# Patient Record
Sex: MALE | Race: WHITE | Employment: OTHER | URBAN - METROPOLITAN AREA
[De-identification: names, ages, dates, MRNs, and addresses within clinical notes are randomized per-mention and may not be internally consistent; named-entity substitution may affect disease eponyms.]

---

## 2017-01-18 PROBLEM — Z95.2 S/P AVR: Status: ACTIVE | Noted: 2017-01-18

## 2017-01-18 PROBLEM — Z95.1 S/P CABG (CORONARY ARTERY BYPASS GRAFT): Status: ACTIVE | Noted: 2017-01-18

## 2017-02-01 ENCOUNTER — NURSE ONLY (OUTPATIENT)
Dept: INTERNAL MEDICINE CLINIC | Facility: CLINIC | Age: 77
End: 2017-02-01

## 2017-02-01 ENCOUNTER — APPOINTMENT (OUTPATIENT)
Dept: LAB | Age: 77
End: 2017-02-01
Attending: INTERNAL MEDICINE
Payer: MEDICARE

## 2017-02-01 DIAGNOSIS — M35.3 POLYMYALGIA RHEUMATICA (HCC): Primary | ICD-10-CM

## 2017-02-01 DIAGNOSIS — R79.83 HOMOCYSTEINEMIA: ICD-10-CM

## 2017-02-01 DIAGNOSIS — M35.3 POLYMYALGIA RHEUMATICA (HCC): ICD-10-CM

## 2017-02-01 DIAGNOSIS — Z00.00 LABORATORY EXAMINATION ORDERED AS PART OF A ROUTINE GENERAL MEDICAL EXAMINATION: ICD-10-CM

## 2017-02-01 LAB
HOMOCYSTEINE: 9.4 UMOL/L (ref 5–13.9)
SED RATE-ML: 27 MM/HR (ref 0–12)

## 2017-02-01 PROCEDURE — 36415 COLL VENOUS BLD VENIPUNCTURE: CPT

## 2017-02-01 PROCEDURE — 83090 ASSAY OF HOMOCYSTEINE: CPT

## 2017-02-01 PROCEDURE — 93000 ELECTROCARDIOGRAM COMPLETE: CPT | Performed by: INTERNAL MEDICINE

## 2017-02-01 PROCEDURE — 94010 BREATHING CAPACITY TEST: CPT | Performed by: INTERNAL MEDICINE

## 2017-02-01 PROCEDURE — 99173 VISUAL ACUITY SCREEN: CPT | Performed by: INTERNAL MEDICINE

## 2017-02-01 PROCEDURE — 92551 PURE TONE HEARING TEST AIR: CPT | Performed by: INTERNAL MEDICINE

## 2017-02-01 PROCEDURE — 85652 RBC SED RATE AUTOMATED: CPT

## 2017-02-01 NOTE — PROGRESS NOTES
*LOOKIN' BODY RESULTS    YES: X      NO:       REASON TEST NOT PERFORMED:        HEIGHT: 5'11.5   WEIGHT: 173  _____________________________________________________________________________  *VENIPUNCTURE    YES:       NO:  X      REASON VENIPUNCTURE NOT PE

## 2017-02-02 RX ORDER — ROSUVASTATIN CALCIUM 20 MG/1
TABLET, FILM COATED ORAL
Qty: 90 TABLET | Refills: 0 | Status: SHIPPED | OUTPATIENT
Start: 2017-02-02 | End: 2017-04-27

## 2017-02-02 RX ORDER — PREDNISONE 1 MG/1
TABLET ORAL
Qty: 45 TABLET | Refills: 0 | Status: SHIPPED | OUTPATIENT
Start: 2017-02-02 | End: 2017-04-27

## 2017-02-02 RX ORDER — OMEPRAZOLE 10 MG/1
CAPSULE, DELAYED RELEASE ORAL
Qty: 90 CAPSULE | Refills: 0 | Status: SHIPPED | OUTPATIENT
Start: 2017-02-02 | End: 2017-04-27

## 2017-02-02 RX ORDER — CARVEDILOL 6.25 MG/1
TABLET ORAL
Qty: 180 TABLET | Refills: 0 | Status: SHIPPED | OUTPATIENT
Start: 2017-02-02 | End: 2017-04-27

## 2017-02-07 ENCOUNTER — MED REC SCAN ONLY (OUTPATIENT)
Dept: INTERNAL MEDICINE CLINIC | Facility: CLINIC | Age: 77
End: 2017-02-07

## 2017-02-09 NOTE — PROGRESS NOTES
HEALTH MAINTENANCE:        Immunization History  Administered            Date(s) Administered    Influenza             11/06/2013      Influenza Vaccine, High Dose, Preserv Free                          10/20/2014  11/30/2015      Influenza Virus Vaccine, Left Ear @ 25dBHL - 4000 Hz: Fail Right Ear @ 25dBHL - 4000 Hz: Fail   Left Ear @ 40dBHL - 500 Hz: Pass Right Ear @ 40 dBHL - 500 Hz: Pass   Left Ear @ 40dBHL - 1000 Hz: Pass Right Ear @ 40 dBHL - 1000 Hz: Pass   Left Ear @ 40dBHL - 2000 Hz: Pass Right E

## 2017-02-15 ENCOUNTER — OFFICE VISIT (OUTPATIENT)
Dept: INTERNAL MEDICINE CLINIC | Facility: CLINIC | Age: 77
End: 2017-02-15

## 2017-02-15 VITALS
WEIGHT: 173 LBS | HEIGHT: 71.5 IN | RESPIRATION RATE: 12 BRPM | TEMPERATURE: 97 F | OXYGEN SATURATION: 97 % | BODY MASS INDEX: 23.69 KG/M2 | HEART RATE: 66 BPM | SYSTOLIC BLOOD PRESSURE: 135 MMHG | DIASTOLIC BLOOD PRESSURE: 75 MMHG

## 2017-02-15 DIAGNOSIS — R80.8 OTHER PROTEINURIA: ICD-10-CM

## 2017-02-15 DIAGNOSIS — Z79.52 LONG TERM CURRENT USE OF SYSTEMIC STEROIDS: ICD-10-CM

## 2017-02-15 DIAGNOSIS — Z13.89 SCREENING FOR GENITOURINARY CONDITION: ICD-10-CM

## 2017-02-15 DIAGNOSIS — Z00.01 ENCOUNTER FOR GENERAL ADULT MEDICAL EXAMINATION WITH ABNORMAL FINDINGS: Primary | ICD-10-CM

## 2017-02-15 DIAGNOSIS — E55.9 VITAMIN D DEFICIENCY: ICD-10-CM

## 2017-02-15 DIAGNOSIS — M85.80 OSTEOPENIA, UNSPECIFIED LOCATION: ICD-10-CM

## 2017-02-15 LAB
APPEARANCE: CLEAR
CREAT UR-SCNC: 289 MG/DL
GLUCOSE (URINE DIPSTICK): NEGATIVE MG/DL
LEUKOCYTES: NEGATIVE
MICROALBUMIN UR-MCNC: 6.22 MG/DL
MICROALBUMIN/CREAT 24H UR-RTO: 21.5 UG/MG (ref ?–30)
MULTISTIX LOT#: ABNORMAL NUMERIC
NITRITE, URINE: NEGATIVE
OCCULT BLOOD: NEGATIVE
PH, URINE: 5.5 (ref 4.5–8)
PROTEIN (URINE DIPSTICK): 30 MG/DL
SPECIFIC GRAVITY: 1.03 (ref 1–1.03)
UROBILINOGEN,SEMI-QN: 0.2 MG/DL (ref 0–1.9)

## 2017-02-15 PROCEDURE — 82570 ASSAY OF URINE CREATININE: CPT | Performed by: INTERNAL MEDICINE

## 2017-02-15 PROCEDURE — G0439 PPPS, SUBSEQ VISIT: HCPCS | Performed by: INTERNAL MEDICINE

## 2017-02-15 PROCEDURE — 81003 URINALYSIS AUTO W/O SCOPE: CPT | Performed by: INTERNAL MEDICINE

## 2017-02-15 PROCEDURE — 82043 UR ALBUMIN QUANTITATIVE: CPT | Performed by: INTERNAL MEDICINE

## 2017-02-16 NOTE — PROGRESS NOTES
1      REASON FOR VISIT: [facility]/Medicare annual on 02/15/2017:    1. First consideration is cardiovascular: history of hypertension, lipids, and stents along with triple bypass and AVR in 2013, all doing well on meds.  Recent echo and nuclear stress 07/2015 Plus.  5. Snoring, recommended you have a home sleep study in the past-reconsider. CONCLUSIONS: Chris Gaines, you have done very well with your multiple medical problems, stay on your current medicines and supplements except B12.  Will send a copy of these res

## 2017-02-16 NOTE — PROGRESS NOTES
HPI:    Patient ID: Britney Urbina is a 78year old male here for annual 2-15-17    HPI    Multiple stable medical problems:      Review of Systems   Constitutional: Positive for fatigue. Negative for fever.    Eyes:        [place] eye   Respiratory: Vitamin K 100 MCG Oral Tab Take 100 mcg by mouth daily.  Disp:  Rfl:    Probiotic Product ([address]) Oral Cap One a day Disp:  Rfl:    Aspirin 325 MG Oral Tab EC Take one a day Disp: 120 Tab Rfl: 0   Cyanocobalamin (VITAMIN B 12 OR) Take  by gastroesophageal reflux     Vitamin D deficiency     Hyperhomocysteinemia (HCC)     Diverticulosis     Colon polyps     Dyslipidemia     Carotid artery plaque     Renal cyst, left     Osteopenia     S/P AVR     S/P CABG (coronary artery bypass graft)     R Comment [place]-neg    SKIN TAGS  10/1714    Comment plus histofreeze    CORTISONE INJECTION  7/6/15    Comment rt shoulder        Family History:  Family History   Problem Relation Age of Onset   • Neurological Disorder Father      parkinsons   • index is 23.79 kg/(m^2).       Health Screens    Anxiety Index: No major anxiety evident  Depression Index: No major depression evident  Cage Questionnaire: No indication of alcohol abuse  Sleepiness Index: Normal  Male Sexual Health Inventory: Normal  [place] Normal 5.6    TSH: Normal 1.770    PSA: Normal 0.659    CBC: Normal     URINALYSIS: Trace protein so special MAB sent=21.5 a little high-just watch  ___    Patient Active Problem List  Sed Rate Date: 02/01/2017   Value: 27* Ref Range 0-12 mm/Hr Status: Fin

## 2017-02-17 ENCOUNTER — TELEPHONE (OUTPATIENT)
Dept: INTERNAL MEDICINE CLINIC | Facility: CLINIC | Age: 77
End: 2017-02-17

## 2017-02-17 DIAGNOSIS — I35.0 NONRHEUMATIC AORTIC VALVE STENOSIS: ICD-10-CM

## 2017-02-17 DIAGNOSIS — E72.11 HYPERHOMOCYSTEINEMIA (HCC): ICD-10-CM

## 2017-02-17 DIAGNOSIS — I15.9 SECONDARY HYPERTENSION: Primary | ICD-10-CM

## 2017-02-17 DIAGNOSIS — I25.10 CORONARY ARTERY DISEASE INVOLVING NATIVE CORONARY ARTERY OF NATIVE HEART WITHOUT ANGINA PECTORIS: ICD-10-CM

## 2017-02-17 DIAGNOSIS — E53.8 B12 DEFICIENCY: ICD-10-CM

## 2017-02-17 NOTE — TELEPHONE ENCOUNTER
Patient declined unattended sleep study test at this time. (at home test). Informed patient of labs due in 3 months.

## 2017-03-13 ENCOUNTER — MED REC SCAN ONLY (OUTPATIENT)
Dept: INTERNAL MEDICINE CLINIC | Facility: CLINIC | Age: 77
End: 2017-03-13

## 2017-04-27 RX ORDER — OMEPRAZOLE 10 MG/1
CAPSULE, DELAYED RELEASE ORAL
Qty: 90 CAPSULE | Refills: 3 | Status: SHIPPED | OUTPATIENT
Start: 2017-04-27 | End: 2018-02-28

## 2017-04-27 RX ORDER — ROSUVASTATIN CALCIUM 20 MG/1
TABLET, FILM COATED ORAL
Qty: 90 TABLET | Refills: 3 | Status: SHIPPED | OUTPATIENT
Start: 2017-04-27 | End: 2018-05-01

## 2017-04-27 RX ORDER — PREDNISONE 1 MG/1
TABLET ORAL
Qty: 45 TABLET | Refills: 3 | Status: SHIPPED | OUTPATIENT
Start: 2017-04-27 | End: 2018-05-01

## 2017-04-27 RX ORDER — AMLODIPINE BESYLATE 5 MG/1
TABLET ORAL
Qty: 90 TABLET | Refills: 3 | Status: SHIPPED | OUTPATIENT
Start: 2017-04-27 | End: 2018-05-01

## 2017-04-27 RX ORDER — CARVEDILOL 6.25 MG/1
TABLET ORAL
Qty: 180 TABLET | Refills: 3 | Status: SHIPPED | OUTPATIENT
Start: 2017-04-27 | End: 2018-05-01

## 2017-05-01 ENCOUNTER — NURSE ONLY (OUTPATIENT)
Dept: INTERNAL MEDICINE CLINIC | Facility: CLINIC | Age: 77
End: 2017-05-01

## 2017-05-01 ENCOUNTER — APPOINTMENT (OUTPATIENT)
Dept: LAB | Age: 77
End: 2017-05-01
Attending: INTERNAL MEDICINE
Payer: MEDICARE

## 2017-05-01 DIAGNOSIS — I15.9 SECONDARY HYPERTENSION: ICD-10-CM

## 2017-05-01 DIAGNOSIS — I35.0 NONRHEUMATIC AORTIC VALVE STENOSIS: ICD-10-CM

## 2017-05-01 DIAGNOSIS — E72.11 HYPERHOMOCYSTEINEMIA (HCC): ICD-10-CM

## 2017-05-01 DIAGNOSIS — E53.8 B12 DEFICIENCY: ICD-10-CM

## 2017-05-01 DIAGNOSIS — I25.10 CORONARY ARTERY DISEASE INVOLVING NATIVE CORONARY ARTERY OF NATIVE HEART WITHOUT ANGINA PECTORIS: ICD-10-CM

## 2017-05-01 PROCEDURE — 85652 RBC SED RATE AUTOMATED: CPT

## 2017-05-01 PROCEDURE — 36415 COLL VENOUS BLD VENIPUNCTURE: CPT

## 2017-05-01 PROCEDURE — 82607 VITAMIN B-12: CPT

## 2017-05-01 PROCEDURE — 83090 ASSAY OF HOMOCYSTEINE: CPT

## 2017-05-03 ENCOUNTER — TELEPHONE (OUTPATIENT)
Dept: INTERNAL MEDICINE CLINIC | Facility: CLINIC | Age: 77
End: 2017-05-03

## 2017-05-03 ENCOUNTER — MED REC SCAN ONLY (OUTPATIENT)
Dept: INTERNAL MEDICINE CLINIC | Facility: CLINIC | Age: 77
End: 2017-05-03

## 2017-05-04 NOTE — PROGRESS NOTES
MPO collected and sent to [facility] for testing.  Per pt sent to satellite lab for additional labs to do through [facility]

## 2017-05-15 ENCOUNTER — OFFICE VISIT (OUTPATIENT)
Dept: INTERNAL MEDICINE CLINIC | Facility: CLINIC | Age: 77
End: 2017-05-15

## 2017-05-15 VITALS
HEART RATE: 77 BPM | RESPIRATION RATE: 12 BRPM | OXYGEN SATURATION: 99 % | WEIGHT: 176 LBS | BODY MASS INDEX: 24 KG/M2 | TEMPERATURE: 98 F | SYSTOLIC BLOOD PRESSURE: 138 MMHG | DIASTOLIC BLOOD PRESSURE: 75 MMHG

## 2017-05-15 DIAGNOSIS — Z95.1 S/P CABG (CORONARY ARTERY BYPASS GRAFT): ICD-10-CM

## 2017-05-15 DIAGNOSIS — I35.0 NONRHEUMATIC AORTIC VALVE STENOSIS: ICD-10-CM

## 2017-05-15 DIAGNOSIS — Z95.2 S/P AVR: ICD-10-CM

## 2017-05-15 DIAGNOSIS — M35.3 PMR (POLYMYALGIA RHEUMATICA) (HCC): Primary | ICD-10-CM

## 2017-05-15 DIAGNOSIS — I25.10 CORONARY ARTERY DISEASE INVOLVING NATIVE CORONARY ARTERY OF NATIVE HEART WITHOUT ANGINA PECTORIS: ICD-10-CM

## 2017-05-15 DIAGNOSIS — Z83.6 FAMILY HISTORY OF PULMONARY FIBROSIS: ICD-10-CM

## 2017-05-15 PROCEDURE — 99214 OFFICE O/P EST MOD 30 MIN: CPT | Performed by: INTERNAL MEDICINE

## 2017-05-17 NOTE — PROGRESS NOTES
HPI:    Patient ID: Lexus Garcia is a 78year old male PMR and heart disease stable    Joint Pain  This is a chronic (PMR) problem. The current episode started more than 1 year ago. The problem occurs constantly. The problem has been unchanged.  Associat (CLOBETASOL PROPIONATE E) 0.05 % Apply Externally Cream Apply  topically 2 (two) times daily. prn Disp:  Rfl:    Vitamin K 100 MCG Oral Tab Take 100 mcg by mouth daily.  Disp:  Rfl:    Probiotic Product ([address]) Oral Cap One a day Disp:  Rfl:

## 2017-09-22 ENCOUNTER — TELEPHONE (OUTPATIENT)
Dept: INTERNAL MEDICINE CLINIC | Facility: CLINIC | Age: 77
End: 2017-09-22

## 2017-09-22 ENCOUNTER — APPOINTMENT (OUTPATIENT)
Dept: LAB | Age: 77
End: 2017-09-22
Attending: INTERNAL MEDICINE
Payer: MEDICARE

## 2017-09-22 DIAGNOSIS — I25.10 CORONARY ARTERY DISEASE, ANGINA PRESENCE UNSPECIFIED, UNSPECIFIED VESSEL OR LESION TYPE, UNSPECIFIED WHETHER NATIVE OR TRANSPLANTED HEART: ICD-10-CM

## 2017-09-22 DIAGNOSIS — E72.11 HYPERHOMOCYSTEINEMIA (HCC): ICD-10-CM

## 2017-09-22 DIAGNOSIS — M35.3 PMR (POLYMYALGIA RHEUMATICA) (HCC): ICD-10-CM

## 2017-09-22 DIAGNOSIS — E53.8 VITAMIN B12 DEFICIENCY: ICD-10-CM

## 2017-09-22 DIAGNOSIS — I10 HYPERTENSION, UNSPECIFIED TYPE: Primary | ICD-10-CM

## 2017-09-22 PROCEDURE — 83090 ASSAY OF HOMOCYSTEINE: CPT

## 2017-09-22 PROCEDURE — 82607 VITAMIN B-12: CPT

## 2017-09-22 PROCEDURE — 36415 COLL VENOUS BLD VENIPUNCTURE: CPT

## 2017-09-22 PROCEDURE — 85652 RBC SED RATE AUTOMATED: CPT

## 2017-09-22 NOTE — TELEPHONE ENCOUNTER
Elevated MPO in Feb. We raised  qd to bid and asked for 3mo redo = May so past due. Can ask [facility] to send to [facility] or redraw Mon.

## 2017-09-22 NOTE — TELEPHONE ENCOUNTER
Pt in lab thought he needed blood work what would Dr Lesly Greer like? ?  vo dr Obi Olsen sed rate, vit b12 and homocystine lab orders placed lab aware

## 2017-09-22 NOTE — TELEPHONE ENCOUNTER
Pt has questions regarding follow up labs please call  I put in sed rate,Vitamin B12 and homocystine already

## 2017-09-26 ENCOUNTER — NURSE ONLY (OUTPATIENT)
Dept: INTERNAL MEDICINE CLINIC | Facility: CLINIC | Age: 77
End: 2017-09-26

## 2017-09-26 NOTE — PROGRESS NOTES
Patient here for an MPO bloo draw to be sent to [facility].     Patient drawn on right arm by Jose Quintero

## 2017-10-02 ENCOUNTER — MED REC SCAN ONLY (OUTPATIENT)
Dept: INTERNAL MEDICINE CLINIC | Facility: CLINIC | Age: 77
End: 2017-10-02

## 2017-10-05 ENCOUNTER — OFFICE VISIT (OUTPATIENT)
Dept: INTERNAL MEDICINE CLINIC | Facility: CLINIC | Age: 77
End: 2017-10-05

## 2017-10-05 VITALS
BODY MASS INDEX: 24 KG/M2 | HEART RATE: 62 BPM | RESPIRATION RATE: 12 BRPM | OXYGEN SATURATION: 98 % | SYSTOLIC BLOOD PRESSURE: 140 MMHG | WEIGHT: 175 LBS | TEMPERATURE: 98 F | DIASTOLIC BLOOD PRESSURE: 68 MMHG

## 2017-10-05 DIAGNOSIS — E72.11 HYPERHOMOCYSTEINEMIA (HCC): ICD-10-CM

## 2017-10-05 DIAGNOSIS — M35.3 PMR (POLYMYALGIA RHEUMATICA) (HCC): Primary | ICD-10-CM

## 2017-10-05 DIAGNOSIS — Z23 NEED FOR INFLUENZA VACCINATION: ICD-10-CM

## 2017-10-05 DIAGNOSIS — E53.8 VITAMIN B12 DEFICIENCY: ICD-10-CM

## 2017-10-05 PROCEDURE — G0008 ADMIN INFLUENZA VIRUS VAC: HCPCS | Performed by: INTERNAL MEDICINE

## 2017-10-05 PROCEDURE — 90653 IIV ADJUVANT VACCINE IM: CPT | Performed by: INTERNAL MEDICINE

## 2017-10-05 PROCEDURE — 99213 OFFICE O/P EST LOW 20 MIN: CPT | Performed by: INTERNAL MEDICINE

## 2017-10-05 PROCEDURE — 96372 THER/PROPH/DIAG INJ SC/IM: CPT | Performed by: INTERNAL MEDICINE

## 2017-10-05 RX ORDER — CYANOCOBALAMIN 1000 UG/ML
1000 INJECTION INTRAMUSCULAR; SUBCUTANEOUS ONCE
Status: COMPLETED | OUTPATIENT
Start: 2017-10-05 | End: 2017-10-05

## 2017-10-05 RX ADMIN — CYANOCOBALAMIN 1000 MCG: 1000 INJECTION INTRAMUSCULAR; SUBCUTANEOUS at 12:07:00

## 2017-10-05 NOTE — PROGRESS NOTES
HPI:    Patient ID: Roxy Monet is a 78year old male hx PMR recent ESR stable 28 on lodose Pred. Recently added B12 for low B12 and high homo    On B12 for level in 200s and high homo >11. Tired, minor aches and pains.         Review of Systems   Const Cyanocobalamin (VITAMIN B 12 OR) Take 1,000 mcg by mouth daily. Disp:  Rfl:    Riboflavin 100 MG Oral Cap Take  by mouth 3 (three) times a week. m-w-fr Disp:  Rfl:    Nutritional Supplements (JUICE PLUS FIBRE OR) Take  by mouth.  Disp:  Rfl:    Betaine,

## 2017-10-09 ENCOUNTER — TELEPHONE (OUTPATIENT)
Dept: INTERNAL MEDICINE CLINIC | Facility: CLINIC | Age: 77
End: 2017-10-09

## 2017-10-09 NOTE — TELEPHONE ENCOUNTER
As instructed  called pt to schedule OV with Dr. Colleen Navas to go over stress test.  Patient states he had OV last week with Dr. Colleen Navas who told him make sure copies of both cardiac tests (he's having another one done Thursday)  are sent to him and no

## 2017-10-09 NOTE — TELEPHONE ENCOUNTER
Per Dr Nikos Young - once pt has stress test, he would like to see pt 3-4 days later to f/u. Please contact pt to get in Thurs/Friday.   He had stress test this am.

## 2018-01-05 ENCOUNTER — TELEPHONE (OUTPATIENT)
Dept: INTERNAL MEDICINE CLINIC | Facility: CLINIC | Age: 78
End: 2018-01-05

## 2018-01-05 RX ORDER — CODEINE PHOSPHATE AND GUAIFENESIN 10; 100 MG/5ML; MG/5ML
5 SOLUTION ORAL EVERY 6 HOURS PRN
Qty: 118 ML | Refills: 0 | Status: CANCELLED | OUTPATIENT
Start: 2018-01-05

## 2018-01-05 RX ORDER — OSELTAMIVIR PHOSPHATE 75 MG/1
75 CAPSULE ORAL 2 TIMES DAILY
Qty: 10 CAPSULE | Refills: 0 | Status: SHIPPED | OUTPATIENT
Start: 2018-01-05 | End: 2018-01-06

## 2018-01-06 ENCOUNTER — TELEPHONE (OUTPATIENT)
Dept: INTERNAL MEDICINE CLINIC | Facility: CLINIC | Age: 78
End: 2018-01-06

## 2018-01-06 RX ORDER — OSELTAMIVIR PHOSPHATE 75 MG/1
75 CAPSULE ORAL 2 TIMES DAILY
Qty: 10 CAPSULE | Refills: 0 | Status: SHIPPED | OUTPATIENT
Start: 2018-01-06 | End: 2018-02-26 | Stop reason: ALTCHOICE

## 2018-01-08 ENCOUNTER — TELEPHONE (OUTPATIENT)
Dept: INTERNAL MEDICINE CLINIC | Facility: CLINIC | Age: 78
End: 2018-01-08

## 2018-01-08 NOTE — TELEPHONE ENCOUNTER
Per Dr Aviles Matters - call to get condition update on pt. Wife was positive flu; pt tx as well. Spoke to pt wife; she states pt is doing better.

## 2018-01-24 ENCOUNTER — TELEPHONE (OUTPATIENT)
Dept: INTERNAL MEDICINE CLINIC | Facility: CLINIC | Age: 78
End: 2018-01-24

## 2018-02-16 ENCOUNTER — NURSE ONLY (OUTPATIENT)
Dept: INTERNAL MEDICINE CLINIC | Facility: CLINIC | Age: 78
End: 2018-02-16

## 2018-02-16 ENCOUNTER — APPOINTMENT (OUTPATIENT)
Dept: LAB | Age: 78
End: 2018-02-16
Attending: INTERNAL MEDICINE
Payer: MEDICARE

## 2018-02-16 ENCOUNTER — TELEPHONE (OUTPATIENT)
Dept: INTERNAL MEDICINE CLINIC | Facility: CLINIC | Age: 78
End: 2018-02-16

## 2018-02-16 DIAGNOSIS — Z86.010 PERSONAL HISTORY OF COLONIC POLYPS: Primary | ICD-10-CM

## 2018-02-16 DIAGNOSIS — Z00.00 LABORATORY EXAMINATION ORDERED AS PART OF A ROUTINE GENERAL MEDICAL EXAMINATION: ICD-10-CM

## 2018-02-16 DIAGNOSIS — M35.3 PMR (POLYMYALGIA RHEUMATICA) (HCC): ICD-10-CM

## 2018-02-16 DIAGNOSIS — M35.3 PMR (POLYMYALGIA RHEUMATICA) (HCC): Primary | ICD-10-CM

## 2018-02-16 LAB
AMB EXT LDL CHOLESTEROL, DIRECT: 60 MG/DL
AMB EXT PSA SCREEN: 0.55 NG/ML
BILIRUB UR QL STRIP.AUTO: NEGATIVE
COLOR UR AUTO: YELLOW
GLUCOSE UR STRIP.AUTO-MCNC: NEGATIVE MG/DL
KETONES UR STRIP.AUTO-MCNC: NEGATIVE MG/DL
LEUKOCYTE ESTERASE UR QL STRIP.AUTO: NEGATIVE
NITRITE UR QL STRIP.AUTO: NEGATIVE
PH UR STRIP.AUTO: 5 [PH] (ref 4.5–8)
PROT UR STRIP.AUTO-MCNC: NEGATIVE MG/DL
RBC UR QL AUTO: NEGATIVE
SED RATE-ML: 28 MM/HR (ref 0–12)
SP GR UR STRIP.AUTO: 1.02 (ref 1–1.03)
UROBILINOGEN UR STRIP.AUTO-MCNC: <2 MG/DL

## 2018-02-16 PROCEDURE — 81003 URINALYSIS AUTO W/O SCOPE: CPT | Performed by: INTERNAL MEDICINE

## 2018-02-16 PROCEDURE — 99173 VISUAL ACUITY SCREEN: CPT | Performed by: INTERNAL MEDICINE

## 2018-02-16 PROCEDURE — 85652 RBC SED RATE AUTOMATED: CPT

## 2018-02-16 PROCEDURE — 94010 BREATHING CAPACITY TEST: CPT | Performed by: INTERNAL MEDICINE

## 2018-02-16 PROCEDURE — 93000 ELECTROCARDIOGRAM COMPLETE: CPT | Performed by: INTERNAL MEDICINE

## 2018-02-16 PROCEDURE — 92551 PURE TONE HEARING TEST AIR: CPT | Performed by: INTERNAL MEDICINE

## 2018-02-16 PROCEDURE — 36415 COLL VENOUS BLD VENIPUNCTURE: CPT

## 2018-02-16 NOTE — TELEPHONE ENCOUNTER
PC regarding lab SED results, no change in low dose Prednisone. [facility]

## 2018-02-16 NOTE — TELEPHONE ENCOUNTER
----- Message from Nieves Mcneal MD sent at 2/16/2018 11:16 AM CST -----  ESR 28 stable no change lodose prednisone rest sent to [facility]

## 2018-02-16 NOTE — PROGRESS NOTES
*LOOKIN' BODY RESULTS    YES: X      NO:       REASON TEST NOT PERFORMED:        HEIGHT: 5'10.7   WEIGHT: 173  _____________________________________________________________________________  *VENIPUNCTURE    YES:       NO:  X      REASON VENIPUNCTURE NOT PE

## 2018-02-20 NOTE — PROGRESS NOTES
HEALTH MAINTENANCE:        Immunization History  Administered            Date(s) Administered    Fluad High dose 72 yr and older (30131)                          10/05/2017      Influenza             11/06/2013      Influenza Vaccine, High Dose, Preserv Fr Right Ear @ 25dBHL - 4000 Hz: Fail   Left Ear @ 40dBHL - 500 Hz: Pass Right Ear @ 40 dBHL - 500 Hz: Pass   Left Ear @ 40dBHL - 1000 Hz: Pass Right Ear @ 40 dBHL - 1000 Hz: Pass   Left Ear @ 40dBHL - 2000 Hz: Pass Right Ear @ 40 dBHL - 2000 Hz: Pass   Left

## 2018-02-21 ENCOUNTER — MED REC SCAN ONLY (OUTPATIENT)
Dept: INTERNAL MEDICINE CLINIC | Facility: CLINIC | Age: 78
End: 2018-02-21

## 2018-02-26 ENCOUNTER — OFFICE VISIT (OUTPATIENT)
Dept: INTERNAL MEDICINE CLINIC | Facility: CLINIC | Age: 78
End: 2018-02-26

## 2018-02-26 VITALS
BODY MASS INDEX: 24.22 KG/M2 | OXYGEN SATURATION: 97 % | HEIGHT: 70.7 IN | RESPIRATION RATE: 12 BRPM | WEIGHT: 173 LBS | DIASTOLIC BLOOD PRESSURE: 77 MMHG | SYSTOLIC BLOOD PRESSURE: 150 MMHG | HEART RATE: 58 BPM | TEMPERATURE: 98 F

## 2018-02-26 DIAGNOSIS — Z00.01 ENCOUNTER FOR GENERAL ADULT MEDICAL EXAMINATION WITH ABNORMAL FINDINGS: Primary | ICD-10-CM

## 2018-02-26 PROCEDURE — G0439 PPPS, SUBSEQ VISIT: HCPCS | Performed by: INTERNAL MEDICINE

## 2018-02-27 ENCOUNTER — TELEPHONE (OUTPATIENT)
Dept: INTERNAL MEDICINE CLINIC | Facility: CLINIC | Age: 78
End: 2018-02-27

## 2018-02-27 DIAGNOSIS — Z95.2 S/P AVR (AORTIC VALVE REPLACEMENT): ICD-10-CM

## 2018-02-27 DIAGNOSIS — M85.80 OSTEOPENIA, UNSPECIFIED LOCATION: Primary | ICD-10-CM

## 2018-02-27 DIAGNOSIS — I70.90 ATHEROSCLEROSIS: ICD-10-CM

## 2018-02-27 DIAGNOSIS — I65.29 CAROTID ATHEROSCLEROSIS, UNSPECIFIED LATERALITY: ICD-10-CM

## 2018-02-27 DIAGNOSIS — I10 HYPERTENSION, UNSPECIFIED TYPE: ICD-10-CM

## 2018-02-27 DIAGNOSIS — Z95.1 S/P CABG (CORONARY ARTERY BYPASS GRAFT): ICD-10-CM

## 2018-02-27 DIAGNOSIS — E72.11 HYPERHOMOCYSTINEMIA (HCC): ICD-10-CM

## 2018-02-27 NOTE — TELEPHONE ENCOUNTER
LM for pt to call back. Need to let him know what was ordered for him and that I will mail out everything to home. Also sent my chart message detailing everything. Also, Dr Nikos Young wants to know is he having EGD along with colonoscopy in April?  If yes

## 2018-02-27 NOTE — PROGRESS NOTES
HPI:    Patient ID: Kay Valerio is a 70year old male CPE 2-26-18:    No new problems:        Review of Systems   Constitutional: Positive for fatigue. Negative for fever. Eyes:        [place] eye   Respiratory: Negative.     Cardiovascular:        Hx Disp: 30 capsule Rfl: 0   Clobetasol Prop Emollient Base (CLOBETASOL PROPIONATE E) 0.05 % Apply Externally Cream Apply  topically 2 (two) times daily. prn Disp:  Rfl:    Vitamin K 100 MCG Oral Tab Take 100 mcg by mouth daily.  Disp:  Rfl:    Probiotic Produ polyps     Dyslipidemia     Carotid artery plaque     Osteopenia     S/P AVR     S/P CABG (coronary artery bypass graft)     Proteinuria     Multiple renal cysts     Renal stone      Past Medical History:  Past Medical History:   Diagnosis Date   • Aortic UPPER GI ENDOSCOPY PERFORMED      Comment: Eliu/Rafi    Family History:  Family History   Problem Relation Age of Onset   • Neurological Disorder Father      parkinsons   • Cancer Father      lung   • Other[Other] [OTHER] Father    • Mental Disorder Mo SpO2: 97%    Weight: 173 lb    Height: 70.7\"      Body mass index is 24.33 kg/m².       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 You had a previous bone marrow exam as well showing no malignancy.  No reason to

## 2018-02-28 RX ORDER — OMEPRAZOLE 10 MG/1
CAPSULE, DELAYED RELEASE ORAL
Qty: 90 CAPSULE | Refills: 3 | COMMUNITY
Start: 2018-02-28 | End: 2018-05-01

## 2018-02-28 NOTE — TELEPHONE ENCOUNTER
[address] [address] then he must not be having a lot of reflux so make the omeprazole 10mg which should be OTC PRN. If having DAILY reflux despite the omeprazole then I will speak to Chris Goyal about doing EGD same time to avoid having to come back for the EGD alone.

## 2018-02-28 NOTE — TELEPHONE ENCOUNTER
Pt wife called back. Informed her of orders. Per pt, will NOT be having EGD with Dr. Misa Yañez in April.

## 2018-02-28 NOTE — TELEPHONE ENCOUNTER
Per pt; NO daily sx of reflux. Spoke to Dr. Jose Carlos Lewis in detail specifically about reflux and EGD and Dr. Jose Carlos Lewis said was not necessary. Pt said thank you to Dr Kristopher Banerjee for following up on this. Pt does still use omeprazole only as needed.

## 2018-04-03 NOTE — H&P
[facility]  Pre-procedure History and Physical      Javi Dominguez Patient Status:  Hospital Outpatient Surgery    1940 MRN [id number]   [facility] ENDOSCOPY Attending Isabel Alexis MD   [address] # 0 PCP Michael Liang MD

## 2018-04-04 ENCOUNTER — HOSPITAL ENCOUNTER (OUTPATIENT)
Facility: HOSPITAL | Age: 78
Setting detail: HOSPITAL OUTPATIENT SURGERY
Discharge: HOME | End: 2018-04-04
Attending: INTERNAL MEDICINE | Admitting: INTERNAL MEDICINE
Payer: MEDICARE

## 2018-04-04 ENCOUNTER — SURGERY (OUTPATIENT)
Age: 78
End: 2018-04-04

## 2018-04-04 VITALS
WEIGHT: 175 LBS | HEIGHT: 71 IN | SYSTOLIC BLOOD PRESSURE: 132 MMHG | OXYGEN SATURATION: 99 % | RESPIRATION RATE: 16 BRPM | DIASTOLIC BLOOD PRESSURE: 49 MMHG | TEMPERATURE: 98 F | HEART RATE: 59 BPM | BODY MASS INDEX: 24.5 KG/M2

## 2018-04-04 DIAGNOSIS — Z86.010 PERSONAL HISTORY OF COLONIC POLYPS: ICD-10-CM

## 2018-04-04 PROCEDURE — 88305 TISSUE EXAM BY PATHOLOGIST: CPT | Performed by: INTERNAL MEDICINE

## 2018-04-04 PROCEDURE — 0DBN8ZX EXCISION OF SIGMOID COLON, VIA NATURAL OR ARTIFICIAL OPENING ENDOSCOPIC, DIAGNOSTIC: ICD-10-PCS | Performed by: INTERNAL MEDICINE

## 2018-04-04 PROCEDURE — 99152 MOD SED SAME PHYS/QHP 5/>YRS: CPT | Performed by: INTERNAL MEDICINE

## 2018-04-04 PROCEDURE — 0DBM8ZX EXCISION OF DESCENDING COLON, VIA NATURAL OR ARTIFICIAL OPENING ENDOSCOPIC, DIAGNOSTIC: ICD-10-PCS | Performed by: INTERNAL MEDICINE

## 2018-04-04 PROCEDURE — 99153 MOD SED SAME PHYS/QHP EA: CPT | Performed by: INTERNAL MEDICINE

## 2018-04-04 RX ORDER — MIDAZOLAM HYDROCHLORIDE 1 MG/ML
INJECTION INTRAMUSCULAR; INTRAVENOUS
Status: DISCONTINUED | OUTPATIENT
Start: 2018-04-04 | End: 2018-04-04

## 2018-04-04 RX ORDER — SODIUM CHLORIDE, SODIUM LACTATE, POTASSIUM CHLORIDE, CALCIUM CHLORIDE 600; 310; 30; 20 MG/100ML; MG/100ML; MG/100ML; MG/100ML
INJECTION, SOLUTION INTRAVENOUS CONTINUOUS
Status: DISCONTINUED | OUTPATIENT
Start: 2018-04-04 | End: 2018-04-04

## 2018-04-04 NOTE — OPERATIVE REPORT
[facility]  Colonoscopy Report      Janna Joseph Patient Status:  Hospital Outpatient Surgery    1940 MRN [id number]   Location [address] [place] [address] Attending Ilsa Pathak MD       DATE OF OPERATION: 2018     PREOPERATIVE CARLA

## 2018-04-06 ENCOUNTER — TELEPHONE (OUTPATIENT)
Dept: INTERNAL MEDICINE CLINIC | Facility: CLINIC | Age: 78
End: 2018-04-06

## 2018-05-01 RX ORDER — OMEPRAZOLE 10 MG/1
10 CAPSULE, DELAYED RELEASE ORAL DAILY
Qty: 90 CAPSULE | Refills: 3 | Status: SHIPPED | OUTPATIENT
Start: 2018-05-01 | End: 2019-05-11

## 2018-05-01 RX ORDER — AMLODIPINE BESYLATE 5 MG/1
TABLET ORAL
Qty: 90 TABLET | Refills: 3 | Status: SHIPPED | OUTPATIENT
Start: 2018-05-01 | End: 2019-05-11

## 2018-05-01 RX ORDER — ROSUVASTATIN CALCIUM 20 MG/1
TABLET, COATED ORAL
Qty: 90 TABLET | Refills: 3 | Status: SHIPPED | OUTPATIENT
Start: 2018-05-01 | End: 2019-03-21

## 2018-05-01 RX ORDER — PREDNISONE 1 MG/1
TABLET ORAL
Qty: 45 TABLET | Refills: 3 | Status: SHIPPED | OUTPATIENT
Start: 2018-05-01 | End: 2019-03-21

## 2018-05-01 RX ORDER — CARVEDILOL 6.25 MG/1
TABLET ORAL
Qty: 180 TABLET | Refills: 3 | Status: SHIPPED | OUTPATIENT
Start: 2018-05-01 | End: 2019-05-11

## 2018-05-01 NOTE — TELEPHONE ENCOUNTER
CPE 2/26/18    Refill all meds expect Omeprazole 4/2017 90 days + 3  Omeprazole ordered 2/26/18 # 90, 0 refills

## 2018-05-31 ENCOUNTER — OFFICE VISIT (OUTPATIENT)
Dept: INTERNAL MEDICINE CLINIC | Facility: CLINIC | Age: 78
End: 2018-05-31

## 2018-05-31 VITALS
OXYGEN SATURATION: 98 % | RESPIRATION RATE: 14 BRPM | HEART RATE: 68 BPM | DIASTOLIC BLOOD PRESSURE: 60 MMHG | SYSTOLIC BLOOD PRESSURE: 128 MMHG

## 2018-05-31 DIAGNOSIS — I10 ESSENTIAL HYPERTENSION: ICD-10-CM

## 2018-05-31 DIAGNOSIS — M35.3 PMR (POLYMYALGIA RHEUMATICA) (HCC): Primary | ICD-10-CM

## 2018-05-31 PROCEDURE — 99213 OFFICE O/P EST LOW 20 MIN: CPT | Performed by: INTERNAL MEDICINE

## 2018-05-31 NOTE — PROGRESS NOTES
All 30 min discussion of lab from Feb, and testing ordered for Aug. No change meds. Heart-aortic murmur, chest clear. Minimal myalgias from PMR hope to reduce prednisone at next OV. On will call.

## 2018-06-19 ENCOUNTER — TELEPHONE (OUTPATIENT)
Dept: INTERNAL MEDICINE CLINIC | Facility: CLINIC | Age: 78
End: 2018-06-19

## 2018-06-19 ENCOUNTER — HOSPITAL ENCOUNTER (OUTPATIENT)
Dept: GENERAL RADIOLOGY | Facility: HOSPITAL | Age: 78
Discharge: HOME | End: 2018-06-19
Attending: INTERNAL MEDICINE
Payer: MEDICARE

## 2018-06-19 ENCOUNTER — OFFICE VISIT (OUTPATIENT)
Dept: INTERNAL MEDICINE CLINIC | Facility: CLINIC | Age: 78
End: 2018-06-19

## 2018-06-19 VITALS
DIASTOLIC BLOOD PRESSURE: 84 MMHG | WEIGHT: 175 LBS | BODY MASS INDEX: 24.5 KG/M2 | SYSTOLIC BLOOD PRESSURE: 148 MMHG | HEIGHT: 71 IN | HEART RATE: 76 BPM | TEMPERATURE: 98 F | RESPIRATION RATE: 16 BRPM

## 2018-06-19 DIAGNOSIS — R10.9 ACUTE LEFT FLANK PAIN: Primary | ICD-10-CM

## 2018-06-19 DIAGNOSIS — S22.080A COMPRESSION FRACTURE OF T12 VERTEBRA (HCC): ICD-10-CM

## 2018-06-19 DIAGNOSIS — S22.080D CLOSED WEDGE COMPRESSION FRACTURE OF T11 VERTEBRA WITH ROUTINE HEALING: ICD-10-CM

## 2018-06-19 DIAGNOSIS — S22.080D CLOSED WEDGE COMPRESSION FRACTURE OF ELEVENTH THORACIC VERTEBRA WITH ROUTINE HEALING, SUBSEQUENT ENCOUNTER: ICD-10-CM

## 2018-06-19 DIAGNOSIS — M51.9 LUMBAR DISC DISEASE: ICD-10-CM

## 2018-06-19 DIAGNOSIS — R10.9 ACUTE LEFT FLANK PAIN: ICD-10-CM

## 2018-06-19 PROCEDURE — 72110 X-RAY EXAM L-2 SPINE 4/>VWS: CPT | Performed by: INTERNAL MEDICINE

## 2018-06-19 PROCEDURE — 72220 X-RAY EXAM SACRUM TAILBONE: CPT | Performed by: INTERNAL MEDICINE

## 2018-06-19 PROCEDURE — 81003 URINALYSIS AUTO W/O SCOPE: CPT | Performed by: INTERNAL MEDICINE

## 2018-06-19 PROCEDURE — 99214 OFFICE O/P EST MOD 30 MIN: CPT | Performed by: INTERNAL MEDICINE

## 2018-06-19 RX ORDER — METHYLPREDNISOLONE 4 MG/1
TABLET ORAL
Qty: 1 KIT | Refills: 0 | Status: SHIPPED | OUTPATIENT
Start: 2018-06-19 | End: 2018-06-22

## 2018-06-19 RX ORDER — ACETAMINOPHEN AND CODEINE PHOSPHATE 300; 30 MG/1; MG/1
1 TABLET ORAL EVERY 4 HOURS PRN
Qty: 30 TABLET | Refills: 0 | COMMUNITY
Start: 2018-06-19 | End: 2018-06-19

## 2018-06-19 RX ORDER — TRAMADOL HYDROCHLORIDE 50 MG/1
50 TABLET ORAL EVERY 6 HOURS PRN
Qty: 30 TABLET | Refills: 0 | COMMUNITY
Start: 2018-06-19 | End: 2018-07-11

## 2018-06-19 NOTE — TELEPHONE ENCOUNTER
Patient has been doing some gardening and is having  left hip and buttocks pain. Can the doctor send in a prescription for a muscle relaxant to [facility]?

## 2018-06-19 NOTE — PROGRESS NOTES
HPI:    Patient ID: Alonso Giron is a 70year old male here for acute left flank pain since working in yard 6/15. No hx LBP despite XR showing old compressions T11 and T12, multilevel disc disease.  Also hx kidney stones        Review of Systems   Co Disp:  Rfl:    Vitamin K 100 MCG Oral Tab Take 100 mcg by mouth daily. Disp:  Rfl:    Probiotic Product ([address]) Oral Cap One a day Disp:  Rfl:    Aspirin 325 MG Oral Tab  mg 2 (two) times daily.  650 qd  Disp: 120 Tab Rfl: 0   Riboflav

## 2018-06-19 NOTE — TELEPHONE ENCOUNTER
Spouse calling regarding back pain from yard work on 6/15 that has increased over the w/e. Pain is from waist L side radiates to buttock. He is very restless and can not get comfortable in any position, difficulty sleeping. Rates pain 10/10.  Scheduled acu

## 2018-06-22 ENCOUNTER — OFFICE VISIT (OUTPATIENT)
Dept: INTERNAL MEDICINE CLINIC | Facility: CLINIC | Age: 78
End: 2018-06-22

## 2018-06-22 VITALS
DIASTOLIC BLOOD PRESSURE: 65 MMHG | SYSTOLIC BLOOD PRESSURE: 150 MMHG | OXYGEN SATURATION: 95 % | TEMPERATURE: 98 F | HEART RATE: 82 BPM | RESPIRATION RATE: 14 BRPM

## 2018-06-22 DIAGNOSIS — S22.080D CLOSED WEDGE COMPRESSION FRACTURE OF T11 VERTEBRA WITH ROUTINE HEALING: ICD-10-CM

## 2018-06-22 DIAGNOSIS — S39.012D ACUTE MYOFASCIAL STRAIN OF LUMBAR REGION, SUBSEQUENT ENCOUNTER: Primary | ICD-10-CM

## 2018-06-22 DIAGNOSIS — M47.896 OTHER OSTEOARTHRITIS OF SPINE, LUMBAR REGION: ICD-10-CM

## 2018-06-22 DIAGNOSIS — S22.080A COMPRESSION FRACTURE OF T12 VERTEBRA (HCC): ICD-10-CM

## 2018-06-22 DIAGNOSIS — M35.3 PMR (POLYMYALGIA RHEUMATICA) (HCC): ICD-10-CM

## 2018-06-22 PROCEDURE — 99214 OFFICE O/P EST MOD 30 MIN: CPT | Performed by: INTERNAL MEDICINE

## 2018-06-22 RX ORDER — CALCIUM CARBONATE 200(500)MG
1 TABLET,CHEWABLE ORAL DAILY
Refills: 0 | COMMUNITY
Start: 2018-06-22 | End: 2021-05-24

## 2018-06-22 RX ORDER — MELATONIN
400 DAILY
Qty: 30 TABLET | Refills: 0 | COMMUNITY
Start: 2018-06-22 | End: 2020-10-19

## 2018-06-22 NOTE — PROGRESS NOTES
HPI:    Patient ID: Ryne Anderson is a 70year old male here fu acute left lumbar strain-better    Reports 70% improvement left lumbar strain for garden work-taking Medrol and Ultram        Review of Systems   Constitutional: Positive for fatigue (chroni Rfl: 0   Riboflavin 100 MG Oral Cap Take  by mouth 3 (three) times a week. m-w-fr Disp:  Rfl:    Nutritional Supplements (JUICE PLUS FIBRE OR) Take  by mouth.  Disp:  Rfl:      Allergies:  Codeine                 PAIN    Comment:abdominal  Lisinopril

## 2018-07-02 ENCOUNTER — MED REC SCAN ONLY (OUTPATIENT)
Dept: INTERNAL MEDICINE CLINIC | Facility: CLINIC | Age: 78
End: 2018-07-02

## 2018-07-11 RX ORDER — TRAMADOL HYDROCHLORIDE 50 MG/1
50 TABLET ORAL EVERY 6 HOURS PRN
Qty: 30 TABLET | Refills: 1 | Status: ON HOLD
Start: 2018-07-11 | End: 2019-08-10

## 2018-07-11 NOTE — TELEPHONE ENCOUNTER
Patient would like a refill of TraMADol HCl 50 MG Oral Tab. He was taking them every 6 hours as needed. Patient would like the refill to go to [facility].

## 2018-07-20 ENCOUNTER — OFFICE VISIT (OUTPATIENT)
Dept: INTERNAL MEDICINE CLINIC | Facility: CLINIC | Age: 78
End: 2018-07-20
Payer: MEDICARE

## 2018-07-20 VITALS
OXYGEN SATURATION: 99 % | DIASTOLIC BLOOD PRESSURE: 64 MMHG | HEART RATE: 67 BPM | TEMPERATURE: 98 F | WEIGHT: 172 LBS | BODY MASS INDEX: 24.08 KG/M2 | HEIGHT: 71 IN | SYSTOLIC BLOOD PRESSURE: 122 MMHG | RESPIRATION RATE: 12 BRPM

## 2018-07-20 DIAGNOSIS — M54.50 ACUTE LEFT-SIDED LOW BACK PAIN WITHOUT SCIATICA: Primary | ICD-10-CM

## 2018-07-20 DIAGNOSIS — Z95.2 S/P AVR: ICD-10-CM

## 2018-07-20 DIAGNOSIS — M35.3 PMR (POLYMYALGIA RHEUMATICA) (HCC): ICD-10-CM

## 2018-07-20 PROBLEM — M54.16 LEFT LUMBAR RADICULOPATHY: Status: ACTIVE | Noted: 2018-07-20

## 2018-07-20 PROCEDURE — 99213 OFFICE O/P EST LOW 20 MIN: CPT | Performed by: INTERNAL MEDICINE

## 2018-07-20 NOTE — PROGRESS NOTES
Left lumbar pain resolved. Finished PT. No lumbar tenderness or radiation down leg. Has Tylenol and Ultram he was taking-make prn. Ultram caused nausea and constipation but he pushed thru it. Going on vacation to [place] next week. Will see how back holds up.  On

## 2018-08-03 ENCOUNTER — TELEPHONE (OUTPATIENT)
Dept: INTERNAL MEDICINE CLINIC | Facility: CLINIC | Age: 78
End: 2018-08-03

## 2018-08-03 ENCOUNTER — MED REC SCAN ONLY (OUTPATIENT)
Dept: INTERNAL MEDICINE CLINIC | Facility: CLINIC | Age: 78
End: 2018-08-03

## 2018-08-03 DIAGNOSIS — M85.88 OSTEOPENIA OF SPINE: Primary | ICD-10-CM

## 2018-08-03 NOTE — TELEPHONE ENCOUNTER
Kianna from [facility] called and says pt's order for the dexa scan was not approved.  She asks that another order be sent with a code that would be approved by Medicare    Please fax order to [phone]

## 2018-08-03 NOTE — TELEPHONE ENCOUNTER
New order placed with new code faxed as requested they are to call if it still does not pass we will send to [facility]

## 2018-08-06 ENCOUNTER — HOSPITAL ENCOUNTER (OUTPATIENT)
Dept: BONE DENSITY | Age: 78
Discharge: HOME | End: 2018-08-06
Attending: INTERNAL MEDICINE
Payer: MEDICARE

## 2018-08-06 DIAGNOSIS — M85.88 OSTEOPENIA OF SPINE: ICD-10-CM

## 2018-08-06 PROCEDURE — 77080 DXA BONE DENSITY AXIAL: CPT | Performed by: INTERNAL MEDICINE

## 2018-08-08 ENCOUNTER — HOSPITAL ENCOUNTER (OUTPATIENT)
Dept: CV DIAGNOSTICS | Age: 78
Discharge: HOME | End: 2018-08-08
Attending: INTERNAL MEDICINE
Payer: MEDICARE

## 2018-08-08 ENCOUNTER — APPOINTMENT (OUTPATIENT)
Dept: BONE DENSITY | Age: 78
End: 2018-08-08
Attending: INTERNAL MEDICINE
Payer: MEDICARE

## 2018-08-08 ENCOUNTER — HOSPITAL ENCOUNTER (OUTPATIENT)
Dept: ULTRASOUND IMAGING | Age: 78
Discharge: HOME | End: 2018-08-08
Attending: INTERNAL MEDICINE
Payer: MEDICARE

## 2018-08-08 DIAGNOSIS — I10 HYPERTENSION, UNSPECIFIED TYPE: ICD-10-CM

## 2018-08-08 DIAGNOSIS — Z95.1 S/P CABG (CORONARY ARTERY BYPASS GRAFT): ICD-10-CM

## 2018-08-08 DIAGNOSIS — Z95.2 S/P AVR (AORTIC VALVE REPLACEMENT): ICD-10-CM

## 2018-08-08 DIAGNOSIS — I65.29 CAROTID ATHEROSCLEROSIS, UNSPECIFIED LATERALITY: ICD-10-CM

## 2018-08-08 DIAGNOSIS — I70.90 ATHEROSCLEROSIS: ICD-10-CM

## 2018-08-08 PROCEDURE — 93880 EXTRACRANIAL BILAT STUDY: CPT | Performed by: INTERNAL MEDICINE

## 2018-08-08 PROCEDURE — 93306 TTE W/DOPPLER COMPLETE: CPT | Performed by: INTERNAL MEDICINE

## 2018-08-09 ENCOUNTER — TELEPHONE (OUTPATIENT)
Dept: INTERNAL MEDICINE CLINIC | Facility: CLINIC | Age: 78
End: 2018-08-09

## 2018-08-09 NOTE — TELEPHONE ENCOUNTER
----- Message from Gabby Claros MD sent at 8/8/2018    8:40 PM CDT -----  Echo 2D  Valve good redo 1 yr,     US Carotid  Mild plaque unchanged

## 2018-09-05 ENCOUNTER — APPOINTMENT (OUTPATIENT)
Dept: LAB | Age: 78
End: 2018-09-05
Attending: INTERNAL MEDICINE
Payer: MEDICARE

## 2018-09-05 ENCOUNTER — TELEPHONE (OUTPATIENT)
Dept: INTERNAL MEDICINE CLINIC | Facility: CLINIC | Age: 78
End: 2018-09-05

## 2018-09-05 DIAGNOSIS — E72.11 HYPERHOMOCYSTINEMIA (HCC): ICD-10-CM

## 2018-09-05 DIAGNOSIS — M35.3 PMR (POLYMYALGIA RHEUMATICA) (HCC): ICD-10-CM

## 2018-09-05 LAB
HOMOCYSTEINE: 10.8 UMOL/L (ref 5–13.9)
SED RATE-ML: 29 MM/HR (ref 0–12)

## 2018-09-05 PROCEDURE — 83090 ASSAY OF HOMOCYSTEINE: CPT

## 2018-09-05 PROCEDURE — 36415 COLL VENOUS BLD VENIPUNCTURE: CPT

## 2018-09-05 PROCEDURE — 85652 RBC SED RATE AUTOMATED: CPT

## 2018-11-12 ENCOUNTER — TELEPHONE (OUTPATIENT)
Dept: INTERNAL MEDICINE CLINIC | Facility: CLINIC | Age: 78
End: 2018-11-12

## 2018-11-12 NOTE — TELEPHONE ENCOUNTER
Patient would like to know if he has been in this year for his flu shot. Please check and call to confirm if he was here.

## 2018-11-12 NOTE — TELEPHONE ENCOUNTER
PC regarding pt has not had flu shot with info regarding flu vaccine here by appointment or at local pharmacy.

## 2018-11-28 ENCOUNTER — TELEPHONE (OUTPATIENT)
Dept: INTERNAL MEDICINE CLINIC | Facility: CLINIC | Age: 78
End: 2018-11-28

## 2018-11-28 NOTE — TELEPHONE ENCOUNTER
Patient called and has a couple questions about the vitamin TMG that he is supposed to be taking. He says he usually gets them at the [facility] but they have discontinued it and he is unable to find it elsewhere.

## 2018-11-28 NOTE — TELEPHONE ENCOUNTER
PC re Supplement, TMG available at both [employer] and [employer]    Pt will ask spouse to go online and find product for him as she is more computer savvy.

## 2019-03-06 ENCOUNTER — TELEPHONE (OUTPATIENT)
Dept: INTERNAL MEDICINE CLINIC | Facility: CLINIC | Age: 79
End: 2019-03-06

## 2019-03-06 ENCOUNTER — NURSE ONLY (OUTPATIENT)
Dept: INTERNAL MEDICINE CLINIC | Facility: CLINIC | Age: 79
End: 2019-03-06

## 2019-03-06 ENCOUNTER — APPOINTMENT (OUTPATIENT)
Dept: LAB | Age: 79
End: 2019-03-06
Attending: INTERNAL MEDICINE
Payer: MEDICARE

## 2019-03-06 DIAGNOSIS — M35.3 PMR (POLYMYALGIA RHEUMATICA) (HCC): Primary | ICD-10-CM

## 2019-03-06 DIAGNOSIS — M35.3 PMR (POLYMYALGIA RHEUMATICA) (HCC): ICD-10-CM

## 2019-03-06 DIAGNOSIS — R70.0 ELEVATED SED RATE (ELEV SR): Primary | ICD-10-CM

## 2019-03-06 DIAGNOSIS — Z00.00 LABORATORY EXAMINATION ORDERED AS PART OF A ROUTINE GENERAL MEDICAL EXAMINATION: ICD-10-CM

## 2019-03-06 LAB
BILIRUB UR QL STRIP.AUTO: NEGATIVE
CLARITY UR REFRACT.AUTO: CLEAR
COLOR UR AUTO: YELLOW
GLUCOSE UR STRIP.AUTO-MCNC: NEGATIVE MG/DL
KETONES UR STRIP.AUTO-MCNC: NEGATIVE MG/DL
LEUKOCYTE ESTERASE UR QL STRIP.AUTO: NEGATIVE
NITRITE UR QL STRIP.AUTO: NEGATIVE
PH UR STRIP.AUTO: 5 [PH] (ref 4.5–8)
PROT UR STRIP.AUTO-MCNC: NEGATIVE MG/DL
RBC UR QL AUTO: NEGATIVE
SED RATE-ML: 46 MM/HR (ref 0–12)
SP GR UR STRIP.AUTO: 1.02 (ref 1–1.03)
UROBILINOGEN UR STRIP.AUTO-MCNC: <2 MG/DL

## 2019-03-06 PROCEDURE — 94010 BREATHING CAPACITY TEST: CPT | Performed by: INTERNAL MEDICINE

## 2019-03-06 PROCEDURE — 99173 VISUAL ACUITY SCREEN: CPT | Performed by: INTERNAL MEDICINE

## 2019-03-06 PROCEDURE — 36415 COLL VENOUS BLD VENIPUNCTURE: CPT

## 2019-03-06 PROCEDURE — 85652 RBC SED RATE AUTOMATED: CPT

## 2019-03-06 PROCEDURE — 92551 PURE TONE HEARING TEST AIR: CPT | Performed by: INTERNAL MEDICINE

## 2019-03-06 PROCEDURE — 81003 URINALYSIS AUTO W/O SCOPE: CPT | Performed by: INTERNAL MEDICINE

## 2019-03-06 PROCEDURE — 93000 ELECTROCARDIOGRAM COMPLETE: CPT | Performed by: INTERNAL MEDICINE

## 2019-03-06 RX ORDER — PREDNISONE 1 MG/1
5 TABLET ORAL DAILY
Qty: 90 TABLET | Refills: 0 | COMMUNITY
Start: 2019-03-06 | End: 2019-04-22

## 2019-03-06 NOTE — PROGRESS NOTES
*LOOKIN' BODY RESULTS    YES: X      NO:       REASON TEST NOT PERFORMED:        HEIGHT: 5'10.7   WEIGHT: 169  _____________________________________________________________________________  *VENIPUNCTURE    YES:       NO:  x       REASON VENIPUNCTURE NOT P

## 2019-03-06 NOTE — TELEPHONE ENCOUNTER
----- Message from Zakiya Shannon MD sent at 3/6/2019 11:45 AM CST -----  Jump in ESR please increase Prednisone 1/2 of 5mg=2,5mg to whole 5mg daily and will discuss at CPE.  Offer and order repeat ESR same day 1hr before next OV

## 2019-03-06 NOTE — TELEPHONE ENCOUNTER
PC re SED rate elevated and need to increase Prednsione from 2.5 daily to 5 mg daily. Recheck SED rate at THE [facility] @ 1 hour before your CPE on 3/20/19. Dr. Hannah Wright review SED and all of your labs at your Physical.   Pt verbalized understanding of above orders.

## 2019-03-12 ENCOUNTER — MED REC SCAN ONLY (OUTPATIENT)
Dept: INTERNAL MEDICINE CLINIC | Facility: CLINIC | Age: 79
End: 2019-03-12

## 2019-03-19 NOTE — PROGRESS NOTES
HEALTH MAINTENANCE:      Immunization History   Administered Date(s) Administered   • FLU VACC High Dose 65 YRS & Older PRSV Free (92103) 10/20/2014, 11/30/2015, 11/01/2016   • FLUAD High Dose 72 yr and older (73663) 10/05/2017   • Influenza 11/06/2013   •

## 2019-03-20 ENCOUNTER — OFFICE VISIT (OUTPATIENT)
Dept: INTERNAL MEDICINE CLINIC | Facility: CLINIC | Age: 79
End: 2019-03-20
Payer: MEDICARE

## 2019-03-20 ENCOUNTER — APPOINTMENT (OUTPATIENT)
Dept: LAB | Age: 79
End: 2019-03-20
Attending: INTERNAL MEDICINE
Payer: MEDICARE

## 2019-03-20 ENCOUNTER — TELEPHONE (OUTPATIENT)
Dept: INTERNAL MEDICINE CLINIC | Facility: CLINIC | Age: 79
End: 2019-03-20

## 2019-03-20 VITALS
HEART RATE: 60 BPM | RESPIRATION RATE: 12 BRPM | TEMPERATURE: 98 F | BODY MASS INDEX: 23.66 KG/M2 | WEIGHT: 169 LBS | DIASTOLIC BLOOD PRESSURE: 70 MMHG | SYSTOLIC BLOOD PRESSURE: 130 MMHG | HEIGHT: 70.7 IN | OXYGEN SATURATION: 97 %

## 2019-03-20 DIAGNOSIS — R70.0 ELEVATED SEDIMENTATION RATE: Primary | ICD-10-CM

## 2019-03-20 DIAGNOSIS — R50.9 FUO (FEVER OF UNKNOWN ORIGIN): ICD-10-CM

## 2019-03-20 DIAGNOSIS — R70.0 ELEVATED SED RATE (ELEV SR): ICD-10-CM

## 2019-03-20 DIAGNOSIS — Z00.01 ENCOUNTER FOR GENERAL ADULT MEDICAL EXAMINATION WITH ABNORMAL FINDINGS: Primary | ICD-10-CM

## 2019-03-20 DIAGNOSIS — H61.23 EXCESSIVE EAR WAX, BILATERAL: ICD-10-CM

## 2019-03-20 LAB — SED RATE-ML: 30 MM/HR (ref 0–12)

## 2019-03-20 PROCEDURE — G0439 PPPS, SUBSEQ VISIT: HCPCS | Performed by: INTERNAL MEDICINE

## 2019-03-20 PROCEDURE — 69209 REMOVE IMPACTED EAR WAX UNI: CPT | Performed by: INTERNAL MEDICINE

## 2019-03-20 PROCEDURE — 85652 RBC SED RATE AUTOMATED: CPT

## 2019-03-20 PROCEDURE — 36415 COLL VENOUS BLD VENIPUNCTURE: CPT

## 2019-03-20 NOTE — TELEPHONE ENCOUNTER
----- Message from Ajay Ruelas MD sent at 3/20/2019 12:38 PM CDT -----  ESR down from 46 to 30 \"usual\" ESR 20s Stay Pred 5mg/day for awhile.  Next ESR in 1mo

## 2019-03-20 NOTE — TELEPHONE ENCOUNTER
PC re ESR down 30 from 46. Usual level is in 20's. Continue with Prednisone 5mg daily and recheck ESR in one month. Order entered.

## 2019-03-21 PROBLEM — M54.50 ACUTE LEFT-SIDED LOW BACK PAIN WITHOUT SCIATICA: Status: RESOLVED | Noted: 2018-07-20 | Resolved: 2019-03-21

## 2019-03-21 RX ORDER — PREDNISONE 1 MG/1
TABLET ORAL
Qty: 45 TABLET | Refills: 3 | COMMUNITY
Start: 2019-03-21 | End: 2019-03-21

## 2019-03-21 RX ORDER — ROSUVASTATIN CALCIUM 20 MG/1
10 TABLET, COATED ORAL NIGHTLY
Qty: 90 TABLET | Refills: 3 | COMMUNITY
Start: 2019-03-21 | End: 2019-04-08 | Stop reason: DRUGHIGH

## 2019-03-21 NOTE — PROGRESS NOTES
HPI:    Patient ID: Balwinder Banks is a 70year old male CPE    PMR little worse with muscle soreness, better on inc Prednisone        Review of Systems   Constitutional: Positive for fatigue. Negative for fever. HENT: Positive for hearing loss.     Eyes Rfl: 0   CARVEDILOL 6.25 MG Oral Tab TAKE ONE TABLET BY MOUTH TWICE DAILY WITH  MEALS Disp: 180 tablet Rfl: 3   AMLODIPINE BESYLATE 5 MG Oral Tab TAKE ONE TABLET BY MOUTH ONCE DAILY Disp: 90 tablet Rfl: 3   Coenzyme Q10 (CO Q 10 OR) Take 50 mg by mouth.  Gail Bhatt [facility])     CAD (coronary artery disease)     Aortic stenosis     Psoriasis     Hiatal hernia with gastroesophageal reflux     Vitamin D deficiency     Hyperhomocysteinemia (HCC)     Diverticulosis     Colon polyps     Dyslipidemia     Carotid artery plaque REPLACEMENT   • CARDIAC CATHETERIZATION  2008/2013   • CHOLECYSTECTOMY  1984   • COLONOSCOPY  2005    E[facility]   • COLONOSCOPY  11/7/2012       • COLONOSCOPY N/A 4/4/2018    Performed by Gareth Morse MD at [facility] ENDOSCOPY   • CORTISONE INJECTION  7/ drinks or equivalent per week        Comment: 1 drink per night      Drug use: No      Sexual activity: Not on file    Lifestyle      Physical activity:        Days per week: Not on file        Minutes per session: Not on file      Stress: Not on file    R (36.6 °C)   TempSrc: Oral   SpO2: 97%   Weight: 169 lb   Height: 70.7\"     Body mass index is 23.77 kg/m².       Health Screens    Anxiety Index: No major anxiety evident  Depression Index: No major depression   Cage Questionnaire: No  alcohol abuse  Sleep Normal  ______________________________________________________________________     EKG: Sinus bradycardia, normal interval,  unchanged     SPIROMETRY: Normal      HEARING:  Audiometry Results:  Left Ear @ 25dBHL - 500 Hz: Yes Right Ear @ 25dBHL - 500 Hz: Y the past, but no skin cancers. 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  You had a previous bone marrow exam as wel

## 2019-03-22 ENCOUNTER — TELEPHONE (OUTPATIENT)
Dept: INTERNAL MEDICINE CLINIC | Facility: CLINIC | Age: 79
End: 2019-03-22

## 2019-03-22 DIAGNOSIS — M35.3 PMR (POLYMYALGIA RHEUMATICA) (HCC): ICD-10-CM

## 2019-03-22 DIAGNOSIS — E72.11 HYPERHOMOCYSTINEMIA (HCC): ICD-10-CM

## 2019-03-22 DIAGNOSIS — N20.0 RENAL STONE: ICD-10-CM

## 2019-03-22 DIAGNOSIS — Q61.02 MULTIPLE RENAL CYSTS: Primary | ICD-10-CM

## 2019-03-22 DIAGNOSIS — R80.9 PROTEINURIA, UNSPECIFIED TYPE: ICD-10-CM

## 2019-03-22 RX ORDER — PREDNISONE 1 MG/1
5 TABLET ORAL DAILY
Qty: 90 TABLET | Refills: 3 | Status: ON HOLD | OUTPATIENT
Start: 2019-03-22 | End: 2019-07-27

## 2019-03-22 NOTE — TELEPHONE ENCOUNTER
Patient's wife called and says she has been having difficulty finding the life extension tmg 1000mg on [place], she is only seeing the 500mg. She asks that Dr Keila Garcia return her phone call.

## 2019-03-22 NOTE — TELEPHONE ENCOUNTER
Refill request prednisone    LOV 3/20/19, orders at  were to stay on prednisone 5 mg daily     Prednisone 5 mg refill processed

## 2019-03-29 NOTE — TELEPHONE ENCOUNTER
Received letter from [address] [place] [address] assist  On 3/27/19     Patient's medical plan does not cover Prolia at this time. Will do f/u to see if we can continue to look into possible  coverage.

## 2019-04-02 ENCOUNTER — TELEPHONE (OUTPATIENT)
Dept: INTERNAL MEDICINE CLINIC | Facility: CLINIC | Age: 79
End: 2019-04-02

## 2019-04-02 NOTE — TELEPHONE ENCOUNTER
Patient says he was expecting a few orders to check for kidney stones to be in his chart but when he called to schedule appointments he was told there was nothing in there. He asks that a nurse please look into.  It.

## 2019-04-02 NOTE — TELEPHONE ENCOUNTER
Initiated PA for Reclast via Cover My [address]  Dx codes: S22.080D, [id number], M85.88,   K21.9, K44.9

## 2019-04-02 NOTE — TELEPHONE ENCOUNTER
PC to tell him Abd US Complete was ordered on 3/22/19 to compare to CT done in 2015. Imaging will look at kidney stones and cysts. It is in Epic and central scheduling should be able to see it. Recommend he call back and have them schedule US.   His wife w

## 2019-04-03 RX ORDER — RISEDRONATE SODIUM 35 MG/1
35 TABLET, FILM COATED ORAL
Qty: 4 TABLET | Refills: 5 | Status: SHIPPED | OUTPATIENT
Start: 2019-04-03 | End: 2019-07-30

## 2019-04-03 NOTE — TELEPHONE ENCOUNTER
Claire Schaumann from [facility] calling re Dx codes and Dexa T scores for PA for Reclast.   She said [facility] generally requires Dx Osteoporosis for men for Reclast approval. Pt does not have OP.      She will check if generic is on formulary and we will get fax back re determi

## 2019-04-03 NOTE — TELEPHONE ENCOUNTER
Received faxed Denial for Reclast today per [employer]/Medicare. Diagnosis do not support coverage per Medicare.    Notified Dr. Traci Bustos MD sent to [facility] about denials, cash price will be thousands, offer Act

## 2019-04-22 ENCOUNTER — TELEPHONE (OUTPATIENT)
Dept: INTERNAL MEDICINE CLINIC | Facility: CLINIC | Age: 79
End: 2019-04-22

## 2019-04-22 ENCOUNTER — APPOINTMENT (OUTPATIENT)
Dept: LAB | Age: 79
End: 2019-04-22
Attending: INTERNAL MEDICINE
Payer: MEDICARE

## 2019-04-22 DIAGNOSIS — M35.3 PMR (POLYMYALGIA RHEUMATICA) (HCC): ICD-10-CM

## 2019-04-22 DIAGNOSIS — R70.0 ELEVATED SEDIMENTATION RATE: Primary | ICD-10-CM

## 2019-04-22 PROCEDURE — 36415 COLL VENOUS BLD VENIPUNCTURE: CPT

## 2019-04-22 PROCEDURE — 85652 RBC SED RATE AUTOMATED: CPT

## 2019-04-22 NOTE — TELEPHONE ENCOUNTER
----- Message from Alla Hodges MD sent at 4/22/2019 12:21 PM CDT -----  Still high on Pred 5 and  bid. Rather than inc Pred would add \"natural\" anti-inflammatory Tumeric 300mg one a day-Solar[employer] at [employer] good [employer].  Make sure he is taking two

## 2019-04-22 NOTE — TELEPHONE ENCOUNTER
SED level 36    PC and spoke to spouse re SED lab elevated with instructions to stay on Pred 5 and aspirin 325 but add natural antiinflammatory Tumeric 300mg qd- Solaray brand. Reminder to continue 4 CORINNA supplements and recheck SED lab in June.  Order erin

## 2019-04-23 ENCOUNTER — TELEPHONE (OUTPATIENT)
Dept: INTERNAL MEDICINE CLINIC | Facility: CLINIC | Age: 79
End: 2019-04-23

## 2019-04-23 NOTE — TELEPHONE ENCOUNTER
Patient called and says he has not taken his Risedronate for the last two days and saw on medication bottle that if he does not take medication for two or more days he must contact his physician's office. He asks that a nurse please call him.

## 2019-04-23 NOTE — TELEPHONE ENCOUNTER
PC re 2 days late on taking Risedronate- weekly Actonel dose. Told him no harm done. He can either do another day this week or wait for regularly scheduled day to take pill.  Reminder this pill is taken every 7 days only in am.   If he takes another day, th

## 2019-04-24 ENCOUNTER — HOSPITAL ENCOUNTER (OUTPATIENT)
Dept: ULTRASOUND IMAGING | Facility: HOSPITAL | Age: 79
Discharge: HOME | End: 2019-04-24
Attending: INTERNAL MEDICINE
Payer: MEDICARE

## 2019-04-24 ENCOUNTER — TELEPHONE (OUTPATIENT)
Dept: INTERNAL MEDICINE CLINIC | Facility: CLINIC | Age: 79
End: 2019-04-24

## 2019-04-24 DIAGNOSIS — Q61.02 MULTIPLE RENAL CYSTS: ICD-10-CM

## 2019-04-24 DIAGNOSIS — N20.0 RENAL STONE: ICD-10-CM

## 2019-04-24 PROCEDURE — 76700 US EXAM ABDOM COMPLETE: CPT | Performed by: INTERNAL MEDICINE

## 2019-04-24 NOTE — TELEPHONE ENCOUNTER
Mateo Aldana MD  [facility]             Fatty liver (reduce fatty foods) and cysts both kidneys(just watch). No signs of cancer. Redo 1yr ok. PC re US abdomen results with details and instructions to decrease fatty foods.  No signs of canc

## 2019-04-24 NOTE — TELEPHONE ENCOUNTER
----- Message from Minerva Adkins MD sent at 4/24/2019 10:19 AM CDT -----  Fatty liver (reduce fatty foods) and cysts both kidneys(just watch). No signs of cancer. Redo 1yr ok.

## 2019-04-26 ENCOUNTER — TELEPHONE (OUTPATIENT)
Dept: INTERNAL MEDICINE CLINIC | Facility: CLINIC | Age: 79
End: 2019-04-26

## 2019-04-26 NOTE — TELEPHONE ENCOUNTER
Patient called with questions about a letter he received stating he has overdue labs. He says Dr Lesly Greer told him earlier in the week that he would not need to do the labs for another 3 months. He would like to confirm that is true.  Please call him back and

## 2019-05-01 ENCOUNTER — TELEPHONE (OUTPATIENT)
Dept: INTERNAL MEDICINE CLINIC | Facility: CLINIC | Age: 79
End: 2019-05-01

## 2019-05-01 NOTE — TELEPHONE ENCOUNTER
Letter mailed to patient explaining he has labs that are currently due in June. Instructions as follows:  1. Fasting   2. Book appt for lab in our office, but patient can go directly to [facility] day of draw. CHL:  1. Lipid panel   2.  MPO  D

## 2019-05-13 ENCOUNTER — APPOINTMENT (OUTPATIENT)
Dept: LAB | Age: 79
End: 2019-05-13
Attending: INTERNAL MEDICINE
Payer: MEDICARE

## 2019-05-13 DIAGNOSIS — Z95.2 S/P AVR: ICD-10-CM

## 2019-05-13 DIAGNOSIS — I25.10 CORONARY ARTERY DISEASE INVOLVING NATIVE CORONARY ARTERY OF NATIVE HEART WITHOUT ANGINA PECTORIS: ICD-10-CM

## 2019-05-13 DIAGNOSIS — Z95.1 S/P CABG (CORONARY ARTERY BYPASS GRAFT): ICD-10-CM

## 2019-05-13 DIAGNOSIS — E78.00 PURE HYPERCHOLESTEROLEMIA: ICD-10-CM

## 2019-05-13 DIAGNOSIS — I10 ESSENTIAL HYPERTENSION: ICD-10-CM

## 2019-05-13 PROCEDURE — 80053 COMPREHEN METABOLIC PANEL: CPT

## 2019-05-13 PROCEDURE — 36415 COLL VENOUS BLD VENIPUNCTURE: CPT

## 2019-05-13 RX ORDER — CARVEDILOL 6.25 MG/1
TABLET ORAL
Qty: 180 TABLET | Refills: 3 | Status: SHIPPED | OUTPATIENT
Start: 2019-05-13 | End: 2019-07-30

## 2019-05-13 RX ORDER — OMEPRAZOLE 10 MG/1
CAPSULE, DELAYED RELEASE ORAL
Qty: 90 CAPSULE | Refills: 3 | Status: SHIPPED | OUTPATIENT
Start: 2019-05-13 | End: 2019-07-30

## 2019-05-13 RX ORDER — ROSUVASTATIN CALCIUM 20 MG/1
TABLET, COATED ORAL
Qty: 90 TABLET | Refills: 3 | Status: SHIPPED | OUTPATIENT
Start: 2019-05-13 | End: 2020-01-06

## 2019-05-13 RX ORDER — AMLODIPINE BESYLATE 5 MG/1
TABLET ORAL
Qty: 90 TABLET | Refills: 3 | Status: ON HOLD | OUTPATIENT
Start: 2019-05-13 | End: 2019-08-10

## 2019-06-17 ENCOUNTER — TELEPHONE (OUTPATIENT)
Dept: INTERNAL MEDICINE CLINIC | Facility: CLINIC | Age: 79
End: 2019-06-17

## 2019-06-17 NOTE — TELEPHONE ENCOUNTER
2nd Letter mailed to patient explaining he has labs that are currently due in June.      Instructions as follows:  1. Fasting   2. Book appt for lab in our office, but patient can go directly to [facility] day of draw.           CHL:  1.  Lipid panel   2

## 2019-06-24 ENCOUNTER — TELEPHONE (OUTPATIENT)
Dept: INTERNAL MEDICINE CLINIC | Facility: CLINIC | Age: 79
End: 2019-06-24

## 2019-06-24 ENCOUNTER — APPOINTMENT (OUTPATIENT)
Dept: LAB | Age: 79
End: 2019-06-24
Attending: INTERNAL MEDICINE
Payer: MEDICARE

## 2019-06-24 DIAGNOSIS — E72.11 HYPERHOMOCYSTEINEMIA (HCC): ICD-10-CM

## 2019-06-24 DIAGNOSIS — E72.11 HYPERHOMOCYSTINEMIA (HCC): ICD-10-CM

## 2019-06-24 DIAGNOSIS — M35.3 PMR (POLYMYALGIA RHEUMATICA) (HCC): Primary | ICD-10-CM

## 2019-06-24 DIAGNOSIS — R70.0 ELEVATED SEDIMENTATION RATE: ICD-10-CM

## 2019-06-24 PROCEDURE — 85652 RBC SED RATE AUTOMATED: CPT

## 2019-06-24 PROCEDURE — 36415 COLL VENOUS BLD VENIPUNCTURE: CPT

## 2019-06-24 PROCEDURE — 83090 ASSAY OF HOMOCYSTEINE: CPT

## 2019-06-24 NOTE — TELEPHONE ENCOUNTER
PC re ESR and homocysteine results with details and stay on same dose of Prednisone. Recheck both labs in 6 months. Orders entered.

## 2019-06-24 NOTE — TELEPHONE ENCOUNTER
----- Message from Rebekah Burton MD sent at 6/24/2019  1:13 PM CDT -----  ESR 1 point higher (not significant) stay on same dose Prednisone  Like the homo which IS now below 10 good news  Redo both 6mo range

## 2019-07-25 ENCOUNTER — TELEPHONE (OUTPATIENT)
Dept: INTERNAL MEDICINE CLINIC | Facility: CLINIC | Age: 79
End: 2019-07-25

## 2019-07-25 NOTE — TELEPHONE ENCOUNTER
PC from patient's wife    Pt began Actonel 35 mg one tab weekly since  April     Patient took Actonel today and wife and patient would like to stop this. Feels like it is causing joint pain and pt has hx of  Esophagitis in 2005.     Will make an OV to discu

## 2019-07-25 NOTE — TELEPHONE ENCOUNTER
Patient has been suffering from joint pain and exhaustion from coughing since April. Dr. Naun Garcia prescribed Risedronate Sodium (ACTONEL) 35 MG Oral Tab last time the patient was in, and the wife feels this medication is causing these side effects.   She wo

## 2019-07-26 ENCOUNTER — HOSPITAL ENCOUNTER (OUTPATIENT)
Facility: HOSPITAL | Age: 79
Setting detail: OBSERVATION
Discharge: HOME | End: 2019-07-27
Attending: EMERGENCY MEDICINE | Admitting: HOSPITALIST
Payer: MEDICARE

## 2019-07-26 ENCOUNTER — APPOINTMENT (OUTPATIENT)
Dept: CT IMAGING | Facility: HOSPITAL | Age: 79
End: 2019-07-26
Attending: EMERGENCY MEDICINE
Payer: MEDICARE

## 2019-07-26 ENCOUNTER — APPOINTMENT (OUTPATIENT)
Dept: ULTRASOUND IMAGING | Facility: HOSPITAL | Age: 79
End: 2019-07-26
Attending: EMERGENCY MEDICINE
Payer: MEDICARE

## 2019-07-26 ENCOUNTER — APPOINTMENT (OUTPATIENT)
Dept: GENERAL RADIOLOGY | Facility: HOSPITAL | Age: 79
End: 2019-07-26
Attending: EMERGENCY MEDICINE
Payer: MEDICARE

## 2019-07-26 DIAGNOSIS — R06.02 SHORTNESS OF BREATH: Primary | ICD-10-CM

## 2019-07-26 DIAGNOSIS — R53.83 FATIGUE, UNSPECIFIED TYPE: ICD-10-CM

## 2019-07-26 PROBLEM — E87.1 HYPONATREMIA: Status: ACTIVE | Noted: 2019-07-26

## 2019-07-26 PROBLEM — R73.9 HYPERGLYCEMIA: Status: ACTIVE | Noted: 2019-07-26

## 2019-07-26 PROBLEM — D64.9 ANEMIA: Status: ACTIVE | Noted: 2019-07-26

## 2019-07-26 LAB
ALBUMIN SERPL-MCNC: 2.8 G/DL (ref 3.4–5)
ALP LIVER SERPL-CCNC: 51 U/L (ref 45–117)
ALT SERPL-CCNC: 59 U/L (ref 16–61)
ANION GAP SERPL CALC-SCNC: 9 MMOL/L (ref 0–18)
AST SERPL-CCNC: 56 U/L (ref 15–37)
BASOPHILS # BLD AUTO: 0.01 X10(3) UL (ref 0–0.2)
BASOPHILS NFR BLD AUTO: 0.1 %
BILIRUB DIRECT SERPL-MCNC: 0.2 MG/DL (ref 0–0.2)
BILIRUB SERPL-MCNC: 0.6 MG/DL (ref 0.1–2)
BILIRUB UR QL: NEGATIVE
BUN BLD-MCNC: 12 MG/DL (ref 7–18)
BUN/CREAT SERPL: 12.2 (ref 10–20)
CALCIUM BLD-MCNC: 8.2 MG/DL (ref 8.5–10.1)
CHLORIDE SERPL-SCNC: 101 MMOL/L (ref 98–112)
CLARITY UR: CLEAR
CO2 SERPL-SCNC: 23 MMOL/L (ref 21–32)
COLOR UR: YELLOW
CREAT BLD-MCNC: 0.98 MG/DL (ref 0.7–1.3)
D DIMER PPP FEU-MCNC: 3.46 UG/ML FEU (ref ?–0.77)
DEPRECATED RDW RBC AUTO: 41.2 FL (ref 35.1–46.3)
EOSINOPHIL # BLD AUTO: 0.09 X10(3) UL (ref 0–0.7)
EOSINOPHIL NFR BLD AUTO: 1.1 %
ERYTHROCYTE [DISTWIDTH] IN BLOOD BY AUTOMATED COUNT: 11.9 % (ref 11–15)
ERYTHROCYTE [SEDIMENTATION RATE] IN BLOOD: 85 MM/HR (ref 0–20)
GLUCOSE BLD-MCNC: 100 MG/DL (ref 70–99)
GLUCOSE UR-MCNC: NEGATIVE MG/DL
HAV IGM SER QL: 2.2 MG/DL (ref 1.6–2.6)
HCT VFR BLD AUTO: 34.3 % (ref 39–53)
HGB BLD-MCNC: 11.5 G/DL (ref 13–17.5)
HGB UR QL STRIP.AUTO: NEGATIVE
IMM GRANULOCYTES # BLD AUTO: 0.04 X10(3) UL (ref 0–1)
IMM GRANULOCYTES NFR BLD: 0.5 %
KETONES UR-MCNC: NEGATIVE MG/DL
LEUKOCYTE ESTERASE UR QL STRIP.AUTO: NEGATIVE
LYMPHOCYTES # BLD AUTO: 0.36 X10(3) UL (ref 1–4)
LYMPHOCYTES NFR BLD AUTO: 4.4 %
M PROTEIN MFR SERPL ELPH: 7.7 G/DL (ref 6.4–8.2)
MCH RBC QN AUTO: 31.9 PG (ref 26–34)
MCHC RBC AUTO-ENTMCNC: 33.5 G/DL (ref 31–37)
MCV RBC AUTO: 95.3 FL (ref 80–100)
MONOCYTES # BLD AUTO: 0.8 X10(3) UL (ref 0.1–1)
MONOCYTES NFR BLD AUTO: 9.8 %
NEUTROPHILS # BLD AUTO: 6.85 X10 (3) UL (ref 1.5–7.7)
NEUTROPHILS # BLD AUTO: 6.85 X10(3) UL (ref 1.5–7.7)
NEUTROPHILS NFR BLD AUTO: 84.1 %
NITRITE UR QL STRIP.AUTO: NEGATIVE
NT-PROBNP SERPL-MCNC: 744 PG/ML (ref ?–450)
OSMOLALITY SERPL CALC.SUM OF ELEC: 276 MOSM/KG (ref 275–295)
PH UR: 7 [PH] (ref 5–8)
PLATELET # BLD AUTO: 228 10(3)UL (ref 150–450)
POTASSIUM SERPL-SCNC: 4.2 MMOL/L (ref 3.5–5.1)
PROT UR-MCNC: NEGATIVE MG/DL
RBC # BLD AUTO: 3.6 X10(6)UL (ref 3.8–5.8)
SODIUM SERPL-SCNC: 133 MMOL/L (ref 136–145)
SP GR UR STRIP: 1 (ref 1–1.03)
TROPONIN I SERPL-MCNC: <0.045 NG/ML (ref ?–0.04)
TSI SER-ACNC: 1.06 MIU/ML (ref 0.36–3.74)
UROBILINOGEN UR STRIP-ACNC: <2
VIT C UR-MCNC: NEGATIVE MG/DL
WBC # BLD AUTO: 8.2 X10(3) UL (ref 4–11)

## 2019-07-26 PROCEDURE — 83880 ASSAY OF NATRIURETIC PEPTIDE: CPT | Performed by: EMERGENCY MEDICINE

## 2019-07-26 PROCEDURE — 96372 THER/PROPH/DIAG INJ SC/IM: CPT

## 2019-07-26 PROCEDURE — 85379 FIBRIN DEGRADATION QUANT: CPT | Performed by: EMERGENCY MEDICINE

## 2019-07-26 PROCEDURE — 80076 HEPATIC FUNCTION PANEL: CPT | Performed by: EMERGENCY MEDICINE

## 2019-07-26 PROCEDURE — 80048 BASIC METABOLIC PNL TOTAL CA: CPT | Performed by: EMERGENCY MEDICINE

## 2019-07-26 PROCEDURE — 71260 CT THORAX DX C+: CPT | Performed by: EMERGENCY MEDICINE

## 2019-07-26 PROCEDURE — 83735 ASSAY OF MAGNESIUM: CPT | Performed by: EMERGENCY MEDICINE

## 2019-07-26 PROCEDURE — 71045 X-RAY EXAM CHEST 1 VIEW: CPT | Performed by: EMERGENCY MEDICINE

## 2019-07-26 PROCEDURE — 85025 COMPLETE CBC W/AUTO DIFF WBC: CPT | Performed by: EMERGENCY MEDICINE

## 2019-07-26 PROCEDURE — 93010 ELECTROCARDIOGRAM REPORT: CPT | Performed by: INTERNAL MEDICINE

## 2019-07-26 PROCEDURE — 84443 ASSAY THYROID STIM HORMONE: CPT | Performed by: EMERGENCY MEDICINE

## 2019-07-26 PROCEDURE — 93971 EXTREMITY STUDY: CPT | Performed by: EMERGENCY MEDICINE

## 2019-07-26 PROCEDURE — 93005 ELECTROCARDIOGRAM TRACING: CPT

## 2019-07-26 PROCEDURE — 36415 COLL VENOUS BLD VENIPUNCTURE: CPT

## 2019-07-26 PROCEDURE — 84484 ASSAY OF TROPONIN QUANT: CPT | Performed by: EMERGENCY MEDICINE

## 2019-07-26 PROCEDURE — 99285 EMERGENCY DEPT VISIT HI MDM: CPT

## 2019-07-26 PROCEDURE — 85652 RBC SED RATE AUTOMATED: CPT | Performed by: HOSPITALIST

## 2019-07-26 PROCEDURE — 81003 URINALYSIS AUTO W/O SCOPE: CPT | Performed by: EMERGENCY MEDICINE

## 2019-07-26 RX ORDER — ASPIRIN 325 MG
325 TABLET, DELAYED RELEASE (ENTERIC COATED) ORAL NIGHTLY
Status: DISCONTINUED | OUTPATIENT
Start: 2019-07-26 | End: 2019-07-27

## 2019-07-26 RX ORDER — ROSUVASTATIN CALCIUM 20 MG/1
20 TABLET, COATED ORAL NIGHTLY
Status: DISCONTINUED | OUTPATIENT
Start: 2019-07-26 | End: 2019-07-27

## 2019-07-26 RX ORDER — MAGNESIUM OXIDE 400 MG (241.3 MG MAGNESIUM) TABLET
400 TABLET DAILY
Status: DISCONTINUED | OUTPATIENT
Start: 2019-07-27 | End: 2019-07-27

## 2019-07-26 RX ORDER — ONDANSETRON 2 MG/ML
4 INJECTION INTRAMUSCULAR; INTRAVENOUS EVERY 6 HOURS PRN
Status: DISCONTINUED | OUTPATIENT
Start: 2019-07-26 | End: 2019-07-26

## 2019-07-26 RX ORDER — ONDANSETRON 2 MG/ML
4 INJECTION INTRAMUSCULAR; INTRAVENOUS EVERY 6 HOURS PRN
Status: DISCONTINUED | OUTPATIENT
Start: 2019-07-26 | End: 2019-07-27

## 2019-07-26 RX ORDER — HEPARIN SODIUM 5000 [USP'U]/ML
5000 INJECTION, SOLUTION INTRAVENOUS; SUBCUTANEOUS EVERY 12 HOURS SCHEDULED
Status: DISCONTINUED | OUTPATIENT
Start: 2019-07-26 | End: 2019-07-27

## 2019-07-26 RX ORDER — PANTOPRAZOLE SODIUM 20 MG/1
20 TABLET, DELAYED RELEASE ORAL
Status: DISCONTINUED | OUTPATIENT
Start: 2019-07-27 | End: 2019-07-27

## 2019-07-26 RX ORDER — SODIUM CHLORIDE 0.9 % (FLUSH) 0.9 %
3 SYRINGE (ML) INJECTION AS NEEDED
Status: DISCONTINUED | OUTPATIENT
Start: 2019-07-26 | End: 2019-07-27

## 2019-07-26 RX ORDER — SODIUM CHLORIDE 0.9 % (FLUSH) 0.9 %
3 SYRINGE (ML) INJECTION AS NEEDED
Status: DISCONTINUED | OUTPATIENT
Start: 2019-07-26 | End: 2019-07-26

## 2019-07-26 RX ORDER — PREDNISONE 1 MG/1
5 TABLET ORAL DAILY
Status: DISCONTINUED | OUTPATIENT
Start: 2019-07-27 | End: 2019-07-27

## 2019-07-26 RX ORDER — CARVEDILOL 3.12 MG/1
1.56 TABLET ORAL 2 TIMES DAILY WITH MEALS
Status: DISCONTINUED | OUTPATIENT
Start: 2019-07-26 | End: 2019-07-27

## 2019-07-26 RX ORDER — CALCIUM CARBONATE 200(500)MG
500 TABLET,CHEWABLE ORAL NIGHTLY
Status: DISCONTINUED | OUTPATIENT
Start: 2019-07-26 | End: 2019-07-27

## 2019-07-26 RX ORDER — METOCLOPRAMIDE HYDROCHLORIDE 5 MG/ML
10 INJECTION INTRAMUSCULAR; INTRAVENOUS EVERY 8 HOURS PRN
Status: DISCONTINUED | OUTPATIENT
Start: 2019-07-26 | End: 2019-07-26

## 2019-07-26 RX ORDER — METOCLOPRAMIDE HYDROCHLORIDE 5 MG/ML
10 INJECTION INTRAMUSCULAR; INTRAVENOUS EVERY 8 HOURS PRN
Status: DISCONTINUED | OUTPATIENT
Start: 2019-07-26 | End: 2019-07-27

## 2019-07-26 RX ORDER — HEPARIN SODIUM 5000 [USP'U]/ML
5000 INJECTION, SOLUTION INTRAVENOUS; SUBCUTANEOUS EVERY 12 HOURS SCHEDULED
Status: DISCONTINUED | OUTPATIENT
Start: 2019-07-26 | End: 2019-07-26

## 2019-07-26 RX ORDER — ACETAMINOPHEN 325 MG/1
650 TABLET ORAL EVERY 6 HOURS PRN
Status: DISCONTINUED | OUTPATIENT
Start: 2019-07-26 | End: 2019-07-26

## 2019-07-26 RX ORDER — AMLODIPINE BESYLATE 5 MG/1
5 TABLET ORAL DAILY
Status: DISCONTINUED | OUTPATIENT
Start: 2019-07-27 | End: 2019-07-27

## 2019-07-26 RX ORDER — ACETAMINOPHEN 325 MG/1
650 TABLET ORAL EVERY 6 HOURS PRN
Status: DISCONTINUED | OUTPATIENT
Start: 2019-07-26 | End: 2019-07-27

## 2019-07-26 NOTE — CONSULTS
Cardiology  Results reviewed  No specific cardiology abnormalities  D/w dr Jodie Chance and jim  Will keep overnight and get stress test and echo  If ok, home after test done  Pt and family agreeable

## 2019-07-26 NOTE — ED PROVIDER NOTES
Patient Seen in: [facility] Emergency Department    History   Patient presents with:  Dyspnea ANDRES SOB (respiratory)    Stated Complaint: Othella Jemima w/Bilateral LE Swelling    HPI    82-year-old male presents for complaint of fatigue and weakness fo mild   • Psoriasis    • Renal stone     right   • Right shoulder pain     injected-Liane   • Vitamin B12 nutritional deficiency    • Vitamin D deficiency        Past Surgical History:   Procedure Laterality Date   • ANGIOPLASTY (CORONARY)  1999     Current:/70   Pulse 65   Temp 98.4 °F (36.9 °C) (Oral)   Resp 16   Ht 177.8 cm (5' 10\")   Wt 74.8 kg   SpO2 100%   BMI 23.68 kg/m²         Physical Exam   Constitutional: He is oriented to person, place, and time.  He appears well-developed and Narrative: The following orders were created for panel order CBC WITH DIFFERENTIAL WITH PLATELET.   Procedure                               Abnormality         Status                     ---------                               -----------         ---

## 2019-07-26 NOTE — PROGRESS NOTES
ASSESSMENT/PLAN:    Impression: 1. Cough and fatigue in a 16-year-old male with a history of aortic valve replacement and bypass. 2.  History of aortic valve and bypass in 2013. 3.  Hypertension. 4.  Dyslipidemia.   5.  PMR    Recommendation: Renal c WITH MEALS Disp: 180 tablet Rfl: 3   Risedronate Sodium (ACTONEL) 35 MG Oral Tab Take 1 tablet (35 mg total) by mouth every 7 days. Take pill whole in am with full glass of water on empty stomach 30 minutes before eating.  Do not lay back down after taking vertebra ([facility] [address] 6/19/2018   • Diverticulosis    • Dyslipidemia    • Elevated lipoprotein A level    • FUO (fever of unknown origin)     PMR or testicular/prostate infection   • Gastritis    • Hemorrhoids    • Hiatal hernia with gastroesophageal reflux    • Pulse 69   Temp 98.4 °F (36.9 °C) (Oral)   Resp 16   Ht 5' 10\" (1.778 m)   Wt 165 lb (74.8 kg)   SpO2 99%   BMI 23.68 kg/m²   CONS: well developed, well nourished. WEIGHT:discussed. HEAD/FACE:no trauma, normocephalic.  EYES:conjunctiva not injected, no toribio

## 2019-07-26 NOTE — ED INITIAL ASSESSMENT (HPI)
Cough,sob, and edema to bilateral l.e. Symptoms x1 month. Speaking in full sentences at this time. No resp distress noted.

## 2019-07-27 ENCOUNTER — APPOINTMENT (OUTPATIENT)
Dept: NUCLEAR MEDICINE | Facility: HOSPITAL | Age: 79
End: 2019-07-27
Attending: INTERNAL MEDICINE
Payer: MEDICARE

## 2019-07-27 ENCOUNTER — APPOINTMENT (OUTPATIENT)
Dept: CV DIAGNOSTICS | Facility: HOSPITAL | Age: 79
End: 2019-07-27
Attending: INTERNAL MEDICINE
Payer: MEDICARE

## 2019-07-27 VITALS
WEIGHT: 162.13 LBS | DIASTOLIC BLOOD PRESSURE: 68 MMHG | OXYGEN SATURATION: 99 % | TEMPERATURE: 98 F | SYSTOLIC BLOOD PRESSURE: 123 MMHG | HEIGHT: 70 IN | RESPIRATION RATE: 18 BRPM | HEART RATE: 66 BPM | BODY MASS INDEX: 23.21 KG/M2

## 2019-07-27 LAB
ANION GAP SERPL CALC-SCNC: 11 MMOL/L (ref 0–18)
BASOPHILS # BLD AUTO: 0.02 X10(3) UL (ref 0–0.2)
BASOPHILS NFR BLD AUTO: 0.3 %
BUN BLD-MCNC: 17 MG/DL (ref 7–18)
BUN/CREAT SERPL: 17.3 (ref 10–20)
CALCIUM BLD-MCNC: 8.2 MG/DL (ref 8.5–10.1)
CHLORIDE SERPL-SCNC: 103 MMOL/L (ref 98–112)
CO2 SERPL-SCNC: 24 MMOL/L (ref 21–32)
CORTIS SERPL-MCNC: 12.8 UG/DL
CREAT BLD-MCNC: 0.98 MG/DL (ref 0.7–1.3)
DEPRECATED RDW RBC AUTO: 40.9 FL (ref 35.1–46.3)
EOSINOPHIL # BLD AUTO: 0.15 X10(3) UL (ref 0–0.7)
EOSINOPHIL NFR BLD AUTO: 2.1 %
ERYTHROCYTE [DISTWIDTH] IN BLOOD BY AUTOMATED COUNT: 11.9 % (ref 11–15)
GLUCOSE BLD-MCNC: 84 MG/DL (ref 70–99)
HCT VFR BLD AUTO: 34.3 % (ref 39–53)
HGB BLD-MCNC: 11.4 G/DL (ref 13–17.5)
IMM GRANULOCYTES # BLD AUTO: 0.03 X10(3) UL (ref 0–1)
IMM GRANULOCYTES NFR BLD: 0.4 %
LYMPHOCYTES # BLD AUTO: 0.54 X10(3) UL (ref 1–4)
LYMPHOCYTES NFR BLD AUTO: 7.6 %
MCH RBC QN AUTO: 31.7 PG (ref 26–34)
MCHC RBC AUTO-ENTMCNC: 33.2 G/DL (ref 31–37)
MCV RBC AUTO: 95.3 FL (ref 80–100)
MONOCYTES # BLD AUTO: 0.82 X10(3) UL (ref 0.1–1)
MONOCYTES NFR BLD AUTO: 11.6 %
NEUTROPHILS # BLD AUTO: 5.52 X10 (3) UL (ref 1.5–7.7)
NEUTROPHILS # BLD AUTO: 5.52 X10(3) UL (ref 1.5–7.7)
NEUTROPHILS NFR BLD AUTO: 78 %
OSMOLALITY SERPL CALC.SUM OF ELEC: 287 MOSM/KG (ref 275–295)
PATIENT FASTING: YES
PLATELET # BLD AUTO: 189 10(3)UL (ref 150–450)
POTASSIUM SERPL-SCNC: 4 MMOL/L (ref 3.5–5.1)
RBC # BLD AUTO: 3.6 X10(6)UL (ref 3.8–5.8)
SODIUM SERPL-SCNC: 138 MMOL/L (ref 136–145)
WBC # BLD AUTO: 7.1 X10(3) UL (ref 4–11)

## 2019-07-27 PROCEDURE — 93306 TTE W/DOPPLER COMPLETE: CPT | Performed by: INTERNAL MEDICINE

## 2019-07-27 PROCEDURE — 78452 HT MUSCLE IMAGE SPECT MULT: CPT | Performed by: INTERNAL MEDICINE

## 2019-07-27 PROCEDURE — 93016 CV STRESS TEST SUPVJ ONLY: CPT | Performed by: INTERNAL MEDICINE

## 2019-07-27 PROCEDURE — 93017 CV STRESS TEST TRACING ONLY: CPT | Performed by: INTERNAL MEDICINE

## 2019-07-27 PROCEDURE — 93018 CV STRESS TEST I&R ONLY: CPT | Performed by: INTERNAL MEDICINE

## 2019-07-27 PROCEDURE — 80048 BASIC METABOLIC PNL TOTAL CA: CPT | Performed by: HOSPITALIST

## 2019-07-27 PROCEDURE — 82533 TOTAL CORTISOL: CPT | Performed by: HOSPITALIST

## 2019-07-27 PROCEDURE — 85025 COMPLETE CBC W/AUTO DIFF WBC: CPT | Performed by: HOSPITALIST

## 2019-07-27 PROCEDURE — 96372 THER/PROPH/DIAG INJ SC/IM: CPT

## 2019-07-27 RX ORDER — SODIUM CHLORIDE 9 MG/ML
INJECTION, SOLUTION INTRAVENOUS
Status: COMPLETED
Start: 2019-07-27 | End: 2019-07-27

## 2019-07-27 RX ORDER — AMLODIPINE BESYLATE 5 MG/1
5 TABLET ORAL DAILY
Status: DISCONTINUED | OUTPATIENT
Start: 2019-07-27 | End: 2019-07-27

## 2019-07-27 RX ORDER — PREDNISONE 1 MG/1
10 TABLET ORAL DAILY
Qty: 14 TABLET | Refills: 0 | Status: SHIPPED | OUTPATIENT
Start: 2019-07-27 | End: 2019-07-30

## 2019-07-27 NOTE — PROGRESS NOTES
Assessment and Plan:     1. Cough and fatigue  -   2. CABG + AVR (bio) 2013 at [facility]  3. HTN  4. HLD  5.  PMR      PLAN:  - Echo and stress today      Subjective:     Feels better    Objective:   Temp:  [97.8 °F (36.6 °C)-98.6 °F (37 °C)] 98.3 °F Pain/pe (iv Only) (cpt=71260)    Result Date: 7/26/2019  CONCLUSION:   No pulmonary embolus in the central, main, lobar, segmental pulmonary arteries. Nondiagnostic in the subsegmental pulmonary arteries due to respiratory motion artifact.   0.8 cm nodule i

## 2019-07-27 NOTE — PLAN OF CARE
Problem: Patient/Family Goals  Goal: Patient/Family Long Term Goal  Description  Patient's Long Term Goal: home    Interventions:  - medications  - follow up with physicians   - See additional Care Plan goals for specific interventions   7/27/2019 0037 b based on assessment  - Modify environment to reduce risk of injury  - Provide assistive devices as appropriate  - Consider OT/PT consult to assist with strengthening/mobility  - Encourage toileting schedule  7/27/2019 0037 by Alyce Reed RN  Outcome: Alyce Reed RN  Outcome: Progressing  7/27/2019 0036 by Alyce Reed RN  Outcome: Progressing  Goal: Absence of cardiac arrhythmias or at baseline  Description  INTERVENTIONS:  - Continuous cardiac monitoring, monitor vital signs, obtain 12 lead

## 2019-07-27 NOTE — H&P
[facility] Hospitalist H&P     CC: Patient presents with:  Dyspnea NADRES SOB (respiratory)     PCP: Thereasa Sicard, MD    Date of Admission: 7/26/2019  2:58 PM    ASSESSMENT / PLAN:     Mr. Rigo Gonzáles is a 69 yo M with PMH of PMR on chronic prednisone, CAD s/p CABG, AS s with head elevated. He has had some weight loss, about 4 lbs in the past couple weeks. Notices more fatigue with exertion when going up and down stairs. Used to walk 3 miles per day and is unable to do that. No fevers/chills.        [address]  Past Medical Histor JAR(S): 1  12/7/2012    excisional biopsy rt inguinal lymph node Dr. Bertha Smith  2/8/13    exploratory abd lymphadenopathy   • REPLACE AORTIC VALVE OPEN  10/25/13    bio   • Lee   • SKIN TAGS  10/1714    plu normal, CN intact, sensory intact  Psych: Affect- normal  SKIN: warm, dry  EXT: trace edema around ankles    Diagnostic Data:    CBC/Chem    Recent Labs   Lab 07/26/19  1530   RBC 3.60*   HGB 11.5*   HCT 34.3*   MCV 95.3   MCH 31.9   MCHC 33.5   RDW 11.9 lobe.  Followup chest CT recommended in 6 months to demonstrate resolution. Mild wall thickening of the distal esophagus at the gastroesophageal junction may be related to esophagitis.  If clinically indicated, further evaluation with direct visualization

## 2019-07-27 NOTE — DISCHARGE SUMMARY
General Medicine Discharge Summary     Patient ID:  Jesusita Hernandez  70year old  2/13/1940    Admit date: 7/26/2019    Discharge date and time: 07/27/19    Attending Physician: Niko Nesbitt MD     Primary Care Physician: Conny Seip, MD     Reason fo exertion  - normal CXR  - cardiology consulted, plan for stress test and TTE tomorrow  - unclear if due to cardiac issue in nature, less likely infectious, ? rheumatologic given hx of PMR, will check sed rate and also AM cortisol given chronic prednisone  - (cpt=71260)    Result Date: 7/26/2019  CONCLUSION:   No pulmonary embolus in the central, main, lobar, segmental pulmonary arteries. Nondiagnostic in the subsegmental pulmonary arteries due to respiratory motion artifact.   0.8 cm nodule is seen within the TABLET BY MOUTH ONCE DAILY AT NIGHT     traMADol HCl 50 MG Tabs  Commonly known as:  ULTRAM  Take 1 tablet (50 mg total) by mouth every 6 (six) hours as needed for Pain.      TUMS FRESHERS 500 MG Chew  Generic drug:  Calcium Carbonate Antacid     Vitamin K

## 2019-07-29 ENCOUNTER — PATIENT OUTREACH (OUTPATIENT)
Dept: CASE MANAGEMENT | Age: 79
End: 2019-07-29

## 2019-07-29 ENCOUNTER — TELEPHONE (OUTPATIENT)
Dept: INTERNAL MEDICINE CLINIC | Facility: CLINIC | Age: 79
End: 2019-07-29

## 2019-07-29 DIAGNOSIS — Z02.9 ENCOUNTERS FOR UNSPECIFIED ADMINISTRATIVE PURPOSE: ICD-10-CM

## 2019-07-29 DIAGNOSIS — R06.02 SHORTNESS OF BREATH: ICD-10-CM

## 2019-07-29 PROCEDURE — 1111F DSCHRG MED/CURRENT MED MERGE: CPT

## 2019-07-29 NOTE — PROGRESS NOTES
Initial Post Discharge Follow Up   Discharge Date: 7/27/19  Contact Date: 7/29/2019    Consent Verification:  Assessment Completed With: Patient  HIPAA Verified?   Yes    Discharge Dx:     Shortness of breath  fatigue    Was TCC ordered: no    General: Medications:  predniSONE 5 MG Oral Tab Take 2 tablets (10 mg total) by mouth daily.  Disp: 14 tablet Rfl: 0   AMLODIPINE BESYLATE 5 MG Oral Tab TAKE 1 TABLET BY MOUTH ONCE DAILY Disp: 90 tablet Rfl: 3   OMEPRAZOLE 10 MG Oral Capsule Delayed Release TAKE 1 C mouth. Disp:  Rfl:      • When you were leaving the hospital were any medication changes discussed with you? yes  • If you were prescribed a new medication:   o Was the new medication’s purpose explained? yes  o Was the new medication’s side effects discus cannot make your appointments? No     NCM Reviewed upcoming Specialist Appt with patient     n/a  Overall Rating: How would you rate the care you received while in the hospital?  good        Interventions by NCM: AVS summary reviewed with rick Damon

## 2019-07-29 NOTE — TELEPHONE ENCOUNTER
NCM spoke with pt for TCM. Pt has a regular exam appt with his PCP tomorrow 7/30/19 at 3:15pm however is high risk for readmission and would benefit from a TCM HFU.   Please advise PCP and if ok to change visit type to a TCM HFU as pt would greatly benefit

## 2019-07-30 ENCOUNTER — OFFICE VISIT (OUTPATIENT)
Dept: INTERNAL MEDICINE CLINIC | Facility: CLINIC | Age: 79
End: 2019-07-30
Payer: MEDICARE

## 2019-07-30 VITALS
DIASTOLIC BLOOD PRESSURE: 76 MMHG | SYSTOLIC BLOOD PRESSURE: 128 MMHG | BODY MASS INDEX: 24 KG/M2 | WEIGHT: 164 LBS | HEART RATE: 84 BPM

## 2019-07-30 DIAGNOSIS — K20.90 ESOPHAGITIS: ICD-10-CM

## 2019-07-30 DIAGNOSIS — R06.02 SOB (SHORTNESS OF BREATH): ICD-10-CM

## 2019-07-30 DIAGNOSIS — M35.3 PMR (POLYMYALGIA RHEUMATICA) (HCC): Primary | ICD-10-CM

## 2019-07-30 PROCEDURE — 99496 TRANSJ CARE MGMT HIGH F2F 7D: CPT | Performed by: INTERNAL MEDICINE

## 2019-07-30 RX ORDER — OMEPRAZOLE 20 MG/1
20 CAPSULE, DELAYED RELEASE ORAL
Qty: 90 CAPSULE | Refills: 3 | Status: ON HOLD | OUTPATIENT
Start: 2019-07-30 | End: 2019-08-10

## 2019-07-30 RX ORDER — PREDNISONE 10 MG/1
10 TABLET ORAL DAILY
Qty: 90 TABLET | Refills: 3 | Status: SHIPPED | OUTPATIENT
Start: 2019-07-30 | End: 2019-08-03

## 2019-07-30 RX ORDER — CARVEDILOL 3.12 MG/1
3.12 TABLET ORAL 2 TIMES DAILY WITH MEALS
Qty: 180 TABLET | Refills: 3 | Status: ON HOLD | OUTPATIENT
Start: 2019-07-30 | End: 2020-01-13

## 2019-08-03 RX ORDER — PREDNISONE 10 MG/1
TABLET ORAL
Qty: 90 TABLET | Refills: 3 | Status: ON HOLD | COMMUNITY
Start: 2019-08-03 | End: 2019-08-10

## 2019-08-03 NOTE — PROGRESS NOTES
HPI:    Patient ID: Chucho Venegas is a 70year old male TCM fu discharged from [facility] 7/27 with SOB     Neg CTA, normal prosthetic valve on echo.  High ESR with esophagitis from Actonel used because of Reclast denial by insurance      Review of Systems   Cons a day Disp:  Rfl:    Riboflavin 100 MG Oral Cap Take  by mouth 3 (three) times a week. m-w-fr Disp:  Rfl:    Nutritional Supplements (JUICE PLUS FIBRE OR) Take  by mouth.  Disp:  Rfl:    TraMADol HCl 50 MG Oral Tab Take 1 tablet (50 mg total) by mouth every

## 2019-08-09 ENCOUNTER — HOSPITAL ENCOUNTER (OUTPATIENT)
Facility: HOSPITAL | Age: 79
Setting detail: OBSERVATION
Discharge: HOME | End: 2019-08-10
Attending: INTERNAL MEDICINE | Admitting: INTERNAL MEDICINE
Payer: MEDICARE

## 2019-08-09 ENCOUNTER — TELEPHONE (OUTPATIENT)
Dept: INTERNAL MEDICINE CLINIC | Facility: CLINIC | Age: 79
End: 2019-08-09

## 2019-08-09 ENCOUNTER — APPOINTMENT (OUTPATIENT)
Dept: CT IMAGING | Facility: HOSPITAL | Age: 79
End: 2019-08-09
Attending: INTERNAL MEDICINE
Payer: MEDICARE

## 2019-08-09 PROBLEM — R63.4 WEIGHT LOSS: Status: ACTIVE | Noted: 2019-08-09

## 2019-08-09 PROBLEM — K20.90 ESOPHAGITIS: Status: ACTIVE | Noted: 2019-08-09

## 2019-08-09 LAB
ALBUMIN SERPL-MCNC: 2.5 G/DL (ref 3.4–5)
ALBUMIN/GLOB SERPL: 0.6 {RATIO} (ref 1–2)
ALP LIVER SERPL-CCNC: 71 U/L (ref 45–117)
ALT SERPL-CCNC: 115 U/L (ref 16–61)
AMYLASE SERPL-CCNC: 50 U/L (ref 25–115)
ANION GAP SERPL CALC-SCNC: 5 MMOL/L (ref 0–18)
AST SERPL-CCNC: 81 U/L (ref 15–37)
ATRIAL RATE: 73 BPM
BASOPHILS # BLD AUTO: 0.01 X10(3) UL (ref 0–0.2)
BASOPHILS NFR BLD AUTO: 0.1 %
BILIRUB SERPL-MCNC: 0.6 MG/DL (ref 0.1–2)
BILIRUB UR QL STRIP.AUTO: NEGATIVE
BUN BLD-MCNC: 22 MG/DL (ref 7–18)
BUN/CREAT SERPL: 16.7 (ref 10–20)
CALCIUM BLD-MCNC: 8.6 MG/DL (ref 8.5–10.1)
CHLORIDE SERPL-SCNC: 102 MMOL/L (ref 98–112)
CO2 SERPL-SCNC: 25 MMOL/L (ref 21–32)
COLOR UR AUTO: YELLOW
CREAT BLD-MCNC: 1.32 MG/DL (ref 0.7–1.3)
DEPRECATED RDW RBC AUTO: 41.2 FL (ref 35.1–46.3)
EOSINOPHIL # BLD AUTO: 0.08 X10(3) UL (ref 0–0.7)
EOSINOPHIL NFR BLD AUTO: 0.8 %
ERYTHROCYTE [DISTWIDTH] IN BLOOD BY AUTOMATED COUNT: 12 % (ref 11–15)
GLOBULIN PLAS-MCNC: 4.5 G/DL (ref 2.8–4.4)
GLUCOSE BLD-MCNC: 104 MG/DL (ref 70–99)
GLUCOSE UR STRIP.AUTO-MCNC: NEGATIVE MG/DL
HCT VFR BLD AUTO: 34.5 % (ref 39–53)
HGB BLD-MCNC: 11.5 G/DL (ref 13–17.5)
IMM GRANULOCYTES # BLD AUTO: 0.07 X10(3) UL (ref 0–1)
IMM GRANULOCYTES NFR BLD: 0.7 %
KETONES UR STRIP.AUTO-MCNC: NEGATIVE MG/DL
LEUKOCYTE ESTERASE UR QL STRIP.AUTO: NEGATIVE
LIPASE SERPL-CCNC: 72 U/L (ref 73–393)
LYMPHOCYTES # BLD AUTO: 0.31 X10(3) UL (ref 1–4)
LYMPHOCYTES NFR BLD AUTO: 3 %
M PROTEIN MFR SERPL ELPH: 7 G/DL (ref 6.4–8.2)
MCH RBC QN AUTO: 31.2 PG (ref 26–34)
MCHC RBC AUTO-ENTMCNC: 33.3 G/DL (ref 31–37)
MCV RBC AUTO: 93.5 FL (ref 80–100)
MONOCYTES # BLD AUTO: 0.56 X10(3) UL (ref 0.1–1)
MONOCYTES NFR BLD AUTO: 5.4 %
NEUTROPHILS # BLD AUTO: 9.31 X10 (3) UL (ref 1.5–7.7)
NEUTROPHILS # BLD AUTO: 9.31 X10(3) UL (ref 1.5–7.7)
NEUTROPHILS NFR BLD AUTO: 90 %
NITRITE UR QL STRIP.AUTO: NEGATIVE
OSMOLALITY SERPL CALC.SUM OF ELEC: 278 MOSM/KG (ref 275–295)
P AXIS: 49 DEGREES
P-R INTERVAL: 176 MS
PH UR STRIP.AUTO: 6 [PH] (ref 4.5–8)
PLATELET # BLD AUTO: 221 10(3)UL (ref 150–450)
POTASSIUM SERPL-SCNC: 4.4 MMOL/L (ref 3.5–5.1)
PROT UR STRIP.AUTO-MCNC: NEGATIVE MG/DL
Q-T INTERVAL: 406 MS
QRS DURATION: 94 MS
QTC CALCULATION (BEZET): 447 MS
R AXIS: -41 DEGREES
RBC # BLD AUTO: 3.69 X10(6)UL (ref 3.8–5.8)
RBC UR QL AUTO: NEGATIVE
RHEUMATOID FACT SERPL-ACNC: <10 IU/ML (ref ?–15)
SED RATE-ML: 71 MM/HR (ref 0–12)
SODIUM SERPL-SCNC: 132 MMOL/L (ref 136–145)
SP GR UR STRIP.AUTO: 1.01 (ref 1–1.03)
T AXIS: 67 DEGREES
UROBILINOGEN UR STRIP.AUTO-MCNC: <2 MG/DL
VENTRICULAR RATE: 73 BPM
WBC # BLD AUTO: 10.3 X10(3) UL (ref 4–11)

## 2019-08-09 PROCEDURE — 99220 INITIAL OBSERVATION CARE,LEVL III: CPT | Performed by: INTERNAL MEDICINE

## 2019-08-09 PROCEDURE — 74177 CT ABD & PELVIS W/CONTRAST: CPT | Performed by: INTERNAL MEDICINE

## 2019-08-09 RX ORDER — PANTOPRAZOLE SODIUM 20 MG/1
20 TABLET, DELAYED RELEASE ORAL
Status: DISCONTINUED | OUTPATIENT
Start: 2019-08-10 | End: 2019-08-10

## 2019-08-09 RX ORDER — ACETAMINOPHEN 325 MG/1
650 TABLET ORAL EVERY 6 HOURS PRN
Status: DISCONTINUED | OUTPATIENT
Start: 2019-08-09 | End: 2019-08-10

## 2019-08-09 RX ORDER — ROSUVASTATIN CALCIUM 20 MG/1
20 TABLET, COATED ORAL NIGHTLY
Status: DISCONTINUED | OUTPATIENT
Start: 2019-08-09 | End: 2019-08-10

## 2019-08-09 RX ORDER — CARVEDILOL 3.12 MG/1
3.12 TABLET ORAL 2 TIMES DAILY WITH MEALS
Status: DISCONTINUED | OUTPATIENT
Start: 2019-08-09 | End: 2019-08-10

## 2019-08-09 RX ORDER — DEXTROSE AND SODIUM CHLORIDE 5; .45 G/100ML; G/100ML
INJECTION, SOLUTION INTRAVENOUS CONTINUOUS
Status: DISCONTINUED | OUTPATIENT
Start: 2019-08-09 | End: 2019-08-10

## 2019-08-09 NOTE — PROGRESS NOTES
NURSING ADMISSION NOTE      Patient admitted via Wheelchair  Oriented to room. Safety precautions initiated. Bed in low position. Call light in reach. Pt Aox4. No c/o pain. VSS. Pt has hx of esophagitis finding in EGD in 2007.  Weight loss of ~1

## 2019-08-09 NOTE — PROGRESS NOTES
NURSING ADMISSION NOTE      Patient admitted via Wheelchair  Oriented to room. Safety precautions initiated. Bed in low position. Call light in reach. Patient able to demonstrate proper use of call light.    Admission navigator questions and medicati

## 2019-08-09 NOTE — PROGRESS NOTES
Examined for H&P - discussion with wife. See orders. Has esophagitis with wt loss. EGD 2007. Colonoscopy 4/2018 polyp. CT abdomen 4/2019. Suspect a GI malignancy. Plan EGD in am after CT abdomen today. Discussed with GI Reyes Prince.

## 2019-08-09 NOTE — CONSULTS
[facility] Patient Status:  Observation   Date of Birth 2/13/1940 MRN [id number]   [facility] 4NW-A Attending Shane Jimenez MD   [facility] Day # 0 PCP Clarice Kraft MD     Date of Consultation:  6 • Proteinuria     mild   • Psoriasis    • Renal stone     right   • Right shoulder pain     injected-Liane   • Shortness of breath    • Sleep apnea    • Vitamin B12 nutritional deficiency    • Vitamin D deficiency        Medications:     Prior to Admis Date , Taking? Yes, Authorizing Provider External/Patient, Reported    Medication Cholecalciferol (VITAMIN D3) 5000 UNITS Oral Cap, Sig Take 5,000 Units by mouth daily. , Start Date 6/9/15, End Date , Taking?  Yes, Authorizing Provider MD Hiwot Rasheed Social History:    Social History    Tobacco Use      Smoking status: Former Smoker        Types: Cigarettes        Quit date: 1971        Years since quittin.0      Smokeless tobacco: Never Used    Alcohol use:  Yes      Alcohol/week: 5.8 s agrees and all questions were answered.

## 2019-08-10 VITALS
SYSTOLIC BLOOD PRESSURE: 113 MMHG | WEIGHT: 157.81 LBS | TEMPERATURE: 99 F | RESPIRATION RATE: 18 BRPM | BODY MASS INDEX: 23 KG/M2 | DIASTOLIC BLOOD PRESSURE: 57 MMHG | HEART RATE: 77 BPM | OXYGEN SATURATION: 99 %

## 2019-08-10 LAB
ALBUMIN SERPL-MCNC: 2.5 G/DL (ref 3.4–5)
ALP LIVER SERPL-CCNC: 72 U/L (ref 45–117)
ALT SERPL-CCNC: 115 U/L (ref 16–61)
AST SERPL-CCNC: 80 U/L (ref 15–37)
BILIRUB DIRECT SERPL-MCNC: 0.2 MG/DL (ref 0–0.2)
BILIRUB SERPL-MCNC: 0.6 MG/DL (ref 0.1–2)
DEPRECATED HBV CORE AB SER IA-ACNC: 360.7 NG/ML (ref 30–530)
HAV IGM SER QL: NONREACTIVE
HBV CORE IGM SER QL: NONREACTIVE
HBV SURFACE AG SERPL QL IA: NONREACTIVE
HCV AB SERPL QL IA: NONREACTIVE
INR BLD: 1.1 (ref 0.9–1.1)
M PROTEIN MFR SERPL ELPH: 7.1 G/DL (ref 6.4–8.2)
PSA SERPL DL<=0.01 NG/ML-MCNC: 14.7 SECONDS (ref 12.5–14.7)

## 2019-08-10 PROCEDURE — 0DB68ZX EXCISION OF STOMACH, VIA NATURAL OR ARTIFICIAL OPENING ENDOSCOPIC, DIAGNOSTIC: ICD-10-PCS | Performed by: INTERNAL MEDICINE

## 2019-08-10 PROCEDURE — 0DB18ZX EXCISION OF UPPER ESOPHAGUS, VIA NATURAL OR ARTIFICIAL OPENING ENDOSCOPIC, DIAGNOSTIC: ICD-10-PCS | Performed by: INTERNAL MEDICINE

## 2019-08-10 PROCEDURE — 99217 OBSERVATION CARE DISCHARGE: CPT | Performed by: INTERNAL MEDICINE

## 2019-08-10 PROCEDURE — 0DB38ZX EXCISION OF LOWER ESOPHAGUS, VIA NATURAL OR ARTIFICIAL OPENING ENDOSCOPIC, DIAGNOSTIC: ICD-10-PCS | Performed by: INTERNAL MEDICINE

## 2019-08-10 PROCEDURE — 0DB98ZX EXCISION OF DUODENUM, VIA NATURAL OR ARTIFICIAL OPENING ENDOSCOPIC, DIAGNOSTIC: ICD-10-PCS | Performed by: INTERNAL MEDICINE

## 2019-08-10 RX ORDER — ACETAMINOPHEN 325 MG/1
650 TABLET ORAL EVERY 6 HOURS PRN
Refills: 0 | Status: SHIPPED | COMMUNITY
Start: 2019-08-10 | End: 2019-08-28 | Stop reason: CLARIF

## 2019-08-10 RX ORDER — PREDNISONE 10 MG/1
20 TABLET ORAL DAILY
Qty: 90 TABLET | Refills: 3 | Status: SHIPPED | COMMUNITY
Start: 2019-08-10 | End: 2019-09-30

## 2019-08-10 RX ORDER — OMEPRAZOLE 20 MG/1
20 CAPSULE, DELAYED RELEASE ORAL
Qty: 90 CAPSULE | Refills: 3 | Status: SHIPPED | COMMUNITY
Start: 2019-08-10 | End: 2019-11-25

## 2019-08-10 RX ORDER — MIDAZOLAM HYDROCHLORIDE 1 MG/ML
INJECTION INTRAMUSCULAR; INTRAVENOUS
Status: DISCONTINUED | OUTPATIENT
Start: 2019-08-10 | End: 2019-08-10 | Stop reason: HOSPADM

## 2019-08-10 NOTE — OPERATIVE REPORT
[facility]  Esophagogastroduodenoscopy Report    Balwinder Banks Patient Status:  Observation    1940 MRN [id number]   [facility] ENDOSCOPY Attending Esperanza Chan MD      DATE OF OPERATION: 8/10/2019     PREOPERATIVE DIAGNOSIS

## 2019-08-10 NOTE — PLAN OF CARE
Patient A+Ox4. Denies any pain. Up as tolerated. Spoke to MD resumed some of home medications. Vitals stable. NPO for EGD in am. Consent signed for EGD and placed in chart.     Problem: CARDIOVASCULAR - ADULT  Goal: Maintains optimal cardiac output and hemo

## 2019-08-10 NOTE — PROGRESS NOTES
[facility]  GI Progress Note      Chanraghu Waters Patient Status:  Observation    1940 MRN [id number]   [facility] ENDOSCOPY Attending Dixie Lu MD   Hosp Day # 0 PCP Nieves Mcneal MD       The patient has mild elevation of trans

## 2019-08-10 NOTE — H&P
[facility]    PATIENT'S NAME: Shay Cedeño   ATTENDING PHYSICIAN: Karenann Kawasaki, M.D.    PATIENT ACCOUNT#:   [de-identified]    LOCATION:  [address] [place] [address]  MEDICAL RECORD #:   [id number]       YOB: 1940  ADMISSION DATE:       08/09/2019 origin thought to be related to his PMR. PAST SURGICAL HISTORY:  Angioplasty in 1999, appendix, bone marrow in 2012, CABG and aortic valve October 2013, gallbladder out in 1984, colonoscopy last done in 2018, EGD last done in 2007.      MEDICATIONS: he is depressed. HEMATOLOGIC: Bleeds easy on aspirin and steroids.   Mild chronic anemia thought to be due to his PMR with increase in proteins, right inguinal and abdominal node negative for lymphoma on previous biopsies in the past.        PHYSICAL EXAMI

## 2019-08-11 NOTE — PROGRESS NOTES
Seen and discharged yesterday-dictated today. Spoke to Sabiha by phone, wife and nurse in person all done with discharge 8/10.

## 2019-08-12 ENCOUNTER — PATIENT OUTREACH (OUTPATIENT)
Dept: CASE MANAGEMENT | Age: 79
End: 2019-08-12

## 2019-08-12 ENCOUNTER — TELEPHONE (OUTPATIENT)
Dept: INTERNAL MEDICINE CLINIC | Facility: CLINIC | Age: 79
End: 2019-08-12

## 2019-08-12 LAB
ANA SCREEN: POSITIVE
CENTROMERE AUTOAB: <100 AU/ML (ref ?–100)
DSDNA AUTOAB: 149 IU/ML (ref ?–100)
HISTONE AUTOAB: <100 AU/ML (ref ?–100)
JO-1 AUTOAB: <100 AU/ML (ref ?–100)
RNP AUTOAB: <100 AU/ML (ref ?–100)
SCL-70 AUTOAB: <100 AU/ML (ref ?–100)
SM AUTOAB (SMITH): <100 AU/ML (ref ?–100)
SSA AUTOAB: <100 AU/ML (ref ?–100)
SSB AUTOAB: <100 AU/ML (ref ?–100)

## 2019-08-12 NOTE — PROGRESS NOTES
Nilam [phone] for post hospital follow up, [facility] contact information provided.

## 2019-08-13 NOTE — DISCHARGE SUMMARY
[facility]    PATIENT'S NAME: Thadleann Beni   ATTENDING PHYSICIAN: Gabby Claros M.D.    PATIENT ACCOUNT#:   [de-identified]    LOCATION:  [address] [place] [address]  MEDICAL RECORD #:   [id number]       YOB: 1940  ADMISSION DATE:       08/09/2019 needed a small dose for his heart. Amlodipine was on hold. His blood pressure was normal with his weight loss, but I left him on carvedilol for cardiac reasons. FOLLOWUP:  I will see him next week and have results of the biopsies.   We will have an a

## 2019-08-14 LAB — CCP IGG SERPL-ACNC: 1.6 U/ML (ref 0–6.9)

## 2019-08-14 NOTE — PROGRESS NOTES
Pt is currently in TCM. Condition Update Post Discharge   Discharge Date: 8/10/19  Contact Date: 8/14/2019    Consent Verification:  Assessment Completed With: Patient  HIPAA Verified?   Yes    General:   • How have you been since your discharge from the 3   omeprazole 20 MG Oral Capsule Delayed Release Take 1 capsule (20 mg total) by mouth 2 (two) times daily before meals. Disp: 90 capsule Rfl: 3   acetaminophen 325 MG Oral Tab Take 2 tablets (650 mg total) by mouth every 6 (six) hours as needed.  Disp:  R D/C?No       Services ordered at D/C? No     DX: specifics:      Re-Admit:  Tell me what led up to your readmission: Due to symptoms per Dr. Radha Sanchez  Did you call your PCP office first? yes  Did you attempt to get in with your PCP?   yes  Do you feel this co and or orders sent to PCP.

## 2019-08-15 ENCOUNTER — TELEPHONE (OUTPATIENT)
Dept: INTERNAL MEDICINE CLINIC | Facility: CLINIC | Age: 79
End: 2019-08-15

## 2019-08-15 ENCOUNTER — APPOINTMENT (OUTPATIENT)
Dept: LAB | Age: 79
End: 2019-08-15
Attending: INTERNAL MEDICINE
Payer: MEDICARE

## 2019-08-15 ENCOUNTER — OFFICE VISIT (OUTPATIENT)
Dept: INTERNAL MEDICINE CLINIC | Facility: CLINIC | Age: 79
End: 2019-08-15
Payer: MEDICARE

## 2019-08-15 VITALS
DIASTOLIC BLOOD PRESSURE: 80 MMHG | SYSTOLIC BLOOD PRESSURE: 130 MMHG | OXYGEN SATURATION: 98 % | HEART RATE: 70 BPM | HEIGHT: 71 IN | BODY MASS INDEX: 21.84 KG/M2 | WEIGHT: 156 LBS | RESPIRATION RATE: 14 BRPM | TEMPERATURE: 98 F

## 2019-08-15 DIAGNOSIS — K44.9 HIATAL HERNIA WITH GASTROESOPHAGEAL REFLUX: Primary | ICD-10-CM

## 2019-08-15 DIAGNOSIS — R63.4 WEIGHT LOSS: ICD-10-CM

## 2019-08-15 DIAGNOSIS — R53.83 FATIGUE, UNSPECIFIED TYPE: ICD-10-CM

## 2019-08-15 DIAGNOSIS — K21.9 HIATAL HERNIA WITH GASTROESOPHAGEAL REFLUX: Primary | ICD-10-CM

## 2019-08-15 DIAGNOSIS — M35.3 PMR (POLYMYALGIA RHEUMATICA) (HCC): ICD-10-CM

## 2019-08-15 LAB — SED RATE-ML: 62 MM/HR (ref 0–12)

## 2019-08-15 PROCEDURE — 36415 COLL VENOUS BLD VENIPUNCTURE: CPT

## 2019-08-15 PROCEDURE — 99214 OFFICE O/P EST MOD 30 MIN: CPT | Performed by: INTERNAL MEDICINE

## 2019-08-15 PROCEDURE — 85652 RBC SED RATE AUTOMATED: CPT

## 2019-08-15 RX ORDER — HYDROXYCHLOROQUINE SULFATE 200 MG/1
200 TABLET, FILM COATED ORAL NIGHTLY
Qty: 30 TABLET | Refills: 3 | Status: SHIPPED | OUTPATIENT
Start: 2019-08-15 | End: 2019-08-30

## 2019-08-15 NOTE — TELEPHONE ENCOUNTER
PC to pt and spoke with wife. Reviewed test results and new medication regime, wife wrote down details and verbalized understanding and will relay information to .

## 2019-08-15 NOTE — TELEPHONE ENCOUNTER
----- Message from Abigail Russell MD sent at 8/15/2019  1:42 PM CDT -----  ESR down 71 to 62 was hoping for 50s. Stay on Pred 10 bid and I called in Plaquenil 200 at one a day.  Recheck ESR with me or rheum Lichon in 3-4 weeks

## 2019-08-25 ENCOUNTER — TELEPHONE (OUTPATIENT)
Dept: INTERNAL MEDICINE CLINIC | Facility: CLINIC | Age: 79
End: 2019-08-25

## 2019-08-26 NOTE — PROGRESS NOTES
HPI:    Patient ID: Thomas Gupta is a 70year old male seen 8 /15 discharged 8/10 TCM fu wt loss, severe fatigue. Found esophagitis and +RTINY    Esophagitis worsened by recent addition Actonel.  Severe fatigue and wt loss with hx adenopathy suspected lymp Cholecalciferol (VITAMIN D3) 5000 UNITS Oral Cap Take 5,000 Units by mouth daily. Disp: 30 capsule Rfl: 0   Clobetasol Prop Emollient Base (CLOBETASOL PROPIONATE E) 0.05 % Apply Externally Cream Apply  topically 2 (two) times daily.  prn Disp:  Rfl:

## 2019-08-27 ENCOUNTER — TELEPHONE (OUTPATIENT)
Dept: INTERNAL MEDICINE CLINIC | Facility: CLINIC | Age: 79
End: 2019-08-27

## 2019-08-27 ENCOUNTER — OFFICE VISIT (OUTPATIENT)
Dept: FAMILY MEDICINE CLINIC | Facility: CLINIC | Age: 79
End: 2019-08-27
Payer: MEDICARE

## 2019-08-27 VITALS
OXYGEN SATURATION: 100 % | RESPIRATION RATE: 16 BRPM | SYSTOLIC BLOOD PRESSURE: 160 MMHG | HEART RATE: 71 BPM | DIASTOLIC BLOOD PRESSURE: 80 MMHG

## 2019-08-27 DIAGNOSIS — Z01.30 BLOOD PRESSURE CHECK: Primary | ICD-10-CM

## 2019-08-27 NOTE — TELEPHONE ENCOUNTER
Spouse calling regarding elevated BP readings per home monitor and [employer] today:  BP 8/25  167-178 / 84-86  BP 8/26 151-169 / 76-79    /80    BP 8/27 Home monitor 153-166 /     Call to spouse re BP readings, they are different times a day per spo

## 2019-08-27 NOTE — PROGRESS NOTES
Pt here with wife, alert, verbal, ambulating per self without difficulty. Denies any difficult speech this morning, denies weakness. States that daughter is ill and has been concerned about her.  Checked blood pressure with home unit this am  that is automa

## 2019-08-28 ENCOUNTER — OFFICE VISIT (OUTPATIENT)
Dept: INTERNAL MEDICINE CLINIC | Facility: CLINIC | Age: 79
End: 2019-08-28
Payer: MEDICARE

## 2019-08-28 VITALS
TEMPERATURE: 98 F | HEART RATE: 80 BPM | DIASTOLIC BLOOD PRESSURE: 86 MMHG | WEIGHT: 154.69 LBS | SYSTOLIC BLOOD PRESSURE: 142 MMHG | BODY MASS INDEX: 22 KG/M2

## 2019-08-28 DIAGNOSIS — I10 ESSENTIAL HYPERTENSION: ICD-10-CM

## 2019-08-28 DIAGNOSIS — K20.90 ESOPHAGITIS: ICD-10-CM

## 2019-08-28 DIAGNOSIS — R63.4 WEIGHT LOSS: Primary | ICD-10-CM

## 2019-08-28 DIAGNOSIS — S22.080D CLOSED WEDGE COMPRESSION FRACTURE OF T11 VERTEBRA WITH ROUTINE HEALING: ICD-10-CM

## 2019-08-28 DIAGNOSIS — M35.3 PMR (POLYMYALGIA RHEUMATICA) (HCC): ICD-10-CM

## 2019-08-28 DIAGNOSIS — F41.9 ANXIETY: ICD-10-CM

## 2019-08-28 DIAGNOSIS — R76.8 POSITIVE ANA (ANTINUCLEAR ANTIBODY): ICD-10-CM

## 2019-08-28 PROCEDURE — 99214 OFFICE O/P EST MOD 30 MIN: CPT | Performed by: INTERNAL MEDICINE

## 2019-08-28 RX ORDER — AMLODIPINE BESYLATE 5 MG/1
5 TABLET ORAL DAILY
Qty: 90 TABLET | Refills: 3 | COMMUNITY
Start: 2019-08-28 | End: 2019-12-20

## 2019-08-28 NOTE — PROGRESS NOTES
Patient presents with:  Blood Pressure: Here for b/p check. HPI: Issues discussed today.         1.  Blood pressure variability patient got really lightheaded on carvedilol and dose was cut in half because the pulse rate of 48 blood pressure readings n couple weeks. If patient feels worse stop immediately other side effects can include diarrhea constipation sleep disorder etc. but most the time very well tolerated symptoms are come to change suddenly.   But lets get on so we can at that taken care of plaque     Osteopenia     S/P AVR     S/P CABG (coronary artery bypass graft)     Proteinuria     Multiple renal cysts     Renal stone     Closed wedge compression fracture of T11 vertebra with routine healing     Compression fracture of T12 vertebra (HCC) Disp:  Rfl:        Physical Exam  BP (!) 148/94 (BP Location: Left arm, Patient Position: Sitting, Cuff Size: adult)   Pulse 80   Temp 97.9 °F (36.6 °C) (Oral)   Wt 154 lb 11.2 oz   BMI 21.58 kg/m²   Constitutional: Sex lost weight actually looks like he h

## 2019-08-30 ENCOUNTER — OFFICE VISIT (OUTPATIENT)
Dept: RHEUMATOLOGY | Facility: CLINIC | Age: 79
End: 2019-08-30
Payer: MEDICARE

## 2019-08-30 VITALS
WEIGHT: 154 LBS | BODY MASS INDEX: 21 KG/M2 | DIASTOLIC BLOOD PRESSURE: 68 MMHG | RESPIRATION RATE: 12 BRPM | SYSTOLIC BLOOD PRESSURE: 112 MMHG | HEART RATE: 68 BPM

## 2019-08-30 DIAGNOSIS — S22.080D COMPRESSION FRACTURE OF T12 VERTEBRA WITH ROUTINE HEALING, SUBSEQUENT ENCOUNTER: ICD-10-CM

## 2019-08-30 DIAGNOSIS — Z95.1 S/P CABG (CORONARY ARTERY BYPASS GRAFT): ICD-10-CM

## 2019-08-30 DIAGNOSIS — R53.83 FATIGUE, UNSPECIFIED TYPE: ICD-10-CM

## 2019-08-30 DIAGNOSIS — M35.3 PMR (POLYMYALGIA RHEUMATICA) (HCC): Primary | ICD-10-CM

## 2019-08-30 DIAGNOSIS — R76.8 POSITIVE ANA (ANTINUCLEAR ANTIBODY): ICD-10-CM

## 2019-08-30 DIAGNOSIS — Z95.2 S/P AVR: ICD-10-CM

## 2019-08-30 PROBLEM — E87.1 HYPONATREMIA: Status: RESOLVED | Noted: 2019-07-26 | Resolved: 2019-08-30

## 2019-08-30 PROCEDURE — 99205 OFFICE O/P NEW HI 60 MIN: CPT | Performed by: INTERNAL MEDICINE

## 2019-08-30 RX ORDER — HYDROXYCHLOROQUINE SULFATE 200 MG/1
200 TABLET, FILM COATED ORAL 2 TIMES DAILY
Qty: 180 TABLET | Refills: 3 | Status: SHIPPED | OUTPATIENT
Start: 2019-08-30 | End: 2020-01-07 | Stop reason: SINTOL

## 2019-08-30 NOTE — PATIENT INSTRUCTIONS
Current plan for polymyalgia rheumatica is to continue presently on prednisone 10 mg twice a day to bring down the inflammation. Recent sed rate in mid August was 62. Normal level should be below 30.   Plaquenil has recently been introduced to try to help

## 2019-08-30 NOTE — PROGRESS NOTES
[facility]  Report of Consultation    Rocio Waters Patient Status:  No patient class for patient encounter    1940 MRN [id number]   Location [address] [place] [address] PCP Nieves Mcneal MD     Date of Consult:  19    Reason for Consultation: Estate [employer]  Allergies:   Codeine                 PAIN    Comment:abdominal  Lisinopril              HIVES  Niacin                  RASH  Pcn [Bicillin C-R,]     HIVES    Medications:   Current Outpatient Medications:   •  Hydroxychloroquine Sulfate ( Take  by mouth 3 (three) times a week. m-w-fr, Disp: , Rfl:   •  Nutritional Supplements (JUICE PLUS FIBRE OR), Take  by mouth., Disp: , Rfl:     Review of Systems:   No recent fever or chills. No recent weight loss or weight gain.   No current chest pain, present femoral neck. 8/9/19 CT of abdomen and pelvis shows #1 mild distal esophageal and gastroesophageal wall thickening as well as mild gastric wall thickening patient has history of esophagitis. #2 mild bladder wall thickening correlate for cystitis. and I will call you with the results. Eat a well-balanced diet. Mild exercise. We have to be at look out for alternative explanations for your fatigue. Return to see Dr. Leslie Dykes in 1 month.       Thank you for allowing me to participate in the care of yo

## 2019-09-05 ENCOUNTER — APPOINTMENT (OUTPATIENT)
Dept: LAB | Age: 79
End: 2019-09-05
Attending: INTERNAL MEDICINE
Payer: MEDICARE

## 2019-09-05 ENCOUNTER — TELEPHONE (OUTPATIENT)
Dept: RHEUMATOLOGY | Facility: CLINIC | Age: 79
End: 2019-09-05

## 2019-09-05 DIAGNOSIS — M35.3 PMR (POLYMYALGIA RHEUMATICA) (HCC): Primary | ICD-10-CM

## 2019-09-05 DIAGNOSIS — M35.3 PMR (POLYMYALGIA RHEUMATICA) (HCC): ICD-10-CM

## 2019-09-05 LAB
CRP SERPL-MCNC: 1.37 MG/DL (ref ?–0.3)
SED RATE-ML: 41 MM/HR (ref 0–12)

## 2019-09-05 PROCEDURE — 36415 COLL VENOUS BLD VENIPUNCTURE: CPT

## 2019-09-05 PROCEDURE — 85652 RBC SED RATE AUTOMATED: CPT

## 2019-09-05 PROCEDURE — 86140 C-REACTIVE PROTEIN: CPT

## 2019-09-05 NOTE — TELEPHONE ENCOUNTER
ESR 41  CRP  1.3  Still tired and a bit achy. Continue Prednisone 20 mg per day fro PMR  See me in office 2 weeks with another lab before ESR/CRP.   Dr Silvia Aldrich

## 2019-09-18 ENCOUNTER — OFFICE VISIT (OUTPATIENT)
Dept: INTERNAL MEDICINE CLINIC | Facility: CLINIC | Age: 79
End: 2019-09-18
Payer: MEDICARE

## 2019-09-18 ENCOUNTER — TELEPHONE (OUTPATIENT)
Dept: INTERNAL MEDICINE CLINIC | Facility: CLINIC | Age: 79
End: 2019-09-18

## 2019-09-18 VITALS
SYSTOLIC BLOOD PRESSURE: 150 MMHG | BODY MASS INDEX: 21.42 KG/M2 | TEMPERATURE: 98 F | RESPIRATION RATE: 14 BRPM | HEART RATE: 72 BPM | DIASTOLIC BLOOD PRESSURE: 70 MMHG | WEIGHT: 153 LBS | HEIGHT: 71 IN | OXYGEN SATURATION: 98 %

## 2019-09-18 DIAGNOSIS — F32.9 REACTIVE DEPRESSION: ICD-10-CM

## 2019-09-18 DIAGNOSIS — I88.0 MESENTERIC ADENITIS: ICD-10-CM

## 2019-09-18 DIAGNOSIS — R91.1 LUNG NODULE: ICD-10-CM

## 2019-09-18 DIAGNOSIS — Z79.52 LONG TERM (CURRENT) USE OF SYSTEMIC STEROIDS: ICD-10-CM

## 2019-09-18 DIAGNOSIS — M35.3 PMR (POLYMYALGIA RHEUMATICA) (HCC): Primary | ICD-10-CM

## 2019-09-18 DIAGNOSIS — R63.4 WEIGHT LOSS: ICD-10-CM

## 2019-09-18 DIAGNOSIS — R50.9 FEVER OF UNKNOWN ORIGIN: ICD-10-CM

## 2019-09-18 DIAGNOSIS — Z80.7 FAMILY HISTORY OF LYMPHOMA: ICD-10-CM

## 2019-09-18 DIAGNOSIS — S22.080D COMPRESSION FRACTURE OF T12 VERTEBRA WITH ROUTINE HEALING, SUBSEQUENT ENCOUNTER: ICD-10-CM

## 2019-09-18 DIAGNOSIS — M85.80 OSTEOPENIA, UNSPECIFIED LOCATION: Primary | ICD-10-CM

## 2019-09-18 PROCEDURE — 99214 OFFICE O/P EST MOD 30 MIN: CPT | Performed by: INTERNAL MEDICINE

## 2019-09-18 NOTE — TELEPHONE ENCOUNTER
MD Danitza Pedraza             Repeat DEXA-Medicare will pay 1yr if on steroids. Could offer [place] [facility]. He has lost weight and will prob show osteoporosis this time      Pt asking re PA for Prolia at [address] today.  Denial prolia first t

## 2019-09-18 NOTE — PROGRESS NOTES
HPI:    Patient ID: Russ Sun is a 70year old male worried about unplanned wt loss    Continued wt loss, fevers in past, known mesenteric nodes presumed inflammatory, known small lung nodule <1cm, family hx lymphoma(2nd daughter just dx).  Plaquenil Oral Chew Tab Chew 1 tablet by mouth nightly. Disp:  Rfl: 0   Magnesium Oxide 400 (240 Mg) MG Oral Tab Take by mouth. One daily Disp: 30 tablet Rfl: 0   Coenzyme Q10 (CO Q 10 OR) Take 50 mg by mouth.  Disp:  Rfl:    Cholecalciferol (VITAMIN D3) 5000 UNITS requested or ordered in this encounter       Imaging & Referrals:  PET/CT STANDARD BODY SCAN (ONCOLOGY) (XSZ=[id number])       FE#[id number]

## 2019-09-19 ENCOUNTER — TELEPHONE (OUTPATIENT)
Dept: INTERNAL MEDICINE CLINIC | Facility: CLINIC | Age: 79
End: 2019-09-19

## 2019-09-19 NOTE — TELEPHONE ENCOUNTER
Wife states that Dr. Gela Wilson was going to try to order a Petscan for patient, but he expected it to be denied. Dr. Gela Wilson stated he would make a list of issues that the patient had, and maybe they would approve the [address].     Jose Eduardo Aguayo thinks all of the below

## 2019-09-19 NOTE — TELEPHONE ENCOUNTER
Spoke with pt's wife Lillian Wright. Explained process for PA on Pet Scan- pt will schedule PET scan at [facility] to do  Dexa at [address] due to cost and last Dexa Scan done 8/2018.       Pt aware to call for test scheduling and to call if a

## 2019-09-23 RX ORDER — AMLODIPINE BESYLATE 5 MG/1
TABLET ORAL
Qty: 90 TABLET | Refills: 1 | Status: SHIPPED | OUTPATIENT
Start: 2019-09-23 | End: 2020-01-06

## 2019-09-24 ENCOUNTER — TELEPHONE (OUTPATIENT)
Dept: INTERNAL MEDICINE CLINIC | Facility: CLINIC | Age: 79
End: 2019-09-24

## 2019-09-24 NOTE — TELEPHONE ENCOUNTER
PC re Dexa results, Osteopenia but high risk due to T 12 vert compression fx and long term use of steroids. Dr. Verner Drone first choice is to try Prolia injections. PA sent today. Second choice would be Reclast infusion.    Told pt will wait for PA determina

## 2019-09-25 ENCOUNTER — MED REC SCAN ONLY (OUTPATIENT)
Dept: INTERNAL MEDICINE CLINIC | Facility: CLINIC | Age: 79
End: 2019-09-25

## 2019-09-26 ENCOUNTER — APPOINTMENT (OUTPATIENT)
Dept: LAB | Age: 79
End: 2019-09-26
Attending: INTERNAL MEDICINE
Payer: MEDICARE

## 2019-09-26 DIAGNOSIS — M35.3 PMR (POLYMYALGIA RHEUMATICA) (HCC): ICD-10-CM

## 2019-09-26 LAB
CK SERPL-CCNC: 135 U/L (ref 39–308)
CRP SERPL-MCNC: <0.29 MG/DL (ref ?–0.3)
SED RATE-ML: 45 MM/HR (ref 0–12)

## 2019-09-26 PROCEDURE — 85652 RBC SED RATE AUTOMATED: CPT

## 2019-09-26 PROCEDURE — 36415 COLL VENOUS BLD VENIPUNCTURE: CPT

## 2019-09-26 PROCEDURE — 86140 C-REACTIVE PROTEIN: CPT

## 2019-09-26 PROCEDURE — 82550 ASSAY OF CK (CPK): CPT

## 2019-09-27 ENCOUNTER — OFFICE VISIT (OUTPATIENT)
Dept: RHEUMATOLOGY | Facility: CLINIC | Age: 79
End: 2019-09-27
Payer: MEDICARE

## 2019-09-27 VITALS
HEART RATE: 68 BPM | DIASTOLIC BLOOD PRESSURE: 68 MMHG | WEIGHT: 152 LBS | RESPIRATION RATE: 16 BRPM | BODY MASS INDEX: 21 KG/M2 | SYSTOLIC BLOOD PRESSURE: 132 MMHG

## 2019-09-27 DIAGNOSIS — Z95.1 S/P CABG (CORONARY ARTERY BYPASS GRAFT): ICD-10-CM

## 2019-09-27 DIAGNOSIS — M35.3 PMR (POLYMYALGIA RHEUMATICA) (HCC): Primary | ICD-10-CM

## 2019-09-27 DIAGNOSIS — Z95.2 S/P AVR: ICD-10-CM

## 2019-09-27 PROCEDURE — 99214 OFFICE O/P EST MOD 30 MIN: CPT | Performed by: INTERNAL MEDICINE

## 2019-09-27 NOTE — PROGRESS NOTES
[facility]  Dr. Guerda Simms Progress Note     Subjective:   Ryne Anderson is a(n) 70year old male. Current complaints: Patient presents with:  Test Results: Pt here to discuss test results. In the morning feels better. By afternoon gets worse.   St

## 2019-09-27 NOTE — PATIENT INSTRUCTIONS
Continue Plaquenil 200 mg 2 a day in the morning. Continue Prednisone at 10 mg twice a day. Deal with the stress in your life as best you can. Repeat Sed Rate/crp in 2 weeks and I'll you with the results and advice. Well balanced, low in salt.   Return

## 2019-09-30 ENCOUNTER — TELEPHONE (OUTPATIENT)
Dept: INTERNAL MEDICINE CLINIC | Facility: CLINIC | Age: 79
End: 2019-09-30

## 2019-09-30 RX ORDER — PREDNISONE 10 MG/1
20 TABLET ORAL 2 TIMES DAILY
Qty: 180 TABLET | Refills: 1 | Status: SHIPPED | OUTPATIENT
Start: 2019-09-30 | End: 2019-12-20

## 2019-09-30 NOTE — TELEPHONE ENCOUNTER
----- Message from Hoang Causey MD sent at 9/26/2019  5:36 PM CDT -----  Normal muscle enzyme CPK so not a statin problem (Crestor).  Normal CRP good news ESR 45 still little high prefer same dose Pred 10 bid for now until new Rx Plaquenil starts working (on

## 2019-09-30 NOTE — TELEPHONE ENCOUNTER
PC re lab results ordered by Lucy Lomas with instructions to stay on Prednisone 10 mg 2x day until Plaquenil starts working.

## 2019-09-30 NOTE — TELEPHONE ENCOUNTER
Patient is running out of predniSONE 10 MG Oral Tab because the doctor increased his dosage. The pharmacy will not fill because it is too early.     Please call

## 2019-10-01 ENCOUNTER — TELEPHONE (OUTPATIENT)
Dept: INTERNAL MEDICINE CLINIC | Facility: CLINIC | Age: 79
End: 2019-10-01

## 2019-10-01 NOTE — TELEPHONE ENCOUNTER
2021 Lucy Howell patient wife has questions   Per discussion for the next step with Medicare     2021 Lucy Howell

## 2019-10-01 NOTE — TELEPHONE ENCOUNTER
Spouse calling re PA denial for Prolia and asked if it would help if they called [employer] to complain? Told her that would probably not be effective. We will f/u with PA for Reclast infusion and get back to you with determination.

## 2019-10-02 NOTE — TELEPHONE ENCOUNTER
Attempted PA Reclast with dx Osteopenia, T1 compression Fx & long term use of systemic steroids. Pt failed oral biophospahates with dx Esophograstitis.        Call to Chinmay Albarado [phone] with [facility] re dx codes and she confirmed that male wi

## 2019-10-10 ENCOUNTER — HOSPITAL ENCOUNTER (OUTPATIENT)
Dept: NUCLEAR MEDICINE | Facility: HOSPITAL | Age: 79
Discharge: HOME | End: 2019-10-10
Attending: INTERNAL MEDICINE
Payer: MEDICARE

## 2019-10-10 DIAGNOSIS — R91.1 LUNG NODULE: ICD-10-CM

## 2019-10-10 DIAGNOSIS — R50.9 FEVER OF UNKNOWN ORIGIN: ICD-10-CM

## 2019-10-10 DIAGNOSIS — R63.4 WEIGHT LOSS: ICD-10-CM

## 2019-10-10 DIAGNOSIS — Z80.7 FAMILY HISTORY OF LYMPHOMA: ICD-10-CM

## 2019-10-10 DIAGNOSIS — I88.0 MESENTERIC ADENITIS: ICD-10-CM

## 2019-10-10 PROCEDURE — 78815 PET IMAGE W/CT SKULL-THIGH: CPT | Performed by: INTERNAL MEDICINE

## 2019-10-10 PROCEDURE — 82962 GLUCOSE BLOOD TEST: CPT

## 2019-10-11 ENCOUNTER — TELEPHONE (OUTPATIENT)
Dept: INTERNAL MEDICINE CLINIC | Facility: CLINIC | Age: 79
End: 2019-10-11

## 2019-10-14 ENCOUNTER — LAB ENCOUNTER (OUTPATIENT)
Dept: LAB | Age: 79
End: 2019-10-14
Attending: INTERNAL MEDICINE
Payer: MEDICARE

## 2019-10-14 DIAGNOSIS — M35.3 PMR (POLYMYALGIA RHEUMATICA) (HCC): ICD-10-CM

## 2019-10-14 PROCEDURE — 86140 C-REACTIVE PROTEIN: CPT

## 2019-10-14 PROCEDURE — 36415 COLL VENOUS BLD VENIPUNCTURE: CPT

## 2019-10-14 PROCEDURE — 85652 RBC SED RATE AUTOMATED: CPT

## 2019-10-14 PROCEDURE — 86225 DNA ANTIBODY NATIVE: CPT

## 2019-10-29 ENCOUNTER — TELEPHONE (OUTPATIENT)
Dept: INTERNAL MEDICINE CLINIC | Facility: CLINIC | Age: 79
End: 2019-10-29

## 2019-10-29 NOTE — TELEPHONE ENCOUNTER
Patient called and asks for a refill on the following rx:    Sertraline HCl 50 MG    Please send to [facility] in [facility]

## 2019-11-05 ENCOUNTER — TELEPHONE (OUTPATIENT)
Dept: RHEUMATOLOGY | Facility: CLINIC | Age: 79
End: 2019-11-05

## 2019-11-05 DIAGNOSIS — M35.3 PMR (POLYMYALGIA RHEUMATICA) (HCC): Primary | ICD-10-CM

## 2019-11-05 NOTE — TELEPHONE ENCOUNTER
Your appointments     Date & Time Appointment Department [facility])    Nov 08, 2019 11:00 AM CST Exam - Established with Uzair [address] ([facility])                [address]

## 2019-11-06 ENCOUNTER — APPOINTMENT (OUTPATIENT)
Dept: LAB | Age: 79
End: 2019-11-06
Attending: INTERNAL MEDICINE
Payer: MEDICARE

## 2019-11-06 DIAGNOSIS — M35.3 PMR (POLYMYALGIA RHEUMATICA) (HCC): ICD-10-CM

## 2019-11-06 PROCEDURE — 36415 COLL VENOUS BLD VENIPUNCTURE: CPT

## 2019-11-06 PROCEDURE — 85652 RBC SED RATE AUTOMATED: CPT

## 2019-11-08 ENCOUNTER — OFFICE VISIT (OUTPATIENT)
Dept: RHEUMATOLOGY | Facility: CLINIC | Age: 79
End: 2019-11-08
Payer: MEDICARE

## 2019-11-08 VITALS
SYSTOLIC BLOOD PRESSURE: 128 MMHG | HEART RATE: 72 BPM | RESPIRATION RATE: 16 BRPM | WEIGHT: 151.19 LBS | DIASTOLIC BLOOD PRESSURE: 84 MMHG | BODY MASS INDEX: 21 KG/M2

## 2019-11-08 DIAGNOSIS — I25.10 CORONARY ARTERY DISEASE INVOLVING NATIVE CORONARY ARTERY OF NATIVE HEART WITHOUT ANGINA PECTORIS: ICD-10-CM

## 2019-11-08 DIAGNOSIS — Z95.1 S/P CABG (CORONARY ARTERY BYPASS GRAFT): ICD-10-CM

## 2019-11-08 DIAGNOSIS — Z95.2 S/P AVR: ICD-10-CM

## 2019-11-08 DIAGNOSIS — M35.3 PMR (POLYMYALGIA RHEUMATICA) (HCC): Primary | ICD-10-CM

## 2019-11-08 PROCEDURE — 99214 OFFICE O/P EST MOD 30 MIN: CPT | Performed by: INTERNAL MEDICINE

## 2019-11-08 NOTE — PROGRESS NOTES
[facility]  Dr. Mansoor Neville Progress Note     Subjective:   Tenisha Friedman is a(n) 70year old male. Current complaints: Patient presents with:  PMR: 6 week f/u. Pt states 'balance and strength are still not there.  Is currently on 20 mg prednisone daily

## 2019-11-08 NOTE — PATIENT INSTRUCTIONS
Continue Plaquenil 200 mg 2 tablets per day. Use Prednisone 10 mg twice a day. Recheck Sed Rate CRP in 2 weeks. I'll call you with results. Elevate legs, no salt in diet. Return to office 1 month.

## 2019-11-25 ENCOUNTER — TELEPHONE (OUTPATIENT)
Dept: INTERNAL MEDICINE CLINIC | Facility: CLINIC | Age: 79
End: 2019-11-25

## 2019-11-25 RX ORDER — OMEPRAZOLE 20 MG/1
20 CAPSULE, DELAYED RELEASE ORAL
Qty: 90 CAPSULE | Refills: 3 | Status: SHIPPED | OUTPATIENT
Start: 2019-11-25 | End: 2019-12-19

## 2019-11-29 ENCOUNTER — APPOINTMENT (OUTPATIENT)
Dept: LAB | Age: 79
End: 2019-11-29
Attending: INTERNAL MEDICINE
Payer: MEDICARE

## 2019-11-29 DIAGNOSIS — M35.3 PMR (POLYMYALGIA RHEUMATICA) (HCC): ICD-10-CM

## 2019-11-29 PROCEDURE — 86140 C-REACTIVE PROTEIN: CPT

## 2019-11-29 PROCEDURE — 85652 RBC SED RATE AUTOMATED: CPT

## 2019-11-29 PROCEDURE — 36415 COLL VENOUS BLD VENIPUNCTURE: CPT

## 2019-12-02 ENCOUNTER — OFFICE VISIT (OUTPATIENT)
Dept: RHEUMATOLOGY | Facility: CLINIC | Age: 79
End: 2019-12-02
Payer: MEDICARE

## 2019-12-02 VITALS
DIASTOLIC BLOOD PRESSURE: 100 MMHG | WEIGHT: 156 LBS | OXYGEN SATURATION: 99 % | TEMPERATURE: 98 F | HEART RATE: 71 BPM | BODY MASS INDEX: 21.84 KG/M2 | HEIGHT: 71 IN | SYSTOLIC BLOOD PRESSURE: 174 MMHG | RESPIRATION RATE: 15 BRPM

## 2019-12-02 DIAGNOSIS — Z95.2 S/P AVR: ICD-10-CM

## 2019-12-02 DIAGNOSIS — S22.080D COMPRESSION FRACTURE OF T12 VERTEBRA WITH ROUTINE HEALING, SUBSEQUENT ENCOUNTER: ICD-10-CM

## 2019-12-02 DIAGNOSIS — M35.3 PMR (POLYMYALGIA RHEUMATICA) (HCC): Primary | ICD-10-CM

## 2019-12-02 DIAGNOSIS — Z95.1 S/P CABG (CORONARY ARTERY BYPASS GRAFT): ICD-10-CM

## 2019-12-02 DIAGNOSIS — R53.83 FATIGUE, UNSPECIFIED TYPE: ICD-10-CM

## 2019-12-02 PROCEDURE — 99214 OFFICE O/P EST MOD 30 MIN: CPT | Performed by: INTERNAL MEDICINE

## 2019-12-02 RX ORDER — HYDROCHLOROTHIAZIDE 50 MG/1
50 TABLET ORAL DAILY PRN
Qty: 30 TABLET | Refills: 3 | Status: SHIPPED | OUTPATIENT
Start: 2019-12-02 | End: 2019-12-20

## 2019-12-02 RX ORDER — PREDNISONE 1 MG/1
5 TABLET ORAL DAILY
Qty: 30 TABLET | Refills: 3 | Status: SHIPPED | OUTPATIENT
Start: 2019-12-02 | End: 2019-12-20

## 2019-12-02 NOTE — PATIENT INSTRUCTIONS
Current advice continue on Plaquenil 200 mg 2 tablets daily. Lower prednisone from 20 mg/day to 17.5 mg/day taking all the medicine in the morning. For leg swelling use hydrochlorothiazide 50 mg a day as needed.   If you wake up in your legs are swollen t

## 2019-12-02 NOTE — PROGRESS NOTES
[facility]  Dr. Ivana Madison Progress Note     Subjective:   Tj Linares is a(n) 70year old male. Current complaints: Patient presents with:  PMR: 1 month follow-up. Labs 11/29/19: Sed Rate 24, CRP 0.29.  Starting to feel better, ,but balance has not your balance issues if they persist.  Return to see Dr. Tsosie Hodgkins in 1 month.       Edin Thomas MD 57/6/8111 4:25 PM

## 2019-12-04 ENCOUNTER — TELEPHONE (OUTPATIENT)
Dept: INTERNAL MEDICINE CLINIC | Facility: CLINIC | Age: 79
End: 2019-12-04

## 2019-12-09 ENCOUNTER — TELEPHONE (OUTPATIENT)
Dept: INTERNAL MEDICINE CLINIC | Facility: CLINIC | Age: 79
End: 2019-12-09

## 2019-12-09 DIAGNOSIS — E78.5 HYPERLIPIDEMIA, UNSPECIFIED HYPERLIPIDEMIA TYPE: Primary | ICD-10-CM

## 2019-12-09 NOTE — TELEPHONE ENCOUNTER
Patient called and says he believes he was supposed to have an order for a lipid test to be done before the end of this year.  He asks that a nurse please look into it and confirm

## 2019-12-09 NOTE — TELEPHONE ENCOUNTER
PC re order for Lipids entered as they were due in 6 months. Reminder to pt to tell lab to include Homocysteine and Sed ordered 6/2019. Included note on Lipid order.

## 2019-12-19 ENCOUNTER — APPOINTMENT (OUTPATIENT)
Dept: LAB | Age: 79
End: 2019-12-19
Attending: INTERNAL MEDICINE
Payer: MEDICARE

## 2019-12-19 DIAGNOSIS — Z95.2 S/P AVR: ICD-10-CM

## 2019-12-19 DIAGNOSIS — M35.3 PMR (POLYMYALGIA RHEUMATICA) (HCC): ICD-10-CM

## 2019-12-19 DIAGNOSIS — R53.83 FATIGUE, UNSPECIFIED TYPE: ICD-10-CM

## 2019-12-19 DIAGNOSIS — Z95.1 S/P CABG (CORONARY ARTERY BYPASS GRAFT): ICD-10-CM

## 2019-12-19 DIAGNOSIS — E72.11 HYPERHOMOCYSTEINEMIA (HCC): ICD-10-CM

## 2019-12-19 DIAGNOSIS — S22.080D COMPRESSION FRACTURE OF T12 VERTEBRA WITH ROUTINE HEALING, SUBSEQUENT ENCOUNTER: ICD-10-CM

## 2019-12-19 DIAGNOSIS — E78.5 HYPERLIPIDEMIA, UNSPECIFIED HYPERLIPIDEMIA TYPE: ICD-10-CM

## 2019-12-19 PROCEDURE — 85652 RBC SED RATE AUTOMATED: CPT

## 2019-12-19 PROCEDURE — 83090 ASSAY OF HOMOCYSTEINE: CPT

## 2019-12-19 PROCEDURE — 86140 C-REACTIVE PROTEIN: CPT

## 2019-12-19 PROCEDURE — 80061 LIPID PANEL: CPT

## 2019-12-19 PROCEDURE — 36415 COLL VENOUS BLD VENIPUNCTURE: CPT

## 2019-12-20 ENCOUNTER — TELEPHONE (OUTPATIENT)
Dept: INTERNAL MEDICINE CLINIC | Facility: CLINIC | Age: 79
End: 2019-12-20

## 2019-12-20 RX ORDER — HYDROCHLOROTHIAZIDE 50 MG/1
25 TABLET ORAL DAILY PRN
Qty: 30 TABLET | Refills: 3 | COMMUNITY
Start: 2019-12-20 | End: 2019-12-20

## 2019-12-20 RX ORDER — PREDNISONE 10 MG/1
20 TABLET ORAL DAILY
Qty: 180 TABLET | Refills: 1 | Status: ON HOLD | COMMUNITY
Start: 2019-12-20 | End: 2020-01-13

## 2019-12-20 RX ORDER — HYDROCHLOROTHIAZIDE 50 MG/1
25 TABLET ORAL DAILY
Qty: 30 TABLET | Refills: 3 | COMMUNITY
Start: 2019-12-20 | End: 2020-01-06

## 2019-12-20 NOTE — TELEPHONE ENCOUNTER
PC to pt   Pt said balance not good but working with Dr. Ericka Matson on this     Looked up [address] 6-2-600  Cost on good Rx [facility] price $38.00 with good RX    Concerned with Bp reading lately, will send in readings    Dr. Ericka Matson - Rheumatologist s

## 2019-12-20 NOTE — TELEPHONE ENCOUNTER
Patient states Dr. Loc Campbell just prescribed a new medication, which the pharmacy told him is going to be very expensive, $236 for 36 pills. Patient wants to know if there is a generic alternative. Also, patient's BP has been high the past 2 weeks.   Does D

## 2019-12-27 ENCOUNTER — TELEPHONE (OUTPATIENT)
Dept: INTERNAL MEDICINE CLINIC | Facility: CLINIC | Age: 79
End: 2019-12-27

## 2019-12-27 NOTE — TELEPHONE ENCOUNTER
Patient called and says he was asked to call Dr Hadley Pryor to follow up.  Pt understands Dr Hadley Pryor is out of the office and will return on Monday

## 2019-12-28 ENCOUNTER — TELEPHONE (OUTPATIENT)
Dept: INTERNAL MEDICINE CLINIC | Facility: CLINIC | Age: 79
End: 2019-12-28

## 2020-01-04 ENCOUNTER — HOSPITAL ENCOUNTER (OUTPATIENT)
Dept: GENERAL RADIOLOGY | Facility: HOSPITAL | Age: 80
Discharge: HOME | End: 2020-01-04
Attending: INTERNAL MEDICINE
Payer: MEDICARE

## 2020-01-04 ENCOUNTER — TELEPHONE (OUTPATIENT)
Dept: INTERNAL MEDICINE CLINIC | Facility: CLINIC | Age: 80
End: 2020-01-04

## 2020-01-04 DIAGNOSIS — R91.1 LUNG NODULE: ICD-10-CM

## 2020-01-04 DIAGNOSIS — R63.4 WEIGHT LOSS: Primary | ICD-10-CM

## 2020-01-04 DIAGNOSIS — M35.3 PMR (POLYMYALGIA RHEUMATICA) (HCC): ICD-10-CM

## 2020-01-04 PROCEDURE — 71046 X-RAY EXAM CHEST 2 VIEWS: CPT | Performed by: INTERNAL MEDICINE

## 2020-01-04 RX ORDER — BUPROPION HYDROCHLORIDE 75 MG/1
75 TABLET ORAL DAILY
Qty: 30 TABLET | Refills: 0 | Status: SHIPPED | OUTPATIENT
Start: 2020-01-04 | End: 2020-01-23

## 2020-01-05 ENCOUNTER — TELEPHONE (OUTPATIENT)
Dept: INTERNAL MEDICINE CLINIC | Facility: CLINIC | Age: 80
End: 2020-01-05

## 2020-01-06 ENCOUNTER — OFFICE VISIT (OUTPATIENT)
Dept: INTERNAL MEDICINE CLINIC | Facility: CLINIC | Age: 80
End: 2020-01-06
Payer: MEDICARE

## 2020-01-06 ENCOUNTER — LAB ENCOUNTER (OUTPATIENT)
Dept: LAB | Age: 80
End: 2020-01-06
Attending: INTERNAL MEDICINE
Payer: MEDICARE

## 2020-01-06 VITALS
TEMPERATURE: 98 F | RESPIRATION RATE: 12 BRPM | HEIGHT: 71 IN | HEART RATE: 74 BPM | OXYGEN SATURATION: 99 % | BODY MASS INDEX: 20.02 KG/M2 | DIASTOLIC BLOOD PRESSURE: 80 MMHG | SYSTOLIC BLOOD PRESSURE: 120 MMHG | WEIGHT: 143 LBS

## 2020-01-06 DIAGNOSIS — E86.0 DEHYDRATION: ICD-10-CM

## 2020-01-06 DIAGNOSIS — E43 SEVERE PROTEIN-CALORIE MALNUTRITION (HCC): Primary | ICD-10-CM

## 2020-01-06 DIAGNOSIS — D64.9 ANEMIA, UNSPECIFIED TYPE: ICD-10-CM

## 2020-01-06 DIAGNOSIS — E87.6 HYPOKALEMIA: ICD-10-CM

## 2020-01-06 DIAGNOSIS — R73.9 HYPERGLYCEMIA: ICD-10-CM

## 2020-01-06 DIAGNOSIS — F32.9 REACTIVE DEPRESSION: ICD-10-CM

## 2020-01-06 DIAGNOSIS — M35.3 PMR (POLYMYALGIA RHEUMATICA) (HCC): ICD-10-CM

## 2020-01-06 DIAGNOSIS — R09.89 SUSPECTED ENDOCARDITIS: ICD-10-CM

## 2020-01-06 DIAGNOSIS — R23.8 EASY BRUISING: ICD-10-CM

## 2020-01-06 DIAGNOSIS — R63.4 WEIGHT LOSS: ICD-10-CM

## 2020-01-06 DIAGNOSIS — E72.11 HYPERHOMOCYSTEINEMIA (HCC): ICD-10-CM

## 2020-01-06 DIAGNOSIS — Z95.2 S/P AVR: ICD-10-CM

## 2020-01-06 DIAGNOSIS — I88.0 MESENTERIC ADENITIS: ICD-10-CM

## 2020-01-06 DIAGNOSIS — R53.83 FATIGUE, UNSPECIFIED TYPE: ICD-10-CM

## 2020-01-06 DIAGNOSIS — I49.3 PVC'S (PREMATURE VENTRICULAR CONTRACTIONS): ICD-10-CM

## 2020-01-06 LAB
ALBUMIN SERPL-MCNC: 2.7 G/DL (ref 3.4–5)
ALBUMIN/GLOB SERPL: 0.6 {RATIO} (ref 1–2)
ALP LIVER SERPL-CCNC: 65 U/L (ref 45–117)
ALT SERPL-CCNC: 110 U/L (ref 16–61)
ANION GAP SERPL CALC-SCNC: 12 MMOL/L (ref 0–18)
AST SERPL-CCNC: 61 U/L (ref 15–37)
BASOPHILS # BLD AUTO: 0.02 X10(3) UL (ref 0–0.2)
BASOPHILS NFR BLD AUTO: 0.2 %
BILIRUB SERPL-MCNC: 0.8 MG/DL (ref 0.1–2)
BUN BLD-MCNC: 49 MG/DL (ref 7–18)
BUN/CREAT SERPL: 21.3 (ref 10–20)
CALCIUM BLD-MCNC: 9.1 MG/DL (ref 8.5–10.1)
CHLORIDE SERPL-SCNC: 98 MMOL/L (ref 98–112)
CO2 SERPL-SCNC: 24 MMOL/L (ref 21–32)
CREAT BLD-MCNC: 2.3 MG/DL (ref 0.7–1.3)
DEPRECATED RDW RBC AUTO: 47.1 FL (ref 35.1–46.3)
EOSINOPHIL # BLD AUTO: 0 X10(3) UL (ref 0–0.7)
EOSINOPHIL NFR BLD AUTO: 0 %
ERYTHROCYTE [DISTWIDTH] IN BLOOD BY AUTOMATED COUNT: 13.3 % (ref 11–15)
FOLATE SERPL-MCNC: >100 NG/ML (ref 8.7–?)
GLOBULIN PLAS-MCNC: 4.5 G/DL (ref 2.8–4.4)
GLUCOSE BLD-MCNC: 125 MG/DL (ref 70–99)
HCT VFR BLD AUTO: 31.2 % (ref 39–53)
HGB BLD-MCNC: 10.8 G/DL (ref 13–17.5)
IMM GRANULOCYTES # BLD AUTO: 0.25 X10(3) UL (ref 0–1)
IMM GRANULOCYTES NFR BLD: 1.9 %
IRON SATURATION: 35 % (ref 20–50)
IRON SERPL-MCNC: 98 UG/DL (ref 65–175)
LYMPHOCYTES # BLD AUTO: 2.08 X10(3) UL (ref 1–4)
LYMPHOCYTES NFR BLD AUTO: 15.7 %
M PROTEIN MFR SERPL ELPH: 7.2 G/DL (ref 6.4–8.2)
MCH RBC QN AUTO: 33.3 PG (ref 26–34)
MCHC RBC AUTO-ENTMCNC: 34.6 G/DL (ref 31–37)
MCV RBC AUTO: 96.3 FL (ref 80–100)
MONOCYTES # BLD AUTO: 0.63 X10(3) UL (ref 0.1–1)
MONOCYTES NFR BLD AUTO: 4.7 %
NEUTROPHILS # BLD AUTO: 10.31 X10 (3) UL (ref 1.5–7.7)
NEUTROPHILS # BLD AUTO: 10.31 X10(3) UL (ref 1.5–7.7)
NEUTROPHILS NFR BLD AUTO: 77.5 %
OSMOLALITY SERPL CALC.SUM OF ELEC: 292 MOSM/KG (ref 275–295)
PATIENT FASTING Y/N/NP: NO
PLATELET # BLD AUTO: 172 10(3)UL (ref 150–450)
POTASSIUM SERPL-SCNC: 3.1 MMOL/L (ref 3.5–5.1)
RBC # BLD AUTO: 3.24 X10(6)UL (ref 3.8–5.8)
SED RATE-ML: 90 MM/HR (ref 0–12)
SODIUM SERPL-SCNC: 134 MMOL/L (ref 136–145)
T4 FREE SERPL-MCNC: 0.9 NG/DL (ref 0.8–1.7)
TOTAL IRON BINDING CAPACITY: 280 UG/DL (ref 240–450)
TRANSFERRIN SERPL-MCNC: 188 MG/DL (ref 200–360)
TSI SER-ACNC: 1.78 MIU/ML (ref 0.36–3.74)
VIT B12 SERPL-MCNC: >2000 PG/ML (ref 193–986)
WBC # BLD AUTO: 13.3 X10(3) UL (ref 4–11)

## 2020-01-06 PROCEDURE — 85652 RBC SED RATE AUTOMATED: CPT

## 2020-01-06 PROCEDURE — 80053 COMPREHEN METABOLIC PANEL: CPT

## 2020-01-06 PROCEDURE — 84439 ASSAY OF FREE THYROXINE: CPT

## 2020-01-06 PROCEDURE — 36415 COLL VENOUS BLD VENIPUNCTURE: CPT

## 2020-01-06 PROCEDURE — 93000 ELECTROCARDIOGRAM COMPLETE: CPT | Performed by: INTERNAL MEDICINE

## 2020-01-06 PROCEDURE — 82607 VITAMIN B-12: CPT

## 2020-01-06 PROCEDURE — 83550 IRON BINDING TEST: CPT

## 2020-01-06 PROCEDURE — 99215 OFFICE O/P EST HI 40 MIN: CPT | Performed by: INTERNAL MEDICINE

## 2020-01-06 PROCEDURE — 82746 ASSAY OF FOLIC ACID SERUM: CPT

## 2020-01-06 PROCEDURE — 85025 COMPLETE CBC W/AUTO DIFF WBC: CPT

## 2020-01-06 PROCEDURE — 84443 ASSAY THYROID STIM HORMONE: CPT

## 2020-01-06 PROCEDURE — 83540 ASSAY OF IRON: CPT

## 2020-01-06 RX ORDER — POTASSIUM CHLORIDE 1500 MG/1
20 TABLET, FILM COATED, EXTENDED RELEASE ORAL DAILY
Qty: 60 TABLET | Refills: 1 | Status: SHIPPED | OUTPATIENT
Start: 2020-01-06 | End: 2020-01-23

## 2020-01-06 RX ORDER — AMLODIPINE BESYLATE 5 MG/1
2.5 TABLET ORAL
Qty: 90 TABLET | Refills: 1 | Status: ON HOLD | COMMUNITY
Start: 2020-01-06 | End: 2020-01-13

## 2020-01-06 RX ORDER — ROSUVASTATIN CALCIUM 20 MG/1
10 TABLET, COATED ORAL NIGHTLY
Qty: 90 TABLET | Refills: 3 | Status: SHIPPED | OUTPATIENT
Start: 2020-01-06 | End: 2020-01-16 | Stop reason: ALTCHOICE

## 2020-01-06 NOTE — PROGRESS NOTES
HPI:    Patient ID: Toro Mirza is a 70year old male here fu from weekend calls for severe fatigue and wt loss    OP CXR neg, labs and OV today for worsening fatigue, weakness, depression, wt loss      Review of Systems   Constitutional: Positive for MG Oral Tab Take by mouth. One daily 30 tablet 0   • Coenzyme Q10 (CO Q 10 OR) Take 50 mg by mouth. • Cholecalciferol (VITAMIN D3) 5000 UNITS Oral Cap Take 5,000 Units by mouth daily.    30 capsule 0   • Clobetasol Prop Emollient Base (CLOBETASOL PROPIO Final   • Calcium, Total 01/06/2020 9.1  8.5 - 10.1 mg/dL Final   • Calculated Osmolality 01/06/2020 292  275 - 295 mOsm/kg Final   • GFR, Non- 01/06/2020 26* >=60 Final   • GFR, -American 01/06/2020 31* >=60 Final   • AST 01/06/2020 1.9  % Final   Appointment on 12/19/2019   Component Date Value Ref Range Status   • Cholesterol, Total 12/19/2019 161  <200 mg/dL Final   • HDL Cholesterol 12/19/2019 93* 40 - 59 mg/dL Final   • Triglycerides 12/19/2019 85  30 - 149 mg/dL Final   • LDL Ch Potassium Chloride ER 20 MEQ Oral Tab CR 60 tablet 1     Sig: Take 20 mEq by mouth daily. • Rosuvastatin Calcium 20 MG Oral Tab 90 tablet 3     Sig: Take 0.5 tablets (10 mg total) by mouth nightly.        Imaging & Referrals:  ELECTROCARDIOGRAM, COMPLETE

## 2020-01-07 ENCOUNTER — LAB ENCOUNTER (OUTPATIENT)
Dept: LAB | Age: 80
End: 2020-01-07
Attending: INTERNAL MEDICINE
Payer: MEDICARE

## 2020-01-07 ENCOUNTER — OFFICE VISIT (OUTPATIENT)
Dept: RHEUMATOLOGY | Facility: CLINIC | Age: 80
End: 2020-01-07
Payer: MEDICARE

## 2020-01-07 VITALS
HEART RATE: 84 BPM | BODY MASS INDEX: 20.33 KG/M2 | DIASTOLIC BLOOD PRESSURE: 92 MMHG | OXYGEN SATURATION: 98 % | HEIGHT: 70.5 IN | SYSTOLIC BLOOD PRESSURE: 144 MMHG | WEIGHT: 143.63 LBS

## 2020-01-07 DIAGNOSIS — Z95.2 S/P AVR: ICD-10-CM

## 2020-01-07 DIAGNOSIS — R23.8 EASY BRUISING: ICD-10-CM

## 2020-01-07 DIAGNOSIS — M35.3 PMR (POLYMYALGIA RHEUMATICA) (HCC): Primary | ICD-10-CM

## 2020-01-07 DIAGNOSIS — R53.1 WEAKNESS: ICD-10-CM

## 2020-01-07 DIAGNOSIS — R09.89 SUSPECTED ENDOCARDITIS: ICD-10-CM

## 2020-01-07 DIAGNOSIS — D64.89 ANEMIA DUE TO OTHER CAUSE, NOT CLASSIFIED: ICD-10-CM

## 2020-01-07 DIAGNOSIS — Z95.1 S/P CABG (CORONARY ARTERY BYPASS GRAFT): ICD-10-CM

## 2020-01-07 DIAGNOSIS — R53.83 FATIGUE, UNSPECIFIED TYPE: ICD-10-CM

## 2020-01-07 DIAGNOSIS — R63.4 WEIGHT LOSS: ICD-10-CM

## 2020-01-07 LAB
INR BLD: 1.05 (ref 0.9–1.1)
PSA SERPL DL<=0.01 NG/ML-MCNC: 14.1 SECONDS (ref 12.5–14.7)

## 2020-01-07 PROCEDURE — 36415 COLL VENOUS BLD VENIPUNCTURE: CPT

## 2020-01-07 PROCEDURE — 99214 OFFICE O/P EST MOD 30 MIN: CPT | Performed by: INTERNAL MEDICINE

## 2020-01-07 PROCEDURE — 85610 PROTHROMBIN TIME: CPT

## 2020-01-07 PROCEDURE — 87040 BLOOD CULTURE FOR BACTERIA: CPT

## 2020-01-07 NOTE — PATIENT INSTRUCTIONS
D/c Plaquenil no effect on sed rate and PMR symptoms. ESR now 90, losing weight, down from 170 to 146. Question is there a hidden cancer. Current process appears to be more than PMR. Increase Prednisone up to 20 mg per day.   Agree with CT abdomen and

## 2020-01-07 NOTE — PROGRESS NOTES
[facility]  Dr. Bryanna Hoover Progress Note     Subjective:   Shanta Garcia is a(n) 70year old male. Current complaints: Patient presents with: Follow - Up: 1 month f/u C/O Patient losing weight, 30 lb since April.  Patient denies any feet or ankle swe

## 2020-01-08 ENCOUNTER — TELEPHONE (OUTPATIENT)
Dept: HEMATOLOGY/ONCOLOGY | Facility: HOSPITAL | Age: 80
End: 2020-01-08

## 2020-01-08 ENCOUNTER — TELEPHONE (OUTPATIENT)
Dept: INTERNAL MEDICINE CLINIC | Facility: CLINIC | Age: 80
End: 2020-01-08

## 2020-01-08 NOTE — TELEPHONE ENCOUNTER
----- Message from Maria Elena Powell MD sent at 1/7/2020  9:08 PM CST -----  Was worried his bruising was from nutritional vitK def but NO.

## 2020-01-08 NOTE — TELEPHONE ENCOUNTER
Call to spouse re INR in normal range so Camacho's bruising is NOT related to Vit K deficiency. The blood cultures are in process, we will call back with results.

## 2020-01-09 ENCOUNTER — TELEPHONE (OUTPATIENT)
Dept: INTERNAL MEDICINE CLINIC | Facility: CLINIC | Age: 80
End: 2020-01-09

## 2020-01-09 ENCOUNTER — HOSPITAL ENCOUNTER (OUTPATIENT)
Dept: CV DIAGNOSTICS | Facility: HOSPITAL | Age: 80
Discharge: HOME | End: 2020-01-09
Attending: INTERNAL MEDICINE
Payer: MEDICARE

## 2020-01-09 ENCOUNTER — APPOINTMENT (OUTPATIENT)
Dept: GENERAL RADIOLOGY | Facility: HOSPITAL | Age: 80
End: 2020-01-09
Attending: EMERGENCY MEDICINE
Payer: MEDICARE

## 2020-01-09 ENCOUNTER — HOSPITAL ENCOUNTER (OUTPATIENT)
Dept: CT IMAGING | Facility: HOSPITAL | Age: 80
Discharge: HOME | End: 2020-01-09
Attending: INTERNAL MEDICINE
Payer: MEDICARE

## 2020-01-09 ENCOUNTER — HOSPITAL ENCOUNTER (OUTPATIENT)
Facility: HOSPITAL | Age: 80
Setting detail: OBSERVATION
Discharge: HOME | End: 2020-01-14
Attending: EMERGENCY MEDICINE | Admitting: INTERNAL MEDICINE
Payer: MEDICARE

## 2020-01-09 DIAGNOSIS — D64.9 ANEMIA, UNSPECIFIED TYPE: ICD-10-CM

## 2020-01-09 DIAGNOSIS — R09.89 SUSPECTED ENDOCARDITIS: ICD-10-CM

## 2020-01-09 DIAGNOSIS — R63.4 WEIGHT LOSS: ICD-10-CM

## 2020-01-09 DIAGNOSIS — I88.0 MESENTERIC ADENITIS: ICD-10-CM

## 2020-01-09 DIAGNOSIS — Z95.2 S/P AVR: ICD-10-CM

## 2020-01-09 DIAGNOSIS — R91.8 LUNG MASS: ICD-10-CM

## 2020-01-09 DIAGNOSIS — E87.6 HYPOKALEMIA: ICD-10-CM

## 2020-01-09 DIAGNOSIS — R53.1 WEAKNESS GENERALIZED: Primary | ICD-10-CM

## 2020-01-09 DIAGNOSIS — J18.9 PNEUMONIA OF RIGHT LOWER LOBE DUE TO INFECTIOUS ORGANISM: ICD-10-CM

## 2020-01-09 LAB
ALBUMIN SERPL-MCNC: 3.1 G/DL (ref 3.4–5)
ALBUMIN/GLOB SERPL: 0.7 {RATIO} (ref 1–2)
ALP LIVER SERPL-CCNC: 82 U/L (ref 45–117)
ALT SERPL-CCNC: 108 U/L (ref 16–61)
ANION GAP SERPL CALC-SCNC: 8 MMOL/L (ref 0–18)
AST SERPL-CCNC: 72 U/L (ref 15–37)
BASOPHILS # BLD AUTO: 0.04 X10(3) UL (ref 0–0.2)
BASOPHILS NFR BLD AUTO: 0.3 %
BILIRUB SERPL-MCNC: 0.8 MG/DL (ref 0.1–2)
BILIRUB UR QL STRIP.AUTO: NEGATIVE
BUN BLD-MCNC: 55 MG/DL (ref 7–18)
BUN/CREAT SERPL: 26.2 (ref 10–20)
CALCIUM BLD-MCNC: 8.6 MG/DL (ref 8.5–10.1)
CHLORIDE SERPL-SCNC: 100 MMOL/L (ref 98–112)
CLARITY UR REFRACT.AUTO: CLEAR
CO2 SERPL-SCNC: 27 MMOL/L (ref 21–32)
COLOR UR AUTO: YELLOW
CREAT BLD-MCNC: 2.1 MG/DL (ref 0.7–1.3)
DEPRECATED RDW RBC AUTO: 46.7 FL (ref 35.1–46.3)
EOSINOPHIL # BLD AUTO: 0 X10(3) UL (ref 0–0.7)
EOSINOPHIL NFR BLD AUTO: 0 %
ERYTHROCYTE [DISTWIDTH] IN BLOOD BY AUTOMATED COUNT: 13.5 % (ref 11–15)
GLOBULIN PLAS-MCNC: 4.2 G/DL (ref 2.8–4.4)
GLUCOSE BLD-MCNC: 98 MG/DL (ref 70–99)
GLUCOSE UR STRIP.AUTO-MCNC: NEGATIVE MG/DL
HCT VFR BLD AUTO: 32.2 % (ref 39–53)
HGB BLD-MCNC: 11.2 G/DL (ref 13–17.5)
IMM GRANULOCYTES # BLD AUTO: 0.43 X10(3) UL (ref 0–1)
IMM GRANULOCYTES NFR BLD: 3.2 %
KETONES UR STRIP.AUTO-MCNC: NEGATIVE MG/DL
LEUKOCYTE ESTERASE UR QL STRIP.AUTO: NEGATIVE
LIPASE SERPL-CCNC: 260 U/L (ref 73–393)
LYMPHOCYTES # BLD AUTO: 2.36 X10(3) UL (ref 1–4)
LYMPHOCYTES NFR BLD AUTO: 17.5 %
M PROTEIN MFR SERPL ELPH: 7.3 G/DL (ref 6.4–8.2)
MCH RBC QN AUTO: 33.2 PG (ref 26–34)
MCHC RBC AUTO-ENTMCNC: 34.8 G/DL (ref 31–37)
MCV RBC AUTO: 95.5 FL (ref 80–100)
MONOCYTES # BLD AUTO: 0.79 X10(3) UL (ref 0.1–1)
MONOCYTES NFR BLD AUTO: 5.8 %
NEUTROPHILS # BLD AUTO: 9.89 X10 (3) UL (ref 1.5–7.7)
NEUTROPHILS # BLD AUTO: 9.89 X10(3) UL (ref 1.5–7.7)
NEUTROPHILS NFR BLD AUTO: 73.2 %
NITRITE UR QL STRIP.AUTO: NEGATIVE
NT-PROBNP SERPL-MCNC: 5563 PG/ML (ref ?–450)
OSMOLALITY SERPL CALC.SUM OF ELEC: 295 MOSM/KG (ref 275–295)
PH UR STRIP.AUTO: 7 [PH] (ref 4.5–8)
PLATELET # BLD AUTO: 171 10(3)UL (ref 150–450)
POTASSIUM SERPL-SCNC: 2.8 MMOL/L (ref 3.5–5.1)
PROT UR STRIP.AUTO-MCNC: 100 MG/DL
RBC # BLD AUTO: 3.37 X10(6)UL (ref 3.8–5.8)
SODIUM SERPL-SCNC: 135 MMOL/L (ref 136–145)
SP GR UR STRIP.AUTO: 1.01 (ref 1–1.03)
TROPONIN I SERPL-MCNC: 0.15 NG/ML (ref ?–0.04)
UROBILINOGEN UR STRIP.AUTO-MCNC: <2 MG/DL
WBC # BLD AUTO: 13.5 X10(3) UL (ref 4–11)

## 2020-01-09 PROCEDURE — 74176 CT ABD & PELVIS W/O CONTRAST: CPT | Performed by: INTERNAL MEDICINE

## 2020-01-09 PROCEDURE — 99220 INITIAL OBSERVATION CARE,LEVL III: CPT | Performed by: HOSPITALIST

## 2020-01-09 PROCEDURE — 71045 X-RAY EXAM CHEST 1 VIEW: CPT | Performed by: EMERGENCY MEDICINE

## 2020-01-09 PROCEDURE — 93306 TTE W/DOPPLER COMPLETE: CPT | Performed by: INTERNAL MEDICINE

## 2020-01-09 RX ORDER — SODIUM CHLORIDE 9 MG/ML
INJECTION, SOLUTION INTRAVENOUS CONTINUOUS
Status: DISCONTINUED | OUTPATIENT
Start: 2020-01-09 | End: 2020-01-14

## 2020-01-09 RX ORDER — HYDRALAZINE HYDROCHLORIDE 20 MG/ML
10 INJECTION INTRAMUSCULAR; INTRAVENOUS EVERY 4 HOURS PRN
Status: DISCONTINUED | OUTPATIENT
Start: 2020-01-09 | End: 2020-01-14

## 2020-01-09 RX ORDER — BUPROPION HYDROCHLORIDE 75 MG/1
75 TABLET ORAL DAILY
Status: DISCONTINUED | OUTPATIENT
Start: 2020-01-09 | End: 2020-01-13

## 2020-01-09 RX ORDER — CARVEDILOL 3.12 MG/1
3.12 TABLET ORAL 2 TIMES DAILY WITH MEALS
Status: DISCONTINUED | OUTPATIENT
Start: 2020-01-10 | End: 2020-01-10

## 2020-01-09 RX ORDER — PREDNISONE 20 MG/1
20 TABLET ORAL DAILY
Status: DISCONTINUED | OUTPATIENT
Start: 2020-01-09 | End: 2020-01-10

## 2020-01-09 RX ORDER — METOCLOPRAMIDE HYDROCHLORIDE 5 MG/ML
5 INJECTION INTRAMUSCULAR; INTRAVENOUS EVERY 8 HOURS PRN
Status: DISCONTINUED | OUTPATIENT
Start: 2020-01-09 | End: 2020-01-14

## 2020-01-09 RX ORDER — AMLODIPINE BESYLATE 2.5 MG/1
2.5 TABLET ORAL
Status: DISCONTINUED | OUTPATIENT
Start: 2020-01-10 | End: 2020-01-10

## 2020-01-09 RX ORDER — ACETAMINOPHEN 500 MG
1000 TABLET ORAL EVERY 6 HOURS PRN
Status: DISCONTINUED | OUTPATIENT
Start: 2020-01-09 | End: 2020-01-14

## 2020-01-09 RX ORDER — POTASSIUM CHLORIDE 20 MEQ/1
20 TABLET, EXTENDED RELEASE ORAL DAILY
Status: DISCONTINUED | OUTPATIENT
Start: 2020-01-09 | End: 2020-01-14

## 2020-01-09 RX ORDER — ASPIRIN 81 MG/1
81 TABLET, CHEWABLE ORAL
Status: DISCONTINUED | OUTPATIENT
Start: 2020-01-10 | End: 2020-01-10

## 2020-01-09 RX ORDER — ONDANSETRON 2 MG/ML
4 INJECTION INTRAMUSCULAR; INTRAVENOUS EVERY 6 HOURS PRN
Status: DISCONTINUED | OUTPATIENT
Start: 2020-01-09 | End: 2020-01-14

## 2020-01-09 RX ORDER — ENOXAPARIN SODIUM 100 MG/ML
30 INJECTION SUBCUTANEOUS NIGHTLY
Status: DISCONTINUED | OUTPATIENT
Start: 2020-01-09 | End: 2020-01-10

## 2020-01-09 RX ORDER — AMLODIPINE BESYLATE 5 MG/1
2.5 TABLET ORAL DAILY
Status: DISCONTINUED | OUTPATIENT
Start: 2020-01-09 | End: 2020-01-09

## 2020-01-09 RX ORDER — METOCLOPRAMIDE HYDROCHLORIDE 5 MG/ML
10 INJECTION INTRAMUSCULAR; INTRAVENOUS EVERY 8 HOURS PRN
Status: DISCONTINUED | OUTPATIENT
Start: 2020-01-09 | End: 2020-01-09

## 2020-01-09 NOTE — ED INITIAL ASSESSMENT (HPI)
Patient sent here from primary care office d/t increased weakness, fatigue and dehydration- all symptoms worsening in the last week.  Patient has polymyalgia rheumatica- which patient now takes a lot of steroids over the years and his energy has been declin

## 2020-01-09 NOTE — TELEPHONE ENCOUNTER
PC to pt   Spoke with wife - Cathy  Patient getting progressively weaker over last several weeks  Weight loss 10 lbs in 4 mos    Poor appetite   Drinking fluids ok   Dr. Rekha Montoya - on Mon (1/6)  Hctz stopped and   Potassium 20 MEQ daily begun         Saw Dr. Uum Ni

## 2020-01-10 ENCOUNTER — APPOINTMENT (OUTPATIENT)
Dept: CT IMAGING | Facility: HOSPITAL | Age: 80
End: 2020-01-10
Attending: INTERNAL MEDICINE
Payer: MEDICARE

## 2020-01-10 LAB
ALBUMIN SERPL-MCNC: 2.5 G/DL (ref 3.4–5)
ALBUMIN/GLOB SERPL: 0.7 {RATIO} (ref 1–2)
ALP LIVER SERPL-CCNC: 59 U/L (ref 45–117)
ALT SERPL-CCNC: 82 U/L (ref 16–61)
ANION GAP SERPL CALC-SCNC: 7 MMOL/L (ref 0–18)
ANION GAP SERPL CALC-SCNC: 7 MMOL/L (ref 0–18)
APTT PPP: 27.9 SECONDS (ref 25.4–36.1)
AST SERPL-CCNC: 50 U/L (ref 15–37)
ATRIAL RATE: 79 BPM
ATRIAL RATE: 81 BPM
BASOPHILS # BLD AUTO: 0.02 X10(3) UL (ref 0–0.2)
BASOPHILS NFR BLD AUTO: 0.2 %
BILIRUB SERPL-MCNC: 0.6 MG/DL (ref 0.1–2)
BUN BLD-MCNC: 35 MG/DL (ref 7–18)
BUN BLD-MCNC: 35 MG/DL (ref 7–18)
BUN/CREAT SERPL: 22.2 (ref 10–20)
BUN/CREAT SERPL: 22.9 (ref 10–20)
C DIFF TOX B STL QL: NEGATIVE
CALCIUM BLD-MCNC: 7.2 MG/DL (ref 8.5–10.1)
CALCIUM BLD-MCNC: 7.4 MG/DL (ref 8.5–10.1)
CHLORIDE SERPL-SCNC: 111 MMOL/L (ref 98–112)
CHLORIDE SERPL-SCNC: 111 MMOL/L (ref 98–112)
CK SERPL-CCNC: 112 U/L (ref 39–308)
CO2 SERPL-SCNC: 23 MMOL/L (ref 21–32)
CO2 SERPL-SCNC: 23 MMOL/L (ref 21–32)
CORTIS SERPL-MCNC: 17.5 UG/DL
CREAT BLD-MCNC: 1.53 MG/DL (ref 0.7–1.3)
CREAT BLD-MCNC: 1.58 MG/DL (ref 0.7–1.3)
DEPRECATED RDW RBC AUTO: 47.2 FL (ref 35.1–46.3)
DEPRECATED RDW RBC AUTO: 47.8 FL (ref 35.1–46.3)
DIRECT COOMBS POLY: NEGATIVE
EOSINOPHIL # BLD AUTO: 0 X10(3) UL (ref 0–0.7)
EOSINOPHIL NFR BLD AUTO: 0 %
ERYTHROCYTE [DISTWIDTH] IN BLOOD BY AUTOMATED COUNT: 13.7 % (ref 11–15)
ERYTHROCYTE [DISTWIDTH] IN BLOOD BY AUTOMATED COUNT: 13.7 % (ref 11–15)
GLOBULIN PLAS-MCNC: 3.5 G/DL (ref 2.8–4.4)
GLUCOSE BLD-MCNC: 81 MG/DL (ref 70–99)
GLUCOSE BLD-MCNC: 84 MG/DL (ref 70–99)
HAPTOGLOB SERPL-MCNC: 167 MG/DL (ref 30–200)
HCT VFR BLD AUTO: 24.6 % (ref 39–53)
HCT VFR BLD AUTO: 25.2 % (ref 39–53)
HEPARIN INDUCED PLT ANTIBODY: NEGATIVE
HGB BLD-MCNC: 8.6 G/DL (ref 13–17.5)
HGB BLD-MCNC: 8.7 G/DL (ref 13–17.5)
HGB RETIC QN AUTO: 39.8 PG (ref 28.2–36.6)
IMM GRANULOCYTES # BLD AUTO: 0.18 X10(3) UL (ref 0–1)
IMM GRANULOCYTES NFR BLD: 1.9 %
IMM RETICS NFR: 0.27 RATIO (ref 0.1–0.3)
INR BLD: 1.04 (ref 0.9–1.1)
LDH SERPL L TO P-CCNC: 365 U/L (ref 87–241)
LYMPHOCYTES # BLD AUTO: 1.11 X10(3) UL (ref 1–4)
LYMPHOCYTES NFR BLD AUTO: 11.9 %
M PROTEIN MFR SERPL ELPH: 6 G/DL (ref 6.4–8.2)
MCH RBC QN AUTO: 33.1 PG (ref 26–34)
MCH RBC QN AUTO: 33.7 PG (ref 26–34)
MCHC RBC AUTO-ENTMCNC: 34.5 G/DL (ref 31–37)
MCHC RBC AUTO-ENTMCNC: 35 G/DL (ref 31–37)
MCV RBC AUTO: 95.8 FL (ref 80–100)
MCV RBC AUTO: 96.5 FL (ref 80–100)
MONOCYTES # BLD AUTO: 0.21 X10(3) UL (ref 0.1–1)
MONOCYTES NFR BLD AUTO: 2.2 %
NEUTROPHILS # BLD AUTO: 7.82 X10 (3) UL (ref 1.5–7.7)
NEUTROPHILS # BLD AUTO: 7.82 X10(3) UL (ref 1.5–7.7)
NEUTROPHILS NFR BLD AUTO: 83.8 %
OSMOLALITY SERPL CALC.SUM OF ELEC: 299 MOSM/KG (ref 275–295)
OSMOLALITY SERPL CALC.SUM OF ELEC: 299 MOSM/KG (ref 275–295)
P AXIS: 39 DEGREES
P AXIS: 41 DEGREES
P-R INTERVAL: 174 MS
P-R INTERVAL: 182 MS
PLATELET # BLD AUTO: 100 10(3)UL (ref 150–450)
PLATELET # BLD AUTO: 99 10(3)UL (ref 150–450)
POTASSIUM SERPL-SCNC: 3.1 MMOL/L (ref 3.5–5.1)
POTASSIUM SERPL-SCNC: 3.4 MMOL/L (ref 3.5–5.1)
PSA SERPL DL<=0.01 NG/ML-MCNC: 14 SECONDS (ref 12.5–14.7)
Q-T INTERVAL: 448 MS
Q-T INTERVAL: 472 MS
QRS DURATION: 108 MS
QRS DURATION: 116 MS
QTC CALCULATION (BEZET): 520 MS
QTC CALCULATION (BEZET): 541 MS
R AXIS: -39 DEGREES
R AXIS: -47 DEGREES
RBC # BLD AUTO: 2.55 X10(6)UL (ref 3.8–5.8)
RBC # BLD AUTO: 2.63 X10(6)UL (ref 3.8–5.8)
RETICS # AUTO: 142.5 X10(3) UL (ref 22.5–147.5)
RETICS/RBC NFR AUTO: 5.4 % (ref 0.5–2.5)
SODIUM SERPL-SCNC: 141 MMOL/L (ref 136–145)
SODIUM SERPL-SCNC: 141 MMOL/L (ref 136–145)
T AXIS: 61 DEGREES
T AXIS: 64 DEGREES
TROPONIN I SERPL-MCNC: 0.13 NG/ML (ref ?–0.04)
VENTRICULAR RATE: 79 BPM
VENTRICULAR RATE: 81 BPM
WBC # BLD AUTO: 9.3 X10(3) UL (ref 4–11)
WBC # BLD AUTO: 9.4 X10(3) UL (ref 4–11)

## 2020-01-10 PROCEDURE — 99204 OFFICE O/P NEW MOD 45 MIN: CPT | Performed by: INTERNAL MEDICINE

## 2020-01-10 PROCEDURE — 71250 CT THORAX DX C-: CPT | Performed by: INTERNAL MEDICINE

## 2020-01-10 PROCEDURE — 99225 SUBSEQUENT OBSERVATION CARE: CPT | Performed by: HOSPITALIST

## 2020-01-10 PROCEDURE — 99214 OFFICE O/P EST MOD 30 MIN: CPT | Performed by: INTERNAL MEDICINE

## 2020-01-10 RX ORDER — ASPIRIN 81 MG/1
81 TABLET, CHEWABLE ORAL
Status: DISCONTINUED | OUTPATIENT
Start: 2020-01-10 | End: 2020-01-14

## 2020-01-10 RX ORDER — POTASSIUM CHLORIDE 20 MEQ/1
40 TABLET, EXTENDED RELEASE ORAL EVERY 4 HOURS
Status: COMPLETED | OUTPATIENT
Start: 2020-01-10 | End: 2020-01-10

## 2020-01-10 RX ORDER — OMEPRAZOLE 20 MG/1
20 CAPSULE, DELAYED RELEASE ORAL
COMMUNITY
End: 2020-01-23

## 2020-01-10 RX ORDER — CARVEDILOL 6.25 MG/1
6.25 TABLET ORAL 2 TIMES DAILY WITH MEALS
Status: DISCONTINUED | OUTPATIENT
Start: 2020-01-10 | End: 2020-01-13

## 2020-01-10 RX ORDER — AMLODIPINE BESYLATE 5 MG/1
5 TABLET ORAL
Status: DISCONTINUED | OUTPATIENT
Start: 2020-01-11 | End: 2020-01-14

## 2020-01-10 NOTE — CONSULTS
[facility] [address] [place] [address] Cardiology  Report of Consultation    Rocio Waters Patient Status:  Observation    1940 MRN [id number]   [facility] 4NW-A Attending Shaquille Scott MD   Hosp Day # 0 PCP Nieves Mcneal MD     Reason for Consultation neg 12/2012   • Closed wedge compression fracture of T11 vertebra with routine healing 6/19/2018   • Colon polyps 11/2012    colonoscopy   • Compression fracture of T12 vertebra (HCC) 6/19/2018   • Diverticulosis    • Dyslipidemia    • Elevated lipoprotein total) by mouth once daily. , Disp: 90 tablet, Rfl: 1  aspirin 81 MG Oral Tab, Take 1 tablet (81 mg total) by mouth 3 (three) times a week., Disp: , Rfl: 0  Rosuvastatin Calcium 20 MG Oral Tab, Take 0.5 tablets (10 mg total) by mouth nightly., Disp: 90 tabl 01/10/20  0748   BP: 148/73   Pulse: 76   Resp: 18   Temp: 99 °F (37.2 °C)     Wt Readings from Last 3 Encounters:  01/09/20 : 145 lb 14.4 oz (66.2 kg)  01/07/20 : 143 lb 9.6 oz (65.1 kg)  01/06/20 : 143 lb (64.9 kg)          General: Well developed, well 33.2 33.1 33.7   MCHC 34.6 34.8 34.5 35.0   RDW 13.3 13.5 13.7 13.7   NEPRELIM 10.31* 9.89*  --  7.82*   WBC 13.3* 13.5* 9.4 9.3   .0 171.0 99.0* 100.0*       Recent Labs   Lab 01/07/20  1413   PTP 14.1   INR 1.05       Recent Labs   Lab 01/09/20  1 Coreg and continue to titrate  7. K+  8. F/U QT with normalization of lytes  9. Rheumatology eval  10. Oncology evaluation of CT findings  11. May ultimately consider cath pending above assessments.         Eboni Alamo MD  1/10/2020  9:00 AM

## 2020-01-10 NOTE — CONSULTS
[facility]  Rheumatology Report of Consultation  Tenisha Riosleonard Patient Status:  Observation    1940 MRN [id number]   [facility] 4NW-A Attending Shelley Quintana MD   Hosp Day # 0 PCP Zakiya Shannon MD     Date of Admission: 2020 appendectomy,  and cholecystectomy()  Bone marrow biopsy done in the  which was negative. Past colonoscopy with removal of polyp. Cardiac stents 2 in . FAMHX. Father  age 80 of lung cancer and also had Parkinson's disease.   Mother  was increased to 20 mg/day on Tuesday of this week. Review of Systems: See history of present illness. No current fever or chills. Has a mild cough. Shortness of breath on exertion. Complaints of generalized weakness and fatigue.   Recent weight loss T4 0.9. Pro time 14.0 INR 1.04 PTT 27.9. Sed rate 2020 was 90. Sed rate 1931. Sed rate 1924. Imagin/10/2020 CT scan of chest shows airspace opacity in the medial right lung base. Small air bronchograms are likely present.   This mg/day and hopefully continue to taper the steroids. Thank you for allowing me to participate in the care of your patient. Natanael Henriquez [phone]  1/10/2020  5:19 PM

## 2020-01-10 NOTE — OCCUPATIONAL THERAPY NOTE
OCCUPATIONAL THERAPY EVALUATION - INPATIENT     Room Number: 422/422-A  Evaluation Date: 1/10/2020  Type of Evaluation: Initial  Presenting Problem: Hypokalemia, weakness    Physician Order: IP Consult to Occupational Therapy  Reason for Therapy: ADL/IADL deficiency    • Vitamin D deficiency        Past Surgical History  Past Surgical History:   Procedure Laterality Date   • ANGIOPLASTY (CORONARY)  1999       • APPENDECTOMY      teenager   • BONE MARROW BIOPSY AND ASPIRATION  12/2012    Colin-neg PAIN ASSESSMENT  Ratin  Location: denies       COGNITION  WNL    VISION  WNL    PERCEPTION  WNL    SENSATION  WNL    Communication: WNL    Behavioral/Emotional/Social: in good spirits, cooperative, pleasant    RANGE OF MOTION AND STRENGTH ASSESSMEN balance w/ use of RW. Recommended use of RW while recovering. Patient verbalized understanding. Tolerated standing x ~3 min w/ c/o increasing SOB w/ activity. O2 sats stable between 93-98% on RA.   HR at rest: 107; w/ act: 121bpm  Don/doff socks while u 1 - 3 performance deficits    Client Assessment/Performance Deficits LOW - No comorbidities nor modifications of tasks    Clinical Decision Making LOW - Analysis of occupational profile, problem-focused assessments, limited treatment options    Overall Co

## 2020-01-10 NOTE — CM/SW NOTE
01/10/20 1200   CM/SW Referral Data   Referral Source Social Work (self-referral)   Reason for Referral Discharge planning   Informant Patient   Patient Info   Patient's Mental Status Alert;Oriented   Patient's Home Environment House   Patient lives wit

## 2020-01-10 NOTE — CONSULTS
[facility] Consult [facility]    Reason for Consult: Concern for Cancer  Medical Record Number: [id number]   CSN: [id number]   Referring Physician: No ref.  provider found  PCP: Patria Cole MD    History of Present Illness: 80M with a PMH of PMR Besylate (NORVASC) tab 2.5 mg, 2.5 mg, Oral, Daily  •  buPROPion HCl (WELLBUTRIN) tab 75 mg, 75 mg, Oral, Daily  •  carvedilol (COREG) tab 3.125 mg, 3.125 mg, Oral, BID with meals  •  Potassium Chloride ER (K-DUR M20) CR tab 20 mEq, 20 mEq, Oral, Daily  • PMR (polymyalgia rheumatica) (HCC)    • Precancerous lesion     skin   • Proteinuria     mild   • Psoriasis    • Renal stone     right   • Right shoulder pain     injected-Liane   • Shortness of breath    • Sleep apnea    • Vitamin B12 nutritional defici drink per night      Drug use: No     Family History   Problem Relation Age of Onset   • Neurological Disorder Father         parkinsons   • Cancer Father         lung   • Other (Other) Father    • Mental Disorder Mother         alzheimers   • Other (Other illness and dilution. LDH mildly elevated with normal haptoglobin. He was exposed to heparin last night and > 150 days. Low probability of HIT, but will hold Lovenox and check HIT Ab now.    - Monoclonal study, ANDREY, Fe studies, PT/PTT  - N92, Folic acid and

## 2020-01-10 NOTE — TELEPHONE ENCOUNTER
Spoke with Dr. Thomas Maza in afternoon 1/9/20. Dr. Thomas Maza spoke with patient and wife directly.     Patient sent to ED for assessment, then admitted to hospital.

## 2020-01-10 NOTE — PHYSICAL THERAPY NOTE
PHYSICAL THERAPY EVALUATION - INPATIENT     Room Number: 422/422-A  Evaluation Date: 1/10/2020  Type of Evaluation: Initial  Physician Order: PT Eval and Treat    Presenting Problem: weakness  Reason for Therapy: Mobility Dysfunction and Discharge Pl History:   Procedure Laterality Date   • ANGIOPLASTY (CORONARY)  1999       • APPENDECTOMY      teenager   • BONE MARROW BIOPSY AND ASPIRATION  12/2012    Hantel-neg   • CABG  10/25/13    AORTIC VALVE REPLACEMENT   • CARDIAC CATHETERIZATION  2008 bilaterally    BALANCE  Static Sitting: Good  Dynamic Sitting: Good  Static Standing: Fair +  Dynamic Standing: Fair -    ADDITIONAL TESTS                                    NEUROLOGICAL FINDINGS                      ACTIVITY TOLERANCE worker    ASSESSMENT   Patient is a 70year old male admitted on 1/9/2020 for progressive weakness. Pertinent comorbidities and personal factors impacting therapy include h/o chronic anemia.   In this PT evaluation, the patient presents with the following

## 2020-01-10 NOTE — PROGRESS NOTES
[facility]acy Note:  Renal Dose Adjustment for Metoclopramide (REGLAN)    Roxy Monet has been prescribed Metoclopramide (REGLAN) 10 mg every 8 hours as needed for nausea/vomiting.     Estimated Creatinine Clearance: 26.5 mL/min (A) (based on SCr of 2.1

## 2020-01-10 NOTE — PLAN OF CARE
NURSING ADMISSION NOTE      Patient admitted via Wheelchair  Oriented to room. Safety precautions initiated. Bed in low position. Call light in reach. Patient admitted for increasing weakness and abn CT results from primary care office.  Ez

## 2020-01-10 NOTE — DIETARY MALNUTRITION NOTE
[facility]    NUTRITION INITIAL ASSESSMENT    Pt meets severe malnutrition criteria.     CRITERIA FOR MALNUTRITION DIAGNOSIS:  Criteria for severe malnutrition diagnosis: chronic illness related to wt loss greater than 5% in 1 month, energy intake less past 72 hrs:   Weight   01/09/20 1921 63.5 kg (140 lb)   01/09/20 2318 66.2 kg (145 lb 14.4 oz)     Wt Readings from Last 30 Encounters:  01/09/20 : 66.2 kg (145 lb 14.4 oz)  01/07/20 : 65.1 kg (143 lb 9.6 oz)  01/06/20 : 64.9 kg (143 lb)  12/02/19 : 70.8

## 2020-01-10 NOTE — H&P
[facility]  History and Physical     Hyacinth Jaimes Patient Status:  Emergency    1940 MRN [id number]   Location [address] [facility] Attending Richar Whyte MD   Hosp Day # 0 PCP Hoang Causey MD     Chief Complaint: Moore Guess • Dyslipidemia    • Elevated lipoprotein A level    • FUO (fever of unknown origin)     PMR or testicular/prostate infection   • Gastritis    • Heart valve disease    • Hemorrhoids    • Hiatal hernia with gastroesophageal reflux    • High blood pressure reports current alcohol use of about 5.8 standard drinks of alcohol per week. He reports that he does not use drugs.   Family History:   Family History   Problem Relation Age of Onset   • Neurological Disorder Father         parkinsons   • Cancer Father MG Oral Tab, Take by mouth. One daily, Disp: 30 tablet, Rfl: 0  Coenzyme Q10 (CO Q 10 OR), Take 50 mg by mouth., Disp: , Rfl:   Cholecalciferol (VITAMIN D3) 5000 UNITS Oral Cap, Take 5,000 Units by mouth daily.   , Disp: 30 capsule, Rfl: 0  Clobetasol Prop CA 9.1 8.6   ALB 2.7* 3.1*   * 135*   K 3.1* 2.8*   CL 98 100   CO2 24.0 27.0   ALKPHO 65 82   AST 61* 72*   * 108*   BILT 0.8 0.8   TP 7.2 7.3     Recent Labs   Lab 01/07/20  1413   PTP 14.1   INR 1.05     Recent Labs   Lab 01/09/20  1821

## 2020-01-10 NOTE — PROGRESS NOTES
[facility]  Progress Note     Iftikhar Alves Patient Status:  Observation    1940 MRN [id number]   [facility] 4NW-A Attending Idris Blake MD   Hosp Day # 0 PCP Karenann Kawasaki, MD     Chief Complaint: Weakness    S: Patient feel 108*  --  82*   BILT 0.8 0.8  --  0.6   TP 7.2 7.3  --  6.0*       Estimated Creatinine Clearance: 37.9 mL/min (A) (based on SCr of 1.53 mg/dL (H)).     Recent Labs   Lab 01/07/20  1413 01/10/20  1026   PTP 14.1 14.0   INR 1.05 1.04       Recent Labs   Lab

## 2020-01-10 NOTE — PROGRESS NOTES
[facility] Pharmacy Note:  Renal Dose Adjustment for Enoxaparin (LOVENOX)    Ryne Anderson has been prescribed Enoxaparin (LOVENOX) 40 mg subcutaneously every 24 hours. Estimated Creatinine Clearance: 26.5 mL/min (A) (based on SCr of 2.1 mg/dL (H)).     His c

## 2020-01-11 LAB
ANION GAP SERPL CALC-SCNC: 5 MMOL/L (ref 0–18)
BASOPHILS # BLD AUTO: 0.01 X10(3) UL (ref 0–0.2)
BASOPHILS NFR BLD AUTO: 0.1 %
BUN BLD-MCNC: 28 MG/DL (ref 7–18)
BUN/CREAT SERPL: 20.9 (ref 10–20)
CALCIUM BLD-MCNC: 7.6 MG/DL (ref 8.5–10.1)
CHLORIDE SERPL-SCNC: 117 MMOL/L (ref 98–112)
CO2 SERPL-SCNC: 22 MMOL/L (ref 21–32)
CREAT BLD-MCNC: 1.34 MG/DL (ref 0.7–1.3)
DEPRECATED RDW RBC AUTO: 50.3 FL (ref 35.1–46.3)
EOSINOPHIL # BLD AUTO: 0 X10(3) UL (ref 0–0.7)
EOSINOPHIL NFR BLD AUTO: 0 %
ERYTHROCYTE [DISTWIDTH] IN BLOOD BY AUTOMATED COUNT: 13.9 % (ref 11–15)
GLUCOSE BLD-MCNC: 77 MG/DL (ref 70–99)
HAV IGM SER QL: 1.8 MG/DL (ref 1.6–2.6)
HCT VFR BLD AUTO: 24.6 % (ref 39–53)
HGB BLD-MCNC: 8.1 G/DL (ref 13–17.5)
IMM GRANULOCYTES # BLD AUTO: 0.14 X10(3) UL (ref 0–1)
IMM GRANULOCYTES NFR BLD: 1.6 %
LYMPHOCYTES # BLD AUTO: 1.41 X10(3) UL (ref 1–4)
LYMPHOCYTES NFR BLD AUTO: 16.2 %
MCH RBC QN AUTO: 32.8 PG (ref 26–34)
MCHC RBC AUTO-ENTMCNC: 32.9 G/DL (ref 31–37)
MCV RBC AUTO: 99.6 FL (ref 80–100)
MONOCYTES # BLD AUTO: 0.6 X10(3) UL (ref 0.1–1)
MONOCYTES NFR BLD AUTO: 6.9 %
NEUTROPHILS # BLD AUTO: 6.57 X10 (3) UL (ref 1.5–7.7)
NEUTROPHILS # BLD AUTO: 6.57 X10(3) UL (ref 1.5–7.7)
NEUTROPHILS NFR BLD AUTO: 75.2 %
OSMOLALITY SERPL CALC.SUM OF ELEC: 302 MOSM/KG (ref 275–295)
PLATELET # BLD AUTO: 105 10(3)UL (ref 150–450)
POTASSIUM SERPL-SCNC: 3.6 MMOL/L (ref 3.5–5.1)
POTASSIUM SERPL-SCNC: 3.6 MMOL/L (ref 3.5–5.1)
RBC # BLD AUTO: 2.47 X10(6)UL (ref 3.8–5.8)
SODIUM SERPL-SCNC: 144 MMOL/L (ref 136–145)
WBC # BLD AUTO: 8.7 X10(3) UL (ref 4–11)

## 2020-01-11 PROCEDURE — 99214 OFFICE O/P EST MOD 30 MIN: CPT | Performed by: SPECIALIST

## 2020-01-11 PROCEDURE — 99225 SUBSEQUENT OBSERVATION CARE: CPT | Performed by: HOSPITALIST

## 2020-01-11 RX ORDER — PANTOPRAZOLE SODIUM 20 MG/1
20 TABLET, DELAYED RELEASE ORAL
Status: DISCONTINUED | OUTPATIENT
Start: 2020-01-11 | End: 2020-01-14

## 2020-01-11 NOTE — PROGRESS NOTES
[facility]      Reason for Consultation:   CAD / CABG / +Troponin / Dehydration    History of Present Illness:   Balwinder Banks is a(n) 70year old male with a past history of HTN, HL, and CAD.   In 1999 he underwent stenting of 2 vess 11/2012    colonoscopy   • Compression fracture of T12 vertebra ([facility] [address] 6/19/2018   • Diverticulosis    • Dyslipidemia    • Elevated lipoprotein A level    • FUO (fever of unknown origin)     PMR or testicular/prostate infection   • Gastritis    • Heart valv mouth once daily. , Disp: 90 tablet, Rfl: 1  aspirin 81 MG Oral Tab, Take 1 tablet (81 mg total) by mouth 3 (three) times a week., Disp: , Rfl: 0  Rosuvastatin Calcium 20 MG Oral Tab, Take 0.5 tablets (10 mg total) by mouth nightly., Disp: 90 tablet, Rfl: 3 Ten point review of systems is otherwise negative or unremarkable.     Physical Exam:    01/11/20  0900   BP: 158/90   Pulse: 88   Resp: 19   Temp: 98.3 °F (36.8 °C)     Wt Readings from Last 3 Encounters:  01/11/20 : 143 lb 8.3 oz (65.1 kg)  01/07/20 : 143 TP 7.2 7.3  --  6.0*  --        Recent Labs   Lab 01/09/20  1821 01/10/20  0541 01/10/20  0712 01/11/20  0555   RBC 3.37* 2.63* 2.55* 2.47*   HGB 11.2* 8.7* 8.6* 8.1*   HCT 32.2* 25.2* 24.6* 24.6*   MCV 95.5 95.8 96.5 99.6   MCH 33.2 33.1 33.7 32.8   MCH angina   -Known CAD, however negative recent stress test   -? HTN related   -? Reflect RI  8. ANCELMO   -Dehydration  9. PMR   -Significant increase in ESR recently      Plan:  1. ASA  2. Norvasc 5 mg PO qAM  3. IV Fluids - reduce rate creat improved   4.

## 2020-01-11 NOTE — PLAN OF CARE
Alert and oriented, denies pain, still experiencing fatigue but improved since admission. Dangled at the bedside which he able to do it unassisted, per family, patient could hardly do it on his own before.   Mild SOB with exertion, improved at rest, latest

## 2020-01-11 NOTE — PLAN OF CARE
Pt alert and oriented x4. Pt hypertensive this evening, PRN hydralazine given at this time. Pt went from 173/86 to 147/85. Pt wife sleeping at bedside. Pt requesting to restart home omeprazole, MD paged, waiting for response. Pt denies pain.   IV antib

## 2020-01-11 NOTE — PROGRESS NOTES
[facility] Progress Note    Patient Name: Roxy Monet  Medical Record Number: [id number]     Subjective: 80M with a PMH of PMR on Steroids, HTN, CAD s/p CABG, T12 Compression Fx and AS s/p AVR presented on 1/9/19 with acute on chroni Constitutional NAD. Head Normocephalic and atraumatic. Eyes Conjunctiva clear; sclera anicteric. ENMT External nose normal; external ears normal.  Respiratory Normal effort; no respiratory distress. Cardiovascular Regular rate and rhythm.   Extremitie Prelim 7.82 (H) 1.50 - 7.70 x10 (3) uL    Neutrophil Absolute 7.82 (H) 1.50 - 7.70 x10(3) uL    Lymphocyte Absolute 1.11 1.00 - 4.00 x10(3) uL    Monocyte Absolute 0.21 0.10 - 1.00 x10(3) uL    Eosinophil Absolute 0.00 0.00 - 0.70 x10(3) uL    Basophil Abs 8.5 - 10.1 mg/dL    Calculated Osmolality 302 (H) 275 - 295 mOsm/kg    GFR, Non- 51 (L) >=60    GFR, -American 59 (L) >=60   MAGNESIUM    Collection Time: 01/11/20  5:55 AM   Result Value Ref Range    Magnesium 1.8 1.6 - 2.6 mg/dL noted of the medial right lung base is again noted. Small air bronchograms are likely present. This is therefore favored represent an area of atelectasis or early consolidation as opposed to underlying mass.   However a   follow-up exam is recommended to multifactorial and includes anemia of CKD and anemia of chronic disease.   - LDH mildly elevated with normal haptoglobin. HIPA is negative.    - W49, Folic acid and TSH are normal  - Iron panel unremarkable. - Hemoglobin and platelet stable.  No transfu

## 2020-01-12 LAB
ANION GAP SERPL CALC-SCNC: 7 MMOL/L (ref 0–18)
BUN BLD-MCNC: 31 MG/DL (ref 7–18)
BUN/CREAT SERPL: 21.1 (ref 10–20)
CALCIUM BLD-MCNC: 7.6 MG/DL (ref 8.5–10.1)
CHLORIDE SERPL-SCNC: 116 MMOL/L (ref 98–112)
CO2 SERPL-SCNC: 21 MMOL/L (ref 21–32)
CREAT BLD-MCNC: 1.47 MG/DL (ref 0.7–1.3)
GLUCOSE BLD-MCNC: 70 MG/DL (ref 70–99)
OSMOLALITY SERPL CALC.SUM OF ELEC: 303 MOSM/KG (ref 275–295)
POTASSIUM SERPL-SCNC: 3.5 MMOL/L (ref 3.5–5.1)
SODIUM SERPL-SCNC: 144 MMOL/L (ref 136–145)

## 2020-01-12 PROCEDURE — 99214 OFFICE O/P EST MOD 30 MIN: CPT | Performed by: INTERNAL MEDICINE

## 2020-01-12 PROCEDURE — 99225 SUBSEQUENT OBSERVATION CARE: CPT | Performed by: HOSPITALIST

## 2020-01-12 NOTE — PLAN OF CARE
Pt alert and oriented x4. VSS and afebrile. Pt with wife sleeping at bedside. Continues on IV antibiotics, tolerating well. Pt states that he is able to get up and walk to washroom and feeling less SOB. Coughing also decreasing.  All needs attended to

## 2020-01-12 NOTE — PROGRESS NOTES
[facility]      Reason for Consultation:   CAD / CABG / +Troponin / Dehydration    History of Present Illness:   Jesusita Hernandez is a(n) 70year old male with a past history of HTN, HL, and CAD.   In 1999 he underwent stenting of 2 vess 11/2012    colonoscopy   • Compression fracture of T12 vertebra ([facility] [address] 6/19/2018   • Diverticulosis    • Dyslipidemia    • Elevated lipoprotein A level    • FUO (fever of unknown origin)     PMR or testicular/prostate infection   • Gastritis    • Heart valv 1  aspirin 81 MG Oral Tab, Take 1 tablet (81 mg total) by mouth 3 (three) times a week., Disp: , Rfl: 0  Rosuvastatin Calcium 20 MG Oral Tab, Take 0.5 tablets (10 mg total) by mouth nightly., Disp: 90 tablet, Rfl: 3  buPROPion HCl (WELLBUTRIN) 75 MG Oral T negative or unremarkable.     Physical Exam:    01/12/20  0815   BP:    Pulse: 88   Resp:    Temp:      Wt Readings from Last 3 Encounters:  01/12/20 : 151 lb 6.4 oz (68.7 kg)  01/07/20 : 143 lb 9.6 oz (65.1 kg)  01/06/20 : 143 lb (64.9 kg)          General 0.8  --  0.6  --   --    TP 7.2 7.3  --  6.0*  --   --     < > = values in this interval not displayed.        Recent Labs   Lab 01/09/20  1821 01/10/20  0541 01/10/20  0712 01/11/20  0555   RBC 3.37* 2.63* 2.55* 2.47*   HGB 11.2* 8.7* 8.6* 8.1*   HCT 32.2* +Troponin   -No classic Sx of ischemia   -Has not been noting his typical angina   -Known CAD, however negative recent stress test   -? HTN related   -? Reflect RI  8.   ANCELMO   -Dehydration              - Creat up slightly  Follow BMP push po fluid  CRI baseli

## 2020-01-12 NOTE — PROGRESS NOTES
[facility]  Rheumatology Progress Note  Ke Bui Patient Status:  Observation    1940 MRN [id number]   [facility] 4NW-A Attending Michela Roe MD   Hosp Day # 0 PCP Demetria Delacruz MD     Subjective:  Ke Bui is a(n) valve replacement. Plan:   Continue current antibiotic treatment. Lower prednisone from 17.5 mg/day to 15 mg/day starting tomorrow. My impression is that the PMR is not the cause of the elevated sed rate and is not active.   This higher dose of predn

## 2020-01-12 NOTE — PROGRESS NOTES
[facility]  Progress Note     Mary Hose Patient Status:  Observation    1940 MRN [id number]   [facility] 4NW-A Attending Jeronimo Jules MD   Hosp Day # 0 PCP Amadou Payne MD     Chief Complaint: Weakness    S: Patient feel 2. 7* 3.1*  --  2.5*  --   --    * 135*   < > 141 144 144   K 3.1* 2.8*   < > 3.1* 3.6  3.6 3.5   CL 98 100   < > 111 117* 116*   CO2 24.0 27.0   < > 23.0 22.0 21.0   ALKPHO 65 82  --  59  --   --    AST 61* 72*  --  50*  --   --    * 108*  -- to TCC on discharge?: yes  Estimated date of discharge: 1/13  Discharge is dependent on: progress  At this point Mr. Daly is expected to be discharge to: home    Plan of care discussed with pt, wife and RN.     Reena Reece MD

## 2020-01-12 NOTE — PLAN OF CARE
Alert and oriented, states he feels less tired and has no SOB with exertion. O2 walk was done, patient was consistently 99% on RA at rest and with ambulation.  Had one loose stools today, instructed to collect next stools if to have another one.   1700 had retention  Outcome: Progressing

## 2020-01-13 LAB
ANION GAP SERPL CALC-SCNC: 7 MMOL/L (ref 0–18)
BASOPHILS # BLD AUTO: 0.02 X10(3) UL (ref 0–0.2)
BASOPHILS NFR BLD AUTO: 0.2 %
BUN BLD-MCNC: 33 MG/DL (ref 7–18)
BUN/CREAT SERPL: 22.4 (ref 10–20)
CALCIUM BLD-MCNC: 8 MG/DL (ref 8.5–10.1)
CHLORIDE SERPL-SCNC: 114 MMOL/L (ref 98–112)
CO2 SERPL-SCNC: 22 MMOL/L (ref 21–32)
CREAT BLD-MCNC: 1.47 MG/DL (ref 0.7–1.3)
CRP SERPL-MCNC: 0.72 MG/DL (ref ?–0.3)
DEPRECATED RDW RBC AUTO: 51.5 FL (ref 35.1–46.3)
EOSINOPHIL # BLD AUTO: 0.01 X10(3) UL (ref 0–0.7)
EOSINOPHIL NFR BLD AUTO: 0.1 %
ERYTHROCYTE [DISTWIDTH] IN BLOOD BY AUTOMATED COUNT: 14.4 % (ref 11–15)
GLUCOSE BLD-MCNC: 78 MG/DL (ref 70–99)
HCT VFR BLD AUTO: 23.5 % (ref 39–53)
HGB BLD-MCNC: 7.7 G/DL (ref 13–17.5)
IMM GRANULOCYTES # BLD AUTO: 0.18 X10(3) UL (ref 0–1)
IMM GRANULOCYTES NFR BLD: 2 %
KAPPA FREE LIGHT CHAIN: 5.59 MG/DL (ref 0.33–1.94)
KAPPA/LAMBDA FLC RATIO: 1.62 (ref 0.26–1.65)
LAMBDA FREE LIGHT CHAIN: 3.45 MG/DL (ref 0.57–2.63)
LYMPHOCYTES # BLD AUTO: 1.4 X10(3) UL (ref 1–4)
LYMPHOCYTES NFR BLD AUTO: 15.4 %
MCH RBC QN AUTO: 33 PG (ref 26–34)
MCHC RBC AUTO-ENTMCNC: 32.8 G/DL (ref 31–37)
MCV RBC AUTO: 100.9 FL (ref 80–100)
MONOCYTES # BLD AUTO: 0.57 X10(3) UL (ref 0.1–1)
MONOCYTES NFR BLD AUTO: 6.3 %
NEUTROPHILS # BLD AUTO: 6.91 X10 (3) UL (ref 1.5–7.7)
NEUTROPHILS # BLD AUTO: 6.91 X10(3) UL (ref 1.5–7.7)
NEUTROPHILS NFR BLD AUTO: 76 %
OSMOLALITY SERPL CALC.SUM OF ELEC: 302 MOSM/KG (ref 275–295)
PLATELET # BLD AUTO: 84 10(3)UL (ref 150–450)
POTASSIUM SERPL-SCNC: 3.3 MMOL/L (ref 3.5–5.1)
RBC # BLD AUTO: 2.33 X10(6)UL (ref 3.8–5.8)
SED RATE-ML: 79 MM/HR (ref 0–12)
SODIUM SERPL-SCNC: 143 MMOL/L (ref 136–145)
WBC # BLD AUTO: 9.1 X10(3) UL (ref 4–11)

## 2020-01-13 PROCEDURE — 99214 OFFICE O/P EST MOD 30 MIN: CPT | Performed by: INTERNAL MEDICINE

## 2020-01-13 PROCEDURE — 99225 SUBSEQUENT OBSERVATION CARE: CPT | Performed by: INTERNAL MEDICINE

## 2020-01-13 PROCEDURE — 99214 OFFICE O/P EST MOD 30 MIN: CPT | Performed by: NURSE PRACTITIONER

## 2020-01-13 RX ORDER — PREDNISONE 2.5 MG
17.5 TABLET ORAL DAILY
Qty: 90 TABLET | Refills: 5 | Status: SHIPPED | COMMUNITY
Start: 2020-01-13 | End: 2020-01-14

## 2020-01-13 RX ORDER — CARVEDILOL 6.25 MG/1
6.25 TABLET ORAL ONCE
Status: COMPLETED | OUTPATIENT
Start: 2020-01-13 | End: 2020-01-13

## 2020-01-13 RX ORDER — CARVEDILOL 12.5 MG/1
12.5 TABLET ORAL 2 TIMES DAILY WITH MEALS
Status: DISCONTINUED | OUTPATIENT
Start: 2020-01-13 | End: 2020-01-14

## 2020-01-13 RX ORDER — BUSPIRONE HYDROCHLORIDE 5 MG/1
5 TABLET ORAL 2 TIMES DAILY
Status: DISCONTINUED | OUTPATIENT
Start: 2020-01-13 | End: 2020-01-14

## 2020-01-13 RX ORDER — CARVEDILOL 6.25 MG/1
6.25 TABLET ORAL 2 TIMES DAILY WITH MEALS
Qty: 90 TABLET | Refills: 5 | Status: SHIPPED | OUTPATIENT
Start: 2020-01-13 | End: 2020-01-14

## 2020-01-13 RX ORDER — AMLODIPINE BESYLATE 5 MG/1
5 TABLET ORAL
Qty: 30 TABLET | Refills: 0 | Status: SHIPPED | COMMUNITY
Start: 2020-01-13 | End: 2020-01-23

## 2020-01-13 RX ORDER — PANTOPRAZOLE SODIUM 20 MG/1
20 TABLET, DELAYED RELEASE ORAL
Qty: 60 TABLET | Refills: 0 | Status: SHIPPED | COMMUNITY
Start: 2020-01-13 | End: 2020-01-13

## 2020-01-13 NOTE — PROGRESS NOTES
[facility]  Progress Note    Tramaine Ramirez Patient Status:  Observation    1940 MRN [id number]   [facility] 4NW-A Attending Hetal Cohen MD   Hosp Day # 0 PCP Rafi Alexis MD     Subjective:   Feels better overa Atraumatic. No icterus. Neck:  There is no jugular venous distention. Cardiovascular:  Cardiovascular examination demonstrates a regular rate and rhythm. There is normal S1, S2. There is no S3 or S4. There are no murmurs, rubs, or gallops.   No click 14.1 14.0   INR 1.05 1.04       Recent Labs   Lab 01/09/20  1821 01/09/20  2318 01/10/20  0712   TROP 0.146* 0.130*  --    CK  --   --  112         Tele: SR    Diagnostics:    Echo:   Conclusions:     1. Left ventricle:  The cavity size was normal. There wa chest    Plan:   1. Increase Coreg  2. Norvasc  3. Hold statin at present  4. Heme evaluation of anemia ongoing  5. Anticipate pulmonary assessment  6. Discussed cardiac status with pt and wife. At present, no angina / ACS Sx.   Troponin likely refle

## 2020-01-13 NOTE — DISCHARGE SUMMARY
[address] [place] [address] Patient Status:  Observation    1940 MRN [id number]   [facility] 4NW-A Attending Lesly Poole MD   Hosp Day # 0 PCP Siobhan Gamez MD     Date of Admission: 2020  Date of Discharge:  Awaiting their recommendation. Patient does experience some edema from time to time. Has been used with hydrochlorothiazide just on a as needed basis. BMP is pending today.         Lace+ Score: 72  59-90 High Risk  29-58 Medium Risk  0-28   Low cholecalciferol 125 MCG (5000 UT) Caps  Commonly known as:  VITAMIN D3      Take 5,000 Units by mouth daily.    Quantity:  30 capsule  Refills:  0     CLOBETASOL PROPIONATE E 0.05 % Crea  Generic drug:  Clobetasol Prop Emollient Base      Apply  topically CONSULT-HEM/ONC  [7757] 60 min. [address] [facility] in [place] Cristina Childs MD    Patient Instructions:          Location Instructions: Your appointment is on the [address] [place] [address] in&nbsp; the [facility].  The address is  97.9 °F (36.6 °C)  Pulse:  [73-88] 74  Resp:  [16-20] 20  BP: (114-148)/(68-83) 146/69    Physical Exam:    General: No acute distress. General sense of wellness improving. Physically and objectively  Respiratory: Some rhonchi right lung fields.   No club

## 2020-01-13 NOTE — PROGRESS NOTES
[facility]  Rheumatology Progress Note  Jaxon Josue Patient Status:  Observation    1940 MRN [id number]   [facility] 4NW-A Attending Tanisha Arauz MD   Hosp Day # 0 PCP Emiliano Petit MD     Subjective:  Jaxon Josue is a(n)

## 2020-01-13 NOTE — PROGRESS NOTES
Hem/Onc Inpatient Note    Patient Name: Sarath Marcial   YOB: 1940   Medical Record Number: [id number]   CSN: [id number]   Attending Physician: Dr. Kathy Martin     Chief Complaint: dyspnea, fatigue and weight loss; anemia    S: patient reports fee Sodium 144 136 - 145 mmol/L    Potassium 3.6 3.5 - 5.1 mmol/L    Chloride 117 (H) 98 - 112 mmol/L    CO2 22.0 21.0 - 32.0 mmol/L    Anion Gap 5 0 - 18 mmol/L    BUN 28 (H) 7 - 18 mg/dL    Creatinine 1.34 (H) 0.70 - 1.30 mg/dL    BUN/CREA Ratio 20.9 (H) Calcium, Total 7.6 (L) 8.5 - 10.1 mg/dL    Calculated Osmolality 303 (H) 275 - 295 mOsm/kg    GFR, Non- 45 (L) >=60    GFR, -American 52 (L) >=60   SED RATE, RAFFI (AUTOMATED)    Collection Time: 01/13/20  6:13 AM   Result Va Osmolality 302 (H) 275 - 295 mOsm/kg    GFR, Non- 45 (L) >=60    GFR, -American 52 (L) >=60     Impression/Plan    RLL Spiculated Mass  - Described on abd/pelvis CT.  CT chest shows the finding to be most consistent with atelectasis/c

## 2020-01-14 ENCOUNTER — TELEPHONE (OUTPATIENT)
Dept: INTERNAL MEDICINE CLINIC | Facility: CLINIC | Age: 80
End: 2020-01-14

## 2020-01-14 VITALS
SYSTOLIC BLOOD PRESSURE: 168 MMHG | OXYGEN SATURATION: 94 % | BODY MASS INDEX: 21 KG/M2 | DIASTOLIC BLOOD PRESSURE: 87 MMHG | TEMPERATURE: 98 F | RESPIRATION RATE: 18 BRPM | WEIGHT: 151.38 LBS | HEART RATE: 96 BPM

## 2020-01-14 LAB
BASOPHILS # BLD AUTO: 0.02 X10(3) UL (ref 0–0.2)
BASOPHILS NFR BLD AUTO: 0.2 %
DEPRECATED RDW RBC AUTO: 52.3 FL (ref 35.1–46.3)
EOSINOPHIL # BLD AUTO: 0 X10(3) UL (ref 0–0.7)
EOSINOPHIL NFR BLD AUTO: 0 %
ERYTHROCYTE [DISTWIDTH] IN BLOOD BY AUTOMATED COUNT: 14.6 % (ref 11–15)
HCT VFR BLD AUTO: 26.7 % (ref 39–53)
HGB BLD-MCNC: 8.7 G/DL (ref 13–17.5)
IMM GRANULOCYTES # BLD AUTO: 0.26 X10(3) UL (ref 0–1)
IMM GRANULOCYTES NFR BLD: 2.5 %
LYMPHOCYTES # BLD AUTO: 1.76 X10(3) UL (ref 1–4)
LYMPHOCYTES NFR BLD AUTO: 16.9 %
MCH RBC QN AUTO: 33.2 PG (ref 26–34)
MCHC RBC AUTO-ENTMCNC: 32.6 G/DL (ref 31–37)
MCV RBC AUTO: 101.9 FL (ref 80–100)
MONOCYTES # BLD AUTO: 0.66 X10(3) UL (ref 0.1–1)
MONOCYTES NFR BLD AUTO: 6.3 %
NEUTROPHILS # BLD AUTO: 7.71 X10 (3) UL (ref 1.5–7.7)
NEUTROPHILS # BLD AUTO: 7.71 X10(3) UL (ref 1.5–7.7)
NEUTROPHILS NFR BLD AUTO: 74.1 %
PLATELET # BLD AUTO: 105 10(3)UL (ref 150–450)
RBC # BLD AUTO: 2.62 X10(6)UL (ref 3.8–5.8)
WBC # BLD AUTO: 10.4 X10(3) UL (ref 4–11)

## 2020-01-14 PROCEDURE — 99214 OFFICE O/P EST MOD 30 MIN: CPT | Performed by: INTERNAL MEDICINE

## 2020-01-14 PROCEDURE — 99217 OBSERVATION CARE DISCHARGE: CPT | Performed by: INTERNAL MEDICINE

## 2020-01-14 RX ORDER — PREDNISONE 1 MG/1
15 TABLET ORAL DAILY
Qty: 90 TABLET | Refills: 1 | Status: SHIPPED | OUTPATIENT
Start: 2020-01-15 | End: 2020-02-06

## 2020-01-14 RX ORDER — CEFUROXIME AXETIL 500 MG/1
500 TABLET ORAL 2 TIMES DAILY
Qty: 7 TABLET | Refills: 0 | Status: SHIPPED | OUTPATIENT
Start: 2020-01-14 | End: 2020-01-21 | Stop reason: ALTCHOICE

## 2020-01-14 RX ORDER — CARVEDILOL 12.5 MG/1
12.5 TABLET ORAL 2 TIMES DAILY WITH MEALS
Qty: 90 TABLET | Refills: 1 | Status: SHIPPED | OUTPATIENT
Start: 2020-01-14 | End: 2020-03-13

## 2020-01-14 RX ORDER — PREDNISONE 1 MG/1
5 TABLET ORAL DAILY
Qty: 90 TABLET | Refills: 1 | Status: SHIPPED | OUTPATIENT
Start: 2020-01-15 | End: 2020-01-14

## 2020-01-14 NOTE — PROGRESS NOTES
[facility]  Progress Note    Kay Valerio Patient Status:  Observation    1940 MRN [id number]   [facility] 4NW-A Attending Krzysztof Alba MD   Hosp Day # 0 PCP Siobhan Gamez MD     Subjective:  Kay Valerio is a(n) 70 year Recent Labs   Lab 01/11/20  0555 01/12/20  0546 01/13/20  0613   GLU 77 70 78   BUN 28* 31* 33*   CREATSERUM 1.34* 1.47* 1.47*   GFRAA 59* 52* 52*   GFRNAA 51* 45* 45*   CA 7.6* 7.6* 8.0*    144 143   K 3.6  3.6 3.5 3.3*   * 116* 114*   CO2

## 2020-01-14 NOTE — TELEPHONE ENCOUNTER
Pharmacy called and asks that a nurse please call as she has some questions about carvedilol and prednisone rx's that were sent in today.     Crystal can be reached at [phone]

## 2020-01-14 NOTE — PROGRESS NOTES
Heme/Onc Progress Note    Patient Name: Thomas Gupta   YOB: 1940   Medical Record Number: [id number]   CSN: [id number]   Attending Physician: Ashly Horan M.D.      Subjective:  Stumbled last night and scraped leg - has an abrasion and Oropharynx is clear. Neck: No JVD. No palpable lymphadenopathy. Neck is supple. Chest: Scattered minimal crackles  Heart: Regular rate and rhythm  Abdomen: Soft, non tender with good bowel sounds. Extremities: Pedal pulses are present. No edema.   Princella Bran anemia is multifactorial and includes anemia of CKD and anemia of chronic disease.   - On admission (1/9/20), Hb at baseline of 11.2 and Plt 171. On 1/10/20, Hb 8.7 and Plt 99.  Acute drop likely due to Abx exposure/acute illness and dilution.   - LDH mildl

## 2020-01-14 NOTE — TELEPHONE ENCOUNTER
Hospital discharge meds:  Pharmacist needs to clarify Prednisone is 15 daily vs 5 mg and  Coreg is 12.5mg bid vs 6.25 bid.        Dr. Jacob Long received call from pharmacy and already clarified both rx with pharmacist.

## 2020-01-14 NOTE — PHYSICAL THERAPY NOTE
PHYSICAL THERAPY TREATMENT NOTE - INPATIENT    Room Number: 426/426-A     Session: 1   Number of Visits to Meet Established Goals: 3    Presenting Problem: weakness    Problem List  Principal Problem:    Weakness generalized  Active Problems:    Hyponatre teenager   • BONE MARROW BIOPSY AND ASPIRATION  12/2012    Hantel-neg   • CABG  10/25/13    AORTIC VALVE REPLACEMENT   • CARDIAC CATHETERIZATION  2008/2013   • CHOLECYSTECTOMY  1984   • COLONOSCOPY  2005    Eliu   • COLONOSCOPY  11/7/2012     back to sitting on the side of the bed?: None   How much help from another person does the patient currently need. ..   -   Moving to and from a bed to a chair (including a wheelchair)?: None   -   Need to walk in hospital room?: None   -   Climbing 3-5 dwayne Patient currently does not meet criteria for skilled inpatient physical therapy services, however patient will continue to benefit from QID ambulation with nursing staff - with use of RW. Pt is hereby discharged from [address]   RN aware to re-or

## 2020-01-14 NOTE — PROGRESS NOTES
[facility]  Progress Note    [facility] Patient Status:  Observation    1940 MRN [id number]   [facility] 4NW-A Attending Gabriela Qureshi MD   Hosp Day # 0 PCP Rebecca Ugarte MD       Assessment #1. Fall with abrasion.   He i headaches. Psychiatric/Behavioral: Negative for hallucinations, substance abuse and suicidal ideas. The patient is not nervous/anxious and does not have insomnia. Has seen developed more anxiety 2 daughters have cancer. We will relook at [facility]. < > 3.1*  --  3.6  3.6 3.5 3.3*   CL  --  100  --    < > 111  --  117* 116* 114*   GLU  --  98  --    < > 81  --  77 70 78   CO2  --  27.0  --    < > 23.0  --  22.0 21.0 22.0   CA  --  8.6  --    < > 7.2*  --  7.6* 7.6* 8.0*   ALB  --  3.1*  --   --  2.

## 2020-01-14 NOTE — PROGRESS NOTES
[facility] Cardiology  Progress Note    Tenisha Friedman Patient Status:  Observation    1940 MRN [id number]   [facility] 4NW-A Attending Shelley Quintana MD   Hosp Day # 0 PCP Zakiya Shannon MD     Subjective:   No chest pain / an Normocephalic. Atraumatic. No icterus. Neck:  There is no jugular venous distention. Cardiovascular:  Cardiovascular examination demonstrates a regular rate and rhythm. There is normal S1, S2. There is no S3 or S4.   There are no murmurs, rubs, or ga 01/10/20  1026   PTP 14.1 14.0   INR 1.05 1.04       Recent Labs   Lab 01/09/20  1821 01/09/20  2318 01/10/20  0712   TROP 0.146* 0.130*  --    CK  --   --  112         Tele: SR    Diagnostics:    Echo:   Conclusions:     1. Left ventricle:  The cavity size Abn CT chest    Plan:   1. Coreg 12.5mg PO BID  2. Norvasc  3. Hold statin at present - will re-introduce upon follow up in 1 month if no change clinically. 4.  Heme evaluation of anemia ongoing. Plan repeat CBC in 1 week and consider BMBx.     5.  ATB

## 2020-01-14 NOTE — CM/SW NOTE
01/14/20 1200   Discharge disposition   Expected discharge disposition Home or Self   Name of Facillity/Home Care/Hospice Residential   Informed [facility] the pt will dc home later today

## 2020-01-14 NOTE — PLAN OF CARE
Patient received this am alert and oriented x 4, Dr Sharolyn Hamman discharged patient, dressing changed. Lungs clear on room air, abdomen soft, bowel sounds present, passing flatus, voiding adequate amounts, scattered bruising noted to body.   14:15-Patient discha

## 2020-01-14 NOTE — PLAN OF CARE
Pt Alert x4 on RA denies pain. Pt up and ambulating throughout the day. This evening around 1700 pt ambulating in the beard with wife and RN. Pt turned to talk to nurse and loss his balance while turning. RN at pt side, fall witnessed.  Pt does have a small

## 2020-01-14 NOTE — OCCUPATIONAL THERAPY NOTE
OCCUPATIONAL THERAPY TREATMENT NOTE - INPATIENT     Room Number: 426/426-A  Session: 1   Number of Visits to Meet Established Goals: 3    Presenting Problem: Hypokalemia, weakness    History related to current admission: Patient is a 73y/o male admitted 1/ Right shoulder pain     injected-Liane   • Shortness of breath    • Sleep apnea    • Vitamin B12 nutritional deficiency    • Vitamin D deficiency        Past Surgical History  Past Surgical History:   Procedure Laterality Date   • ANGIOPLASTY (CORONARY) off regular upper body clothing?: None  -   Taking care of personal grooming such as brushing teeth?: None  -   Eating meals?: None    AM-PAC Score:  Score: 23  Approx Degree of Impairment: 15.86%  Standardized Score (AM-PAC Scale): 51.12  CMS Modifier (G- eval/education; Compensatory technique education  Rehab Potential : Good  Frequency (Obs): 5x/week      Goals met 1/14/20  ADL GOALS:  Patient will perform lower body dressing w/ mod I and with adaptive equipment PRN  Patient will perform toileting with mod

## 2020-01-15 ENCOUNTER — PATIENT OUTREACH (OUTPATIENT)
Dept: CASE MANAGEMENT | Age: 80
End: 2020-01-15

## 2020-01-15 DIAGNOSIS — R53.1 WEAKNESS GENERALIZED: ICD-10-CM

## 2020-01-15 DIAGNOSIS — J18.9 PNEUMONIA OF RIGHT LOWER LOBE DUE TO INFECTIOUS ORGANISM: ICD-10-CM

## 2020-01-15 DIAGNOSIS — Z02.9 ENCOUNTERS FOR UNSPECIFIED ADMINISTRATIVE PURPOSE: ICD-10-CM

## 2020-01-15 DIAGNOSIS — E87.6 HYPOKALEMIA: ICD-10-CM

## 2020-01-15 DIAGNOSIS — R91.8 LUNG MASS: ICD-10-CM

## 2020-01-15 PROCEDURE — 1111F DSCHRG MED/CURRENT MED MERGE: CPT

## 2020-01-15 NOTE — PROGRESS NOTES
Initial Post Discharge Follow Up   Discharge Date: 1/14/20  Contact Date: 1/15/2020    Consent Verification:  Assessment Completed With: Patient  HIPAA Verified? Yes    Discharge Dx:     #1.  Clinical pneumonitis community-acquired improving at time of explained to you?   o On a scale of 1-5   1- Very Poor and 5- Very well   o Very well  • Do you have any questions about your discharge instructions? No  • Before leaving the hospital was your diagnoses explained to you?  Yes  • Do you have any questions a D3) 5000 UNITS Oral Cap Take 5,000 Units by mouth daily. 30 capsule 0   • Clobetasol Prop Emollient Base (CLOBETASOL PROPIONATE E) 0.05 % Apply Externally Cream Apply  topically 2 (two) times daily.  prn     • Vitamin K 100 MCG Oral Tab Take 100 mcg by mo that you are home, are there any needs or concerns you need addressed before your next visit with your PCP?  (DME, meds, disease concerns, Etc): No     Follow up appointments:      Your appointments     Date & Time Appointment Department [facility])    Jan 16 confirmed with Jose Cuevas that he has a HFU on 1/21/2020 with Dr. Aman Iqbal and a HFU on 9/1/4054 with Dr. Whitney Roca.  Patient states the appointment he had scheduled with Dr. Bharat Hernandez on 1/16/2020 is cancelled as Dr. Bharat Hernandez told Jose Cuevas he did not need to see him since

## 2020-01-16 ENCOUNTER — OFFICE VISIT (OUTPATIENT)
Dept: INTERNAL MEDICINE CLINIC | Facility: CLINIC | Age: 80
End: 2020-01-16
Payer: MEDICARE

## 2020-01-16 ENCOUNTER — LAB ENCOUNTER (OUTPATIENT)
Dept: LAB | Age: 80
End: 2020-01-16
Attending: INTERNAL MEDICINE
Payer: MEDICARE

## 2020-01-16 VITALS
HEART RATE: 80 BPM | WEIGHT: 155 LBS | OXYGEN SATURATION: 98 % | HEIGHT: 70.5 IN | TEMPERATURE: 98 F | SYSTOLIC BLOOD PRESSURE: 140 MMHG | BODY MASS INDEX: 21.94 KG/M2 | RESPIRATION RATE: 14 BRPM | DIASTOLIC BLOOD PRESSURE: 78 MMHG

## 2020-01-16 DIAGNOSIS — J18.9 PNEUMONIA OF RIGHT LOWER LOBE DUE TO INFECTIOUS ORGANISM: ICD-10-CM

## 2020-01-16 DIAGNOSIS — N18.30 ANEMIA IN STAGE 3 CHRONIC KIDNEY DISEASE (HCC): ICD-10-CM

## 2020-01-16 DIAGNOSIS — R93.1 DECREASED CARDIAC EJECTION FRACTION: ICD-10-CM

## 2020-01-16 DIAGNOSIS — F39 MOOD DISORDER (HCC): ICD-10-CM

## 2020-01-16 DIAGNOSIS — I10 ESSENTIAL HYPERTENSION: ICD-10-CM

## 2020-01-16 DIAGNOSIS — M35.3 PMR (POLYMYALGIA RHEUMATICA) (HCC): ICD-10-CM

## 2020-01-16 DIAGNOSIS — N18.30 STAGE 3 CHRONIC KIDNEY DISEASE (HCC): ICD-10-CM

## 2020-01-16 DIAGNOSIS — N18.30 STAGE 3 CHRONIC KIDNEY DISEASE (HCC): Primary | ICD-10-CM

## 2020-01-16 DIAGNOSIS — R53.1 WEAKNESS GENERALIZED: ICD-10-CM

## 2020-01-16 DIAGNOSIS — D63.1 ANEMIA IN STAGE 3 CHRONIC KIDNEY DISEASE (HCC): ICD-10-CM

## 2020-01-16 DIAGNOSIS — I35.0 NONRHEUMATIC AORTIC VALVE STENOSIS: ICD-10-CM

## 2020-01-16 DIAGNOSIS — R91.8 LUNG MASS: ICD-10-CM

## 2020-01-16 DIAGNOSIS — R63.4 WEIGHT LOSS: ICD-10-CM

## 2020-01-16 LAB
ALBUMIN SERPL ELPH-MCNC: 3.32 G/DL (ref 3.75–5.21)
ALBUMIN/GLOB SERPL: 0.93 {RATIO} (ref 1–2)
ALPHA1 GLOB SERPL ELPH-MCNC: 0.52 G/DL (ref 0.19–0.46)
ALPHA2 GLOB SERPL ELPH-MCNC: 1.08 G/DL (ref 0.48–1.05)
ANION GAP SERPL CALC-SCNC: 5 MMOL/L (ref 0–18)
B-GLOBULIN SERPL ELPH-MCNC: 1.01 G/DL (ref 0.68–1.23)
BUN BLD-MCNC: 40 MG/DL (ref 7–18)
BUN/CREAT SERPL: 25.2 (ref 10–20)
CALCIUM BLD-MCNC: 8.5 MG/DL (ref 8.5–10.1)
CHLORIDE SERPL-SCNC: 110 MMOL/L (ref 98–112)
CO2 SERPL-SCNC: 23 MMOL/L (ref 21–32)
CREAT BLD-MCNC: 1.59 MG/DL (ref 0.7–1.3)
GAMMA GLOB SERPL ELPH-MCNC: 0.97 G/DL (ref 0.62–1.7)
GLUCOSE BLD-MCNC: 81 MG/DL (ref 70–99)
M PROTEIN MFR SERPL ELPH: 6.9 G/DL (ref 6.4–8.2)
OSMOLALITY SERPL CALC.SUM OF ELEC: 295 MOSM/KG (ref 275–295)
PATIENT FASTING Y/N/NP: NO
POTASSIUM SERPL-SCNC: 4.2 MMOL/L (ref 3.5–5.1)
SODIUM SERPL-SCNC: 138 MMOL/L (ref 136–145)

## 2020-01-16 PROCEDURE — 36415 COLL VENOUS BLD VENIPUNCTURE: CPT

## 2020-01-16 PROCEDURE — 99495 TRANSJ CARE MGMT MOD F2F 14D: CPT | Performed by: INTERNAL MEDICINE

## 2020-01-16 PROCEDURE — 80048 BASIC METABOLIC PNL TOTAL CA: CPT

## 2020-01-16 RX ORDER — FUROSEMIDE 20 MG/1
TABLET ORAL
Qty: 10 TABLET | Refills: 2 | Status: SHIPPED | OUTPATIENT
Start: 2020-01-16 | End: 2020-01-23 | Stop reason: ALTCHOICE

## 2020-01-16 NOTE — PROGRESS NOTES
Patient presents with:  TCM (Transition Of Care Management)      HPI: Discharged 2 days ago. TCM visit -since discharge patient is feeling better sleeping better better sense of wellbeing. Wife accompanies patient.     Full medication list reviewed Well modestly. Dr. Yanna Hamm can see the patient would patient does have appointments first with Dr. Loreta Hernández. Etc. Etc. #6. Hypokalemia following up on testing. Review of Systems   Constitutional: Negative for chills, fever and weight loss. breath     Fatigue, unspecified type     Weight loss     Positive TRINY (antinuclear antibody)     Weakness generalized     Hypokalemia     Pneumonia of right lower lobe due to infectious organism     Lung mass     Pneumonia of right lung due to infectious o mouth daily. 30 capsule 0   • Clobetasol Prop Emollient Base (CLOBETASOL PROPIONATE E) 0.05 % Apply Externally Cream Apply  topically 2 (two) times daily. prn     • Vitamin K 100 MCG Oral Tab Take 100 mcg by mouth daily.      • Probiotic Product (WONG (hcc)  Lung mass  Pneumonia of right lower lobe due to infectious organism  Mood disorder (hcc)  Weight loss    Orders Placed This Encounter      Basic Metabolic Panel (8) [E]      Basic Metabolic Panel (8) [E]      Meds & Refills for this Visit:  Requeste

## 2020-01-17 ENCOUNTER — TELEPHONE (OUTPATIENT)
Dept: INTERNAL MEDICINE CLINIC | Facility: CLINIC | Age: 80
End: 2020-01-17

## 2020-01-17 NOTE — TELEPHONE ENCOUNTER
Patient is calling to report his daily weight, as requested by Dr. Damian Mahoney. Today, this AM before breakfast,   151.5 pounds    Please return call if needed.

## 2020-01-17 NOTE — PROGRESS NOTES
We will have to watch about over diuresis just use the Lasix for 2 days. None repeat next week a BMP.   Has renal function I do want a get on a hand

## 2020-01-20 ENCOUNTER — TELEPHONE (OUTPATIENT)
Dept: INTERNAL MEDICINE CLINIC | Facility: CLINIC | Age: 80
End: 2020-01-20

## 2020-01-20 NOTE — TELEPHONE ENCOUNTER
FELICITY, acknowlegded daily wts and asked about appetite. He said \" My appetite is good. \" states he is still on diuretic until tomorrow. He has f/u apt Thursday 1/23 with Dr. Joshua Degroot.

## 2020-01-21 ENCOUNTER — OFFICE VISIT (OUTPATIENT)
Dept: HEMATOLOGY/ONCOLOGY | Facility: HOSPITAL | Age: 80
End: 2020-01-21
Attending: INTERNAL MEDICINE
Payer: MEDICARE

## 2020-01-21 VITALS
HEIGHT: 69.88 IN | SYSTOLIC BLOOD PRESSURE: 134 MMHG | TEMPERATURE: 97 F | DIASTOLIC BLOOD PRESSURE: 66 MMHG | RESPIRATION RATE: 18 BRPM | OXYGEN SATURATION: 100 % | BODY MASS INDEX: 21.86 KG/M2 | WEIGHT: 151 LBS | HEART RATE: 83 BPM

## 2020-01-21 DIAGNOSIS — R53.1 WEAKNESS GENERALIZED: ICD-10-CM

## 2020-01-21 DIAGNOSIS — N18.30 STAGE 3 CHRONIC KIDNEY DISEASE (HCC): ICD-10-CM

## 2020-01-21 DIAGNOSIS — D64.89 ANEMIA DUE TO OTHER CAUSE, NOT CLASSIFIED: Primary | ICD-10-CM

## 2020-01-21 DIAGNOSIS — D64.9 ANEMIA, NORMOCYTIC NORMOCHROMIC: Primary | ICD-10-CM

## 2020-01-21 DIAGNOSIS — M35.3 PMR (POLYMYALGIA RHEUMATICA) (HCC): ICD-10-CM

## 2020-01-21 DIAGNOSIS — J18.9 PNEUMONIA OF RIGHT LOWER LOBE DUE TO INFECTIOUS ORGANISM: ICD-10-CM

## 2020-01-21 LAB
BASOPHILS # BLD AUTO: 0.02 X10(3) UL (ref 0–0.2)
BASOPHILS NFR BLD AUTO: 0.2 %
DEPRECATED RDW RBC AUTO: 53.5 FL (ref 35.1–46.3)
EOSINOPHIL # BLD AUTO: 0.09 X10(3) UL (ref 0–0.7)
EOSINOPHIL NFR BLD AUTO: 0.8 %
ERYTHROCYTE [DISTWIDTH] IN BLOOD BY AUTOMATED COUNT: 14.4 % (ref 11–15)
HCT VFR BLD AUTO: 29.6 % (ref 39–53)
HGB BLD-MCNC: 9.9 G/DL (ref 13–17.5)
HGB RETIC QN AUTO: 41.2 PG (ref 28.2–36.6)
IMM GRANULOCYTES # BLD AUTO: 0.22 X10(3) UL (ref 0–1)
IMM GRANULOCYTES NFR BLD: 2 %
IMM RETICS NFR: 0.23 RATIO (ref 0.1–0.3)
LDH SERPL L TO P-CCNC: 411 U/L (ref 87–241)
LYMPHOCYTES # BLD AUTO: 1.73 X10(3) UL (ref 1–4)
LYMPHOCYTES NFR BLD AUTO: 15.8 %
MCH RBC QN AUTO: 34.3 PG (ref 26–34)
MCHC RBC AUTO-ENTMCNC: 33.4 G/DL (ref 31–37)
MCV RBC AUTO: 102.4 FL (ref 80–100)
MONOCYTES # BLD AUTO: 0.57 X10(3) UL (ref 0.1–1)
MONOCYTES NFR BLD AUTO: 5.2 %
NEUTROPHILS # BLD AUTO: 8.29 X10 (3) UL (ref 1.5–7.7)
NEUTROPHILS # BLD AUTO: 8.29 X10(3) UL (ref 1.5–7.7)
NEUTROPHILS NFR BLD AUTO: 76 %
PLATELET # BLD AUTO: 111 10(3)UL (ref 150–450)
RBC # BLD AUTO: 2.89 X10(6)UL (ref 3.8–5.8)
RETICS # AUTO: 182.6 X10(3) UL (ref 22.5–147.5)
RETICS/RBC NFR AUTO: 6.3 % (ref 0.5–2.5)
WBC # BLD AUTO: 10.9 X10(3) UL (ref 4–11)

## 2020-01-21 PROCEDURE — 38222 DX BONE MARROW BX & ASPIR: CPT | Performed by: INTERNAL MEDICINE

## 2020-01-21 NOTE — PROGRESS NOTES
Patient is here for MD consult/ post hospital follow up. Patient was seen by Dr Kathy Martin during his hospital admission 1/9-1/13. Patient reports recent weight loss, weakness and SOB with exertion. He is using a walker to assist in ambulation.  He is here with

## 2020-01-21 NOTE — PROGRESS NOTES
Date of visit: 1/21/2020    Diagnosis:  Anemia, normocytic normochromic  (primary encounter diagnosis)   Weight loss    Narrative:  Ramon Ruelas presents for a bone marrow biopsy and aspirate.   I explained the procedure to the patient including the risk

## 2020-01-21 NOTE — PROGRESS NOTES
[facility]    PATIENT'S NAME: Sudeepaimee More   ATTENDING PHYSICIAN: Anabelle Salvador M.D.    PATIENT ACCOUNT #: [de-identified] LOCATION: [address] [place] [address] RECORD #: [id number] YOB: 1940   DATE OF SERVICE: 01/21/2020       CANCER CENT though he is starting to eat a little bit better. He is short of breath with exertion and at rest.  He is coughing minimally and largely nonproductive.     MEDICATIONS:  His current medications include amlodipine 5 mg daily, aspirin 81 mg 3 times a week, b CT scan and Dr. Shant Echavarria as well. Dictated By Anabelle Salvador M.D.  d: 01/21/2020 13:21:06  t: 01/21/2020 13:49:57  [facility] [id number]  AH/    cc: STACEY Dena M.D.

## 2020-01-22 ENCOUNTER — TELEPHONE (OUTPATIENT)
Dept: INTERNAL MEDICINE CLINIC | Facility: CLINIC | Age: 80
End: 2020-01-22

## 2020-01-22 NOTE — TELEPHONE ENCOUNTER
Dina Tyler, a p/t from [facility], called just wanting to let  know that she will be seeing patient 2x/week for the next 3 weeks. No call back needed.

## 2020-01-23 ENCOUNTER — OFFICE VISIT (OUTPATIENT)
Dept: INTERNAL MEDICINE CLINIC | Facility: CLINIC | Age: 80
End: 2020-01-23
Payer: MEDICARE

## 2020-01-23 ENCOUNTER — TELEPHONE (OUTPATIENT)
Dept: INTERNAL MEDICINE CLINIC | Facility: CLINIC | Age: 80
End: 2020-01-23

## 2020-01-23 ENCOUNTER — LAB ENCOUNTER (OUTPATIENT)
Dept: LAB | Age: 80
End: 2020-01-23
Attending: INTERNAL MEDICINE
Payer: MEDICARE

## 2020-01-23 VITALS
DIASTOLIC BLOOD PRESSURE: 70 MMHG | BODY MASS INDEX: 21.33 KG/M2 | WEIGHT: 149 LBS | HEIGHT: 70 IN | RESPIRATION RATE: 14 BRPM | OXYGEN SATURATION: 99 % | SYSTOLIC BLOOD PRESSURE: 120 MMHG | HEART RATE: 74 BPM

## 2020-01-23 DIAGNOSIS — R63.4 WEIGHT LOSS: ICD-10-CM

## 2020-01-23 DIAGNOSIS — N18.30 STAGE 3 CHRONIC KIDNEY DISEASE (HCC): ICD-10-CM

## 2020-01-23 DIAGNOSIS — R05.3 CHRONIC COUGH: Primary | ICD-10-CM

## 2020-01-23 DIAGNOSIS — M35.3 PMR (POLYMYALGIA RHEUMATICA) (HCC): ICD-10-CM

## 2020-01-23 DIAGNOSIS — D64.89 ANEMIA DUE TO OTHER CAUSE, NOT CLASSIFIED: ICD-10-CM

## 2020-01-23 DIAGNOSIS — R53.1 WEAKNESS: ICD-10-CM

## 2020-01-23 DIAGNOSIS — J18.9 PNEUMONIA OF BOTH LUNGS DUE TO INFECTIOUS ORGANISM, UNSPECIFIED PART OF LUNG: Primary | ICD-10-CM

## 2020-01-23 DIAGNOSIS — M35.3 PMR (POLYMYALGIA RHEUMATICA) (HCC): Primary | ICD-10-CM

## 2020-01-23 DIAGNOSIS — R05.3 CHRONIC COUGH: ICD-10-CM

## 2020-01-23 LAB
ALBUMIN SERPL-MCNC: 2.9 G/DL (ref 3.4–5)
ALBUMIN/GLOB SERPL: 0.7 {RATIO} (ref 1–2)
ALP LIVER SERPL-CCNC: 48 U/L (ref 45–117)
ALT SERPL-CCNC: 48 U/L (ref 16–61)
ANION GAP SERPL CALC-SCNC: 6 MMOL/L (ref 0–18)
AST SERPL-CCNC: 31 U/L (ref 15–37)
BILIRUB SERPL-MCNC: 0.4 MG/DL (ref 0.1–2)
BUN BLD-MCNC: 35 MG/DL (ref 7–18)
BUN/CREAT SERPL: 19.2 (ref 10–20)
CALCIUM BLD-MCNC: 8.8 MG/DL (ref 8.5–10.1)
CHLORIDE SERPL-SCNC: 107 MMOL/L (ref 98–112)
CO2 SERPL-SCNC: 25 MMOL/L (ref 21–32)
CREAT BLD-MCNC: 1.82 MG/DL (ref 0.7–1.3)
CRP SERPL-MCNC: 0.99 MG/DL (ref ?–0.3)
GLOBULIN PLAS-MCNC: 4.1 G/DL (ref 2.8–4.4)
GLUCOSE BLD-MCNC: 85 MG/DL (ref 70–99)
HAPTOGLOB SERPL-MCNC: 137 MG/DL (ref 30–200)
M PROTEIN MFR SERPL ELPH: 7 G/DL (ref 6.4–8.2)
OSMOLALITY SERPL CALC.SUM OF ELEC: 293 MOSM/KG (ref 275–295)
PATIENT FASTING Y/N/NP: NO
POTASSIUM SERPL-SCNC: 4.2 MMOL/L (ref 3.5–5.1)
SED RATE-ML: 64 MM/HR (ref 0–12)
SODIUM SERPL-SCNC: 138 MMOL/L (ref 136–145)

## 2020-01-23 PROCEDURE — 86334 IMMUNOFIX E-PHORESIS SERUM: CPT

## 2020-01-23 PROCEDURE — 36415 COLL VENOUS BLD VENIPUNCTURE: CPT

## 2020-01-23 PROCEDURE — 83883 ASSAY NEPHELOMETRY NOT SPEC: CPT

## 2020-01-23 PROCEDURE — 99214 OFFICE O/P EST MOD 30 MIN: CPT | Performed by: INTERNAL MEDICINE

## 2020-01-23 PROCEDURE — 84165 PROTEIN E-PHORESIS SERUM: CPT

## 2020-01-23 PROCEDURE — 86480 TB TEST CELL IMMUN MEASURE: CPT

## 2020-01-23 PROCEDURE — 83010 ASSAY OF HAPTOGLOBIN QUANT: CPT

## 2020-01-23 PROCEDURE — 85652 RBC SED RATE AUTOMATED: CPT

## 2020-01-23 PROCEDURE — 86140 C-REACTIVE PROTEIN: CPT

## 2020-01-23 PROCEDURE — 80053 COMPREHEN METABOLIC PANEL: CPT

## 2020-01-23 RX ORDER — ROSUVASTATIN CALCIUM 20 MG/1
10 TABLET, COATED ORAL NIGHTLY
Refills: 0 | COMMUNITY
Start: 2020-01-23 | End: 2020-11-04

## 2020-01-23 RX ORDER — AMLODIPINE BESYLATE 5 MG/1
2.5 TABLET ORAL
Qty: 30 TABLET | Refills: 0 | COMMUNITY
Start: 2020-01-23 | End: 2020-04-02

## 2020-01-23 RX ORDER — PAROXETINE 10 MG/1
10 TABLET, FILM COATED ORAL EVERY MORNING
Qty: 30 TABLET | Refills: 0 | Status: SHIPPED | OUTPATIENT
Start: 2020-01-23 | End: 2020-02-18

## 2020-01-23 RX ORDER — OMEPRAZOLE 20 MG/1
10 CAPSULE, DELAYED RELEASE ORAL EVERY MORNING
Refills: 0 | COMMUNITY
Start: 2020-01-23 | End: 2020-09-24

## 2020-01-23 NOTE — PROGRESS NOTES
HPI:    Patient ID: Toro Mirza is a 70year old male [facility] hospital 1/10 to 1/14 dx pneumonia     EH for atypical bilat pneumonia. Minimal cough and no fever prob due to suppression from prednisone for PMR was 20 now 10mg.  Wt loss troublesome-l (TUMS FRESHERS) 500 MG Oral Chew Tab Chew 1 tablet by mouth as needed. 0   • Magnesium Oxide 400 (240 Mg) MG Oral Tab Take 400 mg by mouth daily. 30 tablet 0   • Coenzyme Q10 (CO Q 10 OR) Take 50 mg by mouth.      • Cholecalciferol (VITAMIN D3) 5000 UN low alb but proBNP was very high BUT EF 50% on echo.  Amlodipine a factor also cut 5 to 2.5  Stay off abx for sputum collection  Stay lower dose Pred 15 not 20  Off Plaquenil  Cut Prilosec bid to qd(having no reflux)  Stop Pot-was 4.2 on 1/16 and NOT on [facility]

## 2020-01-23 NOTE — TELEPHONE ENCOUNTER
Bella Alcantar OT, will be seeing Sol Lapping,    Once a week for one week  Twice a week for one week  Once a week for two weeks. Please call with a verbal confirmation.

## 2020-01-24 ENCOUNTER — TELEPHONE (OUTPATIENT)
Dept: INTERNAL MEDICINE CLINIC | Facility: CLINIC | Age: 80
End: 2020-01-24

## 2020-01-24 LAB
KAPPA FREE LIGHT CHAIN: 3.79 MG/DL (ref 0.33–1.94)
KAPPA/LAMBDA FLC RATIO: 1.39 (ref 0.26–1.65)
LAMBDA FREE LIGHT CHAIN: 2.73 MG/DL (ref 0.57–2.63)

## 2020-01-27 ENCOUNTER — TELEPHONE (OUTPATIENT)
Dept: HEMATOLOGY/ONCOLOGY | Facility: HOSPITAL | Age: 80
End: 2020-01-27

## 2020-01-27 ENCOUNTER — TELEPHONE (OUTPATIENT)
Dept: INTERNAL MEDICINE CLINIC | Facility: CLINIC | Age: 80
End: 2020-01-27

## 2020-01-27 LAB
ALBUMIN SERPL ELPH-MCNC: 3.35 G/DL (ref 3.75–5.21)
ALBUMIN/GLOB SERPL: 1.22 {RATIO} (ref 1–2)
ALPHA1 GLOB SERPL ELPH-MCNC: 0.37 G/DL (ref 0.19–0.46)
ALPHA2 GLOB SERPL ELPH-MCNC: 0.85 G/DL (ref 0.48–1.05)
B-GLOBULIN SERPL ELPH-MCNC: 0.77 G/DL (ref 0.68–1.23)
GAMMA GLOB SERPL ELPH-MCNC: 0.76 G/DL (ref 0.62–1.7)
M PROTEIN MFR SERPL ELPH: 6.1 G/DL (ref 6.4–8.2)
M TB TUBERC IFN-G/MITOGEN IGNF BLD: 0 IU/ML
M TB TUBERC IFN-G/MITOGEN IGNF BLD: 0 IU/ML
M TB TUBERC IGNF/MITOGEN IGNF CONTROL: 0.45 IU/ML
MITOGEN IGNF BCKGRD COR BLD-ACNC: 0.02 IU/ML

## 2020-01-27 NOTE — TELEPHONE ENCOUNTER
Left message on wife's cell phone. Marrow looks ok - no infiltrative process like leukemia or lymphoma. Mild hypocellularity consistent with inflammatory state. Will froward to Dr Wanda Chun and Duncan Matute.   Doug Cano MD

## 2020-01-31 ENCOUNTER — TELEPHONE (OUTPATIENT)
Dept: INTERNAL MEDICINE CLINIC | Facility: CLINIC | Age: 80
End: 2020-01-31

## 2020-01-31 NOTE — TELEPHONE ENCOUNTER
Patient is reporting his daily weight as follows;    Monday 1/27   150.0    Wednesday 1/29     152.0    Friday 1/31         153.0

## 2020-01-31 NOTE — TELEPHONE ENCOUNTER
Call to spouse regarding ok to decrease wts to weekly and asked about sputum cultures ordered by Pulmonology? She said they were waiting for call back from his office to reschedule correct tests but at this pt will clarify labs at [address] on Monday 2/3.

## 2020-02-03 ENCOUNTER — APPOINTMENT (OUTPATIENT)
Dept: LAB | Facility: HOSPITAL | Age: 80
End: 2020-02-03
Attending: INTERNAL MEDICINE
Payer: MEDICARE

## 2020-02-04 ENCOUNTER — APPOINTMENT (OUTPATIENT)
Dept: LAB | Facility: HOSPITAL | Age: 80
End: 2020-02-04
Attending: INTERNAL MEDICINE
Payer: MEDICARE

## 2020-02-04 ENCOUNTER — TELEPHONE (OUTPATIENT)
Dept: INTERNAL MEDICINE CLINIC | Facility: CLINIC | Age: 80
End: 2020-02-04

## 2020-02-04 ENCOUNTER — OFFICE VISIT (OUTPATIENT)
Dept: RHEUMATOLOGY | Facility: CLINIC | Age: 80
End: 2020-02-04
Payer: MEDICARE

## 2020-02-04 VITALS
RESPIRATION RATE: 18 BRPM | WEIGHT: 155 LBS | HEART RATE: 80 BPM | HEIGHT: 70 IN | DIASTOLIC BLOOD PRESSURE: 80 MMHG | SYSTOLIC BLOOD PRESSURE: 142 MMHG | BODY MASS INDEX: 22.19 KG/M2

## 2020-02-04 DIAGNOSIS — Z95.1 S/P CABG (CORONARY ARTERY BYPASS GRAFT): ICD-10-CM

## 2020-02-04 DIAGNOSIS — Z95.2 S/P AVR: ICD-10-CM

## 2020-02-04 DIAGNOSIS — D69.6 THROMBOCYTOPENIA (HCC): ICD-10-CM

## 2020-02-04 DIAGNOSIS — D64.89 ANEMIA DUE TO OTHER CAUSE, NOT CLASSIFIED: ICD-10-CM

## 2020-02-04 DIAGNOSIS — R91.8 LUNG MASS: ICD-10-CM

## 2020-02-04 DIAGNOSIS — J18.9 PNEUMONIA OF RIGHT LOWER LOBE DUE TO INFECTIOUS ORGANISM: ICD-10-CM

## 2020-02-04 DIAGNOSIS — M35.3 PMR (POLYMYALGIA RHEUMATICA) (HCC): Primary | ICD-10-CM

## 2020-02-04 PROBLEM — E87.1 HYPONATREMIA: Status: RESOLVED | Noted: 2019-07-26 | Resolved: 2020-02-04

## 2020-02-04 PROCEDURE — 99214 OFFICE O/P EST MOD 30 MIN: CPT | Performed by: INTERNAL MEDICINE

## 2020-02-04 NOTE — TELEPHONE ENCOUNTER
Jose G Maradiaga, will be discharging patient one visit early. He has met all of his goals and is doing great. Please call with verbal OK.

## 2020-02-04 NOTE — PATIENT INSTRUCTIONS
Currently on Prednisone 15 mg per day for the past 3 weeks. Gradually feeling better. Was in the hospital treated with antibiotics, which were stopped 2 weeks ago. Has a history of PMR. No current joint pain. PMR quiet.   History of abnormal lung scan

## 2020-02-04 NOTE — PROGRESS NOTES
[facility]  Dr. Bill Hamlin Progress Note     Subjective:   Bella Brown is a(n) 70year old male.    Current complaints: Patient presents with:  PMR: Hopital follow-up,1/23/2020 Sed Rate 64, CRP 0.99, prednisone 15 mg/day, overall feeling better  Slowl treated with antibiotics, which were stopped 2 weeks ago. Has a history of PMR. No current joint pain. PMR quiet. History of abnormal lung scan with questionable area of either scar tissue, atelectasis, infection or remotely tumor.   Pulmonary following

## 2020-02-05 ENCOUNTER — OFFICE VISIT (OUTPATIENT)
Dept: INTERNAL MEDICINE CLINIC | Facility: CLINIC | Age: 80
End: 2020-02-05
Payer: MEDICARE

## 2020-02-05 VITALS
RESPIRATION RATE: 14 BRPM | DIASTOLIC BLOOD PRESSURE: 80 MMHG | WEIGHT: 158 LBS | HEART RATE: 70 BPM | BODY MASS INDEX: 23 KG/M2 | SYSTOLIC BLOOD PRESSURE: 160 MMHG | OXYGEN SATURATION: 97 %

## 2020-02-05 DIAGNOSIS — F32.9 REACTIVE DEPRESSION: ICD-10-CM

## 2020-02-05 DIAGNOSIS — M35.3 PMR (POLYMYALGIA RHEUMATICA) (HCC): Primary | ICD-10-CM

## 2020-02-05 DIAGNOSIS — J18.9 PNEUMONIA DUE TO INFECTIOUS ORGANISM, UNSPECIFIED LATERALITY, UNSPECIFIED PART OF LUNG: ICD-10-CM

## 2020-02-05 PROCEDURE — 99214 OFFICE O/P EST MOD 30 MIN: CPT | Performed by: INTERNAL MEDICINE

## 2020-02-06 RX ORDER — PREDNISONE 1 MG/1
TABLET ORAL
Qty: 90 TABLET | Refills: 1 | COMMUNITY
Start: 2020-02-06 | End: 2020-03-02

## 2020-02-07 NOTE — PROGRESS NOTES
HPI:    Patient ID: Jaxon Josue is a 70year old male fu from [facility] dx pneumonia-better    Finished Ceftin for atypical pneumia-no org-TB Quantiferon equivocal. On  Pred 15 for PMR-saw rheum who said he could reduce to 12.5.  Wt loss WAS a problem now wt g needed. 0   • Magnesium Oxide 400 (240 Mg) MG Oral Tab Take 400 mg by mouth daily. 30 tablet 0   • Coenzyme Q10 (CO Q 10 OR) Take 50 mg by mouth. • Cholecalciferol (VITAMIN D3) 5000 UNITS Oral Cap Take 5,000 Units by mouth daily.    30 capsule 0 Referrals:  None       YY#[id number]

## 2020-02-18 ENCOUNTER — TELEPHONE (OUTPATIENT)
Dept: INTERNAL MEDICINE CLINIC | Facility: CLINIC | Age: 80
End: 2020-02-18

## 2020-02-18 RX ORDER — PAROXETINE 10 MG/1
10 TABLET, FILM COATED ORAL EVERY MORNING
Qty: 90 TABLET | Refills: 1 | Status: SHIPPED | OUTPATIENT
Start: 2020-02-18 | End: 2020-08-13

## 2020-02-18 NOTE — TELEPHONE ENCOUNTER
Patient called for a refill on the following rx: PARoxetine HCl 10 MG    He asks whether or not Dr Gela Wilson would like for him to continue taking the medication or not. He also says he was asked to call in with his weight.  He says from 2/11 until danna

## 2020-02-18 NOTE — TELEPHONE ENCOUNTER
PC yes you should stay on Paroxetine and rx was refilled today. We'll see you in 2 weeks at your next OV in March. PA approved through 1/24/21.

## 2020-02-25 ENCOUNTER — HOSPITAL ENCOUNTER (OUTPATIENT)
Dept: CT IMAGING | Facility: HOSPITAL | Age: 80
Discharge: HOME | End: 2020-02-25
Attending: INTERNAL MEDICINE
Payer: MEDICARE

## 2020-02-25 DIAGNOSIS — M85.80 OSTEOPENIA, UNSPECIFIED LOCATION: ICD-10-CM

## 2020-02-25 DIAGNOSIS — Z79.52 LONG TERM (CURRENT) USE OF SYSTEMIC STEROIDS: ICD-10-CM

## 2020-02-25 DIAGNOSIS — R91.8 LUNG MASS: ICD-10-CM

## 2020-02-25 DIAGNOSIS — J18.9 PNEUMONIA OF RIGHT LOWER LOBE DUE TO INFECTIOUS ORGANISM: ICD-10-CM

## 2020-02-25 DIAGNOSIS — S22.080D COMPRESSION FRACTURE OF T12 VERTEBRA WITH ROUTINE HEALING, SUBSEQUENT ENCOUNTER: ICD-10-CM

## 2020-02-25 PROCEDURE — 77080 DXA BONE DENSITY AXIAL: CPT | Performed by: INTERNAL MEDICINE

## 2020-02-25 PROCEDURE — 71250 CT THORAX DX C-: CPT | Performed by: INTERNAL MEDICINE

## 2020-02-27 ENCOUNTER — LAB ENCOUNTER (OUTPATIENT)
Dept: LAB | Age: 80
End: 2020-02-27
Attending: INTERNAL MEDICINE
Payer: MEDICARE

## 2020-02-27 ENCOUNTER — TELEPHONE (OUTPATIENT)
Dept: INTERNAL MEDICINE CLINIC | Facility: CLINIC | Age: 80
End: 2020-02-27

## 2020-02-27 DIAGNOSIS — M35.3 PMR (POLYMYALGIA RHEUMATICA) (HCC): ICD-10-CM

## 2020-02-27 LAB
BASOPHILS # BLD AUTO: 0.04 X10(3) UL (ref 0–0.2)
BASOPHILS NFR BLD AUTO: 0.3 %
CRP SERPL-MCNC: <0.29 MG/DL (ref ?–0.3)
DEPRECATED RDW RBC AUTO: 49.1 FL (ref 35.1–46.3)
EOSINOPHIL # BLD AUTO: 0.26 X10(3) UL (ref 0–0.7)
EOSINOPHIL NFR BLD AUTO: 2.2 %
ERYTHROCYTE [DISTWIDTH] IN BLOOD BY AUTOMATED COUNT: 13.2 % (ref 11–15)
HCT VFR BLD AUTO: 32.7 % (ref 39–53)
HGB BLD-MCNC: 10.7 G/DL (ref 13–17.5)
IMM GRANULOCYTES # BLD AUTO: 0.11 X10(3) UL (ref 0–1)
IMM GRANULOCYTES NFR BLD: 0.9 %
LYMPHOCYTES # BLD AUTO: 1.54 X10(3) UL (ref 1–4)
LYMPHOCYTES NFR BLD AUTO: 13.1 %
MCH RBC QN AUTO: 33.6 PG (ref 26–34)
MCHC RBC AUTO-ENTMCNC: 32.7 G/DL (ref 31–37)
MCV RBC AUTO: 102.8 FL (ref 80–100)
MONOCYTES # BLD AUTO: 0.82 X10(3) UL (ref 0.1–1)
MONOCYTES NFR BLD AUTO: 6.9 %
NEUTROPHILS # BLD AUTO: 9.03 X10 (3) UL (ref 1.5–7.7)
NEUTROPHILS # BLD AUTO: 9.03 X10(3) UL (ref 1.5–7.7)
NEUTROPHILS NFR BLD AUTO: 76.6 %
PLATELET # BLD AUTO: 130 10(3)UL (ref 150–450)
RBC # BLD AUTO: 3.18 X10(6)UL (ref 3.8–5.8)
SED RATE-ML: 32 MM/HR (ref 0–12)
WBC # BLD AUTO: 11.8 X10(3) UL (ref 4–11)

## 2020-02-27 PROCEDURE — 86140 C-REACTIVE PROTEIN: CPT

## 2020-02-27 PROCEDURE — 36415 COLL VENOUS BLD VENIPUNCTURE: CPT

## 2020-02-27 PROCEDURE — 85652 RBC SED RATE AUTOMATED: CPT

## 2020-02-27 PROCEDURE — 85025 COMPLETE CBC W/AUTO DIFF WBC: CPT

## 2020-02-27 NOTE — TELEPHONE ENCOUNTER
Tasha Bautista from [facility] called and wanted to let Dr Aurelia Padilla know that she will be seeing patient for the next 3 weeks, one time each week to check on respiratory status.  She expects him to be discharged from home health services on the week of Ma

## 2020-03-02 ENCOUNTER — OFFICE VISIT (OUTPATIENT)
Dept: INTERNAL MEDICINE CLINIC | Facility: CLINIC | Age: 80
End: 2020-03-02
Payer: MEDICARE

## 2020-03-02 VITALS
WEIGHT: 168 LBS | RESPIRATION RATE: 14 BRPM | HEART RATE: 68 BPM | DIASTOLIC BLOOD PRESSURE: 65 MMHG | HEIGHT: 70 IN | BODY MASS INDEX: 24.05 KG/M2 | TEMPERATURE: 98 F | OXYGEN SATURATION: 98 % | SYSTOLIC BLOOD PRESSURE: 135 MMHG

## 2020-03-02 DIAGNOSIS — R63.4 WEIGHT LOSS: ICD-10-CM

## 2020-03-02 DIAGNOSIS — F32.9 REACTIVE DEPRESSION: ICD-10-CM

## 2020-03-02 DIAGNOSIS — M35.3 PMR (POLYMYALGIA RHEUMATICA) (HCC): Primary | ICD-10-CM

## 2020-03-02 PROCEDURE — 99214 OFFICE O/P EST MOD 30 MIN: CPT | Performed by: INTERNAL MEDICINE

## 2020-03-02 RX ORDER — PREDNISONE 1 MG/1
TABLET ORAL
Qty: 90 TABLET | Refills: 1 | COMMUNITY
Start: 2020-03-02 | End: 2020-03-22

## 2020-03-02 RX ORDER — CHROMIUM 200 MCG
1 TABLET ORAL DAILY
Qty: 30 TABLET | Refills: 0 | COMMUNITY
Start: 2020-03-02 | End: 2021-01-20 | Stop reason: DRUGHIGH

## 2020-03-03 ENCOUNTER — TELEPHONE (OUTPATIENT)
Dept: INTERNAL MEDICINE CLINIC | Facility: CLINIC | Age: 80
End: 2020-03-03

## 2020-03-03 NOTE — PROGRESS NOTES
HPI:    Patient ID: Britney Urbina is a 68year old male wt stable but retaing fluid in face and feet from steroids    Pred at 12.5 for PMR with moon face. Wt stable.  Rare cough despite recent abnormal CT chest.       Review of Systems   Constitutional: P Antacid (TUMS FRESHERS) 500 MG Oral Chew Tab Chew 1 tablet by mouth as needed. 0   • Magnesium Oxide 400 (240 Mg) MG Oral Tab Take 400 mg by mouth daily. 30 tablet 0   • Coenzyme Q10 (CO Q 10 OR) Take 50 mg by mouth.      • Cholecalciferol (VITAMIN D3) Referrals:  None       #[id number]

## 2020-03-03 NOTE — TELEPHONE ENCOUNTER
Page Coburn spoke with you regarding Dash Median : 1940. Anuradha Cammy needs to know was the CBC that was taken on  with Dr. Michelle Norton ok?

## 2020-03-04 NOTE — TELEPHONE ENCOUNTER
Call back to spouse with CBC results, mild anemia, unchanged and WBC elevated from [facility] as expected. Will repeat CBC with [facility] panel in April.

## 2020-03-04 NOTE — TELEPHONE ENCOUNTER
Abnormal unchanged-I told her at LOV that mild anemia same and mild inc WBC from Prednisone as expected. Will be repeated at CPE.

## 2020-03-13 RX ORDER — CARVEDILOL 12.5 MG/1
TABLET ORAL
Qty: 180 TABLET | Refills: 1 | Status: SHIPPED | OUTPATIENT
Start: 2020-03-13 | End: 2020-09-23

## 2020-03-13 NOTE — TELEPHONE ENCOUNTER
Requesting Carvedilol   LOV: 3/2/20 last OV   Filled: 1/14/20 #90 with 1refills    Future Appointments   Date Time Provider Kerry Villa   3/31/2020  8:00 AM [facility] 24 [facility] 24 [facility] [place]   4/6/2020 12:30 PM EH XR RM6 (CHEST) [id number] [facility]   4/6

## 2020-03-18 RX ORDER — AMLODIPINE BESYLATE 2.5 MG/1
2.5 TABLET ORAL DAILY
Qty: 90 TABLET | Refills: 1 | Status: SHIPPED | OUTPATIENT
Start: 2020-03-18 | End: 2020-04-09

## 2020-03-22 ENCOUNTER — TELEPHONE (OUTPATIENT)
Dept: INTERNAL MEDICINE CLINIC | Facility: CLINIC | Age: 80
End: 2020-03-22

## 2020-03-22 RX ORDER — PREDNISONE 1 MG/1
10 TABLET ORAL DAILY
Qty: 90 TABLET | Refills: 1 | COMMUNITY
Start: 2020-03-22 | End: 2020-09-24

## 2020-03-22 NOTE — TELEPHONE ENCOUNTER
I called him worried about Corona-doing fine-recommended dec Pred 10 to 7.5. OV and Lab later in March.

## 2020-03-25 ENCOUNTER — TELEPHONE (OUTPATIENT)
Dept: RHEUMATOLOGY | Facility: CLINIC | Age: 80
End: 2020-03-25

## 2020-03-31 ENCOUNTER — OFFICE VISIT (OUTPATIENT)
Dept: INTERNAL MEDICINE CLINIC | Facility: CLINIC | Age: 80
End: 2020-03-31
Payer: MEDICARE

## 2020-03-31 ENCOUNTER — HOSPITAL ENCOUNTER (OUTPATIENT)
Dept: GENERAL RADIOLOGY | Facility: HOSPITAL | Age: 80
Discharge: HOME | End: 2020-03-31
Attending: INTERNAL MEDICINE
Payer: MEDICARE

## 2020-03-31 ENCOUNTER — LAB ENCOUNTER (OUTPATIENT)
Dept: LAB | Age: 80
End: 2020-03-31
Attending: INTERNAL MEDICINE
Payer: MEDICARE

## 2020-03-31 ENCOUNTER — NURSE ONLY (OUTPATIENT)
Dept: INTERNAL MEDICINE CLINIC | Facility: CLINIC | Age: 80
End: 2020-03-31
Payer: MEDICARE

## 2020-03-31 VITALS
BODY MASS INDEX: 23.88 KG/M2 | SYSTOLIC BLOOD PRESSURE: 152 MMHG | HEART RATE: 64 BPM | DIASTOLIC BLOOD PRESSURE: 80 MMHG | HEIGHT: 70.5 IN | WEIGHT: 168.69 LBS | OXYGEN SATURATION: 99 %

## 2020-03-31 DIAGNOSIS — M35.3 PMR (POLYMYALGIA RHEUMATICA) (HCC): ICD-10-CM

## 2020-03-31 DIAGNOSIS — R69 DIAGNOSIS UNKNOWN: Primary | ICD-10-CM

## 2020-03-31 DIAGNOSIS — Z00.00 ROUTINE GENERAL MEDICAL EXAMINATION AT A HEALTH CARE FACILITY: Primary | ICD-10-CM

## 2020-03-31 DIAGNOSIS — Z00.01 ENCOUNTER FOR GENERAL ADULT MEDICAL EXAMINATION WITH ABNORMAL FINDINGS: Primary | ICD-10-CM

## 2020-03-31 DIAGNOSIS — J90 PLEURAL EFFUSION: ICD-10-CM

## 2020-03-31 LAB
BILIRUB UR QL STRIP.AUTO: NEGATIVE
COLOR UR AUTO: YELLOW
GLUCOSE UR STRIP.AUTO-MCNC: NEGATIVE MG/DL
KETONES UR STRIP.AUTO-MCNC: NEGATIVE MG/DL
LEUKOCYTE ESTERASE UR QL STRIP.AUTO: NEGATIVE
NITRITE UR QL STRIP.AUTO: NEGATIVE
PH UR STRIP.AUTO: 6 [PH] (ref 4.5–8)
PROT UR STRIP.AUTO-MCNC: 100 MG/DL
RBC UR QL AUTO: NEGATIVE
SP GR UR STRIP.AUTO: 1.01 (ref 1–1.03)
UROBILINOGEN UR STRIP.AUTO-MCNC: <2 MG/DL

## 2020-03-31 PROCEDURE — 93000 ELECTROCARDIOGRAM COMPLETE: CPT | Performed by: INTERNAL MEDICINE

## 2020-03-31 PROCEDURE — 36415 COLL VENOUS BLD VENIPUNCTURE: CPT

## 2020-03-31 PROCEDURE — 99173 VISUAL ACUITY SCREEN: CPT | Performed by: INTERNAL MEDICINE

## 2020-03-31 PROCEDURE — 71046 X-RAY EXAM CHEST 2 VIEWS: CPT | Performed by: INTERNAL MEDICINE

## 2020-03-31 PROCEDURE — 92551 PURE TONE HEARING TEST AIR: CPT | Performed by: INTERNAL MEDICINE

## 2020-03-31 PROCEDURE — 94010 BREATHING CAPACITY TEST: CPT | Performed by: INTERNAL MEDICINE

## 2020-03-31 PROCEDURE — 85652 RBC SED RATE AUTOMATED: CPT

## 2020-03-31 PROCEDURE — G0439 PPPS, SUBSEQ VISIT: HCPCS | Performed by: INTERNAL MEDICINE

## 2020-03-31 PROCEDURE — 81001 URINALYSIS AUTO W/SCOPE: CPT | Performed by: INTERNAL MEDICINE

## 2020-03-31 NOTE — PROGRESS NOTES
*LOOKIN' BODY RESULTS    YES: x      NO:       REASON TEST NOT PERFORMED:        HEIGHT: 5'10.5  WEIGHT:168.7 lb  _____________________________________________________________________________  *VENIPUNCTURE    YES:       NO: x       REASON VENIPUNCTURE NOT

## 2020-04-01 ENCOUNTER — TELEPHONE (OUTPATIENT)
Dept: INTERNAL MEDICINE CLINIC | Facility: CLINIC | Age: 80
End: 2020-04-01

## 2020-04-02 RX ORDER — AMLODIPINE BESYLATE 5 MG/1
5 TABLET ORAL DAILY
Qty: 30 TABLET | Refills: 0 | COMMUNITY
Start: 2020-04-02 | End: 2020-04-08

## 2020-04-02 NOTE — PROGRESS NOTES
HPI:    Patient ID: Robby Jasso is a 68year old male here for annual and fu pneumonia    Pneumonia in 1/2020, CT 2/2020, now CXR today finall resolved. No cough or fever.  Still high risk in this Covid epidemic aisha with heart disease and PMR on Prednis every 7 days.  12 tablet 1   • predniSONE 5 MG Oral Tab 7.5/day 90 tablet 1   • CARVEDILOL 12.5 MG Oral Tab TAKE 1 TABLET BY MOUTH TWICE DAILY WITH MEALS 180 tablet 1   • Turmeric, Curcuma Longa, Does not apply Powder One a day 300mg  0   • chromium picolin Constitutional: He appears well-developed and well-nourished. No distress. Eyes:   normal   Neck: Normal range of motion. Neck supple. Cardiovascular: Normal rate, regular rhythm and intact distal pulses. Murmur (2/6 systolic) heard.   Sternal scar back pain without sciatica 7/20/2018   • JOSE LUIS (acute kidney injury) [facility])     resolved   • Aortic stenosis 10/2013    AVR-bio   • CAD (coronary artery disease)     stents, bypasses   • Calculus of kidney    • Carotid artery plaque 2011    ultrasound   • Chr shoulder    • CYTOLOGY LYMPH NODE FNA - JAR(S): 1  12/7/2012    excisional biopsy rt inguinal lymph node    • ESOPHAGOGASTRODUODENOSCOPY (EGD) N/A 8/10/2019    Performed by Allan Adam MD at [address] [place] [address] Physical activity:        Days per week: Not on file        Minutes per session: Not on file      Stress: Not on file    Relationships      Social connections:        Talks on phone: Not on file        Gets together: Not on file        Attends [employer] se index is 23.86 kg/m².       Health Screens    Anxiety Index: Anxiety evident  Depression Index: Chronic depression from family stresses  Cage Questionnaire: No indication of alcohol abuse  Sleepiness Index: Normal  Male Sexual Health Inventory: N/A  Metabol OMEGA CHECK: Last year normal 7.0 not repeated continue wild salmon and sardines, flax, johnathan, increased Juice Plus Omega Blend      CMP: Normal fasting sugar 92, BUN high 27 down from 40 with better hydration, but creat up to 1.81 giving you eGFR 35 want on Crestor 20. BP up with all this family stress and Covid so increase amlodipine 2.5 to 5/day and monitor at home 2x/week. Homocysteine little high want <10 so increase TMG and recheck 3mos.       2. Regarding cancer: you had precancerous changes on skin b

## 2020-04-06 ENCOUNTER — TELEPHONE (OUTPATIENT)
Dept: INTERNAL MEDICINE CLINIC | Facility: CLINIC | Age: 80
End: 2020-04-06

## 2020-04-06 ENCOUNTER — VIRTUAL PHONE E/M (OUTPATIENT)
Dept: RHEUMATOLOGY | Facility: CLINIC | Age: 80
End: 2020-04-06
Payer: MEDICARE

## 2020-04-06 DIAGNOSIS — R70.0 ELEVATED SED RATE: ICD-10-CM

## 2020-04-06 DIAGNOSIS — M35.3 PMR (POLYMYALGIA RHEUMATICA) (HCC): ICD-10-CM

## 2020-04-06 PROCEDURE — G2012 BRIEF CHECK IN BY MD/QHP: HCPCS | Performed by: INTERNAL MEDICINE

## 2020-04-06 NOTE — PROGRESS NOTES
Virtual/Telephone Check-In    Roxy Monet verbally consents to a Virtual/Telephone Check-In service on 04/06/20. Patient understands and accepts financial responsibility for any deductible, co-insurance and/or co-pays associated with this service.

## 2020-04-08 ENCOUNTER — TELEPHONE (OUTPATIENT)
Dept: INTERNAL MEDICINE CLINIC | Facility: CLINIC | Age: 80
End: 2020-04-08

## 2020-04-08 DIAGNOSIS — N18.30 ANEMIA IN STAGE 3 CHRONIC KIDNEY DISEASE (HCC): Primary | ICD-10-CM

## 2020-04-08 DIAGNOSIS — R80.9 PROTEINURIA, UNSPECIFIED TYPE: ICD-10-CM

## 2020-04-08 DIAGNOSIS — D63.1 ANEMIA IN STAGE 3 CHRONIC KIDNEY DISEASE (HCC): Primary | ICD-10-CM

## 2020-04-09 PROBLEM — R63.4 WEIGHT LOSS: Status: RESOLVED | Noted: 2019-08-09 | Resolved: 2020-04-09

## 2020-04-09 PROBLEM — R73.9 HYPERGLYCEMIA: Status: RESOLVED | Noted: 2019-07-26 | Resolved: 2020-04-09

## 2020-04-09 RX ORDER — RISEDRONATE SODIUM 35 MG/1
35 TABLET, FILM COATED ORAL
Qty: 12 TABLET | Refills: 1 | Status: SHIPPED | OUTPATIENT
Start: 2020-04-09 | End: 2020-09-03

## 2020-04-09 RX ORDER — AMLODIPINE BESYLATE 2.5 MG/1
5 TABLET ORAL DAILY
Qty: 90 TABLET | Refills: 1 | COMMUNITY
Start: 2020-04-09 | End: 2020-08-13

## 2020-04-10 DIAGNOSIS — E78.5 DYSLIPIDEMIA: ICD-10-CM

## 2020-04-10 DIAGNOSIS — E72.11 HYPERHOMOCYSTEINEMIA (HCC): ICD-10-CM

## 2020-04-10 DIAGNOSIS — R80.9 PROTEINURIA, UNSPECIFIED TYPE: Primary | ICD-10-CM

## 2020-04-10 DIAGNOSIS — N18.30 CKD (CHRONIC KIDNEY DISEASE) STAGE 3, GFR 30-59 ML/MIN (HCC): ICD-10-CM

## 2020-04-10 DIAGNOSIS — D69.6 THROMBOCYTOPENIA (HCC): ICD-10-CM

## 2020-04-17 ENCOUNTER — MED REC SCAN ONLY (OUTPATIENT)
Dept: INTERNAL MEDICINE CLINIC | Facility: CLINIC | Age: 80
End: 2020-04-17

## 2020-05-11 ENCOUNTER — TELEPHONE (OUTPATIENT)
Dept: INTERNAL MEDICINE CLINIC | Facility: CLINIC | Age: 80
End: 2020-05-11

## 2020-05-11 DIAGNOSIS — Z20.822 EXPOSURE TO COVID-19 VIRUS: Primary | ICD-10-CM

## 2020-05-11 NOTE — TELEPHONE ENCOUNTER
Call to check in with Maliha Both and Martha Morrow to see how they are doing and offer COVID antibody testing. Maliha Both said they are doing ok at home but Martha Morrow is fatigued and sleeping more. Recommend she call for virtual visit for spouse.     They would like order for a

## 2020-06-15 ENCOUNTER — TELEPHONE (OUTPATIENT)
Dept: INTERNAL MEDICINE CLINIC | Facility: CLINIC | Age: 80
End: 2020-06-15

## 2020-06-15 DIAGNOSIS — R70.0 ELEVATED SED RATE: Primary | ICD-10-CM

## 2020-06-18 ENCOUNTER — LAB ENCOUNTER (OUTPATIENT)
Dept: LAB | Age: 80
End: 2020-06-18
Attending: INTERNAL MEDICINE
Payer: MEDICARE

## 2020-06-18 ENCOUNTER — TELEPHONE (OUTPATIENT)
Dept: INTERNAL MEDICINE CLINIC | Facility: CLINIC | Age: 80
End: 2020-06-18

## 2020-06-18 DIAGNOSIS — N18.30 CKD (CHRONIC KIDNEY DISEASE) STAGE 3, GFR 30-59 ML/MIN (HCC): ICD-10-CM

## 2020-06-18 DIAGNOSIS — D69.6 THROMBOCYTOPENIA (HCC): ICD-10-CM

## 2020-06-18 DIAGNOSIS — Z20.822 CLOSE EXPOSURE TO COVID-19 VIRUS: Primary | ICD-10-CM

## 2020-06-18 DIAGNOSIS — R70.0 ELEVATED SED RATE: ICD-10-CM

## 2020-06-18 DIAGNOSIS — R80.9 PROTEINURIA, UNSPECIFIED TYPE: ICD-10-CM

## 2020-06-18 PROCEDURE — 36415 COLL VENOUS BLD VENIPUNCTURE: CPT

## 2020-06-18 PROCEDURE — 86769 SARS-COV-2 COVID-19 ANTIBODY: CPT

## 2020-06-18 PROCEDURE — 85025 COMPLETE CBC W/AUTO DIFF WBC: CPT

## 2020-06-18 PROCEDURE — 82570 ASSAY OF URINE CREATININE: CPT

## 2020-06-18 PROCEDURE — 81001 URINALYSIS AUTO W/SCOPE: CPT

## 2020-06-18 PROCEDURE — 80048 BASIC METABOLIC PNL TOTAL CA: CPT

## 2020-06-18 PROCEDURE — 82043 UR ALBUMIN QUANTITATIVE: CPT

## 2020-06-18 PROCEDURE — 85652 RBC SED RATE AUTOMATED: CPT

## 2020-06-18 NOTE — TELEPHONE ENCOUNTER
Discussed labs-inc ESR, worsening kidneys, SARS antibody neg.  In Pred 7.5 to 10 and get consultants involved

## 2020-06-19 ENCOUNTER — TELEPHONE (OUTPATIENT)
Dept: NEPHROLOGY | Facility: CLINIC | Age: 80
End: 2020-06-19

## 2020-06-19 ENCOUNTER — TELEPHONE (OUTPATIENT)
Dept: PHARMACY | Facility: HOSPITAL | Age: 80
End: 2020-06-19

## 2020-06-19 NOTE — TELEPHONE ENCOUNTER
Called pt to and LVM for a return call to schedule for a consultation in 1-2 weeks with dr Joselin Duncan

## 2020-07-08 ENCOUNTER — OFFICE VISIT (OUTPATIENT)
Dept: NEPHROLOGY | Facility: CLINIC | Age: 80
End: 2020-07-08
Payer: MEDICARE

## 2020-07-08 ENCOUNTER — APPOINTMENT (OUTPATIENT)
Dept: HEMATOLOGY/ONCOLOGY | Facility: HOSPITAL | Age: 80
End: 2020-07-08
Attending: INTERNAL MEDICINE
Payer: MEDICARE

## 2020-07-08 VITALS — DIASTOLIC BLOOD PRESSURE: 82 MMHG | WEIGHT: 165.5 LBS | BODY MASS INDEX: 23 KG/M2 | SYSTOLIC BLOOD PRESSURE: 160 MMHG

## 2020-07-08 DIAGNOSIS — R80.9 PROTEINURIA, UNSPECIFIED TYPE: ICD-10-CM

## 2020-07-08 DIAGNOSIS — N18.30 CKD (CHRONIC KIDNEY DISEASE) STAGE 3, GFR 30-59 ML/MIN (HCC): Primary | ICD-10-CM

## 2020-07-08 PROCEDURE — 99204 OFFICE O/P NEW MOD 45 MIN: CPT | Performed by: INTERNAL MEDICINE

## 2020-07-08 NOTE — PROGRESS NOTES
Nephrology Consult Note    REASON FOR CONSULT: CKD 3    ASSESSMENT/PLAN:        1) CKD 3- serum creatinine is increased gradually since July 2019 from 0.98 mg/dL to 1.94 mg/dL and is accompanied by onset of mild sub-nephrotic range proteinuria.   Zion Donovan cytopenias by Dr. Sadler Para tell which was nondiagnostic. No history of lung or liver disease. No history of congestive heart failure. No NSAID use.     ROS:    Denies fever/chills  Denies wt loss/gain  Denies HA or visual changes  Denies CP or palpitations Procedure Laterality Date   • ANGIOPLASTY (CORONARY)  1999       • APPENDECTOMY      teenager   • BONE MARROW BIOPSY AND ASPIRATION  1/2021    Hantel-neg   • CABG  10/25/13    AORTIC VALVE REPLACEMENT   • CARDIAC CATHETERIZATION  2008/2013   • tablet (81 mg total) by mouth 3 (three) times a week.  0   • Ljxpaysoy-Xjhyjcoym-Kksnllfewz (CEREFOLIN NAC) 6-2-600 MG Oral Tab One MWF 3/week 36 tablet 3   • Betaine, Trimethylglycine, 500 MG Oral Cap Take 3 capsules by mouth daily.  3/day=1500   0   • Gabe Caffeine Concern: Yes          2 cups daily       Family History:  Denies family history of kidney disease. PHYSICAL EXAM:   There were no vitals taken for this visit.    Wt Readings from Last 3 Encounters:  03/31/20 : 168 lb 11.2 oz (76.5 kg)  03/02

## 2020-07-09 ENCOUNTER — TELEPHONE (OUTPATIENT)
Dept: INTERNAL MEDICINE CLINIC | Facility: CLINIC | Age: 80
End: 2020-07-09

## 2020-07-10 ENCOUNTER — OFFICE VISIT (OUTPATIENT)
Dept: HEMATOLOGY/ONCOLOGY | Facility: HOSPITAL | Age: 80
End: 2020-07-10
Attending: INTERNAL MEDICINE
Payer: MEDICARE

## 2020-07-10 ENCOUNTER — APPOINTMENT (OUTPATIENT)
Dept: LAB | Age: 80
End: 2020-07-10
Attending: INTERNAL MEDICINE
Payer: MEDICARE

## 2020-07-10 ENCOUNTER — NURSE ONLY (OUTPATIENT)
Dept: INTERNAL MEDICINE CLINIC | Facility: CLINIC | Age: 80
End: 2020-07-10
Payer: MEDICARE

## 2020-07-10 VITALS
BODY MASS INDEX: 23.25 KG/M2 | HEART RATE: 69 BPM | HEIGHT: 70.5 IN | DIASTOLIC BLOOD PRESSURE: 89 MMHG | TEMPERATURE: 97 F | RESPIRATION RATE: 16 BRPM | SYSTOLIC BLOOD PRESSURE: 159 MMHG | WEIGHT: 164.19 LBS | OXYGEN SATURATION: 99 %

## 2020-07-10 DIAGNOSIS — E78.5 DYSLIPIDEMIA: ICD-10-CM

## 2020-07-10 DIAGNOSIS — N18.30 STAGE 3 CHRONIC KIDNEY DISEASE (HCC): ICD-10-CM

## 2020-07-10 DIAGNOSIS — D63.8 ANEMIA OF CHRONIC DISEASE: ICD-10-CM

## 2020-07-10 DIAGNOSIS — E72.11 HYPERHOMOCYSTEINEMIA (HCC): ICD-10-CM

## 2020-07-10 DIAGNOSIS — D64.9 ANEMIA, NORMOCYTIC NORMOCHROMIC: Primary | ICD-10-CM

## 2020-07-10 DIAGNOSIS — N18.30 CKD (CHRONIC KIDNEY DISEASE) STAGE 3, GFR 30-59 ML/MIN (HCC): ICD-10-CM

## 2020-07-10 DIAGNOSIS — M35.3 PMR (POLYMYALGIA RHEUMATICA) (HCC): ICD-10-CM

## 2020-07-10 DIAGNOSIS — R80.9 PROTEINURIA, UNSPECIFIED TYPE: ICD-10-CM

## 2020-07-10 LAB
ANION GAP SERPL CALC-SCNC: 6 MMOL/L (ref 0–18)
BILIRUB UR QL STRIP.AUTO: NEGATIVE
BUN BLD-MCNC: 38 MG/DL (ref 7–18)
BUN/CREAT SERPL: 19.4 (ref 10–20)
C3 SERPL-MCNC: 112 MG/DL (ref 90–180)
C4 SERPL-MCNC: 27.9 MG/DL (ref 10–40)
CALCIUM BLD-MCNC: 9.1 MG/DL (ref 8.5–10.1)
CHLORIDE SERPL-SCNC: 103 MMOL/L (ref 98–112)
CLARITY UR REFRACT.AUTO: CLEAR
CO2 SERPL-SCNC: 28 MMOL/L (ref 21–32)
COLOR UR AUTO: YELLOW
CREAT BLD-MCNC: 1.96 MG/DL (ref 0.7–1.3)
CREAT UR-SCNC: 123 MG/DL
EOSINOPHILS,URINE: NEGATIVE
GLUCOSE BLD-MCNC: 100 MG/DL (ref 70–99)
GLUCOSE UR STRIP.AUTO-MCNC: NEGATIVE MG/DL
HCYS SERPL-SCNC: 17.7 UMOL/L (ref 3.2–10.7)
KETONES UR STRIP.AUTO-MCNC: NEGATIVE MG/DL
LEUKOCYTE ESTERASE UR QL STRIP.AUTO: NEGATIVE
NITRITE UR QL STRIP.AUTO: NEGATIVE
OSMOLALITY SERPL CALC.SUM OF ELEC: 293 MOSM/KG (ref 275–295)
PATIENT FASTING Y/N/NP: YES
PH UR STRIP.AUTO: 6 [PH] (ref 4.5–8)
POTASSIUM SERPL-SCNC: 4 MMOL/L (ref 3.5–5.1)
PROT UR STRIP.AUTO-MCNC: 100 MG/DL
PROT UR-MCNC: 107.1 MG/DL
RBC UR QL AUTO: NEGATIVE
SODIUM SERPL-SCNC: 137 MMOL/L (ref 136–145)
SP GR UR STRIP.AUTO: 1.01 (ref 1–1.03)
UROBILINOGEN UR STRIP.AUTO-MCNC: <2 MG/DL

## 2020-07-10 PROCEDURE — 86160 COMPLEMENT ANTIGEN: CPT

## 2020-07-10 PROCEDURE — 84156 ASSAY OF PROTEIN URINE: CPT

## 2020-07-10 PROCEDURE — 87205 SMEAR GRAM STAIN: CPT

## 2020-07-10 PROCEDURE — 81001 URINALYSIS AUTO W/SCOPE: CPT

## 2020-07-10 PROCEDURE — 99214 OFFICE O/P EST MOD 30 MIN: CPT | Performed by: INTERNAL MEDICINE

## 2020-07-10 PROCEDURE — 86255 FLUORESCENT ANTIBODY SCREEN: CPT

## 2020-07-10 PROCEDURE — 83876 ASSAY MYELOPEROXIDASE: CPT

## 2020-07-10 PROCEDURE — 82570 ASSAY OF URINE CREATININE: CPT

## 2020-07-10 PROCEDURE — 83516 IMMUNOASSAY NONANTIBODY: CPT

## 2020-07-10 PROCEDURE — 36415 COLL VENOUS BLD VENIPUNCTURE: CPT

## 2020-07-10 PROCEDURE — 80048 BASIC METABOLIC PNL TOTAL CA: CPT

## 2020-07-10 PROCEDURE — 83090 ASSAY OF HOMOCYSTEINE: CPT

## 2020-07-10 RX ORDER — MULTIVIT,TX WITH IRON,MINERALS
TABLET, EXTENDED RELEASE ORAL
COMMUNITY
End: 2021-01-20

## 2020-07-10 NOTE — PROGRESS NOTES
Patient is here for MD f/u. Patient is on Prednisone 10 mg daily. No longer having pain. He c/o still feeling tired all the time. Weight is maintaining. Patient reports sed rate continues to rise despite Prednisone dose. Patient c/o SOB with exertion.

## 2020-07-12 PROBLEM — D63.8 ANEMIA OF CHRONIC DISEASE: Status: ACTIVE | Noted: 2020-07-12

## 2020-07-13 NOTE — PROGRESS NOTES
[facility]    PATIENT'S NAME: Jackson Parents   ATTENDING PHYSICIAN: Kisha Mir M.D.    PATIENT ACCOUNT #: [de-identified] LOCATION: [address] [place] [address] RECORD #: [id number] YOB: 1940   DATE OF SERVICE: 07/10/2020       CANCER CENT mg nightly; turmeric 300 mg daily; ;vitamin K 100 mcg daily; zinc 50 mg daily. PHYSICAL EXAMINATION:    GENERAL:  He is a well-appearing male in no acute distress. VITAL SIGNS:  His performance status is 1. His weight is 164 pounds.   Blood pressure i

## 2020-07-15 LAB
MYELOPEROX ANTIBODIES, IGG: 0 AU/ML
SERINE PROTEASE3, IGG: 0 AU/ML

## 2020-07-16 ENCOUNTER — TELEPHONE (OUTPATIENT)
Dept: INTERNAL MEDICINE CLINIC | Facility: CLINIC | Age: 80
End: 2020-07-16

## 2020-07-19 ENCOUNTER — TELEPHONE (OUTPATIENT)
Dept: NEPHROLOGY | Facility: CLINIC | Age: 80
End: 2020-07-19

## 2020-07-19 NOTE — TELEPHONE ENCOUNTER
Left VM for pt- all serologies / imaging neg; UA bland with modest proteinuria approx 1 g- given signfiicnat Cr rise since last yr- needs renal biopsy-    Arianna / Cher- please schedule pt to come in to discuss this week-    thx  htf

## 2020-07-20 ENCOUNTER — TELEPHONE (OUTPATIENT)
Dept: INTERNAL MEDICINE CLINIC | Facility: CLINIC | Age: 80
End: 2020-07-20

## 2020-07-21 ENCOUNTER — MED REC SCAN ONLY (OUTPATIENT)
Dept: INTERNAL MEDICINE CLINIC | Facility: CLINIC | Age: 80
End: 2020-07-21

## 2020-07-22 ENCOUNTER — TELEPHONE (OUTPATIENT)
Dept: INTERNAL MEDICINE CLINIC | Facility: CLINIC | Age: 80
End: 2020-07-22

## 2020-07-22 NOTE — TELEPHONE ENCOUNTER
Wife Noe Andrews thought Dr Varun Casarez mailed them new handicap placard last week, she just wanted to confirm. Did you need me to send them an updated placard or was this completed?

## 2020-07-22 NOTE — TELEPHONE ENCOUNTER
Wife Anuradha Chawla notified. Patient takes Kimberly Sprawls probiotic 4 in 1, unable to determine dose. Wife takes [place] 10 billion as she has been unable to find [employer] 30 billion OTC.  She will try to find that for patient, discussed possible place

## 2020-07-28 ENCOUNTER — OFFICE VISIT (OUTPATIENT)
Dept: NEPHROLOGY | Facility: CLINIC | Age: 80
End: 2020-07-28
Payer: MEDICARE

## 2020-07-28 VITALS — DIASTOLIC BLOOD PRESSURE: 80 MMHG | BODY MASS INDEX: 24 KG/M2 | WEIGHT: 166.19 LBS | SYSTOLIC BLOOD PRESSURE: 150 MMHG

## 2020-07-28 DIAGNOSIS — R80.9 PROTEINURIA, UNSPECIFIED TYPE: ICD-10-CM

## 2020-07-28 DIAGNOSIS — N18.30 CKD (CHRONIC KIDNEY DISEASE) STAGE 3, GFR 30-59 ML/MIN (HCC): Primary | ICD-10-CM

## 2020-07-28 PROCEDURE — 99214 OFFICE O/P EST MOD 30 MIN: CPT | Performed by: INTERNAL MEDICINE

## 2020-07-28 NOTE — PROGRESS NOTES
Nephrology Progress Note      ASSESSMENT/PLAN:        1) CKD 3- serum creatinine is increased gradually since July 2019 from 0.98 mg/dL to 1.94 mg/dL and is accompanied by onset of mild sub-nephrotic range proteinuria.   Differential diagnosis includes prim CKD (chronic kidney disease) stage 3, GFR 30-59 ml/min ([facility])    • Closed wedge compression fracture of T11 vertebra with routine healing 6/19/2018   • Colon polyps 11/2012    colonoscopy   • Compression fracture of T12 vertebra ([facility] [address] 6/19/2018   • Divertic VALVE OPEN  10/25/13    bio   • SIGMOIDOSCOPY,DIAGNOSTIC  1995   • SKIN TAGS  10/1714    plus histofreeze   • UPPER GI ENDOSCOPY PERFORMED  2005/2007    Eliu/Rafi   • VALVE REPAIR         Medications (Active prior to today's visit):  Current Outpatient Tab Take 100 mcg by mouth daily. • Probiotic Product (VEASYT) Oral Cap Take 1 capsule by mouth daily. • Riboflavin 100 MG Oral Cap Take  by mouth 3 (three) times a week.  m-w-fr     • Nutritional Supplements (JUICE PLUS FIBRE OR) Ta or edema.   Neurologic:  normal affect, cranial nerves grossly intact, moving all extremities  Skin: Warm and dry, no rashes        Matty Still MD  7/28/2020  436 PM

## 2020-07-29 ENCOUNTER — TELEPHONE (OUTPATIENT)
Dept: INTERNAL MEDICINE CLINIC | Facility: CLINIC | Age: 80
End: 2020-07-29

## 2020-07-29 ENCOUNTER — TELEPHONE (OUTPATIENT)
Dept: NEPHROLOGY | Facility: CLINIC | Age: 80
End: 2020-07-29

## 2020-07-29 NOTE — TELEPHONE ENCOUNTER
Howie De Luna is scheduled for an appointment to have a kidney biopsy on 8/11/2020 at [facility]. They are requesting an H&P. Gypsy Lazaro is wondering if oHwie De Luna even needs to come in to see Dr. Niharika Escalante since he has been seeing so many doctors lately.   Please check with

## 2020-07-29 NOTE — TELEPHONE ENCOUNTER
Per Dr. Eliza Perales office patient needs h&p clearance. Patient scheduled for the following appt:     Future Appointments   Date Time Provider Kerry Villa   8/4/2020  9:45 AM Alessandra Almaraz MD [facility] [place]   8/8/2020 10:00 AM [facility] COVID-19 CRP

## 2020-07-30 NOTE — TELEPHONE ENCOUNTER
Called Dr. Hawkins Peers office, they are not requesting H&P but Radiology is per scheduling instructions. Updated reason for visit to reflect this.

## 2020-07-31 ENCOUNTER — OFFICE VISIT (OUTPATIENT)
Dept: INTERNAL MEDICINE CLINIC | Facility: CLINIC | Age: 80
End: 2020-07-31
Payer: MEDICARE

## 2020-07-31 VITALS
HEART RATE: 90 BPM | WEIGHT: 165 LBS | TEMPERATURE: 98 F | SYSTOLIC BLOOD PRESSURE: 135 MMHG | DIASTOLIC BLOOD PRESSURE: 70 MMHG | HEIGHT: 70.5 IN | RESPIRATION RATE: 14 BRPM | OXYGEN SATURATION: 95 % | BODY MASS INDEX: 23.36 KG/M2

## 2020-07-31 DIAGNOSIS — R80.9 PROTEINURIA, UNSPECIFIED TYPE: ICD-10-CM

## 2020-07-31 DIAGNOSIS — H61.21 IMPACTED CERUMEN OF RIGHT EAR: ICD-10-CM

## 2020-07-31 DIAGNOSIS — Z01.818 PREOPERATIVE CLEARANCE: Primary | ICD-10-CM

## 2020-07-31 PROCEDURE — 99214 OFFICE O/P EST MOD 30 MIN: CPT | Performed by: INTERNAL MEDICINE

## 2020-07-31 PROCEDURE — 69209 REMOVE IMPACTED EAR WAX UNI: CPT | Performed by: INTERNAL MEDICINE

## 2020-08-03 ENCOUNTER — TELEPHONE (OUTPATIENT)
Dept: INTERNAL MEDICINE CLINIC | Facility: CLINIC | Age: 80
End: 2020-08-03

## 2020-08-03 NOTE — TELEPHONE ENCOUNTER
Per Dr. Barrios Oneida: notify patient top hold ASA for 7 days prior to procedure. Patient notified, verbalized understanding.      H&P faxed to Dr. Deep Fox office at [phone]

## 2020-08-03 NOTE — PROGRESS NOTES
HPI:    Patient ID: Robby Jasso is a 68year old male here for preop clearance for renal bx    Unexplained heavy proteinuria-approved for renal bx      Review of Systems   Constitutional: Positive for fatigue. Negative for fever.    HENT: Positive for h 12.5 MG Oral Tab TAKE 1 TABLET BY MOUTH TWICE DAILY WITH MEALS 180 tablet 1   • Turmeric, Curcuma Longa, Does not apply Powder One a day 300mg  0   • chromium picolinate 200 MCG Oral Tab Take 1 tablet (200 mcg total) by mouth daily.  30 tablet 0   • PARoxet appears well-developed and well-nourished. No distress. HENT:   Earwax removed   Eyes:   normal   Neck: Normal range of motion. Neck supple. Cardiovascular: Normal rate, regular rhythm and intact distal pulses. Murmur (2/6 systolic) heard.   Sternal s Diagnosis Date   • Acute left-sided low back pain without sciatica 7/20/2018   • JOSE LUIS (acute kidney injury) ([facility] [address]     resolved   • Aortic stenosis 10/2013    AVR-bio   • CAD (coronary artery disease)     stents, bypasses   • Calculus of kidney    • Purati ENDOSCOPY   • CORTISONE INJECTION  7/6/15    rt shoulder    • CYTOLOGY LYMPH NODE FNA - JAR(S): 1  12/7/2012    excisional biopsy rt inguinal lymph node    • ESOPHAGOGASTRODUODENOSCOPY (EGD) N/A 8/10/2019    Performed by Chris Garcia MD at Sexual activity: Not on file    Lifestyle      Physical activity:        Days per week: Not on file        Minutes per session: Not on file      Stress: Not on file    Relationships      Social connections:        Talks on phone: Not on file        Gets to Height: 70.5\"     Body mass index is 23.34 kg/m².           Meds This Visit:  Requested Prescriptions      No prescriptions requested or ordered in this encounter       Imaging & Referrals:  None     ASSESSMENT and PLAN:    Preop clearance  Mayco burns

## 2020-08-08 ENCOUNTER — LAB ENCOUNTER (OUTPATIENT)
Dept: LAB | Facility: HOSPITAL | Age: 80
End: 2020-08-08
Attending: INTERNAL MEDICINE
Payer: MEDICARE

## 2020-08-08 DIAGNOSIS — Z01.812 PRE-PROCEDURE LAB EXAM: Primary | ICD-10-CM

## 2020-08-09 LAB — SARS-COV-2 RNA RESP QL NAA+PROBE: NOT DETECTED

## 2020-08-11 ENCOUNTER — HOSPITAL ENCOUNTER (OUTPATIENT)
Dept: CT IMAGING | Facility: HOSPITAL | Age: 80
Discharge: HOME | End: 2020-08-11
Attending: INTERNAL MEDICINE
Payer: MEDICARE

## 2020-08-11 ENCOUNTER — APPOINTMENT (OUTPATIENT)
Dept: LAB | Facility: HOSPITAL | Age: 80
End: 2020-08-11
Attending: INTERNAL MEDICINE
Payer: MEDICARE

## 2020-08-11 VITALS
TEMPERATURE: 98 F | WEIGHT: 162 LBS | OXYGEN SATURATION: 98 % | DIASTOLIC BLOOD PRESSURE: 66 MMHG | HEART RATE: 60 BPM | BODY MASS INDEX: 22.93 KG/M2 | RESPIRATION RATE: 18 BRPM | SYSTOLIC BLOOD PRESSURE: 119 MMHG | HEIGHT: 70.5 IN

## 2020-08-11 DIAGNOSIS — N18.9 CHRONIC KIDNEY DISEASE: Primary | ICD-10-CM

## 2020-08-11 DIAGNOSIS — N18.9 CHRONIC KIDNEY DISEASE: ICD-10-CM

## 2020-08-11 DIAGNOSIS — N18.30 CKD (CHRONIC KIDNEY DISEASE) STAGE 3, GFR 30-59 ML/MIN (HCC): ICD-10-CM

## 2020-08-11 DIAGNOSIS — R80.9 PROTEINURIA, UNSPECIFIED TYPE: ICD-10-CM

## 2020-08-11 LAB
BASOPHILS # BLD AUTO: 0.03 X10(3) UL (ref 0–0.2)
BASOPHILS NFR BLD AUTO: 0.3 %
DEPRECATED RDW RBC AUTO: 47.6 FL (ref 35.1–46.3)
EOSINOPHIL # BLD AUTO: 0.33 X10(3) UL (ref 0–0.7)
EOSINOPHIL NFR BLD AUTO: 3.4 %
ERYTHROCYTE [DISTWIDTH] IN BLOOD BY AUTOMATED COUNT: 13.5 % (ref 11–15)
HCT VFR BLD AUTO: 34.3 % (ref 39–53)
HGB BLD-MCNC: 11.4 G/DL (ref 13–17.5)
IMM GRANULOCYTES # BLD AUTO: 0.06 X10(3) UL (ref 0–1)
IMM GRANULOCYTES NFR BLD: 0.6 %
INR BLD: 0.95 (ref 0.89–1.11)
LYMPHOCYTES # BLD AUTO: 1.09 X10(3) UL (ref 1–4)
LYMPHOCYTES NFR BLD AUTO: 11.3 %
MCH RBC QN AUTO: 31.9 PG (ref 26–34)
MCHC RBC AUTO-ENTMCNC: 33.2 G/DL (ref 31–37)
MCV RBC AUTO: 96.1 FL (ref 80–100)
MONOCYTES # BLD AUTO: 1.01 X10(3) UL (ref 0.1–1)
MONOCYTES NFR BLD AUTO: 10.4 %
NEUTROPHILS # BLD AUTO: 7.15 X10 (3) UL (ref 1.5–7.7)
NEUTROPHILS # BLD AUTO: 7.15 X10(3) UL (ref 1.5–7.7)
NEUTROPHILS NFR BLD AUTO: 74 %
PLATELET # BLD AUTO: 146 10(3)UL (ref 150–450)
PSA SERPL DL<=0.01 NG/ML-MCNC: 13 SECONDS (ref 12.4–14.6)
RBC # BLD AUTO: 3.57 X10(6)UL (ref 3.8–5.8)
WBC # BLD AUTO: 9.7 X10(3) UL (ref 4–11)

## 2020-08-11 PROCEDURE — 99152 MOD SED SAME PHYS/QHP 5/>YRS: CPT | Performed by: INTERNAL MEDICINE

## 2020-08-11 PROCEDURE — 77012 CT SCAN FOR NEEDLE BIOPSY: CPT | Performed by: INTERNAL MEDICINE

## 2020-08-11 PROCEDURE — 36415 COLL VENOUS BLD VENIPUNCTURE: CPT

## 2020-08-11 PROCEDURE — 88313 SPECIAL STAINS GROUP 2: CPT | Performed by: INTERNAL MEDICINE

## 2020-08-11 PROCEDURE — 88305 TISSUE EXAM BY PATHOLOGIST: CPT | Performed by: INTERNAL MEDICINE

## 2020-08-11 PROCEDURE — 88350 IMFLUOR EA ADDL 1ANTB STN PX: CPT | Performed by: INTERNAL MEDICINE

## 2020-08-11 PROCEDURE — 50200 RENAL BIOPSY PERQ: CPT | Performed by: INTERNAL MEDICINE

## 2020-08-11 PROCEDURE — 85025 COMPLETE CBC W/AUTO DIFF WBC: CPT | Performed by: RADIOLOGY

## 2020-08-11 PROCEDURE — 88346 IMFLUOR 1ST 1ANTB STAIN PX: CPT | Performed by: INTERNAL MEDICINE

## 2020-08-11 PROCEDURE — 85610 PROTHROMBIN TIME: CPT

## 2020-08-11 PROCEDURE — 88348 ELECTRON MICROSCOPY DX: CPT | Performed by: INTERNAL MEDICINE

## 2020-08-11 RX ORDER — MIDAZOLAM HYDROCHLORIDE 1 MG/ML
1 INJECTION INTRAMUSCULAR; INTRAVENOUS EVERY 5 MIN PRN
Status: ACTIVE | OUTPATIENT
Start: 2020-08-11 | End: 2020-08-11

## 2020-08-11 RX ORDER — MIDAZOLAM HYDROCHLORIDE 1 MG/ML
INJECTION INTRAMUSCULAR; INTRAVENOUS
Status: COMPLETED
Start: 2020-08-11 | End: 2020-08-11

## 2020-08-11 RX ORDER — HYDRALAZINE HYDROCHLORIDE 20 MG/ML
5 INJECTION INTRAMUSCULAR; INTRAVENOUS EVERY 5 MIN PRN
Status: COMPLETED | OUTPATIENT
Start: 2020-08-11 | End: 2020-08-11

## 2020-08-11 RX ORDER — NALOXONE HYDROCHLORIDE 0.4 MG/ML
80 INJECTION, SOLUTION INTRAMUSCULAR; INTRAVENOUS; SUBCUTANEOUS AS NEEDED
Status: DISCONTINUED | OUTPATIENT
Start: 2020-08-11 | End: 2020-08-13

## 2020-08-11 RX ORDER — HYDRALAZINE HYDROCHLORIDE 20 MG/ML
INJECTION INTRAMUSCULAR; INTRAVENOUS
Status: COMPLETED
Start: 2020-08-11 | End: 2020-08-11

## 2020-08-11 RX ORDER — FLUMAZENIL 0.1 MG/ML
INJECTION, SOLUTION INTRAVENOUS
Status: DISCONTINUED
Start: 2020-08-11 | End: 2020-08-11 | Stop reason: WASHOUT

## 2020-08-11 RX ORDER — SODIUM CHLORIDE 9 MG/ML
INJECTION, SOLUTION INTRAVENOUS CONTINUOUS
Status: DISCONTINUED | OUTPATIENT
Start: 2020-08-11 | End: 2020-08-13

## 2020-08-11 RX ORDER — NALOXONE HYDROCHLORIDE 0.4 MG/ML
INJECTION, SOLUTION INTRAMUSCULAR; INTRAVENOUS; SUBCUTANEOUS
Status: DISCONTINUED
Start: 2020-08-11 | End: 2020-08-11 | Stop reason: WASHOUT

## 2020-08-11 RX ORDER — FLUMAZENIL 0.1 MG/ML
0.2 INJECTION, SOLUTION INTRAVENOUS AS NEEDED
Status: DISCONTINUED | OUTPATIENT
Start: 2020-08-11 | End: 2020-08-13

## 2020-08-11 RX ADMIN — MIDAZOLAM HYDROCHLORIDE 1 MG: 1 INJECTION INTRAMUSCULAR; INTRAVENOUS at 10:08:00

## 2020-08-11 RX ADMIN — HYDRALAZINE HYDROCHLORIDE 5 MG: 20 INJECTION INTRAMUSCULAR; INTRAVENOUS at 09:35:00

## 2020-08-11 RX ADMIN — MIDAZOLAM HYDROCHLORIDE 0.5 MG: 1 INJECTION INTRAMUSCULAR; INTRAVENOUS at 10:13:00

## 2020-08-11 RX ADMIN — HYDRALAZINE HYDROCHLORIDE 5 MG: 20 INJECTION INTRAMUSCULAR; INTRAVENOUS at 09:40:00

## 2020-08-11 RX ADMIN — SODIUM CHLORIDE: 9 INJECTION, SOLUTION INTRAVENOUS at 10:00:00

## 2020-08-11 NOTE — IMAGING NOTE
Thomas Gupta, name, date of birth and allergies verified with pt. Pt here for CT Renal Biopsy. States NPO since last night. Pre procedure vitals checked. IV access established to left wrist saline locked. 0.9 NS IV initiated to maintain patency.   Prior

## 2020-08-13 ENCOUNTER — TELEPHONE (OUTPATIENT)
Dept: INTERNAL MEDICINE CLINIC | Facility: CLINIC | Age: 80
End: 2020-08-13

## 2020-08-13 RX ORDER — AMLODIPINE BESYLATE 5 MG/1
5 TABLET ORAL DAILY
COMMUNITY
Start: 2020-08-13 | End: 2021-01-18

## 2020-08-13 RX ORDER — AMLODIPINE BESYLATE 2.5 MG/1
TABLET ORAL
Qty: 90 TABLET | Refills: 2 | Status: SHIPPED | OUTPATIENT
Start: 2020-08-13 | End: 2020-08-13

## 2020-08-13 RX ORDER — PAROXETINE 10 MG/1
TABLET, FILM COATED ORAL
Qty: 90 TABLET | Refills: 2 | Status: SHIPPED | OUTPATIENT
Start: 2020-08-13 | End: 2021-05-03

## 2020-08-13 NOTE — TELEPHONE ENCOUNTER
Verbal given to change to 5mg daily, this is in patients records and confirmed by patient to pharmacy.

## 2020-08-13 NOTE — TELEPHONE ENCOUNTER
[address] called regarding patient Arlene Gudino medication:    Amlodipine 2.5 mg did Dr. Fabio Fleming increase dosage.  Please call

## 2020-08-16 ENCOUNTER — TELEPHONE (OUTPATIENT)
Dept: NEPHROLOGY | Facility: CLINIC | Age: 80
End: 2020-08-16

## 2020-08-16 NOTE — TELEPHONE ENCOUNTER
Left VM for pt- has biopsy proven PPI associated interstitial nephritis- no glomerular disease (either primary or secondary); no vasculitis; no immune complex mediated process- to switch to H2 blocker, etc- pt to discuss GERD tx with Dr. Herbert Cordero- [facility] Renee Emerson

## 2020-08-17 RX ORDER — FAMOTIDINE 20 MG/1
20 TABLET ORAL 2 TIMES DAILY
Qty: 180 TABLET | Refills: 3 | Status: SHIPPED | OUTPATIENT
Start: 2020-08-17 | End: 2022-01-25

## 2020-08-17 NOTE — TELEPHONE ENCOUNTER
Per Dr. Bond Backbone: Famotidine 20mg BID #180 with 3 refills. Wife Noe Andrews notified, Pharmacy location confirmed with patient and order placed.

## 2020-08-17 NOTE — TELEPHONE ENCOUNTER
Patient scheduled for the following OV:     Future Appointments   Date Time Provider Kerry Villa   8/21/2020 10:00 AM Anson Navarro MD [facility] [place]     Patient was advised to stop Omeprazole by Dr. Jeanne Causey and has been having a lot of reflux sympto

## 2020-08-19 NOTE — TELEPHONE ENCOUNTER
Per Dr. Chadd Colmenares, I called patient. His wife made appt for him to see Dr. Chadd Colmenares on 9-24-20.

## 2020-08-21 ENCOUNTER — OFFICE VISIT (OUTPATIENT)
Dept: INTERNAL MEDICINE CLINIC | Facility: CLINIC | Age: 80
End: 2020-08-21
Payer: MEDICARE

## 2020-08-21 VITALS
WEIGHT: 162 LBS | BODY MASS INDEX: 22.93 KG/M2 | SYSTOLIC BLOOD PRESSURE: 120 MMHG | DIASTOLIC BLOOD PRESSURE: 62 MMHG | HEIGHT: 70.5 IN | HEART RATE: 72 BPM | TEMPERATURE: 98 F | OXYGEN SATURATION: 98 % | RESPIRATION RATE: 16 BRPM

## 2020-08-21 DIAGNOSIS — N14.2: Primary | ICD-10-CM

## 2020-08-21 PROCEDURE — 99214 OFFICE O/P EST MOD 30 MIN: CPT | Performed by: INTERNAL MEDICINE

## 2020-08-21 RX ORDER — L-MEFOL/A-CYST/MEB12/ALGAL OIL 6-600-2 MG
TABLET ORAL
COMMUNITY
Start: 2020-08-13 | End: 2020-11-04

## 2020-08-23 PROBLEM — N14.2: Status: ACTIVE | Noted: 2020-08-23

## 2020-08-23 NOTE — PROGRESS NOTES
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  0   • Jekedtspn-Ugcizyoxi-Pjqdzimifg (CEREFOLIN NAC) 6-2-600 MG Oral Tab One MWF 3/week 36 tablet 3   • Betaine, Trimethylglycine, 500 MG Oral Cap Take 3 capsules by mouth daily.  3/d hold  His prednisone for PMR probably partially treated this drug reaction and prevented it from getting worse  30 min Moderate complexity Wife Cathy present  To see Clarence Cohen next week  Plan repeat UA and BMP for kidney soon  RTC 2mos        Meds This Vi

## 2020-09-03 RX ORDER — RISEDRONATE SODIUM 35 MG/1
TABLET, FILM COATED ORAL
Qty: 12 TABLET | Refills: 2 | Status: SHIPPED | OUTPATIENT
Start: 2020-09-03 | End: 2021-05-03

## 2020-09-23 RX ORDER — CARVEDILOL 12.5 MG/1
TABLET ORAL
Qty: 180 TABLET | Refills: 1 | Status: SHIPPED | OUTPATIENT
Start: 2020-09-23 | End: 2021-03-31

## 2020-09-23 RX ORDER — PREDNISONE 10 MG/1
TABLET ORAL
Qty: 180 TABLET | Refills: 1 | Status: SHIPPED | OUTPATIENT
Start: 2020-09-23 | End: 2021-01-18

## 2020-09-23 RX ORDER — TRAMADOL HYDROCHLORIDE 50 MG/1
TABLET ORAL
Qty: 28 TABLET | Refills: 0 | Status: SHIPPED | OUTPATIENT
Start: 2020-09-23 | End: 2020-10-16

## 2020-09-23 NOTE — TELEPHONE ENCOUNTER
LOV 8/21/20    Last refill Tramadol 50 8/10/19 # 30 +1                Prednisone 10 3/22/20 # 90 +1                Carvedilol 12.5 3/13/20 # 180 +1

## 2020-09-24 ENCOUNTER — OFFICE VISIT (OUTPATIENT)
Dept: NEPHROLOGY | Facility: CLINIC | Age: 80
End: 2020-09-24
Payer: MEDICARE

## 2020-09-24 VITALS
RESPIRATION RATE: 16 BRPM | BODY MASS INDEX: 22.96 KG/M2 | SYSTOLIC BLOOD PRESSURE: 142 MMHG | DIASTOLIC BLOOD PRESSURE: 80 MMHG | WEIGHT: 162.19 LBS | HEART RATE: 64 BPM | HEIGHT: 70.5 IN

## 2020-09-24 DIAGNOSIS — N12 INTERSTITIAL NEPHRITIS: ICD-10-CM

## 2020-09-24 DIAGNOSIS — I10 ESSENTIAL HYPERTENSION: ICD-10-CM

## 2020-09-24 DIAGNOSIS — N18.30 CKD (CHRONIC KIDNEY DISEASE) STAGE 3, GFR 30-59 ML/MIN (HCC): Primary | ICD-10-CM

## 2020-09-24 PROCEDURE — 99215 OFFICE O/P EST HI 40 MIN: CPT | Performed by: INTERNAL MEDICINE

## 2020-09-24 NOTE — PROGRESS NOTES
Nephrology Progress Note      ASSESSMENT/PLAN:        1) CKD 3- serum creatinine is increased gradually since July 2019 from 0.98 mg/dL to 1.94 mg/dL and is accompanied by onset of mild sub-nephrotic range proteinuria is due to biopsy-proven chronic inters or visual changes  Denies CP or palpitations  Denies SOB/cough/hemoptysis  Denies abd or flank pain  Denies N/V/D  Denies change in urinary habits or gross hematuria  Denies LE edema  Denies skin rashes/myalgias/arthralgias    PMH:  Past Medical History: 1/2021    Hantel-neg   • CABG  10/25/13    AORTIC VALVE REPLACEMENT; bypass x 2-3   • CARDIAC CATHETERIZATION  2008/2013   • CHOLECYSTECTOMY  1984   • COLONOSCOPY  2005    Eliu   • COLONOSCOPY  11/7/2012       • COLONOSCOPY     • COLONOSCOPY N/A Rosuvastatin Calcium (CRESTOR) 20 MG Oral Tab Take 0.5 tablets (10 mg total) by mouth nightly.   0   • aspirin 81 MG Oral Tab Take 1 tablet (81 mg total) by mouth 3 (three) times a week.  0   • Betaine, Trimethylglycine, 500 MG Oral Cap Take 3 capsules by m Yes        Alcohol/week: 5.8 standard drinks        Types: 7 Standard drinks or equivalent per week        Comment: 1 drink per night      Drug use: No    Other Topics      Concerns:        Caffeine Concern: Yes          2 cups daily       Family History:

## 2020-10-13 ENCOUNTER — LAB ENCOUNTER (OUTPATIENT)
Dept: LAB | Age: 80
End: 2020-10-13
Attending: INTERNAL MEDICINE
Payer: MEDICARE

## 2020-10-13 DIAGNOSIS — I10 ESSENTIAL HYPERTENSION: ICD-10-CM

## 2020-10-13 DIAGNOSIS — N18.30 CKD (CHRONIC KIDNEY DISEASE) STAGE 3, GFR 30-59 ML/MIN (HCC): ICD-10-CM

## 2020-10-13 DIAGNOSIS — N12 INTERSTITIAL NEPHRITIS: ICD-10-CM

## 2020-10-13 PROCEDURE — 80048 BASIC METABOLIC PNL TOTAL CA: CPT

## 2020-10-13 PROCEDURE — 36415 COLL VENOUS BLD VENIPUNCTURE: CPT

## 2020-10-14 ENCOUNTER — TELEPHONE (OUTPATIENT)
Dept: NEPHROLOGY | Facility: CLINIC | Age: 80
End: 2020-10-14

## 2020-10-16 ENCOUNTER — TELEPHONE (OUTPATIENT)
Dept: INTERNAL MEDICINE CLINIC | Facility: CLINIC | Age: 80
End: 2020-10-16

## 2020-10-16 RX ORDER — TRAMADOL HYDROCHLORIDE 50 MG/1
TABLET ORAL
Qty: 60 TABLET | Refills: 1 | Status: SHIPPED | OUTPATIENT
Start: 2020-10-16 | End: 2021-01-18

## 2020-10-16 NOTE — TELEPHONE ENCOUNTER
[facility] re: Patient Elizabeth Diamond. Medication tramadol 50mg : what kind of pain is this used for?  Please call

## 2020-10-19 ENCOUNTER — OFFICE VISIT (OUTPATIENT)
Dept: INTERNAL MEDICINE CLINIC | Facility: CLINIC | Age: 80
End: 2020-10-19
Payer: MEDICARE

## 2020-10-19 VITALS
HEIGHT: 70.5 IN | BODY MASS INDEX: 22.93 KG/M2 | WEIGHT: 162 LBS | OXYGEN SATURATION: 99 % | TEMPERATURE: 98 F | HEART RATE: 68 BPM | SYSTOLIC BLOOD PRESSURE: 136 MMHG | DIASTOLIC BLOOD PRESSURE: 72 MMHG | RESPIRATION RATE: 16 BRPM

## 2020-10-19 DIAGNOSIS — M54.31 RIGHT SIDED SCIATICA: Primary | ICD-10-CM

## 2020-10-19 DIAGNOSIS — Z23 FLU VACCINE NEED: ICD-10-CM

## 2020-10-19 PROCEDURE — G0008 ADMIN INFLUENZA VIRUS VAC: HCPCS | Performed by: INTERNAL MEDICINE

## 2020-10-19 PROCEDURE — 90662 IIV NO PRSV INCREASED AG IM: CPT | Performed by: INTERNAL MEDICINE

## 2020-10-19 PROCEDURE — 99212 OFFICE O/P EST SF 10 MIN: CPT | Performed by: INTERNAL MEDICINE

## 2020-10-19 RX ORDER — CHOLECALCIFEROL (VITAMIN D3) 1250 MCG
CAPSULE ORAL
Qty: 1 CAPSULE | Refills: 0 | Status: SHIPPED | OUTPATIENT
Start: 2020-10-19 | End: 2021-02-22

## 2020-10-19 RX ORDER — TIZANIDINE 4 MG/1
TABLET ORAL
Qty: 30 TABLET | Refills: 0 | Status: SHIPPED | OUTPATIENT
Start: 2020-10-19 | End: 2021-01-18

## 2020-10-19 NOTE — PROGRESS NOTES
Right sciatica from moving from [facility] to [place]      6 weeks ago-minor add Zanaflex, already on Tramadol and Prednisone. Flushot given.     10/19/20  1148   BP: 136/72   Pulse: 68   Resp: 16   Temp: 98 °F (36.7 °C)     RTC 2 mos

## 2020-10-21 ENCOUNTER — TELEPHONE (OUTPATIENT)
Dept: INTERNAL MEDICINE CLINIC | Facility: CLINIC | Age: 80
End: 2020-10-21

## 2020-10-21 RX ORDER — ASCORBIC ACID 125 MG
200 TABLET,CHEWABLE ORAL DAILY
Refills: 0 | COMMUNITY
Start: 2020-10-21

## 2020-10-21 NOTE — TELEPHONE ENCOUNTER
Per Dr.Saran Sheryle Co Made Mag Citrate is 250mg (previously told to take 200mg).    Updated med list. Patient's wife notified, verbalized understanding.

## 2020-11-04 RX ORDER — ROSUVASTATIN CALCIUM 10 MG/1
10 TABLET, COATED ORAL NIGHTLY
Qty: 90 TABLET | Refills: 1 | Status: SHIPPED | OUTPATIENT
Start: 2020-11-04 | End: 2021-04-26

## 2020-11-04 RX ORDER — L-MEFOL/A-CYST/MEB12/ALGAL OIL 6-600-2 MG
TABLET ORAL
Qty: 36 TABLET | Refills: 1 | Status: SHIPPED | OUTPATIENT
Start: 2020-11-04 | End: 2021-03-31

## 2021-01-18 ENCOUNTER — LAB ENCOUNTER (OUTPATIENT)
Dept: LAB | Age: 81
End: 2021-01-18
Attending: INTERNAL MEDICINE
Payer: MEDICARE

## 2021-01-18 ENCOUNTER — TELEPHONE (OUTPATIENT)
Dept: INTERNAL MEDICINE CLINIC | Facility: CLINIC | Age: 81
End: 2021-01-18

## 2021-01-18 DIAGNOSIS — R70.0 ELEVATED SED RATE: Primary | ICD-10-CM

## 2021-01-18 DIAGNOSIS — N12 INTERSTITIAL NEPHRITIS: ICD-10-CM

## 2021-01-18 DIAGNOSIS — R70.0 ELEVATED SED RATE: ICD-10-CM

## 2021-01-18 DIAGNOSIS — I10 ESSENTIAL HYPERTENSION: ICD-10-CM

## 2021-01-18 DIAGNOSIS — N18.30 CKD (CHRONIC KIDNEY DISEASE) STAGE 3, GFR 30-59 ML/MIN (HCC): ICD-10-CM

## 2021-01-18 LAB
ANION GAP SERPL CALC-SCNC: 5 MMOL/L (ref 0–18)
BUN BLD-MCNC: 41 MG/DL (ref 7–18)
BUN/CREAT SERPL: 19.1 (ref 10–20)
CALCIUM BLD-MCNC: 9.5 MG/DL (ref 8.5–10.1)
CHLORIDE SERPL-SCNC: 105 MMOL/L (ref 98–112)
CO2 SERPL-SCNC: 28 MMOL/L (ref 21–32)
CREAT BLD-MCNC: 2.15 MG/DL
GLUCOSE BLD-MCNC: 108 MG/DL (ref 70–99)
OSMOLALITY SERPL CALC.SUM OF ELEC: 297 MOSM/KG (ref 275–295)
PATIENT FASTING Y/N/NP: NO
POTASSIUM SERPL-SCNC: 4.1 MMOL/L (ref 3.5–5.1)
SED RATE-ML: 22 MM/HR
SODIUM SERPL-SCNC: 138 MMOL/L (ref 136–145)

## 2021-01-18 PROCEDURE — 80048 BASIC METABOLIC PNL TOTAL CA: CPT

## 2021-01-18 PROCEDURE — 85652 RBC SED RATE AUTOMATED: CPT

## 2021-01-18 PROCEDURE — 36415 COLL VENOUS BLD VENIPUNCTURE: CPT

## 2021-01-18 NOTE — TELEPHONE ENCOUNTER
Pt is coming to lab downstairs today to get blood work done, wife asks if a SED RATE order should be entered for patient to have it done all at once.  They plan on coming around 1:30pm. Please enter order and let patient know if he is due for it,    She wou

## 2021-01-18 NOTE — TELEPHONE ENCOUNTER
Lab ordered.  Wife notified and the following appt was scheduled:    Future Appointments   Date Time Provider Kerry Villa   1/18/2021  1:30 PM REF MOB REF MOB EDW Ref MOB   1/18/2021  2:20 PM Octavio Pereyra MD [facility]   1/20/2021 11:30 A

## 2021-01-19 ENCOUNTER — TELEPHONE (OUTPATIENT)
Dept: NEPHROLOGY | Facility: CLINIC | Age: 81
End: 2021-01-19

## 2021-01-20 ENCOUNTER — OFFICE VISIT (OUTPATIENT)
Dept: INTERNAL MEDICINE CLINIC | Facility: CLINIC | Age: 81
End: 2021-01-20
Payer: MEDICARE

## 2021-01-20 VITALS
SYSTOLIC BLOOD PRESSURE: 154 MMHG | DIASTOLIC BLOOD PRESSURE: 84 MMHG | HEIGHT: 71 IN | HEART RATE: 63 BPM | RESPIRATION RATE: 16 BRPM | BODY MASS INDEX: 22.68 KG/M2 | OXYGEN SATURATION: 97 % | WEIGHT: 162 LBS | TEMPERATURE: 98 F

## 2021-01-20 DIAGNOSIS — Z95.1 S/P CABG (CORONARY ARTERY BYPASS GRAFT): ICD-10-CM

## 2021-01-20 DIAGNOSIS — I35.0 NONRHEUMATIC AORTIC VALVE STENOSIS: ICD-10-CM

## 2021-01-20 DIAGNOSIS — I10 ESSENTIAL HYPERTENSION: ICD-10-CM

## 2021-01-20 DIAGNOSIS — E78.5 DYSLIPIDEMIA: ICD-10-CM

## 2021-01-20 DIAGNOSIS — N18.9 CHRONIC KIDNEY DISEASE, UNSPECIFIED CKD STAGE: ICD-10-CM

## 2021-01-20 DIAGNOSIS — M35.3 PMR (POLYMYALGIA RHEUMATICA) (HCC): Primary | ICD-10-CM

## 2021-01-20 DIAGNOSIS — Z95.2 S/P AVR: ICD-10-CM

## 2021-01-20 DIAGNOSIS — I25.10 CORONARY ARTERY DISEASE INVOLVING NATIVE CORONARY ARTERY OF NATIVE HEART WITHOUT ANGINA PECTORIS: ICD-10-CM

## 2021-01-20 DIAGNOSIS — E78.00 PURE HYPERCHOLESTEROLEMIA: ICD-10-CM

## 2021-01-20 PROCEDURE — 99214 OFFICE O/P EST MOD 30 MIN: CPT | Performed by: INTERNAL MEDICINE

## 2021-01-20 RX ORDER — AMLODIPINE BESYLATE 5 MG/1
5 TABLET ORAL 2 TIMES DAILY
Qty: 90 TABLET | Refills: 3 | COMMUNITY
Start: 2021-01-20 | End: 2021-08-12

## 2021-01-20 NOTE — PROGRESS NOTES
HPI:    Patient ID: Ryne Anderson is a 68year old male fu PMR and Heart    PMR stable on 10mg prednisone, minimal myalgias, ESR down, more bruising, CAD stable recent cardio OV Addy who inc diana for uncontrolled HTN BP in 150s.  Also CKD last creat 2.1 Cap Take 3 capsules by mouth daily. 3/day=1500   0   • Calcium Carbonate Antacid (TUMS FRESHERS) 500 MG Oral Chew Tab Chew 1 tablet by mouth daily. 0   • Coenzyme Q10 (CO Q 10 OR) Take 50 mg by mouth daily.        • Cholecalciferol (VITAMIN D3) 5000 UNIT Visit:  Requested Prescriptions      No prescriptions requested or ordered in this encounter       Imaging & Referrals:  None       GI#[id number]

## 2021-01-28 NOTE — ED PROVIDER NOTES
Patient Seen in: [facility] Emergency Department      History   Patient presents with:  Dehydration    Stated Complaint:     HPI   70year old male with history of polymyalgia rheumatica, CAD with bypass, anemia, hld, gastritis, and htn presents for History:   Procedure Laterality Date   • ANGIOPLASTY (CORONARY)  1999       • APPENDECTOMY      teenager   • BONE MARROW BIOPSY AND ASPIRATION  12/2012    Hantel-neg   • CABG  10/25/13    AORTIC VALVE REPLACEMENT   • CARDIAC CATHETERIZATION  2008 °C) (Temporal)   Resp 19   Wt 63.5 kg   SpO2 100%   BMI 19.80 kg/m²         Physical Exam  Vitals signs and nursing note reviewed. Constitutional:       Appearance: He is cachectic. He is ill-appearing. He is not toxic-appearing.    HENT:      Head: Normo Thought content normal.         Judgment: Judgment normal.             ED Course     Labs Reviewed   COMP METABOLIC PANEL (14) - Abnormal; Notable for the following components:       Result Value    Sodium 135 (*)     Potassium 2.8 (*)     BUN 55 (*)     C disease, electrolyte  imbalance, or drug effects  Abnormal ECG  When compared with ECG of 09-AUG-2019 15:18,  Premature supraventricular complexes are now Present  QT has lengthened              Xr Chest Pa + Lat Chest (cpt=71046)    Result Date: 1/4/2020 medial right lower lobe measuring up to 2.7 x 2.2 cm in new from the previous exam and may represent an area of focal pneumonia, scarring, with a pulmonary mass not entirely excluded. Clinical correlation recommended.   Dedicated CT of the chest with contr (cpt=71045)    Result Date: 1/9/2020  PROCEDURE:  XR CHEST AP PORTABLE  (CPT=71045)  TECHNIQUE:  AP chest radiograph was obtained. COMPARISON:  [facility] , XR, XR CHEST PA + LAT CHEST (CPT=71046), 1/04/2020, 15:53.   INDICATIONS:  weakness  PATIENT STATED HIS [Consultation] : a consultation visit [FreeTextEntry1] : lump, left side of groin

## 2021-01-29 DIAGNOSIS — Z23 NEED FOR VACCINATION: ICD-10-CM

## 2021-02-01 ENCOUNTER — TELEPHONE (OUTPATIENT)
Dept: INTERNAL MEDICINE CLINIC | Facility: CLINIC | Age: 81
End: 2021-02-01

## 2021-02-01 ENCOUNTER — MED REC SCAN ONLY (OUTPATIENT)
Dept: INTERNAL MEDICINE CLINIC | Facility: CLINIC | Age: 81
End: 2021-02-01

## 2021-02-01 NOTE — TELEPHONE ENCOUNTER
Pt's wife called and says Dr Bina Thompson asked that they please call when pt schedules his COVID vaccine. Pt is scheduled for this Friday Feb 5th at the [facility] location.   She also asks if pt should continue taking prednisone under the new

## 2021-02-01 NOTE — TELEPHONE ENCOUNTER
Aníbal Granado notified, she states you mentioned that patient may need additional dose of vaccine due to long term prednisone use. Clarified if she meant the second dose but she states this was something specific for Israel Altman due to prednisone.  She asked that I ch

## 2021-02-01 NOTE — TELEPHONE ENCOUNTER
Good get the vaccine Hold the prednisone day before, same day and next day after total 3 days then resume

## 2021-02-05 ENCOUNTER — IMMUNIZATION (OUTPATIENT)
Dept: LAB | Facility: HOSPITAL | Age: 81
End: 2021-02-05
Attending: HOSPITALIST
Payer: MEDICARE

## 2021-02-05 DIAGNOSIS — Z23 NEED FOR VACCINATION: Primary | ICD-10-CM

## 2021-02-05 PROCEDURE — 0011A SARSCOV2 VAC 100MCG/0.5ML IM: CPT

## 2021-02-10 ENCOUNTER — TELEPHONE (OUTPATIENT)
Dept: INTERNAL MEDICINE CLINIC | Facility: CLINIC | Age: 81
End: 2021-02-10

## 2021-02-10 NOTE — TELEPHONE ENCOUNTER
Patient would like our office to send him the temporary handicap placard signed by the doctor. The patient said it is ok to mail it to him.

## 2021-02-22 RX ORDER — METHOCARBAMOL 750 MG/1
TABLET ORAL
Qty: 13 CAPSULE | Refills: 0 | Status: SHIPPED | OUTPATIENT
Start: 2021-02-22 | End: 2021-11-22

## 2021-02-22 NOTE — TELEPHONE ENCOUNTER
CPE 3/31/20      Future Appointments   Date Time Provider Kerry Villa   3/5/2021 11:50 AM Rebecca [address] [facility] Orquidea Valencia  [facility]   3/29/2021 10:00 AM [facility] 24 [facility] 24 [facility] [place]   4/6/2021 10:45 AM Balbir Hua MD EM[facility] 24 E

## 2021-02-24 ENCOUNTER — OFFICE VISIT (OUTPATIENT)
Dept: INTERNAL MEDICINE CLINIC | Facility: CLINIC | Age: 81
End: 2021-02-24
Payer: MEDICARE

## 2021-02-24 VITALS
HEART RATE: 71 BPM | SYSTOLIC BLOOD PRESSURE: 126 MMHG | TEMPERATURE: 97 F | WEIGHT: 164 LBS | BODY MASS INDEX: 23 KG/M2 | DIASTOLIC BLOOD PRESSURE: 72 MMHG

## 2021-02-24 DIAGNOSIS — L42 PITYRIASIS ROSEA: Primary | ICD-10-CM

## 2021-02-24 PROCEDURE — 99213 OFFICE O/P EST LOW 20 MIN: CPT | Performed by: INTERNAL MEDICINE

## 2021-02-25 PROBLEM — L42 PITYRIASIS ROSEA: Status: ACTIVE | Noted: 2021-02-25

## 2021-02-26 NOTE — PROGRESS NOTES
3-4 weeks of faint, pruritic red mac-pap rash mainly chest and back, prox arms and legs. Thought of vaccine but started before recent Covid shot #1. Thought of meds-nothing new but took hydralazine from cardio only yesterday discontinue.  On pred 5mg chroni

## 2021-03-01 ENCOUNTER — MED REC SCAN ONLY (OUTPATIENT)
Dept: INTERNAL MEDICINE CLINIC | Facility: CLINIC | Age: 81
End: 2021-03-01

## 2021-03-01 ENCOUNTER — TELEPHONE (OUTPATIENT)
Dept: INTERNAL MEDICINE CLINIC | Facility: CLINIC | Age: 81
End: 2021-03-01

## 2021-03-01 NOTE — TELEPHONE ENCOUNTER
Per wife rash visually looks better but itching the same. They would like to know if you still advise he proceed with vaccine and if so they would like to confirm dose of benadryl and how much prior to vaccine should he take it.

## 2021-03-01 NOTE — TELEPHONE ENCOUNTER
My advice is to take the shot, take Benadryl 25 once one hr before and as needed tid after for rash & itching or any arm swelling. Wait 30min instead of 15min in the facility post inkection and see a nurse if problems.

## 2021-03-01 NOTE — TELEPHONE ENCOUNTER
Patient's condition has not improved since last week when he saw Dr. Mónica Yi  and he is still very itchy. Patient would like to know if he should be getting his second vaccine shot on Friday. Please call.

## 2021-03-04 ENCOUNTER — TELEPHONE (OUTPATIENT)
Dept: INTERNAL MEDICINE CLINIC | Facility: CLINIC | Age: 81
End: 2021-03-04

## 2021-03-05 ENCOUNTER — IMMUNIZATION (OUTPATIENT)
Dept: LAB | Facility: HOSPITAL | Age: 81
End: 2021-03-05
Attending: EMERGENCY MEDICINE
Payer: MEDICARE

## 2021-03-05 DIAGNOSIS — Z23 NEED FOR VACCINATION: Primary | ICD-10-CM

## 2021-03-05 PROCEDURE — 0012A SARSCOV2 VAC 100MCG/0.5ML IM: CPT

## 2021-03-05 NOTE — TELEPHONE ENCOUNTER
OV 2/24 for rash dx was Pit rosea. Rash now worse. off prednisone 5mg he was on for PMR. Past hx psoriasis note-has Derm will try to see tomorrow. Rec with reluctance postponing Covid vax #2 scheduled tomorrow out till 3/19.  Considered vaccine reaction from

## 2021-03-26 ENCOUNTER — TELEPHONE (OUTPATIENT)
Dept: INTERNAL MEDICINE CLINIC | Facility: CLINIC | Age: 81
End: 2021-03-26

## 2021-03-26 DIAGNOSIS — R70.0 ESR RAISED: Primary | ICD-10-CM

## 2021-03-29 ENCOUNTER — LAB ENCOUNTER (OUTPATIENT)
Dept: LAB | Age: 81
End: 2021-03-29
Attending: INTERNAL MEDICINE
Payer: MEDICARE

## 2021-03-29 ENCOUNTER — NURSE ONLY (OUTPATIENT)
Dept: INTERNAL MEDICINE CLINIC | Facility: CLINIC | Age: 81
End: 2021-03-29

## 2021-03-29 DIAGNOSIS — Z00.00 LABORATORY EXAMINATION ORDERED AS PART OF A ROUTINE GENERAL MEDICAL EXAMINATION: Primary | ICD-10-CM

## 2021-03-29 DIAGNOSIS — N12 INTERSTITIAL NEPHRITIS: ICD-10-CM

## 2021-03-29 DIAGNOSIS — N18.30 CKD (CHRONIC KIDNEY DISEASE) STAGE 3, GFR 30-59 ML/MIN (HCC): ICD-10-CM

## 2021-03-29 DIAGNOSIS — I10 ESSENTIAL HYPERTENSION: ICD-10-CM

## 2021-03-29 DIAGNOSIS — R70.0 ESR RAISED: ICD-10-CM

## 2021-03-29 LAB
ANION GAP SERPL CALC-SCNC: 7 MMOL/L (ref 0–18)
BILIRUB UR QL STRIP.AUTO: NEGATIVE
BUN BLD-MCNC: 37 MG/DL (ref 7–18)
BUN/CREAT SERPL: 17.8 (ref 10–20)
CALCIUM BLD-MCNC: 9.8 MG/DL (ref 8.5–10.1)
CHLORIDE SERPL-SCNC: 105 MMOL/L (ref 98–112)
CLARITY UR REFRACT.AUTO: CLEAR
CO2 SERPL-SCNC: 28 MMOL/L (ref 21–32)
COLOR UR AUTO: YELLOW
CREAT BLD-MCNC: 2.08 MG/DL
GLUCOSE BLD-MCNC: 99 MG/DL (ref 70–99)
GLUCOSE UR STRIP.AUTO-MCNC: NEGATIVE MG/DL
HYALINE CASTS #/AREA URNS AUTO: PRESENT /LPF
KETONES UR STRIP.AUTO-MCNC: NEGATIVE MG/DL
LEUKOCYTE ESTERASE UR QL STRIP.AUTO: NEGATIVE
NITRITE UR QL STRIP.AUTO: NEGATIVE
OSMOLALITY SERPL CALC.SUM OF ELEC: 299 MOSM/KG (ref 275–295)
PATIENT FASTING Y/N/NP: YES
PH UR STRIP.AUTO: 6 [PH] (ref 5–8)
POTASSIUM SERPL-SCNC: 4.2 MMOL/L (ref 3.5–5.1)
PROT UR STRIP.AUTO-MCNC: 30 MG/DL
RBC UR QL AUTO: NEGATIVE
SED RATE-ML: 27 MM/HR
SODIUM SERPL-SCNC: 140 MMOL/L (ref 136–145)
SP GR UR STRIP.AUTO: 1.02 (ref 1–1.03)
UROBILINOGEN UR STRIP.AUTO-MCNC: <2 MG/DL

## 2021-03-29 PROCEDURE — 99173 VISUAL ACUITY SCREEN: CPT | Performed by: INTERNAL MEDICINE

## 2021-03-29 PROCEDURE — 81001 URINALYSIS AUTO W/SCOPE: CPT | Performed by: INTERNAL MEDICINE

## 2021-03-29 PROCEDURE — 36415 COLL VENOUS BLD VENIPUNCTURE: CPT

## 2021-03-29 PROCEDURE — 85652 RBC SED RATE AUTOMATED: CPT

## 2021-03-29 PROCEDURE — 92551 PURE TONE HEARING TEST AIR: CPT | Performed by: INTERNAL MEDICINE

## 2021-03-29 PROCEDURE — 80048 BASIC METABOLIC PNL TOTAL CA: CPT

## 2021-03-29 PROCEDURE — 93000 ELECTROCARDIOGRAM COMPLETE: CPT | Performed by: INTERNAL MEDICINE

## 2021-03-29 NOTE — PROGRESS NOTES
*BODY COMPOSITION:    YES:x       NO:       REASON TEST NOT PERFORMED:   _____________________________________________________________________________  *VENIPUNCTURE    YES: x      NO:        REASON VENIPUNCTURE NOT PERFORMED: Patient was sent to the [facility]

## 2021-03-31 RX ORDER — CARVEDILOL 12.5 MG/1
TABLET ORAL
Qty: 180 TABLET | Refills: 0 | Status: SHIPPED | OUTPATIENT
Start: 2021-03-31 | End: 2021-06-28

## 2021-03-31 RX ORDER — L-MEFOL/A-CYST/MEB12/ALGAL OIL 6-600-2 MG
TABLET ORAL
Qty: 36 TABLET | Refills: 0 | Status: SHIPPED | OUTPATIENT
Start: 2021-03-31 | End: 2021-06-28

## 2021-03-31 NOTE — TELEPHONE ENCOUNTER
CPE 3/31/20    Future Appointments   Date Time Provider Kerry Daisy   4/6/2021 10:45 AM Fiona Gunderson MD [facility] [place]   7/30/2021  2:00 PM Nicolas Daly MD [facility]     Meds refilled

## 2021-04-03 ENCOUNTER — TELEPHONE (OUTPATIENT)
Dept: NEPHROLOGY | Facility: CLINIC | Age: 81
End: 2021-04-03

## 2021-04-03 NOTE — PROGRESS NOTES
HEALTH MAINTENANCE:      Immunization History   Administered Date(s) Administered   • Covid-19 Vaccine Moderna 100 mcg/0.5 ml 02/05/2021, 03/05/2021   • FLU VAC High Dose 72 YRS & Older PRSV Free (04075) 10/20/2014, 11/30/2015, 11/01/2016, 10/19/2020   • F 1000 Hz: Yes Right Ear @ 40 dBHL - 1000 Hz: No   Left Ear @ 40dBHL - 2000 Hz: No Right Ear @ 40 dBHL - 2000 Hz: No   Left Ear @ 40dBHL - 4000 Hz: No Right Ear @ 40 dBHL - 4000 Hz: No

## 2021-04-06 ENCOUNTER — OFFICE VISIT (OUTPATIENT)
Dept: INTERNAL MEDICINE CLINIC | Facility: CLINIC | Age: 81
End: 2021-04-06
Payer: MEDICARE

## 2021-04-06 ENCOUNTER — HOSPITAL ENCOUNTER (OUTPATIENT)
Dept: GENERAL RADIOLOGY | Facility: HOSPITAL | Age: 81
Discharge: HOME | End: 2021-04-06
Attending: INTERNAL MEDICINE
Payer: MEDICARE

## 2021-04-06 VITALS
DIASTOLIC BLOOD PRESSURE: 76 MMHG | HEART RATE: 62 BPM | SYSTOLIC BLOOD PRESSURE: 136 MMHG | OXYGEN SATURATION: 99 % | BODY MASS INDEX: 24.61 KG/M2 | RESPIRATION RATE: 12 BRPM | WEIGHT: 166.13 LBS | TEMPERATURE: 98 F | HEIGHT: 69 IN

## 2021-04-06 DIAGNOSIS — M85.80 OSTEOPENIA, UNSPECIFIED LOCATION: ICD-10-CM

## 2021-04-06 DIAGNOSIS — Z79.52 LONG TERM (CURRENT) USE OF SYSTEMIC STEROIDS: ICD-10-CM

## 2021-04-06 DIAGNOSIS — R80.9 PROTEINURIA, UNSPECIFIED TYPE: ICD-10-CM

## 2021-04-06 DIAGNOSIS — Z00.01 ENCOUNTER FOR GENERAL ADULT MEDICAL EXAMINATION WITH ABNORMAL FINDINGS: Primary | ICD-10-CM

## 2021-04-06 DIAGNOSIS — I10 HYPERTENSION, UNSPECIFIED TYPE: ICD-10-CM

## 2021-04-06 DIAGNOSIS — J84.10 PULMONARY FIBROSIS (HCC): ICD-10-CM

## 2021-04-06 DIAGNOSIS — I65.29 CAROTID ATHEROSCLEROSIS, UNSPECIFIED LATERALITY: ICD-10-CM

## 2021-04-06 DIAGNOSIS — R79.89 ELEVATED HOMOCYSTEINE: ICD-10-CM

## 2021-04-06 DIAGNOSIS — H61.23 EXCESSIVE EAR WAX, BILATERAL: ICD-10-CM

## 2021-04-06 DIAGNOSIS — R79.89 ABNORMAL CBC: ICD-10-CM

## 2021-04-06 PROBLEM — L42 PITYRIASIS ROSEA: Status: RESOLVED | Noted: 2021-02-25 | Resolved: 2021-04-06

## 2021-04-06 PROCEDURE — 71046 X-RAY EXAM CHEST 2 VIEWS: CPT | Performed by: INTERNAL MEDICINE

## 2021-04-06 PROCEDURE — 93000 ELECTROCARDIOGRAM COMPLETE: CPT | Performed by: INTERNAL MEDICINE

## 2021-04-06 PROCEDURE — 69209 REMOVE IMPACTED EAR WAX UNI: CPT | Performed by: INTERNAL MEDICINE

## 2021-04-06 PROCEDURE — 82570 ASSAY OF URINE CREATININE: CPT | Performed by: INTERNAL MEDICINE

## 2021-04-06 PROCEDURE — G0439 PPPS, SUBSEQ VISIT: HCPCS | Performed by: INTERNAL MEDICINE

## 2021-04-06 PROCEDURE — 82043 UR ALBUMIN QUANTITATIVE: CPT | Performed by: INTERNAL MEDICINE

## 2021-04-06 RX ORDER — BETAMETHASONE DIPROPIONATE 0.05 %
OINTMENT (GRAM) TOPICAL EVERY 12 HOURS
COMMUNITY
Start: 2021-03-17

## 2021-04-06 NOTE — PROGRESS NOTES
HPI/Subjective:   Patient ID: Toro Mirza is a 80year old male here for CPE biggest complaint is flare of psoriasis    Severe, generalized pruritic rash.  Rest of CPE see below:      History/Other:   Review of Systems   Constitutional: Positive for fat tablet 0   • Methotrexate Sodium 2.5 MG Oral Tab 4 tabs once weekly 16 tablet 3   • triamcinolone acetonide 0.1 % External Ointment APPLY OINTMENT TOPICALLY TWICE DAILY TO WHOLE BODY     • Betamethasone Dipropionate 0.05 % External Ointment Apply topically by mouth daily. 0   • famoTIDine 20 MG Oral Tab Take 1 tablet (20 mg total) by mouth 2 (two) times daily. 180 tablet 3   • Cholecalciferol (VITAMIN D3) 5000 UNITS Oral Cap Take 5,000 Units by mouth daily.    30 capsule 0     Allergies:  Codeine artery bypass graft)     Proteinuria     Multiple renal cysts     Renal stone     Closed wedge compression fracture of T11 vertebra with routine healing     Compression fracture of T12 vertebra (HCC)     Anemia     Fatigue, unspecified type     Positive AN [facility])    • Inguinal lymphadenopathy     right-bx   • Ischemic colitis [facility])    • Mesenteric adenitis     bx   • Multiple renal cysts     both   • Osteopenia    • PMR (polymyalgia rheumatica) (HCC)    • Pneumonia 02/2020   • Positive TRINY (antinuclear antibo hodgkins at age 27   • Heart Surgery Brother    • Heart Disorder Brother    • Other (Other) Brother    • Other (Other) Brother    • Other (Other) Other         seizures from neurofibromatosis       Social History:  Social History    Socioeconomic H Feeling of Stress :   Social Connections:       Frequency of Communication with Friends and Family:       Frequency of Social Gatherings with Friends and Family:       Attends [employer] Services:       Active Member of Clubs or Organizations:       Attends 9/2021     2. Colonoscopy: 4/2018 by Dr. Magalie Acharya consider 4/2024     3. Dental: Periodic     4. Eye Exam: Screening done in office     5. Full Skin Exam: Sees Dr. Robbi Harvey     6.   Heart Test:  2D with Doppler-2/2021 AVR ok-redo 2/2022  Card Nuc Exercise Stres unspecified type  Carotid atherosclerosis, unspecified laterality  Pulmonary fibrosis ([facility] [address]  Long term (current) use of systemic steroids  Osteopenia, unspecified location  Excessive ear wax, bilateral  Abnormal CBC        Meds This Visit:  Requested Presc your multiple medical problems, stay on your current medicines and supplements. Will send a copy of these results to cardio Addy and renal Fang. Try for a better diet with reduction of red meat, dairy, and egg yolks to lower TMAO.  Recheck urine protein

## 2021-04-07 ENCOUNTER — MED REC SCAN ONLY (OUTPATIENT)
Dept: INTERNAL MEDICINE CLINIC | Facility: CLINIC | Age: 81
End: 2021-04-07

## 2021-04-09 RX ORDER — METHOTREXATE 2.5 MG/1
TABLET ORAL
Qty: 16 TABLET | Refills: 3 | Status: SHIPPED | OUTPATIENT
Start: 2021-04-09 | End: 2021-07-06

## 2021-04-09 RX ORDER — PREDNISONE 1 MG/1
10 TABLET ORAL DAILY
Qty: 90 TABLET | Refills: 0 | COMMUNITY
Start: 2021-04-09 | End: 2021-05-19

## 2021-04-10 ENCOUNTER — TELEPHONE (OUTPATIENT)
Dept: INTERNAL MEDICINE CLINIC | Facility: CLINIC | Age: 81
End: 2021-04-10

## 2021-04-10 RX ORDER — MELATONIN
800 DAILY
Refills: 0 | COMMUNITY
Start: 2021-04-10 | End: 2021-09-13

## 2021-04-12 ENCOUNTER — TELEPHONE (OUTPATIENT)
Dept: INTERNAL MEDICINE CLINIC | Facility: CLINIC | Age: 81
End: 2021-04-12

## 2021-04-12 DIAGNOSIS — D64.9 ANEMIA, UNSPECIFIED TYPE: ICD-10-CM

## 2021-04-12 DIAGNOSIS — M35.3 PMR (POLYMYALGIA RHEUMATICA) (HCC): Primary | ICD-10-CM

## 2021-04-12 DIAGNOSIS — I25.10 CORONARY ARTERY DISEASE INVOLVING NATIVE HEART, UNSPECIFIED VESSEL OR LESION TYPE, UNSPECIFIED WHETHER ANGINA PRESENT: ICD-10-CM

## 2021-04-12 DIAGNOSIS — R79.89 ELEVATED HOMOCYSTEINE: ICD-10-CM

## 2021-04-12 NOTE — TELEPHONE ENCOUNTER
Per Dr. Rekha Montoya: order CBC, homocysteine, change BMP to CMP. Add ESR. Patient aware, no need to call. Orders placed.

## 2021-04-23 ENCOUNTER — TELEPHONE (OUTPATIENT)
Dept: INTERNAL MEDICINE CLINIC | Facility: CLINIC | Age: 81
End: 2021-04-23

## 2021-04-23 NOTE — TELEPHONE ENCOUNTER
Patient is currently  taking prednisone 10mg once daily. Dr. Amy Nayak ordered edward labs, when should Johana Estrada have labs drawn?

## 2021-04-26 RX ORDER — ROSUVASTATIN CALCIUM 10 MG/1
TABLET, COATED ORAL
Qty: 90 TABLET | Refills: 3 | Status: SHIPPED | OUTPATIENT
Start: 2021-04-26

## 2021-04-27 ENCOUNTER — LAB ENCOUNTER (OUTPATIENT)
Dept: LAB | Age: 81
End: 2021-04-27
Attending: INTERNAL MEDICINE
Payer: MEDICARE

## 2021-04-27 DIAGNOSIS — I25.10 CORONARY ARTERY DISEASE INVOLVING NATIVE HEART, UNSPECIFIED VESSEL OR LESION TYPE, UNSPECIFIED WHETHER ANGINA PRESENT: ICD-10-CM

## 2021-04-27 DIAGNOSIS — R79.89 ELEVATED HOMOCYSTEINE: ICD-10-CM

## 2021-04-27 DIAGNOSIS — D64.9 ANEMIA, UNSPECIFIED TYPE: ICD-10-CM

## 2021-04-27 DIAGNOSIS — M35.3 PMR (POLYMYALGIA RHEUMATICA) (HCC): ICD-10-CM

## 2021-04-27 PROCEDURE — 36415 COLL VENOUS BLD VENIPUNCTURE: CPT

## 2021-04-27 PROCEDURE — 80053 COMPREHEN METABOLIC PANEL: CPT

## 2021-04-27 PROCEDURE — 85025 COMPLETE CBC W/AUTO DIFF WBC: CPT

## 2021-04-27 PROCEDURE — 85652 RBC SED RATE AUTOMATED: CPT

## 2021-04-27 PROCEDURE — 83090 ASSAY OF HOMOCYSTEINE: CPT

## 2021-05-03 RX ORDER — RISEDRONATE SODIUM 35 MG/1
TABLET, FILM COATED ORAL
Qty: 12 TABLET | Refills: 3 | Status: SHIPPED | OUTPATIENT
Start: 2021-05-03 | End: 2022-01-27

## 2021-05-03 RX ORDER — PAROXETINE 10 MG/1
TABLET, FILM COATED ORAL
Qty: 90 TABLET | Refills: 3 | Status: SHIPPED | OUTPATIENT
Start: 2021-05-03

## 2021-05-19 ENCOUNTER — LAB ENCOUNTER (OUTPATIENT)
Dept: LAB | Age: 81
End: 2021-05-19
Attending: INTERNAL MEDICINE
Payer: MEDICARE

## 2021-05-19 ENCOUNTER — TELEPHONE (OUTPATIENT)
Dept: NEPHROLOGY | Facility: CLINIC | Age: 81
End: 2021-05-19

## 2021-05-19 DIAGNOSIS — I10 ESSENTIAL HYPERTENSION: ICD-10-CM

## 2021-05-19 DIAGNOSIS — N12 INTERSTITIAL NEPHRITIS: ICD-10-CM

## 2021-05-19 DIAGNOSIS — N18.30 CKD (CHRONIC KIDNEY DISEASE) STAGE 3, GFR 30-59 ML/MIN (HCC): ICD-10-CM

## 2021-05-19 DIAGNOSIS — M35.3 PMR (POLYMYALGIA RHEUMATICA) (HCC): ICD-10-CM

## 2021-05-19 PROCEDURE — 85652 RBC SED RATE AUTOMATED: CPT

## 2021-05-19 PROCEDURE — 83090 ASSAY OF HOMOCYSTEINE: CPT

## 2021-05-19 PROCEDURE — 80053 COMPREHEN METABOLIC PANEL: CPT

## 2021-05-19 PROCEDURE — 85025 COMPLETE CBC W/AUTO DIFF WBC: CPT

## 2021-05-19 PROCEDURE — 36415 COLL VENOUS BLD VENIPUNCTURE: CPT

## 2021-05-20 ENCOUNTER — TELEPHONE (OUTPATIENT)
Dept: RHEUMATOLOGY | Facility: CLINIC | Age: 81
End: 2021-05-20

## 2021-05-20 NOTE — TELEPHONE ENCOUNTER
Test results forwarded from Dr. Rossy gaona. 5/19/2021 homocysteine  10.7 normal.  Sed rate 28. White count 9.9 hemoglobin 10.3 hematocrit 31.1 platelet count 24,866 MCV 96.   Glucose 94 sodium 135 potassium 4.4 chloride 106 BUN 39 creatinine 1.69 calcium

## 2021-05-24 ENCOUNTER — OFFICE VISIT (OUTPATIENT)
Dept: RHEUMATOLOGY | Facility: CLINIC | Age: 81
End: 2021-05-24
Payer: MEDICARE

## 2021-05-24 VITALS
BODY MASS INDEX: 22.9 KG/M2 | TEMPERATURE: 98 F | SYSTOLIC BLOOD PRESSURE: 148 MMHG | RESPIRATION RATE: 16 BRPM | WEIGHT: 160 LBS | HEIGHT: 70 IN | HEART RATE: 74 BPM | DIASTOLIC BLOOD PRESSURE: 70 MMHG

## 2021-05-24 DIAGNOSIS — M35.3 PMR (POLYMYALGIA RHEUMATICA) (HCC): Primary | ICD-10-CM

## 2021-05-24 DIAGNOSIS — D69.6 THROMBOCYTOPENIA (HCC): ICD-10-CM

## 2021-05-24 DIAGNOSIS — N18.32 CKD STAGE G3B/A2, GFR 30-44 AND ALBUMIN CREATININE RATIO 30-299 MG/G (HCC): ICD-10-CM

## 2021-05-24 DIAGNOSIS — Z95.2 S/P AVR: ICD-10-CM

## 2021-05-24 DIAGNOSIS — Z95.1 S/P CABG (CORONARY ARTERY BYPASS GRAFT): ICD-10-CM

## 2021-05-24 PROCEDURE — 99214 OFFICE O/P EST MOD 30 MIN: CPT | Performed by: INTERNAL MEDICINE

## 2021-05-24 NOTE — PROGRESS NOTES
[facility]  Dr. Edilson Black Progress Note     Subjective:   Nick Hayes is a(n) 80year old male. Current complaints: Patient presents with:  PMR: one year f/u. Feeling good. Gets fatigued and winded. No joint pain. Some facial swelling.     Has 7 gra

## 2021-05-24 NOTE — PATIENT INSTRUCTIONS
Current complaint, short of breath at times,    Lack of energy at times  Continue Methotrexate 4 tablets per week for PMR. Use Folic acid 1 mg per day. Cut/reduce Prednisone to 5 mg per day. Continue trying to exercise.   A small glass of Darrelyn Dice is ok o

## 2021-06-01 ENCOUNTER — CARDPULM VISIT (OUTPATIENT)
Dept: CARDIAC REHAB | Facility: HOSPITAL | Age: 81
End: 2021-06-01
Attending: INTERNAL MEDICINE
Payer: MEDICARE

## 2021-06-01 DIAGNOSIS — J84.10 PULMONARY FIBROSIS (HCC): ICD-10-CM

## 2021-06-10 ENCOUNTER — CARDPULM VISIT (OUTPATIENT)
Dept: CARDIAC REHAB | Facility: HOSPITAL | Age: 81
End: 2021-06-10
Attending: INTERNAL MEDICINE
Payer: MEDICARE

## 2021-06-15 ENCOUNTER — CARDPULM VISIT (OUTPATIENT)
Dept: CARDIAC REHAB | Facility: HOSPITAL | Age: 81
End: 2021-06-15
Attending: INTERNAL MEDICINE
Payer: MEDICARE

## 2021-06-17 ENCOUNTER — CARDPULM VISIT (OUTPATIENT)
Dept: CARDIAC REHAB | Facility: HOSPITAL | Age: 81
End: 2021-06-17
Attending: INTERNAL MEDICINE
Payer: MEDICARE

## 2021-06-22 ENCOUNTER — APPOINTMENT (OUTPATIENT)
Dept: CARDIAC REHAB | Facility: HOSPITAL | Age: 81
End: 2021-06-22
Attending: INTERNAL MEDICINE
Payer: MEDICARE

## 2021-06-24 ENCOUNTER — CARDPULM VISIT (OUTPATIENT)
Dept: CARDIAC REHAB | Facility: HOSPITAL | Age: 81
End: 2021-06-24
Attending: INTERNAL MEDICINE
Payer: MEDICARE

## 2021-06-28 RX ORDER — CARVEDILOL 12.5 MG/1
TABLET ORAL
Qty: 180 TABLET | Refills: 3 | Status: SHIPPED | OUTPATIENT
Start: 2021-06-28

## 2021-06-28 RX ORDER — L-MEFOL/A-CYST/MEB12/ALGAL OIL 6-600-2 MG
TABLET ORAL
Qty: 36 TABLET | Refills: 3 | Status: SHIPPED | OUTPATIENT
Start: 2021-06-28

## 2021-06-28 NOTE — HOME CARE LIAISON
Received referral from Steven Valencia. Met with patient at the bedside to discuss home health services and offer choice. Patient is agreeable to [facility] services at discharge. Brochure and contact information provided. Any questions addressed. Will follow. No

## 2021-06-29 ENCOUNTER — CARDPULM VISIT (OUTPATIENT)
Dept: CARDIAC REHAB | Facility: HOSPITAL | Age: 81
End: 2021-06-29
Attending: INTERNAL MEDICINE
Payer: MEDICARE

## 2021-07-01 ENCOUNTER — CARDPULM VISIT (OUTPATIENT)
Dept: CARDIAC REHAB | Facility: HOSPITAL | Age: 81
End: 2021-07-01
Attending: INTERNAL MEDICINE
Payer: MEDICARE

## 2021-07-01 ENCOUNTER — TELEPHONE (OUTPATIENT)
Dept: INTERNAL MEDICINE CLINIC | Facility: CLINIC | Age: 81
End: 2021-07-01

## 2021-07-01 ENCOUNTER — LAB ENCOUNTER (OUTPATIENT)
Dept: LAB | Facility: HOSPITAL | Age: 81
End: 2021-07-01
Attending: INTERNAL MEDICINE
Payer: MEDICARE

## 2021-07-01 DIAGNOSIS — Z95.2 S/P AVR: ICD-10-CM

## 2021-07-01 DIAGNOSIS — D69.6 THROMBOCYTOPENIA (HCC): ICD-10-CM

## 2021-07-01 DIAGNOSIS — N18.32 CKD STAGE G3B/A2, GFR 30-44 AND ALBUMIN CREATININE RATIO 30-299 MG/G (HCC): ICD-10-CM

## 2021-07-01 DIAGNOSIS — N18.30 CKD (CHRONIC KIDNEY DISEASE) STAGE 3, GFR 30-59 ML/MIN (HCC): ICD-10-CM

## 2021-07-01 DIAGNOSIS — I10 ESSENTIAL HYPERTENSION: ICD-10-CM

## 2021-07-01 DIAGNOSIS — Z95.1 S/P CABG (CORONARY ARTERY BYPASS GRAFT): ICD-10-CM

## 2021-07-01 DIAGNOSIS — N12 INTERSTITIAL NEPHRITIS: ICD-10-CM

## 2021-07-01 DIAGNOSIS — M35.3 PMR (POLYMYALGIA RHEUMATICA) (HCC): ICD-10-CM

## 2021-07-01 LAB
ALBUMIN SERPL-MCNC: 3.4 G/DL (ref 3.4–5)
ALBUMIN/GLOB SERPL: 1 {RATIO} (ref 1–2)
ALP LIVER SERPL-CCNC: 39 U/L
ALT SERPL-CCNC: 23 U/L
ANION GAP SERPL CALC-SCNC: 7 MMOL/L (ref 0–18)
AST SERPL-CCNC: 25 U/L (ref 15–37)
BASOPHILS # BLD AUTO: 0.04 X10(3) UL (ref 0–0.2)
BASOPHILS NFR BLD AUTO: 0.4 %
BILIRUB SERPL-MCNC: 0.7 MG/DL (ref 0.1–2)
BUN BLD-MCNC: 40 MG/DL (ref 7–18)
BUN/CREAT SERPL: 17.1 (ref 10–20)
CALCIUM BLD-MCNC: 9.1 MG/DL (ref 8.5–10.1)
CHLORIDE SERPL-SCNC: 108 MMOL/L (ref 98–112)
CO2 SERPL-SCNC: 24 MMOL/L (ref 21–32)
CREAT BLD-MCNC: 2.34 MG/DL
DEPRECATED RDW RBC AUTO: 56.8 FL (ref 35.1–46.3)
EOSINOPHIL # BLD AUTO: 0.41 X10(3) UL (ref 0–0.7)
EOSINOPHIL NFR BLD AUTO: 4.3 %
ERYTHROCYTE [DISTWIDTH] IN BLOOD BY AUTOMATED COUNT: 15.9 % (ref 11–15)
ERYTHROCYTE [SEDIMENTATION RATE] IN BLOOD: 63 MM/HR
GLOBULIN PLAS-MCNC: 3.4 G/DL (ref 2.8–4.4)
GLUCOSE BLD-MCNC: 86 MG/DL (ref 70–99)
HCT VFR BLD AUTO: 27.2 %
HGB BLD-MCNC: 8.9 G/DL
IMM GRANULOCYTES # BLD AUTO: 0.06 X10(3) UL (ref 0–1)
IMM GRANULOCYTES NFR BLD: 0.6 %
LYMPHOCYTES # BLD AUTO: 0.81 X10(3) UL (ref 1–4)
LYMPHOCYTES NFR BLD AUTO: 8.6 %
M PROTEIN MFR SERPL ELPH: 6.8 G/DL (ref 6.4–8.2)
MCH RBC QN AUTO: 32.7 PG (ref 26–34)
MCHC RBC AUTO-ENTMCNC: 32.7 G/DL (ref 31–37)
MCV RBC AUTO: 100 FL
MONOCYTES # BLD AUTO: 0.99 X10(3) UL (ref 0.1–1)
MONOCYTES NFR BLD AUTO: 10.5 %
NEUTROPHILS # BLD AUTO: 7.14 X10 (3) UL (ref 1.5–7.7)
NEUTROPHILS # BLD AUTO: 7.14 X10(3) UL (ref 1.5–7.7)
NEUTROPHILS NFR BLD AUTO: 75.6 %
OSMOLALITY SERPL CALC.SUM OF ELEC: 297 MOSM/KG (ref 275–295)
PATIENT FASTING Y/N/NP: YES
PLATELET # BLD AUTO: 167 10(3)UL (ref 150–450)
POTASSIUM SERPL-SCNC: 4.4 MMOL/L (ref 3.5–5.1)
RBC # BLD AUTO: 2.72 X10(6)UL
SODIUM SERPL-SCNC: 139 MMOL/L (ref 136–145)
WBC # BLD AUTO: 9.5 X10(3) UL (ref 4–11)

## 2021-07-01 PROCEDURE — 85652 RBC SED RATE AUTOMATED: CPT

## 2021-07-01 PROCEDURE — 85025 COMPLETE CBC W/AUTO DIFF WBC: CPT

## 2021-07-01 PROCEDURE — 80053 COMPREHEN METABOLIC PANEL: CPT

## 2021-07-01 PROCEDURE — 36415 COLL VENOUS BLD VENIPUNCTURE: CPT

## 2021-07-06 ENCOUNTER — OFFICE VISIT (OUTPATIENT)
Dept: INTERNAL MEDICINE CLINIC | Facility: CLINIC | Age: 81
End: 2021-07-06
Payer: MEDICARE

## 2021-07-06 ENCOUNTER — TELEPHONE (OUTPATIENT)
Dept: RHEUMATOLOGY | Facility: CLINIC | Age: 81
End: 2021-07-06

## 2021-07-06 VITALS
DIASTOLIC BLOOD PRESSURE: 62 MMHG | BODY MASS INDEX: 22.62 KG/M2 | RESPIRATION RATE: 16 BRPM | HEART RATE: 59 BPM | WEIGHT: 158 LBS | SYSTOLIC BLOOD PRESSURE: 126 MMHG | HEIGHT: 70 IN | TEMPERATURE: 98 F | OXYGEN SATURATION: 100 %

## 2021-07-06 DIAGNOSIS — M35.3 PMR (POLYMYALGIA RHEUMATICA) (HCC): ICD-10-CM

## 2021-07-06 DIAGNOSIS — N18.4 ANEMIA DUE TO STAGE 4 CHRONIC KIDNEY DISEASE (HCC): ICD-10-CM

## 2021-07-06 DIAGNOSIS — L40.9 PSORIASIS: ICD-10-CM

## 2021-07-06 DIAGNOSIS — N18.4 STAGE 4 CHRONIC KIDNEY DISEASE (HCC): Primary | ICD-10-CM

## 2021-07-06 DIAGNOSIS — D63.1 ANEMIA DUE TO STAGE 4 CHRONIC KIDNEY DISEASE (HCC): ICD-10-CM

## 2021-07-06 PROCEDURE — 99214 OFFICE O/P EST MOD 30 MIN: CPT | Performed by: INTERNAL MEDICINE

## 2021-07-06 RX ORDER — METHOTREXATE 2.5 MG/1
TABLET ORAL
Qty: 60 TABLET | Refills: 3 | Status: SHIPPED | OUTPATIENT
Start: 2021-07-06

## 2021-07-06 NOTE — TELEPHONE ENCOUNTER
Dr. Aretha López office called and was wanting Dr. Gerardo Calvo to call their office to discuss payment.

## 2021-07-06 NOTE — TELEPHONE ENCOUNTER
Discussed with Dr. Herbert Cordero. Bianka Owusu is sed rate is in the 60s. He feels okay not great. He has a history of compression fractures seen in skin on prednisone 5 mg daily and 3 tablets of methotrexate 7.5 mg/week. .   Agreed to increase to 5 tablets/week which

## 2021-07-07 NOTE — PROGRESS NOTES
HPI/Subjective:   Patient ID: Rocio Waters is a 80year old male. HPI    History/Other:   Review of Systems   Constitutional: Positive for fatigue. Fever: severe. Respiratory: Positive for shortness of breath.     Cardiovascular:        CAD(CABG), A total) by mouth 2 (two) times a day. 90 tablet 3   • Magnesium Citrate 125 MG Oral Cap Take 250 capsules by mouth daily. 0   • famoTIDine 20 MG Oral Tab Take 1 tablet (20 mg total) by mouth 2 (two) times daily.  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)    • Mesenteric adenitis     bx   • M Mother         alzheimers   • Other (Other) Mother    • Cancer Daughter         hodgkins at age 25   • Heart Surgery Brother    • Heart Disorder Brother    • Other (Other) Brother    • Other (Other) Brother    • Other (Other) Other         seizures from ne per Week:       Minutes of Exercise per Session:   Stress:       Feeling of Stress :   Social Connections:       Frequency of Communication with Friends and Family:       Frequency of Social Gatherings with Friends and Family:       Attends [employer] methotrexate 10 to 12.5 per week after speaking to rheum Abebe-liver enzymes ok  Spoke to renal Tigre Right said nothing can be done about kidneys-damaged form interstitial nephritis on bx  Has appts Sid Rey. .and also cardio Addy  RTC 3mos-wants to revisi

## 2021-07-12 ENCOUNTER — TELEPHONE (OUTPATIENT)
Dept: INTERNAL MEDICINE CLINIC | Facility: CLINIC | Age: 81
End: 2021-07-12

## 2021-07-12 NOTE — TELEPHONE ENCOUNTER
Per Dr. Sydney Deluna: Call Dr. Sherry Walsh office and give Riaz Form that patient needs shot of EPO at office for anemia r/t chronic kidney disease. '    Attempted to call x2, office is open but per automated system they are helping others and were unable to take the call, a

## 2021-07-13 ENCOUNTER — CARDPULM VISIT (OUTPATIENT)
Dept: CARDIAC REHAB | Facility: HOSPITAL | Age: 81
End: 2021-07-13
Attending: INTERNAL MEDICINE
Payer: MEDICARE

## 2021-07-15 ENCOUNTER — CARDPULM VISIT (OUTPATIENT)
Dept: CARDIAC REHAB | Facility: HOSPITAL | Age: 81
End: 2021-07-15
Attending: INTERNAL MEDICINE
Payer: MEDICARE

## 2021-07-20 ENCOUNTER — CARDPULM VISIT (OUTPATIENT)
Dept: CARDIAC REHAB | Facility: HOSPITAL | Age: 81
End: 2021-07-20
Attending: INTERNAL MEDICINE
Payer: MEDICARE

## 2021-07-21 ENCOUNTER — HOSPITAL ENCOUNTER (OUTPATIENT)
Dept: ULTRASOUND IMAGING | Facility: HOSPITAL | Age: 81
Discharge: HOME | End: 2021-07-21
Attending: INTERNAL MEDICINE
Payer: MEDICARE

## 2021-07-21 DIAGNOSIS — I65.29 CAROTID ATHEROSCLEROSIS, UNSPECIFIED LATERALITY: ICD-10-CM

## 2021-07-21 PROCEDURE — 93880 EXTRACRANIAL BILAT STUDY: CPT | Performed by: INTERNAL MEDICINE

## 2021-07-22 ENCOUNTER — CARDPULM VISIT (OUTPATIENT)
Dept: CARDIAC REHAB | Facility: HOSPITAL | Age: 81
End: 2021-07-22
Attending: INTERNAL MEDICINE
Payer: MEDICARE

## 2021-07-27 ENCOUNTER — OFFICE VISIT (OUTPATIENT)
Dept: NEPHROLOGY | Facility: CLINIC | Age: 81
End: 2021-07-27
Payer: MEDICARE

## 2021-07-27 ENCOUNTER — OFFICE VISIT (OUTPATIENT)
Dept: RHEUMATOLOGY | Facility: CLINIC | Age: 81
End: 2021-07-27
Payer: MEDICARE

## 2021-07-27 VITALS
SYSTOLIC BLOOD PRESSURE: 124 MMHG | HEIGHT: 70 IN | RESPIRATION RATE: 16 BRPM | DIASTOLIC BLOOD PRESSURE: 80 MMHG | WEIGHT: 161 LBS | BODY MASS INDEX: 23.05 KG/M2

## 2021-07-27 VITALS
SYSTOLIC BLOOD PRESSURE: 138 MMHG | WEIGHT: 161 LBS | OXYGEN SATURATION: 98 % | HEIGHT: 70 IN | BODY MASS INDEX: 23.05 KG/M2 | DIASTOLIC BLOOD PRESSURE: 68 MMHG | HEART RATE: 62 BPM

## 2021-07-27 DIAGNOSIS — N18.4 ANEMIA DUE TO STAGE 4 CHRONIC KIDNEY DISEASE (HCC): ICD-10-CM

## 2021-07-27 DIAGNOSIS — D63.1 ANEMIA DUE TO STAGE 4 CHRONIC KIDNEY DISEASE (HCC): ICD-10-CM

## 2021-07-27 DIAGNOSIS — S22.080D CLOSED WEDGE COMPRESSION FRACTURE OF T11 VERTEBRA WITH ROUTINE HEALING: ICD-10-CM

## 2021-07-27 DIAGNOSIS — S22.080D COMPRESSION FRACTURE OF T12 VERTEBRA WITH ROUTINE HEALING, SUBSEQUENT ENCOUNTER: ICD-10-CM

## 2021-07-27 DIAGNOSIS — N18.4 CKD (CHRONIC KIDNEY DISEASE) STAGE 4, GFR 15-29 ML/MIN (HCC): ICD-10-CM

## 2021-07-27 DIAGNOSIS — M35.3 PMR (POLYMYALGIA RHEUMATICA) (HCC): Primary | ICD-10-CM

## 2021-07-27 DIAGNOSIS — Z95.1 S/P CABG (CORONARY ARTERY BYPASS GRAFT): ICD-10-CM

## 2021-07-27 DIAGNOSIS — M85.80 OSTEOPENIA, UNSPECIFIED LOCATION: ICD-10-CM

## 2021-07-27 DIAGNOSIS — N18.4 CKD (CHRONIC KIDNEY DISEASE) STAGE 4, GFR 15-29 ML/MIN (HCC): Primary | ICD-10-CM

## 2021-07-27 PROCEDURE — 99215 OFFICE O/P EST HI 40 MIN: CPT | Performed by: INTERNAL MEDICINE

## 2021-07-27 PROCEDURE — 99214 OFFICE O/P EST MOD 30 MIN: CPT | Performed by: INTERNAL MEDICINE

## 2021-07-27 PROCEDURE — 96372 THER/PROPH/DIAG INJ SC/IM: CPT | Performed by: INTERNAL MEDICINE

## 2021-07-27 NOTE — PROGRESS NOTES
Nephrology Progress Note      ASSESSMENT/PLAN:        1) CKD 3- serum creatinine is increased gradually since July 2019 from 0.98 mg/dL to apprx 2 mg/dL and is accompanied by onset of mild proteinuria is due to biopsy-proven chronic interstitial nephritis years in [place] to the opera club in [place]. His son-in-law is the head of anesthesiology at [facility] but moved from [facility] now to [place]. Meds are otherwise unchanged.   Reflux reasonably well controlled on H2 blocker but not as good as P rheumatica) ([facility].)    • Pneumonia 02/2020   • Positive TRINY (antinuclear antibody) 8/28/2019   • Precancerous lesion     skin   • Proteinuria     mild   • Psoriasis    • Reactive depression    • Renal stone     right   • Right shoulder pain     injected-LaBe Oral Tab TAKE 1 TABLET BY MOUTH ONCE DAILY IN THE MORNING 90 tablet 3   • RISEDRONATE SODIUM 35 MG Oral Tab Take 1 tablet by mouth once a week 12 tablet 3   • ROSUVASTATIN CALCIUM 10 MG Oral Tab Take 1 tablet by mouth nightly 90 tablet 3   • folic acid 621 Socioeconomic History      Marital status:       Spouse name: Melisa Duran      Number of children: 3      Years of education: Not on file      Highest education level: Not on file    Occupational History      Occupation:         Emp

## 2021-07-27 NOTE — PROGRESS NOTES
[facility]  Dr. Mello Every Progress Note     Subjective:   Karla Merritt is a(n) 80year old male. Current complaints: Patient presents with:   Follow - Up: LOV 5-24-21; on MTX 5tabs/week; feels stronger but it is slow progress, fatigues easily, naps causes skin rash or mouth sores and the vitamin helps prevent this. Prednisone 5 mg per day. Hesitant to raise prednisone higher due to his osteoporosis and thin skin.   Your show mild renal insufficiency with a BUN of 40 and creatinine of 2.3 normal spencer

## 2021-07-27 NOTE — PATIENT INSTRUCTIONS
For PMR - Methotrexate 5 tablets per week and Folic acid 214 mug per day. Folic acid is a vitamin that helps prevent side effects from methotrexate. Sometimes methotrexate causes skin rash or mouth sores and the vitamin helps prevent this.   Prednisone 5

## 2021-07-29 ENCOUNTER — CARDPULM VISIT (OUTPATIENT)
Dept: CARDIAC REHAB | Facility: HOSPITAL | Age: 81
End: 2021-07-29
Attending: INTERNAL MEDICINE
Payer: MEDICARE

## 2021-07-30 ENCOUNTER — LAB ENCOUNTER (OUTPATIENT)
Dept: LAB | Age: 81
End: 2021-07-30
Attending: INTERNAL MEDICINE
Payer: MEDICARE

## 2021-07-30 DIAGNOSIS — N18.4 CKD (CHRONIC KIDNEY DISEASE) STAGE 4, GFR 15-29 ML/MIN (HCC): ICD-10-CM

## 2021-07-30 DIAGNOSIS — D63.1 ANEMIA DUE TO STAGE 4 CHRONIC KIDNEY DISEASE (HCC): ICD-10-CM

## 2021-07-30 DIAGNOSIS — I10 ESSENTIAL HYPERTENSION: ICD-10-CM

## 2021-07-30 DIAGNOSIS — Z95.1 S/P CABG (CORONARY ARTERY BYPASS GRAFT): ICD-10-CM

## 2021-07-30 DIAGNOSIS — N18.30 CKD (CHRONIC KIDNEY DISEASE) STAGE 3, GFR 30-59 ML/MIN (HCC): ICD-10-CM

## 2021-07-30 DIAGNOSIS — M35.3 PMR (POLYMYALGIA RHEUMATICA) (HCC): ICD-10-CM

## 2021-07-30 DIAGNOSIS — N12 INTERSTITIAL NEPHRITIS: ICD-10-CM

## 2021-07-30 DIAGNOSIS — N18.4 ANEMIA DUE TO STAGE 4 CHRONIC KIDNEY DISEASE (HCC): ICD-10-CM

## 2021-07-30 PROBLEM — D50.0 IRON DEFICIENCY ANEMIA DUE TO CHRONIC BLOOD LOSS: Status: ACTIVE | Noted: 2021-07-30

## 2021-07-30 LAB
ANION GAP SERPL CALC-SCNC: 5 MMOL/L (ref 0–18)
BASOPHILS # BLD AUTO: 0.04 X10(3) UL (ref 0–0.2)
BASOPHILS NFR BLD AUTO: 0.3 %
BUN BLD-MCNC: 28 MG/DL (ref 7–18)
CALCIUM BLD-MCNC: 9.4 MG/DL (ref 8.5–10.1)
CHLORIDE SERPL-SCNC: 106 MMOL/L (ref 98–112)
CO2 SERPL-SCNC: 26 MMOL/L (ref 21–32)
CREAT BLD-MCNC: 2.14 MG/DL
DEPRECATED HBV CORE AB SER IA-ACNC: 72.8 NG/ML
EOSINOPHIL # BLD AUTO: 0.34 X10(3) UL (ref 0–0.7)
EOSINOPHIL NFR BLD AUTO: 2.6 %
ERYTHROCYTE [DISTWIDTH] IN BLOOD BY AUTOMATED COUNT: 14.4 %
FOLATE SERPL-MCNC: >100 NG/ML (ref 8.7–?)
GLUCOSE BLD-MCNC: 108 MG/DL (ref 70–99)
HCT VFR BLD AUTO: 32 %
HGB BLD-MCNC: 10.3 G/DL
IMM GRANULOCYTES # BLD AUTO: 0.13 X10(3) UL (ref 0–1)
IMM GRANULOCYTES NFR BLD: 1 %
IRON SATURATION: 10 %
IRON SERPL-MCNC: 36 UG/DL
LYMPHOCYTES # BLD AUTO: 0.51 X10(3) UL (ref 1–4)
LYMPHOCYTES NFR BLD AUTO: 3.9 %
MCH RBC QN AUTO: 33.2 PG (ref 26–34)
MCHC RBC AUTO-ENTMCNC: 32.2 G/DL (ref 31–37)
MCV RBC AUTO: 103.2 FL
MONOCYTES # BLD AUTO: 0.83 X10(3) UL (ref 0.1–1)
MONOCYTES NFR BLD AUTO: 6.4 %
NEUTROPHILS # BLD AUTO: 11.08 X10 (3) UL (ref 1.5–7.7)
NEUTROPHILS # BLD AUTO: 11.08 X10(3) UL (ref 1.5–7.7)
NEUTROPHILS NFR BLD AUTO: 85.8 %
OSMOLALITY SERPL CALC.SUM OF ELEC: 290 MOSM/KG (ref 275–295)
PATIENT FASTING Y/N/NP: NO
PLATELET # BLD AUTO: 173 10(3)UL (ref 150–450)
POTASSIUM SERPL-SCNC: 4.1 MMOL/L (ref 3.5–5.1)
RBC # BLD AUTO: 3.1 X10(6)UL
SED RATE-ML: 27 MM/HR
SODIUM SERPL-SCNC: 137 MMOL/L (ref 136–145)
TOTAL IRON BINDING CAPACITY: 378 UG/DL (ref 240–450)
TRANSFERRIN SERPL-MCNC: 254 MG/DL (ref 200–360)
VIT B12 SERPL-MCNC: 732 PG/ML (ref 193–986)
WBC # BLD AUTO: 12.9 X10(3) UL (ref 4–11)

## 2021-07-30 PROCEDURE — 82746 ASSAY OF FOLIC ACID SERUM: CPT

## 2021-07-30 PROCEDURE — 83540 ASSAY OF IRON: CPT

## 2021-07-30 PROCEDURE — 83550 IRON BINDING TEST: CPT

## 2021-07-30 PROCEDURE — 80048 BASIC METABOLIC PNL TOTAL CA: CPT

## 2021-07-30 PROCEDURE — 36415 COLL VENOUS BLD VENIPUNCTURE: CPT

## 2021-07-30 PROCEDURE — 85652 RBC SED RATE AUTOMATED: CPT

## 2021-07-30 PROCEDURE — 82607 VITAMIN B-12: CPT

## 2021-07-30 PROCEDURE — 82728 ASSAY OF FERRITIN: CPT

## 2021-07-30 PROCEDURE — 85025 COMPLETE CBC W/AUTO DIFF WBC: CPT

## 2021-08-02 ENCOUNTER — TELEPHONE (OUTPATIENT)
Dept: NEPHROLOGY | Facility: CLINIC | Age: 81
End: 2021-08-02

## 2021-08-03 ENCOUNTER — CARDPULM VISIT (OUTPATIENT)
Dept: CARDIAC REHAB | Facility: HOSPITAL | Age: 81
End: 2021-08-03
Attending: INTERNAL MEDICINE
Payer: MEDICARE

## 2021-08-04 ENCOUNTER — OFFICE VISIT (OUTPATIENT)
Dept: HEMATOLOGY/ONCOLOGY | Facility: HOSPITAL | Age: 81
End: 2021-08-04
Attending: INTERNAL MEDICINE
Payer: MEDICARE

## 2021-08-04 VITALS
SYSTOLIC BLOOD PRESSURE: 141 MMHG | DIASTOLIC BLOOD PRESSURE: 67 MMHG | TEMPERATURE: 98 F | OXYGEN SATURATION: 100 % | BODY MASS INDEX: 23.34 KG/M2 | HEIGHT: 70 IN | RESPIRATION RATE: 16 BRPM | HEART RATE: 68 BPM | WEIGHT: 163 LBS

## 2021-08-04 DIAGNOSIS — D64.9 ANEMIA, NORMOCYTIC NORMOCHROMIC: ICD-10-CM

## 2021-08-04 DIAGNOSIS — N18.31 STAGE 3A CHRONIC KIDNEY DISEASE (HCC): ICD-10-CM

## 2021-08-04 DIAGNOSIS — D50.0 IRON DEFICIENCY ANEMIA DUE TO CHRONIC BLOOD LOSS: Primary | ICD-10-CM

## 2021-08-04 PROCEDURE — 99214 OFFICE O/P EST MOD 30 MIN: CPT | Performed by: INTERNAL MEDICINE

## 2021-08-05 ENCOUNTER — CARDPULM VISIT (OUTPATIENT)
Dept: CARDIAC REHAB | Facility: HOSPITAL | Age: 81
End: 2021-08-05
Attending: INTERNAL MEDICINE
Payer: MEDICARE

## 2021-08-05 NOTE — PROGRESS NOTES
Rolo Matt    PATIENT'S NAME: Dung Benja   ATTENDING PHYSICIAN: Peyton Jackson MD   PATIENT ACCOUNT #: [de-identified] LOCATION: [address] [place] [address] RECORD #: [id number] YOB: 1940   DATE OF SERVICE: 08/04/2021       CANCER EUGENE though his weight is fairly stable. He, for a time, was losing weight, but he has regained some of that. He also does not eat a lot of red meat in his diet. Dr. Naun Garcia has been encouraging him to minimize this. He has never had a capsule endoscopy.     [place] probably multifactorial, and he has components of other processes. The etiology of his iron deficiency is unclear. It is likely to be mild chronic gastrointestinal blood loss. He has had endoscopies not that long ago.   I will contact Dr. Oneil Boyd and ask

## 2021-08-06 ENCOUNTER — OFFICE VISIT (OUTPATIENT)
Dept: HEMATOLOGY/ONCOLOGY | Facility: HOSPITAL | Age: 81
End: 2021-08-06
Attending: INTERNAL MEDICINE
Payer: MEDICARE

## 2021-08-06 VITALS
HEART RATE: 63 BPM | TEMPERATURE: 96 F | SYSTOLIC BLOOD PRESSURE: 132 MMHG | OXYGEN SATURATION: 98 % | DIASTOLIC BLOOD PRESSURE: 74 MMHG | RESPIRATION RATE: 16 BRPM

## 2021-08-06 DIAGNOSIS — D50.0 IRON DEFICIENCY ANEMIA DUE TO CHRONIC BLOOD LOSS: Primary | ICD-10-CM

## 2021-08-06 PROCEDURE — 96365 THER/PROPH/DIAG IV INF INIT: CPT

## 2021-08-06 PROCEDURE — 96376 TX/PRO/DX INJ SAME DRUG ADON: CPT

## 2021-08-06 NOTE — PROGRESS NOTES
Education Record    Learner:  Patient    Disease / Diagnosis: MARIAH    Barriers / Limitations:  None   Comments:    Method:  Brief focused   Comments:    General Topics:  Medication, Procedure, Side effects and symptom management and Plan of care reviewed

## 2021-08-10 ENCOUNTER — CARDPULM VISIT (OUTPATIENT)
Dept: CARDIAC REHAB | Facility: HOSPITAL | Age: 81
End: 2021-08-10
Attending: INTERNAL MEDICINE
Payer: MEDICARE

## 2021-08-17 ENCOUNTER — CARDPULM VISIT (OUTPATIENT)
Dept: CARDIAC REHAB | Facility: HOSPITAL | Age: 81
End: 2021-08-17
Attending: INTERNAL MEDICINE
Payer: MEDICARE

## 2021-08-19 ENCOUNTER — CARDPULM VISIT (OUTPATIENT)
Dept: CARDIAC REHAB | Facility: HOSPITAL | Age: 81
End: 2021-08-19
Attending: INTERNAL MEDICINE
Payer: MEDICARE

## 2021-08-26 ENCOUNTER — CARDPULM VISIT (OUTPATIENT)
Dept: CARDIAC REHAB | Facility: HOSPITAL | Age: 81
End: 2021-08-26
Attending: INTERNAL MEDICINE
Payer: MEDICARE

## 2021-08-31 ENCOUNTER — CARDPULM VISIT (OUTPATIENT)
Dept: CARDIAC REHAB | Facility: HOSPITAL | Age: 81
End: 2021-08-31
Attending: INTERNAL MEDICINE
Payer: MEDICARE

## 2021-09-02 ENCOUNTER — CARDPULM VISIT (OUTPATIENT)
Dept: CARDIAC REHAB | Facility: HOSPITAL | Age: 81
End: 2021-09-02
Attending: INTERNAL MEDICINE
Payer: MEDICARE

## 2021-09-07 ENCOUNTER — CARDPULM VISIT (OUTPATIENT)
Dept: CARDIAC REHAB | Facility: HOSPITAL | Age: 81
End: 2021-09-07
Attending: INTERNAL MEDICINE
Payer: MEDICARE

## 2021-09-14 ENCOUNTER — CARDPULM VISIT (OUTPATIENT)
Dept: CARDIAC REHAB | Facility: HOSPITAL | Age: 81
End: 2021-09-14
Attending: INTERNAL MEDICINE
Payer: MEDICARE

## 2021-09-16 ENCOUNTER — CARDPULM VISIT (OUTPATIENT)
Dept: CARDIAC REHAB | Facility: HOSPITAL | Age: 81
End: 2021-09-16
Attending: INTERNAL MEDICINE
Payer: MEDICARE

## 2021-09-21 ENCOUNTER — CARDPULM VISIT (OUTPATIENT)
Dept: CARDIAC REHAB | Facility: HOSPITAL | Age: 81
End: 2021-09-21
Attending: INTERNAL MEDICINE
Payer: MEDICARE

## 2021-09-24 ENCOUNTER — HOSPITAL ENCOUNTER (OUTPATIENT)
Dept: BONE DENSITY | Facility: HOSPITAL | Age: 81
Discharge: HOME | End: 2021-09-24
Attending: INTERNAL MEDICINE
Payer: MEDICARE

## 2021-09-24 DIAGNOSIS — Z79.52 LONG TERM (CURRENT) USE OF SYSTEMIC STEROIDS: ICD-10-CM

## 2021-09-24 DIAGNOSIS — M85.80 OSTEOPENIA, UNSPECIFIED LOCATION: ICD-10-CM

## 2021-09-24 PROCEDURE — 77080 DXA BONE DENSITY AXIAL: CPT | Performed by: INTERNAL MEDICINE

## 2021-09-28 ENCOUNTER — CARDPULM VISIT (OUTPATIENT)
Dept: CARDIAC REHAB | Facility: HOSPITAL | Age: 81
End: 2021-09-28
Attending: INTERNAL MEDICINE
Payer: MEDICARE

## 2021-09-28 ENCOUNTER — LAB ENCOUNTER (OUTPATIENT)
Dept: LAB | Facility: HOSPITAL | Age: 81
End: 2021-09-28
Attending: INTERNAL MEDICINE
Payer: MEDICARE

## 2021-09-28 DIAGNOSIS — D50.0 IRON DEFICIENCY ANEMIA DUE TO CHRONIC BLOOD LOSS: ICD-10-CM

## 2021-09-28 PROCEDURE — 84466 ASSAY OF TRANSFERRIN: CPT

## 2021-09-28 PROCEDURE — 36415 COLL VENOUS BLD VENIPUNCTURE: CPT

## 2021-09-28 PROCEDURE — 85025 COMPLETE CBC W/AUTO DIFF WBC: CPT

## 2021-09-28 PROCEDURE — 83540 ASSAY OF IRON: CPT

## 2021-09-28 PROCEDURE — 82728 ASSAY OF FERRITIN: CPT

## 2021-09-29 ENCOUNTER — OFFICE VISIT (OUTPATIENT)
Dept: RHEUMATOLOGY | Facility: CLINIC | Age: 81
End: 2021-09-29
Payer: MEDICARE

## 2021-09-29 VITALS
OXYGEN SATURATION: 97 % | DIASTOLIC BLOOD PRESSURE: 78 MMHG | HEIGHT: 70 IN | HEART RATE: 57 BPM | WEIGHT: 164.63 LBS | SYSTOLIC BLOOD PRESSURE: 132 MMHG | TEMPERATURE: 97 F | BODY MASS INDEX: 23.57 KG/M2

## 2021-09-29 DIAGNOSIS — D64.89 ANEMIA DUE TO OTHER CAUSE, NOT CLASSIFIED: ICD-10-CM

## 2021-09-29 DIAGNOSIS — M35.3 PMR (POLYMYALGIA RHEUMATICA) (HCC): Primary | ICD-10-CM

## 2021-09-29 DIAGNOSIS — Z95.2 S/P AVR: ICD-10-CM

## 2021-09-29 DIAGNOSIS — Z95.1 S/P CABG (CORONARY ARTERY BYPASS GRAFT): ICD-10-CM

## 2021-09-29 DIAGNOSIS — R53.82 CHRONIC FATIGUE: ICD-10-CM

## 2021-09-29 PROCEDURE — 99214 OFFICE O/P EST MOD 30 MIN: CPT | Performed by: INTERNAL MEDICINE

## 2021-09-29 RX ORDER — POLYETHYLENE GLYCOL 3350, SODIUM SULFATE ANHYDROUS, SODIUM BICARBONATE, SODIUM CHLORIDE, POTASSIUM CHLORIDE 236; 22.74; 6.74; 5.86; 2.97 G/4L; G/4L; G/4L; G/4L; G/4L
POWDER, FOR SOLUTION ORAL
COMMUNITY
Start: 2021-09-13 | End: 2021-10-11

## 2021-09-29 NOTE — PATIENT INSTRUCTIONS
Continue Methotrexate 5 tablets per week to fight the joint pain and keep the sed rate low. Folic acid 404 mcg per day. Multivitamin one per day. Prednisone 5 mg per day. Water pill on days the ankles swell.    Daily short walk when not in [facility] [place]

## 2021-09-29 NOTE — PROGRESS NOTES
[facility]  Dr. Iavna Madison Progress Note     Subjective:   Tj Linares is a(n) 80year old male. Current complaints: Patient presents with: Follow - Up: Pt had a Dexa scan at [place]. Swelling in the left foot since June. Labs are completed.  Phys low.  Folic acid 880 mcg per day. Multivitamin one per day. Prednisone 5 mg per day. Water pill on days the ankles swell. Daily short walk when not in [facility]. Dr Bobbi Tompkins the hematologist along with Dr Cynthia Miller determine treatment of the anemia.

## 2021-09-30 ENCOUNTER — CARDPULM VISIT (OUTPATIENT)
Dept: CARDIAC REHAB | Facility: HOSPITAL | Age: 81
End: 2021-09-30
Attending: INTERNAL MEDICINE
Payer: MEDICARE

## 2021-09-30 RX ORDER — FAMOTIDINE 20 MG/1
20 TABLET ORAL 2 TIMES DAILY
Qty: 180 TABLET | Refills: 3 | Status: SHIPPED | OUTPATIENT
Start: 2021-09-30

## 2021-10-05 ENCOUNTER — CARDPULM VISIT (OUTPATIENT)
Dept: CARDIAC REHAB | Facility: HOSPITAL | Age: 81
End: 2021-10-05
Attending: INTERNAL MEDICINE
Payer: MEDICARE

## 2021-10-06 ENCOUNTER — OFFICE VISIT (OUTPATIENT)
Dept: INTERNAL MEDICINE CLINIC | Facility: CLINIC | Age: 81
End: 2021-10-06
Payer: MEDICARE

## 2021-10-06 ENCOUNTER — LAB ENCOUNTER (OUTPATIENT)
Dept: LAB | Age: 81
End: 2021-10-06
Attending: INTERNAL MEDICINE
Payer: MEDICARE

## 2021-10-06 VITALS
WEIGHT: 157 LBS | TEMPERATURE: 98 F | BODY MASS INDEX: 23 KG/M2 | HEART RATE: 67 BPM | RESPIRATION RATE: 12 BRPM | OXYGEN SATURATION: 98 % | SYSTOLIC BLOOD PRESSURE: 146 MMHG | DIASTOLIC BLOOD PRESSURE: 70 MMHG

## 2021-10-06 DIAGNOSIS — R70.0 ESR RAISED: ICD-10-CM

## 2021-10-06 DIAGNOSIS — D64.89 ANEMIA DUE TO OTHER CAUSE, NOT CLASSIFIED: ICD-10-CM

## 2021-10-06 DIAGNOSIS — M35.3 PMR (POLYMYALGIA RHEUMATICA) (HCC): ICD-10-CM

## 2021-10-06 DIAGNOSIS — Z95.1 S/P CABG (CORONARY ARTERY BYPASS GRAFT): ICD-10-CM

## 2021-10-06 DIAGNOSIS — Z23 IMMUNIZATION DUE: ICD-10-CM

## 2021-10-06 DIAGNOSIS — R53.82 CHRONIC FATIGUE: ICD-10-CM

## 2021-10-06 DIAGNOSIS — Z95.2 S/P AVR: ICD-10-CM

## 2021-10-06 DIAGNOSIS — J84.10 PULMONARY FIBROSIS (HCC): ICD-10-CM

## 2021-10-06 DIAGNOSIS — M35.3 PMR (POLYMYALGIA RHEUMATICA) (HCC): Primary | ICD-10-CM

## 2021-10-06 DIAGNOSIS — J42 CHRONIC BRONCHITIS, UNSPECIFIED CHRONIC BRONCHITIS TYPE (HCC): ICD-10-CM

## 2021-10-06 PROCEDURE — 99214 OFFICE O/P EST MOD 30 MIN: CPT | Performed by: INTERNAL MEDICINE

## 2021-10-06 PROCEDURE — 85025 COMPLETE CBC W/AUTO DIFF WBC: CPT

## 2021-10-06 PROCEDURE — G0008 ADMIN INFLUENZA VIRUS VAC: HCPCS | Performed by: INTERNAL MEDICINE

## 2021-10-06 PROCEDURE — 36415 COLL VENOUS BLD VENIPUNCTURE: CPT

## 2021-10-06 PROCEDURE — 80053 COMPREHEN METABOLIC PANEL: CPT

## 2021-10-06 PROCEDURE — 85652 RBC SED RATE AUTOMATED: CPT

## 2021-10-06 PROCEDURE — 90662 IIV NO PRSV INCREASED AG IM: CPT | Performed by: INTERNAL MEDICINE

## 2021-10-07 ENCOUNTER — CARDPULM VISIT (OUTPATIENT)
Dept: CARDIAC REHAB | Facility: HOSPITAL | Age: 81
End: 2021-10-07
Attending: INTERNAL MEDICINE
Payer: MEDICARE

## 2021-10-07 ENCOUNTER — TELEPHONE (OUTPATIENT)
Dept: RHEUMATOLOGY | Facility: CLINIC | Age: 81
End: 2021-10-07

## 2021-10-07 RX ORDER — PREDNISONE 2.5 MG
5 TABLET ORAL EVERY OTHER DAY
Qty: 90 TABLET | Refills: 0 | COMMUNITY
Start: 2021-10-07 | End: 2021-11-02

## 2021-10-07 NOTE — PROGRESS NOTES
Subjective:   Patient ID: Ryne Anderson is a 80year old male. fu PMR     Prox muscle aches and pains better, still fatigue. Sees Abebe for rheum. Cardiac hx-Addy      History/Other:   Review of Systems   Constitutional: Positive for fatigue.  Negative TWICE DAILY TO WHOLE BODY     • Betamethasone Dipropionate 0.05 % External Ointment Apply topically Q12H.      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   • FUO (fever of unknown origin)     PMR or testicular/prostate infection   • Gastritis    • Hemorrhoids    • Hiatal hernia with gastroesophageal reflux    • History of community acquired pneumonia    • HTN (hypertension)    • Immunosuppression Age of Onset   • Neurological Disorder Father         parkinsons   • Cancer Father         lung   • Other (Other) Father    • Mental Disorder Mother         alzheimers   • Other (Other) Mother    • Cancer Daughter         hodgkins at age 25   • Heart Surge the Last Year: Not on file  Transportation Needs:       Lack of Transportation (Medical): Not on file      Lack of Transportation (Non-Medical):  Not on file  Physical Activity:       Days of Exercise per Week: Not on file      Minutes of Exercise per [facility] (primary encounter diagnosis)  ESR raised  Immunization due  Pulmonary fibrosis (HCC)  Chronic bronchitis, unspecified chronic bronchitis type (HCC)   PMR better on methotrexate, ESR done=15 low can dec Pred 5 every day to every other day-check with Rheum

## 2021-10-08 ENCOUNTER — LAB ENCOUNTER (OUTPATIENT)
Dept: LAB | Facility: HOSPITAL | Age: 81
End: 2021-10-08
Attending: INTERNAL MEDICINE
Payer: MEDICARE

## 2021-10-08 DIAGNOSIS — Z01.818 PREOP TESTING: ICD-10-CM

## 2021-10-08 NOTE — TELEPHONE ENCOUNTER
Sed rate normal at 15. Patient feels well. Continue methotrexate 5 tablets/week. Lower prednisone to 2.5 mg/day.

## 2021-10-11 ENCOUNTER — HOSPITAL ENCOUNTER (OUTPATIENT)
Facility: HOSPITAL | Age: 81
Setting detail: HOSPITAL OUTPATIENT SURGERY
Discharge: HOME | End: 2021-10-11
Attending: INTERNAL MEDICINE | Admitting: INTERNAL MEDICINE
Payer: MEDICARE

## 2021-10-11 ENCOUNTER — ANESTHESIA (OUTPATIENT)
Dept: ENDOSCOPY | Facility: HOSPITAL | Age: 81
End: 2021-10-11
Payer: MEDICARE

## 2021-10-11 ENCOUNTER — ANESTHESIA EVENT (OUTPATIENT)
Dept: ENDOSCOPY | Facility: HOSPITAL | Age: 81
End: 2021-10-11
Payer: MEDICARE

## 2021-10-11 VITALS
DIASTOLIC BLOOD PRESSURE: 64 MMHG | RESPIRATION RATE: 18 BRPM | HEIGHT: 70 IN | SYSTOLIC BLOOD PRESSURE: 120 MMHG | TEMPERATURE: 98 F | BODY MASS INDEX: 22.19 KG/M2 | HEART RATE: 55 BPM | WEIGHT: 155 LBS | OXYGEN SATURATION: 99 %

## 2021-10-11 DIAGNOSIS — D50.9 IRON DEFICIENCY ANEMIA, UNSPECIFIED IRON DEFICIENCY ANEMIA TYPE: ICD-10-CM

## 2021-10-11 DIAGNOSIS — Z01.818 PREOP TESTING: Primary | ICD-10-CM

## 2021-10-11 PROCEDURE — 0DBL8ZX EXCISION OF TRANSVERSE COLON, VIA NATURAL OR ARTIFICIAL OPENING ENDOSCOPIC, DIAGNOSTIC: ICD-10-PCS | Performed by: INTERNAL MEDICINE

## 2021-10-11 PROCEDURE — 0DB98ZX EXCISION OF DUODENUM, VIA NATURAL OR ARTIFICIAL OPENING ENDOSCOPIC, DIAGNOSTIC: ICD-10-PCS | Performed by: INTERNAL MEDICINE

## 2021-10-11 PROCEDURE — 36415 COLL VENOUS BLD VENIPUNCTURE: CPT | Performed by: INTERNAL MEDICINE

## 2021-10-11 PROCEDURE — 0DBN8ZX EXCISION OF SIGMOID COLON, VIA NATURAL OR ARTIFICIAL OPENING ENDOSCOPIC, DIAGNOSTIC: ICD-10-PCS | Performed by: INTERNAL MEDICINE

## 2021-10-11 PROCEDURE — 0DBM8ZX EXCISION OF DESCENDING COLON, VIA NATURAL OR ARTIFICIAL OPENING ENDOSCOPIC, DIAGNOSTIC: ICD-10-PCS | Performed by: INTERNAL MEDICINE

## 2021-10-11 PROCEDURE — 88305 TISSUE EXAM BY PATHOLOGIST: CPT | Performed by: INTERNAL MEDICINE

## 2021-10-11 PROCEDURE — 0DB48ZX EXCISION OF ESOPHAGOGASTRIC JUNCTION, VIA NATURAL OR ARTIFICIAL OPENING ENDOSCOPIC, DIAGNOSTIC: ICD-10-PCS | Performed by: INTERNAL MEDICINE

## 2021-10-11 PROCEDURE — 88312 SPECIAL STAINS GROUP 1: CPT | Performed by: INTERNAL MEDICINE

## 2021-10-11 RX ORDER — SODIUM CHLORIDE, SODIUM LACTATE, POTASSIUM CHLORIDE, CALCIUM CHLORIDE 600; 310; 30; 20 MG/100ML; MG/100ML; MG/100ML; MG/100ML
INJECTION, SOLUTION INTRAVENOUS CONTINUOUS
Status: DISCONTINUED | OUTPATIENT
Start: 2021-10-11 | End: 2021-10-11

## 2021-10-11 RX ORDER — LIDOCAINE HYDROCHLORIDE 10 MG/ML
INJECTION, SOLUTION EPIDURAL; INFILTRATION; INTRACAUDAL; PERINEURAL AS NEEDED
Status: DISCONTINUED | OUTPATIENT
Start: 2021-10-11 | End: 2021-10-11 | Stop reason: SURG

## 2021-10-11 RX ADMIN — LIDOCAINE HYDROCHLORIDE 70 MG: 10 INJECTION, SOLUTION EPIDURAL; INFILTRATION; INTRACAUDAL; PERINEURAL at 13:23:00

## 2021-10-11 RX ADMIN — SODIUM CHLORIDE, SODIUM LACTATE, POTASSIUM CHLORIDE, CALCIUM CHLORIDE: 600; 310; 30; 20 INJECTION, SOLUTION INTRAVENOUS at 13:21:00

## 2021-10-11 NOTE — OPERATIVE REPORT
[facility]  Esophagogastroduodenoscopy and Colonoscopy Report    [facility]    Hyacinth Jaimes Patient Status:  Hospital Outpatient Surgery   Date of Birth 2/13/1940 MRN [id number]   Location [address] [place] [address] Attending Mor the GE junction. The gastric mucosa including retrograde views of theM cardia and fundus is normal.  The pylorus duodenal bulb and descending duodenum are normal. Biopsies were taken from the descending duodenum.      The visualized colonic mucosa reveals

## 2021-10-11 NOTE — ANESTHESIA PREPROCEDURE EVALUATION
Anesthesia PreOp Note    HPI:     Elena Castillo is a 80year old male who presents for preoperative consultation requested by: Sujit Gomez MD    Date of Surgery: 10/11/2021    Procedure(s):  COLONOSCOPY, ESOPHAGOGASTRODUODENOSCOPY (EGD)  _  Indicatio Noted: 11/08/2012      Hyperhomocysteinemia ([facility] [address]      Aortic stenosis         Date Noted: 09/22/2012      Psoriasis      Hiatal hernia with gastroesophageal reflux      Vitamin D deficiency      HTN (hypertension)         Date Noted: 08/14/2012      PMR ( Reactive depression    • Renal stone     right   • Right shoulder pain     injected-Liane   • Sleep apnea    • Thrombocytopenia (HCC)    • Vitamin B12 nutritional deficiency    • Vitamin D deficiency    • Weight loss        Past Surgical History:   Proce MCG Oral Tab, Take 100 mcg by mouth daily. , Disp: , Rfl:   predniSONE 2.5 MG Oral Tab, Take 2 tablets (5 mg total) by mouth every other day., Disp: 90 tablet, Rfl: 0, 10/8/2021  famotidine 20 MG Oral Tab, Take 1 tablet (20 mg total) by mouth 2 (two) times infusion, , Intravenous, Continuous, Jason Love MD  sodium chloride 3 % nebulizer solution 3 mL, 3 mL, Nebulization, Once, Jeff Sr., Nic Jaquez MD    No current [facility]-ordered outpatient medications on file.         Codeine                 PAIN    Co Care: Not Asked        Exercise: Yes          walking daily         Bike Helmet: Not Asked        Seat Belt: Yes        Self-Exams: Not Asked    Social History Narrative      Not on file    Social Determinants of Health  Financial Resource Strain:       Eitan Friends  (H) 10/06/2021    CA 9.7 10/06/2021          Vital Signs: Body mass index is 22.24 kg/m². height is 1.778 m (5' 10\") and weight is 70.3 kg (155 lb). His temporal temperature is 97.6 °F (36.4 °C).  His blood pressure is 146/75 and his pulse is 62 normal exam             Anesthesia Plan:   ASA:  3  Plan:   General and MAC      I have informed Jeannette Ann-Marielinh and/or legal guardian or family member of the nature of the anesthetic plan, benefits, risks including possible dental damage if relevant, iman

## 2021-10-11 NOTE — ANESTHESIA POSTPROCEDURE EVALUATION
Patient: Ryne Anderson    Procedure Summary     Date: 10/11/21 Room / Location: [address] [place] [address] ENDOSCOPY 05 / [facility] ENDOSCOPY    Anesthesia Start: 8933 Anesthesia Stop: 1400    Procedures:       COLONOSCOPY, ESOPHAGOGASTRODUODENOSCOPY (EGD) (N/A )      _ (N/A ) Diagn

## 2021-10-11 NOTE — H&P
[facility]  Pre-procedure History and Physical      Jaxon Josue Patient Status:  Hospital Outpatient Surgery    1940 MRN [id number]   Location [facility] [place] ENDOSCOPY LAB SUITES Attending Mara Newberry MD   Hosp Day # 0 MACHO Zavala Inguinal lymphadenopathy     right-bx   • Ischemic colitis [facility])    • Mesenteric adenitis     bx   • Multiple renal cysts     both   • Osteopenia    • PMR (polymyalgia rheumatica) (HCC)    • Pneumonia 02/2020   • Positive TRINY (antinuclear antibody) 8/28/20 90 tablet 3   • Methotrexate Sodium 2.5 MG Oral Tab 5 tabs=12.5 total mg once weekly 60 tablet 3   • CARVEDILOL 12.5 MG Oral Tab TAKE 1 TABLET BY MOUTH TWICE DAILY WITH MEALS 180 tablet 3   • PAROXETINE HCL 10 MG Oral Tab TAKE 1 TABLET BY MOUTH ONCE DAILY 10/11/21  1301   BP: 146/75   Pulse: 62   Resp: 16   Temp: 97.6 °F (36.4 °C)     HEENT: normocephalic, oropharynx clear  HEART: normal S1S2  LUNGS: clear  ABDOMEN: soft, bowel sounds positive, no masses    ASSESSMENT:    1. Iron deficiency anemia  2.  Histo

## 2021-10-14 ENCOUNTER — CARDPULM VISIT (OUTPATIENT)
Dept: CARDIAC REHAB | Facility: HOSPITAL | Age: 81
End: 2021-10-14
Attending: INTERNAL MEDICINE
Payer: MEDICARE

## 2021-10-16 ENCOUNTER — HOSPITAL ENCOUNTER (OUTPATIENT)
Dept: GENERAL RADIOLOGY | Facility: HOSPITAL | Age: 81
Discharge: HOME | End: 2021-10-16
Attending: INTERNAL MEDICINE
Payer: MEDICARE

## 2021-10-16 DIAGNOSIS — J42 CHRONIC BRONCHITIS, UNSPECIFIED CHRONIC BRONCHITIS TYPE (HCC): ICD-10-CM

## 2021-10-16 DIAGNOSIS — J84.10 PULMONARY FIBROSIS (HCC): ICD-10-CM

## 2021-10-16 PROCEDURE — 71046 X-RAY EXAM CHEST 2 VIEWS: CPT | Performed by: INTERNAL MEDICINE

## 2021-10-19 ENCOUNTER — CARDPULM VISIT (OUTPATIENT)
Dept: CARDIAC REHAB | Facility: HOSPITAL | Age: 81
End: 2021-10-19
Attending: INTERNAL MEDICINE
Payer: MEDICARE

## 2021-10-19 ENCOUNTER — TELEPHONE (OUTPATIENT)
Dept: INTERNAL MEDICINE CLINIC | Facility: CLINIC | Age: 81
End: 2021-10-19

## 2021-10-20 ENCOUNTER — TELEPHONE (OUTPATIENT)
Dept: INTERNAL MEDICINE CLINIC | Facility: CLINIC | Age: 81
End: 2021-10-20

## 2021-10-20 DIAGNOSIS — K52.9 COLITIS: Primary | ICD-10-CM

## 2021-10-20 NOTE — TELEPHONE ENCOUNTER
MD Hema Chan January, April, RN  Please order a plain CT abd and pelvis dx colitis-tell patient this is what GI Sabrina Come and I want     Ordered. Wife Alejandro Navas notified.

## 2021-10-20 NOTE — PROGRESS NOTES
I reviewed the results with the patient's wife. GE junction biopsies show intestinal metaplasia without dysplasia. Duodenal biopsies are normal. One polyp is an adenoma and two are hyperplastic.  Sigmoid biopsies show changes consistent with ischemic coliti

## 2021-10-25 ENCOUNTER — HOSPITAL ENCOUNTER (OUTPATIENT)
Dept: CT IMAGING | Facility: HOSPITAL | Age: 81
Discharge: HOME | End: 2021-10-25
Attending: INTERNAL MEDICINE
Payer: MEDICARE

## 2021-10-25 ENCOUNTER — TELEPHONE (OUTPATIENT)
Dept: INTERNAL MEDICINE CLINIC | Facility: CLINIC | Age: 81
End: 2021-10-25

## 2021-10-25 DIAGNOSIS — K52.9 COLITIS: ICD-10-CM

## 2021-10-25 PROCEDURE — 74176 CT ABD & PELVIS W/O CONTRAST: CPT | Performed by: INTERNAL MEDICINE

## 2021-10-25 NOTE — TELEPHONE ENCOUNTER
Discussed CT and Colonoscopy findings and discussed with -mild ischemic colitis-avoid caffeine. Normal BM. Intermittent reflux and abd cramping-has Pepcid. Has fu.

## 2021-10-26 ENCOUNTER — CARDPULM VISIT (OUTPATIENT)
Dept: CARDIAC REHAB | Facility: HOSPITAL | Age: 81
End: 2021-10-26
Attending: INTERNAL MEDICINE
Payer: MEDICARE

## 2021-10-28 ENCOUNTER — CARDPULM VISIT (OUTPATIENT)
Dept: CARDIAC REHAB | Facility: HOSPITAL | Age: 81
End: 2021-10-28
Attending: INTERNAL MEDICINE
Payer: MEDICARE

## 2021-11-02 ENCOUNTER — CARDPULM VISIT (OUTPATIENT)
Dept: CARDIAC REHAB | Facility: HOSPITAL | Age: 81
End: 2021-11-02
Attending: INTERNAL MEDICINE
Payer: MEDICARE

## 2021-11-02 RX ORDER — PREDNISONE 2.5 MG
5 TABLET ORAL EVERY OTHER DAY
Qty: 45 TABLET | Refills: 1 | Status: SHIPPED | OUTPATIENT
Start: 2021-11-02 | End: 2021-11-09

## 2021-11-04 ENCOUNTER — CARDPULM VISIT (OUTPATIENT)
Dept: CARDIAC REHAB | Facility: HOSPITAL | Age: 81
End: 2021-11-04
Attending: INTERNAL MEDICINE
Payer: MEDICARE

## 2021-11-05 ENCOUNTER — TELEPHONE (OUTPATIENT)
Dept: INTERNAL MEDICINE CLINIC | Facility: CLINIC | Age: 81
End: 2021-11-05

## 2021-11-05 DIAGNOSIS — R70.0 ELEVATED SED RATE: Primary | ICD-10-CM

## 2021-11-05 NOTE — TELEPHONE ENCOUNTER
Patient called and asks if Dr Loc Phelan would like to order a sed rate blood test just to see where levels are currently at.  He says Dr Loc Phelan usually likes to have it checked before patient's follow up appointment with him

## 2021-11-08 ENCOUNTER — LAB ENCOUNTER (OUTPATIENT)
Dept: LAB | Age: 81
End: 2021-11-08
Attending: INTERNAL MEDICINE
Payer: MEDICARE

## 2021-11-08 DIAGNOSIS — R70.0 ELEVATED SED RATE: ICD-10-CM

## 2021-11-08 PROCEDURE — 85652 RBC SED RATE AUTOMATED: CPT

## 2021-11-08 PROCEDURE — 36415 COLL VENOUS BLD VENIPUNCTURE: CPT

## 2021-11-09 ENCOUNTER — CARDPULM VISIT (OUTPATIENT)
Dept: CARDIAC REHAB | Facility: HOSPITAL | Age: 81
End: 2021-11-09
Attending: INTERNAL MEDICINE
Payer: MEDICARE

## 2021-11-09 ENCOUNTER — OFFICE VISIT (OUTPATIENT)
Dept: INTERNAL MEDICINE CLINIC | Facility: CLINIC | Age: 81
End: 2021-11-09
Payer: MEDICARE

## 2021-11-09 ENCOUNTER — TELEPHONE (OUTPATIENT)
Dept: RHEUMATOLOGY | Facility: CLINIC | Age: 81
End: 2021-11-09

## 2021-11-09 VITALS
SYSTOLIC BLOOD PRESSURE: 112 MMHG | HEART RATE: 59 BPM | WEIGHT: 157 LBS | TEMPERATURE: 98 F | RESPIRATION RATE: 16 BRPM | OXYGEN SATURATION: 97 % | DIASTOLIC BLOOD PRESSURE: 64 MMHG | BODY MASS INDEX: 22.48 KG/M2 | HEIGHT: 70 IN

## 2021-11-09 DIAGNOSIS — M35.3 PMR (POLYMYALGIA RHEUMATICA) (HCC): Primary | ICD-10-CM

## 2021-11-09 PROCEDURE — 99213 OFFICE O/P EST LOW 20 MIN: CPT | Performed by: INTERNAL MEDICINE

## 2021-11-09 RX ORDER — PREDNISONE 2.5 MG
5 TABLET ORAL DAILY
Qty: 45 TABLET | Refills: 1 | COMMUNITY
Start: 2021-11-09

## 2021-11-09 NOTE — PROGRESS NOTES
Subjective:   Patient ID: Ke Bui is a 80year old male. PMR fatigue myalgias not bad    PMR on Pred 2.5 and Methotrexate-myalgias/arthralgias not bad but extreme fatigue      History/Other:   Review of Systems   Constitutional: Positive for fatigue topically Q12H. • cholecalciferol (D3-50) 1.25 MG (05498 UT) Oral Cap Take 1 capsule by mouth once a week 13 capsule 0   • Magnesium Citrate 125 MG Oral Cap Take 200 capsules by mouth daily.   0   • Turmeric, Curcuma Longa, Does not apply Powder One a d Mental Status: He is alert.    Psychiatric:         Mood and Affect: Mood normal.        11/09/21  0914   BP: 112/64   Pulse: 59   Resp: 16   Temp: 97.5 °F (36.4 °C)         Assessment & Plan:   PMR (polymyalgia rheumatica) (HCC)  (primary encounter diagn

## 2021-11-09 NOTE — TELEPHONE ENCOUNTER
Spoke with Dr. Hoang Causey today regarding Carley Mcdonnell. Carley Kecia feels a little worse. More tired. Sed rate is up to 48. He has PMR. Plan is increase the prednisone from 2.5 to 5 mg a day. Maintain the methotrexate at 5 tablets/week which is 12.5 mg.   See ho

## 2021-11-10 ENCOUNTER — TELEPHONE (OUTPATIENT)
Dept: INTERNAL MEDICINE CLINIC | Facility: CLINIC | Age: 81
End: 2021-11-10

## 2021-11-10 NOTE — TELEPHONE ENCOUNTER
Patient would like to speak with the nurse regarding some questions he has about the full dose Moderna or pfizer vaccine.

## 2021-11-11 ENCOUNTER — APPOINTMENT (OUTPATIENT)
Dept: CARDIAC REHAB | Facility: HOSPITAL | Age: 81
End: 2021-11-11
Attending: INTERNAL MEDICINE
Payer: MEDICARE

## 2021-11-16 ENCOUNTER — APPOINTMENT (OUTPATIENT)
Dept: CARDIAC REHAB | Facility: HOSPITAL | Age: 81
End: 2021-11-16
Attending: INTERNAL MEDICINE
Payer: MEDICARE

## 2021-11-18 ENCOUNTER — APPOINTMENT (OUTPATIENT)
Dept: CARDIAC REHAB | Facility: HOSPITAL | Age: 81
End: 2021-11-18
Attending: INTERNAL MEDICINE
Payer: MEDICARE

## 2021-11-22 ENCOUNTER — TELEPHONE (OUTPATIENT)
Dept: INTERNAL MEDICINE CLINIC | Facility: CLINIC | Age: 81
End: 2021-11-22

## 2021-11-22 RX ORDER — CHOLECALCIFEROL (VITAMIN D3) 1250 MCG
CAPSULE ORAL
Qty: 13 CAPSULE | Refills: 1 | Status: SHIPPED | OUTPATIENT
Start: 2021-11-22

## 2021-11-22 NOTE — TELEPHONE ENCOUNTER
Patient states Dr. Loc Phelan prescribed 2 different D3 medications and he is not sure which one he is supposed to take. Please return call.

## 2021-11-22 NOTE — TELEPHONE ENCOUNTER
Reviewed with patient, both bottles have same dose/ instruction. Most likely different manufactures. Patient verbalized understanding.

## 2021-12-02 ENCOUNTER — PATIENT OUTREACH (OUTPATIENT)
Dept: CASE MANAGEMENT | Age: 81
End: 2021-12-02

## 2021-12-09 ENCOUNTER — TELEPHONE (OUTPATIENT)
Dept: INTERNAL MEDICINE CLINIC | Facility: CLINIC | Age: 81
End: 2021-12-09

## 2021-12-09 NOTE — TELEPHONE ENCOUNTER
Patient is getting his booster shot on Monday and he has questions about stopping prednisone before and after the shot. This is what he did before previous shots. Please call to confirm that is what he should do.

## 2021-12-09 NOTE — TELEPHONE ENCOUNTER
Patient is going to hold prednisone prior to and a few days after COVID vaccine as he did prior to previous COVID vaccines.

## 2021-12-10 ENCOUNTER — TELEPHONE (OUTPATIENT)
Dept: INTERNAL MEDICINE CLINIC | Facility: CLINIC | Age: 81
End: 2021-12-10

## 2021-12-10 ENCOUNTER — PATIENT OUTREACH (OUTPATIENT)
Dept: CASE MANAGEMENT | Age: 81
End: 2021-12-10

## 2021-12-10 NOTE — TELEPHONE ENCOUNTER
Good question-theoretically should stop both Prednisone and Metho 1 week before Covid booster and 1 week after = 2 weeks total BUT. .he will be nonfuctional. So will compromise and say cont Pred and hold the Metho for the two weeks(one week before and one w

## 2021-12-10 NOTE — TELEPHONE ENCOUNTER
Patient called and wants to know if he should stop taking Methotrexate Sodium 2.5 MG Oral Tab before he gets his booster shot.

## 2021-12-13 ENCOUNTER — IMMUNIZATION (OUTPATIENT)
Dept: LAB | Facility: HOSPITAL | Age: 81
End: 2021-12-13
Attending: EMERGENCY MEDICINE
Payer: MEDICARE

## 2021-12-13 DIAGNOSIS — Z23 NEED FOR VACCINATION: Primary | ICD-10-CM

## 2021-12-13 PROCEDURE — 0064A SARSCOV2 VAC 50MCG/0.25ML IM: CPT

## 2021-12-23 ENCOUNTER — TELEPHONE (OUTPATIENT)
Dept: INTERNAL MEDICINE CLINIC | Facility: CLINIC | Age: 81
End: 2021-12-23

## 2021-12-23 NOTE — TELEPHONE ENCOUNTER
Patient was at an engagement party for about 2 hours yesterday with his grandson. Yesterday the grandson tested negative for covid, today he tested positive. Please call patient.

## 2021-12-28 ENCOUNTER — TELEPHONE (OUTPATIENT)
Dept: INTERNAL MEDICINE CLINIC | Facility: CLINIC | Age: 81
End: 2021-12-28

## 2021-12-28 NOTE — TELEPHONE ENCOUNTER
FYI: Patient was tested negative for Covid, however he wants to cancel his f/u appt with  DR. Debra Kwon

## 2022-01-07 ENCOUNTER — VIRTUAL PHONE E/M (OUTPATIENT)
Dept: INTERNAL MEDICINE CLINIC | Facility: CLINIC | Age: 82
End: 2022-01-07
Payer: MEDICARE

## 2022-01-07 DIAGNOSIS — D64.89 ANEMIA DUE TO OTHER CAUSE, NOT CLASSIFIED: ICD-10-CM

## 2022-01-07 DIAGNOSIS — M35.3 PMR (POLYMYALGIA RHEUMATICA) (HCC): Primary | ICD-10-CM

## 2022-01-07 DIAGNOSIS — N18.32 STAGE 3B CHRONIC KIDNEY DISEASE (HCC): ICD-10-CM

## 2022-01-07 PROCEDURE — 99442 PHONE E/M BY PHYS 11-20 MIN: CPT | Performed by: INTERNAL MEDICINE

## 2022-01-07 NOTE — PROGRESS NOTES
Virtual Telephone Check-In    Mely Macario verbally consents to a Virtual/Telephone Check-In visit on 01/07/22. Patient has been referred to the [facility] website at [email or web address] to review the yearly Consent to Treat document.     Patient underst

## 2022-01-19 ENCOUNTER — LAB ENCOUNTER (OUTPATIENT)
Dept: LAB | Facility: REFERENCE LAB | Age: 82
End: 2022-01-19
Attending: INTERNAL MEDICINE
Payer: MEDICARE

## 2022-01-19 DIAGNOSIS — D64.89 ANEMIA DUE TO OTHER CAUSE, NOT CLASSIFIED: ICD-10-CM

## 2022-01-19 DIAGNOSIS — M35.3 PMR (POLYMYALGIA RHEUMATICA) (HCC): ICD-10-CM

## 2022-01-19 DIAGNOSIS — N18.32 STAGE 3B CHRONIC KIDNEY DISEASE (HCC): ICD-10-CM

## 2022-01-19 LAB
ANION GAP SERPL CALC-SCNC: 6 MMOL/L (ref 0–18)
BUN BLD-MCNC: 28 MG/DL (ref 7–18)
BUN/CREAT SERPL: 15.6 (ref 10–20)
CALCIUM BLD-MCNC: 9.3 MG/DL (ref 8.5–10.1)
CHLORIDE SERPL-SCNC: 107 MMOL/L (ref 98–112)
CO2 SERPL-SCNC: 27 MMOL/L (ref 21–32)
CREAT BLD-MCNC: 1.8 MG/DL
DEPRECATED HBV CORE AB SER IA-ACNC: 352.9 NG/ML
ERYTHROCYTE [SEDIMENTATION RATE] IN BLOOD: 26 MM/HR
FASTING STATUS PATIENT QL REPORTED: YES
FOLATE SERPL-MCNC: >20 NG/ML (ref 8.7–?)
GLUCOSE BLD-MCNC: 88 MG/DL (ref 70–99)
IRON SATN MFR SERPL: 37 %
IRON SERPL-MCNC: 124 UG/DL
OSMOLALITY SERPL CALC.SUM OF ELEC: 295 MOSM/KG (ref 275–295)
POTASSIUM SERPL-SCNC: 4.1 MMOL/L (ref 3.5–5.1)
SODIUM SERPL-SCNC: 140 MMOL/L (ref 136–145)
TIBC SERPL-MCNC: 331 UG/DL (ref 240–450)
TRANSFERRIN SERPL-MCNC: 222 MG/DL (ref 200–360)
VIT B12 SERPL-MCNC: 774 PG/ML (ref 193–986)

## 2022-01-19 PROCEDURE — 84466 ASSAY OF TRANSFERRIN: CPT

## 2022-01-19 PROCEDURE — 80048 BASIC METABOLIC PNL TOTAL CA: CPT

## 2022-01-19 PROCEDURE — 83540 ASSAY OF IRON: CPT

## 2022-01-19 PROCEDURE — 82746 ASSAY OF FOLIC ACID SERUM: CPT

## 2022-01-19 PROCEDURE — 82607 VITAMIN B-12: CPT

## 2022-01-19 PROCEDURE — 82728 ASSAY OF FERRITIN: CPT

## 2022-01-19 PROCEDURE — 36415 COLL VENOUS BLD VENIPUNCTURE: CPT

## 2022-01-19 PROCEDURE — 85652 RBC SED RATE AUTOMATED: CPT

## 2022-01-26 ENCOUNTER — TELEPHONE (OUTPATIENT)
Dept: INTERNAL MEDICINE CLINIC | Facility: CLINIC | Age: 82
End: 2022-01-26

## 2022-01-26 ENCOUNTER — TELEPHONE (OUTPATIENT)
Dept: RHEUMATOLOGY | Facility: CLINIC | Age: 82
End: 2022-01-26

## 2022-01-26 DIAGNOSIS — K55.9 ISCHEMIC COLITIS (HCC): Primary | ICD-10-CM

## 2022-01-26 NOTE — TELEPHONE ENCOUNTER
Spouse (Cathy) would like to know if the patient can have the CAT scan done at [facility] or does he have to have it done at the [facility]? Please call ASAP so she can schedule.

## 2022-01-26 NOTE — TELEPHONE ENCOUNTER
I called and told him I suspect ischemic colitis, I ordered stat CT abd/pelvis for TOMORROW please call our radiol to confirm. Add stat CBC, ESR and CMP also for tomorrow.

## 2022-01-26 NOTE — TELEPHONE ENCOUNTER
Diego Holter is having abdominal pain and bloody diarrhea since last night. Hx of stomach colitis please advise.  [phone]

## 2022-01-26 NOTE — TELEPHONE ENCOUNTER
Gini Rosario (spouse) informed that the patient can have his CAT Scan done at [facility] as per [facility].

## 2022-01-27 ENCOUNTER — HOSPITAL ENCOUNTER (OUTPATIENT)
Dept: CT IMAGING | Facility: HOSPITAL | Age: 82
Discharge: HOME | End: 2022-01-27
Attending: INTERNAL MEDICINE
Payer: MEDICARE

## 2022-01-27 ENCOUNTER — OFFICE VISIT (OUTPATIENT)
Dept: SURGERY | Facility: CLINIC | Age: 82
End: 2022-01-27
Payer: MEDICARE

## 2022-01-27 ENCOUNTER — LAB ENCOUNTER (OUTPATIENT)
Dept: LAB | Facility: HOSPITAL | Age: 82
End: 2022-01-27
Attending: INTERNAL MEDICINE
Payer: MEDICARE

## 2022-01-27 ENCOUNTER — TELEPHONE (OUTPATIENT)
Dept: INTERNAL MEDICINE CLINIC | Facility: CLINIC | Age: 82
End: 2022-01-27

## 2022-01-27 VITALS
DIASTOLIC BLOOD PRESSURE: 84 MMHG | SYSTOLIC BLOOD PRESSURE: 134 MMHG | WEIGHT: 149 LBS | HEART RATE: 88 BPM | BODY MASS INDEX: 21.33 KG/M2 | HEIGHT: 70 IN | TEMPERATURE: 97 F

## 2022-01-27 DIAGNOSIS — K55.9 ISCHEMIC COLITIS (HCC): ICD-10-CM

## 2022-01-27 DIAGNOSIS — Z95.1 S/P CABG (CORONARY ARTERY BYPASS GRAFT): ICD-10-CM

## 2022-01-27 DIAGNOSIS — N18.4 CKD (CHRONIC KIDNEY DISEASE) STAGE 4, GFR 15-29 ML/MIN (HCC): ICD-10-CM

## 2022-01-27 DIAGNOSIS — K57.90 DIVERTICULOSIS: ICD-10-CM

## 2022-01-27 DIAGNOSIS — K55.9 ISCHEMIC COLITIS (HCC): Primary | ICD-10-CM

## 2022-01-27 DIAGNOSIS — T38.0X5A IMMUNOSUPPRESSION DUE TO CHRONIC STEROID USE (HCC): ICD-10-CM

## 2022-01-27 DIAGNOSIS — I10 PRIMARY HYPERTENSION: ICD-10-CM

## 2022-01-27 DIAGNOSIS — R10.9 ABDOMINAL PAIN, UNSPECIFIED ABDOMINAL LOCATION: ICD-10-CM

## 2022-01-27 DIAGNOSIS — Z95.2 S/P AVR: ICD-10-CM

## 2022-01-27 DIAGNOSIS — K22.719 BARRETT'S ESOPHAGUS WITH DYSPLASIA: ICD-10-CM

## 2022-01-27 DIAGNOSIS — I25.10 CORONARY ARTERY DISEASE INVOLVING NATIVE CORONARY ARTERY OF NATIVE HEART WITHOUT ANGINA PECTORIS: ICD-10-CM

## 2022-01-27 DIAGNOSIS — Z79.52 IMMUNOSUPPRESSION DUE TO CHRONIC STEROID USE (HCC): ICD-10-CM

## 2022-01-27 DIAGNOSIS — D84.821 IMMUNOSUPPRESSION DUE TO CHRONIC STEROID USE (HCC): ICD-10-CM

## 2022-01-27 DIAGNOSIS — D50.0 IRON DEFICIENCY ANEMIA DUE TO CHRONIC BLOOD LOSS: ICD-10-CM

## 2022-01-27 LAB
ALBUMIN SERPL-MCNC: 3.5 G/DL (ref 3.4–5)
ALBUMIN/GLOB SERPL: 1 {RATIO} (ref 1–2)
ALP LIVER SERPL-CCNC: 51 U/L
ALT SERPL-CCNC: 20 U/L
ANION GAP SERPL CALC-SCNC: 8 MMOL/L (ref 0–18)
AST SERPL-CCNC: 21 U/L (ref 15–37)
BASOPHILS # BLD AUTO: 0.04 X10(3) UL (ref 0–0.2)
BASOPHILS NFR BLD AUTO: 0.2 %
BILIRUB SERPL-MCNC: 1.3 MG/DL (ref 0.1–2)
BUN BLD-MCNC: 30 MG/DL (ref 7–18)
CALCIUM BLD-MCNC: 9 MG/DL (ref 8.5–10.1)
CHLORIDE SERPL-SCNC: 104 MMOL/L (ref 98–112)
CO2 SERPL-SCNC: 25 MMOL/L (ref 21–32)
CREAT BLD-MCNC: 1.93 MG/DL
EOSINOPHIL # BLD AUTO: 0.26 X10(3) UL (ref 0–0.7)
EOSINOPHIL NFR BLD AUTO: 1.1 %
ERYTHROCYTE [DISTWIDTH] IN BLOOD BY AUTOMATED COUNT: 13.4 %
ERYTHROCYTE [SEDIMENTATION RATE] IN BLOOD: 33 MM/HR
FASTING STATUS PATIENT QL REPORTED: NO
GLOBULIN PLAS-MCNC: 3.4 G/DL (ref 2.8–4.4)
GLUCOSE BLD-MCNC: 96 MG/DL (ref 70–99)
HCT VFR BLD AUTO: 37.5 %
HGB BLD-MCNC: 12.4 G/DL
IMM GRANULOCYTES # BLD AUTO: 0.18 X10(3) UL (ref 0–1)
IMM GRANULOCYTES NFR BLD: 0.7 %
LYMPHOCYTES # BLD AUTO: 1.01 X10(3) UL (ref 1–4)
LYMPHOCYTES NFR BLD AUTO: 4.1 %
MCH RBC QN AUTO: 33.7 PG (ref 26–34)
MCHC RBC AUTO-ENTMCNC: 33.1 G/DL (ref 31–37)
MCV RBC AUTO: 101.9 FL
MONOCYTES # BLD AUTO: 1.18 X10(3) UL (ref 0.1–1)
MONOCYTES NFR BLD AUTO: 4.8 %
NEUTROPHILS # BLD AUTO: 21.91 X10 (3) UL (ref 1.5–7.7)
NEUTROPHILS # BLD AUTO: 21.91 X10(3) UL (ref 1.5–7.7)
NEUTROPHILS NFR BLD AUTO: 89.1 %
OSMOLALITY SERPL CALC.SUM OF ELEC: 290 MOSM/KG (ref 275–295)
PLATELET # BLD AUTO: 154 10(3)UL (ref 150–450)
POTASSIUM SERPL-SCNC: 4 MMOL/L (ref 3.5–5.1)
PROT SERPL-MCNC: 6.9 G/DL (ref 6.4–8.2)
RBC # BLD AUTO: 3.68 X10(6)UL
SODIUM SERPL-SCNC: 137 MMOL/L (ref 136–145)
WBC # BLD AUTO: 24.6 X10(3) UL (ref 4–11)

## 2022-01-27 PROCEDURE — 74176 CT ABD & PELVIS W/O CONTRAST: CPT | Performed by: INTERNAL MEDICINE

## 2022-01-27 PROCEDURE — 80053 COMPREHEN METABOLIC PANEL: CPT

## 2022-01-27 PROCEDURE — 85025 COMPLETE CBC W/AUTO DIFF WBC: CPT

## 2022-01-27 PROCEDURE — 99205 OFFICE O/P NEW HI 60 MIN: CPT | Performed by: COLON & RECTAL SURGERY

## 2022-01-27 PROCEDURE — 36415 COLL VENOUS BLD VENIPUNCTURE: CPT

## 2022-01-27 PROCEDURE — 85652 RBC SED RATE AUTOMATED: CPT

## 2022-01-27 RX ORDER — METRONIDAZOLE 500 MG/1
500 TABLET ORAL 3 TIMES DAILY
Qty: 30 TABLET | Refills: 0 | Status: SHIPPED | OUTPATIENT
Start: 2022-01-27

## 2022-01-27 RX ORDER — LEVOFLOXACIN 500 MG/1
500 TABLET, FILM COATED ORAL DAILY
Qty: 10 TABLET | Refills: 0 | Status: SHIPPED | OUTPATIENT
Start: 2022-01-27 | End: 2022-02-06

## 2022-01-27 NOTE — TELEPHONE ENCOUNTER
Today spoke with wife. Patient's recent lab test show a sed rate 26. Creatinine 1.8. These levels are stable for Bibi Finney. However he is having abdominal pain. Seen Dr. Rigo Delgado primary, and Dr. Alina Perez GI.   Had a CAT scan today which does show some colit

## 2022-01-27 NOTE — TELEPHONE ENCOUNTER
Spouse called. Unable to see his regular surgeon. Made of with Dr. Nela Ramon tomorrow at 1:20. Wants to know what Dr. Gabrielle Manzano advises. Instructed patient to keep this appointment.  Will forward to Dr. Gabrielle Manzano

## 2022-01-27 NOTE — H&P
New Patient Visit Note       Active Problems      1. Ischemic colitis ([facility] [address]    2. Diverticulosis    3. Primary hypertension    4. CKD (chronic kidney disease) stage 4, GFR 15-29 ml/min ([facility])    5.  Coronary artery disease involving native coronary artery of [place] in this encounter. The physician obtained a history, independently performed a physical exam, and developed an assessment and plan. The physician performed all medical decision making.     Marvel Smith PA-C      I agree with the not bone marrow neg 12/2012   • CKD (chronic kidney disease) stage 4, GFR 15-29 ml/min ([facility])    • Closed wedge compression fracture of T11 vertebra with routine healing 6/19/2018   • Colon polyps 11/2012    colonoscopy   • Compression fracture of T12 vertebra COLONOSCOPY  10/11/2021    Polyps   • COLONOSCOPY N/A 10/11/2021    Procedure: COLONOSCOPY, ESOPHAGOGASTRODUODENOSCOPY (EGD);   Surgeon: Phil Grant MD;  Location: [address] [place] [address] ENDOSCOPY   • CORTISONE INJECTION  7/6/15    rt shoulder    • CYTOLOGY LYMP daily        Stress Concern: No        Weight Concern: No        Special Diet: No        Exercise: Yes          walking daily         Seat Belt: Yes       Current Outpatient Medications:   •  levoFLOXacin (LEVAQUIN) 500 MG Oral Tab, Take 1 tablet (500 mg t 0.05 % External Ointment, Apply topically Q12H., Disp: , Rfl:   •  Magnesium Citrate 125 MG Oral Cap, Take 200 capsules by mouth daily. , Disp: , Rfl: 0  •  Turmeric, Curcuma Longa, Does not apply Powder, One a day 300mg, Disp: , Rfl: 0  •  Betaine, Trimeth Exam  Constitutional:       Appearance: He is well-developed. Pulmonary:      Effort: Pulmonary effort is normal.      Breath sounds: Normal breath sounds. Abdominal:      General: Bowel sounds are normal. There is no distension.       Palpations: Abdom and 4-5 bouts of diarrhea daily. He endorses anorexia, and states he has been not eating much aside from soup broth for the past 2 days. He notes some associated nausea. He denies fevers, chills, vomiting. I personally reviewed this patient's CT.  CT ever fluids he will tolerate at this time. I instructed the patient that if his symptoms worsen, his pain increases, or he has nausea, vomiting, fever, chills, dizziness, lightheadedness, then he should go to the emergency department immediately.     I wou

## 2022-01-27 NOTE — PATIENT INSTRUCTIONS
The patient presents today in consultation of Dr. Marzetta Libman for ischemic colitis. The patient states about 1 week ago he started having left lower quadrant pain. He describes the pain as a constant, dull, ache that is tolerable.   He states his pain is asso imaging due to his chronic kidney disease. I discussed with the patient that I recommend gut rest, a clear liquid diet, and hydration for his symptoms. I instructed the patient that it is essential that he stay hydrated.   I instructed him to obtain G

## 2022-01-27 NOTE — TELEPHONE ENCOUNTER
Called pharmacy. On lunch break. Left message. What additional information do they need from this office?

## 2022-01-27 NOTE — TELEPHONE ENCOUNTER
Dr Demetrice Linder sent two antibiotic prescriptions in for [facility], but they should have been sent to [facility] in [facility]. The [facility] pharmacist states they have no history for [facility], so can we call in the medications ASAP and fax over some information about [facility]? Please call Jj Abraham when we have submitted this to the new pharmacy so then she will send her son to wait for the prescriptions to be ready. Again, she needs these ASAP.     levoFLOXacin (LEVAQUIN) 500 MG Oral Tab    metRONIDAZOLE (FLAGYL) 500 MG Oral Tab

## 2022-01-28 ENCOUNTER — OFFICE VISIT (OUTPATIENT)
Dept: INTERNAL MEDICINE CLINIC | Facility: CLINIC | Age: 82
End: 2022-01-28
Payer: MEDICARE

## 2022-01-28 ENCOUNTER — OFFICE VISIT (OUTPATIENT)
Dept: HEMATOLOGY/ONCOLOGY | Facility: HOSPITAL | Age: 82
End: 2022-01-28
Attending: INTERNAL MEDICINE
Payer: MEDICARE

## 2022-01-28 ENCOUNTER — TELEPHONE (OUTPATIENT)
Dept: INTERNAL MEDICINE CLINIC | Facility: CLINIC | Age: 82
End: 2022-01-28

## 2022-01-28 VITALS
BODY MASS INDEX: 22 KG/M2 | RESPIRATION RATE: 16 BRPM | SYSTOLIC BLOOD PRESSURE: 110 MMHG | HEART RATE: 70 BPM | WEIGHT: 155 LBS | OXYGEN SATURATION: 94 % | DIASTOLIC BLOOD PRESSURE: 72 MMHG | TEMPERATURE: 98 F

## 2022-01-28 VITALS
BODY MASS INDEX: 22 KG/M2 | OXYGEN SATURATION: 99 % | DIASTOLIC BLOOD PRESSURE: 68 MMHG | TEMPERATURE: 98 F | WEIGHT: 155.81 LBS | HEART RATE: 80 BPM | SYSTOLIC BLOOD PRESSURE: 111 MMHG | RESPIRATION RATE: 16 BRPM

## 2022-01-28 DIAGNOSIS — K55.9 ISCHEMIC COLITIS (HCC): Primary | ICD-10-CM

## 2022-01-28 DIAGNOSIS — E86.0 DEHYDRATION: Primary | ICD-10-CM

## 2022-01-28 PROCEDURE — 96360 HYDRATION IV INFUSION INIT: CPT

## 2022-01-28 PROCEDURE — 96361 HYDRATE IV INFUSION ADD-ON: CPT

## 2022-01-28 PROCEDURE — 99215 OFFICE O/P EST HI 40 MIN: CPT | Performed by: INTERNAL MEDICINE

## 2022-01-28 RX ORDER — SODIUM CHLORIDE 9 MG/ML
INJECTION, SOLUTION INTRAVENOUS CONTINUOUS
Status: ACTIVE | OUTPATIENT
Start: 2022-01-31 | End: 2022-01-31

## 2022-01-28 NOTE — PROGRESS NOTES
Education Record    Learner:  Patient    Disease / Diagnosis: Dehydration    Barriers / Limitations:  None   Comments:    Method:  Discussion   Comments:    General Topics:  Medication, Procedure and Plan of care reviewed   Comments:    Outcome:  Shows und

## 2022-01-28 NOTE — IMAGING NOTE
Spoke  To wife  Tobias Jamil with direction. Arrival time  At 745 for 2 hrs hydration.  CT at 10 AM. Wife verbalized understanding and  Call back number provided

## 2022-01-28 NOTE — TELEPHONE ENCOUNTER
Left message for patient and spouse. CT scheduled for [facility] Monday 1/31. Need to arrive at 8:00am for IV Infusion 500cc NS to run over 2 hours prior to procedure which is scheduled at 10:00am and repeat labs for Wednesday 2.2.2022.

## 2022-01-30 NOTE — PROGRESS NOTES
Subjective:   Patient ID: Nanette Loco is a 80year old male. Seriously ill with colitis      Called late 1/26 with abd cramps and bloody diarrhea. Had CT 1/27 showing severe colitis most likely ischemic.  On prednisone and Methotrexate for for PMR so i total mg once weekly 60 tablet 3   • CARVEDILOL 12.5 MG Oral Tab TAKE 1 TABLET BY MOUTH TWICE DAILY WITH MEALS 180 tablet 3   • CEREFOLIN NAC 6-90.314-2-600 MG Oral Tab TAKE 1 TABLET BY MOUTH ON MONDAY, WEDNESDAY AND FRIDAY THREE  TIMES  A  WEEK 36 tablet is alert.          Active Problems:  Patient Active Problem List:     HTN (hypertension)     PMR (polymyalgia rheumatica) (HCC)     CAD (coronary artery disease)     Aortic stenosis     Psoriasis     Hiatal hernia with gastroesophageal reflux     Vitamin D ejection fraction 1/16/2020   • Diverticulosis    • Drug-induced interstitial nephritis 8/23/2020    Omeprazole   • Dyslipidemia    • Elevated homocysteine    • Elevated lipoprotein A level    • Elevated lipoprotein A level    • Elevated troponin    • Fati JAR(S): 1  12/07/2012    excisional biopsy rt inguinal lymph node    • EGD  10-    [facility]   • IR KIDNEY BIOPSY (RENAL)RANDOM  08/11/2020    for drug induced nephritis   • LAPAROSCOPIC LYMPH NODE BIOP  2/8/13    exploratory abd lymphadenopathy   • RE Concern: No        Weight Concern: No        Special Diet: No        Back Care: Not Asked        Exercise: Yes          walking daily         Bike Helmet: Not Asked        Seat Belt: Yes        Self-Exams: Not Asked    Social History Narrative      Not on weekend  Over 1 hr spent on his case        Orders Placed This Encounter      CBC W Differential W Platelet [E]      Sed Rate, Westergren (Automated) [E]      Basic Metabolic Panel (8) [E]      Meds This Visit:  Requested Prescriptions      No prescription

## 2022-01-31 ENCOUNTER — HOSPITAL ENCOUNTER (OUTPATIENT)
Dept: CT IMAGING | Facility: HOSPITAL | Age: 82
Discharge: HOME | End: 2022-01-31
Attending: INTERNAL MEDICINE
Payer: MEDICARE

## 2022-01-31 ENCOUNTER — OFFICE VISIT (OUTPATIENT)
Dept: SURGERY | Facility: CLINIC | Age: 82
End: 2022-01-31
Payer: MEDICARE

## 2022-01-31 VITALS
HEART RATE: 60 BPM | SYSTOLIC BLOOD PRESSURE: 111 MMHG | DIASTOLIC BLOOD PRESSURE: 65 MMHG | RESPIRATION RATE: 18 BRPM | OXYGEN SATURATION: 100 %

## 2022-01-31 VITALS
SYSTOLIC BLOOD PRESSURE: 144 MMHG | BODY MASS INDEX: 22 KG/M2 | HEART RATE: 66 BPM | DIASTOLIC BLOOD PRESSURE: 75 MMHG | TEMPERATURE: 98 F | WEIGHT: 153 LBS

## 2022-01-31 DIAGNOSIS — N18.4 CKD (CHRONIC KIDNEY DISEASE) STAGE 4, GFR 15-29 ML/MIN (HCC): ICD-10-CM

## 2022-01-31 DIAGNOSIS — K55.9 ISCHEMIC COLITIS (HCC): Primary | ICD-10-CM

## 2022-01-31 DIAGNOSIS — I25.10 CORONARY ARTERY DISEASE INVOLVING NATIVE CORONARY ARTERY OF NATIVE HEART WITHOUT ANGINA PECTORIS: ICD-10-CM

## 2022-01-31 DIAGNOSIS — K55.9 ISCHEMIC COLITIS (HCC): ICD-10-CM

## 2022-01-31 DIAGNOSIS — Z95.1 S/P CABG (CORONARY ARTERY BYPASS GRAFT): ICD-10-CM

## 2022-01-31 DIAGNOSIS — I10 PRIMARY HYPERTENSION: ICD-10-CM

## 2022-01-31 PROCEDURE — 96361 HYDRATE IV INFUSION ADD-ON: CPT

## 2022-01-31 PROCEDURE — 96360 HYDRATION IV INFUSION INIT: CPT

## 2022-01-31 PROCEDURE — 99214 OFFICE O/P EST MOD 30 MIN: CPT | Performed by: COLON & RECTAL SURGERY

## 2022-01-31 PROCEDURE — 74174 CTA ABD&PLVS W/CONTRAST: CPT | Performed by: INTERNAL MEDICINE

## 2022-01-31 RX ORDER — IOHEXOL 350 MG/ML
100 INJECTION, SOLUTION INTRAVENOUS
Status: COMPLETED | OUTPATIENT
Start: 2022-01-31 | End: 2022-01-31

## 2022-01-31 RX ADMIN — SODIUM CHLORIDE 500 ML: 9 INJECTION, SOLUTION INTRAVENOUS at 08:10:00

## 2022-01-31 RX ADMIN — IOHEXOL 100 ML: 350 INJECTION, SOLUTION INTRAVENOUS at 10:40:00

## 2022-01-31 NOTE — PROGRESS NOTES
Follow Up Visit Note       Active Problems      1. Ischemic colitis ([address]    2. Coronary artery disease involving native coronary artery of native heart without angina pectoris    3. Primary hypertension    4.  CKD (chronic kidney disease) stage 4, GFR 15-2 as necessary  I attest to the accuracy of this note as detailed above    Clare Muro MD FACS FASCRS    My total time spent with this patient on today's visit was 30 minutes.   This includes time in preparation, obtaining and reviewing history, performi right renal artery is present just inferior to the main right renal artery with approximately 60%    stenosis.   Approximately 50% stenosis involves the left renal artery ostium       LIVER:  No enlargement, atrophy, abnormal density, or significant focal l (CST): Geovanny Grider MD on 1/31/2022 at 11:06 AM      Allergies  Sklavern Carmona is allergic to codeine; lisinopril; niacin; pcn [bicillin c-r,]; and zinc acetate.     Past Medical / Surgical / Social / Family History    The past medical and past surgical history have injected-Liane   • Sleep apnea    • Thrombocytopenia (HCC)    • Vitamin B12 nutritional deficiency    • Vitamin D deficiency    • Weight loss      Past Surgical History:   Procedure Laterality Date   • ANGIOPLASTY (CORONARY)  1999    Dr. Deondre Reeves   • MARTHA Marital status:       Spouse name: Jakob Clayton      Number of children: 3    Occupational History      Occupation:         Employer: [address] [place] [address]    Tobacco Use      Smoking status: Former Smoker        Types: Cigarettes        Q 3  •  CARVEDILOL 12.5 MG Oral Tab, TAKE 1 TABLET BY MOUTH TWICE DAILY WITH MEALS, Disp: 180 tablet, Rfl: 3  •  CEREFOLIN NAC 6-90.314-2-600 MG Oral Tab, TAKE 1 TABLET BY MOUTH ON MONDAY, WEDNESDAY AND FRIDAY THREE  TIMES  A  WEEK, Disp: 36 tablet, Rfl: 3 palpitations and leg swelling. Gastrointestinal: Negative for abdominal distention, abdominal pain, anal bleeding, blood in stool, constipation, diarrhea, nausea and vomiting.    Genitourinary: Negative for difficulty urinating, dysuria, frequency and urg having any diarrhea. He states that he does have an appetite and would like to have his diet advanced. He denies having any fevers, chills, nausea, or vomiting.   He is still on his Levaquin and Flagyl, he has about a weeks worth left of his antibiotics

## 2022-01-31 NOTE — PATIENT INSTRUCTIONS
The patient presents today for continued care and evaluation of his ischemic colitis. The patient states that since his visit last week he feels much better. He states he is no longer having any abdominal pain.   He is tolerating his clear liquid diet w the patient on an as-needed basis at this time. He may call or return for any new or worsening symptoms.

## 2022-02-03 ENCOUNTER — TELEPHONE (OUTPATIENT)
Dept: INTERNAL MEDICINE CLINIC | Facility: CLINIC | Age: 82
End: 2022-02-03

## 2022-02-03 NOTE — TELEPHONE ENCOUNTER
Dr. Sonja Page adjusted the patients medications temporarily while he was having some issues. The patient has received some good test results from Dr. Ishan Wu, and he is wondering if Dr. Sonja Page wants him to resume his regular medications.

## 2022-02-04 ENCOUNTER — LAB ENCOUNTER (OUTPATIENT)
Dept: LAB | Facility: HOSPITAL | Age: 82
End: 2022-02-04
Attending: INTERNAL MEDICINE
Payer: MEDICARE

## 2022-02-04 ENCOUNTER — VIRTUAL PHONE E/M (OUTPATIENT)
Dept: INTERNAL MEDICINE CLINIC | Facility: CLINIC | Age: 82
End: 2022-02-04
Payer: MEDICARE

## 2022-02-04 DIAGNOSIS — T50.8X5A CONTRAST DYE INDUCED NEPHROPATHY: ICD-10-CM

## 2022-02-04 DIAGNOSIS — N14.1 CONTRAST DYE INDUCED NEPHROPATHY: ICD-10-CM

## 2022-02-04 DIAGNOSIS — K55.9 ISCHEMIC COLITIS (HCC): ICD-10-CM

## 2022-02-04 DIAGNOSIS — M35.3 PMR (POLYMYALGIA RHEUMATICA) (HCC): ICD-10-CM

## 2022-02-04 DIAGNOSIS — K52.9 COLITIS: Primary | ICD-10-CM

## 2022-02-04 PROBLEM — N14.11 CONTRAST DYE INDUCED NEPHROPATHY: Status: ACTIVE | Noted: 2022-02-04

## 2022-02-04 LAB
ANION GAP SERPL CALC-SCNC: 5 MMOL/L (ref 0–18)
BASOPHILS # BLD AUTO: 0.05 X10(3) UL (ref 0–0.2)
BASOPHILS NFR BLD AUTO: 0.4 %
BUN BLD-MCNC: 25 MG/DL (ref 7–18)
BUN/CREAT SERPL: 9.6 (ref 10–20)
CALCIUM BLD-MCNC: 9.4 MG/DL (ref 8.5–10.1)
CHLORIDE SERPL-SCNC: 107 MMOL/L (ref 98–112)
CO2 SERPL-SCNC: 25 MMOL/L (ref 21–32)
CREAT BLD-MCNC: 2.61 MG/DL
DEPRECATED RDW RBC AUTO: 50.1 FL (ref 35.1–46.3)
EOSINOPHIL # BLD AUTO: 0.32 X10(3) UL (ref 0–0.7)
EOSINOPHIL NFR BLD AUTO: 2.4 %
ERYTHROCYTE [DISTWIDTH] IN BLOOD BY AUTOMATED COUNT: 13.5 % (ref 11–15)
ERYTHROCYTE [SEDIMENTATION RATE] IN BLOOD: 36 MM/HR
FASTING STATUS PATIENT QL REPORTED: YES
GLUCOSE BLD-MCNC: 93 MG/DL (ref 70–99)
HCT VFR BLD AUTO: 34.8 %
HGB BLD-MCNC: 11.2 G/DL
IMM GRANULOCYTES # BLD AUTO: 0.15 X10(3) UL (ref 0–1)
IMM GRANULOCYTES NFR BLD: 1.1 %
LYMPHOCYTES NFR BLD AUTO: 8 %
MCHC RBC AUTO-ENTMCNC: 32.2 G/DL (ref 31–37)
MCV RBC AUTO: 101.8 FL
MONOCYTES # BLD AUTO: 1.6 X10(3) UL (ref 0.1–1)
MONOCYTES NFR BLD AUTO: 12.2 %
NEUTROPHILS # BLD AUTO: 9.94 X10 (3) UL (ref 1.5–7.7)
NEUTROPHILS # BLD AUTO: 9.94 X10(3) UL (ref 1.5–7.7)
NEUTROPHILS NFR BLD AUTO: 75.9 %
OSMOLALITY SERPL CALC.SUM OF ELEC: 288 MOSM/KG (ref 275–295)
PLATELET # BLD AUTO: 216 10(3)UL (ref 150–450)
RBC # BLD AUTO: 3.42 X10(6)UL
SODIUM SERPL-SCNC: 137 MMOL/L (ref 136–145)
WBC # BLD AUTO: 13.1 X10(3) UL (ref 4–11)

## 2022-02-04 PROCEDURE — 85652 RBC SED RATE AUTOMATED: CPT

## 2022-02-04 PROCEDURE — 85025 COMPLETE CBC W/AUTO DIFF WBC: CPT

## 2022-02-04 PROCEDURE — 99442 PHONE E/M BY PHYS 11-20 MIN: CPT | Performed by: INTERNAL MEDICINE

## 2022-02-04 PROCEDURE — 36415 COLL VENOUS BLD VENIPUNCTURE: CPT

## 2022-02-04 PROCEDURE — 80048 BASIC METABOLIC PNL TOTAL CA: CPT

## 2022-02-05 ENCOUNTER — LAB ENCOUNTER (OUTPATIENT)
Dept: LAB | Facility: REFERENCE LAB | Age: 82
End: 2022-02-05
Attending: INTERNAL MEDICINE
Payer: MEDICARE

## 2022-02-05 DIAGNOSIS — K52.9 COLITIS: ICD-10-CM

## 2022-02-05 DIAGNOSIS — T50.8X5A CONTRAST DYE INDUCED NEPHROPATHY: ICD-10-CM

## 2022-02-05 DIAGNOSIS — M35.3 PMR (POLYMYALGIA RHEUMATICA) (HCC): ICD-10-CM

## 2022-02-05 DIAGNOSIS — N14.1 CONTRAST DYE INDUCED NEPHROPATHY: ICD-10-CM

## 2022-02-05 LAB
ANION GAP SERPL CALC-SCNC: 10 MMOL/L (ref 0–18)
BASOPHILS # BLD AUTO: 0.03 X10(3) UL (ref 0–0.2)
BASOPHILS NFR BLD AUTO: 0.3 %
BILIRUB UR QL: NEGATIVE
BUN BLD-MCNC: 28 MG/DL (ref 7–18)
BUN/CREAT SERPL: 11 (ref 10–20)
CALCIUM BLD-MCNC: 9.4 MG/DL (ref 8.5–10.1)
CHLORIDE SERPL-SCNC: 107 MMOL/L (ref 98–112)
CO2 SERPL-SCNC: 24 MMOL/L (ref 21–32)
COLOR UR: YELLOW
CREAT BLD-MCNC: 2.55 MG/DL
DEPRECATED RDW RBC AUTO: 49.6 FL (ref 35.1–46.3)
EOSINOPHIL # BLD AUTO: 0.16 X10(3) UL (ref 0–0.7)
EOSINOPHIL NFR BLD AUTO: 1.5 %
ERYTHROCYTE [DISTWIDTH] IN BLOOD BY AUTOMATED COUNT: 13.3 % (ref 11–15)
ERYTHROCYTE [SEDIMENTATION RATE] IN BLOOD: 51 MM/HR
FASTING STATUS PATIENT QL REPORTED: NO
GLUCOSE BLD-MCNC: 125 MG/DL (ref 70–99)
GLUCOSE UR-MCNC: NEGATIVE MG/DL
HCT VFR BLD AUTO: 33.3 %
HGB BLD-MCNC: 10.8 G/DL
HGB UR QL STRIP.AUTO: NEGATIVE
HYALINE CASTS #/AREA URNS AUTO: PRESENT /LPF
IMM GRANULOCYTES # BLD AUTO: 0.1 X10(3) UL (ref 0–1)
IMM GRANULOCYTES NFR BLD: 0.9 %
LYMPHOCYTES # BLD AUTO: 0.6 X10(3) UL (ref 1–4)
LYMPHOCYTES NFR BLD AUTO: 5.6 %
MCH RBC QN AUTO: 32.9 PG (ref 26–34)
MCHC RBC AUTO-ENTMCNC: 32.4 G/DL (ref 31–37)
MCV RBC AUTO: 101.5 FL
MONOCYTES # BLD AUTO: 1.16 X10(3) UL (ref 0.1–1)
MONOCYTES NFR BLD AUTO: 10.8 %
NEUTROPHILS # BLD AUTO: 8.72 X10 (3) UL (ref 1.5–7.7)
NEUTROPHILS # BLD AUTO: 8.72 X10(3) UL (ref 1.5–7.7)
NEUTROPHILS NFR BLD AUTO: 80.9 %
NITRITE UR QL STRIP.AUTO: NEGATIVE
OSMOLALITY SERPL CALC.SUM OF ELEC: 299 MOSM/KG (ref 275–295)
PH UR: 5 [PH] (ref 5–8)
PLATELET # BLD AUTO: 186 10(3)UL (ref 150–450)
POTASSIUM SERPL-SCNC: 3.9 MMOL/L (ref 3.5–5.1)
PROT UR-MCNC: 30 MG/DL
RBC # BLD AUTO: 3.28 X10(6)UL
SODIUM SERPL-SCNC: 141 MMOL/L (ref 136–145)
SP GR UR STRIP: 1.02 (ref 1–1.03)
UROBILINOGEN UR STRIP-ACNC: 2
WBC # BLD AUTO: 10.8 X10(3) UL (ref 4–11)

## 2022-02-05 PROCEDURE — 81001 URINALYSIS AUTO W/SCOPE: CPT

## 2022-02-05 PROCEDURE — 85652 RBC SED RATE AUTOMATED: CPT

## 2022-02-05 PROCEDURE — 80048 BASIC METABOLIC PNL TOTAL CA: CPT

## 2022-02-05 PROCEDURE — 36415 COLL VENOUS BLD VENIPUNCTURE: CPT

## 2022-02-05 PROCEDURE — 85025 COMPLETE CBC W/AUTO DIFF WBC: CPT

## 2022-02-05 NOTE — PROGRESS NOTES
Virtual Telephone Check-In    Michelle Offer verbally consents to a Virtual/Telephone Check-In visit on 02/04/22. Patient has been referred to the [facility] website at [email or web address] to review the yearly Consent to Treat document. Patient understands and accepts financial responsibility for any deductible, co-insurance and/or co-pays associated with this service. Duration of the service: 15 minutes audio only could not connect to video      Summary of topics discussed:     Colitis better. BM normal,no blood. LLQ pain better Finishing Levaquin and Flagyl. Lab 2/3 = WBC down, but Hgb down and ESR and creat higher (worse). Had contrast with CT 1/31 showing colitis better. Mesenteric vessels patent. Plan fluids, finish abx, repeat creat CBC and ESR 2/5.  Inc pred 5 to to 10 daily (for PMR)      Riki Kim MD

## 2022-02-09 ENCOUNTER — OFFICE VISIT (OUTPATIENT)
Dept: HEMATOLOGY/ONCOLOGY | Facility: HOSPITAL | Age: 82
End: 2022-02-09
Attending: INTERNAL MEDICINE
Payer: MEDICARE

## 2022-02-09 VITALS
HEIGHT: 70 IN | DIASTOLIC BLOOD PRESSURE: 70 MMHG | WEIGHT: 156 LBS | OXYGEN SATURATION: 100 % | SYSTOLIC BLOOD PRESSURE: 130 MMHG | HEART RATE: 65 BPM | BODY MASS INDEX: 22.33 KG/M2 | RESPIRATION RATE: 16 BRPM | TEMPERATURE: 97 F

## 2022-02-09 DIAGNOSIS — Z86.39 HISTORY OF IRON DEFICIENCY: ICD-10-CM

## 2022-02-09 DIAGNOSIS — D64.89 ANEMIA DUE TO OTHER CAUSE, NOT CLASSIFIED: Primary | ICD-10-CM

## 2022-02-09 PROCEDURE — 99214 OFFICE O/P EST MOD 30 MIN: CPT | Performed by: INTERNAL MEDICINE

## 2022-02-10 NOTE — PROGRESS NOTES
[facility]    PATIENT'S NAME: Mayur Belcher   ATTENDING PHYSICIAN: Felicia Nobles MD   PATIENT ACCOUNT #: [de-identified] LOCATION: [address] [place] [address] RECORD #: [id number] YOB: 1940   DATE OF SERVICE: 02/09/2022       CANC[facility] CENTER PROGRESS NOTE    CHIEF COMPLAINT:  Followup for history of multifactorial anemia among other medical issues. HISTORY OF PRESENT ILLNESS:  The patient is a 70-year-old male. He has a history of multifactorial anemia with factors that have included iron deficiency in the past, anemia of chronic disease, and anemia related to chronic kidney disease as well. I last saw him in August.  He has had bone marrows done and has had no evidence of any myelodysplastic syndrome. He does have ongoing polymyalgia rheumatica and he has had flare-ups. He has been on various doses of prednisone, and he is currently being weaned back down. He sees Dr. August Palacios. He is under the care of Dr. Koko Rutledge for his primary care. Most recently, he has recovered from another flare of the polymyalgia rheumatica. He has had upper and lower endoscopies. He recently had an episode of ischemic colitis which appears to be resolving. MEDICATIONS:  His current medications include amlodipine besylate 5 mg b.i.d.; calcium carbonate 500 mg daily; carvedilol 12.5 mg twice daily; CerefolinNAC 1 tablet Monday, Wednesday, Friday; coenzyme Q10 at 50 mg daily; Decara 1.25 mg weekly; famotidine 20 mg twice daily; magnesium citrate 125 mg 2 capsules daily; methotrexate 12.5 mg weekly; Juice Plus with fiber daily; prednisone 5 mg daily; rosuvastatin 10 mg nightly; turmeric 300 mg daily; vitamin K 100 mcg daily. PHYSICAL EXAMINATION:  GENERAL:  He is a well-appearing male in no acute distress. He is thin. VITAL SIGNS:  Performance status is 1. His weight is 156 pounds. Blood pressure is 130/70, pulse 65, respiratory rate is 20, temperature is 97.4. HEENT:  Unremarkable.   LYMPHATICS:  He has no adenopathy. LUNGS:  Clear. HEART:  Normal.  ABDOMEN:  No hepatosplenomegaly or tenderness. EXTREMITIES:  He has no clubbing, cyanosis, or edema. NEUROLOGIC:  He is intact. LABORATORY DATA:  His most recent labs demonstrated a BUN of 28, creatinine of 2.55. Hemoglobin was 10.8. Last iron studies were done on January 19. His iron saturation was 37% with a ferritin of 353. R46 and folic acid were normal.    IMPRESSION:  Multifactorial anemia. He is clearly not currrently iron deficient. He has anemia related to chronic kidney disease and also anemia of inflammation related to his PMR. He also had a recent flare of ischemic colitis. He is actually stable enough with a hemoglobin maintained over 10. I told him that I would just follow his blood counts every 3 months with iron studies, and we will continue to see him at 6-month intervals for now.     Dictated By Robbi Pedersen MD  d: 02/09/2022 18:40:41  t: 02/09/2022 41:23:09  Job [id number]  XT/

## 2022-04-14 RX ORDER — RISEDRONATE SODIUM 35 MG/1
TABLET, FILM COATED ORAL
Qty: 12 TABLET | Refills: 0 | Status: SHIPPED | OUTPATIENT
Start: 2022-04-14

## 2022-04-14 RX ORDER — HYDROCHLOROTHIAZIDE 50 MG/1
TABLET ORAL
Qty: 30 TABLET | Refills: 0 | OUTPATIENT
Start: 2022-04-14

## 2022-04-19 ENCOUNTER — NURSE ONLY (OUTPATIENT)
Dept: INTERNAL MEDICINE CLINIC | Facility: CLINIC | Age: 82
End: 2022-04-19
Payer: MEDICARE

## 2022-04-19 DIAGNOSIS — Z00.00 LABORATORY EXAMINATION ORDERED AS PART OF A ROUTINE GENERAL MEDICAL EXAMINATION: Primary | ICD-10-CM

## 2022-04-19 LAB
AMB EXT CHLORIDE: 96
AMB EXT CHOL/HDL RATIO: 2.6
AMB EXT CHOLESTEROL, TOTAL: 143 MG/DL
AMB EXT CMP ALT: 21 U/L
AMB EXT CMP AST: 23 U/L
AMB EXT GLUCOSE: 97 MG/DL
AMB EXT HDL CHOLESTEROL: 56 MG/DL
AMB EXT HEMATOCRIT: 29.8
AMB EXT HEMOGLOBIN: 10.1
AMB EXT HGBA1C: 4.9 %
AMB EXT LDL CHOLESTEROL, DIRECT: 66 MG/DL
AMB EXT MCV: 98.7
AMB EXT NON HDL CHOL: 87 MG/DL
AMB EXT POSTASSIUM: 4.5 MMOL/L
AMB EXT PSA SCREEN: 0.5 NG/ML
AMB EXT SODIUM: 134 MMOL/L
AMB EXT TRIGLYCERIDES: 127 MG/DL
AMB EXT TSH: 1.6 MIU/ML
AMB EXT WBC: 9 X10(3)UL
BILIRUB UR QL STRIP.AUTO: NEGATIVE
COLOR UR AUTO: YELLOW
GLUCOSE UR STRIP.AUTO-MCNC: NEGATIVE MG/DL
HYALINE CASTS #/AREA URNS AUTO: PRESENT /LPF
LEUKOCYTE ESTERASE UR QL STRIP.AUTO: NEGATIVE
NITRITE UR QL STRIP.AUTO: NEGATIVE
PH UR STRIP.AUTO: 5 [PH] (ref 5–8)
PROT UR STRIP.AUTO-MCNC: 30 MG/DL
RBC #/AREA URNS AUTO: >10 /HPF
RBC UR QL AUTO: NEGATIVE
SP GR UR STRIP.AUTO: 1.02 (ref 1–1.03)
UROBILINOGEN UR STRIP.AUTO-MCNC: <2 MG/DL

## 2022-04-19 PROCEDURE — 92551 PURE TONE HEARING TEST AIR: CPT | Performed by: INTERNAL MEDICINE

## 2022-04-19 PROCEDURE — 93923 UPR/LXTR ART STDY 3+ LVLS: CPT | Performed by: INTERNAL MEDICINE

## 2022-04-19 PROCEDURE — 99173 VISUAL ACUITY SCREEN: CPT | Performed by: INTERNAL MEDICINE

## 2022-04-19 PROCEDURE — 81001 URINALYSIS AUTO W/SCOPE: CPT | Performed by: INTERNAL MEDICINE

## 2022-04-19 NOTE — PROGRESS NOTES
*BODY COMPOSITION:    YES: x       NO:       REASON TEST NOT PERFORMED:   _____________________________________________________________________________  *VENIPUNCTURE    YES: x      NO:        REASON VENIPUNCTURE NOT PERFORMED:      LEFT A/C:     LEFT HAND:        RIGHT A/C: x 1 stick landed    RIGHT HAND:   __________________________________________________________________  *VISION    YES:x    NO:    REASON TEST NOT PERFORMED:  ________________________________________________________________________  *ANKLE BRACHIAL INDEX    YES;x      NO:    REASON TEST NOT PERFORMED:   ________________________________________________________________________  *URINE SAMPLE    YES:x    NO:    REASON NOT COLLECTED:

## 2022-04-29 RX ORDER — ROSUVASTATIN CALCIUM 10 MG/1
TABLET, COATED ORAL
Qty: 90 TABLET | Refills: 3 | Status: SHIPPED | OUTPATIENT
Start: 2022-04-29 | End: 2023-02-07

## 2022-04-29 NOTE — TELEPHONE ENCOUNTER
Rosuvastatin 10 mg 1 tab nightly filled 4/26/21 90 with 3 refills     LOV 1/28/22  Next apt 5/9/22  Labs 3/29/21

## 2022-05-09 ENCOUNTER — OFFICE VISIT (OUTPATIENT)
Dept: INTERNAL MEDICINE CLINIC | Facility: CLINIC | Age: 82
End: 2022-05-09
Payer: MEDICARE

## 2022-05-09 VITALS
TEMPERATURE: 97 F | DIASTOLIC BLOOD PRESSURE: 56 MMHG | BODY MASS INDEX: 22.63 KG/M2 | OXYGEN SATURATION: 95 % | HEART RATE: 65 BPM | WEIGHT: 152.81 LBS | SYSTOLIC BLOOD PRESSURE: 116 MMHG | HEIGHT: 69 IN

## 2022-05-09 DIAGNOSIS — I35.0 NONRHEUMATIC AORTIC VALVE STENOSIS: ICD-10-CM

## 2022-05-09 DIAGNOSIS — K63.5 POLYP OF COLON, UNSPECIFIED PART OF COLON, UNSPECIFIED TYPE: ICD-10-CM

## 2022-05-09 DIAGNOSIS — Z95.2 S/P AVR: ICD-10-CM

## 2022-05-09 DIAGNOSIS — I10 ESSENTIAL HYPERTENSION: ICD-10-CM

## 2022-05-09 DIAGNOSIS — Z95.1 S/P CABG (CORONARY ARTERY BYPASS GRAFT): ICD-10-CM

## 2022-05-09 DIAGNOSIS — E78.5 DYSLIPIDEMIA: ICD-10-CM

## 2022-05-09 DIAGNOSIS — I25.10 CORONARY ARTERY DISEASE INVOLVING NATIVE CORONARY ARTERY OF NATIVE HEART WITHOUT ANGINA PECTORIS: ICD-10-CM

## 2022-05-09 DIAGNOSIS — E78.00 PURE HYPERCHOLESTEROLEMIA: ICD-10-CM

## 2022-05-09 DIAGNOSIS — Z00.01 ENCOUNTER FOR GENERAL ADULT MEDICAL EXAMINATION WITH ABNORMAL FINDINGS: Primary | ICD-10-CM

## 2022-05-09 PROBLEM — E43 SEVERE PROTEIN-CALORIE MALNUTRITION (HCC): Status: RESOLVED | Noted: 2022-05-09 | Resolved: 2022-05-09

## 2022-05-09 PROBLEM — J42 CHRONIC BRONCHITIS, UNSPECIFIED CHRONIC BRONCHITIS TYPE (HCC): Status: RESOLVED | Noted: 2022-05-09 | Resolved: 2022-05-09

## 2022-05-09 PROBLEM — D50.0 IRON DEFICIENCY ANEMIA DUE TO CHRONIC BLOOD LOSS: Status: RESOLVED | Noted: 2021-07-30 | Resolved: 2022-05-09

## 2022-05-09 PROBLEM — J42 CHRONIC BRONCHITIS, UNSPECIFIED CHRONIC BRONCHITIS TYPE (HCC): Status: ACTIVE | Noted: 2022-05-09

## 2022-05-09 PROBLEM — E43 SEVERE PROTEIN-CALORIE MALNUTRITION (HCC): Status: ACTIVE | Noted: 2022-05-09

## 2022-05-09 PROCEDURE — G0439 PPPS, SUBSEQ VISIT: HCPCS | Performed by: INTERNAL MEDICINE

## 2022-05-09 RX ORDER — AMLODIPINE BESYLATE 5 MG/1
5 TABLET ORAL DAILY
Qty: 90 TABLET | Refills: 2 | COMMUNITY
Start: 2022-05-09

## 2022-05-09 RX ORDER — ASPIRIN 81 MG/1
81 TABLET ORAL
Refills: 0 | COMMUNITY
Start: 2022-05-09

## 2022-05-09 RX ORDER — CHOLECALCIFEROL (VITAMIN D3) 1250 MCG
50000 CAPSULE ORAL
COMMUNITY

## 2022-05-09 RX ORDER — NITROGLYCERIN 2.5 MG/1
2.5 CAPSULE ORAL 2 TIMES DAILY
Qty: 180 CAPSULE | Refills: 3 | Status: SHIPPED | OUTPATIENT
Start: 2022-05-09

## 2022-05-09 RX ORDER — NITROGLYCERIN 0.4 MG/1
0.4 TABLET SUBLINGUAL EVERY 5 MIN PRN
Qty: 25 TABLET | Refills: 1 | Status: SHIPPED | OUTPATIENT
Start: 2022-05-09

## 2022-05-09 RX ORDER — PREDNISONE 1 MG/1
5 TABLET ORAL DAILY
COMMUNITY

## 2022-05-10 ENCOUNTER — TELEPHONE (OUTPATIENT)
Dept: INTERNAL MEDICINE CLINIC | Facility: CLINIC | Age: 82
End: 2022-05-10

## 2022-05-10 ENCOUNTER — TELEPHONE (OUTPATIENT)
Dept: RHEUMATOLOGY | Facility: CLINIC | Age: 82
End: 2022-05-10

## 2022-05-10 RX ORDER — PREDNISONE 1 MG/1
5 TABLET ORAL DAILY
Qty: 135 TABLET | Refills: 3 | Status: SHIPPED | OUTPATIENT
Start: 2022-05-10

## 2022-05-10 NOTE — TELEPHONE ENCOUNTER
Called pt to get him an appt with dr Alejandra Serrano in June, left pt a message to please call back

## 2022-05-11 ENCOUNTER — TELEPHONE (OUTPATIENT)
Dept: INTERNAL MEDICINE CLINIC | Facility: CLINIC | Age: 82
End: 2022-05-11

## 2022-05-11 ENCOUNTER — OFFICE VISIT (OUTPATIENT)
Dept: RHEUMATOLOGY | Facility: CLINIC | Age: 82
End: 2022-05-11
Payer: MEDICARE

## 2022-05-11 ENCOUNTER — TELEPHONE (OUTPATIENT)
Dept: HEMATOLOGY/ONCOLOGY | Facility: HOSPITAL | Age: 82
End: 2022-05-11

## 2022-05-11 ENCOUNTER — LAB ENCOUNTER (OUTPATIENT)
Dept: LAB | Age: 82
End: 2022-05-11
Attending: INTERNAL MEDICINE
Payer: MEDICARE

## 2022-05-11 VITALS
HEART RATE: 63 BPM | DIASTOLIC BLOOD PRESSURE: 64 MMHG | BODY MASS INDEX: 22.28 KG/M2 | SYSTOLIC BLOOD PRESSURE: 122 MMHG | HEIGHT: 70 IN | OXYGEN SATURATION: 99 % | WEIGHT: 155.63 LBS | TEMPERATURE: 98 F

## 2022-05-11 DIAGNOSIS — R53.82 CHRONIC FATIGUE: Primary | ICD-10-CM

## 2022-05-11 DIAGNOSIS — I25.10 CORONARY ARTERY DISEASE INVOLVING NATIVE CORONARY ARTERY OF NATIVE HEART WITHOUT ANGINA PECTORIS: ICD-10-CM

## 2022-05-11 DIAGNOSIS — Z95.1 S/P CABG (CORONARY ARTERY BYPASS GRAFT): ICD-10-CM

## 2022-05-11 DIAGNOSIS — N18.4 CKD (CHRONIC KIDNEY DISEASE) STAGE 4, GFR 15-29 ML/MIN (HCC): ICD-10-CM

## 2022-05-11 DIAGNOSIS — M35.3 PMR (POLYMYALGIA RHEUMATICA) (HCC): ICD-10-CM

## 2022-05-11 DIAGNOSIS — D64.89 ANEMIA DUE TO OTHER CAUSE, NOT CLASSIFIED: ICD-10-CM

## 2022-05-11 DIAGNOSIS — D89.89 KAPPA LIGHT CHAIN DISEASE (HCC): ICD-10-CM

## 2022-05-11 DIAGNOSIS — N18.32 CKD STAGE G3B/A2, GFR 30-44 AND ALBUMIN CREATININE RATIO 30-299 MG/G (HCC): ICD-10-CM

## 2022-05-11 LAB
BILIRUB UR QL STRIP.AUTO: NEGATIVE
COLOR UR AUTO: YELLOW
CREAT UR-SCNC: 289 MG/DL
GLUCOSE UR STRIP.AUTO-MCNC: NEGATIVE MG/DL
HYALINE CASTS #/AREA URNS AUTO: PRESENT /LPF
LEUKOCYTE ESTERASE UR QL STRIP.AUTO: NEGATIVE
MICROALBUMIN UR-MCNC: 10.5 MG/DL
MICROALBUMIN/CREAT 24H UR-RTO: 36.3 UG/MG (ref ?–30)
NITRITE UR QL STRIP.AUTO: NEGATIVE
PH UR STRIP.AUTO: 5 [PH] (ref 5–8)
PROT UR STRIP.AUTO-MCNC: 30 MG/DL
RBC UR QL AUTO: NEGATIVE
SP GR UR STRIP.AUTO: 1.02 (ref 1–1.03)
UROBILINOGEN UR STRIP.AUTO-MCNC: <2 MG/DL

## 2022-05-11 PROCEDURE — 82570 ASSAY OF URINE CREATININE: CPT

## 2022-05-11 PROCEDURE — 99214 OFFICE O/P EST MOD 30 MIN: CPT | Performed by: INTERNAL MEDICINE

## 2022-05-11 PROCEDURE — 82043 UR ALBUMIN QUANTITATIVE: CPT

## 2022-05-11 PROCEDURE — 81001 URINALYSIS AUTO W/SCOPE: CPT

## 2022-05-11 RX ORDER — METHOTREXATE 2.5 MG/1
7.5 TABLET ORAL WEEKLY
Qty: 60 TABLET | Refills: 3 | COMMUNITY
Start: 2022-05-11

## 2022-05-11 NOTE — TELEPHONE ENCOUNTER
[address] is calling to clarify if the patient is taking both medications for nitroglycerin 0.4 MG Sublingual SL Tab nitroglycerin ER 2.5 MG Oral Cap CR     [facility] [address] [phone], [phone]

## 2022-05-11 NOTE — TELEPHONE ENCOUNTER
Dr Rahul Sim would like patient to follow up with Dr Davon Farr in June and get labs drawn as well. Message sent to the PSRs. Also contacted patient and updated him. His wife will look at the schedule and call back. He is uncertain at this time [facility].

## 2022-05-11 NOTE — PATIENT INSTRUCTIONS
Labs ordered to check for inflammation, abnormal proteins, kidney function, iron level, liver function, anemia. Cut back on Methotrexate to 3 tablets per week. Folic acid daily. Prednisone was at 5 mg every other day since 4/18. Dr Edna Young increased Prednisone to 5 mg alternating with 10 mg on 5/9. Try this for a month and see how it goes. PMR appears quiet. Fatigue is present and severe. Do the blood tests now. I will call you with results. Your fatigue is caused by multiple factors which include aging, anemia, kidney disease, PMR. Rest as needed. Return to office 2 months.

## 2022-05-12 ENCOUNTER — LAB ENCOUNTER (OUTPATIENT)
Dept: LAB | Facility: HOSPITAL | Age: 82
End: 2022-05-12
Attending: INTERNAL MEDICINE
Payer: MEDICARE

## 2022-05-12 DIAGNOSIS — R53.82 CHRONIC FATIGUE: ICD-10-CM

## 2022-05-12 DIAGNOSIS — M35.3 PMR (POLYMYALGIA RHEUMATICA) (HCC): ICD-10-CM

## 2022-05-12 DIAGNOSIS — N18.4 CKD (CHRONIC KIDNEY DISEASE) STAGE 4, GFR 15-29 ML/MIN (HCC): ICD-10-CM

## 2022-05-12 DIAGNOSIS — N18.32 CKD STAGE G3B/A2, GFR 30-44 AND ALBUMIN CREATININE RATIO 30-299 MG/G (HCC): ICD-10-CM

## 2022-05-12 DIAGNOSIS — D64.89 ANEMIA DUE TO OTHER CAUSE, NOT CLASSIFIED: ICD-10-CM

## 2022-05-12 LAB
ALBUMIN SERPL-MCNC: 3.5 G/DL (ref 3.4–5)
ALBUMIN/GLOB SERPL: 0.9 {RATIO} (ref 1–2)
ALP LIVER SERPL-CCNC: 38 U/L
ALT SERPL-CCNC: 21 U/L
ANION GAP SERPL CALC-SCNC: 6 MMOL/L (ref 0–18)
AST SERPL-CCNC: 23 U/L (ref 15–37)
BASOPHILS # BLD AUTO: 0.04 X10(3) UL (ref 0–0.2)
BASOPHILS NFR BLD AUTO: 0.5 %
BILIRUB SERPL-MCNC: 0.5 MG/DL (ref 0.1–2)
BUN BLD-MCNC: 33 MG/DL (ref 7–18)
BUN/CREAT SERPL: 13.3 (ref 10–20)
CALCIUM BLD-MCNC: 9.9 MG/DL (ref 8.5–10.1)
CHLORIDE SERPL-SCNC: 107 MMOL/L (ref 98–112)
CO2 SERPL-SCNC: 28 MMOL/L (ref 21–32)
CREAT BLD-MCNC: 2.49 MG/DL
CRP SERPL-MCNC: <0.29 MG/DL (ref ?–0.3)
DEPRECATED RDW RBC AUTO: 52.8 FL (ref 35.1–46.3)
EOSINOPHIL # BLD AUTO: 0.35 X10(3) UL (ref 0–0.7)
EOSINOPHIL NFR BLD AUTO: 4.1 %
ERYTHROCYTE [DISTWIDTH] IN BLOOD BY AUTOMATED COUNT: 13.9 % (ref 11–15)
ERYTHROCYTE [SEDIMENTATION RATE] IN BLOOD: 46 MM/HR
FASTING STATUS PATIENT QL REPORTED: YES
GLOBULIN PLAS-MCNC: 3.7 G/DL (ref 2.8–4.4)
GLUCOSE BLD-MCNC: 96 MG/DL (ref 70–99)
HCT VFR BLD AUTO: 31.6 %
HGB BLD-MCNC: 10.4 G/DL
IMM GRANULOCYTES # BLD AUTO: 0.05 X10(3) UL (ref 0–1)
IMM GRANULOCYTES NFR BLD: 0.6 %
IRON SATN MFR SERPL: 16 %
IRON SERPL-MCNC: 54 UG/DL
LYMPHOCYTES # BLD AUTO: 0.86 X10(3) UL (ref 1–4)
LYMPHOCYTES NFR BLD AUTO: 10.2 %
MCH RBC QN AUTO: 34.1 PG (ref 26–34)
MCHC RBC AUTO-ENTMCNC: 32.9 G/DL (ref 31–37)
MCV RBC AUTO: 103.6 FL
MONOCYTES # BLD AUTO: 1.21 X10(3) UL (ref 0.1–1)
MONOCYTES NFR BLD AUTO: 14.3 %
NEUTROPHILS # BLD AUTO: 5.95 X10 (3) UL (ref 1.5–7.7)
NEUTROPHILS # BLD AUTO: 5.95 X10(3) UL (ref 1.5–7.7)
NEUTROPHILS NFR BLD AUTO: 70.3 %
OSMOLALITY SERPL CALC.SUM OF ELEC: 299 MOSM/KG (ref 275–295)
PLATELET # BLD AUTO: 166 10(3)UL (ref 150–450)
POTASSIUM SERPL-SCNC: 3.9 MMOL/L (ref 3.5–5.1)
PROT SERPL-MCNC: 7.2 G/DL (ref 6.4–8.2)
RBC # BLD AUTO: 3.05 X10(6)UL
SODIUM SERPL-SCNC: 141 MMOL/L (ref 136–145)
TIBC SERPL-MCNC: 335 UG/DL (ref 240–450)
TRANSFERRIN SERPL-MCNC: 225 MG/DL (ref 200–360)
WBC # BLD AUTO: 8.5 X10(3) UL (ref 4–11)

## 2022-05-12 PROCEDURE — 36415 COLL VENOUS BLD VENIPUNCTURE: CPT

## 2022-05-12 PROCEDURE — 86140 C-REACTIVE PROTEIN: CPT

## 2022-05-12 PROCEDURE — 84165 PROTEIN E-PHORESIS SERUM: CPT

## 2022-05-12 PROCEDURE — 80053 COMPREHEN METABOLIC PANEL: CPT

## 2022-05-12 PROCEDURE — 85652 RBC SED RATE AUTOMATED: CPT

## 2022-05-12 PROCEDURE — 83540 ASSAY OF IRON: CPT

## 2022-05-12 PROCEDURE — 86334 IMMUNOFIX E-PHORESIS SERUM: CPT

## 2022-05-12 PROCEDURE — 83521 IG LIGHT CHAINS FREE EACH: CPT

## 2022-05-12 PROCEDURE — 85025 COMPLETE CBC W/AUTO DIFF WBC: CPT

## 2022-05-12 PROCEDURE — 84466 ASSAY OF TRANSFERRIN: CPT

## 2022-05-16 LAB
ALBUMIN SERPL ELPH-MCNC: 3.93 G/DL (ref 3.75–5.21)
ALBUMIN/GLOB SERPL: 1.47 {RATIO} (ref 1–2)
ALPHA1 GLOB SERPL ELPH-MCNC: 0.37 G/DL (ref 0.19–0.46)
ALPHA2 GLOB SERPL ELPH-MCNC: 0.87 G/DL (ref 0.48–1.05)
B-GLOBULIN SERPL ELPH-MCNC: 0.69 G/DL (ref 0.68–1.23)
GAMMA GLOB SERPL ELPH-MCNC: 0.74 G/DL (ref 0.62–1.7)
KAPPA LC FREE SER-MCNC: 4.22 MG/DL (ref 0.33–1.94)
KAPPA LC FREE/LAMBDA FREE SER NEPH: 1.55 {RATIO} (ref 0.26–1.65)
LAMBDA LC FREE SERPL-MCNC: 2.71 MG/DL (ref 0.57–2.63)
PROT SERPL-MCNC: 6.6 G/DL (ref 6.4–8.2)

## 2022-05-17 ENCOUNTER — HOSPITAL ENCOUNTER (OUTPATIENT)
Dept: CV DIAGNOSTICS | Facility: HOSPITAL | Age: 82
Discharge: HOME | End: 2022-05-17
Attending: INTERNAL MEDICINE
Payer: MEDICARE

## 2022-05-17 ENCOUNTER — HOSPITAL ENCOUNTER (OUTPATIENT)
Dept: NUCLEAR MEDICINE | Facility: HOSPITAL | Age: 82
Discharge: HOME | End: 2022-05-17
Attending: INTERNAL MEDICINE
Payer: MEDICARE

## 2022-05-17 DIAGNOSIS — I25.10 CORONARY ARTERY DISEASE INVOLVING NATIVE HEART WITHOUT ANGINA PECTORIS, UNSPECIFIED VESSEL OR LESION TYPE: ICD-10-CM

## 2022-05-17 PROCEDURE — 93018 CV STRESS TEST I&R ONLY: CPT | Performed by: INTERNAL MEDICINE

## 2022-05-17 PROCEDURE — 78452 HT MUSCLE IMAGE SPECT MULT: CPT | Performed by: INTERNAL MEDICINE

## 2022-05-17 PROCEDURE — 93017 CV STRESS TEST TRACING ONLY: CPT | Performed by: INTERNAL MEDICINE

## 2022-05-17 PROCEDURE — 93306 TTE W/DOPPLER COMPLETE: CPT | Performed by: INTERNAL MEDICINE

## 2022-05-17 PROCEDURE — 93016 CV STRESS TEST SUPVJ ONLY: CPT | Performed by: INTERNAL MEDICINE

## 2022-05-24 ENCOUNTER — OFFICE VISIT (OUTPATIENT)
Dept: NEPHROLOGY | Facility: CLINIC | Age: 82
End: 2022-05-24
Payer: MEDICARE

## 2022-05-24 VITALS — WEIGHT: 159 LBS | SYSTOLIC BLOOD PRESSURE: 134 MMHG | BODY MASS INDEX: 23 KG/M2 | DIASTOLIC BLOOD PRESSURE: 66 MMHG

## 2022-05-24 DIAGNOSIS — N18.4 ANEMIA DUE TO STAGE 4 CHRONIC KIDNEY DISEASE (HCC): ICD-10-CM

## 2022-05-24 DIAGNOSIS — N26.9 INTERSTITIAL FIBROSIS PRESENT ON BIOPSY OF KIDNEY: ICD-10-CM

## 2022-05-24 DIAGNOSIS — I10 PRIMARY HYPERTENSION: ICD-10-CM

## 2022-05-24 DIAGNOSIS — D63.1 ANEMIA DUE TO STAGE 4 CHRONIC KIDNEY DISEASE (HCC): ICD-10-CM

## 2022-05-24 DIAGNOSIS — N18.4 CKD (CHRONIC KIDNEY DISEASE) STAGE 4, GFR 15-29 ML/MIN (HCC): Primary | ICD-10-CM

## 2022-05-24 PROCEDURE — 99215 OFFICE O/P EST HI 40 MIN: CPT | Performed by: INTERNAL MEDICINE

## 2022-05-27 ENCOUNTER — APPOINTMENT (OUTPATIENT)
Dept: CT IMAGING | Facility: HOSPITAL | Age: 82
End: 2022-05-27
Attending: EMERGENCY MEDICINE
Payer: MEDICARE

## 2022-05-27 ENCOUNTER — HOSPITAL ENCOUNTER (INPATIENT)
Facility: HOSPITAL | Age: 82
LOS: 1 days | Discharge: HOME | End: 2022-05-30
Attending: EMERGENCY MEDICINE | Admitting: HOSPITALIST
Payer: MEDICARE

## 2022-05-27 DIAGNOSIS — R50.9 FEBRILE ILLNESS, ACUTE: Primary | ICD-10-CM

## 2022-05-27 LAB
ADENOVIRUS F 40/41 PCR: NEGATIVE
ALBUMIN SERPL-MCNC: 3.3 G/DL (ref 3.4–5)
ALP LIVER SERPL-CCNC: 37 U/L
ALT SERPL-CCNC: 24 U/L
ANION GAP SERPL CALC-SCNC: 6 MMOL/L (ref 0–18)
AST SERPL-CCNC: 24 U/L (ref 15–37)
ASTROVIRUS PCR: NEGATIVE
BASOPHILS # BLD AUTO: 0.02 X10(3) UL (ref 0–0.2)
BASOPHILS NFR BLD AUTO: 0.1 %
BILIRUB DIRECT SERPL-MCNC: 0.2 MG/DL (ref 0–0.2)
BILIRUB SERPL-MCNC: 0.5 MG/DL (ref 0.1–2)
BILIRUB UR QL: NEGATIVE
BUN BLD-MCNC: 43 MG/DL (ref 7–18)
BUN/CREAT SERPL: 20.8 (ref 10–20)
C CAYETANENSIS DNA SPEC QL NAA+PROBE: NEGATIVE
C DIFF TOX B STL QL: POSITIVE
CALCIUM BLD-MCNC: 8.7 MG/DL (ref 8.5–10.1)
CAMPY SP DNA.DIARRHEA STL QL NAA+PROBE: NEGATIVE
CHLORIDE SERPL-SCNC: 109 MMOL/L (ref 98–112)
CLARITY UR: CLEAR
CO2 SERPL-SCNC: 25 MMOL/L (ref 21–32)
COLOR UR: YELLOW
CREAT BLD-MCNC: 2.07 MG/DL
CRYPTOSP DNA SPEC QL NAA+PROBE: NEGATIVE
DEPRECATED RDW RBC AUTO: 52.2 FL (ref 35.1–46.3)
EAEC PAA PLAS AGGR+AATA ST NAA+NON-PRB: NEGATIVE
EC STX1+STX2 + H7 FLIC SPEC NAA+PROBE: NEGATIVE
ENTAMOEBA HISTOLYTICA PCR: NEGATIVE
EOSINOPHIL # BLD AUTO: 0.17 X10(3) UL (ref 0–0.7)
EOSINOPHIL NFR BLD AUTO: 1.2 %
EPEC EAE GENE STL QL NAA+NON-PROBE: NEGATIVE
ERYTHROCYTE [DISTWIDTH] IN BLOOD BY AUTOMATED COUNT: 13.6 % (ref 11–15)
ETEC LTA+ST1A+ST1B TOX ST NAA+NON-PROBE: NEGATIVE
FLUAV + FLUBV RNA SPEC NAA+PROBE: NEGATIVE
FLUAV + FLUBV RNA SPEC NAA+PROBE: NEGATIVE
GIARDIA LAMBLIA PCR: NEGATIVE
GLUCOSE BLD-MCNC: 92 MG/DL (ref 70–99)
GLUCOSE UR-MCNC: NEGATIVE MG/DL
HCT VFR BLD AUTO: 33.4 %
HGB BLD-MCNC: 10.8 G/DL
HGB UR QL STRIP.AUTO: NEGATIVE
IMM GRANULOCYTES # BLD AUTO: 0.1 X10(3) UL (ref 0–1)
IMM GRANULOCYTES NFR BLD: 0.7 %
KETONES UR-MCNC: NEGATIVE MG/DL
LEUKOCYTE ESTERASE UR QL STRIP.AUTO: NEGATIVE
LYMPHOCYTES # BLD AUTO: 0.37 X10(3) UL (ref 1–4)
LYMPHOCYTES NFR BLD AUTO: 2.5 %
MCH RBC QN AUTO: 34.2 PG (ref 26–34)
MCHC RBC AUTO-ENTMCNC: 32.3 G/DL (ref 31–37)
MCV RBC AUTO: 105.7 FL
MONOCYTES # BLD AUTO: 1.46 X10(3) UL (ref 0.1–1)
MONOCYTES NFR BLD AUTO: 10.1 %
NEUTROPHILS # BLD AUTO: 12.4 X10 (3) UL (ref 1.5–7.7)
NEUTROPHILS # BLD AUTO: 12.4 X10(3) UL (ref 1.5–7.7)
NEUTROPHILS NFR BLD AUTO: 85.4 %
NITRITE UR QL STRIP.AUTO: NEGATIVE
NOROVIRUS GI/GII PCR: NEGATIVE
OSMOLALITY SERPL CALC.SUM OF ELEC: 300 MOSM/KG (ref 275–295)
P SHIGELLOIDES DNA STL QL NAA+PROBE: NEGATIVE
PH UR: 5 [PH] (ref 5–8)
PLATELET # BLD AUTO: 123 10(3)UL (ref 150–450)
POTASSIUM SERPL-SCNC: 4.5 MMOL/L (ref 3.5–5.1)
PROT SERPL-MCNC: 6.3 G/DL (ref 6.4–8.2)
PROT UR-MCNC: 30 MG/DL
RBC # BLD AUTO: 3.16 X10(6)UL
ROTAVIRUS A PCR: NEGATIVE
RSV RNA SPEC NAA+PROBE: NEGATIVE
SALMONELLA DNA SPEC QL NAA+PROBE: NEGATIVE
SAPOVIRUS PCR: NEGATIVE
SARS-COV-2 RNA RESP QL NAA+PROBE: NOT DETECTED
SHIGELLA SP+EIEC IPAH ST NAA+NON-PROBE: NEGATIVE
SODIUM SERPL-SCNC: 140 MMOL/L (ref 136–145)
SP GR UR STRIP: 1.02 (ref 1–1.03)
UROBILINOGEN UR STRIP-ACNC: <2
V CHOLERAE DNA SPEC QL NAA+PROBE: NEGATIVE
VIBRIO DNA SPEC NAA+PROBE: NEGATIVE
VIT C UR-MCNC: 20 MG/DL
WBC # BLD AUTO: 14.5 X10(3) UL (ref 4–11)
YERSINIA DNA SPEC NAA+PROBE: NEGATIVE

## 2022-05-27 PROCEDURE — 74176 CT ABD & PELVIS W/O CONTRAST: CPT | Performed by: EMERGENCY MEDICINE

## 2022-05-27 PROCEDURE — 99223 1ST HOSP IP/OBS HIGH 75: CPT | Performed by: HOSPITALIST

## 2022-05-27 RX ORDER — ROSUVASTATIN CALCIUM 10 MG/1
10 TABLET, COATED ORAL NIGHTLY
Status: DISCONTINUED | OUTPATIENT
Start: 2022-05-27 | End: 2022-05-30

## 2022-05-27 RX ORDER — CARVEDILOL 12.5 MG/1
12.5 TABLET ORAL 2 TIMES DAILY WITH MEALS
Status: DISCONTINUED | OUTPATIENT
Start: 2022-05-27 | End: 2022-05-30

## 2022-05-27 RX ORDER — ASPIRIN 81 MG/1
81 TABLET ORAL
Status: DISCONTINUED | OUTPATIENT
Start: 2022-05-27 | End: 2022-05-30

## 2022-05-27 RX ORDER — SODIUM CHLORIDE 9 MG/ML
INJECTION, SOLUTION INTRAVENOUS CONTINUOUS
Status: DISCONTINUED | OUTPATIENT
Start: 2022-05-27 | End: 2022-05-29

## 2022-05-27 RX ORDER — MELATONIN
400 2 TIMES DAILY
COMMUNITY

## 2022-05-27 RX ORDER — PREDNISONE 1 MG/1
2.5 TABLET ORAL 2 TIMES DAILY
Status: DISCONTINUED | OUTPATIENT
Start: 2022-05-27 | End: 2022-05-28

## 2022-05-27 RX ORDER — CALCIUM CARBONATE 200(500)MG
500 TABLET,CHEWABLE ORAL 3 TIMES DAILY PRN
Status: DISCONTINUED | OUTPATIENT
Start: 2022-05-27 | End: 2022-05-30

## 2022-05-27 RX ORDER — HYDROCHLOROTHIAZIDE 50 MG/1
50 TABLET ORAL
COMMUNITY

## 2022-05-27 RX ORDER — ONDANSETRON 2 MG/ML
4 INJECTION INTRAMUSCULAR; INTRAVENOUS EVERY 6 HOURS PRN
Status: DISCONTINUED | OUTPATIENT
Start: 2022-05-27 | End: 2022-05-30

## 2022-05-27 RX ORDER — HEPARIN SODIUM 5000 [USP'U]/ML
5000 INJECTION, SOLUTION INTRAVENOUS; SUBCUTANEOUS EVERY 12 HOURS SCHEDULED
Status: DISCONTINUED | OUTPATIENT
Start: 2022-05-27 | End: 2022-05-30

## 2022-05-27 RX ORDER — AMLODIPINE BESYLATE 10 MG/1
10 TABLET ORAL DAILY
Status: DISCONTINUED | OUTPATIENT
Start: 2022-05-27 | End: 2022-05-30

## 2022-05-27 NOTE — RESPIRATORY THERAPY NOTE
SHIRA ASSESSMENT:    Pt does not have a previous diagnosis of SHIRA. Pt does not routinely use a CPAP device at home.

## 2022-05-27 NOTE — ED INITIAL ASSESSMENT (HPI)
Pt to ED with c/o fever, fatigue, and diarrhea since this evening. Fever 101.5 PTA. Tylenol given at home. Pt A&Ox4. Pt denies abdominal pain.

## 2022-05-27 NOTE — PROGRESS NOTES
ADMISSION NOTE    [de-identified]year old male on Prednisone and M T X for PMR  presents with one day of fever to 101.5, weakness and fatigue. And one episode of diarrhea. Had dental work in Feb 2022 but was on prophylactic antibiotics before. 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  ID consulted patient started on cefepime . Available medical records partially reviewed. Dictation to follow.     Yany Bahena M.D.  5/27/2022

## 2022-05-27 NOTE — H&P
[facility]    PATIENT'S NAME: Mayur Belcher   ATTENDING PHYSICIAN: Markus Linda MD   PATIENT ACCOUNT#:   [de-identified]    LOCATION:  [address] [place] [address] RECORD #:   [id number]       YOB: 1940  ADMISSION DATE:       05/27/2022    HISTORY AND PHYSICAL EXAMINATION    DATE OF EXAMINATION:  05/27/2022. CHIEF COMPLAINT:  Fever, diarrhea, poor appetite. HISTORY OF PRESENT ILLNESS:  This is a very pleasant 51-year-old gentleman with a past history of polymyalgia rheumatica treated with methotrexate and prednisone. He was in his usual state of health until the day of admission when he developed fever to 101.5, one episode of diarrhea, and some nausea. His appetite may have been poor for the last couple of days, but he denied any abdominal pain. No urinary symptoms. He reports that he traveled to [place] in March but felt well after returning. He denies any cough, shortness of breath. No dysuria or increased frequency of urination. No melena or hematochezia. He denied any abdominal discomfort. However, on exam in the emergency room did seem to have some abdominal pain. 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 He was also noted to have diverticulosis without evidence of diverticulitis. His urinalysis revealed no signs of infection. Glucose was 92, sodium 140, potassium 4.5, chloride 109, CO2 of 25, BUN of 43 with a creatinine of 2.07. The patient's creatinine on 05/12 was 2.49. Calcium was 8.7, anion gap was 6. Liver function tests were essentially normal except for an albumin of 3.3. White count was 14.5 with a hemoglobin of 10.8 and a platelet count of 231,214. The MCV was 105.7. There were 85% neutrophils. The emergency room physician discussed the patient's case with Infectious Disease. He was started on cefepime and admitted for further evaluation and treatment.     PAST MEDICAL HISTORY: Significant for ischemic colitis in the past; diverticulosis; primary hypertension; chronic kidney disease stage 4; coronary artery disease, status post coronary artery bypass grafting; status post aortic valve replacement; immunosuppression secondary to chronic steroid use; iron deficiency anemia; Pittman's esophagus with dysplasia, the patient was scoped, he said, last year; kidney stones, the patient had 1 episode of kidney stone that apparently passed on its own; chronic anemia, the patient had a bone marrow test done in December 2012 which was negative; colonic polyps removed by colonoscopy, they were apparently precancerous; compression fracture of T12; decreased ejection fraction; dyslipidemia; elevated homocystine level; elevated lipoprotein A level; history of fever of unknown origin, apparently when he was diagnosed with polymyalgia rheumatica; gastritis; hemorrhoids; hiatal hernia with gastroesophageal reflux; history of mesenteric adenitis; proteinuria; thrombocytopenia; B12 deficiency; vitamin D deficiency; appendectomy; coronary artery bypass grafting with bypass x2 to 3, 2013; cholecystectomy; multiple colonoscopies; inguinal lymph node biopsy; skin tags which were removed; psoriasis. MEDICATIONS:  Medications prior to admission:  Chromium 871 mcg daily; folic acid 877 mcg twice a day; hydrochlorothiazide 50 mg daily as needed; methotrexate 2.5 mg 3 tablets by mouth once a week; prednisone 5 mg tablets, 2.5 mg twice a day; _________ 50,000 units every 14 days;  Betaine/trimethylglycine 500 mg capsules, the patient takes 1000 mg twice a day; aspirin 81 mg 3 times a day; amlodipine 81 mg 3 times a week, Monday, Wednesday, and Friday; amlodipine 5 mg tablets, 10 mg daily; Crestor 10 mg nightly; famotidine 20 mg twice a day; carvedilol 12.5 mg twice a day:  Cerefolin NAC 1 tablet Monday, Wednesday, and Friday; calcium carbonate 500 mg at bedtime; triamcinolone acetonide 0.1% external ointment applied topically twice daily; betamethasone dipropionate 0.05% topically as needed; magnesium citrate 125 mg 2 times daily; turmeric once a day; coenzyme Q 50 mg daily; clobetasol 2 times a day p.r.n.; vitamin K 100 mg daily; probiotics 1 capsule daily; nitroglycerin ER 2.5 mg 2 times a day; sublingual nitroglycerin 0.4 under the tongue every 5 minutes as needed for chest pain. ALLERGIES:  Codeine, lisinopril, niacin, penicillin, zinc.    FAMILY HISTORY:  The patient's father  at 80. He had Parkinson's and lung cancer. His mother  at 80; she had Alzheimer's disease. He has a daughter who had Hodgkin's lymphoma at age 25 and currently has breast cancer and another daughter recently diagnosed with non-Hodgkin's lymphoma. SOCIAL HISTORY:  The patient is , lives with his wife. He was a smoker but quit in . Drinks 1 cocktail per night, has 2 cups of coffee daily. He is a retired . REVIEW OF SYSTEMS:  Twelve systems were reviewed. The patient reports fatigue, decreased appetite, and fever. Denies any chest pain, shortness of breath, cough, wheezing, sore throat, sinus congestion, ear pain, rash, urinary symptoms. Did have one episode of diarrhea. There are otherwise no additional pertinent positives or negatives on a 12-point review of systems except as listed in the History of Present Illness. PHYSICAL EXAMINATION:    VITAL SIGNS:  Temperature was 98.5; the patient had taken Tylenol at home when his temperature was 101.5. When I saw him, pulse was 71, respiratory rate was 18, blood pressure was 157/66, O2 saturation was 99% on room air. GENERAL:  He had no complaints at that time. HEENT:  Normocephalic, atraumatic. Pupils equal, reactive. Sclerae anicteric. There was no sinus tenderness. Mucous membranes were moist.  NECK:  Supple. LUNGS:  Clear with easy respiratory excursions. I did not appreciate any wheezes, rhonchi, or rales.   HEART:  Regular rate and rhythm. Normal S1, S2.  ABDOMEN:  Soft. There was tenderness to palpation primarily in the right upper quadrant without guarding or rebound. EXTREMITIES:  No clubbing, cyanosis, calf tenderness, palpable cords, or edema. SKIN:  Warm and dry without any significant rashes. NEUROLOGIC:  The patient was awake, alert, oriented x3 with no focal motor or sensory deficits. PSYCHIATRIC:  His mood and affect were pleasant and cooperative. BACK:  There was no costovertebral angle tenderness. LABORATORY DATA:  Labs were previously mentioned. ASSESSMENT AND PLAN:    1. 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 It could also be autoimmune in nature. Started on cefepime by Infectious Disease. Await recommendations by Infectious Disease. 2.   One episode of diarrhea. If further episodes occur, I have ordered stool for PCR. 3.   Polymyalgia rheumatica on prednisone and methotrexate. Continue prednisone as patient at this point would most certainly have adrenal insufficiency. Obviously this chronic treatment makes him an immunocompromised host.  4.   Chronic kidney disease stage 4, stable. Continue to monitor labs. 5.   History of coronary artery disease, status post coronary artery bypass graft. No complaints of chest pain. Continue to monitor. Place on remote telemetry. 6.   Hypertension. Blood pressure medicines were held initially to monitor his blood pressure given the fever. 7.   History of ischemic colitis. The patient was evaluated by Colorectal Surgery in January of this year. At that point, there was a question that he might need surgical intervention, but apparently he responded to antibiotic treatment and did not require surgery. 8.   Chronic thrombocytopenia. 9.   History of psoriasis which apparently varies in intensity, currently not too bad. 10.   Polymyalgia rheumatica. Will continue low-dose prednisone. Hold methotrexate for now.   11.   Previous history of fever of unknown etiology in the past according to the patient and his wife. No obvious etiology was identified. This occurred years ago. 12.   Vitamin B12 and vitamin D deficiency, continue supplementation. 13.   The patient's current clinical status and proposed treatment plan was discussed with him and his wife at the bedside. All of their questions were answered, and they agreed with the plan of care as outlined above.     Dictated By Swati Ramires MD  d: 05/27/2022 14:44:20  t: 05/27/2022 16:10:03  [facility] [id number]  [facility]/    cc: Sherri Parsons MD

## 2022-05-27 NOTE — PROGRESS NOTES
[facility] Pharmacy Note:  Renal Adjustment for cefepime (MAXIPIME)    Adriana Johnson is a [de-identified]year old patient who has been prescribed cefepime (MAXIPIME) 1 g every 8 hrs. The estimated creatinine clearance is 27.7 mL/min (A) (based on SCr of 2.07 mg/dL (H)). The dose has been adjusted to cefepime (MAXIPIME) 1 g every 12 hrs per hospital renal dose adjustment protocol for treatment of fever of unknown origin. Pharmacy will follow and adjust dose as warranted for additional renal function changes.     Thank you,    Lucky Angelucci, PharmD  5/27/2022  6:19 AM

## 2022-05-27 NOTE — ED QUICK NOTES
Orders for admission, patient is aware of plan and ready to go upstairs. Any questions, please call ED RN karen at extension [phone].      Patient Covid vaccination status: Fully vaccinated     COVID Test Ordered in ED: SARS-CoV-2/Flu A and B/RSV by PCR (GeneXpert)    COVID Suspicion at Admission: N/A    Running Infusions:      Mental Status/LOC at time of transport: alert    Other pertinent information:   CIWA score: N/A   NIH score:  N/A

## 2022-05-27 NOTE — CM/SW NOTE
05/27/22 1000   CM/SW Referral Data   Referral Source    Reason for Referral Discharge planning   Informant Patient   Pertinent Medical Hx   Does patient have an established PCP? Yes  Sloan Babcock)   Patient Info   Patient's Current Mental Status at Time of Assessment Alert;Oriented   Patient's [address]   Patient lives with Spouse/Significant other   Patient Status Prior to Admission   Independent with ADLs and Mobility Yes   Discharge Needs   Anticipated D/C needs No anticipated discharge needs     Pt discussed during nursing rounds. Dx febrile illness, on IV abx. From home w/spouse, independent and active prior to dx. No home care needs anticipated on dc. Plan: Home w/spouse pending medical clearance. / to remain available for support and/or discharge planning.      Monty Nunez, BSN    [phone]

## 2022-05-28 LAB
ALBUMIN SERPL-MCNC: 2.9 G/DL (ref 3.4–5)
ALBUMIN/GLOB SERPL: 1 {RATIO} (ref 1–2)
ALP LIVER SERPL-CCNC: 35 U/L
ALT SERPL-CCNC: 21 U/L
ANION GAP SERPL CALC-SCNC: 7 MMOL/L (ref 0–18)
AST SERPL-CCNC: 25 U/L (ref 15–37)
BASOPHILS # BLD AUTO: 0.01 X10(3) UL (ref 0–0.2)
BASOPHILS NFR BLD AUTO: 0.1 %
BILIRUB SERPL-MCNC: 0.5 MG/DL (ref 0.1–2)
BUN BLD-MCNC: 30 MG/DL (ref 7–18)
BUN/CREAT SERPL: 19.5 (ref 10–20)
C3 SERPL-MCNC: 85.5 MG/DL (ref 90–180)
C4 SERPL-MCNC: 27.2 MG/DL (ref 10–40)
CALCIUM BLD-MCNC: 7.7 MG/DL (ref 8.5–10.1)
CHLORIDE SERPL-SCNC: 111 MMOL/L (ref 98–112)
CK SERPL-CCNC: 53 U/L
CO2 SERPL-SCNC: 22 MMOL/L (ref 21–32)
CREAT BLD-MCNC: 1.54 MG/DL
CRP SERPL-MCNC: 3.46 MG/DL (ref ?–0.3)
DEPRECATED HBV CORE AB SER IA-ACNC: 337.4 NG/ML
DEPRECATED RDW RBC AUTO: 52.6 FL (ref 35.1–46.3)
EOSINOPHIL # BLD AUTO: 0.15 X10(3) UL (ref 0–0.7)
EOSINOPHIL NFR BLD AUTO: 1.6 %
ERYTHROCYTE [DISTWIDTH] IN BLOOD BY AUTOMATED COUNT: 13.4 % (ref 11–15)
GLOBULIN PLAS-MCNC: 2.9 G/DL (ref 2.8–4.4)
GLUCOSE BLD-MCNC: 123 MG/DL (ref 70–99)
HCT VFR BLD AUTO: 31.1 %
HGB BLD-MCNC: 9.9 G/DL
IMM GRANULOCYTES # BLD AUTO: 0.04 X10(3) UL (ref 0–1)
IMM GRANULOCYTES NFR BLD: 0.4 %
LYMPHOCYTES # BLD AUTO: 0.45 X10(3) UL (ref 1–4)
LYMPHOCYTES NFR BLD AUTO: 4.8 %
MAGNESIUM SERPL-MCNC: 2.2 MG/DL (ref 1.6–2.6)
MCH RBC QN AUTO: 34 PG (ref 26–34)
MCHC RBC AUTO-ENTMCNC: 31.8 G/DL (ref 31–37)
MCV RBC AUTO: 106.9 FL
MONOCYTES # BLD AUTO: 0.95 X10(3) UL (ref 0.1–1)
MONOCYTES NFR BLD AUTO: 10.1 %
NEUTROPHILS # BLD AUTO: 7.82 X10 (3) UL (ref 1.5–7.7)
NEUTROPHILS # BLD AUTO: 7.82 X10(3) UL (ref 1.5–7.7)
NEUTROPHILS NFR BLD AUTO: 83 %
OSMOLALITY SERPL CALC.SUM OF ELEC: 298 MOSM/KG (ref 275–295)
PHOSPHATE SERPL-MCNC: 2.4 MG/DL (ref 2.5–4.9)
PLATELET # BLD AUTO: 102 10(3)UL (ref 150–450)
POTASSIUM SERPL-SCNC: 3.6 MMOL/L (ref 3.5–5.1)
PROT SERPL-MCNC: 5.8 G/DL (ref 6.4–8.2)
RBC # BLD AUTO: 2.91 X10(6)UL
SODIUM SERPL-SCNC: 140 MMOL/L (ref 136–145)
WBC # BLD AUTO: 9.4 X10(3) UL (ref 4–11)

## 2022-05-28 PROCEDURE — 99223 1ST HOSP IP/OBS HIGH 75: CPT | Performed by: HOSPITALIST

## 2022-05-28 PROCEDURE — 99223 1ST HOSP IP/OBS HIGH 75: CPT | Performed by: INTERNAL MEDICINE

## 2022-05-28 RX ORDER — PREDNISONE 1 MG/1
5 TABLET ORAL 2 TIMES DAILY
Status: DISCONTINUED | OUTPATIENT
Start: 2022-05-28 | End: 2022-05-29

## 2022-05-28 RX ORDER — VANCOMYCIN HYDROCHLORIDE 125 MG/1
125 CAPSULE ORAL EVERY 6 HOURS
Status: DISCONTINUED | OUTPATIENT
Start: 2022-05-28 | End: 2022-05-30

## 2022-05-28 RX ORDER — PREDNISONE 1 MG/1
2.5 TABLET ORAL ONCE
Status: COMPLETED | OUTPATIENT
Start: 2022-05-28 | End: 2022-05-28

## 2022-05-28 NOTE — PLAN OF CARE
Problem: Patient Centered Care  Goal: Patient preferences are identified and integrated in the patient's plan of care  Description: Interventions:  - What would you like us to know as we care for you  - Provide timely, complete, and accurate information to patient/family  - Incorporate patient and family knowledge, values, beliefs, and cultural backgrounds into the planning and delivery of care  - Encourage patient/family to participate in care and decision-making at the level they choose  - Honor patient and family perspectives and choices  Outcome: Progressing     No PRNs given shift, no complaints of pain  Diet upgraded from CLD to soft, patient had 2 episodes of diarrhea  Wife updated on POC at bedside

## 2022-05-28 NOTE — PLAN OF CARE
Problem: Patient Centered Care  Goal: Patient preferences are identified and integrated in the patient's plan of care  Description: Interventions:  - What would you like us to know as we care for you?  From home with wife  - Provide timely, complete, and accurate information to patient/family  - Incorporate patient and family knowledge, values, beliefs, and cultural backgrounds into the planning and delivery of care  - Encourage patient/family to participate in care and decision-making at the level they choose  - Honor patient and family perspectives and choices  Outcome: Progressing     Problem: Patient/Family Goals  Goal: Patient/Family Long Term Goal  Description: Patient's Long Term Goal: Go back home    Interventions:  - Monitor vital signs  - Monitor appropriate labs  - Pain management  - Administer medications per order  - Follow MD orders  - Diagnostics per order  - Update / inform patient and family on plan of care  - Discharge planning  - See additional Care Plan goals for specific interventions  Outcome: Progressing  Goal: Patient/Family Short Term Goal  Description: Patient's Short Term Goal: Feel better    Interventions:   - Monitor vital signs  - Monitor appropriate labs  - Pain management  - Administer medications per order  - Follow MD orders  - Diagnostics per order  - Update / inform patient and family on plan of care  - See additional Care Plan goals for specific interventions  Outcome: Progressing     Problem: PAIN - ADULT  Goal: Verbalizes/displays adequate comfort level or patient's stated pain goal  Description: INTERVENTIONS:  - Encourage pt to monitor pain and request assistance  - Assess pain using appropriate pain scale  - Administer analgesics based on type and severity of pain and evaluate response  - Implement non-pharmacological measures as appropriate and evaluate response  - Consider cultural and social influences on pain and pain management  - Manage/alleviate anxiety  - Utilize distraction and/or relaxation techniques  - Monitor for opioid side effects  - Notify MD/LIP if interventions unsuccessful or patient reports new pain  - Anticipate increased pain with activity and pre-medicate as appropriate  Outcome: Progressing     Problem: SAFETY ADULT - FALL  Goal: Free from fall injury  Description: INTERVENTIONS:  - Assess pt frequently for physical needs  - Identify cognitive and physical deficits and behaviors that affect risk of falls.   - [place] fall precautions as indicated by assessment.  - Educate pt/family on patient safety including physical limitations  - Instruct pt to call for assistance with activity based on assessment  - Modify environment to reduce risk of injury  - Provide assistive devices as appropriate  - Consider OT/PT consult to assist with strengthening/mobility  - Encourage toileting schedule  Outcome: Progressing     Problem: DISCHARGE PLANNING  Goal: Discharge to home or other facility with appropriate resources  Description: INTERVENTIONS:  - Identify barriers to discharge w/pt and caregiver  - Include patient/family/discharge partner in discharge planning  - Arrange for needed discharge resources and transportation as appropriate  - Identify discharge learning needs (meds, wound care, etc)  - Arrange for interpreters to assist at discharge as needed  - Consider post-discharge preferences of patient/family/discharge partner  - Complete POLST form as appropriate  - Assess patient's ability to be responsible for managing their own health  - Refer to Case Management Department for coordinating discharge planning if the patient needs post-hospital services based on physician/LIP order or complex needs related to functional status, cognitive ability or social support system  Outcome: Progressing     Problem: GASTROINTESTINAL - ADULT  Goal: Maintains or returns to baseline bowel function  Description: INTERVENTIONS:  - Assess bowel function  - Maintain adequate hydration with IV or PO as ordered and tolerated  - Evaluate effectiveness of GI medications  - Encourage mobilization and activity  - Obtain nutritional consult as needed  - Establish a toileting routine/schedule  - Consider collaborating with pharmacy to review patient's medication profile  Outcome: Progressing     Problem: METABOLIC/FLUID AND ELECTROLYTES - ADULT  Goal: Electrolytes maintained within normal limits  Description: INTERVENTIONS:  - Monitor labs and rhythm and assess patient for signs and symptoms of electrolyte imbalances  - Administer electrolyte replacement as ordered  - Monitor response to electrolyte replacements, including rhythm and repeat lab results as appropriate  - Fluid restriction as ordered  - Instruct patient on fluid and nutrition restrictions as appropriate  Outcome: Progressing     Problem: SKIN/TISSUE INTEGRITY - ADULT  Goal: Skin integrity remains intact  Description: INTERVENTIONS  - Assess and document risk factors for pressure ulcer development  - Assess and document skin integrity  - Monitor for areas of redness and/or skin breakdown  - Initiate interventions, skin care algorithm/standards of care as needed  Outcome: Progressing      Monitoring vital signs- stable at this time. Stool specimen collected resulted positive for C. Diff- MD notified, new orders received for antibiotic- see MAR. Safety and fall precautions maintained- I-bed awareness on, bed locked in lowest position, call light within reach. Frequent rounding by nursing staff.

## 2022-05-28 NOTE — PLAN OF CARE
Problem: Patient Centered Care  Goal: Patient preferences are identified and integrated in the patient's plan of care  Description: Interventions:  - What would you like us to know as we care for you?  From home with wife  - Provide timely, complete, and accurate information to patient/family  - Incorporate patient and family knowledge, values, beliefs, and cultural backgrounds into the planning and delivery of care  - Encourage patient/family to participate in care and decision-making at the level they choose  - Honor patient and family perspectives and choices  Outcome: Progressing     Problem: Patient/Family Goals  Goal: Patient/Family Long Term Goal  Description: Patient's Long Term Goal: Go back home    Interventions:  - Monitor vital signs  - Monitor appropriate labs  - Pain management  - Administer medications per order  - Follow MD orders  - Diagnostics per order  - Update / inform patient and family on plan of care  - Discharge planning  - See additional Care Plan goals for specific interventions  Outcome: Progressing  Goal: Patient/Family Short Term Goal  Description: Patient's Short Term Goal: Feel better    Interventions:   - Monitor vital signs  - Monitor appropriate labs  - Pain management  - Administer medications per order  - Follow MD orders  - Diagnostics per order  - Update / inform patient and family on plan of care  - See additional Care Plan goals for specific interventions  Outcome: Progressing     Problem: PAIN - ADULT  Goal: Verbalizes/displays adequate comfort level or patient's stated pain goal  Description: INTERVENTIONS:  - Encourage pt to monitor pain and request assistance  - Assess pain using appropriate pain scale  - Administer analgesics based on type and severity of pain and evaluate response  - Implement non-pharmacological measures as appropriate and evaluate response  - Consider cultural and social influences on pain and pain management  - Manage/alleviate anxiety  - Utilize distraction and/or relaxation techniques  - Monitor for opioid side effects  - Notify MD/LIP if interventions unsuccessful or patient reports new pain  - Anticipate increased pain with activity and pre-medicate as appropriate  Outcome: Progressing     Problem: SAFETY ADULT - FALL  Goal: Free from fall injury  Description: INTERVENTIONS:  - Assess pt frequently for physical needs  - Identify cognitive and physical deficits and behaviors that affect risk of falls.   - [place] fall precautions as indicated by assessment.  - Educate pt/family on patient safety including physical limitations  - Instruct pt to call for assistance with activity based on assessment  - Modify environment to reduce risk of injury  - Provide assistive devices as appropriate  - Consider OT/PT consult to assist with strengthening/mobility  - Encourage toileting schedule  Outcome: Progressing     Problem: DISCHARGE PLANNING  Goal: Discharge to home or other facility with appropriate resources  Description: INTERVENTIONS:  - Identify barriers to discharge w/pt and caregiver  - Include patient/family/discharge partner in discharge planning  - Arrange for needed discharge resources and transportation as appropriate  - Identify discharge learning needs (meds, wound care, etc)  - Arrange for interpreters to assist at discharge as needed  - Consider post-discharge preferences of patient/family/discharge partner  - Complete POLST form as appropriate  - Assess patient's ability to be responsible for managing their own health  - Refer to Case Management Department for coordinating discharge planning if the patient needs post-hospital services based on physician/LIP order or complex needs related to functional status, cognitive ability or social support system  Outcome: Progressing     Problem: GASTROINTESTINAL - ADULT  Goal: Maintains or returns to baseline bowel function  Description: INTERVENTIONS:  - Assess bowel function  - Maintain adequate hydration with IV or PO as ordered and tolerated  - Evaluate effectiveness of GI medications  - Encourage mobilization and activity  - Obtain nutritional consult as needed  - Establish a toileting routine/schedule  - Consider collaborating with pharmacy to review patient's medication profile  Outcome: Progressing     Problem: METABOLIC/FLUID AND ELECTROLYTES - ADULT  Goal: Electrolytes maintained within normal limits  Description: INTERVENTIONS:  - Monitor labs and rhythm and assess patient for signs and symptoms of electrolyte imbalances  - Administer electrolyte replacement as ordered  - Monitor response to electrolyte replacements, including rhythm and repeat lab results as appropriate  - Fluid restriction as ordered  - Instruct patient on fluid and nutrition restrictions as appropriate  Outcome: Progressing     Problem: SKIN/TISSUE INTEGRITY - ADULT  Goal: Skin integrity remains intact  Description: INTERVENTIONS  - Assess and document risk factors for pressure ulcer development  - Assess and document skin integrity  - Monitor for areas of redness and/or skin breakdown  - Initiate interventions, skin care algorithm/standards of care as needed  Outcome: Progressing     Patient is presently resting in the bed. Alert x 4. Patient denied any pain or discomfort. Vital signs taken and stable. Patient is afebrile. Self care and independent. Tolerates meal. Diet was advanced to Regular Cardiac diet. Enteric isolation precautions are followed at all times. Telemetry monitor in place. Patient receives intravenous antibiotics per doctor orders. Rheumatology was placed on consult for patient. Patient receives prednisone oral bid. Intravenous fluids infusing and tolerated. Call light within reach at all times.

## 2022-05-29 LAB
ALBUMIN SERPL-MCNC: 2.8 G/DL (ref 3.4–5)
ANION GAP SERPL CALC-SCNC: 4 MMOL/L (ref 0–18)
BASOPHILS # BLD AUTO: 0 X10(3) UL (ref 0–0.2)
BASOPHILS NFR BLD AUTO: 0 %
BUN BLD-MCNC: 22 MG/DL (ref 7–18)
BUN/CREAT SERPL: 15.7 (ref 10–20)
CALCIUM BLD-MCNC: 8.2 MG/DL (ref 8.5–10.1)
CHLORIDE SERPL-SCNC: 114 MMOL/L (ref 98–112)
CO2 SERPL-SCNC: 23 MMOL/L (ref 21–32)
CREAT BLD-MCNC: 1.4 MG/DL
DEPRECATED RDW RBC AUTO: 51.8 FL (ref 35.1–46.3)
EOSINOPHIL # BLD AUTO: 0.06 X10(3) UL (ref 0–0.7)
EOSINOPHIL NFR BLD AUTO: 0.6 %
ERYTHROCYTE [DISTWIDTH] IN BLOOD BY AUTOMATED COUNT: 13.3 % (ref 11–15)
ERYTHROCYTE [SEDIMENTATION RATE] IN BLOOD: 37 MM/HR
GLUCOSE BLD-MCNC: 124 MG/DL (ref 70–99)
HCT VFR BLD AUTO: 27.2 %
HGB BLD-MCNC: 8.8 G/DL
IMM GRANULOCYTES # BLD AUTO: 0.08 X10(3) UL (ref 0–1)
IMM GRANULOCYTES NFR BLD: 0.8 %
LYMPHOCYTES # BLD AUTO: 0.49 X10(3) UL (ref 1–4)
LYMPHOCYTES NFR BLD AUTO: 4.8 %
MCH RBC QN AUTO: 34.4 PG (ref 26–34)
MCHC RBC AUTO-ENTMCNC: 32.4 G/DL (ref 31–37)
MCV RBC AUTO: 106.3 FL
MONOCYTES # BLD AUTO: 0.76 X10(3) UL (ref 0.1–1)
MONOCYTES NFR BLD AUTO: 7.4 %
NEUTROPHILS # BLD AUTO: 8.88 X10 (3) UL (ref 1.5–7.7)
NEUTROPHILS # BLD AUTO: 8.88 X10(3) UL (ref 1.5–7.7)
NEUTROPHILS NFR BLD AUTO: 86.4 %
OSMOLALITY SERPL CALC.SUM OF ELEC: 297 MOSM/KG (ref 275–295)
PHOSPHATE SERPL-MCNC: 2.6 MG/DL (ref 2.5–4.9)
PLATELET # BLD AUTO: 100 10(3)UL (ref 150–450)
POTASSIUM SERPL-SCNC: 3.8 MMOL/L (ref 3.5–5.1)
RBC # BLD AUTO: 2.56 X10(6)UL
SODIUM SERPL-SCNC: 141 MMOL/L (ref 136–145)
WBC # BLD AUTO: 10.3 X10(3) UL (ref 4–11)

## 2022-05-29 PROCEDURE — 99232 SBSQ HOSP IP/OBS MODERATE 35: CPT | Performed by: INTERNAL MEDICINE

## 2022-05-29 PROCEDURE — 99233 SBSQ HOSP IP/OBS HIGH 50: CPT | Performed by: HOSPITALIST

## 2022-05-29 RX ORDER — PREDNISONE 1 MG/1
2.5 TABLET ORAL EVERY EVENING
Status: DISCONTINUED | OUTPATIENT
Start: 2022-05-29 | End: 2022-05-30

## 2022-05-29 RX ORDER — TEMAZEPAM 7.5 MG/1
7.5 CAPSULE ORAL NIGHTLY PRN
Status: DISCONTINUED | OUTPATIENT
Start: 2022-05-29 | End: 2022-05-30

## 2022-05-29 RX ORDER — HYDROCHLOROTHIAZIDE 25 MG/1
50 TABLET ORAL DAILY PRN
Status: DISCONTINUED | OUTPATIENT
Start: 2022-05-29 | End: 2022-05-30

## 2022-05-29 RX ORDER — PREDNISONE 1 MG/1
5 TABLET ORAL
Status: DISCONTINUED | OUTPATIENT
Start: 2022-05-30 | End: 2022-05-30

## 2022-05-29 NOTE — PLAN OF CARE
Problem: Patient Centered Care  Goal: Patient preferences are identified and integrated in the patient's plan of care  Description: Interventions:  - What would you like us to know as we care for you?  From home with wife  - Provide timely, complete, and accurate information to patient/family  - Incorporate patient and family knowledge, values, beliefs, and cultural backgrounds into the planning and delivery of care  - Encourage patient/family to participate in care and decision-making at the level they choose  - Honor patient and family perspectives and choices  Outcome: Progressing     Problem: Patient/Family Goals  Goal: Patient/Family Long Term Goal  Description: Patient's Long Term Goal: Go back home    Interventions:  - Monitor vital signs  - Monitor appropriate labs  - Pain management  - Administer medications per order  - Follow MD orders  - Diagnostics per order  - Update / inform patient and family on plan of care  - Discharge planning  - See additional Care Plan goals for specific interventions  Outcome: Progressing  Goal: Patient/Family Short Term Goal  Description: Patient's Short Term Goal: Feel better    Interventions:   - Monitor vital signs  - Monitor appropriate labs  - Pain management  - Administer medications per order  - Follow MD orders  - Diagnostics per order  - Update / inform patient and family on plan of care  - See additional Care Plan goals for specific interventions  Outcome: Progressing     Problem: PAIN - ADULT  Goal: Verbalizes/displays adequate comfort level or patient's stated pain goal  Description: INTERVENTIONS:  - Encourage pt to monitor pain and request assistance  - Assess pain using appropriate pain scale  - Administer analgesics based on type and severity of pain and evaluate response  - Implement non-pharmacological measures as appropriate and evaluate response  - Consider cultural and social influences on pain and pain management  - Manage/alleviate anxiety  - Utilize distraction and/or relaxation techniques  - Monitor for opioid side effects  - Notify MD/LIP if interventions unsuccessful or patient reports new pain  - Anticipate increased pain with activity and pre-medicate as appropriate  Outcome: Progressing     Problem: SAFETY ADULT - FALL  Goal: Free from fall injury  Description: INTERVENTIONS:  - Assess pt frequently for physical needs  - Identify cognitive and physical deficits and behaviors that affect risk of falls.   - [place] fall precautions as indicated by assessment.  - Educate pt/family on patient safety including physical limitations  - Instruct pt to call for assistance with activity based on assessment  - Modify environment to reduce risk of injury  - Provide assistive devices as appropriate  - Consider OT/PT consult to assist with strengthening/mobility  - Encourage toileting schedule  Outcome: Progressing     Problem: DISCHARGE PLANNING  Goal: Discharge to home or other facility with appropriate resources  Description: INTERVENTIONS:  - Identify barriers to discharge w/pt and caregiver  - Include patient/family/discharge partner in discharge planning  - Arrange for needed discharge resources and transportation as appropriate  - Identify discharge learning needs (meds, wound care, etc)  - Arrange for interpreters to assist at discharge as needed  - Consider post-discharge preferences of patient/family/discharge partner  - Complete POLST form as appropriate  - Assess patient's ability to be responsible for managing their own health  - Refer to Case Management Department for coordinating discharge planning if the patient needs post-hospital services based on physician/LIP order or complex needs related to functional status, cognitive ability or social support system  Outcome: Progressing     Problem: GASTROINTESTINAL - ADULT  Goal: Maintains or returns to baseline bowel function  Description: INTERVENTIONS:  - Assess bowel function  - Maintain adequate hydration with IV or PO as ordered and tolerated  - Evaluate effectiveness of GI medications  - Encourage mobilization and activity  - Obtain nutritional consult as needed  - Establish a toileting routine/schedule  - Consider collaborating with pharmacy to review patient's medication profile  Outcome: Progressing     Problem: METABOLIC/FLUID AND ELECTROLYTES - ADULT  Goal: Electrolytes maintained within normal limits  Description: INTERVENTIONS:  - Monitor labs and rhythm and assess patient for signs and symptoms of electrolyte imbalances  - Administer electrolyte replacement as ordered  - Monitor response to electrolyte replacements, including rhythm and repeat lab results as appropriate  - Fluid restriction as ordered  - Instruct patient on fluid and nutrition restrictions as appropriate  Outcome: Progressing     Problem: SKIN/TISSUE INTEGRITY - ADULT  Goal: Skin integrity remains intact  Description: INTERVENTIONS  - Assess and document risk factors for pressure ulcer development  - Assess and document skin integrity  - Monitor for areas of redness and/or skin breakdown  - Initiate interventions, skin care algorithm/standards of care as needed  Outcome: Progressing     Patient alert, vitals stable, medications tolerated well. No c/o pain. Iv fluids running. Patient able to make needs known. Isolation precautions maintained. Call light within reach.

## 2022-05-29 NOTE — PLAN OF CARE
Patient and spouse reported that patient has edema to bilateral lower extremities. Patient was instructed to keep bilateral feet elevated while in bed or recliner chair. Patient currently sitting up in recliner chair with bilateral feet elevated on 2 pillows. Patient reported that he takes at home for bilateral feet swelling hydrochlorathiazide 50 mg daily PRN. Dr. Kisha Newton was notified and informed of patient bilateral feet swelling. Dr. Mahi Preston entered orders to discontinue continuous intravenous fluids and to administer hydrochlorathiazide daily PRN per order.

## 2022-05-29 NOTE — PROGRESS NOTES
[facility]acy Note:  Renal Adjustment for cefepime (MAXIPIME)    Conor Armas is a [de-identified]year old patient who has been prescribed cefepime (MAXIPIME) 1000 mg every 12 hrs. The estimated creatinine clearance is 41.9 mL/min (A) (based on SCr of 1.4 mg/dL (H)). The dose has been adjusted to cefepime (MAXIPIME) 1000 mg every 8 hrs per hospital renal dose adjustment protocol for treatment of fever of unknown origin. Pharmacy will follow and adjust dose as warranted for additional renal function changes.     Thank you,    Isidro Amaro, PharmD  5/29/2022  11:04 AM

## 2022-05-29 NOTE — PLAN OF CARE
Problem: Patient Centered Care  Goal: Patient preferences are identified and integrated in the patient's plan of care  Description: Interventions:  - What would you like us to know as we care for you?  From home with wife  - Provide timely, complete, and accurate information to patient/family  - Incorporate patient and family knowledge, values, beliefs, and cultural backgrounds into the planning and delivery of care  - Encourage patient/family to participate in care and decision-making at the level they choose  - Honor patient and family perspectives and choices  Outcome: Progressing     Problem: Patient/Family Goals  Goal: Patient/Family Long Term Goal  Description: Patient's Long Term Goal: Go back home    Interventions:  - Monitor vital signs  - Monitor appropriate labs  - Pain management  - Administer medications per order  - Follow MD orders  - Diagnostics per order  - Update / inform patient and family on plan of care  - Discharge planning  - See additional Care Plan goals for specific interventions  Outcome: Progressing  Goal: Patient/Family Short Term Goal  Description: Patient's Short Term Goal: Feel better    Interventions:   - Monitor vital signs  - Monitor appropriate labs  - Pain management  - Administer medications per order  - Follow MD orders  - Diagnostics per order  - Update / inform patient and family on plan of care  - See additional Care Plan goals for specific interventions  Outcome: Progressing     Problem: PAIN - ADULT  Goal: Verbalizes/displays adequate comfort level or patient's stated pain goal  Description: INTERVENTIONS:  - Encourage pt to monitor pain and request assistance  - Assess pain using appropriate pain scale  - Administer analgesics based on type and severity of pain and evaluate response  - Implement non-pharmacological measures as appropriate and evaluate response  - Consider cultural and social influences on pain and pain management  - Manage/alleviate anxiety  - Utilize distraction and/or relaxation techniques  - Monitor for opioid side effects  - Notify MD/LIP if interventions unsuccessful or patient reports new pain  - Anticipate increased pain with activity and pre-medicate as appropriate  Outcome: Progressing     Problem: SAFETY ADULT - FALL  Goal: Free from fall injury  Description: INTERVENTIONS:  - Assess pt frequently for physical needs  - Identify cognitive and physical deficits and behaviors that affect risk of falls.   - [place] fall precautions as indicated by assessment.  - Educate pt/family on patient safety including physical limitations  - Instruct pt to call for assistance with activity based on assessment  - Modify environment to reduce risk of injury  - Provide assistive devices as appropriate  - Consider OT/PT consult to assist with strengthening/mobility  - Encourage toileting schedule  Outcome: Progressing     Problem: DISCHARGE PLANNING  Goal: Discharge to home or other facility with appropriate resources  Description: INTERVENTIONS:  - Identify barriers to discharge w/pt and caregiver  - Include patient/family/discharge partner in discharge planning  - Arrange for needed discharge resources and transportation as appropriate  - Identify discharge learning needs (meds, wound care, etc)  - Arrange for interpreters to assist at discharge as needed  - Consider post-discharge preferences of patient/family/discharge partner  - Complete POLST form as appropriate  - Assess patient's ability to be responsible for managing their own health  - Refer to Case Management Department for coordinating discharge planning if the patient needs post-hospital services based on physician/LIP order or complex needs related to functional status, cognitive ability or social support system  Outcome: Progressing     Problem: GASTROINTESTINAL - ADULT  Goal: Maintains or returns to baseline bowel function  Description: INTERVENTIONS:  - Assess bowel function  - Maintain adequate hydration with IV or PO as ordered and tolerated  - Evaluate effectiveness of GI medications  - Encourage mobilization and activity  - Obtain nutritional consult as needed  - Establish a toileting routine/schedule  - Consider collaborating with pharmacy to review patient's medication profile  Outcome: Progressing     Problem: METABOLIC/FLUID AND ELECTROLYTES - ADULT  Goal: Electrolytes maintained within normal limits  Description: INTERVENTIONS:  - Monitor labs and rhythm and assess patient for signs and symptoms of electrolyte imbalances  - Administer electrolyte replacement as ordered  - Monitor response to electrolyte replacements, including rhythm and repeat lab results as appropriate  - Fluid restriction as ordered  - Instruct patient on fluid and nutrition restrictions as appropriate  Outcome: Progressing     Problem: SKIN/TISSUE INTEGRITY - ADULT  Goal: Skin integrity remains intact  Description: INTERVENTIONS  - Assess and document risk factors for pressure ulcer development  - Assess and document skin integrity  - Monitor for areas of redness and/or skin breakdown  - Initiate interventions, skin care algorithm/standards of care as needed  Outcome: Progressing     Patient is presently resting in the bed. Alert x 4. Patient denied pain or discomfort at current time. Intravenous fluids infusing and tolerated. Vital signs taken and stable. Patient is self care and independent. Patient receives intravenous antibiotics per doctor orders. Enteric isolation precautions are followed per protocol. Telemetry monitor in place. Rhematology on consult. Call light within reach at all times.

## 2022-05-30 VITALS
BODY MASS INDEX: 21.98 KG/M2 | HEIGHT: 70 IN | SYSTOLIC BLOOD PRESSURE: 138 MMHG | RESPIRATION RATE: 18 BRPM | OXYGEN SATURATION: 100 % | HEART RATE: 68 BPM | TEMPERATURE: 98 F | WEIGHT: 153.5 LBS | DIASTOLIC BLOOD PRESSURE: 64 MMHG

## 2022-05-30 LAB — CORTIS SERPL-MCNC: 4.6 UG/DL

## 2022-05-30 PROCEDURE — 99239 HOSP IP/OBS DSCHRG MGMT >30: CPT | Performed by: INTERNAL MEDICINE

## 2022-05-30 RX ORDER — METHOTREXATE 2.5 MG/1
7.5 TABLET ORAL WEEKLY
Qty: 60 TABLET | Refills: 3 | Status: SHIPPED | COMMUNITY
Start: 2022-06-09 | End: 2022-06-16 | Stop reason: ALTCHOICE

## 2022-05-30 RX ORDER — PREDNISONE 1 MG/1
7.5 TABLET ORAL DAILY
Qty: 135 TABLET | Refills: 3 | Status: SHIPPED | COMMUNITY
Start: 2022-05-30 | End: 2022-06-16

## 2022-05-30 RX ORDER — VANCOMYCIN HYDROCHLORIDE 125 MG/1
125 CAPSULE ORAL EVERY 6 HOURS
Qty: 48 CAPSULE | Refills: 0 | Status: SHIPPED | OUTPATIENT
Start: 2022-05-30 | End: 2022-06-11

## 2022-05-30 NOTE — PLAN OF CARE
Problem: Patient Centered Care  Goal: Patient preferences are identified and integrated in the patient's plan of care  Description: Interventions:  - What would you like us to know as we care for you?  From home with wife  - Provide timely, complete, and accurate information to patient/family  - Incorporate patient and family knowledge, values, beliefs, and cultural backgrounds into the planning and delivery of care  - Encourage patient/family to participate in care and decision-making at the level they choose  - Honor patient and family perspectives and choices  Outcome: Progressing     Problem: Patient/Family Goals  Goal: Patient/Family Long Term Goal  Description: Patient's Long Term Goal: Go back home    Interventions:  - Monitor vital signs  - Monitor appropriate labs  - Pain management  - Administer medications per order  - Follow MD orders  - Diagnostics per order  - Update / inform patient and family on plan of care  - Discharge planning  - See additional Care Plan goals for specific interventions  Outcome: Progressing  Goal: Patient/Family Short Term Goal  Description: Patient's Short Term Goal: Feel better    Interventions:   - Monitor vital signs  - Monitor appropriate labs  - Pain management  - Administer medications per order  - Follow MD orders  - Diagnostics per order  - Update / inform patient and family on plan of care  - See additional Care Plan goals for specific interventions  Outcome: Progressing     Problem: PAIN - ADULT  Goal: Verbalizes/displays adequate comfort level or patient's stated pain goal  Description: INTERVENTIONS:  - Encourage pt to monitor pain and request assistance  - Assess pain using appropriate pain scale  - Administer analgesics based on type and severity of pain and evaluate response  - Implement non-pharmacological measures as appropriate and evaluate response  - Consider cultural and social influences on pain and pain management  - Manage/alleviate anxiety  - Utilize distraction and/or relaxation techniques  - Monitor for opioid side effects  - Notify MD/LIP if interventions unsuccessful or patient reports new pain  - Anticipate increased pain with activity and pre-medicate as appropriate  Outcome: Progressing     Problem: SAFETY ADULT - FALL  Goal: Free from fall injury  Description: INTERVENTIONS:  - Assess pt frequently for physical needs  - Identify cognitive and physical deficits and behaviors that affect risk of falls.   - [place] fall precautions as indicated by assessment.  - Educate pt/family on patient safety including physical limitations  - Instruct pt to call for assistance with activity based on assessment  - Modify environment to reduce risk of injury  - Provide assistive devices as appropriate  - Consider OT/PT consult to assist with strengthening/mobility  - Encourage toileting schedule  Outcome: Progressing     Problem: DISCHARGE PLANNING  Goal: Discharge to home or other facility with appropriate resources  Description: INTERVENTIONS:  - Identify barriers to discharge w/pt and caregiver  - Include patient/family/discharge partner in discharge planning  - Arrange for needed discharge resources and transportation as appropriate  - Identify discharge learning needs (meds, wound care, etc)  - Arrange for interpreters to assist at discharge as needed  - Consider post-discharge preferences of patient/family/discharge partner  - Complete POLST form as appropriate  - Assess patient's ability to be responsible for managing their own health  - Refer to Case Management Department for coordinating discharge planning if the patient needs post-hospital services based on physician/LIP order or complex needs related to functional status, cognitive ability or social support system  Outcome: Progressing     Problem: GASTROINTESTINAL - ADULT  Goal: Maintains or returns to baseline bowel function  Description: INTERVENTIONS:  - Assess bowel function  - Maintain adequate hydration with IV or PO as ordered and tolerated  - Evaluate effectiveness of GI medications  - Encourage mobilization and activity  - Obtain nutritional consult as needed  - Establish a toileting routine/schedule  - Consider collaborating with pharmacy to review patient's medication profile  Outcome: Progressing     Problem: METABOLIC/FLUID AND ELECTROLYTES - ADULT  Goal: Electrolytes maintained within normal limits  Description: INTERVENTIONS:  - Monitor labs and rhythm and assess patient for signs and symptoms of electrolyte imbalances  - Administer electrolyte replacement as ordered  - Monitor response to electrolyte replacements, including rhythm and repeat lab results as appropriate  - Fluid restriction as ordered  - Instruct patient on fluid and nutrition restrictions as appropriate  Outcome: Progressing     Problem: SKIN/TISSUE INTEGRITY - ADULT  Goal: Skin integrity remains intact  Description: INTERVENTIONS  - Assess and document risk factors for pressure ulcer development  - Assess and document skin integrity  - Monitor for areas of redness and/or skin breakdown  - Initiate interventions, skin care algorithm/standards of care as needed  Outcome: Progressing   Patient ambulatory, no c/o pain, no c/o diarrhea, having soft stools; no complaints; vital signs stable, call light in reach.

## 2022-05-30 NOTE — PLAN OF CARE
Patient is alert and oriented x4. On room air, denies SOB, vitals stable. Educated patient on plan of care, general admission education. Call light within reach. Bed in lowest position. All needs addressed. Hourly rounding maintained. Pt educated on discharge instructions. Pt sent home with all paperwork and belongings. Pt brought to ED via wheelchair with PCT. PIV removed. Pt stable upon discharge. Problem: Patient Centered Care  Goal: Patient preferences are identified and integrated in the patient's plan of care  Description: Interventions:  - What would you like us to know as we care for you?  From home with wife  - Provide timely, complete, and accurate information to patient/family  - Incorporate patient and family knowledge, values, beliefs, and cultural backgrounds into the planning and delivery of care  - Encourage patient/family to participate in care and decision-making at the level they choose  - Honor patient and family perspectives and choices  Outcome: Progressing     Problem: PAIN - ADULT  Goal: Verbalizes/displays adequate comfort level or patient's stated pain goal  Description: INTERVENTIONS:  - Encourage pt to monitor pain and request assistance  - Assess pain using appropriate pain scale  - Administer analgesics based on type and severity of pain and evaluate response  - Implement non-pharmacological measures as appropriate and evaluate response  - Consider cultural and social influences on pain and pain management  - Manage/alleviate anxiety  - Utilize distraction and/or relaxation techniques  - Monitor for opioid side effects  - Notify MD/LIP if interventions unsuccessful or patient reports new pain  - Anticipate increased pain with activity and pre-medicate as appropriate  Outcome: Progressing     Problem: SAFETY ADULT - FALL  Goal: Free from fall injury  Description: INTERVENTIONS:  - Assess pt frequently for physical needs  - Identify cognitive and physical deficits and behaviors that affect risk of falls.   - [place] fall precautions as indicated by assessment.  - Educate pt/family on patient safety including physical limitations  - Instruct pt to call for assistance with activity based on assessment  - Modify environment to reduce risk of injury  - Provide assistive devices as appropriate  - Consider OT/PT consult to assist with strengthening/mobility  - Encourage toileting schedule  Outcome: Progressing     Problem: SKIN/TISSUE INTEGRITY - ADULT  Goal: Skin integrity remains intact  Description: INTERVENTIONS  - Assess and document risk factors for pressure ulcer development  - Assess and document skin integrity  - Monitor for areas of redness and/or skin breakdown  - Initiate interventions, skin care algorithm/standards of care as needed  Outcome: Progressing

## 2022-05-31 ENCOUNTER — TELEPHONE (OUTPATIENT)
Dept: INTERNAL MEDICINE CLINIC | Facility: CLINIC | Age: 82
End: 2022-05-31

## 2022-05-31 ENCOUNTER — TELEPHONE (OUTPATIENT)
Dept: HEMATOLOGY/ONCOLOGY | Facility: HOSPITAL | Age: 82
End: 2022-05-31

## 2022-05-31 LAB
ALDOLASE, SERUM: 5.4 U/L
ANGIOTENSIN CONVERTING ENZYME: 15 U/L
NUCLEAR IGG TITR SER IF: NEGATIVE {TITER}

## 2022-05-31 NOTE — TELEPHONE ENCOUNTER
Vitamin d 1 cap once a week filled   LOV 5/9/22  Prednisone 2.5 mg 2 tab daily filled     Follow up in 4 weeks   Next apt 6/2/22  Labs 96.9 vitamin d

## 2022-05-31 NOTE — TELEPHONE ENCOUNTER
Benedicto Johnathan reports Jami Zapien is feeling weak from diarrhea. They would prefer to move his appointments from tomorrow to next week.  I transferred the call to CARLOS CASTELLON, [facility].

## 2022-05-31 NOTE — TELEPHONE ENCOUNTER
Patient was just discharged from hospital and has C-Diff, patient's wife is inquiring if they should still keep his appointment for Wed 6/1 or should they reschedule

## 2022-05-31 NOTE — TELEPHONE ENCOUNTER
Becky Daniel is coming to see Dr. Awa Yap for a hosp f/u appt on Thursday 6/2. Originally, he had a follow up appointment with Dr. Awa Yap on 6/9, and he was supposed to bring a hemoccult. Does he still have to do this with the hosp f/u?   Please call Mickey Orourke

## 2022-05-31 NOTE — TELEPHONE ENCOUNTER
I attempted to reach [facility] in regards to [facility] appointment with Dr Marcos Key tomorrow. No answer. I left a voice mail message that if Batsheva Welch is feeling too weak to come in that we can defer his appointment for 1 week. I asked her to please call the office this afternoon.

## 2022-05-31 NOTE — TELEPHONE ENCOUNTER
Pt wife informed to hold hemoccult since pt was recently diagnosed w joleen   Pt will be in the office Thursday for hosp f/u

## 2022-06-01 ENCOUNTER — PATIENT OUTREACH (OUTPATIENT)
Dept: CASE MANAGEMENT | Age: 82
End: 2022-06-01

## 2022-06-01 ENCOUNTER — APPOINTMENT (OUTPATIENT)
Dept: HEMATOLOGY/ONCOLOGY | Facility: HOSPITAL | Age: 82
End: 2022-06-01
Attending: INTERNAL MEDICINE
Payer: MEDICARE

## 2022-06-01 DIAGNOSIS — Z02.9 ENCOUNTERS FOR UNSPECIFIED ADMINISTRATIVE PURPOSE: ICD-10-CM

## 2022-06-01 LAB
ENA SM IGG SER QL: NEGATIVE
ENA SM+RNP AB SER QL: NEGATIVE
ENA SS-B AB SER QL IA: NEGATIVE
MYELOPEROX ANTIBODIES, IGG: 0 AU/ML
SERINE PROTEASE 3, IGG: 0 AU/ML

## 2022-06-01 RX ORDER — PREDNISONE 2.5 MG
TABLET ORAL
Qty: 45 TABLET | Refills: 0 | Status: SHIPPED | OUTPATIENT
Start: 2022-06-01 | End: 2022-06-02 | Stop reason: DRUGHIGH

## 2022-06-01 RX ORDER — CHOLECALCIFEROL (VITAMIN D3) 1250 MCG
CAPSULE ORAL
Qty: 13 CAPSULE | Refills: 0 | Status: SHIPPED | OUTPATIENT
Start: 2022-06-01

## 2022-06-01 NOTE — PROGRESS NOTES
NCM placed call to patient for TCM, LM requesting a call back to [phone] with a condition update.

## 2022-06-02 ENCOUNTER — OFFICE VISIT (OUTPATIENT)
Dept: INTERNAL MEDICINE CLINIC | Facility: CLINIC | Age: 82
End: 2022-06-02
Payer: MEDICARE

## 2022-06-02 VITALS
TEMPERATURE: 98 F | RESPIRATION RATE: 16 BRPM | SYSTOLIC BLOOD PRESSURE: 120 MMHG | DIASTOLIC BLOOD PRESSURE: 66 MMHG | BODY MASS INDEX: 21.47 KG/M2 | HEART RATE: 78 BPM | OXYGEN SATURATION: 98 % | WEIGHT: 150 LBS | HEIGHT: 70 IN

## 2022-06-02 DIAGNOSIS — E44.0 MODERATE PROTEIN-CALORIE MALNUTRITION (HCC): ICD-10-CM

## 2022-06-02 DIAGNOSIS — A04.72 CLOSTRIDIUM DIFFICILE COLITIS: Primary | ICD-10-CM

## 2022-06-02 DIAGNOSIS — M35.03 SJOGREN'S SYNDROME WITH MYOPATHY (HCC): ICD-10-CM

## 2022-06-02 DIAGNOSIS — R50.9 FUO (FEVER OF UNKNOWN ORIGIN): ICD-10-CM

## 2022-06-02 DIAGNOSIS — M35.3 PMR (POLYMYALGIA RHEUMATICA) (HCC): ICD-10-CM

## 2022-06-02 DIAGNOSIS — I25.10 CORONARY ARTERY DISEASE INVOLVING NATIVE CORONARY ARTERY OF NATIVE HEART WITHOUT ANGINA PECTORIS: ICD-10-CM

## 2022-06-02 PROCEDURE — 1111F DSCHRG MED/CURRENT MED MERGE: CPT | Performed by: INTERNAL MEDICINE

## 2022-06-02 PROCEDURE — 99496 TRANSJ CARE MGMT HIGH F2F 7D: CPT | Performed by: INTERNAL MEDICINE

## 2022-06-02 RX ORDER — CHOLESTYRAMINE 4 G/9G
4 POWDER, FOR SUSPENSION ORAL NIGHTLY
Qty: 14 EACH | Refills: 0 | Status: SHIPPED | OUTPATIENT
Start: 2022-06-02 | End: 2022-07-02

## 2022-06-03 PROBLEM — A04.72 CLOSTRIDIUM DIFFICILE COLITIS: Status: ACTIVE | Noted: 2022-06-03

## 2022-06-03 PROBLEM — M35.03 SJOGREN'S SYNDROME WITH MYOPATHY (HCC): Status: ACTIVE | Noted: 2022-06-03

## 2022-06-03 LAB — DSDNA AB TITR SER: <10 {TITER}

## 2022-06-03 NOTE — PROGRESS NOTES
Multiple attempts to reach pt and messages left with no return call. Patient went in for HFU appt with PCP on 6/2/22. Encounter closing.

## 2022-06-06 ENCOUNTER — TELEPHONE (OUTPATIENT)
Dept: RHEUMATOLOGY | Facility: CLINIC | Age: 82
End: 2022-06-06

## 2022-06-06 NOTE — TELEPHONE ENCOUNTER
Phoned pt wife, explained Dr. Basil Quesada is not the ordering provider of his vancomycin. Pt needs to call the ordering provider or PCP in regards to antibiotics for pt c diff. Instructed to go to urgent care of ER for worsening symptoms. Voiced understanding.

## 2022-06-08 ENCOUNTER — NURSE ONLY (OUTPATIENT)
Dept: HEMATOLOGY/ONCOLOGY | Facility: HOSPITAL | Age: 82
End: 2022-06-08
Attending: INTERNAL MEDICINE
Payer: MEDICARE

## 2022-06-08 ENCOUNTER — TELEPHONE (OUTPATIENT)
Dept: HEMATOLOGY/ONCOLOGY | Facility: HOSPITAL | Age: 82
End: 2022-06-08

## 2022-06-08 VITALS
WEIGHT: 153 LBS | BODY MASS INDEX: 21.9 KG/M2 | TEMPERATURE: 98 F | SYSTOLIC BLOOD PRESSURE: 128 MMHG | DIASTOLIC BLOOD PRESSURE: 67 MMHG | RESPIRATION RATE: 16 BRPM | HEART RATE: 73 BPM | OXYGEN SATURATION: 100 % | HEIGHT: 70 IN

## 2022-06-08 DIAGNOSIS — D64.89 ANEMIA DUE TO OTHER CAUSE, NOT CLASSIFIED: Primary | ICD-10-CM

## 2022-06-08 DIAGNOSIS — D64.89 ANEMIA DUE TO OTHER CAUSE, NOT CLASSIFIED: ICD-10-CM

## 2022-06-08 DIAGNOSIS — D64.9 ANEMIA, UNSPECIFIED TYPE: ICD-10-CM

## 2022-06-08 DIAGNOSIS — Z86.39 HISTORY OF IRON DEFICIENCY: ICD-10-CM

## 2022-06-08 LAB
BASOPHILS # BLD AUTO: 0.03 X10(3) UL (ref 0–0.2)
BASOPHILS NFR BLD AUTO: 0.4 %
DEPRECATED RDW RBC AUTO: 46.7 FL (ref 35.1–46.3)
EOSINOPHIL # BLD AUTO: 0.46 X10(3) UL (ref 0–0.7)
EOSINOPHIL NFR BLD AUTO: 5.7 %
ERYTHROCYTE [DISTWIDTH] IN BLOOD BY AUTOMATED COUNT: 12.4 % (ref 11–15)
HCT VFR BLD AUTO: 31.6 %
HGB BLD-MCNC: 10.3 G/DL
IMM GRANULOCYTES # BLD AUTO: 0.03 X10(3) UL (ref 0–1)
IMM GRANULOCYTES NFR BLD: 0.4 %
LYMPHOCYTES # BLD AUTO: 0.78 X10(3) UL (ref 1–4)
LYMPHOCYTES NFR BLD AUTO: 9.7 %
MCH RBC QN AUTO: 33.8 PG (ref 26–34)
MCHC RBC AUTO-ENTMCNC: 32.6 G/DL (ref 31–37)
MCV RBC AUTO: 103.6 FL
MONOCYTES # BLD AUTO: 1.1 X10(3) UL (ref 0.1–1)
MONOCYTES NFR BLD AUTO: 13.6 %
NEUTROPHILS # BLD AUTO: 5.66 X10 (3) UL (ref 1.5–7.7)
NEUTROPHILS # BLD AUTO: 5.66 X10(3) UL (ref 1.5–7.7)
NEUTROPHILS NFR BLD AUTO: 70.2 %
PLATELET # BLD AUTO: 139 10(3)UL (ref 150–450)
RBC # BLD AUTO: 3.05 X10(6)UL
WBC # BLD AUTO: 8.1 X10(3) UL (ref 4–11)

## 2022-06-08 PROCEDURE — 36415 COLL VENOUS BLD VENIPUNCTURE: CPT

## 2022-06-08 PROCEDURE — 99214 OFFICE O/P EST MOD 30 MIN: CPT | Performed by: INTERNAL MEDICINE

## 2022-06-08 PROCEDURE — 85025 COMPLETE CBC W/AUTO DIFF WBC: CPT

## 2022-06-09 NOTE — PROGRESS NOTES
Rolo Matt    PATIENT'S NAME: Yaima Lagunas   ATTENDING PHYSICIAN: Koko Alejandre MD   PATIENT ACCOUNT #: [de-identified] LOCATION: [facility] [address] RECORD #: [id number] YOB: 1940   DATE OF SERVICE: 06/08/2022       CANCER CENTER PROGRESS NOTE    CHIEF COMPLAINT:  Followup and treatment of multifactorial anemia in the face of multiple medical problems. HISTORY OF PRESENT ILLNESS:  The patient is an 82-year-old male. He has a history of multifactorial anemia with iron deficiency in the past, prior anemia of chronic disease, and anemia related to chronic kidney disease. He has had bone marrows done and has had no evidence of a myelodysplastic syndrome. He has ongoing polymyalgia rheumatica. He is followed by Dr. Kevin Sheikh and Dr. Wendie Contreras, his primary care physician. He had upper and lower endoscopies. He had an episode of ischemic colitis which resolved. Most recently he was back in the hospital.  He has an episode of diarrhea and weakness, and he also had a fever. He was found to have C. difficile. He is now on vancomycin. His bowel movements area improving. He denies any specific new issues. He denies any cough or shortness of breath. He is weak but feels as though he is getting stronger and has gained a couple of pounds since his post hospital visit with Dr. Wendie Contreras last week. He is still completing the vancomycin. This is the first he has had C. difficile.     MEDICATIONS:  His current medications include magnesium citrate 200 mg b.i.d.; methotrexate is on hold for a few weeks until after the C. difficile; vitamin D3 at 50,000 units weekly; amlodipine 10 mg daily; aspirin 81 mg 3 times a week; calcium carbonate 500 mg nightly; carvedilol 12.5 mg b.i.d.; CerefolinNAC 1 tablet Monday, Wednesday, and Friday; cholestyramine packet at bedtime for 2 weeks; chromium 100 mcg daily; coenzyme Q10 at 50 mg daily; famotidine 20 mg twice daily; folic acid 994 mcg twice daily; hydrochlorothiazide 50 mg daily p.r.n.; Juice Plus with fiber 2 capsules twice daily; prednisone 7.5 mg daily; probiotic daily; rosuvastatin 10 mg nightly; turmeric 300 mg daily; vancomycin 125 mg every 6 hours; vitamin K 100 mcg daily. PHYSICAL EXAMINATION:    GENERAL:  He is a reasonably well-appearing male in no acute distress. VITAL SIGNS:  His performance status is 1. His weight is 153 pounds. Blood pressure is 128/67, pulse 73, respiratory rate is 20, temperature is 98.2. HEENT:  Unremarkable. He has pink conjunctivae. Anicteric sclerae. Pharynx without lesions. LYMPHATICS:  He has no cervical, supraclavicular, or axillary adenopathy. LUNGS:  Resonant to percussion and clear to auscultation. No wheezing, rales, or rhonchi. HEART:  Normal.  ABDOMEN:  No hepatosplenomegaly or tenderness. EXTREMITIES:  He has no clubbing, cyanosis, or edema. NEUROLOGIC:  He is intact. LABORATORY DATA:  His CBC was reviewed and today's hemoglobin is improved. It is up to 10.3. It was down in the hospital, likely is an acute inflammatory response to the C. difficile. White count and platelet count are good. Platelets are up to 657,999. IMPRESSION:  Multifactorial anemia. He is actually doing fairly well with this. I put him down to do every 3-month blood work, but he is getting labs fairly frequently with Dr. Herson Bruno and Dr. Amanda Fitzgerald and I told him he does not need to duplicate if he has had labs from the other physicians. I will see him again in December.     Dictated By Amina Bowen MD  d: 06/08/2022 17:29:20  t: 06/08/2022 19:25:46  Job [id number]  /    cc: Dr. Peter Vaca

## 2022-06-16 ENCOUNTER — LAB ENCOUNTER (OUTPATIENT)
Dept: LAB | Age: 82
End: 2022-06-16
Attending: INTERNAL MEDICINE
Payer: MEDICARE

## 2022-06-16 ENCOUNTER — OFFICE VISIT (OUTPATIENT)
Dept: INTERNAL MEDICINE CLINIC | Facility: CLINIC | Age: 82
End: 2022-06-16
Payer: MEDICARE

## 2022-06-16 VITALS
BODY MASS INDEX: 22.05 KG/M2 | SYSTOLIC BLOOD PRESSURE: 130 MMHG | HEIGHT: 70 IN | WEIGHT: 154 LBS | OXYGEN SATURATION: 98 % | TEMPERATURE: 97 F | HEART RATE: 58 BPM | DIASTOLIC BLOOD PRESSURE: 62 MMHG | RESPIRATION RATE: 16 BRPM

## 2022-06-16 DIAGNOSIS — A04.72 CLOSTRIDIUM DIFFICILE COLITIS: Primary | ICD-10-CM

## 2022-06-16 DIAGNOSIS — Z86.39 HISTORY OF IRON DEFICIENCY: ICD-10-CM

## 2022-06-16 DIAGNOSIS — R50.9 FUO (FEVER OF UNKNOWN ORIGIN): ICD-10-CM

## 2022-06-16 DIAGNOSIS — K63.5 POLYP OF COLON, UNSPECIFIED PART OF COLON, UNSPECIFIED TYPE: ICD-10-CM

## 2022-06-16 DIAGNOSIS — D64.89 ANEMIA DUE TO OTHER CAUSE, NOT CLASSIFIED: ICD-10-CM

## 2022-06-16 DIAGNOSIS — M35.3 PMR (POLYMYALGIA RHEUMATICA) (HCC): ICD-10-CM

## 2022-06-16 DIAGNOSIS — I25.10 CORONARY ARTERY DISEASE INVOLVING NATIVE CORONARY ARTERY OF NATIVE HEART WITHOUT ANGINA PECTORIS: ICD-10-CM

## 2022-06-16 DIAGNOSIS — N18.4 CKD (CHRONIC KIDNEY DISEASE) STAGE 4, GFR 15-29 ML/MIN (HCC): ICD-10-CM

## 2022-06-16 LAB
ALBUMIN SERPL-MCNC: 3.4 G/DL (ref 3.4–5)
ALBUMIN/GLOB SERPL: 1 {RATIO} (ref 1–2)
ALP LIVER SERPL-CCNC: 42 U/L
ALT SERPL-CCNC: 29 U/L
ANION GAP SERPL CALC-SCNC: 5 MMOL/L (ref 0–18)
AST SERPL-CCNC: 25 U/L (ref 15–37)
BASOPHILS # BLD AUTO: 0.06 X10(3) UL (ref 0–0.2)
BASOPHILS NFR BLD AUTO: 0.5 %
BILIRUB SERPL-MCNC: 0.4 MG/DL (ref 0.1–2)
BUN BLD-MCNC: 30 MG/DL (ref 7–18)
CALCIUM BLD-MCNC: 9.3 MG/DL (ref 8.5–10.1)
CHLORIDE SERPL-SCNC: 106 MMOL/L (ref 98–112)
CO2 SERPL-SCNC: 27 MMOL/L (ref 21–32)
CREAT BLD-MCNC: 1.89 MG/DL
DEPRECATED HBV CORE AB SER IA-ACNC: 212.6 NG/ML
EOSINOPHIL # BLD AUTO: 0.37 X10(3) UL (ref 0–0.7)
EOSINOPHIL NFR BLD AUTO: 3.3 %
ERYTHROCYTE [DISTWIDTH] IN BLOOD BY AUTOMATED COUNT: 12.4 %
ERYTHROCYTE [SEDIMENTATION RATE] IN BLOOD: 27 MM/HR
FASTING STATUS PATIENT QL REPORTED: YES
GLOBULIN PLAS-MCNC: 3.4 G/DL (ref 2.8–4.4)
GLUCOSE BLD-MCNC: 94 MG/DL (ref 70–99)
HCT VFR BLD AUTO: 30.9 %
HGB BLD-MCNC: 10.2 G/DL
IGA SERPL-MCNC: 208 MG/DL (ref 70–312)
IGM SERPL-MCNC: 83.9 MG/DL (ref 43–279)
IMM GRANULOCYTES # BLD AUTO: 0.16 X10(3) UL (ref 0–1)
IMM GRANULOCYTES NFR BLD: 1.4 %
IMMUNOGLOBULIN PNL SER-MCNC: 911 MG/DL (ref 791–1643)
IRON SATN MFR SERPL: 21 %
IRON SERPL-MCNC: 64 UG/DL
LDH SERPL L TO P-CCNC: 307 U/L
LYMPHOCYTES # BLD AUTO: 1.24 X10(3) UL (ref 1–4)
LYMPHOCYTES NFR BLD AUTO: 11 %
MAGNESIUM SERPL-MCNC: 2.6 MG/DL (ref 1.6–2.6)
MCH RBC QN AUTO: 33.6 PG (ref 26–34)
MCHC RBC AUTO-ENTMCNC: 33 G/DL (ref 31–37)
MCV RBC AUTO: 101.6 FL
MONOCYTES # BLD AUTO: 1.29 X10(3) UL (ref 0.1–1)
MONOCYTES NFR BLD AUTO: 11.5 %
NEUTROPHILS # BLD AUTO: 8.12 X10 (3) UL (ref 1.5–7.7)
NEUTROPHILS # BLD AUTO: 8.12 X10(3) UL (ref 1.5–7.7)
NEUTROPHILS NFR BLD AUTO: 72.3 %
OSMOLALITY SERPL CALC.SUM OF ELEC: 292 MOSM/KG (ref 275–295)
PLATELET # BLD AUTO: 194 10(3)UL (ref 150–450)
POTASSIUM SERPL-SCNC: 4 MMOL/L (ref 3.5–5.1)
PROT SERPL-MCNC: 6.8 G/DL (ref 6.4–8.2)
RBC # BLD AUTO: 3.04 X10(6)UL
SODIUM SERPL-SCNC: 138 MMOL/L (ref 136–145)
TIBC SERPL-MCNC: 311 UG/DL (ref 240–450)
TRANSFERRIN SERPL-MCNC: 209 MG/DL (ref 200–360)
WBC # BLD AUTO: 11.2 X10(3) UL (ref 4–11)

## 2022-06-16 PROCEDURE — 83615 LACTATE (LD) (LDH) ENZYME: CPT

## 2022-06-16 PROCEDURE — 83521 IG LIGHT CHAINS FREE EACH: CPT

## 2022-06-16 PROCEDURE — 83550 IRON BINDING TEST: CPT

## 2022-06-16 PROCEDURE — 85025 COMPLETE CBC W/AUTO DIFF WBC: CPT

## 2022-06-16 PROCEDURE — 82728 ASSAY OF FERRITIN: CPT

## 2022-06-16 PROCEDURE — 85652 RBC SED RATE AUTOMATED: CPT

## 2022-06-16 PROCEDURE — 86334 IMMUNOFIX E-PHORESIS SERUM: CPT

## 2022-06-16 PROCEDURE — 84165 PROTEIN E-PHORESIS SERUM: CPT

## 2022-06-16 PROCEDURE — 82784 ASSAY IGA/IGD/IGG/IGM EACH: CPT

## 2022-06-16 PROCEDURE — 83735 ASSAY OF MAGNESIUM: CPT

## 2022-06-16 PROCEDURE — 83540 ASSAY OF IRON: CPT

## 2022-06-16 PROCEDURE — 99214 OFFICE O/P EST MOD 30 MIN: CPT | Performed by: INTERNAL MEDICINE

## 2022-06-16 PROCEDURE — 80053 COMPREHEN METABOLIC PANEL: CPT

## 2022-06-16 PROCEDURE — 1111F DSCHRG MED/CURRENT MED MERGE: CPT | Performed by: INTERNAL MEDICINE

## 2022-06-16 PROCEDURE — 36415 COLL VENOUS BLD VENIPUNCTURE: CPT

## 2022-06-16 RX ORDER — PREDNISONE 1 MG/1
5 TABLET ORAL DAILY
Qty: 135 TABLET | Refills: 3 | COMMUNITY
Start: 2022-06-16

## 2022-06-16 RX ORDER — CHOLECALCIFEROL (VITAMIN D3) 1250 MCG
CAPSULE ORAL
COMMUNITY

## 2022-06-20 LAB
ALBUMIN SERPL ELPH-MCNC: 3.74 G/DL (ref 3.75–5.21)
ALBUMIN/GLOB SERPL: 1.35 {RATIO} (ref 1–2)
ALPHA1 GLOB SERPL ELPH-MCNC: 0.28 G/DL (ref 0.19–0.46)
ALPHA2 GLOB SERPL ELPH-MCNC: 0.89 G/DL (ref 0.48–1.05)
B-GLOBULIN SERPL ELPH-MCNC: 0.73 G/DL (ref 0.68–1.23)
GAMMA GLOB SERPL ELPH-MCNC: 0.86 G/DL (ref 0.62–1.7)
KAPPA LC FREE SER-MCNC: 4.94 MG/DL (ref 0.33–1.94)
KAPPA LC FREE/LAMBDA FREE SER NEPH: 1.73 {RATIO} (ref 0.26–1.65)
LAMBDA LC FREE SERPL-MCNC: 2.85 MG/DL (ref 0.57–2.63)
PROT SERPL-MCNC: 6.5 G/DL (ref 6.4–8.2)

## 2022-06-29 RX ORDER — CARVEDILOL 12.5 MG/1
TABLET ORAL
Qty: 180 TABLET | Refills: 0 | Status: SHIPPED | OUTPATIENT
Start: 2022-06-29

## 2022-06-30 ENCOUNTER — OFFICE VISIT (OUTPATIENT)
Dept: RHEUMATOLOGY | Facility: CLINIC | Age: 82
End: 2022-06-30
Payer: MEDICARE

## 2022-06-30 VITALS
SYSTOLIC BLOOD PRESSURE: 122 MMHG | HEART RATE: 69 BPM | BODY MASS INDEX: 22.62 KG/M2 | WEIGHT: 158 LBS | TEMPERATURE: 98 F | HEIGHT: 70 IN | DIASTOLIC BLOOD PRESSURE: 64 MMHG | OXYGEN SATURATION: 99 %

## 2022-06-30 DIAGNOSIS — M35.3 PMR (POLYMYALGIA RHEUMATICA) (HCC): Primary | ICD-10-CM

## 2022-06-30 DIAGNOSIS — D89.89 KAPPA LIGHT CHAIN DISEASE (HCC): ICD-10-CM

## 2022-06-30 PROCEDURE — 99214 OFFICE O/P EST MOD 30 MIN: CPT | Performed by: INTERNAL MEDICINE

## 2022-06-30 NOTE — PATIENT INSTRUCTIONS
Current sed rate is normal at 27. Therefore reduce Prednisone from 7.5 mg per day to 5 mg per day. , taken all in the morning. Methotrexate remains at 3 per week. Folic acid 421 mcg twice a day. Mild exercise, walking would be good. Return to office 3 months.

## 2022-07-13 ENCOUNTER — OFFICE VISIT (OUTPATIENT)
Dept: SLEEP CENTER | Age: 82
End: 2022-07-13
Attending: INTERNAL MEDICINE
Payer: MEDICARE

## 2022-07-13 DIAGNOSIS — G47.33 OBSTRUCTIVE SLEEP APNEA SYNDROME: ICD-10-CM

## 2022-07-13 PROCEDURE — 95806 SLEEP STUDY UNATT&RESP EFFT: CPT

## 2022-07-14 ENCOUNTER — APPOINTMENT (OUTPATIENT)
Dept: URBAN - METROPOLITAN AREA CLINIC 249 | Age: 82
Setting detail: DERMATOLOGY
End: 2022-07-15

## 2022-07-14 DIAGNOSIS — L40.0 PSORIASIS VULGARIS: ICD-10-CM

## 2022-07-14 DIAGNOSIS — L57.0 ACTINIC KERATOSIS: ICD-10-CM

## 2022-07-14 DIAGNOSIS — D22 MELANOCYTIC NEVI: ICD-10-CM

## 2022-07-14 DIAGNOSIS — L57.8 OTHER SKIN CHANGES DUE TO CHRONIC EXPOSURE TO NONIONIZING RADIATION: ICD-10-CM

## 2022-07-14 DIAGNOSIS — L82.1 OTHER SEBORRHEIC KERATOSIS: ICD-10-CM

## 2022-07-14 PROBLEM — D22.5 MELANOCYTIC NEVI OF TRUNK: Status: ACTIVE | Noted: 2022-07-14

## 2022-07-14 PROCEDURE — OTHER COUNSELING: OTHER

## 2022-07-14 PROCEDURE — 17000 DESTRUCT PREMALG LESION: CPT

## 2022-07-14 PROCEDURE — 99213 OFFICE O/P EST LOW 20 MIN: CPT | Mod: 25

## 2022-07-14 PROCEDURE — OTHER LIQUID NITROGEN: OTHER

## 2022-07-14 ASSESSMENT — LOCATION DETAILED DESCRIPTION DERM
LOCATION DETAILED: LEFT MEDIAL ZYGOMA
LOCATION DETAILED: RIGHT MEDIAL TRAPEZIAL NECK
LOCATION DETAILED: RIGHT SUPERIOR MEDIAL UPPER BACK

## 2022-07-14 ASSESSMENT — LOCATION ZONE DERM
LOCATION ZONE: FACE
LOCATION ZONE: NECK
LOCATION ZONE: TRUNK

## 2022-07-14 ASSESSMENT — LOCATION SIMPLE DESCRIPTION DERM
LOCATION SIMPLE: POSTERIOR NECK
LOCATION SIMPLE: LEFT ZYGOMA
LOCATION SIMPLE: RIGHT UPPER BACK

## 2022-07-14 NOTE — PROCEDURE: LIQUID NITROGEN
Application Tool (Optional): Cotton Tipped Applicator
Consent: The patient's consent was obtained including but not limited to risks of crusting, scabbing, blistering, scarring, darker or lighter pigmentary change, recurrence, incomplete removal and infection.
Aperture Size (Optional): C
Detail Level: Detailed
Duration Of Freeze Thaw-Cycle (Seconds): 5
Number Of Freeze-Thaw Cycles: 3 freeze-thaw cycles
Post-Care Instructions: I reviewed with the patient in detail post-care instructions. Patient is to wear sunprotection, and avoid picking at any of the treated lesions. Pt may apply Vaseline to crusted or scabbing areas.
Show Aperture Variable?: Yes
Render Post-Care Instructions In Note?: no

## 2022-07-14 NOTE — HPI: SECONDARY COMPLAINT
How Severe Is This Condition?: moderate clear bilaterally.  Pupils equal, round, and reactive to light.

## 2022-07-15 ENCOUNTER — TELEPHONE (OUTPATIENT)
Dept: INTERNAL MEDICINE CLINIC | Facility: CLINIC | Age: 82
End: 2022-07-15

## 2022-07-15 NOTE — TELEPHONE ENCOUNTER
Patient was told by Dr. Joshua Page to go and have his covid booster . When his wife called to schedule she was told that he is not due until October. Wife would like to speak with Dr. Joshua Page. She also wanted to know if he needs to fast for his labs for his upcoming follow up appointment.

## 2022-07-18 NOTE — PROCEDURES
[address] [facility]  Accredited by the [employer] of Sleep Medicine (AASM)    PATIENT'S NAME: Mario Engel   ATTENDING PHYSICIAN:    REFERRING PHYSICIAN: Mauricio Berrios MD   PATIENT ACCOUNT #: [de-identified] LOCATION: [address] #: [id number] YOB: 1940   DATE OF STUDY: 07/13/2022       SLEEP STUDY REPORT    STUDY TYPE:  Unattended sleep study. Height 5 feet 10 inches. Weight 152 pounds. BMI 22. ICD-10 G47.33. CLINICAL HISTORY:  The patient is an 82-year-old male who reports snoring and daytime fatigue. UNATTENDED SLEEP STUDY RECORDING PARAMETERS:  The patient underwent a formal technically adequate unattended diagnostic sleep study coordinated with the [facility]. The study was performed in accordance with the AASM standard for Out of Center Sleep Testing. The four-channel Type III HST measures the following parameters:  flow, respiratory effort, pulse, and oxygen saturation. SCORING:  This study was scored in accordance with AASM scoring rules and Medicare rule 1B. RESPIRATORY STATISTICS:  A total of 552 minutes was recorded using an [employer] home sleep test recording device. The apnea-hypopnea index was 25.7. Oxyhemoglobin saturation marie was 87%. IMPRESSION:  This study demonstrates obstructive sleep apnea. RECOMMENDATIONS:  This patient should follow up in the sleep clinic for consultation and further recommendations. My office will contact the patient to schedule this consult. A trial of nasal CPAP can be considered at which time the patient will need to return for polysomnography to perform CPAP titration. This patient should avoid the use of alcohol and sedative medications at bedtime. This patient should avoid driving while sleepy at all times. Dictated By Marco Perkins MD  d: 07/18/2022 11:49:35  t: 07/18/2022 11:52:41  Job [id number]  IRTERRY/

## 2022-07-25 ENCOUNTER — OFFICE VISIT (OUTPATIENT)
Dept: INTERNAL MEDICINE CLINIC | Facility: CLINIC | Age: 82
End: 2022-07-25
Payer: MEDICARE

## 2022-07-25 VITALS
TEMPERATURE: 97 F | RESPIRATION RATE: 16 BRPM | DIASTOLIC BLOOD PRESSURE: 76 MMHG | OXYGEN SATURATION: 97 % | WEIGHT: 157 LBS | HEART RATE: 64 BPM | BODY MASS INDEX: 23 KG/M2 | SYSTOLIC BLOOD PRESSURE: 132 MMHG

## 2022-07-25 DIAGNOSIS — D50.8 IRON DEFICIENCY ANEMIA SECONDARY TO INADEQUATE DIETARY IRON INTAKE: ICD-10-CM

## 2022-07-25 DIAGNOSIS — M35.3 PMR (POLYMYALGIA RHEUMATICA) (HCC): Primary | ICD-10-CM

## 2022-07-25 PROCEDURE — 1126F AMNT PAIN NOTED NONE PRSNT: CPT | Performed by: INTERNAL MEDICINE

## 2022-07-25 PROCEDURE — 99213 OFFICE O/P EST LOW 20 MIN: CPT | Performed by: INTERNAL MEDICINE

## 2022-07-25 RX ORDER — FAMOTIDINE 40 MG/1
40 TABLET, FILM COATED ORAL DAILY
COMMUNITY

## 2022-07-25 RX ORDER — PREDNISONE 1 MG/1
5 TABLET ORAL DAILY
Qty: 135 TABLET | Refills: 3 | COMMUNITY
Start: 2022-07-25

## 2022-07-25 RX ORDER — HYDROCHLOROTHIAZIDE 25 MG/1
25 TABLET ORAL DAILY PRN
COMMUNITY

## 2022-08-02 ENCOUNTER — LAB ENCOUNTER (OUTPATIENT)
Dept: LAB | Facility: HOSPITAL | Age: 82
End: 2022-08-02
Attending: INTERNAL MEDICINE
Payer: MEDICARE

## 2022-08-02 LAB
BASOPHILS # BLD AUTO: 0.03 X10(3) UL (ref 0–0.2)
BASOPHILS NFR BLD AUTO: 0.3 %
DEPRECATED HBV CORE AB SER IA-ACNC: 167.7 NG/ML
DEPRECATED RDW RBC AUTO: 47.2 FL (ref 35.1–46.3)
EOSINOPHIL # BLD AUTO: 0.3 X10(3) UL (ref 0–0.7)
EOSINOPHIL NFR BLD AUTO: 3 %
ERYTHROCYTE [DISTWIDTH] IN BLOOD BY AUTOMATED COUNT: 13.3 % (ref 11–15)
HCT VFR BLD AUTO: 31.8 %
HGB BLD-MCNC: 10.6 G/DL
HGB RETIC QN AUTO: 36.8 PG (ref 28.2–36.6)
IMM GRANULOCYTES # BLD AUTO: 0.04 X10(3) UL (ref 0–1)
IMM GRANULOCYTES NFR BLD: 0.4 %
IMM RETICS NFR: 0.13 RATIO (ref 0.1–0.3)
IRON SATN MFR SERPL: 32 %
IRON SERPL-MCNC: 97 UG/DL
LYMPHOCYTES # BLD AUTO: 1.11 X10(3) UL (ref 1–4)
LYMPHOCYTES NFR BLD AUTO: 11.2 %
MCH RBC QN AUTO: 32.9 PG (ref 26–34)
MCHC RBC AUTO-ENTMCNC: 33.3 G/DL (ref 31–37)
MCV RBC AUTO: 98.8 FL
MONOCYTES # BLD AUTO: 0.88 X10(3) UL (ref 0.1–1)
MONOCYTES NFR BLD AUTO: 8.9 %
NEUTROPHILS # BLD AUTO: 7.54 X10 (3) UL (ref 1.5–7.7)
NEUTROPHILS # BLD AUTO: 7.54 X10(3) UL (ref 1.5–7.7)
NEUTROPHILS NFR BLD AUTO: 76.2 %
PLATELET # BLD AUTO: 140 10(3)UL (ref 150–450)
RBC # BLD AUTO: 3.22 X10(6)UL
RETICS # AUTO: 100.1 X10(3) UL (ref 22.5–147.5)
RETICS/RBC NFR AUTO: 3.1 %
TIBC SERPL-MCNC: 305 UG/DL (ref 240–450)
TRANSFERRIN SERPL-MCNC: 205 MG/DL (ref 200–360)
WBC # BLD AUTO: 9.9 X10(3) UL (ref 4–11)

## 2022-08-05 RX ORDER — L-MEFOL/A-CYST/MEB12/ALGAL OIL 6-600-2 MG
TABLET ORAL
Qty: 36 TABLET | Refills: 0 | Status: SHIPPED | OUTPATIENT
Start: 2022-08-05

## 2022-08-08 DIAGNOSIS — I10 ESSENTIAL HYPERTENSION: ICD-10-CM

## 2022-08-08 DIAGNOSIS — I25.10 CORONARY ARTERY DISEASE INVOLVING NATIVE CORONARY ARTERY OF NATIVE HEART WITHOUT ANGINA PECTORIS: ICD-10-CM

## 2022-08-08 DIAGNOSIS — I35.0 NONRHEUMATIC AORTIC VALVE STENOSIS: ICD-10-CM

## 2022-08-08 DIAGNOSIS — Z95.1 S/P CABG (CORONARY ARTERY BYPASS GRAFT): ICD-10-CM

## 2022-08-08 DIAGNOSIS — E78.00 PURE HYPERCHOLESTEROLEMIA: ICD-10-CM

## 2022-08-08 DIAGNOSIS — E78.5 DYSLIPIDEMIA: ICD-10-CM

## 2022-08-08 DIAGNOSIS — Z95.2 S/P AVR: ICD-10-CM

## 2022-08-08 RX ORDER — AMLODIPINE BESYLATE 5 MG/1
10 TABLET ORAL DAILY
Qty: 180 TABLET | Refills: 3 | Status: SHIPPED | OUTPATIENT
Start: 2022-08-08

## 2022-08-10 ENCOUNTER — APPOINTMENT (OUTPATIENT)
Dept: HEMATOLOGY/ONCOLOGY | Facility: HOSPITAL | Age: 82
End: 2022-08-10
Attending: INTERNAL MEDICINE
Payer: MEDICARE

## 2022-08-12 ENCOUNTER — TELEPHONE (OUTPATIENT)
Dept: HEMATOLOGY/ONCOLOGY | Facility: HOSPITAL | Age: 82
End: 2022-08-12

## 2022-08-12 NOTE — TELEPHONE ENCOUNTER
Patient has had 3 covid vaccines. He has had a reaction to all vaccines. Dr. Erin Desai wants to know if Dr. Isidro Cobian thinks the patient should get the infusion for Covid prevention. Please call back. Thank you.

## 2022-08-16 ENCOUNTER — TELEPHONE (OUTPATIENT)
Dept: HEMATOLOGY/ONCOLOGY | Facility: HOSPITAL | Age: 82
End: 2022-08-16

## 2022-08-25 ENCOUNTER — TELEPHONE (OUTPATIENT)
Dept: INTERNAL MEDICINE CLINIC | Facility: CLINIC | Age: 82
End: 2022-08-25

## 2022-08-26 DIAGNOSIS — G47.33 OSA (OBSTRUCTIVE SLEEP APNEA): Primary | ICD-10-CM

## 2022-08-26 NOTE — TELEPHONE ENCOUNTER
Left message for patient. This office spoke with Dr. Sebastian Hopper office. Unclear as to why they have not contacted the patient. Advised this office to place a referral for a consult and instruct patient to call pulmonology for a consult. Left message on patients voice mail with Dr. Sebastian Hopper office number.

## 2022-08-29 ENCOUNTER — OFFICE VISIT (OUTPATIENT)
Dept: HEMATOLOGY/ONCOLOGY | Facility: HOSPITAL | Age: 82
End: 2022-08-29
Attending: INTERNAL MEDICINE
Payer: MEDICARE

## 2022-08-29 VITALS
HEART RATE: 65 BPM | SYSTOLIC BLOOD PRESSURE: 136 MMHG | OXYGEN SATURATION: 100 % | TEMPERATURE: 98 F | RESPIRATION RATE: 16 BRPM | DIASTOLIC BLOOD PRESSURE: 66 MMHG

## 2022-08-29 DIAGNOSIS — E86.0 DEHYDRATION: Primary | ICD-10-CM

## 2022-09-27 ENCOUNTER — LAB ENCOUNTER (OUTPATIENT)
Dept: LAB | Facility: HOSPITAL | Age: 82
End: 2022-09-27
Attending: INTERNAL MEDICINE
Payer: MEDICARE

## 2022-09-27 DIAGNOSIS — M35.3 PMR (POLYMYALGIA RHEUMATICA) (HCC): Primary | ICD-10-CM

## 2022-09-27 DIAGNOSIS — N18.4 CKD (CHRONIC KIDNEY DISEASE) STAGE 4, GFR 15-29 ML/MIN (HCC): ICD-10-CM

## 2022-09-27 DIAGNOSIS — D64.89 ANEMIA DUE TO OTHER CAUSE, NOT CLASSIFIED: ICD-10-CM

## 2022-09-27 DIAGNOSIS — D64.9 ANEMIA, UNSPECIFIED TYPE: ICD-10-CM

## 2022-09-27 DIAGNOSIS — N18.4 CKD (CHRONIC KIDNEY DISEASE) STAGE 4, GFR 15-29 ML/MIN (HCC): Primary | ICD-10-CM

## 2022-09-27 DIAGNOSIS — D63.1 ANEMIA DUE TO STAGE 4 CHRONIC KIDNEY DISEASE (HCC): ICD-10-CM

## 2022-09-27 DIAGNOSIS — R53.82 CHRONIC FATIGUE: ICD-10-CM

## 2022-09-27 DIAGNOSIS — N18.32 CKD STAGE G3B/A2, GFR 30-44 AND ALBUMIN CREATININE RATIO 30-299 MG/G (HCC): ICD-10-CM

## 2022-09-27 DIAGNOSIS — M35.3 PMR (POLYMYALGIA RHEUMATICA) (HCC): ICD-10-CM

## 2022-09-27 DIAGNOSIS — N18.4 ANEMIA DUE TO STAGE 4 CHRONIC KIDNEY DISEASE (HCC): ICD-10-CM

## 2022-09-27 LAB
ALBUMIN SERPL-MCNC: 3.7 G/DL (ref 3.4–5)
ALBUMIN/GLOB SERPL: 1 {RATIO} (ref 1–2)
ALP LIVER SERPL-CCNC: 41 U/L
ALT SERPL-CCNC: 34 U/L
ANION GAP SERPL CALC-SCNC: 7 MMOL/L (ref 0–18)
AST SERPL-CCNC: 28 U/L (ref 15–37)
BASOPHILS # BLD AUTO: 0.05 X10(3) UL (ref 0–0.2)
BASOPHILS NFR BLD AUTO: 0.5 %
BILIRUB SERPL-MCNC: 0.6 MG/DL (ref 0.1–2)
BUN BLD-MCNC: 33 MG/DL (ref 7–18)
BUN/CREAT SERPL: 14.8 (ref 10–20)
CALCIUM BLD-MCNC: 8.7 MG/DL (ref 8.5–10.1)
CHLORIDE SERPL-SCNC: 105 MMOL/L (ref 98–112)
CO2 SERPL-SCNC: 27 MMOL/L (ref 21–32)
CREAT BLD-MCNC: 2.23 MG/DL
CRP SERPL-MCNC: <0.29 MG/DL (ref ?–0.3)
DEPRECATED HBV CORE AB SER IA-ACNC: 123.4 NG/ML
DEPRECATED RDW RBC AUTO: 50.1 FL (ref 35.1–46.3)
EOSINOPHIL # BLD AUTO: 0.42 X10(3) UL (ref 0–0.7)
EOSINOPHIL NFR BLD AUTO: 3.8 %
ERYTHROCYTE [DISTWIDTH] IN BLOOD BY AUTOMATED COUNT: 13.7 % (ref 11–15)
ERYTHROCYTE [SEDIMENTATION RATE] IN BLOOD: 40 MM/HR
FASTING STATUS PATIENT QL REPORTED: YES
GFR SERPLBLD BASED ON 1.73 SQ M-ARVRAT: 29 ML/MIN/1.73M2 (ref 60–?)
GLOBULIN PLAS-MCNC: 3.6 G/DL (ref 2.8–4.4)
GLUCOSE BLD-MCNC: 90 MG/DL (ref 70–99)
HCT VFR BLD AUTO: 34 %
HGB BLD-MCNC: 11.3 G/DL
IMM GRANULOCYTES # BLD AUTO: 0.06 X10(3) UL (ref 0–1)
IMM GRANULOCYTES NFR BLD: 0.5 %
IRON SATN MFR SERPL: 25 %
IRON SERPL-MCNC: 88 UG/DL
LYMPHOCYTES # BLD AUTO: 1.16 X10(3) UL (ref 1–4)
LYMPHOCYTES NFR BLD AUTO: 10.5 %
MCH RBC QN AUTO: 33.5 PG (ref 26–34)
MCHC RBC AUTO-ENTMCNC: 33.2 G/DL (ref 31–37)
MCV RBC AUTO: 100.9 FL
MONOCYTES # BLD AUTO: 1.38 X10(3) UL (ref 0.1–1)
MONOCYTES NFR BLD AUTO: 12.5 %
NEUTROPHILS # BLD AUTO: 8 X10 (3) UL (ref 1.5–7.7)
NEUTROPHILS # BLD AUTO: 8 X10(3) UL (ref 1.5–7.7)
NEUTROPHILS NFR BLD AUTO: 72.2 %
OSMOLALITY SERPL CALC.SUM OF ELEC: 295 MOSM/KG (ref 275–295)
PLATELET # BLD AUTO: 147 10(3)UL (ref 150–450)
POTASSIUM SERPL-SCNC: 4.3 MMOL/L (ref 3.5–5.1)
PROT SERPL-MCNC: 7.3 G/DL (ref 6.4–8.2)
RBC # BLD AUTO: 3.37 X10(6)UL
SODIUM SERPL-SCNC: 139 MMOL/L (ref 136–145)
TIBC SERPL-MCNC: 355 UG/DL (ref 240–450)
TRANSFERRIN SERPL-MCNC: 238 MG/DL (ref 200–360)
WBC # BLD AUTO: 11.1 X10(3) UL (ref 4–11)

## 2022-09-27 PROCEDURE — 80053 COMPREHEN METABOLIC PANEL: CPT

## 2022-09-27 PROCEDURE — 85025 COMPLETE CBC W/AUTO DIFF WBC: CPT

## 2022-09-27 PROCEDURE — 86140 C-REACTIVE PROTEIN: CPT

## 2022-09-27 PROCEDURE — 36415 COLL VENOUS BLD VENIPUNCTURE: CPT

## 2022-09-27 PROCEDURE — 83540 ASSAY OF IRON: CPT

## 2022-09-27 PROCEDURE — 82728 ASSAY OF FERRITIN: CPT

## 2022-09-27 PROCEDURE — 84466 ASSAY OF TRANSFERRIN: CPT

## 2022-09-27 PROCEDURE — 85652 RBC SED RATE AUTOMATED: CPT

## 2022-09-27 RX ORDER — CARVEDILOL 12.5 MG/1
TABLET ORAL
Qty: 180 TABLET | Refills: 0 | Status: SHIPPED | OUTPATIENT
Start: 2022-09-27

## 2022-09-27 RX ORDER — METHOTREXATE 2.5 MG/1
TABLET ORAL
Qty: 60 TABLET | Refills: 0 | Status: SHIPPED | OUTPATIENT
Start: 2022-09-27

## 2022-09-27 NOTE — TELEPHONE ENCOUNTER
Methotrexate 2.5 mg take 5 once a week filled 6/9/22 60 with 3 refills historical   Carvedilol 12.5 mg 1 bid filled 6/29/22 180 with 0 refills     LOV 7/25/22  RTC 3mos  Next apt 10/28/22  Labs 9/27/22

## 2022-09-28 ENCOUNTER — OFFICE VISIT (OUTPATIENT)
Dept: RHEUMATOLOGY | Facility: CLINIC | Age: 82
End: 2022-09-28

## 2022-09-28 VITALS
DIASTOLIC BLOOD PRESSURE: 60 MMHG | TEMPERATURE: 97 F | HEART RATE: 63 BPM | OXYGEN SATURATION: 99 % | WEIGHT: 157 LBS | BODY MASS INDEX: 22.48 KG/M2 | HEIGHT: 70 IN | SYSTOLIC BLOOD PRESSURE: 130 MMHG

## 2022-09-28 DIAGNOSIS — Z95.1 S/P CABG (CORONARY ARTERY BYPASS GRAFT): ICD-10-CM

## 2022-09-28 DIAGNOSIS — M35.3 PMR (POLYMYALGIA RHEUMATICA) (HCC): Primary | ICD-10-CM

## 2022-09-28 DIAGNOSIS — D89.89 KAPPA LIGHT CHAIN DISEASE (HCC): ICD-10-CM

## 2022-09-28 DIAGNOSIS — N18.4 CKD (CHRONIC KIDNEY DISEASE) STAGE 4, GFR 15-29 ML/MIN (HCC): ICD-10-CM

## 2022-09-28 PROBLEM — R50.9 FEBRILE ILLNESS, ACUTE: Status: RESOLVED | Noted: 2022-05-27 | Resolved: 2022-09-28

## 2022-09-28 PROCEDURE — 99214 OFFICE O/P EST MOD 30 MIN: CPT | Performed by: INTERNAL MEDICINE

## 2022-09-28 RX ORDER — PAROXETINE 10 MG/1
10 TABLET, FILM COATED ORAL EVERY MORNING
COMMUNITY
Start: 2022-02-10

## 2022-09-28 NOTE — PATIENT INSTRUCTIONS
For polymyalgia rheumatica, continue your current treatment. Prednisone 5 mg a day, methotrexate 3 tablets/week, and folic acid 1 mg a day. Okay to take an occasional extra strength Tylenol if needed. Labs show a sed rate of 40. But since you have no major symptoms of polymyalgia rheumatica we will not change her medication. 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 Rest of your blood tests are reasonable. You do have some renal insufficiency. Regarding your ankle and lower leg swelling, avoid salt, avoid fat in the diet, keep your legs elevated when watching TV, and if necessary wear compression stockings. Return to office for recheck in 4 months with labs a week ahead.

## 2022-09-29 ENCOUNTER — TELEPHONE (OUTPATIENT)
Dept: RHEUMATOLOGY | Facility: CLINIC | Age: 82
End: 2022-09-29

## 2022-09-29 NOTE — TELEPHONE ENCOUNTER
Called patient back and spoke with wife. Just wanted clarification on the Folic Acid. Informed them he is supposed to take 1MG of Folic Acid a day. Verbalized understanding.

## 2022-11-01 ENCOUNTER — OFFICE VISIT (OUTPATIENT)
Dept: INTERNAL MEDICINE CLINIC | Facility: CLINIC | Age: 82
End: 2022-11-01
Payer: MEDICARE

## 2022-11-01 VITALS
HEART RATE: 64 BPM | HEIGHT: 70 IN | TEMPERATURE: 98 F | OXYGEN SATURATION: 98 % | BODY MASS INDEX: 23.19 KG/M2 | WEIGHT: 162 LBS | SYSTOLIC BLOOD PRESSURE: 136 MMHG | DIASTOLIC BLOOD PRESSURE: 76 MMHG

## 2022-11-01 DIAGNOSIS — M25.551 RIGHT HIP PAIN: ICD-10-CM

## 2022-11-01 DIAGNOSIS — M54.31 RIGHT SCIATIC NERVE PAIN: Primary | ICD-10-CM

## 2022-11-01 PROCEDURE — 99214 OFFICE O/P EST MOD 30 MIN: CPT | Performed by: INTERNAL MEDICINE

## 2022-11-01 PROCEDURE — G0008 ADMIN INFLUENZA VIRUS VAC: HCPCS | Performed by: INTERNAL MEDICINE

## 2022-11-01 PROCEDURE — 90662 IIV NO PRSV INCREASED AG IM: CPT | Performed by: INTERNAL MEDICINE

## 2022-11-01 RX ORDER — RISEDRONATE SODIUM 35 MG/1
35 TABLET, FILM COATED ORAL WEEKLY
COMMUNITY
Start: 2022-04-14 | End: 2022-11-02

## 2022-11-01 RX ORDER — BUTYROSPERMUM PARKII(SHEA BUTTER), SIMMONDSIA CHINENSIS (JOJOBA) SEED OIL, ALOE BARBADENSIS LEAF EXTRACT .01; 1; 3.5 G/100G; G/100G; G/100G
100 LIQUID TOPICAL 2 TIMES DAILY
COMMUNITY

## 2022-11-01 RX ORDER — TIZANIDINE 2 MG/1
1 TABLET ORAL EVERY 8 HOURS PRN
Qty: 135 TABLET | Refills: 1 | Status: SHIPPED | OUTPATIENT
Start: 2022-11-01

## 2022-11-01 RX ORDER — MULTIVIT WITH MINERALS/LUTEIN
1000 TABLET ORAL DAILY
COMMUNITY

## 2022-11-02 ENCOUNTER — HOSPITAL ENCOUNTER (OUTPATIENT)
Dept: GENERAL RADIOLOGY | Facility: HOSPITAL | Age: 82
Discharge: HOME | End: 2022-11-02
Attending: INTERNAL MEDICINE
Payer: MEDICARE

## 2022-11-02 ENCOUNTER — LAB ENCOUNTER (OUTPATIENT)
Dept: LAB | Facility: HOSPITAL | Age: 82
End: 2022-11-02
Attending: INTERNAL MEDICINE
Payer: MEDICARE

## 2022-11-02 ENCOUNTER — TELEPHONE (OUTPATIENT)
Dept: RHEUMATOLOGY | Facility: CLINIC | Age: 82
End: 2022-11-02

## 2022-11-02 DIAGNOSIS — D64.89 ANEMIA DUE TO OTHER CAUSE, NOT CLASSIFIED: ICD-10-CM

## 2022-11-02 DIAGNOSIS — M54.31 RIGHT SCIATIC NERVE PAIN: ICD-10-CM

## 2022-11-02 DIAGNOSIS — M25.551 RIGHT HIP PAIN: ICD-10-CM

## 2022-11-02 DIAGNOSIS — D64.9 ANEMIA, UNSPECIFIED TYPE: ICD-10-CM

## 2022-11-02 DIAGNOSIS — Z86.39 HISTORY OF IRON DEFICIENCY: ICD-10-CM

## 2022-11-02 LAB
ALBUMIN SERPL-MCNC: 3.7 G/DL (ref 3.4–5)
ALBUMIN/GLOB SERPL: 1 {RATIO} (ref 1–2)
ALP LIVER SERPL-CCNC: 59 U/L
ALT SERPL-CCNC: 28 U/L
ANION GAP SERPL CALC-SCNC: 7 MMOL/L (ref 0–18)
AST SERPL-CCNC: 24 U/L (ref 15–37)
BASOPHILS # BLD AUTO: 0.05 X10(3) UL (ref 0–0.2)
BASOPHILS NFR BLD AUTO: 0.4 %
BILIRUB SERPL-MCNC: 0.6 MG/DL (ref 0.1–2)
BUN BLD-MCNC: 32 MG/DL (ref 7–18)
BUN/CREAT SERPL: 15.6 (ref 10–20)
CALCIUM BLD-MCNC: 9.5 MG/DL (ref 8.5–10.1)
CHLORIDE SERPL-SCNC: 104 MMOL/L (ref 98–112)
CO2 SERPL-SCNC: 26 MMOL/L (ref 21–32)
CREAT BLD-MCNC: 2.05 MG/DL
DEPRECATED HBV CORE AB SER IA-ACNC: 158.2 NG/ML
DEPRECATED RDW RBC AUTO: 46.3 FL (ref 35.1–46.3)
EOSINOPHIL # BLD AUTO: 0.28 X10(3) UL (ref 0–0.7)
EOSINOPHIL NFR BLD AUTO: 2.1 %
ERYTHROCYTE [DISTWIDTH] IN BLOOD BY AUTOMATED COUNT: 12.6 % (ref 11–15)
FASTING STATUS PATIENT QL REPORTED: NO
GFR SERPLBLD BASED ON 1.73 SQ M-ARVRAT: 32 ML/MIN/1.73M2 (ref 60–?)
GLOBULIN PLAS-MCNC: 3.7 G/DL (ref 2.8–4.4)
GLUCOSE BLD-MCNC: 91 MG/DL (ref 70–99)
HCT VFR BLD AUTO: 34 %
HGB BLD-MCNC: 11 G/DL
HGB RETIC QN AUTO: 36.2 PG (ref 28.2–36.6)
IMM GRANULOCYTES # BLD AUTO: 0.09 X10(3) UL (ref 0–1)
IMM GRANULOCYTES NFR BLD: 0.7 %
IMM RETICS NFR: 0.2 RATIO (ref 0.1–0.3)
IRON SATN MFR SERPL: 14 %
IRON SERPL-MCNC: 43 UG/DL
LDH SERPL L TO P-CCNC: 323 U/L
LYMPHOCYTES # BLD AUTO: 0.55 X10(3) UL (ref 1–4)
LYMPHOCYTES NFR BLD AUTO: 4.1 %
MCH RBC QN AUTO: 32.6 PG (ref 26–34)
MCHC RBC AUTO-ENTMCNC: 32.4 G/DL (ref 31–37)
MCV RBC AUTO: 100.9 FL
MONOCYTES # BLD AUTO: 0.83 X10(3) UL (ref 0.1–1)
MONOCYTES NFR BLD AUTO: 6.2 %
NEUTROPHILS # BLD AUTO: 11.5 X10 (3) UL (ref 1.5–7.7)
NEUTROPHILS # BLD AUTO: 11.5 X10(3) UL (ref 1.5–7.7)
NEUTROPHILS NFR BLD AUTO: 86.5 %
OSMOLALITY SERPL CALC.SUM OF ELEC: 290 MOSM/KG (ref 275–295)
PLATELET # BLD AUTO: 203 10(3)UL (ref 150–450)
POTASSIUM SERPL-SCNC: 3.7 MMOL/L (ref 3.5–5.1)
PROT SERPL-MCNC: 7.4 G/DL (ref 6.4–8.2)
RBC # BLD AUTO: 3.37 X10(6)UL
RETICS # AUTO: 59.6 X10(3) UL (ref 22.5–147.5)
RETICS/RBC NFR AUTO: 1.8 %
SODIUM SERPL-SCNC: 137 MMOL/L (ref 136–145)
TIBC SERPL-MCNC: 308 UG/DL (ref 240–450)
TRANSFERRIN SERPL-MCNC: 207 MG/DL (ref 200–360)
WBC # BLD AUTO: 13.3 X10(3) UL (ref 4–11)

## 2022-11-02 PROCEDURE — 36415 COLL VENOUS BLD VENIPUNCTURE: CPT

## 2022-11-02 PROCEDURE — 83540 ASSAY OF IRON: CPT

## 2022-11-02 PROCEDURE — 72110 X-RAY EXAM L-2 SPINE 4/>VWS: CPT | Performed by: INTERNAL MEDICINE

## 2022-11-02 PROCEDURE — 85025 COMPLETE CBC W/AUTO DIFF WBC: CPT

## 2022-11-02 PROCEDURE — 80053 COMPREHEN METABOLIC PANEL: CPT

## 2022-11-02 PROCEDURE — 83615 LACTATE (LD) (LDH) ENZYME: CPT

## 2022-11-02 PROCEDURE — 84466 ASSAY OF TRANSFERRIN: CPT

## 2022-11-02 PROCEDURE — 73502 X-RAY EXAM HIP UNI 2-3 VIEWS: CPT | Performed by: INTERNAL MEDICINE

## 2022-11-02 PROCEDURE — 82728 ASSAY OF FERRITIN: CPT

## 2022-11-02 PROCEDURE — 85045 AUTOMATED RETICULOCYTE COUNT: CPT

## 2022-11-03 ENCOUNTER — TELEPHONE (OUTPATIENT)
Dept: HEMATOLOGY/ONCOLOGY | Facility: HOSPITAL | Age: 82
End: 2022-11-03

## 2022-11-03 NOTE — TELEPHONE ENCOUNTER
José Miguel Mary is calling to inquire about the new covid booster. She stated that Dr. Berna Garcia and Dr. Justo Mcgraw had a conversation regarding this and she would like a call back to discuss this matter.

## 2022-11-03 NOTE — TELEPHONE ENCOUNTER
Patient's spouse notified that Dr. Lexie Hein recommends the covid booster. Should be scheduled 2 weeks around  [place] (1st dose was in August 2022 and the 2nd is scheduled for February 2023). As discussed, psoriatic flare can be managed if recurs again.

## 2022-11-04 ENCOUNTER — TELEPHONE (OUTPATIENT)
Dept: PHYSICAL THERAPY | Facility: HOSPITAL | Age: 82
End: 2022-11-04

## 2022-11-04 DIAGNOSIS — M47.816 OSTEOARTHRITIS OF LUMBAR SPINE, UNSPECIFIED SPINAL OSTEOARTHRITIS COMPLICATION STATUS: Primary | ICD-10-CM

## 2022-11-07 ENCOUNTER — OFFICE VISIT (OUTPATIENT)
Dept: PAIN CLINIC | Facility: HOSPITAL | Age: 82
End: 2022-11-07
Attending: ANESTHESIOLOGY
Payer: MEDICARE

## 2022-11-07 VITALS
SYSTOLIC BLOOD PRESSURE: 139 MMHG | RESPIRATION RATE: 18 BRPM | BODY MASS INDEX: 23.19 KG/M2 | HEIGHT: 70 IN | WEIGHT: 162 LBS | DIASTOLIC BLOOD PRESSURE: 68 MMHG | HEART RATE: 96 BPM

## 2022-11-07 DIAGNOSIS — M25.551 HIP PAIN, ACUTE, RIGHT: Primary | ICD-10-CM

## 2022-11-07 DIAGNOSIS — Z87.81 HISTORY OF VERTEBRAL COMPRESSION FRACTURE: ICD-10-CM

## 2022-11-07 DIAGNOSIS — M16.11 OSTEOARTHRITIS OF RIGHT HIP, UNSPECIFIED OSTEOARTHRITIS TYPE: ICD-10-CM

## 2022-11-07 DIAGNOSIS — M35.3 PMR (POLYMYALGIA RHEUMATICA) (HCC): ICD-10-CM

## 2022-11-07 PROCEDURE — 99202 OFFICE O/P NEW SF 15 MIN: CPT

## 2022-11-07 NOTE — PROGRESS NOTES
11/7/2022-presents ambulatory to [facility] using his walker; accompanied by his wife; Here to establish care;  NEW Consult C? O  RT GROIN HIP DONE RT LATERAL THIGH, RT KNEE AND RT CALF PAIN;  Pt reports 3wk hx;  States he was lying in bed doing exercises using  Bands attached to his feet, extending his rt leg sideways; afterwards his developed this pain; Today 9/10; He aslo states he had an injection by another md without relief;  Examined by Dr. Jayson Fraser; reviewed current xrays; Refer to dictaion for the plan of care.

## 2022-11-09 RX ORDER — L-MEFOL/A-CYST/MEB12/ALGAL OIL 6-600-2 MG
TABLET ORAL
Qty: 36 TABLET | Refills: 3 | Status: SHIPPED | OUTPATIENT
Start: 2022-11-09

## 2022-11-15 DIAGNOSIS — M54.50 LUMBAR PAIN: Primary | ICD-10-CM

## 2022-11-16 ENCOUNTER — TELEPHONE (OUTPATIENT)
Dept: INTERNAL MEDICINE CLINIC | Facility: CLINIC | Age: 82
End: 2022-11-16

## 2022-11-16 RX ORDER — HYDROCODONE BITARTRATE AND ACETAMINOPHEN 5; 325 MG/1; MG/1
0.5 TABLET ORAL EVERY 4 HOURS PRN
Qty: 30 TABLET | Refills: 0 | Status: SHIPPED | OUTPATIENT
Start: 2022-11-16

## 2022-11-16 NOTE — TELEPHONE ENCOUNTER
Spoke with spouse. Cancelled Tylenol #3 and sent in Kailash Jovana Lan 100 of potential side effects.

## 2022-11-16 NOTE — TELEPHONE ENCOUNTER
Spoke with Glen Mace. Patient did increase the Tizanidine to one whole tablet every 8 hours with 2 Extra Strength Tylenol alternating. States pain centered outer right upper thigh. Rates pain 8/10. Not sleeping well because can't get comfortable. Has appointment tomorrow with physical therapy.

## 2022-11-18 ENCOUNTER — MED REC SCAN ONLY (OUTPATIENT)
Dept: INTERNAL MEDICINE CLINIC | Facility: CLINIC | Age: 82
End: 2022-11-18

## 2022-11-25 ENCOUNTER — TELEPHONE (OUTPATIENT)
Dept: INTERNAL MEDICINE CLINIC | Facility: CLINIC | Age: 82
End: 2022-11-25

## 2022-11-25 RX ORDER — HYDROCODONE BITARTRATE AND ACETAMINOPHEN 5; 325 MG/1; MG/1
TABLET ORAL
Qty: 60 TABLET | Refills: 0 | Status: SHIPPED | OUTPATIENT
Start: 2022-11-25

## 2022-11-25 RX ORDER — TIZANIDINE 2 MG/1
2 TABLET ORAL EVERY 8 HOURS PRN
Qty: 135 TABLET | Refills: 1 | COMMUNITY
Start: 2022-11-25

## 2022-11-25 NOTE — TELEPHONE ENCOUNTER
Right sciatica worse after PT [facility] so told to stop/cancel. Cynthia please Call and order acupuncture for him at [place]-needs 1 st treatment next week. Also order MRI Lumbar without contrast at [facility] with a [place] just before the test(I sent another Rx).  Dx for MRI right sciatica and compression fractures L1 and L2

## 2022-11-28 ENCOUNTER — HOSPITAL ENCOUNTER (OUTPATIENT)
Dept: MRI IMAGING | Age: 82
Discharge: HOME | End: 2022-11-28
Attending: INTERNAL MEDICINE
Payer: MEDICARE

## 2022-11-28 DIAGNOSIS — M54.31 SCIATICA OF RIGHT SIDE: ICD-10-CM

## 2022-11-28 DIAGNOSIS — M54.31 SCIATICA OF RIGHT SIDE: Primary | ICD-10-CM

## 2022-11-28 PROCEDURE — 72148 MRI LUMBAR SPINE W/O DYE: CPT | Performed by: INTERNAL MEDICINE

## 2022-11-29 ENCOUNTER — TELEPHONE (OUTPATIENT)
Dept: PAIN CLINIC | Facility: HOSPITAL | Age: 82
End: 2022-11-29

## 2022-11-29 ENCOUNTER — APPOINTMENT (OUTPATIENT)
Dept: PHYSICAL THERAPY | Facility: HOSPITAL | Age: 82
End: 2022-11-29
Attending: INTERNAL MEDICINE
Payer: MEDICARE

## 2022-11-29 NOTE — TELEPHONE ENCOUNTER
I called and spoke with patient's wife to see if she has any questions about patient's TFESI tomorrow. She asked whether Mr. Daly should take his usual prednisone 5 mg (which is prescribed for twice daily) and I recommended that he go ahead and take this medication. She knows to hold the aspirin. Patient has been added to the schedule for a TFESI tomorrow with Dr. Letty Bridges.

## 2022-11-30 ENCOUNTER — SURGERY CENTER DOCUMENTATION (OUTPATIENT)
Dept: SURGERY | Age: 82
End: 2022-11-30

## 2022-12-01 ENCOUNTER — APPOINTMENT (OUTPATIENT)
Dept: PHYSICAL THERAPY | Facility: HOSPITAL | Age: 82
End: 2022-12-01
Attending: INTERNAL MEDICINE
Payer: MEDICARE

## 2022-12-01 NOTE — PROCEDURES
[facility] Procedure Note     Date of Service: 2022    : 1940    PREOPERATIVE DIAGNOSIS: Lumbosacral radiculopathy     POSTOPERATIVE DIAGNOSIS: Lumbosacral radiculopathy     PHYSICIAN: Benjy Clayton DO    PROCEDURES PERFORMED:  Lumbar transforaminal epidural steroid injection, right L4-5 and L5-S1   Fluoroscopy for needle placement and aspiration. SEDATION: none. Local infiltration only. COMPLICATIONS: NONE    INDICATIONS FOR PROCEDURE:  The patient is an 46-year-old male patient with complaint of right lower extremity radicular pain which starts in the lateral hip and radiates into the R groin and down the RLE to the dorsum of the R foot. The patient has attempted several conservative treatments including pain medications without success in alleviating the pain. The patient is requesting the above procedure today to relieve his pain and facilitate functional improvement. He understands that the risks include, but are not limited to, bleeding, infection, nerve damage, pain, possible blood clots, disability, death, worsened pain, no relief of pain, need for further procedures, need for hospitalization, spinal headache, need for bedrest. Multiple questions were asked and answered in detail. Informed consent was signed by the patient who provided permission to proceed with the injection as scheduled. DESCRIPTION OF PROCEDURE:  After consent was obtained, a detailed allergy history was carried out and found to be noncontributory. The patient was transported to the fluoroscopic suite, laid prone on the fluoroscopic table. Time out was called out to identify the correct patient, injection and side of the injection. The patient remained awake and responsive throughout the procedure. The lumbar spine was disinfected with chlorhexidine and draped with sterile towels in the normal fashion. AP view with oblique tilt was utilized to identify correct needle placement sites.  Local anesthetic 1% lidocaine was used for infiltration of the skin and tissues overlying the sites. A 22-gauge 3.5-inch spinal needle was passed through the skin and was advanced medially and anteriorly until contacted bone, then the needle was walked off inferiorly towards the Right  L5-S1 transforaminal space until located in the final superio-posterior aspect of the foramen. Needle depth was assessed with lateral image. Aspiration was carried out to be certain the needle was not intradural or intravascular, which was negative for CSF and blood. Then 1ml of contrast dye was injected and imaging confirmed epidural spread of the dye. After negative aspiration medication injectate consisting of 10mg dexamethasone mixed with 2ml of 1% lidocaine was injected uneventfully without resistance. The same procedure was done at the adjacent L4-5 level. Needles were removed and bandaids placed over the needle sites. The patient tolerated procedure well and there was no complications observed or reported. The patient was monitored in the recovery room and neurologic exam was intact unchanged from the preop. The patient was given detailed postinjection instructions and encouraged to monitor symptoms and call with any questions or concerns. He is scheduled for follow up in 2 weeks at the [facility] and was discharged home in stable ambulatory condition.          Rizwana Flower DO  Anesthesiology  Pain Medicine

## 2022-12-02 ENCOUNTER — VIRTUAL PHONE E/M (OUTPATIENT)
Dept: INTERNAL MEDICINE CLINIC | Facility: CLINIC | Age: 82
End: 2022-12-02
Payer: MEDICARE

## 2022-12-02 DIAGNOSIS — M48.061 SPINAL STENOSIS OF LUMBAR REGION, UNSPECIFIED WHETHER NEUROGENIC CLAUDICATION PRESENT: Primary | ICD-10-CM

## 2022-12-02 DIAGNOSIS — M54.31 RIGHT SCIATIC NERVE PAIN: Primary | ICD-10-CM

## 2022-12-02 RX ORDER — METHOTREXATE 2.5 MG/1
TABLET ORAL
Qty: 60 TABLET | Refills: 3 | Status: SHIPPED | OUTPATIENT
Start: 2022-12-02

## 2022-12-02 RX ORDER — CHOLECALCIFEROL (VITAMIN D3) 1250 MCG
CAPSULE ORAL
Qty: 13 CAPSULE | Refills: 3 | Status: SHIPPED | OUTPATIENT
Start: 2022-12-02

## 2022-12-02 NOTE — PROGRESS NOTES
Virtual Telephone Check-In    Connie Gordon verbally consents to a Virtual/Telephone Check-In visit on 12/02/22. Patient has been referred to the [facility] website at [email or web address] to review the yearly Consent to Treat document. Patient understands and accepts financial responsibility for any deductible, co-insurance and/or co-pays associated with this service. Duration of the service: 15 minutes audio only with wife on speaker phone      Summary of topics discussed:     Severe LBP with right sciatica. Had shot 11/30 no help. T3 upgraded to [place] with partial relief. Denies constipation but mental fog more likely tizanidine-told stop. Will prob need OR-has a spinal referral from a family Member who had back OR at [facility] by -will seek appt there.  Meanwhile another shot next week        Peter Adkins MD

## 2022-12-05 ENCOUNTER — APPOINTMENT (OUTPATIENT)
Dept: PHYSICAL THERAPY | Facility: HOSPITAL | Age: 82
End: 2022-12-05
Attending: INTERNAL MEDICINE
Payer: MEDICARE

## 2022-12-05 ENCOUNTER — TELEPHONE (OUTPATIENT)
Dept: PAIN CLINIC | Facility: HOSPITAL | Age: 82
End: 2022-12-05

## 2022-12-05 NOTE — TELEPHONE ENCOUNTER
Phone call from dr Margareth Chan about patients condition' of still having right sided radicular pain over hip thigh area. LUMBAR DISC LEVELS:   L1-L2: A large, diffuse disc bulge is apparent with a prominent right paracentral disc bulge and broad-based bilateral foraminal protrusions. Hypertrophic facet arthrosis is seen with buckling of the ligamentum flavum. These findings contribute to   asymmetric subarticular zone impingement of the descending right L2 nerve root and moderate-severe right greater than left foraminal impingement of the exiting L1 nerve roots.    Will Plan Right TFESI L1/2 Implemented All Universal Safety Interventions:  [place] to call system. Call bell, personal items and telephone within reach. Instruct patient to call for assistance. Room bathroom lighting operational. Non-slip footwear when patient is off stretcher. Physically safe environment: no spills, clutter or unnecessary equipment. Stretcher in lowest position, wheels locked, appropriate side rails in place.

## 2022-12-07 ENCOUNTER — NURSE ONLY (OUTPATIENT)
Dept: HEMATOLOGY/ONCOLOGY | Facility: HOSPITAL | Age: 82
End: 2022-12-07
Attending: INTERNAL MEDICINE
Payer: MEDICARE

## 2022-12-07 ENCOUNTER — SURGERY CENTER DOCUMENTATION (OUTPATIENT)
Dept: SURGERY | Age: 82
End: 2022-12-07

## 2022-12-07 VITALS
BODY MASS INDEX: 22.48 KG/M2 | HEIGHT: 70 IN | HEART RATE: 66 BPM | RESPIRATION RATE: 16 BRPM | OXYGEN SATURATION: 100 % | TEMPERATURE: 98 F | SYSTOLIC BLOOD PRESSURE: 125 MMHG | DIASTOLIC BLOOD PRESSURE: 62 MMHG | WEIGHT: 157 LBS

## 2022-12-07 DIAGNOSIS — N18.4 CKD (CHRONIC KIDNEY DISEASE) STAGE 4, GFR 15-29 ML/MIN (HCC): Primary | ICD-10-CM

## 2022-12-07 DIAGNOSIS — D64.9 ANEMIA, UNSPECIFIED TYPE: ICD-10-CM

## 2022-12-07 DIAGNOSIS — Z86.2 HISTORY OF IRON DEFICIENCY ANEMIA: ICD-10-CM

## 2022-12-07 DIAGNOSIS — M35.3 PMR (POLYMYALGIA RHEUMATICA) (HCC): ICD-10-CM

## 2022-12-07 DIAGNOSIS — D64.89 ANEMIA DUE TO OTHER CAUSE, NOT CLASSIFIED: Primary | ICD-10-CM

## 2022-12-07 DIAGNOSIS — N18.4 CKD (CHRONIC KIDNEY DISEASE) STAGE 4, GFR 15-29 ML/MIN (HCC): ICD-10-CM

## 2022-12-07 DIAGNOSIS — N18.31 STAGE 3A CHRONIC KIDNEY DISEASE (HCC): ICD-10-CM

## 2022-12-07 LAB
ALBUMIN SERPL-MCNC: 3.4 G/DL (ref 3.4–5)
ALBUMIN/GLOB SERPL: 0.9 {RATIO} (ref 1–2)
ALP LIVER SERPL-CCNC: 89 U/L
ALT SERPL-CCNC: 24 U/L
ANION GAP SERPL CALC-SCNC: 6 MMOL/L (ref 0–18)
AST SERPL-CCNC: 19 U/L (ref 15–37)
BASOPHILS # BLD AUTO: 0.02 X10(3) UL (ref 0–0.2)
BASOPHILS NFR BLD AUTO: 0.2 %
BILIRUB SERPL-MCNC: 0.6 MG/DL (ref 0.1–2)
BUN BLD-MCNC: 42 MG/DL (ref 7–18)
BUN/CREAT SERPL: 22.7 (ref 10–20)
CALCIUM BLD-MCNC: 9.3 MG/DL (ref 8.5–10.1)
CHLORIDE SERPL-SCNC: 104 MMOL/L (ref 98–112)
CO2 SERPL-SCNC: 27 MMOL/L (ref 21–32)
CREAT BLD-MCNC: 1.85 MG/DL
DEPRECATED HBV CORE AB SER IA-ACNC: 331.6 NG/ML
DEPRECATED RDW RBC AUTO: 49.2 FL (ref 35.1–46.3)
EOSINOPHIL # BLD AUTO: 0.27 X10(3) UL (ref 0–0.7)
EOSINOPHIL NFR BLD AUTO: 2 %
ERYTHROCYTE [DISTWIDTH] IN BLOOD BY AUTOMATED COUNT: 13.4 % (ref 11–15)
ERYTHROCYTE [SEDIMENTATION RATE] IN BLOOD: 84 MM/HR
GFR SERPLBLD BASED ON 1.73 SQ M-ARVRAT: 36 ML/MIN/1.73M2 (ref 60–?)
GLOBULIN PLAS-MCNC: 3.7 G/DL (ref 2.8–4.4)
GLUCOSE BLD-MCNC: 96 MG/DL (ref 70–99)
HCT VFR BLD AUTO: 34.1 %
HGB BLD-MCNC: 11 G/DL
IMM GRANULOCYTES # BLD AUTO: 0.11 X10(3) UL (ref 0–1)
IMM GRANULOCYTES NFR BLD: 0.8 %
IRON SATN MFR SERPL: 17 %
IRON SERPL-MCNC: 51 UG/DL
LYMPHOCYTES # BLD AUTO: 0.85 X10(3) UL (ref 1–4)
LYMPHOCYTES NFR BLD AUTO: 6.4 %
MCH RBC QN AUTO: 32.3 PG (ref 26–34)
MCHC RBC AUTO-ENTMCNC: 32.3 G/DL (ref 31–37)
MCV RBC AUTO: 100 FL
MONOCYTES # BLD AUTO: 1.1 X10(3) UL (ref 0.1–1)
MONOCYTES NFR BLD AUTO: 8.3 %
NEUTROPHILS # BLD AUTO: 10.84 X10 (3) UL (ref 1.5–7.7)
NEUTROPHILS # BLD AUTO: 10.84 X10(3) UL (ref 1.5–7.7)
NEUTROPHILS NFR BLD AUTO: 82.3 %
OSMOLALITY SERPL CALC.SUM OF ELEC: 294 MOSM/KG (ref 275–295)
PLATELET # BLD AUTO: 179 10(3)UL (ref 150–450)
POTASSIUM SERPL-SCNC: 4.1 MMOL/L (ref 3.5–5.1)
PROT SERPL-MCNC: 7.1 G/DL (ref 6.4–8.2)
RBC # BLD AUTO: 3.41 X10(6)UL
SODIUM SERPL-SCNC: 137 MMOL/L (ref 136–145)
TIBC SERPL-MCNC: 295 UG/DL (ref 240–450)
TRANSFERRIN SERPL-MCNC: 198 MG/DL (ref 200–360)
WBC # BLD AUTO: 13.2 X10(3) UL (ref 4–11)

## 2022-12-07 PROCEDURE — 99214 OFFICE O/P EST MOD 30 MIN: CPT | Performed by: INTERNAL MEDICINE

## 2022-12-07 PROCEDURE — 85652 RBC SED RATE AUTOMATED: CPT

## 2022-12-07 PROCEDURE — 82728 ASSAY OF FERRITIN: CPT

## 2022-12-07 PROCEDURE — 36415 COLL VENOUS BLD VENIPUNCTURE: CPT

## 2022-12-07 PROCEDURE — 85025 COMPLETE CBC W/AUTO DIFF WBC: CPT

## 2022-12-07 PROCEDURE — 83540 ASSAY OF IRON: CPT

## 2022-12-07 PROCEDURE — 84466 ASSAY OF TRANSFERRIN: CPT

## 2022-12-07 PROCEDURE — 80053 COMPREHEN METABOLIC PANEL: CPT

## 2022-12-07 NOTE — PROCEDURES
[facility]            Patient: Conor Armas  MR #: [id number]  : 1940        Operative Report        Date of procedure: 2022    Preoperative diagnosis: R Radiculopathy L1/2 Large Disc Bulge/ paracentral foraminal protrusion and NR impingment    Postop diagnosis:R Radiculopathy L1/2 Large Disc Bulge/ paracentral foraminal protrusion and NR impingment        Procedure:  R L1/2  Transforaminal epidural steroid injection fluoroscopic guidance and possible contrast image saved    Surgeon: Deanna Villalta. Cristiano Herrera MD    Indications: [de-identified]year old male with R Radiculopathy L1/2 Large Disc Bulge/ paracentral foraminal protrusion and NR impingement for TFESI      Operative procedure: The patient was brought to the local room suite and placed in the prone position with a pillow under the lower pelvis. Patient was prepped and draped in normal sterile fashion. The endplates of the vertebral bodies were aligned. Fluoroscopic beam was rotated ipsilateral to align the superior articulating process of the vertebra below with the 6 o'clock position of the above pedicle. Xylocaine 1% was instilled into the skin and subcutaneous tissue over the Right  L1 level at which point a 22-gauge, 5-1/2 inch spinal needle was introduced and passed under direct fluoroscopic guidance to the bone of the pedicle at the 6 o'clock position. The needle was then redirected inferiorly to the anterior portion of the foramen. There was no CSF, paresthesia, or blood noted. After negative aspiration a solution of Omnipaque 240 contrast media, 1mL was instilled which showed flow into the epidural space as well as an outline of the nerve root tracking distally. There is no evidence of subarachnoid, subdural, or intravascular spread. This was checked in both AP and lateral views.   At this point after negative aspiration, a solution of Depo-Medrol 80 mg and 2 mL's of preservative-free normal saline was instilled. The needle was withdrawn. A Band-Aid applied. The patient tolerated procedure well without complication and was discharged home with instructions. Electronically approved by: Rand Hollis.  Flower Britton MD

## 2022-12-08 ENCOUNTER — APPOINTMENT (OUTPATIENT)
Dept: PHYSICAL THERAPY | Facility: HOSPITAL | Age: 82
End: 2022-12-08
Attending: INTERNAL MEDICINE
Payer: MEDICARE

## 2022-12-08 NOTE — PROGRESS NOTES
[facility]    PATIENT'S NAME: Irma Harris   ATTENDING PHYSICIAN: Liz Rashid MD   PATIENT ACCOUNT #: [de-identified] LOCATION: [address] [place] [address] RECORD #: [id number] YOB: 1940   DATE OF SERVICE: 12/07/2022       CANCER CENTER PROGRESS NOTE    CHIEF COMPLAINT:  Followup for history of multifactorial anemia in the face of multiple medical problems. HISTORY OF PRESENT ILLNESS:  The patient is an 29-year-old male. I follow him primarily for anemia. He has multiple medical issues, and he is seen by Dr. Yaron Jones and Dr. Maurice Bailey. He has had bone marrows done because of chronic anemia. He has no evidence of MDS. He has an ongoing issue with polymyalgia rheumatica and some degree of osteoarthritis. He also has been having increasing back problems and he is getting spinal injections. We followed him primarily for his anemia and his anemia has been better controlled recently with hemoglobins that have been typically running in the 11 range. He at times has been below 10. He has gotten iron infusions in the past.  We have also given him darbepoetin in the past, but he has not required any of this lately. He also has chronic immunosuppression due to use of methotrexate and prednisone and he did get Evusheld earlier in the year. His last dose was given on August 29. I informed him today that the newer variants of COVID are becoming resistant to Evusheld and the entire program is likely to end within the next month or 2. Now more than 50% of the variants are resistant and the percentage is rapidly climbing. He is relatively weak. He is walking with a walker. He is accompanied by his wife. He has not had any recent falls.     MEDICATIONS:  His current medications include amlodipine 10 mg daily; ascorbic acid 1000 mg daily; aspirin 81 mg 3 times a week; betaine, trimethylglycine 1000 mg 2 times a day; betamethasone dipropionate topically p.r.n.; calcium carbonate antacid 500 mg once daily; carvedilol 1 tablet twice daily with meals; CerefolinNAC 1 tablet Monday, Wednesday, Friday; chromium 200 mcg half a tablet daily; coenzyme Q10 at 50 mg twice daily; famotidine 40 mg twice daily; folic acid 330 mcg twice daily; hydrochlorothiazide 25 mg daily; hydrocodone 5/325 one-half or one tablet every 4 to 6 hours p.r.n.; magnesium citrate 100 mg twice daily; methotrexate 12.5 mg once a week; nitroglycerin sublingual p.r.n.; Juice Plus with fiber 2 capsules twice daily; prednisone 5 mg daily; probiotics daily; rosuvastatin 10 mg nightly; triamcinolone acetonide twice daily; turmeric 300 mg daily; vitamin D3 at 50,000 units weekly; vitamin K 100 mcg daily. PHYSICAL EXAMINATION:    GENERAL:  He is a thin male in no acute distress. VITAL SIGNS:  His performance status is 2. His weight is 157 pounds. Blood pressure is 125/62, pulse is 66, respiratory rate is 20, temperature is 98. 3. HEENT:  Unremarkable. LYMPHATICS:  He has no adenopathy. LUNGS:  Clear. HEART:  Normal.  ABDOMEN:  No hepatosplenomegaly or tenderness. EXTREMITIES:  He has no clubbing, cyanosis, or edema. NEUROLOGIC:  He is intact. LABORATORY DATA:  Today, his BUN is 42, creatinine 1.85. This is a little better than baseline. Last iron studies were done on November 2. He had an iron saturation of 14%. I am going to repeat it based on today's labs. His hemoglobin is 11, white count 13.2, platelets are 665. This has not changed. IMPRESSION:    1.   Multifactorial anemia. I will recheck his iron studies today. I doubt that they are going to be terribly different. He has been repleted in the past.  He does not require any erythrocyte-stimulating agent, given the fact that his hemoglobin is over 10. Some of his anemia is clearly due to chronic kidney disease. We have done bone marrows and ruled out MDS. He does have some element of anemia of inflammation as well. 2.   Back pain, elevated sedimentation rate.   His sedimentation rate is high today at 84. I will forward this information to Dr. Mando Stapleton. He can take a look at it and decide what need to be done. 3.   COVID prophylaxis. He probably will not get further Evusheld injections, given its progressive lack of effectiveness. Dictated By Mel Langley MD  d: 12/07/2022 17:33:21  t: 12/07/2022 19:04:18  Job [id number]  /    cc: Dr. Amairani Conte.  Mando Stapleton

## 2022-12-12 ENCOUNTER — APPOINTMENT (OUTPATIENT)
Dept: PHYSICAL THERAPY | Facility: HOSPITAL | Age: 82
End: 2022-12-12
Attending: INTERNAL MEDICINE
Payer: MEDICARE

## 2022-12-12 ENCOUNTER — TELEPHONE (OUTPATIENT)
Dept: INTERNAL MEDICINE CLINIC | Facility: CLINIC | Age: 82
End: 2022-12-12

## 2022-12-12 RX ORDER — CARVEDILOL 12.5 MG/1
TABLET ORAL
Qty: 180 TABLET | Refills: 0 | Status: SHIPPED | OUTPATIENT
Start: 2022-12-12

## 2022-12-12 RX ORDER — HYDROCODONE BITARTRATE AND ACETAMINOPHEN 5; 325 MG/1; MG/1
TABLET ORAL
Qty: 120 TABLET | Refills: 0 | Status: SHIPPED | OUTPATIENT
Start: 2022-12-12

## 2022-12-12 RX ORDER — METHOTREXATE 2.5 MG/1
TABLET ORAL
Qty: 60 TABLET | Refills: 3 | COMMUNITY
Start: 2022-12-12

## 2022-12-12 NOTE — TELEPHONE ENCOUNTER
Pharmacy called for a dx for hydrocodone/w acetaminophen. Pharmacy informed, severe LBP w/right sciatica.

## 2022-12-12 NOTE — TELEPHONE ENCOUNTER
Spouse called with condition update. Patient still in a lot of pain. Rates 8.5/10. Possibly going for another spinal injection with pain specialist. Now experiencing constipation and nausea. Taking Norco every 4 hours. Down to 15 tabs. Will need a refill.

## 2022-12-12 NOTE — TELEPHONE ENCOUNTER
Carvedilol 12.5 mg 1 tab bid filled 9/27/22 180 with 0 refills     LOV 11/1/22  RTC 4-6 weeks  No upcoming apt on file   Labs 12/7/22

## 2022-12-15 ENCOUNTER — APPOINTMENT (OUTPATIENT)
Dept: PHYSICAL THERAPY | Facility: HOSPITAL | Age: 82
End: 2022-12-15
Attending: INTERNAL MEDICINE
Payer: MEDICARE

## 2022-12-17 ENCOUNTER — APPOINTMENT (OUTPATIENT)
Dept: GENERAL RADIOLOGY | Facility: HOSPITAL | Age: 82
DRG: 519 | End: 2022-12-17
Attending: STUDENT IN AN ORGANIZED HEALTH CARE EDUCATION/TRAINING PROGRAM
Payer: MEDICARE

## 2022-12-17 ENCOUNTER — APPOINTMENT (OUTPATIENT)
Dept: MRI IMAGING | Facility: HOSPITAL | Age: 82
DRG: 519 | End: 2022-12-17
Attending: STUDENT IN AN ORGANIZED HEALTH CARE EDUCATION/TRAINING PROGRAM
Payer: MEDICARE

## 2022-12-17 ENCOUNTER — HOSPITAL ENCOUNTER (INPATIENT)
Facility: HOSPITAL | Age: 82
LOS: 9 days | Discharge: NURSING, REHAB OR LONG-TERM CARE | DRG: 519 | End: 2022-12-26
Attending: STUDENT IN AN ORGANIZED HEALTH CARE EDUCATION/TRAINING PROGRAM | Admitting: HOSPITALIST
Payer: MEDICARE

## 2022-12-17 DIAGNOSIS — D64.89 ANEMIA DUE TO OTHER CAUSE, NOT CLASSIFIED: ICD-10-CM

## 2022-12-17 DIAGNOSIS — M54.17 LUMBOSACRAL RADICULOPATHY AT L2: ICD-10-CM

## 2022-12-17 DIAGNOSIS — M54.59 INTRACTABLE LOW BACK PAIN: ICD-10-CM

## 2022-12-17 DIAGNOSIS — M54.31 RIGHT SIDED SCIATICA: ICD-10-CM

## 2022-12-17 DIAGNOSIS — Z86.2 HISTORY OF IRON DEFICIENCY ANEMIA: ICD-10-CM

## 2022-12-17 DIAGNOSIS — M54.9 INTRACTABLE BACK PAIN: Primary | ICD-10-CM

## 2022-12-17 LAB
ANION GAP SERPL CALC-SCNC: 12 MMOL/L (ref 0–18)
APTT PPP: 31 SECONDS (ref 23.3–35.6)
BASOPHILS # BLD AUTO: 0.02 X10(3) UL (ref 0–0.2)
BASOPHILS NFR BLD AUTO: 0.1 %
BILIRUB UR QL: NEGATIVE
BUN BLD-MCNC: 60 MG/DL (ref 7–18)
BUN/CREAT SERPL: 28.2 (ref 10–20)
C DIFF TOX B STL QL: POSITIVE
CALCIUM BLD-MCNC: 9.4 MG/DL (ref 8.5–10.1)
CHLORIDE SERPL-SCNC: 95 MMOL/L (ref 98–112)
CLARITY UR: CLEAR
CO2 SERPL-SCNC: 26 MMOL/L (ref 21–32)
COLOR UR: YELLOW
CREAT BLD-MCNC: 2.13 MG/DL
DEPRECATED RDW RBC AUTO: 45.6 FL (ref 35.1–46.3)
EOSINOPHIL # BLD AUTO: 0.02 X10(3) UL (ref 0–0.7)
EOSINOPHIL NFR BLD AUTO: 0.1 %
ERYTHROCYTE [DISTWIDTH] IN BLOOD BY AUTOMATED COUNT: 13.5 % (ref 11–15)
GFR SERPLBLD BASED ON 1.73 SQ M-ARVRAT: 31 ML/MIN/1.73M2 (ref 60–?)
GLUCOSE BLD-MCNC: 108 MG/DL (ref 70–99)
GLUCOSE UR-MCNC: NEGATIVE MG/DL
HCT VFR BLD AUTO: 28.6 %
HGB BLD-MCNC: 10.1 G/DL
HYALINE CASTS #/AREA URNS AUTO: PRESENT /LPF
HYALINE CASTS #/AREA URNS AUTO: PRESENT /LPF
IMM GRANULOCYTES # BLD AUTO: 0.18 X10(3) UL (ref 0–1)
IMM GRANULOCYTES NFR BLD: 0.9 %
INR BLD: 1.08 (ref 0.85–1.16)
KETONES UR-MCNC: NEGATIVE MG/DL
LEUKOCYTE ESTERASE UR QL STRIP.AUTO: NEGATIVE
LYMPHOCYTES # BLD AUTO: 0.41 X10(3) UL (ref 1–4)
LYMPHOCYTES NFR BLD AUTO: 2 %
MCH RBC QN AUTO: 32.8 PG (ref 26–34)
MCHC RBC AUTO-ENTMCNC: 35.3 G/DL (ref 31–37)
MCV RBC AUTO: 92.9 FL
MONOCYTES # BLD AUTO: 1.97 X10(3) UL (ref 0.1–1)
MONOCYTES NFR BLD AUTO: 9.6 %
NEUTROPHILS # BLD AUTO: 17.95 X10 (3) UL (ref 1.5–7.7)
NEUTROPHILS # BLD AUTO: 17.95 X10(3) UL (ref 1.5–7.7)
NEUTROPHILS NFR BLD AUTO: 87.3 %
NITRITE UR QL STRIP.AUTO: NEGATIVE
OSMOLALITY SERPL CALC.SUM OF ELEC: 293 MOSM/KG (ref 275–295)
PH UR: 6 [PH] (ref 5–8)
PLATELET # BLD AUTO: 148 10(3)UL (ref 150–450)
POTASSIUM SERPL-SCNC: 3.2 MMOL/L (ref 3.5–5.1)
POTASSIUM SERPL-SCNC: 3.6 MMOL/L (ref 3.5–5.1)
PROCALCITONIN SERPL-MCNC: 0.83 NG/ML (ref ?–0.16)
PROTHROMBIN TIME: 13.9 SECONDS (ref 11.6–14.8)
RBC # BLD AUTO: 3.08 X10(6)UL
SARS-COV-2 RNA RESP QL NAA+PROBE: NOT DETECTED
SODIUM SERPL-SCNC: 133 MMOL/L (ref 136–145)
SP GR UR STRIP: 1.01 (ref 1–1.03)
UROBILINOGEN UR STRIP-ACNC: 0.2
WBC # BLD AUTO: 20.6 X10(3) UL (ref 4–11)

## 2022-12-17 PROCEDURE — 72148 MRI LUMBAR SPINE W/O DYE: CPT | Performed by: STUDENT IN AN ORGANIZED HEALTH CARE EDUCATION/TRAINING PROGRAM

## 2022-12-17 PROCEDURE — 99223 1ST HOSP IP/OBS HIGH 75: CPT | Performed by: INTERNAL MEDICINE

## 2022-12-17 PROCEDURE — 71046 X-RAY EXAM CHEST 2 VIEWS: CPT | Performed by: STUDENT IN AN ORGANIZED HEALTH CARE EDUCATION/TRAINING PROGRAM

## 2022-12-17 RX ORDER — ASCORBIC ACID 500 MG
1000 TABLET ORAL DAILY
Status: DISCONTINUED | OUTPATIENT
Start: 2022-12-17 | End: 2022-12-26

## 2022-12-17 RX ORDER — HYDROCODONE BITARTRATE AND ACETAMINOPHEN 5; 325 MG/1; MG/1
2 TABLET ORAL EVERY 4 HOURS PRN
Status: DISCONTINUED | OUTPATIENT
Start: 2022-12-17 | End: 2022-12-18

## 2022-12-17 RX ORDER — ASCORBIC ACID 1000 MG
50 TABLET ORAL 2 TIMES DAILY
Status: DISCONTINUED | OUTPATIENT
Start: 2022-12-17 | End: 2022-12-17 | Stop reason: RX

## 2022-12-17 RX ORDER — MORPHINE SULFATE 4 MG/ML
4 INJECTION, SOLUTION INTRAMUSCULAR; INTRAVENOUS ONCE
Status: COMPLETED | OUTPATIENT
Start: 2022-12-17 | End: 2022-12-17

## 2022-12-17 RX ORDER — BISACODYL 10 MG
10 SUPPOSITORY, RECTAL RECTAL
Status: DISCONTINUED | OUTPATIENT
Start: 2022-12-17 | End: 2022-12-21

## 2022-12-17 RX ORDER — POTASSIUM CHLORIDE 20 MEQ/1
40 TABLET, EXTENDED RELEASE ORAL ONCE
Status: COMPLETED | OUTPATIENT
Start: 2022-12-17 | End: 2022-12-17

## 2022-12-17 RX ORDER — GARLIC EXTRACT 500 MG
1 CAPSULE ORAL DAILY
Status: DISCONTINUED | OUTPATIENT
Start: 2022-12-18 | End: 2022-12-26

## 2022-12-17 RX ORDER — HYDROMORPHONE HYDROCHLORIDE 1 MG/ML
0.4 INJECTION, SOLUTION INTRAMUSCULAR; INTRAVENOUS; SUBCUTANEOUS EVERY 2 HOUR PRN
Status: DISCONTINUED | OUTPATIENT
Start: 2022-12-17 | End: 2022-12-18

## 2022-12-17 RX ORDER — CARVEDILOL 12.5 MG/1
12.5 TABLET ORAL 2 TIMES DAILY WITH MEALS
Status: DISCONTINUED | OUTPATIENT
Start: 2022-12-17 | End: 2022-12-26

## 2022-12-17 RX ORDER — ACETAMINOPHEN 325 MG/1
650 TABLET ORAL EVERY 4 HOURS PRN
Status: DISCONTINUED | OUTPATIENT
Start: 2022-12-17 | End: 2022-12-18

## 2022-12-17 RX ORDER — SODIUM CHLORIDE 9 MG/ML
INJECTION, SOLUTION INTRAVENOUS CONTINUOUS
Status: DISCONTINUED | OUTPATIENT
Start: 2022-12-17 | End: 2022-12-18

## 2022-12-17 RX ORDER — ROSUVASTATIN CALCIUM 10 MG/1
10 TABLET, COATED ORAL NIGHTLY
Status: DISCONTINUED | OUTPATIENT
Start: 2022-12-17 | End: 2022-12-26

## 2022-12-17 RX ORDER — ONDANSETRON 2 MG/ML
4 INJECTION INTRAMUSCULAR; INTRAVENOUS EVERY 6 HOURS PRN
Status: DISCONTINUED | OUTPATIENT
Start: 2022-12-17 | End: 2022-12-21

## 2022-12-17 RX ORDER — PREDNISONE 1 MG/1
5 TABLET ORAL DAILY
Status: DISCONTINUED | OUTPATIENT
Start: 2022-12-18 | End: 2022-12-21

## 2022-12-17 RX ORDER — MELATONIN
400 2 TIMES DAILY
Status: DISCONTINUED | OUTPATIENT
Start: 2022-12-17 | End: 2022-12-26

## 2022-12-17 RX ORDER — ASPIRIN 81 MG/1
81 TABLET ORAL
Status: DISCONTINUED | OUTPATIENT
Start: 2022-12-19 | End: 2022-12-21

## 2022-12-17 RX ORDER — ACETAMINOPHEN 500 MG
500 TABLET ORAL EVERY 4 HOURS PRN
Status: DISCONTINUED | OUTPATIENT
Start: 2022-12-17 | End: 2022-12-18

## 2022-12-17 RX ORDER — HYDROCODONE BITARTRATE AND ACETAMINOPHEN 5; 325 MG/1; MG/1
1 TABLET ORAL EVERY 4 HOURS PRN
Status: DISCONTINUED | OUTPATIENT
Start: 2022-12-17 | End: 2022-12-18

## 2022-12-17 RX ORDER — ONDANSETRON 2 MG/ML
4 INJECTION INTRAMUSCULAR; INTRAVENOUS ONCE
Status: COMPLETED | OUTPATIENT
Start: 2022-12-17 | End: 2022-12-17

## 2022-12-17 RX ORDER — POLYETHYLENE GLYCOL 3350 17 G/17G
17 POWDER, FOR SOLUTION ORAL DAILY PRN
Status: DISCONTINUED | OUTPATIENT
Start: 2022-12-17 | End: 2022-12-18

## 2022-12-17 RX ORDER — SENNOSIDES 8.6 MG
17.2 TABLET ORAL NIGHTLY PRN
Status: DISCONTINUED | OUTPATIENT
Start: 2022-12-17 | End: 2022-12-26

## 2022-12-17 RX ORDER — HYDROMORPHONE HYDROCHLORIDE 1 MG/ML
0.8 INJECTION, SOLUTION INTRAMUSCULAR; INTRAVENOUS; SUBCUTANEOUS EVERY 2 HOUR PRN
Status: DISCONTINUED | OUTPATIENT
Start: 2022-12-17 | End: 2022-12-18

## 2022-12-17 RX ORDER — FAMOTIDINE 20 MG/1
20 TABLET, FILM COATED ORAL DAILY
Status: DISCONTINUED | OUTPATIENT
Start: 2022-12-18 | End: 2022-12-26

## 2022-12-17 RX ORDER — HYDROMORPHONE HYDROCHLORIDE 1 MG/ML
0.2 INJECTION, SOLUTION INTRAMUSCULAR; INTRAVENOUS; SUBCUTANEOUS EVERY 2 HOUR PRN
Status: DISCONTINUED | OUTPATIENT
Start: 2022-12-17 | End: 2022-12-18

## 2022-12-17 RX ORDER — HEPARIN SODIUM 5000 [USP'U]/ML
5000 INJECTION, SOLUTION INTRAVENOUS; SUBCUTANEOUS EVERY 8 HOURS SCHEDULED
Status: DISCONTINUED | OUTPATIENT
Start: 2022-12-17 | End: 2022-12-21

## 2022-12-17 NOTE — ED QUICK NOTES
Orders for admission, patient is aware of plan and ready to go upstairs. Any questions, please call ED RN Sandra Rico at extension [phone]    AOx3, admitted for intractable back pain managed with 4mg morphine x2. Denies recent falls. Diarrhea frequently past 2 days.     RUNNING INFUSIONS: n/a    FLUIDS: n/a    IVs: 20 g PIV    COVID: negative

## 2022-12-17 NOTE — ED INITIAL ASSESSMENT (HPI)
Patient to ED via EMS for chronic low back pain , hx of multiple lumbar compression fractures, last steroid injection 9 days ago. Pt reports pain is radiating to right leg. Wife also states the patient has been having diarrhea since Wednesday and had a hard time urinating this morning. denies fevers, abd pain. Took norco 5-325mg at 0430.

## 2022-12-18 ENCOUNTER — APPOINTMENT (OUTPATIENT)
Dept: GENERAL RADIOLOGY | Facility: HOSPITAL | Age: 82
DRG: 519 | End: 2022-12-18
Attending: ANESTHESIOLOGY
Payer: MEDICARE

## 2022-12-18 LAB
ANION GAP SERPL CALC-SCNC: 9 MMOL/L (ref 0–18)
BASOPHILS # BLD AUTO: 0.02 X10(3) UL (ref 0–0.2)
BASOPHILS NFR BLD AUTO: 0.1 %
BUN BLD-MCNC: 56 MG/DL (ref 7–18)
BUN/CREAT SERPL: 35.2 (ref 10–20)
CALCIUM BLD-MCNC: 8.3 MG/DL (ref 8.5–10.1)
CHLORIDE SERPL-SCNC: 102 MMOL/L (ref 98–112)
CO2 SERPL-SCNC: 25 MMOL/L (ref 21–32)
CREAT BLD-MCNC: 1.59 MG/DL
DEPRECATED RDW RBC AUTO: 46.6 FL (ref 35.1–46.3)
EOSINOPHIL # BLD AUTO: 0.02 X10(3) UL (ref 0–0.7)
EOSINOPHIL NFR BLD AUTO: 0.1 %
ERYTHROCYTE [DISTWIDTH] IN BLOOD BY AUTOMATED COUNT: 13.4 % (ref 11–15)
GFR SERPLBLD BASED ON 1.73 SQ M-ARVRAT: 44 ML/MIN/1.73M2 (ref 60–?)
GLUCOSE BLD-MCNC: 101 MG/DL (ref 70–99)
HCT VFR BLD AUTO: 26.2 %
HGB BLD-MCNC: 9.1 G/DL
IMM GRANULOCYTES # BLD AUTO: 0.13 X10(3) UL (ref 0–1)
IMM GRANULOCYTES NFR BLD: 0.8 %
LYMPHOCYTES # BLD AUTO: 0.32 X10(3) UL (ref 1–4)
LYMPHOCYTES NFR BLD AUTO: 2.1 %
MAGNESIUM SERPL-MCNC: 2.5 MG/DL (ref 1.6–2.6)
MCH RBC QN AUTO: 33.6 PG (ref 26–34)
MCHC RBC AUTO-ENTMCNC: 34.7 G/DL (ref 31–37)
MCV RBC AUTO: 96.7 FL
MONOCYTES # BLD AUTO: 1.4 X10(3) UL (ref 0.1–1)
MONOCYTES NFR BLD AUTO: 9 %
NEUTROPHILS # BLD AUTO: 13.59 X10 (3) UL (ref 1.5–7.7)
NEUTROPHILS # BLD AUTO: 13.59 X10(3) UL (ref 1.5–7.7)
NEUTROPHILS NFR BLD AUTO: 87.9 %
OSMOLALITY SERPL CALC.SUM OF ELEC: 298 MOSM/KG (ref 275–295)
PLATELET # BLD AUTO: 110 10(3)UL (ref 150–450)
POTASSIUM SERPL-SCNC: 3.4 MMOL/L (ref 3.5–5.1)
RBC # BLD AUTO: 2.71 X10(6)UL
SODIUM SERPL-SCNC: 136 MMOL/L (ref 136–145)
WBC # BLD AUTO: 15.5 X10(3) UL (ref 4–11)

## 2022-12-18 PROCEDURE — 99233 SBSQ HOSP IP/OBS HIGH 50: CPT | Performed by: HOSPITALIST

## 2022-12-18 RX ORDER — HYDROMORPHONE HYDROCHLORIDE 1 MG/ML
0.5 INJECTION, SOLUTION INTRAMUSCULAR; INTRAVENOUS; SUBCUTANEOUS EVERY 2 HOUR PRN
Status: DISCONTINUED | OUTPATIENT
Start: 2022-12-18 | End: 2022-12-21

## 2022-12-18 RX ORDER — DULOXETIN HYDROCHLORIDE 20 MG/1
20 CAPSULE, DELAYED RELEASE ORAL DAILY
Status: DISCONTINUED | OUTPATIENT
Start: 2022-12-18 | End: 2022-12-26

## 2022-12-18 RX ORDER — HYDROMORPHONE HYDROCHLORIDE 1 MG/ML
0.8 INJECTION, SOLUTION INTRAMUSCULAR; INTRAVENOUS; SUBCUTANEOUS EVERY 4 HOURS PRN
Status: DISCONTINUED | OUTPATIENT
Start: 2022-12-18 | End: 2022-12-21

## 2022-12-18 RX ORDER — HYDROCODONE BITARTRATE AND ACETAMINOPHEN 5; 325 MG/1; MG/1
1 TABLET ORAL EVERY 4 HOURS
Status: DISCONTINUED | OUTPATIENT
Start: 2022-12-18 | End: 2022-12-21

## 2022-12-18 RX ORDER — HYDROMORPHONE HYDROCHLORIDE 1 MG/ML
0.2 INJECTION, SOLUTION INTRAMUSCULAR; INTRAVENOUS; SUBCUTANEOUS EVERY 2 HOUR PRN
Status: DISCONTINUED | OUTPATIENT
Start: 2022-12-18 | End: 2022-12-18

## 2022-12-18 RX ORDER — POTASSIUM CHLORIDE 20 MEQ/1
40 TABLET, EXTENDED RELEASE ORAL ONCE
Status: COMPLETED | OUTPATIENT
Start: 2022-12-18 | End: 2022-12-18

## 2022-12-18 RX ORDER — VANCOMYCIN HYDROCHLORIDE 125 MG/1
125 CAPSULE ORAL DAILY
Status: DISCONTINUED | OUTPATIENT
Start: 2022-12-18 | End: 2022-12-20

## 2022-12-18 RX ORDER — HYDROCODONE BITARTRATE AND ACETAMINOPHEN 5; 325 MG/1; MG/1
2 TABLET ORAL EVERY 4 HOURS PRN
Status: DISCONTINUED | OUTPATIENT
Start: 2022-12-18 | End: 2022-12-21

## 2022-12-18 RX ORDER — CYCLOBENZAPRINE HCL 5 MG
5 TABLET ORAL NIGHTLY PRN
Status: DISCONTINUED | OUTPATIENT
Start: 2022-12-18 | End: 2022-12-18

## 2022-12-18 RX ORDER — ACETAMINOPHEN 500 MG
500 TABLET ORAL EVERY 6 HOURS PRN
Status: DISCONTINUED | OUTPATIENT
Start: 2022-12-18 | End: 2022-12-21

## 2022-12-18 NOTE — OPERATIVE REPORT
Patient: Michelle Singh    : 1940        Operative Report        Date of procedure: 2022    Preoperative diagnosis: Intractable back pain  (primary encounter diagnosis)  Intractable low back pain  Right sided sciatica    Postop diagnosis:Intractable back pain  (primary encounter diagnosis)  Intractable low back pain  Right sided sciatica        Procedure:   L2/3  Lumbar epidural steroid injection fluoroscopic guidance and  image saved    Surgeon: Jayce Morton. Carly Pino MD    Indications: [de-identified]year old male wth radiculopathy/sciatica from DDD spinal and neuro foraminal stenosis consented for LESI      Operative procedure:   Patient was brought to the procedure room suite and placed in the prone position with a pillow under the lower pelvis. Patient was prepped and draped in normal sterile fashion. After fluoroscopic localization Xylocaine 1% was instilled per 25-gauge needle into the skin and subcutaneous tissue at the level of L2/3. An 18-gauge Touhy needle was then placed and advanced under direct fluoroscopic guidance and loss-of-resistance technique to the epidural space without difficulty. There was no CSF paresthesia or blood noted. After negative aspiration,   This was then followed by a solution containing 80 mg of Depo-Medrol and 8 mL's of preservative-free normal saline, which was instilled without difficulty. The needle was withdrawn. A Band-Aid was applied. The patient tolerated procedure well without complication was discharged home with instructions. Electronically approved by: Jayce Morton.  Carly Pino MD

## 2022-12-18 NOTE — PLAN OF CARE
Patient is a&ox4, ambulating 2 assist w/ walker. Pain is being controlled w/ Norco, dilaudid. Cymbalta ordered this afternoon and given. Is voiding and having bowel movements, stand and pivot w/rolling chair to the bathroom. Is on a general diet. Heparin is being held, SCDs while in bed for dvt prophylaxis. Is on NS @ 83. Plan is for pt to have lumbar epidural injection this afternoon. Daughter and wife at the bedside. DC plan pending. Problem: Patient Centered Care  Goal: Patient preferences are identified and integrated in the patient's plan of care  Description: Interventions:  - What would you like us to know as we care for you?  My son in law works here   - Provide timely, complete, and accurate information to patient/family  - Incorporate patient and family knowledge, values, beliefs, and cultural backgrounds into the planning and delivery of care  - Encourage patient/family to participate in care and decision-making at the level they choose  - Honor patient and family perspectives and choices  Outcome: Progressing     Problem: PAIN - ADULT  Goal: Verbalizes/displays adequate comfort level or patient's stated pain goal  Description: INTERVENTIONS:  - Encourage pt to monitor pain and request assistance  - Assess pain using appropriate pain scale  - Administer analgesics based on type and severity of pain and evaluate response  - Implement non-pharmacological measures as appropriate and evaluate response  - Consider cultural and social influences on pain and pain management  - Manage/alleviate anxiety  - Utilize distraction and/or relaxation techniques  - Monitor for opioid side effects  - Notify MD/LIP if interventions unsuccessful or patient reports new pain  - Anticipate increased pain with activity and pre-medicate as appropriate  Outcome: Progressing     Problem: RISK FOR INFECTION - ADULT  Goal: Absence of fever/infection during anticipated neutropenic period  Description: INTERVENTIONS  - Monitor WBC  - Administer growth factors as ordered  - Implement neutropenic guidelines  Outcome: Progressing     Problem: SAFETY ADULT - FALL  Goal: Free from fall injury  Description: INTERVENTIONS:  - Assess pt frequently for physical needs  - Identify cognitive and physical deficits and behaviors that affect risk of falls.   - [place] fall precautions as indicated by assessment.  - Educate pt/family on patient safety including physical limitations  - Instruct pt to call for assistance with activity based on assessment  - Modify environment to reduce risk of injury  - Provide assistive devices as appropriate  - Consider OT/PT consult to assist with strengthening/mobility  - Encourage toileting schedule  Outcome: Progressing     Problem: DISCHARGE PLANNING  Goal: Discharge to home or other facility with appropriate resources  Description: INTERVENTIONS:  - Identify barriers to discharge w/pt and caregiver  - Include patient/family/discharge partner in discharge planning  - Arrange for needed discharge resources and transportation as appropriate  - Identify discharge learning needs (meds, wound care, etc)  - Arrange for interpreters to assist at discharge as needed  - Consider post-discharge preferences of patient/family/discharge partner  - Complete POLST form as appropriate  - Assess patient's ability to be responsible for managing their own health  - Refer to Case Management Department for coordinating discharge planning if the patient needs post-hospital services based on physician/LIP order or complex needs related to functional status, cognitive ability or social support system  Outcome: Progressing

## 2022-12-18 NOTE — OCCUPATIONAL THERAPY NOTE
Orders received and chart reviewed. Patient is awaiting neurosurgery consult. Will follow up for for OT evaluation after neurosurgery consult is complete and POC established.     lAexis Slaughter, OTR/L  [facility]  [id number]

## 2022-12-18 NOTE — PLAN OF CARE
Patient Name: Nieves Romberg  : 2014  MRN: [id number]  Patient Address: [facility] [address]      Coronavirus Disease 2019 (COVID-19)     [place] [address] is committed to the safety and well-being of our patients, members, employees, a Patient is a&ox4, ambulating 2 assist w/ walker. Pain is being controlled w/ Norco. Per pt he has nausea when taking narcotics, offered PRN zofran if needed. Is voiding stand and pivot w/rolling chair to the bathroom. Was able to have bowel movement this afternoon, CDIFF sent to lab. Is on a general diet, was able to have dinner this evening. Has heparin and SCDs while in bed for dvt prophylaxis. Is on NS @ 83. DC plan pending. Problem: Patient Centered Care  Goal: Patient preferences are identified and integrated in the patient's plan of care  Description: Interventions:  - What would you like us to know as we care for you?  My wife takes good care of me   - Provide timely, complete, and accurate information to patient/family  - Incorporate patient and family knowledge, values, beliefs, and cultural backgrounds into the planning and delivery of care  - Encourage patient/family to participate in care and decision-making at the level they choose  - Honor patient and family perspectives and choices  Outcome: Progressing     Problem: PAIN - ADULT  Goal: Verbalizes/displays adequate comfort level or patient's stated pain goal  Description: INTERVENTIONS:  - Encourage pt to monitor pain and request assistance  - Assess pain using appropriate pain scale  - Administer analgesics based on type and severity of pain and evaluate response  - Implement non-pharmacological measures as appropriate and evaluate response  - Consider cultural and social influences on pain and pain management  - Manage/alleviate anxiety  - Utilize distraction and/or relaxation techniques  - Monitor for opioid side effects  - Notify MD/LIP if interventions unsuccessful or patient reports new pain  - Anticipate increased pain with activity and pre-medicate as appropriate  Outcome: Progressing     Problem: RISK FOR INFECTION - ADULT  Goal: Absence of fever/infection during anticipated neutropenic period  Description: INTERVENTIONS  - Monitor WBC  - Administer growth factors as ordered  - Implement neutropenic guidelines  Outcome: Progressing     Problem: SAFETY ADULT - FALL  Goal: Free from fall injury  Description: INTERVENTIONS:  - Assess pt frequently for physical needs  - Identify cognitive and physical deficits and behaviors that affect risk of falls.   - [place] fall precautions as indicated by assessment.  - Educate pt/family on patient safety including physical limitations  - Instruct pt to call for assistance with activity based on assessment  - Modify environment to reduce risk of injury  - Provide assistive devices as appropriate  - Consider OT/PT consult to assist with strengthening/mobility  - Encourage toileting schedule  Outcome: Progressing     Problem: DISCHARGE PLANNING  Goal: Discharge to home or other facility with appropriate resources  Description: INTERVENTIONS:  - Identify barriers to discharge w/pt and caregiver  - Include patient/family/discharge partner in discharge planning  - Arrange for needed discharge resources and transportation as appropriate  - Identify discharge learning needs (meds, wound care, etc)  - Arrange for interpreters to assist at discharge as needed  - Consider post-discharge preferences of patient/family/discharge partner  - Complete POLST form as appropriate  - Assess patient's ability to be responsible for managing their own health  - Refer to Case Management Department for coordinating discharge planning if the patient needs post-hospital services based on physician/LIP order or complex needs related to functional status, cognitive ability or social support system  Outcome: Progressing symptoms get worse, call your healthcare provider immediately. 3. Get rest and stay hydrated.    4. If you have a medical appointment, call the healthcare provider ahead of time and tell them that you have or may have COVID-19.  5. For medical emergencies, medications; and  · Improvement in respiratory symptoms (e.g., cough, shortness of breath); and  · At least 10 days have passed since symptoms first appeared OR if asymptomatic patient or date of symptom onset is unclear then use 10 days post COVID test da must:    · Have had a confirmed diagnosis of COVID-19  · Be symptom-free for at least 14 days*    *Some people will be required to have a repeat COVID-19 test in order to be eligible to donate.  If you’re instructed by Antonia Quiñonez that a repeat test is required Researchers are trying to identify similarities between people with a Post-COVID condition to better understand if there are risk factors. How do I prevent a Post-COVID condition?   The best way to prevent the long-term symptoms of COVID-19 is by preve

## 2022-12-18 NOTE — PACU NOTE
Lumbar epidural steroid injection done in PACU under fluoroscopy by Dr Jamshid Riley ,tolerated procedure well

## 2022-12-19 ENCOUNTER — TELEPHONE (OUTPATIENT)
Dept: SURGERY | Facility: CLINIC | Age: 82
End: 2022-12-19

## 2022-12-19 ENCOUNTER — TELEPHONE (OUTPATIENT)
Dept: INTERNAL MEDICINE CLINIC | Facility: CLINIC | Age: 82
End: 2022-12-19

## 2022-12-19 LAB
ANION GAP SERPL CALC-SCNC: 9 MMOL/L (ref 0–18)
BASOPHILS # BLD AUTO: 0.01 X10(3) UL (ref 0–0.2)
BASOPHILS NFR BLD AUTO: 0.1 %
BUN BLD-MCNC: 49 MG/DL (ref 7–18)
BUN/CREAT SERPL: 32 (ref 10–20)
CALCIUM BLD-MCNC: 8.6 MG/DL (ref 8.5–10.1)
CHLORIDE SERPL-SCNC: 101 MMOL/L (ref 98–112)
CO2 SERPL-SCNC: 25 MMOL/L (ref 21–32)
CREAT BLD-MCNC: 1.53 MG/DL
DEPRECATED RDW RBC AUTO: 48 FL (ref 35.1–46.3)
EOSINOPHIL # BLD AUTO: 0 X10(3) UL (ref 0–0.7)
EOSINOPHIL NFR BLD AUTO: 0 %
ERYTHROCYTE [DISTWIDTH] IN BLOOD BY AUTOMATED COUNT: 13.4 % (ref 11–15)
GFR SERPLBLD BASED ON 1.73 SQ M-ARVRAT: 46 ML/MIN/1.73M2 (ref 60–?)
GLUCOSE BLD-MCNC: 140 MG/DL (ref 70–99)
HCT VFR BLD AUTO: 29.1 %
HGB BLD-MCNC: 9.9 G/DL
IMM GRANULOCYTES # BLD AUTO: 0.13 X10(3) UL (ref 0–1)
IMM GRANULOCYTES NFR BLD: 0.7 %
LYMPHOCYTES # BLD AUTO: 0.24 X10(3) UL (ref 1–4)
LYMPHOCYTES NFR BLD AUTO: 1.3 %
MCH RBC QN AUTO: 33.3 PG (ref 26–34)
MCHC RBC AUTO-ENTMCNC: 34 G/DL (ref 31–37)
MCV RBC AUTO: 98 FL
MONOCYTES # BLD AUTO: 0.67 X10(3) UL (ref 0.1–1)
MONOCYTES NFR BLD AUTO: 3.5 %
NEUTROPHILS # BLD AUTO: 17.94 X10 (3) UL (ref 1.5–7.7)
NEUTROPHILS # BLD AUTO: 17.94 X10(3) UL (ref 1.5–7.7)
NEUTROPHILS NFR BLD AUTO: 94.4 %
OSMOLALITY SERPL CALC.SUM OF ELEC: 295 MOSM/KG (ref 275–295)
PLATELET # BLD AUTO: 176 10(3)UL (ref 150–450)
POTASSIUM SERPL-SCNC: 4 MMOL/L (ref 3.5–5.1)
POTASSIUM SERPL-SCNC: 4 MMOL/L (ref 3.5–5.1)
RBC # BLD AUTO: 2.97 X10(6)UL
SODIUM SERPL-SCNC: 135 MMOL/L (ref 136–145)
WBC # BLD AUTO: 19 X10(3) UL (ref 4–11)

## 2022-12-19 PROCEDURE — 99233 SBSQ HOSP IP/OBS HIGH 50: CPT | Performed by: HOSPITALIST

## 2022-12-19 PROCEDURE — 99223 1ST HOSP IP/OBS HIGH 75: CPT | Performed by: STUDENT IN AN ORGANIZED HEALTH CARE EDUCATION/TRAINING PROGRAM

## 2022-12-19 PROCEDURE — 99222 1ST HOSP IP/OBS MODERATE 55: CPT | Performed by: STUDENT IN AN ORGANIZED HEALTH CARE EDUCATION/TRAINING PROGRAM

## 2022-12-19 RX ORDER — GABAPENTIN 100 MG/1
100 CAPSULE ORAL 3 TIMES DAILY
Status: DISCONTINUED | OUTPATIENT
Start: 2022-12-19 | End: 2022-12-22

## 2022-12-19 NOTE — TELEPHONE ENCOUNTER
Late entry. I spoke directly with the patient's PCP, Dr. Jewel Hernandez, to update him on the plan of care from our standpoint. Dr. Jewel Hernandez mentioned that Dr. Ashvin Valero would unlikely be able to provide surgery for the patient until January. I explained that unfortunately, given our clinic schedule and OR availability, that we may not be able to offer him surgery on this admission, but rather would recommend he be seen in our clinic by Dr. Josué Mendez to further discuss surgery and plan for a date. I explained that if he is able to see Dr. Ashvin Valero sooner in the clinic to get scheduled for the procedure, then that is what we would recommend to ensure that the patient is cared for within an appropriate timeframe. There were no further questions.

## 2022-12-19 NOTE — PLAN OF CARE
Patient is alert and oriented 3-4, forgetful. On tele with no calls. SCD's on. Saline locked. Norco 1 tab given for pain management. Heat packs applied to right thigh. States he has little to no relief from pain. Lidocaine patch on right thigh placed. Ambulated in room from bed to chair, tolerated fairly well. Enteric/Contact isolation for positive c-diff. Wife at bedside. Call light within reach. Frequent rounding done.       Problem: Patient Centered Care  Goal: Patient preferences are identified and integrated in the patient's plan of care  Description: Interventions:  - What would you like us to know as we care for you?   - Provide timely, complete, and accurate information to patient/family  - Incorporate patient and family knowledge, values, beliefs, and cultural backgrounds into the planning and delivery of care  - Encourage patient/family to participate in care and decision-making at the level they choose  - Honor patient and family perspectives and choices  Outcome: Progressing     Problem: PAIN - ADULT  Goal: Verbalizes/displays adequate comfort level or patient's stated pain goal  Description: INTERVENTIONS:  - Encourage pt to monitor pain and request assistance  - Assess pain using appropriate pain scale  - Administer analgesics based on type and severity of pain and evaluate response  - Implement non-pharmacological measures as appropriate and evaluate response  - Consider cultural and social influences on pain and pain management  - Manage/alleviate anxiety  - Utilize distraction and/or relaxation techniques  - Monitor for opioid side effects  - Notify MD/LIP if interventions unsuccessful or patient reports new pain  - Anticipate increased pain with activity and pre-medicate as appropriate  Outcome: Not Progressing

## 2022-12-19 NOTE — PLAN OF CARE
Problem: Patient Centered Care  Goal: Patient preferences are identified and integrated in the patient's plan of care  Description: Interventions:  - What would you like us to know as we care for you?   - Provide timely, complete, and accurate information to patient/family  - Incorporate patient and family knowledge, values, beliefs, and cultural backgrounds into the planning and delivery of care  - Encourage patient/family to participate in care and decision-making at the level they choose  - Honor patient and family perspectives and choices  Outcome: Progressing     Problem: PAIN - ADULT  Goal: Verbalizes/displays adequate comfort level or patient's stated pain goal  Description: INTERVENTIONS:  - Encourage pt to monitor pain and request assistance  - Assess pain using appropriate pain scale  - Administer analgesics based on type and severity of pain and evaluate response  - Implement non-pharmacological measures as appropriate and evaluate response  - Consider cultural and social influences on pain and pain management  - Manage/alleviate anxiety  - Utilize distraction and/or relaxation techniques  - Monitor for opioid side effects  - Notify MD/LIP if interventions unsuccessful or patient reports new pain  - Anticipate increased pain with activity and pre-medicate as appropriate  Outcome: Progressing     Problem: RISK FOR INFECTION - ADULT  Goal: Absence of fever/infection during anticipated neutropenic period  Description: INTERVENTIONS  - Monitor WBC  - Administer growth factors as ordered  - Implement neutropenic guidelines  Outcome: Progressing     Problem: SAFETY ADULT - FALL  Goal: Free from fall injury  Description: INTERVENTIONS:  - Assess pt frequently for physical needs  - Identify cognitive and physical deficits and behaviors that affect risk of falls.   - [place] fall precautions as indicated by assessment.  - Educate pt/family on patient safety including physical limitations  - Instruct pt to call for assistance with activity based on assessment  - Modify environment to reduce risk of injury  - Provide assistive devices as appropriate  - Consider OT/PT consult to assist with strengthening/mobility  - Encourage toileting schedule  Outcome: Progressing     Problem: DISCHARGE PLANNING  Goal: Discharge to home or other facility with appropriate resources  Description: INTERVENTIONS:  - Identify barriers to discharge w/pt and caregiver  - Include patient/family/discharge partner in discharge planning  - Arrange for needed discharge resources and transportation as appropriate  - Identify discharge learning needs (meds, wound care, etc)  - Arrange for interpreters to assist at discharge as needed  - Consider post-discharge preferences of patient/family/discharge partner  - Complete POLST form as appropriate  - Assess patient's ability to be responsible for managing their own health  - Refer to Case Management Department for coordinating discharge planning if the patient needs post-hospital services based on physician/LIP order or complex needs related to functional status, cognitive ability or social support system  Outcome: Progressing     Problem: Patient/Family Goals  Goal: Patient/Family Long Term Goal  Description: Patient's Long Term Goal:     Interventions:  -   - See additional Care Plan goals for specific interventions  Outcome: Progressing  Goal: Patient/Family Short Term Goal  Description: Patient's Short Term Goal:     Interventions:   -   - See additional Care Plan goals for specific interventions  Outcome: Progressing     Problem: SKIN/TISSUE INTEGRITY - ADULT  Goal: Incision(s), wounds(s) or drain site(s) healing without S/S of infection  Description: INTERVENTIONS:  - Assess and document risk factors for pressure ulcer development  - Assess and document skin integrity  - Assess and document dressing/incision, wound bed, drain sites and surrounding tissue  - Implement wound care per orders  - Initiate isolation precautions as appropriate  - Initiate Pressure Ulcer prevention bundle as indicated  Outcome: Progressing     Problem: MUSCULOSKELETAL - ADULT  Goal: Return mobility to safest level of function  Description: INTERVENTIONS:  - Assess patient stability and activity tolerance for standing, transferring and ambulating w/ or w/o assistive devices  - Assist with transfers and ambulation using safe patient handling equipment as needed  - Ensure adequate protection for wounds/incisions during mobilization  - Obtain PT/OT consults as needed  - Advance activity as appropriate  - Communicate ordered activity level and limitations with patient/family  Outcome: Progressing  Goal: Maintain proper alignment of affected body part  Description: INTERVENTIONS:  - Support and protect limb and body alignment per provider's orders  - Instruct and reinforce with patient and family use of appropriate assistive device and precautions (e.g. spinal or hip dislocation precautions)  Outcome: Progressing     Problem: Impaired Functional Mobility  Goal: Achieve highest/safest level of mobility/gait  Description: Interventions:  - Assess patient's functional ability and stability  - Promote increasing activity/tolerance for mobility and gait  - Educate and engage patient/family in tolerated activity level and precautions    Outcome: Progressing     Problem: Impaired Activities of Daily Living  Goal: Achieve highest/safest level of independence in self care  Description: Interventions:  - Assess ability and encourage patient to participate in ADLs to maximize function  - Promote sitting position while performing ADLs such as feeding, grooming, and bathing  - Educate and encourage patient/family in tolerated functional activity level and precautions during self-care  Outcome: Progressing

## 2022-12-20 ENCOUNTER — APPOINTMENT (OUTPATIENT)
Dept: MRI IMAGING | Facility: HOSPITAL | Age: 82
DRG: 519 | End: 2022-12-20
Attending: NEUROLOGICAL SURGERY
Payer: MEDICARE

## 2022-12-20 LAB
ALBUMIN SERPL-MCNC: 2.7 G/DL (ref 3.4–5)
ALBUMIN/GLOB SERPL: 0.8 {RATIO} (ref 1–2)
ALP LIVER SERPL-CCNC: 85 U/L
ALT SERPL-CCNC: 53 U/L
AMMONIA PLAS-MCNC: <10 UMOL/L (ref 11–32)
ANION GAP SERPL CALC-SCNC: 7 MMOL/L (ref 0–18)
ANTIBODY SCREEN: NEGATIVE
APTT PPP: 30 SECONDS (ref 23.3–35.6)
AST SERPL-CCNC: 42 U/L (ref 15–37)
BASOPHILS # BLD AUTO: 0.01 X10(3) UL (ref 0–0.2)
BASOPHILS NFR BLD AUTO: 0.1 %
BILIRUB SERPL-MCNC: 0.5 MG/DL (ref 0.1–2)
BUN BLD-MCNC: 46 MG/DL (ref 7–18)
BUN/CREAT SERPL: 31.7 (ref 10–20)
CALCIUM BLD-MCNC: 8.6 MG/DL (ref 8.5–10.1)
CHLORIDE SERPL-SCNC: 102 MMOL/L (ref 98–112)
CO2 SERPL-SCNC: 27 MMOL/L (ref 21–32)
CREAT BLD-MCNC: 1.45 MG/DL
DEPRECATED RDW RBC AUTO: 48 FL (ref 35.1–46.3)
EOSINOPHIL # BLD AUTO: 0 X10(3) UL (ref 0–0.7)
EOSINOPHIL NFR BLD AUTO: 0 %
ERYTHROCYTE [DISTWIDTH] IN BLOOD BY AUTOMATED COUNT: 13.5 % (ref 11–15)
GFR SERPLBLD BASED ON 1.73 SQ M-ARVRAT: 49 ML/MIN/1.73M2 (ref 60–?)
GLOBULIN PLAS-MCNC: 3.6 G/DL (ref 2.8–4.4)
GLUCOSE BLD-MCNC: 134 MG/DL (ref 70–99)
HCT VFR BLD AUTO: 28.6 %
HGB BLD-MCNC: 9.5 G/DL
IMM GRANULOCYTES # BLD AUTO: 0.12 X10(3) UL (ref 0–1)
IMM GRANULOCYTES NFR BLD: 0.9 %
INR BLD: 1.05 (ref 0.85–1.16)
LYMPHOCYTES # BLD AUTO: 0.22 X10(3) UL (ref 1–4)
LYMPHOCYTES NFR BLD AUTO: 1.7 %
MCH RBC QN AUTO: 32.3 PG (ref 26–34)
MCHC RBC AUTO-ENTMCNC: 33.2 G/DL (ref 31–37)
MCV RBC AUTO: 97.3 FL
MONOCYTES # BLD AUTO: 0.74 X10(3) UL (ref 0.1–1)
MONOCYTES NFR BLD AUTO: 5.6 %
NEUTROPHILS # BLD AUTO: 12.03 X10 (3) UL (ref 1.5–7.7)
NEUTROPHILS # BLD AUTO: 12.03 X10(3) UL (ref 1.5–7.7)
NEUTROPHILS NFR BLD AUTO: 91.7 %
OSMOLALITY SERPL CALC.SUM OF ELEC: 296 MOSM/KG (ref 275–295)
PLATELET # BLD AUTO: 167 10(3)UL (ref 150–450)
POTASSIUM SERPL-SCNC: 4.8 MMOL/L (ref 3.5–5.1)
PROT SERPL-MCNC: 6.3 G/DL (ref 6.4–8.2)
PROTHROMBIN TIME: 13.6 SECONDS (ref 11.6–14.8)
RBC # BLD AUTO: 2.94 X10(6)UL
RH BLOOD TYPE: POSITIVE
RH BLOOD TYPE: POSITIVE
SODIUM SERPL-SCNC: 136 MMOL/L (ref 136–145)
WBC # BLD AUTO: 13.1 X10(3) UL (ref 4–11)

## 2022-12-20 PROCEDURE — 99233 SBSQ HOSP IP/OBS HIGH 50: CPT | Performed by: STUDENT IN AN ORGANIZED HEALTH CARE EDUCATION/TRAINING PROGRAM

## 2022-12-20 PROCEDURE — 99233 SBSQ HOSP IP/OBS HIGH 50: CPT | Performed by: HOSPITALIST

## 2022-12-20 PROCEDURE — 72141 MRI NECK SPINE W/O DYE: CPT | Performed by: NEUROLOGICAL SURGERY

## 2022-12-20 PROCEDURE — 99232 SBSQ HOSP IP/OBS MODERATE 35: CPT | Performed by: STUDENT IN AN ORGANIZED HEALTH CARE EDUCATION/TRAINING PROGRAM

## 2022-12-20 RX ORDER — VANCOMYCIN HYDROCHLORIDE 125 MG/1
125 CAPSULE ORAL DAILY
Status: DISCONTINUED | OUTPATIENT
Start: 2022-12-21 | End: 2022-12-26

## 2022-12-20 NOTE — PLAN OF CARE
Pt is A&O x4, day 2 post lumbar steroid injection. States he feels overall improvement in pain today compared to yesterday, is still experiencing generalized weakness. Seen by neurosurgery and cervical MRI ordered. Tolerating diet and voiding appropriately. Pain well controlled with current pain regimen. Resting comfortably in bed, bed is low and locked with alarm on, call light is within reach. Frequent rounding in place. Wife at bedside.      Problem: Patient Centered Care  Goal: Patient preferences are identified and integrated in the patient's plan of care  Description: Interventions:  - What would you like us to know as we care for you?   - Provide timely, complete, and accurate information to patient/family  - Incorporate patient and family knowledge, values, beliefs, and cultural backgrounds into the planning and delivery of care  - Encourage patient/family to participate in care and decision-making at the level they choose  - Honor patient and family perspectives and choices  Outcome: Progressing     Problem: PAIN - ADULT  Goal: Verbalizes/displays adequate comfort level or patient's stated pain goal  Description: INTERVENTIONS:  - Encourage pt to monitor pain and request assistance  - Assess pain using appropriate pain scale  - Administer analgesics based on type and severity of pain and evaluate response  - Implement non-pharmacological measures as appropriate and evaluate response  - Consider cultural and social influences on pain and pain management  - Manage/alleviate anxiety  - Utilize distraction and/or relaxation techniques  - Monitor for opioid side effects  - Notify MD/LIP if interventions unsuccessful or patient reports new pain  - Anticipate increased pain with activity and pre-medicate as appropriate  Outcome: Progressing     Problem: RISK FOR INFECTION - ADULT  Goal: Absence of fever/infection during anticipated neutropenic period  Description: INTERVENTIONS  - Monitor WBC  - Administer growth factors as ordered  - Implement neutropenic guidelines  Outcome: Progressing     Problem: SAFETY ADULT - FALL  Goal: Free from fall injury  Description: INTERVENTIONS:  - Assess pt frequently for physical needs  - Identify cognitive and physical deficits and behaviors that affect risk of falls.   - [place] fall precautions as indicated by assessment.  - Educate pt/family on patient safety including physical limitations  - Instruct pt to call for assistance with activity based on assessment  - Modify environment to reduce risk of injury  - Provide assistive devices as appropriate  - Consider OT/PT consult to assist with strengthening/mobility  - Encourage toileting schedule  Outcome: Progressing     Problem: DISCHARGE PLANNING  Goal: Discharge to home or other facility with appropriate resources  Description: INTERVENTIONS:  - Identify barriers to discharge w/pt and caregiver  - Include patient/family/discharge partner in discharge planning  - Arrange for needed discharge resources and transportation as appropriate  - Identify discharge learning needs (meds, wound care, etc)  - Arrange for interpreters to assist at discharge as needed  - Consider post-discharge preferences of patient/family/discharge partner  - Complete POLST form as appropriate  - Assess patient's ability to be responsible for managing their own health  - Refer to Case Management Department for coordinating discharge planning if the patient needs post-hospital services based on physician/LIP order or complex needs related to functional status, cognitive ability or social support system  Outcome: Progressing     Problem: SKIN/TISSUE INTEGRITY - ADULT  Goal: Incision(s), wounds(s) or drain site(s) healing without S/S of infection  Description: INTERVENTIONS:  - Assess and document risk factors for pressure ulcer development  - Assess and document skin integrity  - Assess and document dressing/incision, wound bed, drain sites and surrounding tissue  - Implement wound care per orders  - Initiate isolation precautions as appropriate  - Initiate Pressure Ulcer prevention bundle as indicated  Outcome: Progressing     Problem: MUSCULOSKELETAL - ADULT  Goal: Return mobility to safest level of function  Description: INTERVENTIONS:  - Assess patient stability and activity tolerance for standing, transferring and ambulating w/ or w/o assistive devices  - Assist with transfers and ambulation using safe patient handling equipment as needed  - Ensure adequate protection for wounds/incisions during mobilization  - Obtain PT/OT consults as needed  - Advance activity as appropriate  - Communicate ordered activity level and limitations with patient/family  Outcome: Progressing  Goal: Maintain proper alignment of affected body part  Description: INTERVENTIONS:  - Support and protect limb and body alignment per provider's orders  - Instruct and reinforce with patient and family use of appropriate assistive device and precautions (e.g. spinal or hip dislocation precautions)  Outcome: Progressing     Problem: Impaired Functional Mobility  Goal: Achieve highest/safest level of mobility/gait  Description: Interventions:  - Assess patient's functional ability and stability  - Promote increasing activity/tolerance for mobility and gait  - Educate and engage patient/family in tolerated activity level and precautions    Outcome: Progressing     Problem: Impaired Activities of Daily Living  Goal: Achieve highest/safest level of independence in self care  Description: Interventions:  - Assess ability and encourage patient to participate in ADLs to maximize function  - Promote sitting position while performing ADLs such as feeding, grooming, and bathing  - Educate and encourage patient/family in tolerated functional activity level and precautions during self-care    Outcome: Progressing

## 2022-12-20 NOTE — HOME CARE LIAISON
Received referral via [facility] for Home Health services. Spoke w/ patient's spouse/Anjelica and provided with list of [facility] providers from [facility], choice is [facility] (pt has hx of [facility]). Agency reserved in [facility] and contact information placed on AVS.Financial interest disclosure provided. Notified CM/Makenzie.

## 2022-12-20 NOTE — PHYSICAL THERAPY NOTE
PHYSICAL THERAPY TREATMENT NOTE - INPATIENT     Room Number: 429/429-A       Presenting Problem: intractable back pain       Problem List  Principal Problem:    Intractable back pain  Active Problems:    Intractable low back pain    Lumbosacral radiculopathy at L2      PHYSICAL THERAPY ASSESSMENT     Pt seen daily. Chart reviewed;RN approved pt participation in PT RX. Min a for bed mobility and transfer. EOB sitting balance activity with emphasis on core stabilization. Family present;family  Education;all questions and concerns addressed. Pt educated on deep breathing. Pt amb 30 ft with RW and min a. Provided cuing for gait pattern as well as for postural awareness. Ther ex. The patient's Approx Degree of Impairment: 50.57% has been calculated based on documentation in the [facility] '6 clicks' Inpatient Basic Mobility Short Form. Research supports that patients with this level of impairment may benefit from Home with 5    PT Discharge Recommendations: Home with home health PT     PLAN  PT Treatment Plan: Bed mobility; Body mechanics  Frequency (Obs): 5x/week    SUBJECTIVE  Pt reports being ready for PT RX    OBJECTIVE  Precautions: Spine    WEIGHT BEARING RESTRICTION                PAIN ASSESSMENT   Ratin  Location: R thigh groin and lateral thigh  Management Techniques: Activity promotion; Body mechanics;Repositioning    BALANCE  Static Sitting: Fair +  Dynamic Sitting: Fair  Static Standing: Fair -  Dynamic Standing: Poor +    ACTIVITY TOLERANCE                          O2 WALK       AM-PAC '6-Clicks' INPATIENT SHORT FORM - BASIC MOBILITY  How much difficulty does the patient currently have. ..   Patient Difficulty: Turning over in bed (including adjusting bedclothes, sheets and blankets)?: A Little   Patient Difficulty: Sitting down on and standing up from a chair with arms (e.g., wheelchair, bedside commode, etc.): A Little   Patient Difficulty: Moving from lying on back to sitting on the side of the bed?: A Lot   How much help from another person does the patient currently need. .. Help from Another: Moving to and from a bed to a chair (including a wheelchair)?: A Little   Help from Another: Need to walk in hospital room?: A Little   Help from Another: Climbing 3-5 steps with a railing?: A Little     AM-PAC Score:  Raw Score: 17   Approx Degree of Impairment: 50.57%   Standardized Score (AM-PAC Scale): 42.13   CMS Modifier (G-Code): CK    FUNCTIONAL ABILITY STATUS  Functional Mobility/Gait Assessment  Gait Assistance: Minimum assistance  Distance (ft): 30  Assistive Device: Rolling walker  Pattern: R Decreased stance time    Additional information:     THERAPEUTIC EXERCISES  Lower Extremity Ankle pumps  Glut sets  Heel slides  Quad sets     Position Supine       Patient End of Session: Up in chair;Call light within reach;RN aware of session/findings;Bracing education provided per handout; All patient questions and concerns addressed    CURRENT GOALS     Patient Goal Patient's self-stated goal is: return to PLOF   Goal #1 Patient is able to demonstrate supine - sit EOB @ level: supervision     Goal #1   Current Status Min a   Goal #2 Patient is able to demonstrate transfers Sit to/from Stand at assistance level: modified independent with walker - rolling     Goal #2  Current Status Min a   Goal #3 Patient is able to ambulate 150 feet with assist device: walker - rolling at assistance level: supervision   Goal #3   Current Status Pt amb 30 ft with RW and Min a   Goal #4 Patient to demonstrate independence with home activity/exercise instructions provided to patient in preparation for discharge.    Goal #4   Current Status In progress

## 2022-12-20 NOTE — PLAN OF CARE
Patient is alert and oriented 3-4, can get confused. On tele with no calls. Voiding freely. Saline locked. Norco 1 tab and gabapentin given for pain management. Refused lidocaine patch. Heparin administered. Daughter at bedside. Call light within reach. Frequent rounding done.     Problem: Patient Centered Care  Goal: Patient preferences are identified and integrated in the patient's plan of care  Description: Interventions:  - What would you like us to know as we care for you?   - Provide timely, complete, and accurate information to patient/family  - Incorporate patient and family knowledge, values, beliefs, and cultural backgrounds into the planning and delivery of care  - Encourage patient/family to participate in care and decision-making at the level they choose  - Honor patient and family perspectives and choices  Outcome: Progressing     Problem: PAIN - ADULT  Goal: Verbalizes/displays adequate comfort level or patient's stated pain goal  Description: INTERVENTIONS:  - Encourage pt to monitor pain and request assistance  - Assess pain using appropriate pain scale  - Administer analgesics based on type and severity of pain and evaluate response  - Implement non-pharmacological measures as appropriate and evaluate response  - Consider cultural and social influences on pain and pain management  - Manage/alleviate anxiety  - Utilize distraction and/or relaxation techniques  - Monitor for opioid side effects  - Notify MD/LIP if interventions unsuccessful or patient reports new pain  - Anticipate increased pain with activity and pre-medicate as appropriate  Outcome: Progressing     Problem: RISK FOR INFECTION - ADULT  Goal: Absence of fever/infection during anticipated neutropenic period  Description: INTERVENTIONS  - Monitor WBC  - Administer growth factors as ordered  - Implement neutropenic guidelines  Outcome: Progressing

## 2022-12-20 NOTE — CM/SW NOTE
12/20/22 0900   CM/SW Screening   Referral Source Case [id number] staff; Chart review;Nursing rounds   Patient's Current Mental Status at Time of Assessment Alert;Oriented   Patient's Home Environment Condo/Apt with elevator   Patient lives with Spouse/Significant other   Patient Status Prior to Admission   Independent with ADLs and Mobility No   Pt. requires assistance with Ambulating   Services in place prior to admission DME/Supplies at home   Type of DME/Supplies Janene Hazy; Wheelchair;CPAP   Discharge Needs   Anticipated D/C needs Home health care   PT rec is [facility]-PT    CM called room and spoke to wife. She confirmed address and above info. Pt PCP on file is correct    Wife is agreeable to [facility] ref. Ref sent, f2f done    12/22 1300  Per PT rec is now [facility], wife is agreeable. CM req [facility] send ref, PASRR done    Plan  [facility] pending choice     / to remain available for support and/or discharge planning.      Rhett Amaro RN    Ext [phone] Patient mom called. Please call her when script is ready to be picked up.

## 2022-12-21 ENCOUNTER — APPOINTMENT (OUTPATIENT)
Dept: GENERAL RADIOLOGY | Facility: HOSPITAL | Age: 82
DRG: 519 | End: 2022-12-21
Attending: STUDENT IN AN ORGANIZED HEALTH CARE EDUCATION/TRAINING PROGRAM
Payer: MEDICARE

## 2022-12-21 ENCOUNTER — ANESTHESIA EVENT (OUTPATIENT)
Dept: SURGERY | Facility: HOSPITAL | Age: 82
End: 2022-12-21
Payer: MEDICARE

## 2022-12-21 ENCOUNTER — ANESTHESIA (OUTPATIENT)
Dept: SURGERY | Facility: HOSPITAL | Age: 82
End: 2022-12-21
Payer: MEDICARE

## 2022-12-21 LAB
ANION GAP SERPL CALC-SCNC: 4 MMOL/L (ref 0–18)
BASOPHILS # BLD AUTO: 0.01 X10(3) UL (ref 0–0.2)
BASOPHILS NFR BLD AUTO: 0.1 %
BUN BLD-MCNC: 41 MG/DL (ref 7–18)
BUN/CREAT SERPL: 34.2 (ref 10–20)
CALCIUM BLD-MCNC: 8.3 MG/DL (ref 8.5–10.1)
CHLORIDE SERPL-SCNC: 106 MMOL/L (ref 98–112)
CO2 SERPL-SCNC: 27 MMOL/L (ref 21–32)
CREAT BLD-MCNC: 1.2 MG/DL
DEPRECATED RDW RBC AUTO: 45.8 FL (ref 35.1–46.3)
EOSINOPHIL # BLD AUTO: 0 X10(3) UL (ref 0–0.7)
EOSINOPHIL NFR BLD AUTO: 0 %
ERYTHROCYTE [DISTWIDTH] IN BLOOD BY AUTOMATED COUNT: 13.3 % (ref 11–15)
GFR SERPLBLD BASED ON 1.73 SQ M-ARVRAT: 61 ML/MIN/1.73M2 (ref 60–?)
GLUCOSE BLD-MCNC: 100 MG/DL (ref 70–99)
HCT VFR BLD AUTO: 27.1 %
HGB BLD-MCNC: 9.2 G/DL
IMM GRANULOCYTES # BLD AUTO: 0.13 X10(3) UL (ref 0–1)
IMM GRANULOCYTES NFR BLD: 1.1 %
LYMPHOCYTES # BLD AUTO: 0.33 X10(3) UL (ref 1–4)
LYMPHOCYTES NFR BLD AUTO: 2.7 %
MCH RBC QN AUTO: 32.6 PG (ref 26–34)
MCHC RBC AUTO-ENTMCNC: 33.9 G/DL (ref 31–37)
MCV RBC AUTO: 96.1 FL
MONOCYTES # BLD AUTO: 0.85 X10(3) UL (ref 0.1–1)
MONOCYTES NFR BLD AUTO: 7 %
MRSA DNA SPEC QL NAA+PROBE: NEGATIVE
NEUTROPHILS # BLD AUTO: 10.84 X10 (3) UL (ref 1.5–7.7)
NEUTROPHILS # BLD AUTO: 10.84 X10(3) UL (ref 1.5–7.7)
NEUTROPHILS NFR BLD AUTO: 89.1 %
OSMOLALITY SERPL CALC.SUM OF ELEC: 294 MOSM/KG (ref 275–295)
PLATELET # BLD AUTO: 135 10(3)UL (ref 150–450)
POTASSIUM SERPL-SCNC: 4.1 MMOL/L (ref 3.5–5.1)
RBC # BLD AUTO: 2.82 X10(6)UL
SARS-COV-2 RNA RESP QL NAA+PROBE: NOT DETECTED
SODIUM SERPL-SCNC: 137 MMOL/L (ref 136–145)
WBC # BLD AUTO: 12.2 X10(3) UL (ref 4–11)

## 2022-12-21 PROCEDURE — 76000 FLUOROSCOPY <1 HR PHYS/QHP: CPT | Performed by: STUDENT IN AN ORGANIZED HEALTH CARE EDUCATION/TRAINING PROGRAM

## 2022-12-21 PROCEDURE — 99233 SBSQ HOSP IP/OBS HIGH 50: CPT | Performed by: HOSPITALIST

## 2022-12-21 PROCEDURE — 3E0R33Z INTRODUCTION OF ANTI-INFLAMMATORY INTO SPINAL CANAL, PERCUTANEOUS APPROACH: ICD-10-PCS | Performed by: ANESTHESIOLOGY

## 2022-12-21 PROCEDURE — 01NB0ZZ RELEASE LUMBAR NERVE, OPEN APPROACH: ICD-10-PCS | Performed by: STUDENT IN AN ORGANIZED HEALTH CARE EDUCATION/TRAINING PROGRAM

## 2022-12-21 PROCEDURE — 00NY0ZZ RELEASE LUMBAR SPINAL CORD, OPEN APPROACH: ICD-10-PCS | Performed by: STUDENT IN AN ORGANIZED HEALTH CARE EDUCATION/TRAINING PROGRAM

## 2022-12-21 PROCEDURE — 0SB20ZZ EXCISION OF LUMBAR VERTEBRAL DISC, OPEN APPROACH: ICD-10-PCS | Performed by: STUDENT IN AN ORGANIZED HEALTH CARE EDUCATION/TRAINING PROGRAM

## 2022-12-21 DEVICE — DURAGEN® PLUS DURAL REGENERATION MATRIX, 2 IN X 2 IN (5 CM X 5 CM)
Type: IMPLANTABLE DEVICE | Site: BACK | Status: FUNCTIONAL
Brand: DURAGEN® PLUS

## 2022-12-21 RX ORDER — SODIUM PHOSPHATE, DIBASIC AND SODIUM PHOSPHATE, MONOBASIC 7; 19 G/133ML; G/133ML
1 ENEMA RECTAL ONCE AS NEEDED
Status: DISCONTINUED | OUTPATIENT
Start: 2022-12-21 | End: 2022-12-26

## 2022-12-21 RX ORDER — HYDROMORPHONE HYDROCHLORIDE 1 MG/ML
0.2 INJECTION, SOLUTION INTRAMUSCULAR; INTRAVENOUS; SUBCUTANEOUS EVERY 5 MIN PRN
Status: DISCONTINUED | OUTPATIENT
Start: 2022-12-21 | End: 2022-12-21 | Stop reason: HOSPADM

## 2022-12-21 RX ORDER — SENNOSIDES 8.6 MG
17.2 TABLET ORAL NIGHTLY
Status: DISCONTINUED | OUTPATIENT
Start: 2022-12-21 | End: 2022-12-26

## 2022-12-21 RX ORDER — DIPHENHYDRAMINE HCL 25 MG
25 CAPSULE ORAL EVERY 4 HOURS PRN
Status: DISCONTINUED | OUTPATIENT
Start: 2022-12-21 | End: 2022-12-26

## 2022-12-21 RX ORDER — OXYCODONE HYDROCHLORIDE 5 MG/1
5 TABLET ORAL EVERY 4 HOURS PRN
Status: DISCONTINUED | OUTPATIENT
Start: 2022-12-21 | End: 2022-12-23

## 2022-12-21 RX ORDER — ONDANSETRON 2 MG/ML
4 INJECTION INTRAMUSCULAR; INTRAVENOUS EVERY 6 HOURS PRN
Status: DISCONTINUED | OUTPATIENT
Start: 2022-12-21 | End: 2022-12-21 | Stop reason: HOSPADM

## 2022-12-21 RX ORDER — ONDANSETRON 2 MG/ML
INJECTION INTRAMUSCULAR; INTRAVENOUS AS NEEDED
Status: DISCONTINUED | OUTPATIENT
Start: 2022-12-21 | End: 2022-12-21 | Stop reason: SURG

## 2022-12-21 RX ORDER — OXYCODONE HYDROCHLORIDE 5 MG/1
2.5 TABLET ORAL EVERY 4 HOURS PRN
Status: DISCONTINUED | OUTPATIENT
Start: 2022-12-21 | End: 2022-12-23

## 2022-12-21 RX ORDER — METHYLPREDNISOLONE ACETATE 40 MG/ML
INJECTION, SUSPENSION INTRA-ARTICULAR; INTRALESIONAL; INTRAMUSCULAR; SOFT TISSUE AS NEEDED
Status: DISCONTINUED | OUTPATIENT
Start: 2022-12-21 | End: 2022-12-21

## 2022-12-21 RX ORDER — DEXAMETHASONE SODIUM PHOSPHATE 4 MG/ML
4 VIAL (ML) INJECTION EVERY 6 HOURS
Status: COMPLETED | OUTPATIENT
Start: 2022-12-21 | End: 2022-12-22

## 2022-12-21 RX ORDER — BUPIVACAINE HYDROCHLORIDE 2.5 MG/ML
INJECTION, SOLUTION EPIDURAL; INFILTRATION; INTRACAUDAL AS NEEDED
Status: DISCONTINUED | OUTPATIENT
Start: 2022-12-21 | End: 2022-12-21 | Stop reason: HOSPADM

## 2022-12-21 RX ORDER — SODIUM CHLORIDE, SODIUM LACTATE, POTASSIUM CHLORIDE, CALCIUM CHLORIDE 600; 310; 30; 20 MG/100ML; MG/100ML; MG/100ML; MG/100ML
INJECTION, SOLUTION INTRAVENOUS CONTINUOUS PRN
Status: DISCONTINUED | OUTPATIENT
Start: 2022-12-21 | End: 2022-12-21 | Stop reason: SURG

## 2022-12-21 RX ORDER — SODIUM CHLORIDE 9 MG/ML
INJECTION, SOLUTION INTRAVENOUS CONTINUOUS
Status: DISCONTINUED | OUTPATIENT
Start: 2022-12-21 | End: 2022-12-21

## 2022-12-21 RX ORDER — NALOXONE HYDROCHLORIDE 0.4 MG/ML
80 INJECTION, SOLUTION INTRAMUSCULAR; INTRAVENOUS; SUBCUTANEOUS AS NEEDED
Status: DISCONTINUED | OUTPATIENT
Start: 2022-12-21 | End: 2022-12-21 | Stop reason: HOSPADM

## 2022-12-21 RX ORDER — ACETAMINOPHEN 10 MG/ML
1000 INJECTION, SOLUTION INTRAVENOUS EVERY 6 HOURS
Status: DISCONTINUED | OUTPATIENT
Start: 2022-12-21 | End: 2022-12-22

## 2022-12-21 RX ORDER — BISACODYL 10 MG
10 SUPPOSITORY, RECTAL RECTAL
Status: DISCONTINUED | OUTPATIENT
Start: 2022-12-21 | End: 2022-12-26

## 2022-12-21 RX ORDER — NEOSTIGMINE METHYLSULFATE 1 MG/ML
INJECTION, SOLUTION INTRAVENOUS AS NEEDED
Status: DISCONTINUED | OUTPATIENT
Start: 2022-12-21 | End: 2022-12-21 | Stop reason: SURG

## 2022-12-21 RX ORDER — HYDROMORPHONE HYDROCHLORIDE 1 MG/ML
0.6 INJECTION, SOLUTION INTRAMUSCULAR; INTRAVENOUS; SUBCUTANEOUS EVERY 5 MIN PRN
Status: DISCONTINUED | OUTPATIENT
Start: 2022-12-21 | End: 2022-12-21 | Stop reason: HOSPADM

## 2022-12-21 RX ORDER — METHOCARBAMOL 500 MG/1
500 TABLET, FILM COATED ORAL 3 TIMES DAILY
Status: DISCONTINUED | OUTPATIENT
Start: 2022-12-21 | End: 2022-12-22

## 2022-12-21 RX ORDER — DIPHENHYDRAMINE HYDROCHLORIDE 50 MG/ML
25 INJECTION INTRAMUSCULAR; INTRAVENOUS EVERY 4 HOURS PRN
Status: DISCONTINUED | OUTPATIENT
Start: 2022-12-21 | End: 2022-12-26

## 2022-12-21 RX ORDER — ROCURONIUM BROMIDE 10 MG/ML
INJECTION, SOLUTION INTRAVENOUS AS NEEDED
Status: DISCONTINUED | OUTPATIENT
Start: 2022-12-21 | End: 2022-12-21 | Stop reason: SURG

## 2022-12-21 RX ORDER — EPHEDRINE SULFATE 50 MG/ML
INJECTION, SOLUTION INTRAVENOUS AS NEEDED
Status: DISCONTINUED | OUTPATIENT
Start: 2022-12-21 | End: 2022-12-21 | Stop reason: SURG

## 2022-12-21 RX ORDER — HYDROMORPHONE HYDROCHLORIDE 1 MG/ML
0.4 INJECTION, SOLUTION INTRAMUSCULAR; INTRAVENOUS; SUBCUTANEOUS EVERY 5 MIN PRN
Status: DISCONTINUED | OUTPATIENT
Start: 2022-12-21 | End: 2022-12-21 | Stop reason: HOSPADM

## 2022-12-21 RX ORDER — DOCUSATE SODIUM 100 MG/1
100 CAPSULE, LIQUID FILLED ORAL 2 TIMES DAILY
Status: DISCONTINUED | OUTPATIENT
Start: 2022-12-21 | End: 2022-12-26

## 2022-12-21 RX ORDER — HYDROMORPHONE HYDROCHLORIDE 1 MG/ML
0.2 INJECTION, SOLUTION INTRAMUSCULAR; INTRAVENOUS; SUBCUTANEOUS EVERY 2 HOUR PRN
Status: DISCONTINUED | OUTPATIENT
Start: 2022-12-21 | End: 2022-12-26

## 2022-12-21 RX ORDER — DEXTROSE AND SODIUM CHLORIDE 5; .45 G/100ML; G/100ML
INJECTION, SOLUTION INTRAVENOUS CONTINUOUS
Status: DISCONTINUED | OUTPATIENT
Start: 2022-12-21 | End: 2022-12-22

## 2022-12-21 RX ORDER — SODIUM CHLORIDE, SODIUM LACTATE, POTASSIUM CHLORIDE, CALCIUM CHLORIDE 600; 310; 30; 20 MG/100ML; MG/100ML; MG/100ML; MG/100ML
INJECTION, SOLUTION INTRAVENOUS CONTINUOUS
Status: DISCONTINUED | OUTPATIENT
Start: 2022-12-21 | End: 2022-12-21 | Stop reason: HOSPADM

## 2022-12-21 RX ORDER — HYDROMORPHONE HYDROCHLORIDE 1 MG/ML
0.4 INJECTION, SOLUTION INTRAMUSCULAR; INTRAVENOUS; SUBCUTANEOUS EVERY 2 HOUR PRN
Status: DISCONTINUED | OUTPATIENT
Start: 2022-12-21 | End: 2022-12-26

## 2022-12-21 RX ORDER — ONDANSETRON 2 MG/ML
4 INJECTION INTRAMUSCULAR; INTRAVENOUS EVERY 6 HOURS PRN
Status: DISCONTINUED | OUTPATIENT
Start: 2022-12-21 | End: 2022-12-26

## 2022-12-21 RX ORDER — METOCLOPRAMIDE HYDROCHLORIDE 5 MG/ML
10 INJECTION INTRAMUSCULAR; INTRAVENOUS EVERY 8 HOURS PRN
Status: DISCONTINUED | OUTPATIENT
Start: 2022-12-21 | End: 2022-12-21 | Stop reason: HOSPADM

## 2022-12-21 RX ORDER — CLINDAMYCIN PHOSPHATE 900 MG/50ML
INJECTION INTRAVENOUS AS NEEDED
Status: DISCONTINUED | OUTPATIENT
Start: 2022-12-21 | End: 2022-12-21 | Stop reason: SURG

## 2022-12-21 RX ORDER — POLYETHYLENE GLYCOL 3350 17 G/17G
17 POWDER, FOR SOLUTION ORAL DAILY PRN
Status: DISCONTINUED | OUTPATIENT
Start: 2022-12-21 | End: 2022-12-26

## 2022-12-21 RX ORDER — METOCLOPRAMIDE HYDROCHLORIDE 5 MG/ML
10 INJECTION INTRAMUSCULAR; INTRAVENOUS EVERY 8 HOURS PRN
Status: DISCONTINUED | OUTPATIENT
Start: 2022-12-21 | End: 2022-12-26

## 2022-12-21 RX ORDER — GLYCOPYRROLATE 0.2 MG/ML
INJECTION, SOLUTION INTRAMUSCULAR; INTRAVENOUS AS NEEDED
Status: DISCONTINUED | OUTPATIENT
Start: 2022-12-21 | End: 2022-12-21 | Stop reason: SURG

## 2022-12-21 RX ADMIN — EPHEDRINE SULFATE 5 MG: 50 INJECTION, SOLUTION INTRAVENOUS at 10:03:00

## 2022-12-21 RX ADMIN — EPHEDRINE SULFATE 5 MG: 50 INJECTION, SOLUTION INTRAVENOUS at 09:32:00

## 2022-12-21 RX ADMIN — SODIUM CHLORIDE, SODIUM LACTATE, POTASSIUM CHLORIDE, CALCIUM CHLORIDE: 600; 310; 30; 20 INJECTION, SOLUTION INTRAVENOUS at 11:51:00

## 2022-12-21 RX ADMIN — ROCURONIUM BROMIDE 40 MG: 10 INJECTION, SOLUTION INTRAVENOUS at 09:06:00

## 2022-12-21 RX ADMIN — CLINDAMYCIN PHOSPHATE 900 MG: 900 INJECTION INTRAVENOUS at 09:27:00

## 2022-12-21 RX ADMIN — GLYCOPYRROLATE 0.2 MG: 0.2 INJECTION, SOLUTION INTRAMUSCULAR; INTRAVENOUS at 10:03:00

## 2022-12-21 RX ADMIN — NEOSTIGMINE METHYLSULFATE 5 MG: 1 INJECTION, SOLUTION INTRAVENOUS at 11:48:00

## 2022-12-21 RX ADMIN — ONDANSETRON 4 MG: 2 INJECTION INTRAMUSCULAR; INTRAVENOUS at 11:41:00

## 2022-12-21 RX ADMIN — GLYCOPYRROLATE 0.8 MG: 0.2 INJECTION, SOLUTION INTRAMUSCULAR; INTRAVENOUS at 11:48:00

## 2022-12-21 RX ADMIN — SODIUM CHLORIDE, SODIUM LACTATE, POTASSIUM CHLORIDE, CALCIUM CHLORIDE: 600; 310; 30; 20 INJECTION, SOLUTION INTRAVENOUS at 09:03:00

## 2022-12-21 NOTE — OPERATIVE REPORT
Neurosurgery Operative Note    Date of surgery:  12/21/2022    Surgeon/Provider:  Mary Kolb MD     Assistant:  May Blizzard (given the procedure's level of complexity, the help of an abled surgical assistant was necessary to ensure patient safety and preservation of critical structures)    Preoperative diagnosis:  1. Lumbalgia. 2. Lumbar spondylosis. 3. Lumbar calcified disc with radiculopathy (L1-2 with right L1 and L2 radiculopathies). Postoperative diagnosis:  1. Lumbalgia. 2. Lumbar spondylosis. 3. Lumbar calcified disc with radiculopathy (L1-2 with right L1 and L2 radiculopathies). 4. Status post L1-2 decompression of right L1-2 nerve roots. Procedure performed:  1. Minimally invasive right L1-2 laminotomy, partial medial facetectomy, resection of calcified disc/endplate complex. 2. Use of intraoperative fluoroscopy including interpretation of x-rays. 3. Use of operating microscope. Indications for procedure:  Mr. Jo Paula is an 68-year-old male who was admitted to the hospital with complaints of back pain with radiation to his right groin and right weakness of the hip flexor muscles. His presentation was consistent with right L1/L2 radiculopathies, which was corroborated by his imaging findings consisting of L1-2 calcified disc/endplate osteophyte complex leading to compression of the right L1 and L2 nerve roots. Pain had been unrelenting, refractory to nonoperative treatment including epidural steroid injections. Of note, he initially also had pain radiating down to his right foot which responded very well to steroid injection providing complete resolution of his likely superimposed lower lumbar radiculopathy. Given his circumstances, I felt surgical treatment of his pathology was indicated, could prove beneficial, and thus recommended it to him.  Due to his age and pre-existing comorbidities, we agreed to target the single, most symptomatic level which did not respond to medical management. Prior to surgery and during preoperative counseling, the risks, benefits, and potential outcomes of the surgical intervention were outlined to Mr. Daly using language he could comprehend. He expressed his understanding and elected to proceed. Procedure in detail:  The patient was identified and taken to the operating room. He was placed under general endotracheal anesthesia without complication. He was positioned prone on a Doug frame, which laid on top of a flat Hollis table. His shoulders were abducted and arms flexed to 90 degrees. All pressure points were appropriately padded. He was firmly secured to the operating table. He received the appropriate preoperative antibiotic prophylaxis. Sequential compression devices were maintained on the lower extremities for DVT prophylaxis. The lumbar region was identified. It was prepped and draped in sterile fashion. An appropriate time out was performed following standard protocol. Under fluoroscopic guidance, a spinal needle was used to localize the L1-2 interspace and accordingly plan the surgical incision. A 2 cm incision was made 1.5 cm to the right of midline overlying the L1-2 interspace. Using a guidewire, a series of sequential dilators and tubular retractor, access was gained to the inferior aspect of the right L1 lamina, the medial aspect of the right L1-2 facet joint and right L1-2 interlaminar space. Intraoperative fluoroscopy was used to confirm adequate positioning of the tubular apparatus after which the operating microscope was brought in for better visualization. Under microscopic magnification, soft tissue dissection was performed exposing the aforementioned landmarks. A high-speed drill was then used to perform a right inferior laminotomy of L1. The bony resection was taken superiorly until the ligamentum flavum could be detached from the undersurface of the right L1 lamina.  The ligamentum flavum was elevated and resected, exposing the dura beneath. The right L1-2 lateral recess decompression was then performed using a series of rongeurs and curettes. Safe exposure to the nerve root necessitated a partial medial facetectomy. This was done until the lateral border of the dura and traversing right L2 nerve root was identified and freed dorsally along its course within the subarticular compartment to the level of the right L2 pedicle. We retracted the lateral border of the dura and the right L2 nerve root medially, which caused an unintended durotomy at the axilla of the right L1 nerve root. This was closed primarily using 5-0 prolene in an interrupted figure-of-eight fashion, and reinforced with dural patch and duraseal. We did not appreciate any concerning egress of cerebrospinal fluid with Valsalva maneuvers afterwards. Once the spinal fluid leak was repaired, we again retracted the lateral border of the dura and the right L2 nerve root medially exposing the subannular disc protrusion at L1-2. We coagulated the overlying epidural vasculature, incised the annulus and removed multiple calcified disc fragments piecemeal. Parts of the calcified complex necessitated to be drilled to achieve a satisfactory decompression until the posterior aspect of the right L1-2 disc was flush with the posterior aspect of the L1 and 2 vertebral bodies. Afterwards, we achieved satisfactory hemostasis and again tracked the path of the right L2 nerve root within the subarticular compartment to the level of the right L2 pedicle, found it to be satisfactorily decompressed along its ventral and dorsal aspects. We again achieved adequate hemostasis, irrigated the surgical field and placed 40 mg of Depo-Medrol into the epidural space overlying the right L1-2 lateral recess and the right L2 nerve root. We then pulled out the tubular retractor, ensuring to coagulate any actively bleeding muscle tissue on the way out.  The surgical incision was then closed in multiple layers and reapproximated at the skin with 4-0 Monocryl in a subcuticular fashion. The patient was then allowed to emerge from general anesthesia and was subsequently extubated. He was taken to the recovery room for further convalescence. Anesthesia: General endotracheal    Estimated blood loss: 25 mL    Counts: All needle, sponge, and cottonoid counts were reported correct at the end of the operation. Complications: Small dural leak at the axilla of the right L1 nerve root. Closed primarily, reinforced with dural patch and duraseal     Drains: None     Implants: None     Specimen: None    Wound classification: 1, clean    Disposition: Acute care floor     Condition: Stable, neurologically at baseline    Bradley Isabel MD  [place] [id number], [address] [place] [address]  [phone]  Pager   12/21/2022 11:55 AM      This note was created using a voice-recognition transcribing system. Incorrect words or phrases may have been missed during proofreading. Please interpret accordingly.

## 2022-12-21 NOTE — SLP NOTE
SPEECH NOTE:    Order received, chart reviewed. Pt scheduled for surgery today, and unable to participate in PO trials for this evaluation. RN aware, and plans to contact SLP service if pt medically appropriate and cleared for PO intake.       Ruth Ann Madrigal MA, CCC-SLP  Speech and Language Pathologist

## 2022-12-21 NOTE — ANESTHESIA PROCEDURE NOTES
Peripheral IV  Date/Time: 12/21/2022 9:26 AM  Inserted by: Paul Oliva MD    Placement  Needle size: 20 G  Laterality: right  Location: hand  Local anesthetic: none  Site prep: alcohol  Technique: anatomical landmarks  Attempts: 1

## 2022-12-21 NOTE — BRIEF OP NOTE
Pre-Operative Diagnosis: lumbar radiculopathy     Post-Operative Diagnosis: lumbar radiculopathy      Procedure Performed:   right minimally invasive lumbar one to two laminoforaminotomy and discectomy     Surgeon(s) and Role:     Tirso Spain MD - Primary    Assistant(s):  Surgical Assistant.: Tashi Jo     Surgical Findings: See operative report      Specimen: None     Estimated Blood Loss: Blood Output: 25 mL (12/21/2022 11:50 AM)    Patient seen and examined on the floor post-operatively after Dr. Oksana Skinner had seen the patient in the PACU. He is awake and alert. Complains of low back pain secondary to the incision and right hip and groin pain, which was present preoperatively. Right HF 2/5, but effort appears somewhat limited at this time secondary to pain and recovery from anesthesia. Wife at bedside. All questions answered at this time. Please keep patient on flat bedrest until tonight. Please contact the neurosurgery team if patient develops positional headaches. RN updated.      Farrukh Reese PA-C  Physician Assistant- Neurosurgery   [facility]  12/21/2022, 1:44 PM

## 2022-12-21 NOTE — PLAN OF CARE
Pt's vital signs stable. Scheduled Norco provided for pain. Alert and oriented x4. Forgetful at times. CMS intact. Tele in place, NSR. On room air. Pt refusing to wear home CPAP HS. Placed on 2L of O2 while asleep. Pt taking pills in applesauce. Pt intermittently coughing with thin liquids; SLP consult placed per protocol. NPO. Voids freely. Up with one assist and the walker or s/p to rolling chair. SCDs for DVT prophylaxis. Appropriate safety precautions maintained. Bed locked in lowest position, call light within reach, and frequent rounding maintained. Pt's wife, Lyndon Sanchez, spending night at bedside. Plan for L1-L2 discectomy this morning with Dr. Abdiel Watson.      Problem: Patient Centered Care  Goal: Patient preferences are identified and integrated in the patient's plan of care  Description: Interventions:  - What would you like us to know as we care for you?   - Provide timely, complete, and accurate information to patient/family  - Incorporate patient and family knowledge, values, beliefs, and cultural backgrounds into the planning and delivery of care  - Encourage patient/family to participate in care and decision-making at the level they choose  - Honor patient and family perspectives and choices  Outcome: Progressing     Problem: PAIN - ADULT  Goal: Verbalizes/displays adequate comfort level or patient's stated pain goal  Description: INTERVENTIONS:  - Encourage pt to monitor pain and request assistance  - Assess pain using appropriate pain scale  - Administer analgesics based on type and severity of pain and evaluate response  - Implement non-pharmacological measures as appropriate and evaluate response  - Consider cultural and social influences on pain and pain management  - Manage/alleviate anxiety  - Utilize distraction and/or relaxation techniques  - Monitor for opioid side effects  - Notify MD/LIP if interventions unsuccessful or patient reports new pain  - Anticipate increased pain with activity and pre-medicate as appropriate  Outcome: Progressing     Problem: RISK FOR INFECTION - ADULT  Goal: Absence of fever/infection during anticipated neutropenic period  Description: INTERVENTIONS  - Monitor WBC  - Administer growth factors as ordered  - Implement neutropenic guidelines  Outcome: Progressing     Problem: SAFETY ADULT - FALL  Goal: Free from fall injury  Description: INTERVENTIONS:  - Assess pt frequently for physical needs  - Identify cognitive and physical deficits and behaviors that affect risk of falls.   - [place] fall precautions as indicated by assessment.  - Educate pt/family on patient safety including physical limitations  - Instruct pt to call for assistance with activity based on assessment  - Modify environment to reduce risk of injury  - Provide assistive devices as appropriate  - Consider OT/PT consult to assist with strengthening/mobility  - Encourage toileting schedule  Outcome: Progressing     Problem: DISCHARGE PLANNING  Goal: Discharge to home or other facility with appropriate resources  Description: INTERVENTIONS:  - Identify barriers to discharge w/pt and caregiver  - Include patient/family/discharge partner in discharge planning  - Arrange for needed discharge resources and transportation as appropriate  - Identify discharge learning needs (meds, wound care, etc)  - Arrange for interpreters to assist at discharge as needed  - Consider post-discharge preferences of patient/family/discharge partner  - Complete POLST form as appropriate  - Assess patient's ability to be responsible for managing their own health  - Refer to Case Management Department for coordinating discharge planning if the patient needs post-hospital services based on physician/LIP order or complex needs related to functional status, cognitive ability or social support system  Outcome: Progressing     Problem: Patient/Family Goals  Goal: Patient/Family Long Term Goal  Description: Patient's Long Term Goal: Interventions:  -   - See additional Care Plan goals for specific interventions  Outcome: Progressing  Goal: Patient/Family Short Term Goal  Description: Patient's Short Term Goal:     Interventions:   -   - See additional Care Plan goals for specific interventions  Outcome: Progressing     Problem: SKIN/TISSUE INTEGRITY - ADULT  Goal: Incision(s), wounds(s) or drain site(s) healing without S/S of infection  Description: INTERVENTIONS:  - Assess and document risk factors for pressure ulcer development  - Assess and document skin integrity  - Assess and document dressing/incision, wound bed, drain sites and surrounding tissue  - Implement wound care per orders  - Initiate isolation precautions as appropriate  - Initiate Pressure Ulcer prevention bundle as indicated  Outcome: Progressing     Problem: MUSCULOSKELETAL - ADULT  Goal: Return mobility to safest level of function  Description: INTERVENTIONS:  - Assess patient stability and activity tolerance for standing, transferring and ambulating w/ or w/o assistive devices  - Assist with transfers and ambulation using safe patient handling equipment as needed  - Ensure adequate protection for wounds/incisions during mobilization  - Obtain PT/OT consults as needed  - Advance activity as appropriate  - Communicate ordered activity level and limitations with patient/family  Outcome: Progressing  Goal: Maintain proper alignment of affected body part  Description: INTERVENTIONS:  - Support and protect limb and body alignment per provider's orders  - Instruct and reinforce with patient and family use of appropriate assistive device and precautions (e.g. spinal or hip dislocation precautions)  Outcome: Progressing     Problem: Impaired Functional Mobility  Goal: Achieve highest/safest level of mobility/gait  Description: Interventions:  - Assess patient's functional ability and stability  - Promote increasing activity/tolerance for mobility and gait  - Educate and engage patient/family in tolerated activity level and precautions  Outcome: Progressing     Problem: Impaired Activities of Daily Living  Goal: Achieve highest/safest level of independence in self care  Description: Interventions:  - Assess ability and encourage patient to participate in ADLs to maximize function  - Promote sitting position while performing ADLs such as feeding, grooming, and bathing  - Educate and encourage patient/family in tolerated functional activity level and precautions during self-care  Outcome: Progressing

## 2022-12-21 NOTE — BRIEF OP NOTE
Pre-Operative Diagnosis: Lumbar polyradiculopathy, right iliopsoas weakness     Post-Operative Diagnosis: Lumbar polyradiculopathy, right iliopsoas weakness     Procedure Performed:   Right minimally invasive L1-2 laminoforaminotomy, medial facetectomy and resection of calcified disc endplate complex for decompression of L1 exiting and traversing L2 nerve roots. Surgeon(s) and Role:     * Ev Hayes MD - Primary    Assistant(s):  Surgical Assistant.: Sohail Miles  PA: Sarabjit Waggoner PA-C     Surgical Findings: Calcified disc osteophyte complex, calcified posterior ligamentous complex     Complications: Small dural leak at the axilla of the L1 nerve root.  Closed primarily, reinforced with dural patch and duraseal    Specimen: None     Estimated Blood Loss: Blood Output: 25 mL (12/21/2022 11:50 AM)    Didier Zavala MD  12/21/2022  11:51 AM

## 2022-12-22 ENCOUNTER — TELEPHONE (OUTPATIENT)
Dept: SURGERY | Facility: CLINIC | Age: 82
End: 2022-12-22

## 2022-12-22 LAB
HCT VFR BLD AUTO: 31.2 %
HGB BLD-MCNC: 10.6 G/DL

## 2022-12-22 PROCEDURE — 99233 SBSQ HOSP IP/OBS HIGH 50: CPT | Performed by: HOSPITALIST

## 2022-12-22 RX ORDER — METHOCARBAMOL 500 MG/1
500 TABLET, FILM COATED ORAL 3 TIMES DAILY PRN
Qty: 45 TABLET | Refills: 0 | Status: SHIPPED | OUTPATIENT
Start: 2022-12-22

## 2022-12-22 RX ORDER — METHOCARBAMOL 500 MG/1
500 TABLET, FILM COATED ORAL 3 TIMES DAILY PRN
Status: DISCONTINUED | OUTPATIENT
Start: 2022-12-22 | End: 2022-12-26

## 2022-12-22 RX ORDER — TAMSULOSIN HYDROCHLORIDE 0.4 MG/1
0.4 CAPSULE ORAL
Status: DISCONTINUED | OUTPATIENT
Start: 2022-12-22 | End: 2022-12-26

## 2022-12-22 RX ORDER — ACETAMINOPHEN 500 MG
500 TABLET ORAL EVERY 6 HOURS PRN
Status: DISCONTINUED | OUTPATIENT
Start: 2022-12-22 | End: 2022-12-26

## 2022-12-22 RX ORDER — OXYCODONE HYDROCHLORIDE 5 MG/1
TABLET ORAL EVERY 4 HOURS PRN
Qty: 40 TABLET | Refills: 0 | Status: SHIPPED | OUTPATIENT
Start: 2022-12-22 | End: 2022-12-22

## 2022-12-22 RX ORDER — ACETAMINOPHEN 500 MG
TABLET ORAL EVERY 6 HOURS PRN
Qty: 50 TABLET | Refills: 0 | Status: SHIPPED | OUTPATIENT
Start: 2022-12-22 | End: 2022-12-22

## 2022-12-22 RX ORDER — NALOXONE HYDROCHLORIDE 4 MG/.1ML
4 SPRAY NASAL AS NEEDED
Qty: 1 KIT | Refills: 0 | Status: SHIPPED | OUTPATIENT
Start: 2022-12-22

## 2022-12-22 RX ORDER — ACETAMINOPHEN 500 MG
1000 TABLET ORAL EVERY 6 HOURS PRN
Status: DISCONTINUED | OUTPATIENT
Start: 2022-12-22 | End: 2022-12-26

## 2022-12-22 RX ORDER — ACETAMINOPHEN 500 MG
TABLET ORAL EVERY 6 HOURS PRN
Status: DISCONTINUED | OUTPATIENT
Start: 2022-12-22 | End: 2022-12-22

## 2022-12-22 NOTE — TELEPHONE ENCOUNTER
Thank you for the update. Discussed with Dr. Oksana Skinner. Will discontinue Oxycodone script and Tylenol script.  Patient may resume his home [place].

## 2022-12-22 NOTE — PLAN OF CARE
Problem: Patient Centered Care  Goal: Patient preferences are identified and integrated in the patient's plan of care  Description: Interventions:  - What would you like us to know as we care for you? ***  - Provide timely, complete, and accurate information to patient/family  - Incorporate patient and family knowledge, values, beliefs, and cultural backgrounds into the planning and delivery of care  - Encourage patient/family to participate in care and decision-making at the level they choose  - Honor patient and family perspectives and choices  Outcome: Progressing     Problem: PAIN - ADULT  Goal: Verbalizes/displays adequate comfort level or patient's stated pain goal  Description: INTERVENTIONS:  - Encourage pt to monitor pain and request assistance  - Assess pain using appropriate pain scale  - Administer analgesics based on type and severity of pain and evaluate response  - Implement non-pharmacological measures as appropriate and evaluate response  - Consider cultural and social influences on pain and pain management  - Manage/alleviate anxiety  - Utilize distraction and/or relaxation techniques  - Monitor for opioid side effects  - Notify MD/LIP if interventions unsuccessful or patient reports new pain  - Anticipate increased pain with activity and pre-medicate as appropriate  Outcome: Progressing     Problem: RISK FOR INFECTION - ADULT  Goal: Absence of fever/infection during anticipated neutropenic period  Description: INTERVENTIONS  - Monitor WBC  - Administer growth factors as ordered  - Implement neutropenic guidelines  Outcome: Progressing     Problem: SAFETY ADULT - FALL  Goal: Free from fall injury  Description: INTERVENTIONS:  - Assess pt frequently for physical needs  - Identify cognitive and physical deficits and behaviors that affect risk of falls.   - [place] fall precautions as indicated by assessment.  - Educate pt/family on patient safety including physical limitations  - Instruct pt to call for assistance with activity based on assessment  - Modify environment to reduce risk of injury  - Provide assistive devices as appropriate  - Consider OT/PT consult to assist with strengthening/mobility  - Encourage toileting schedule  Outcome: Progressing     Problem: DISCHARGE PLANNING  Goal: Discharge to home or other facility with appropriate resources  Description: INTERVENTIONS:  - Identify barriers to discharge w/pt and caregiver  - Include patient/family/discharge partner in discharge planning  - Arrange for needed discharge resources and transportation as appropriate  - Identify discharge learning needs (meds, wound care, etc)  - Arrange for interpreters to assist at discharge as needed  - Consider post-discharge preferences of patient/family/discharge partner  - Complete POLST form as appropriate  - Assess patient's ability to be responsible for managing their own health  - Refer to Case Management Department for coordinating discharge planning if the patient needs post-hospital services based on physician/LIP order or complex needs related to functional status, cognitive ability or social support system  Outcome: Progressing     Problem: Patient/Family Goals  Goal: Patient/Family Long Term Goal  Description: Patient's Long Term Goal: ***    Interventions:  - ***  - See additional Care Plan goals for specific interventions  Outcome: Progressing  Goal: Patient/Family Short Term Goal  Description: Patient's Short Term Goal: ***    Interventions:   - ***  - See additional Care Plan goals for specific interventions  Outcome: Progressing     Problem: SKIN/TISSUE INTEGRITY - ADULT  Goal: Incision(s), wounds(s) or drain site(s) healing without S/S of infection  Description: INTERVENTIONS:  - Assess and document risk factors for pressure ulcer development  - Assess and document skin integrity  - Assess and document dressing/incision, wound bed, drain sites and surrounding tissue  - Implement wound care per orders  - Initiate isolation precautions as appropriate  - Initiate Pressure Ulcer prevention bundle as indicated  Outcome: Progressing     Problem: MUSCULOSKELETAL - ADULT  Goal: Return mobility to safest level of function  Description: INTERVENTIONS:  - Assess patient stability and activity tolerance for standing, transferring and ambulating w/ or w/o assistive devices  - Assist with transfers and ambulation using safe patient handling equipment as needed  - Ensure adequate protection for wounds/incisions during mobilization  - Obtain PT/OT consults as needed  - Advance activity as appropriate  - Communicate ordered activity level and limitations with patient/family  Outcome: Progressing  Goal: Maintain proper alignment of affected body part  Description: INTERVENTIONS:  - Support and protect limb and body alignment per provider's orders  - Instruct and reinforce with patient and family use of appropriate assistive device and precautions (e.g. spinal or hip dislocation precautions)  Outcome: Progressing     Problem: Impaired Functional Mobility  Goal: Achieve highest/safest level of mobility/gait  Description: Interventions:  - Assess patient's functional ability and stability  - Promote increasing activity/tolerance for mobility and gait  - Educate and engage patient/family in tolerated activity level and precautions  {Additional Mobility Interventions:[id number]:::0}  Outcome: Progressing     Problem: Impaired Activities of Daily Living  Goal: Achieve highest/safest level of independence in self care  Description: Interventions:  - Assess ability and encourage patient to participate in ADLs to maximize function  - Promote sitting position while performing ADLs such as feeding, grooming, and bathing  - Educate and encourage patient/family in tolerated functional activity level and precautions during self-care  {Additional ADL Interventions:[id number]:::0}  Outcome: Progressing   Tracie Álvarez rested well, order for bedrest during night bed raised to 30 degrees per MD order, patient npo, gave dilaudid for pain need swallow eval  with speech therapy today.

## 2022-12-22 NOTE — PLAN OF CARE
Sundar Mercado is from home. Pod 1 lumbar sx with Dr. Steph Walker wnl-sciatic pain to RLE. Alert and oriented x 4. Pt/ot eval  completed today-oob with 2 assists and rolling walker-generalized weakness. Spinal and fall precautions in place. BSSE today-recommend nectar thick liquid-no straw/general diet-tolerating without any signs of aspiration. Urinary retention present at the start of this RN's shift. Flomax started and PVR monitored. Oxy ir/robaxin for pain control. Sx miui-uig-aknezeyv-no s/s infection. Therapy recommending rehab at this time. SW to f/u regarding choice of facility and ins auth.   Problem: Patient Centered Care  Goal: Patient preferences are identified and integrated in the patient's plan of care  Description: Interventions:  - What would you like us to know as we care for you?     - Provide timely, complete, and accurate information to patient/family  - Incorporate patient and family knowledge, values, beliefs, and cultural backgrounds into the planning and delivery of care  - Encourage patient/family to participate in care and decision-making at the level they choose  - Honor patient and family perspectives and choices  Outcome: Progressing     Problem: PAIN - ADULT  Goal: Verbalizes/displays adequate comfort level or patient's stated pain goal  Description: INTERVENTIONS:  - Encourage pt to monitor pain and request assistance  - Assess pain using appropriate pain scale  - Administer analgesics based on type and severity of pain and evaluate response  - Implement non-pharmacological measures as appropriate and evaluate response  - Consider cultural and social influences on pain and pain management  - Manage/alleviate anxiety  - Utilize distraction and/or relaxation techniques  - Monitor for opioid side effects  - Notify MD/LIP if interventions unsuccessful or patient reports new pain  - Anticipate increased pain with activity and pre-medicate as appropriate  Outcome: Progressing     Problem: RISK FOR INFECTION - ADULT  Goal: Absence of fever/infection during anticipated neutropenic period  Description: INTERVENTIONS  - Monitor WBC  - Administer growth factors as ordered  - Implement neutropenic guidelines  Outcome: Progressing     Problem: SAFETY ADULT - FALL  Goal: Free from fall injury  Description: INTERVENTIONS:  - Assess pt frequently for physical needs  - Identify cognitive and physical deficits and behaviors that affect risk of falls.   - [place] fall precautions as indicated by assessment.  - Educate pt/family on patient safety including physical limitations  - Instruct pt to call for assistance with activity based on assessment  - Modify environment to reduce risk of injury  - Provide assistive devices as appropriate  - Consider OT/PT consult to assist with strengthening/mobility  - Encourage toileting schedule  Outcome: Progressing     Problem: DISCHARGE PLANNING  Goal: Discharge to home or other facility with appropriate resources  Description: INTERVENTIONS:  - Identify barriers to discharge w/pt and caregiver  - Include patient/family/discharge partner in discharge planning  - Arrange for needed discharge resources and transportation as appropriate  - Identify discharge learning needs (meds, wound care, etc)  - Arrange for interpreters to assist at discharge as needed  - Consider post-discharge preferences of patient/family/discharge partner  - Complete POLST form as appropriate  - Assess patient's ability to be responsible for managing their own health  - Refer to Case Management Department for coordinating discharge planning if the patient needs post-hospital services based on physician/LIP order or complex needs related to functional status, cognitive ability or social support system  Outcome: Progressing     Problem: Patient/Family Goals  Goal: Patient/Family Long Term Goal  Description: Patient's Long Term Goal: return to baseline adls    Interventions:  - pt/ot  Pain control  Bailey hernández planning  - See additional Care Plan goals for specific interventions  Outcome: Progressing  Goal: Patient/Family Short Term Goal  Description: Patient's Short Term Goal: oob for all meals    Interventions:   - mobilize  Pain control  - See additional Care Plan goals for specific interventions  Outcome: Progressing     Problem: SKIN/TISSUE INTEGRITY - ADULT  Goal: Incision(s), wounds(s) or drain site(s) healing without S/S of infection  Description: INTERVENTIONS:  - Assess and document risk factors for pressure ulcer development  - Assess and document skin integrity  - Assess and document dressing/incision, wound bed, drain sites and surrounding tissue  - Implement wound care per orders  - Initiate isolation precautions as appropriate  - Initiate Pressure Ulcer prevention bundle as indicated  Outcome: Progressing     Problem: MUSCULOSKELETAL - ADULT  Goal: Return mobility to safest level of function  Description: INTERVENTIONS:  - Assess patient stability and activity tolerance for standing, transferring and ambulating w/ or w/o assistive devices  - Assist with transfers and ambulation using safe patient handling equipment as needed  - Ensure adequate protection for wounds/incisions during mobilization  - Obtain PT/OT consults as needed  - Advance activity as appropriate  - Communicate ordered activity level and limitations with patient/family  Outcome: Progressing  Goal: Maintain proper alignment of affected body part  Description: INTERVENTIONS:  - Support and protect limb and body alignment per provider's orders  - Instruct and reinforce with patient and family use of appropriate assistive device and precautions (e.g. spinal or hip dislocation precautions)  Outcome: Progressing     Problem: Impaired Functional Mobility  Goal: Achieve highest/safest level of mobility/gait  Description: Interventions:  - Assess patient's functional ability and stability  - Promote increasing activity/tolerance for mobility and gait  - Educate and engage patient/family in tolerated activity level and precautions    Outcome: Progressing     Problem: Impaired Activities of Daily Living  Goal: Achieve highest/safest level of independence in self care  Description: Interventions:  - Assess ability and encourage patient to participate in ADLs to maximize function  - Promote sitting position while performing ADLs such as feeding, grooming, and bathing  - Educate and encourage patient/family in tolerated functional activity level and precautions during self-care    Outcome: Progressing

## 2022-12-22 NOTE — CM/SW NOTE
Department  notified of request for [facility] referrals started. Assigned CM/SW to follow up with pt/family on further discharge planning.      Tanisha Dean   December 22, 2022   13:27

## 2022-12-22 NOTE — TELEPHONE ENCOUNTER
Received feedback from TAMMY Lara. Called pharmacy and spoke with pharmacy staff. Provided information that oxycodone order is to be DC'd by providers. Pharmacy staff acknowledged and thanked Nursing for the return call.

## 2022-12-23 ENCOUNTER — APPOINTMENT (OUTPATIENT)
Dept: GENERAL RADIOLOGY | Facility: HOSPITAL | Age: 82
DRG: 519 | End: 2022-12-23
Attending: HOSPITALIST
Payer: MEDICARE

## 2022-12-23 PROCEDURE — 74230 X-RAY XM SWLNG FUNCJ C+: CPT | Performed by: HOSPITALIST

## 2022-12-23 PROCEDURE — 99233 SBSQ HOSP IP/OBS HIGH 50: CPT | Performed by: HOSPITALIST

## 2022-12-23 RX ORDER — HYDROCODONE BITARTRATE AND ACETAMINOPHEN 5; 325 MG/1; MG/1
1 TABLET ORAL EVERY 4 HOURS PRN
Status: DISCONTINUED | OUTPATIENT
Start: 2022-12-23 | End: 2022-12-26

## 2022-12-23 RX ORDER — PREDNISONE 1 MG/1
5 TABLET ORAL
Status: DISCONTINUED | OUTPATIENT
Start: 2022-12-23 | End: 2022-12-26

## 2022-12-23 NOTE — PLAN OF CARE
Pt is A&Ox4. Remote tele. Scds for dvt prophylaxis. Regular diet & nectar thick liquids. Voiding via primofit. PRN Robaxin & Oxycodone for pain. Up by 1 asssit with a walker. Call light within reach, frequent monitoring. Spine precautions in place & safety measures.      Problem: Patient Centered Care  Goal: Patient preferences are identified and integrated in the patient's plan of care  Description: Interventions:  - What would you like us to know as we care for you?   - Provide timely, complete, and accurate information to patient/family  - Incorporate patient and family knowledge, values, beliefs, and cultural backgrounds into the planning and delivery of care  - Encourage patient/family to participate in care and decision-making at the level they choose  - Honor patient and family perspectives and choices  Outcome: Progressing     Problem: PAIN - ADULT  Goal: Verbalizes/displays adequate comfort level or patient's stated pain goal  Description: INTERVENTIONS:  - Encourage pt to monitor pain and request assistance  - Assess pain using appropriate pain scale  - Administer analgesics based on type and severity of pain and evaluate response  - Implement non-pharmacological measures as appropriate and evaluate response  - Consider cultural and social influences on pain and pain management  - Manage/alleviate anxiety  - Utilize distraction and/or relaxation techniques  - Monitor for opioid side effects  - Notify MD/LIP if interventions unsuccessful or patient reports new pain  - Anticipate increased pain with activity and pre-medicate as appropriate  Outcome: Progressing     Problem: RISK FOR INFECTION - ADULT  Goal: Absence of fever/infection during anticipated neutropenic period  Description: INTERVENTIONS  - Monitor WBC  - Administer growth factors as ordered  - Implement neutropenic guidelines  Outcome: Progressing     Problem: SAFETY ADULT - FALL  Goal: Free from fall injury  Description: INTERVENTIONS:  - Assess pt frequently for physical needs  - Identify cognitive and physical deficits and behaviors that affect risk of falls.   - [place] fall precautions as indicated by assessment.  - Educate pt/family on patient safety including physical limitations  - Instruct pt to call for assistance with activity based on assessment  - Modify environment to reduce risk of injury  - Provide assistive devices as appropriate  - Consider OT/PT consult to assist with strengthening/mobility  - Encourage toileting schedule  Outcome: Progressing     Problem: DISCHARGE PLANNING  Goal: Discharge to home or other facility with appropriate resources  Description: INTERVENTIONS:  - Identify barriers to discharge w/pt and caregiver  - Include patient/family/discharge partner in discharge planning  - Arrange for needed discharge resources and transportation as appropriate  - Identify discharge learning needs (meds, wound care, etc)  - Arrange for interpreters to assist at discharge as needed  - Consider post-discharge preferences of patient/family/discharge partner  - Complete POLST form as appropriate  - Assess patient's ability to be responsible for managing their own health  - Refer to Case Management Department for coordinating discharge planning if the patient needs post-hospital services based on physician/LIP order or complex needs related to functional status, cognitive ability or social support system  Outcome: Progressing     Problem: Patient/Family Goals  Goal: Patient/Family Long Term Goal  Description: Patient's Long Term Goal:     Interventions:  -  - See additional Care Plan goals for specific interventions  Outcome: Progressing  Goal: Patient/Family Short Term Goal  Description: Patient's Short Term Goal:     Interventions:   -   - See additional Care Plan goals for specific interventions  Outcome: Progressing     Problem: SKIN/TISSUE INTEGRITY - ADULT  Goal: Incision(s), wounds(s) or drain site(s) healing without S/S of infection  Description: INTERVENTIONS:  - Assess and document risk factors for pressure ulcer development  - Assess and document skin integrity  - Assess and document dressing/incision, wound bed, drain sites and surrounding tissue  - Implement wound care per orders  - Initiate isolation precautions as appropriate  - Initiate Pressure Ulcer prevention bundle as indicated  Outcome: Progressing     Problem: MUSCULOSKELETAL - ADULT  Goal: Return mobility to safest level of function  Description: INTERVENTIONS:  - Assess patient stability and activity tolerance for standing, transferring and ambulating w/ or w/o assistive devices  - Assist with transfers and ambulation using safe patient handling equipment as needed  - Ensure adequate protection for wounds/incisions during mobilization  - Obtain PT/OT consults as needed  - Advance activity as appropriate  - Communicate ordered activity level and limitations with patient/family  Outcome: Progressing  Goal: Maintain proper alignment of affected body part  Description: INTERVENTIONS:  - Support and protect limb and body alignment per provider's orders  - Instruct and reinforce with patient and family use of appropriate assistive device and precautions (e.g. spinal or hip dislocation precautions)  Outcome: Progressing     Problem: Impaired Functional Mobility  Goal: Achieve highest/safest level of mobility/gait  Description: Interventions:  - Assess patient's functional ability and stability  - Promote increasing activity/tolerance for mobility and gait  - Educate and engage patient/family in tolerated activity level and precautions  {Additional Mobility Interventions  Outcome: Progressing     Problem: Impaired Activities of Daily Living  Goal: Achieve highest/safest level of independence in self care  Description: Interventions:  - Assess ability and encourage patient to participate in ADLs to maximize function  - Promote sitting position while performing ADLs such as feeding, grooming, and bathing  - Educate and encourage patient/family in tolerated functional activity level and precautions during self-care  {Additional ADL Interventions:  Outcome: Progressing

## 2022-12-23 NOTE — SLP NOTE
SPEECH DAILY NOTE - INPATIENT    ASSESSMENT & PLAN   ASSESSMENT  PPE REQUIRED. THIS THERAPIST WORE GLOVES AND MASK FOR DURATION OF EVALUATION. HANDS WASHED UPON ENTRANCE/EXIT. SLP f/u for ongoing meal assessment per recommendations of regular/mildly thick liquids per BSE. RN reports pt tolerates diet and medication well with no overt clinical s/s aspiration. Pt denies any swallowing challenges. Per wife, pt throat clearing more frequently. Pt positioned upright in bedside chair. Pt afebrile, tolerating room air with oxygen status 100% prior to the start of oral trials. SLP reviewed aspiration precautions and safe swallowing compensatory strategies with the patient. Safe swallow guidelines remain written on the white board in purple. Patient and family v/u. Provided no assistance, pt tolerates regular and mildly thick liquids via cup with throat clearing after all trials. Pt presented with trials of thin liquids via cup and exhibiting throat clearing as well. No other clinical signs of aspiration (e.g. immediate/delayed cough, wet vocal quality, increased O2 effort) observed across all trials. Oxygen status remained stable t/o the entire session. Recommend remain on current diet and VFSS for objective assessment of ororpharyngeal swallow function. RN and MD alerted with results and recommendations. MOST RECENT CXR 12/17  CONCLUSION: No edema or bacterial pneumonia      Diet Recommendations - Solids: Regular (softer food choices)  Diet Recommendations - Liquids: Nectar thick liquids/ Mildly thick    Compensatory Strategies Recommended: No straws; Alternate consistencies;Small bites and sips;Multiple swallows  Aspiration Precautions: Upright position; Slow rate;Small bites and sips; No straw  Medication Administration Recommendations: No restrictions    Patient Experiencing Pain: No                Discharge Recommendations  Discharge Recommendations/Plan: Undetermined    Treatment Plan  Treatment Plan/Recommendations: Dysphagia therapy; Aspiration precautions    Interdisciplinary Communication: Discussed with RN            GOALS  Goal #1 The patient will tolerate a Regular diet consistency and MILD THICK liquids without overt signs or symptoms of aspiration with 85 % accuracy over 1-2 session(s). Throat clearing observed after regular and mildly thick liquids. Rx VFSS for objective assessment of oropharyngeal swallow function. In Progress   Goal #2 The patient/family/caregiver will demonstrate understanding and implementation of aspiration precautions and swallow strategies independently over 1-2 session(s). Pt and pt's wife v/u of aspiration precautions and swallow strategies over 1st session  In Progress   Goal #3 The patient will tolerate trial upgrade of regular diet consistency and THIN liquids without overt signs or symptoms of aspiration with 100 % accuracy over 1-2 session(s) to assess for diet upgrade or need for objective VFSS. Throat clearing observed with thin liquids.  Rx VFSS for objective assessment of swallow function  In Progress     FOLLOW UP  Follow Up Needed (Documentation Required): Yes  SLP Follow-up Date: 12/23/22       Session: 1    If you have any questions, please contact ADILSON Biswas    [facility]. [id number] [facility] ([place]  Speech Language Pathologist  Phone Number [phone]

## 2022-12-23 NOTE — CM/SW NOTE
SNF list sent to [facility] via tube station, will f/u with patient and wife regarding choice. 12pm: Met with patient and wife regarding discharge plan. They prefer returning home w/ [address] pending team recommendations. Encouraged wife to review SNF list.    345pm: Per MD request, met with patient's wife to discuss SNF admission from home. Wife aware she can contact a facility off list of options to pursue admission if unable to manage from home. Patient may need an updated COVID test, wife aware. Updated F2F to include ST orders for home health, will f/u with [facility].     Brian Hernandez, [address]

## 2022-12-23 NOTE — PHYSICAL THERAPY NOTE
PHYSICAL THERAPY TREATMENT NOTE - INPATIENT     Room Number: 429/429-A       Presenting Problem: L1,L2 rt. side laminectomy    Problem List  Principal Problem:    Intractable back pain  Active Problems:    Intractable low back pain    Lumbosacral radiculopathy at L2      PHYSICAL THERAPY ASSESSMENT   Chart reviewed. RN  approved participation in physical therapy. PPE worn by therapist: mask, gloves and goggles. Patient was wearing a mask during session. Patient presented in bed with 6/10 pain. Patient with good  progress towards goals during this session. Education provided on Spine precautions, Physical therapy plan of care and physiological benefits of out of bed mobility. Patient with good carryover. Bed mobility: Mod assist  Transfers: Mod assist  Gait Assistance: Contact guard assist  Distance (ft): 2 x 30  Assistive Device: Rolling walker  Pattern: Shuffle          Pt seen daily. Mod a for bed mobility and transfer. EOB sitting balance activity with emphasis on core stabilization. Spinal precautions reviewed. Pt recall 2/3 spinal precautions. Family present;family education;all questions and concerns addressed. Pt amb 2 x 30 ft with RW and CGA;provided cuing for gait pattern as well as for postural awareness. Ther ex. Patient was left in bedside chair at end of session with all needs in reach. The patient's Approx Degree of Impairment: 46.58% has been calculated based on documentation in the [facility] '6 clicks' Inpatient Basic Mobility Short Form. Research supports that patients with this level of impairment may benefit from Subacute Rehab. RN aware of patient status post session. DISCHARGE RECOMMENDATIONS  PT Discharge Recommendations: Sub-acute rehabilitation     PLAN  PT Treatment Plan: Bed mobility; Endurance; Patient education;Gait training    SUBJECTIVE  Pt reports being ready for PT RX    OBJECTIVE  Precautions: Spine    WEIGHT BEARING RESTRICTION  Weight Bearing Restriction: None                PAIN ASSESSMENT   Ratin  Location: Rt.leg pain  Management Techniques: Activity promotion; Body mechanics;Repositioning    BALANCE                                                                                                                       Static Sitting: Normal  Dynamic Sitting: Good           Static Standing: Fair -  Dynamic Standing: Poor +    ACTIVITY TOLERANCE                         O2 WALK       AM-PAC '6-Clicks' INPATIENT SHORT FORM - BASIC MOBILITY  How much difficulty does the patient currently have. .. Patient Difficulty: Turning over in bed (including adjusting bedclothes, sheets and blankets)?: A Little   Patient Difficulty: Sitting down on and standing up from a chair with arms (e.g., wheelchair, bedside commode, etc.): A Little   Patient Difficulty: Moving from lying on back to sitting on the side of the bed?: A Little   How much help from another person does the patient currently need. .. Help from Another: Moving to and from a bed to a chair (including a wheelchair)?: A Little   Help from Another: Need to walk in hospital room?: A Little   Help from Another: Climbing 3-5 steps with a railing?: A Little     AM-PAC Score:  Raw Score: 18   Approx Degree of Impairment: 46.58%   Standardized Score (AM-PAC Scale): 43.63   CMS Modifier (G-Code): CK      Additional information:     THERAPEUTIC EXERCISES  Lower Extremity Ankle pumps  Glut sets  Quad sets     Position Supine       Patient End of Session: Up in chair;Call light within reach;RN aware of session/findings;Bracing education provided per handout; All patient questions and concerns addressed    CURRENT GOALS     Patient Goal Patient's self-stated goal is:home with independent  With FWW   Goal #1 Patient is able to demonstrate supine - sit EOB @ level: modified independent     Goal #1   Current Status Mod a    Goal #2 Patient is able to demonstrate transfers Sit to/from Stand at assistance level: modified independent with rollator     Goal #2  Current Status Mod a   Goal #3 Patient is able to ambulate 300 feet with assist device: rollator at assistance level: modified independent   Goal #3   Current Status Pt amb 2 x 30 ft with RW and Min a   Goal #4    Goal #4   Current Status    Goal #5 Patient to demonstrate independence with home activity/exercise instructions provided to patient in preparation for discharge.    Goal #5   Current Status In progress   Goal #6    Goal #6  Current Status

## 2022-12-24 LAB
ALBUMIN SERPL-MCNC: 2.5 G/DL (ref 3.4–5)
ANION GAP SERPL CALC-SCNC: 6 MMOL/L (ref 0–18)
BASOPHILS # BLD AUTO: 0.02 X10(3) UL (ref 0–0.2)
BASOPHILS NFR BLD AUTO: 0.1 %
BUN BLD-MCNC: 41 MG/DL (ref 7–18)
BUN/CREAT SERPL: 36 (ref 10–20)
CALCIUM BLD-MCNC: 8.3 MG/DL (ref 8.5–10.1)
CHLORIDE SERPL-SCNC: 108 MMOL/L (ref 98–112)
CO2 SERPL-SCNC: 24 MMOL/L (ref 21–32)
CREAT BLD-MCNC: 1.14 MG/DL
DEPRECATED RDW RBC AUTO: 48.4 FL (ref 35.1–46.3)
EOSINOPHIL # BLD AUTO: 0 X10(3) UL (ref 0–0.7)
EOSINOPHIL NFR BLD AUTO: 0 %
ERYTHROCYTE [DISTWIDTH] IN BLOOD BY AUTOMATED COUNT: 13.8 % (ref 11–15)
GFR SERPLBLD BASED ON 1.73 SQ M-ARVRAT: 65 ML/MIN/1.73M2 (ref 60–?)
GLUCOSE BLD-MCNC: 94 MG/DL (ref 70–99)
HCT VFR BLD AUTO: 28.3 %
HGB BLD-MCNC: 9.5 G/DL
IMM GRANULOCYTES # BLD AUTO: 0.19 X10(3) UL (ref 0–1)
IMM GRANULOCYTES NFR BLD: 1.4 %
LYMPHOCYTES # BLD AUTO: 0.3 X10(3) UL (ref 1–4)
LYMPHOCYTES NFR BLD AUTO: 2.2 %
MAGNESIUM SERPL-MCNC: 2.4 MG/DL (ref 1.6–2.6)
MCH RBC QN AUTO: 32 PG (ref 26–34)
MCHC RBC AUTO-ENTMCNC: 33.6 G/DL (ref 31–37)
MCV RBC AUTO: 95.3 FL
MONOCYTES # BLD AUTO: 0.81 X10(3) UL (ref 0.1–1)
MONOCYTES NFR BLD AUTO: 6 %
NEUTROPHILS # BLD AUTO: 12.29 X10 (3) UL (ref 1.5–7.7)
NEUTROPHILS # BLD AUTO: 12.29 X10(3) UL (ref 1.5–7.7)
NEUTROPHILS NFR BLD AUTO: 90.3 %
OSMOLALITY SERPL CALC.SUM OF ELEC: 296 MOSM/KG (ref 275–295)
PHOSPHATE SERPL-MCNC: 2.2 MG/DL (ref 2.5–4.9)
PLATELET # BLD AUTO: 100 10(3)UL (ref 150–450)
POTASSIUM SERPL-SCNC: 3.9 MMOL/L (ref 3.5–5.1)
RBC # BLD AUTO: 2.97 X10(6)UL
SODIUM SERPL-SCNC: 138 MMOL/L (ref 136–145)
WBC # BLD AUTO: 13.6 X10(3) UL (ref 4–11)

## 2022-12-24 PROCEDURE — 99232 SBSQ HOSP IP/OBS MODERATE 35: CPT | Performed by: HOSPITALIST

## 2022-12-24 RX ORDER — AMLODIPINE BESYLATE 5 MG/1
5 TABLET ORAL DAILY
Status: DISCONTINUED | OUTPATIENT
Start: 2022-12-24 | End: 2022-12-25

## 2022-12-24 NOTE — PLAN OF CARE
Alert and oriented x 4. Up with walker and 1 assist. Voiding freely/BM x2 today. Teds for DVT prophylaxis. Norco as needed for pain. Vital signs monitored. No acute changes noted throughout shift. Tolerating diet/ nectar thick liquids. Failed swallow study today. Frequent ambulation encouraged. Cough and deep breathe in addition to use incentive spirometer. Fall precautions in place- bed alarm on, bed in lowest position, call light and personal belongings within reach, non-skid socks in place. Frequent rounding by nursing staff. Plan is home with HH.       Problem: Patient Centered Care  Goal: Patient preferences are identified and integrated in the patient's plan of care  Description: Interventions:  - What would you like us to know as we care for you?  - Provide timely, complete, and accurate information to patient/family  - Incorporate patient and family knowledge, values, beliefs, and cultural backgrounds into the planning and delivery of care  - Encourage patient/family to participate in care and decision-making at the level they choose  - Honor patient and family perspectives and choices  Outcome: Progressing     Problem: PAIN - ADULT  Goal: Verbalizes/displays adequate comfort level or patient's stated pain goal  Description: INTERVENTIONS:  - Encourage pt to monitor pain and request assistance  - Assess pain using appropriate pain scale  - Administer analgesics based on type and severity of pain and evaluate response  - Implement non-pharmacological measures as appropriate and evaluate response  - Consider cultural and social influences on pain and pain management  - Manage/alleviate anxiety  - Utilize distraction and/or relaxation techniques  - Monitor for opioid side effects  - Notify MD/LIP if interventions unsuccessful or patient reports new pain  - Anticipate increased pain with activity and pre-medicate as appropriate  Outcome: Progressing     Problem: RISK FOR INFECTION - ADULT  Goal: Absence of fever/infection during anticipated neutropenic period  Description: INTERVENTIONS  - Monitor WBC  - Administer growth factors as ordered  - Implement neutropenic guidelines  Outcome: Progressing     Problem: SAFETY ADULT - FALL  Goal: Free from fall injury  Description: INTERVENTIONS:  - Assess pt frequently for physical needs  - Identify cognitive and physical deficits and behaviors that affect risk of falls.   - [place] fall precautions as indicated by assessment.  - Educate pt/family on patient safety including physical limitations  - Instruct pt to call for assistance with activity based on assessment  - Modify environment to reduce risk of injury  - Provide assistive devices as appropriate  - Consider OT/PT consult to assist with strengthening/mobility  - Encourage toileting schedule  Outcome: Progressing     Problem: DISCHARGE PLANNING  Goal: Discharge to home or other facility with appropriate resources  Description: INTERVENTIONS:  - Identify barriers to discharge w/pt and caregiver  - Include patient/family/discharge partner in discharge planning  - Arrange for needed discharge resources and transportation as appropriate  - Identify discharge learning needs (meds, wound care, etc)  - Arrange for interpreters to assist at discharge as needed  - Consider post-discharge preferences of patient/family/discharge partner  - Complete POLST form as appropriate  - Assess patient's ability to be responsible for managing their own health  - Refer to Case Management Department for coordinating discharge planning if the patient needs post-hospital services based on physician/LIP order or complex needs related to functional status, cognitive ability or social support system  Outcome: Progressing        Problem: SKIN/TISSUE INTEGRITY - ADULT  Goal: Incision(s), wounds(s) or drain site(s) healing without S/S of infection  Description: INTERVENTIONS:  - Assess and document risk factors for pressure ulcer development  - Assess and document skin integrity  - Assess and document dressing/incision, wound bed, drain sites and surrounding tissue  - Implement wound care per orders  - Initiate isolation precautions as appropriate  - Initiate Pressure Ulcer prevention bundle as indicated  Outcome: Progressing     Problem: MUSCULOSKELETAL - ADULT  Goal: Return mobility to safest level of function  Description: INTERVENTIONS:  - Assess patient stability and activity tolerance for standing, transferring and ambulating w/ or w/o assistive devices  - Assist with transfers and ambulation using safe patient handling equipment as needed  - Ensure adequate protection for wounds/incisions during mobilization  - Obtain PT/OT consults as needed  - Advance activity as appropriate  - Communicate ordered activity level and limitations with patient/family  Outcome: Progressing  Goal: Maintain proper alignment of affected body part  Description: INTERVENTIONS:  - Support and protect limb and body alignment per provider's orders  - Instruct and reinforce with patient and family use of appropriate assistive device and precautions (e.g. spinal or hip dislocation precautions)  Outcome: Progressing     Problem: Impaired Functional Mobility  Goal: Achieve highest/safest level of mobility/gait  Description: Interventions:  - Assess patient's functional ability and stability  - Promote increasing activity/tolerance for mobility and gait  - Educate and engage patient/family in tolerated activity level and precautions  Outcome: Progressing

## 2022-12-24 NOTE — PLAN OF CARE
Pt is A&Ox4. RA. Tolerating regular diet and nectar thick liquids w/no straw. Voiding freely. PRN Norco and Robaxin for pain. Up by 1 assist with a walker/rolling chair. Spine and safety precautions in place. Call light within reach, frequent monitoring. Plan: Home with wife when medically cleared.      Problem: Patient Centered Care  Goal: Patient preferences are identified and integrated in the patient's plan of care  Description: Interventions:  - What would you like us to know as we care for you?   - Provide timely, complete, and accurate information to patient/family  - Incorporate patient and family knowledge, values, beliefs, and cultural backgrounds into the planning and delivery of care  - Encourage patient/family to participate in care and decision-making at the level they choose  - Honor patient and family perspectives and choices  Outcome: Progressing     Problem: PAIN - ADULT  Goal: Verbalizes/displays adequate comfort level or patient's stated pain goal  Description: INTERVENTIONS:  - Encourage pt to monitor pain and request assistance  - Assess pain using appropriate pain scale  - Administer analgesics based on type and severity of pain and evaluate response  - Implement non-pharmacological measures as appropriate and evaluate response  - Consider cultural and social influences on pain and pain management  - Manage/alleviate anxiety  - Utilize distraction and/or relaxation techniques  - Monitor for opioid side effects  - Notify MD/LIP if interventions unsuccessful or patient reports new pain  - Anticipate increased pain with activity and pre-medicate as appropriate  Outcome: Progressing     Problem: RISK FOR INFECTION - ADULT  Goal: Absence of fever/infection during anticipated neutropenic period  Description: INTERVENTIONS  - Monitor WBC  - Administer growth factors as ordered  - Implement neutropenic guidelines  Outcome: Progressing     Problem: SAFETY ADULT - FALL  Goal: Free from fall injury  Description: INTERVENTIONS:  - Assess pt frequently for physical needs  - Identify cognitive and physical deficits and behaviors that affect risk of falls.   - [place] fall precautions as indicated by assessment.  - Educate pt/family on patient safety including physical limitations  - Instruct pt to call for assistance with activity based on assessment  - Modify environment to reduce risk of injury  - Provide assistive devices as appropriate  - Consider OT/PT consult to assist with strengthening/mobility  - Encourage toileting schedule  Outcome: Progressing     Problem: DISCHARGE PLANNING  Goal: Discharge to home or other facility with appropriate resources  Description: INTERVENTIONS:  - Identify barriers to discharge w/pt and caregiver  - Include patient/family/discharge partner in discharge planning  - Arrange for needed discharge resources and transportation as appropriate  - Identify discharge learning needs (meds, wound care, etc)  - Arrange for interpreters to assist at discharge as needed  - Consider post-discharge preferences of patient/family/discharge partner  - Complete POLST form as appropriate  - Assess patient's ability to be responsible for managing their own health  - Refer to Case Management Department for coordinating discharge planning if the patient needs post-hospital services based on physician/LIP order or complex needs related to functional status, cognitive ability or social support system  Outcome: Progressing     Problem: Patient/Family Goals  Goal: Patient/Family Long Term Goal  Description: Patient's Long Term Goal:     Interventions:  -   - See additional Care Plan goals for specific interventions  Outcome: Progressing  Goal: Patient/Family Short Term Goal  Description: Patient's Short Term Goal:     Interventions:   -   - See additional Care Plan goals for specific interventions  Outcome: Progressing     Problem: SKIN/TISSUE INTEGRITY - ADULT  Goal: Incision(s), wounds(s) or drain site(s) healing without S/S of infection  Description: INTERVENTIONS:  - Assess and document risk factors for pressure ulcer development  - Assess and document skin integrity  - Assess and document dressing/incision, wound bed, drain sites and surrounding tissue  - Implement wound care per orders  - Initiate isolation precautions as appropriate  - Initiate Pressure Ulcer prevention bundle as indicated  Outcome: Progressing     Problem: MUSCULOSKELETAL - ADULT  Goal: Return mobility to safest level of function  Description: INTERVENTIONS:  - Assess patient stability and activity tolerance for standing, transferring and ambulating w/ or w/o assistive devices  - Assist with transfers and ambulation using safe patient handling equipment as needed  - Ensure adequate protection for wounds/incisions during mobilization  - Obtain PT/OT consults as needed  - Advance activity as appropriate  - Communicate ordered activity level and limitations with patient/family  Outcome: Progressing  Goal: Maintain proper alignment of affected body part  Description: INTERVENTIONS:  - Support and protect limb and body alignment per provider's orders  - Instruct and reinforce with patient and family use of appropriate assistive device and precautions (e.g. spinal or hip dislocation precautions)  Outcome: Progressing     Problem: Impaired Functional Mobility  Goal: Achieve highest/safest level of mobility/gait  Description: Interventions:  - Assess patient's functional ability and stability  - Promote increasing activity/tolerance for mobility and gait  - Educate and engage patient/family in tolerated activity level and precautions  {Additional Mobility Interventions:  Outcome: Progressing     Problem: Impaired Activities of Daily Living  Goal: Achieve highest/safest level of independence in self care  Description: Interventions:  - Assess ability and encourage patient to participate in ADLs to maximize function  - Promote sitting position while performing ADLs such as feeding, grooming, and bathing  - Educate and encourage patient/family in tolerated functional activity level and precautions during self-care  {Additional ADL Interventions:  Outcome: Progressing

## 2022-12-24 NOTE — PHYSICAL THERAPY NOTE
PHYSICAL THERAPY TREATMENT NOTE - INPATIENT     Room Number: 429/429-A       Presenting Problem: L1,L2 rt. side laminectomy    Problem List  Principal Problem:    Intractable back pain  Active Problems:    Intractable low back pain    Lumbosacral radiculopathy at L2      PHYSICAL THERAPY ASSESSMENT   Chart reviewed. RN Radha Schulz approved participation in physical therapy. PPE worn by therapist: mask, gloves and gown. Patient was wearing a mask during session. Patient presented in bedside chair with 6/10 pain. Patient with good  progress towards goals during this session. Education provided on Spine precautions, Physical therapy plan of care and physiological benefits of out of bed mobility. Patient with good carryover. Pt is received in the chair with spouse present and was cleared for therapy session. Pt is min/CGA with sit<>stand transfers with the RW. Pt was able to AMB about 79' with the RW CGA. Pt with slow gait and shuffling steps with narrow SHYANN. Pt AMB with B knees bent due to pain and weakness. Pt fatigues very quickly with AMB. Returned pt back to the room and to sitting in the chair with all needs within reach. Discussed with the pt and spouse on RAYNE recommendation at this time due to pt's decreased activity tolerance and weakness. Pt's spouse is able to assist pt but not physically and what pt needs at this time. Per spouse no one else will be able to assist pt at home. Both were agreeable to the RAYNE recommendation. Reported to the RN on the status of the pt. Bed mobility: NT  Transfers: Contact guard assist and Min assist  Gait Assistance: Contact guard assist  Distance (ft): 70'  Assistive Device: Rolling walker  Pattern: Shuffle;R Flexed knee;L Flexed knee          . Patient was left in bedside chair at end of session with all needs in reach.  The patient's Approx Degree of Impairment: 50.57% has been calculated based on documentation in the [facility] '6 clicks' Inpatient Basic Mobility Short Form.  Research supports that patients with this level of impairment may benefit from Subacute Rehab. Pt and spouse wanted home. RN aware of patient status post session. DISCHARGE RECOMMENDATIONS  PT Discharge Recommendations: Sub-acute rehabilitation     PLAN  PT Treatment Plan: Bed mobility; Body mechanics; Coordination; Endurance; Patient education; Family education;Gait training;Strengthening;Transfer training;Balance training    SUBJECTIVE  Pt was agreeable to therapy session. OBJECTIVE  Precautions: Spine    WEIGHT BEARING RESTRICTION  Weight Bearing Restriction: None                PAIN ASSESSMENT   Ratin  Location: back  Management Techniques: Activity promotion; Body mechanics; Relaxation;Repositioning    BALANCE                                                                                                                       Static Sitting: Fair +  Dynamic Sitting: Fair           Static Standing: Fair -  Dynamic Standing: Fair -    ACTIVITY TOLERANCE                         O2 WALK       AM-PAC '6-Clicks' INPATIENT SHORT FORM - BASIC MOBILITY  How much difficulty does the patient currently have. .. Patient Difficulty: Turning over in bed (including adjusting bedclothes, sheets and blankets)?: A Little   Patient Difficulty: Sitting down on and standing up from a chair with arms (e.g., wheelchair, bedside commode, etc.): A Little   Patient Difficulty: Moving from lying on back to sitting on the side of the bed?: A Little   How much help from another person does the patient currently need. ..    Help from Another: Moving to and from a bed to a chair (including a wheelchair)?: A Little   Help from Another: Need to walk in hospital room?: A Little   Help from Another: Climbing 3-5 steps with a railing?: A Lot     AM-PAC Score:  Raw Score: 17   Approx Degree of Impairment: 50.57%   Standardized Score (AM-PAC Scale): 42.13   CMS Modifier (G-Code): CK        Patient End of Session: Up in chair;Needs met;Call light within reach;RN aware of session/findings; All patient questions and concerns addressed; Family present    CURRENT GOALS   Goals to be met by: 2 weeks  Patient Goal Patient's self-stated goal is:home with independent  With FWW   Goal #1 Patient is able to demonstrate supine - sit EOB @ level: modified independent     Goal #1   Current Status NT received in the chair   Goal #2 Patient is able to demonstrate transfers Sit to/from Stand at assistance level: modified independent with rollator     Goal #2  Current Status CGA/min A with the RW   Goal #3 Patient is able to ambulate 300 feet with assist device: rollator at assistance level: modified independent   Goal #3   Current Status 79' with the RW CGA slow gait. Goal #4    Goal #4   Current Status    Goal #5 Patient to demonstrate independence with home activity/exercise instructions provided to patient in preparation for discharge.    Goal #5   Current Status IN PROGRESS   Goal #6    Goal #6  Current Status

## 2022-12-24 NOTE — PLAN OF CARE
Alert and oriented x 4. Post-op day 3. Dressing in place to low back. Up with one and a walker. Voiding freely. SCDs for DVT prophylaxis. Norco as needed for pain. Vital signs monitored. No acute changes noted throughout shift. Tolerating diet. Phosphorus covered per protocol. Frequent ambulation encouraged. Cough and deep breathe in addition to use of incentive spirometer. Fall precautions in place- bed in lowest position, call light and personal belongings within reach, non-skid socks in place. Frequent rounding by nursing staff. Plan is subacute rehab versus home with home health. Problem: Patient Centered Care  Goal: Patient preferences are identified and integrated in the patient's plan of care  Description: Interventions:  - What would you like us to know as we care for you?  From home with wife  - Provide timely, complete, and accurate information to patient/family  - Incorporate patient and family knowledge, values, beliefs, and cultural backgrounds into the planning and delivery of care  - Encourage patient/family to participate in care and decision-making at the level they choose  - Honor patient and family perspectives and choices  Outcome: Progressing     Problem: PAIN - ADULT  Goal: Verbalizes/displays adequate comfort level or patient's stated pain goal  Description: INTERVENTIONS:  - Encourage pt to monitor pain and request assistance  - Assess pain using appropriate pain scale  - Administer analgesics based on type and severity of pain and evaluate response  - Implement non-pharmacological measures as appropriate and evaluate response  - Consider cultural and social influences on pain and pain management  - Manage/alleviate anxiety  - Utilize distraction and/or relaxation techniques  - Monitor for opioid side effects  - Notify MD/LIP if interventions unsuccessful or patient reports new pain  - Anticipate increased pain with activity and pre-medicate as appropriate  Outcome: Progressing Problem: RISK FOR INFECTION - ADULT  Goal: Absence of fever/infection during anticipated neutropenic period  Description: INTERVENTIONS  - Monitor WBC  - Administer growth factors as ordered  - Implement neutropenic guidelines  Outcome: Progressing     Problem: SAFETY ADULT - FALL  Goal: Free from fall injury  Description: INTERVENTIONS:  - Assess pt frequently for physical needs  - Identify cognitive and physical deficits and behaviors that affect risk of falls.   - [place] fall precautions as indicated by assessment.  - Educate pt/family on patient safety including physical limitations  - Instruct pt to call for assistance with activity based on assessment  - Modify environment to reduce risk of injury  - Provide assistive devices as appropriate  - Consider OT/PT consult to assist with strengthening/mobility  - Encourage toileting schedule  Outcome: Progressing     Problem: DISCHARGE PLANNING  Goal: Discharge to home or other facility with appropriate resources  Description: INTERVENTIONS:  - Identify barriers to discharge w/pt and caregiver  - Include patient/family/discharge partner in discharge planning  - Arrange for needed discharge resources and transportation as appropriate  - Identify discharge learning needs (meds, wound care, etc)  - Arrange for interpreters to assist at discharge as needed  - Consider post-discharge preferences of patient/family/discharge partner  - Complete POLST form as appropriate  - Assess patient's ability to be responsible for managing their own health  - Refer to Case Management Department for coordinating discharge planning if the patient needs post-hospital services based on physician/LIP order or complex needs related to functional status, cognitive ability or social support system  Outcome: Progressing     Problem: Patient/Family Goals  Goal: Patient/Family Long Term Goal  Description: Patient's Long Term Goal: go home    Interventions:  -ortho consult  -therapy  -medications  - See additional Care Plan goals for specific interventions  Outcome: Progressing  Goal: Patient/Family Short Term Goal  Description: Patient's Short Term Goal: pain management    Interventions:   - medications  -rest  - See additional Care Plan goals for specific interventions  Outcome: Progressing     Problem: SKIN/TISSUE INTEGRITY - ADULT  Goal: Incision(s), wounds(s) or drain site(s) healing without S/S of infection  Description: INTERVENTIONS:  - Assess and document risk factors for pressure ulcer development  - Assess and document skin integrity  - Assess and document dressing/incision, wound bed, drain sites and surrounding tissue  - Implement wound care per orders  - Initiate isolation precautions as appropriate  - Initiate Pressure Ulcer prevention bundle as indicated  Outcome: Progressing     Problem: MUSCULOSKELETAL - ADULT  Goal: Return mobility to safest level of function  Description: INTERVENTIONS:  - Assess patient stability and activity tolerance for standing, transferring and ambulating w/ or w/o assistive devices  - Assist with transfers and ambulation using safe patient handling equipment as needed  - Ensure adequate protection for wounds/incisions during mobilization  - Obtain PT/OT consults as needed  - Advance activity as appropriate  - Communicate ordered activity level and limitations with patient/family  Outcome: Progressing  Goal: Maintain proper alignment of affected body part  Description: INTERVENTIONS:  - Support and protect limb and body alignment per provider's orders  - Instruct and reinforce with patient and family use of appropriate assistive device and precautions (e.g. spinal or hip dislocation precautions)  Outcome: Progressing     Problem: Impaired Functional Mobility  Goal: Achieve highest/safest level of mobility/gait  Description: Interventions:  - Assess patient's functional ability and stability  - Promote increasing activity/tolerance for mobility and gait  - Educate and engage patient/family in tolerated activity level and precautions  - Recommend use of  RW for transfers and ambulation  Outcome: Progressing     Problem: Impaired Activities of Daily Living  Goal: Achieve highest/safest level of independence in self care  Description: Interventions:  - Assess ability and encourage patient to participate in ADLs to maximize function  - Promote sitting position while performing ADLs such as feeding, grooming, and bathing  - Educate and encourage patient/family in tolerated functional activity level and precautions during self-care  - Provide support under elbow of weak side to prevent shoulder subluxation  Outcome: Progressing

## 2022-12-25 LAB
DEPRECATED RDW RBC AUTO: 50.4 FL (ref 35.1–46.3)
ERYTHROCYTE [DISTWIDTH] IN BLOOD BY AUTOMATED COUNT: 14 % (ref 11–15)
HCT VFR BLD AUTO: 28.3 %
HGB BLD-MCNC: 9.4 G/DL
MCH RBC QN AUTO: 32.5 PG (ref 26–34)
MCHC RBC AUTO-ENTMCNC: 33.2 G/DL (ref 31–37)
MCV RBC AUTO: 97.9 FL
PHOSPHATE SERPL-MCNC: 2.9 MG/DL (ref 2.5–4.9)
PLATELET # BLD AUTO: 103 10(3)UL (ref 150–450)
RBC # BLD AUTO: 2.89 X10(6)UL
WBC # BLD AUTO: 15 X10(3) UL (ref 4–11)

## 2022-12-25 PROCEDURE — 99232 SBSQ HOSP IP/OBS MODERATE 35: CPT | Performed by: HOSPITALIST

## 2022-12-25 RX ORDER — POLYETHYLENE GLYCOL 3350 17 G/17G
17 POWDER, FOR SOLUTION ORAL DAILY PRN
Refills: 0 | Status: SHIPPED | COMMUNITY
Start: 2022-12-25

## 2022-12-25 RX ORDER — DULOXETIN HYDROCHLORIDE 20 MG/1
20 CAPSULE, DELAYED RELEASE ORAL DAILY
Refills: 0 | Status: SHIPPED | COMMUNITY
Start: 2022-12-26

## 2022-12-25 RX ORDER — AMLODIPINE BESYLATE 10 MG/1
10 TABLET ORAL DAILY
Status: DISCONTINUED | OUTPATIENT
Start: 2022-12-26 | End: 2022-12-26

## 2022-12-25 RX ORDER — HEPARIN SODIUM 5000 [USP'U]/ML
5000 INJECTION, SOLUTION INTRAVENOUS; SUBCUTANEOUS EVERY 12 HOURS SCHEDULED
Status: DISCONTINUED | OUTPATIENT
Start: 2022-12-25 | End: 2022-12-26

## 2022-12-25 RX ORDER — PSEUDOEPHEDRINE HCL 30 MG
100 TABLET ORAL 2 TIMES DAILY
Refills: 0 | Status: SHIPPED | COMMUNITY
Start: 2022-12-25

## 2022-12-25 RX ORDER — TAMSULOSIN HYDROCHLORIDE 0.4 MG/1
0.4 CAPSULE ORAL
Qty: 30 CAPSULE | Refills: 0 | Status: SHIPPED | COMMUNITY
Start: 2022-12-26

## 2022-12-25 RX ORDER — METHOTREXATE 2.5 MG/1
TABLET ORAL
Qty: 60 TABLET | Refills: 3 | Status: SHIPPED | COMMUNITY
Start: 2022-12-25

## 2022-12-25 RX ORDER — HEPARIN SODIUM 5000 [USP'U]/ML
5000 INJECTION, SOLUTION INTRAVENOUS; SUBCUTANEOUS EVERY 12 HOURS SCHEDULED
Refills: 0 | Status: SHIPPED | COMMUNITY
Start: 2022-12-25 | End: 2022-12-26

## 2022-12-25 RX ORDER — ACETAMINOPHEN 500 MG
500 TABLET ORAL EVERY 6 HOURS PRN
Refills: 0 | Status: SHIPPED | COMMUNITY
Start: 2022-12-25

## 2022-12-25 RX ORDER — HYDROCODONE BITARTRATE AND ACETAMINOPHEN 5; 325 MG/1; MG/1
1 TABLET ORAL EVERY 4 HOURS PRN
Qty: 30 TABLET | Refills: 0 | Status: SHIPPED | OUTPATIENT
Start: 2022-12-25

## 2022-12-25 NOTE — CM/SW NOTE
RN requested for SW to call spouse re DC planning. SW called and left VM for wife. RN aware. Per PT yesterday 12/24:  Discussed with the pt and spouse on [facility] recommendation at this time due to pt's decreased activity tolerance and weakness. Pt's spouse is able to assist pt but not physically and what pt needs at this time. Per spouse no one else will be able to assist pt at home. Both were agreeable to the [facility] recommendation.  on Friday gave pt's spouse a [facility] list to review. @11:45AM  Spouse confirmed with SW regarding plan for DC to [facility]. Spouse requesting [facility]. SW received call from pt's spouse stating [facility] has a room for pt tomorrow 12/26. Spouse states her daughter has been in contact with Maggie in admissions. SW called and left  for Maggie p: [phone]. Sw also sent message to [facility] via [employer]. @2PM  SW reviewed [facility] referral. Sree Capone reports that [facility] should have bed available tomorrow 12/26. PLAN: pending medical course - DC to [facility] (pending confirmation of acceptance/bed)    SW remains available for support and/or discharge planning. Please do not hesitate to call/chat SW if further DC needs arise.      Magaly Terrazas MSW, [place], [place]   Ext [phone]

## 2022-12-25 NOTE — PLAN OF CARE
Problem: Patient Centered Care  Goal: Patient preferences are identified and integrated in the patient's plan of care  Description: Interventions:  - What would you like us to know as we care for you? From home with wife  - Provide timely, complete, and accurate information to patient/family  - Incorporate patient and family knowledge, values, beliefs, and cultural backgrounds into the planning and delivery of care  - Encourage patient/family to participate in care and decision-making at the level they choose  - Honor patient and family perspectives and choices  Outcome: Progressing     Problem: PAIN - ADULT  Goal: Verbalizes/displays adequate comfort level or patient's stated pain goal  Description: INTERVENTIONS:  - Encourage pt to monitor pain and request assistance  - Assess pain using appropriate pain scale  - Administer analgesics based on type and severity of pain and evaluate response  - Implement non-pharmacological measures as appropriate and evaluate response  - Consider cultural and social influences on pain and pain management  - Manage/alleviate anxiety  - Utilize distraction and/or relaxation techniques  - Monitor for opioid side effects  - Notify MD/LIP if interventions unsuccessful or patient reports new pain  - Anticipate increased pain with activity and pre-medicate as appropriate  Outcome: Progressing     Problem: RISK FOR INFECTION - ADULT  Goal: Absence of fever/infection during anticipated neutropenic period  Description: INTERVENTIONS  - Monitor WBC  - Administer growth factors as ordered  - Implement neutropenic guidelines  Outcome: Progressing     Problem: SAFETY ADULT - FALL  Goal: Free from fall injury  Description: INTERVENTIONS:  - Assess pt frequently for physical needs  - Identify cognitive and physical deficits and behaviors that affect risk of falls.   - [place] fall precautions as indicated by assessment.  - Educate pt/family on patient safety including physical limitations  - Instruct pt to call for assistance with activity based on assessment  - Modify environment to reduce risk of injury  - Provide assistive devices as appropriate  - Consider OT/PT consult to assist with strengthening/mobility  - Encourage toileting schedule  Outcome: Progressing     Problem: DISCHARGE PLANNING  Goal: Discharge to home or other facility with appropriate resources  Description: INTERVENTIONS:  - Identify barriers to discharge w/pt and caregiver  - Include patient/family/discharge partner in discharge planning  - Arrange for needed discharge resources and transportation as appropriate  - Identify discharge learning needs (meds, wound care, etc)  - Arrange for interpreters to assist at discharge as needed  - Consider post-discharge preferences of patient/family/discharge partner  - Complete POLST form as appropriate  - Assess patient's ability to be responsible for managing their own health  - Refer to Case Management Department for coordinating discharge planning if the patient needs post-hospital services based on physician/LIP order or complex needs related to functional status, cognitive ability or social support system  Outcome: Progressing     Problem: Patient/Family Goals  Goal: Patient/Family Long Term Goal  Description: Patient's Long Term Goal: go home    Interventions:  -ortho consult  -therapy  -medications  - See additional Care Plan goals for specific interventions  Outcome: Progressing  Goal: Patient/Family Short Term Goal  Description: Patient's Short Term Goal: pain management    Interventions:   - medications  -rest  - See additional Care Plan goals for specific interventions  Outcome: Progressing     Problem: SKIN/TISSUE INTEGRITY - ADULT  Goal: Incision(s), wounds(s) or drain site(s) healing without S/S of infection  Description: INTERVENTIONS:  - Assess and document risk factors for pressure ulcer development  - Assess and document skin integrity  - Assess and document dressing/incision, wound bed, drain sites and surrounding tissue  - Implement wound care per orders  - Initiate isolation precautions as appropriate  - Initiate Pressure Ulcer prevention bundle as indicated  Outcome: Progressing     Problem: MUSCULOSKELETAL - ADULT  Goal: Return mobility to safest level of function  Description: INTERVENTIONS:  - Assess patient stability and activity tolerance for standing, transferring and ambulating w/ or w/o assistive devices  - Assist with transfers and ambulation using safe patient handling equipment as needed  - Ensure adequate protection for wounds/incisions during mobilization  - Obtain PT/OT consults as needed  - Advance activity as appropriate  - Communicate ordered activity level and limitations with patient/family  Outcome: Progressing  Goal: Maintain proper alignment of affected body part  Description: INTERVENTIONS:  - Support and protect limb and body alignment per provider's orders  - Instruct and reinforce with patient and family use of appropriate assistive device and precautions (e.g. spinal or hip dislocation precautions)  Outcome: Progressing     Problem: Impaired Functional Mobility  Goal: Achieve highest/safest level of mobility/gait  Description: Interventions:  - Assess patient's functional ability and stability  - Promote increasing activity/tolerance for mobility and gait  - Educate and engage patient/family in tolerated activity level and precautions  - Recommend use of  RW for transfers and ambulation  Outcome: Progressing     Problem: Impaired Activities of Daily Living  Goal: Achieve highest/safest level of independence in self care  Description: Interventions:  - Assess ability and encourage patient to participate in ADLs to maximize function  - Promote sitting position while performing ADLs such as feeding, grooming, and bathing  - Educate and encourage patient/family in tolerated functional activity level and precautions during self-care    Outcome: [facility] was resting comfortably in bed, family at bedside. He was able to ambulate from bed to the rolling chair x 1 assist. Pt was taking PRN Norco and Robaxin for pain management. Pt maintained on contact isolation for C-Diff, no BM during shift last night. Pt on tele, no calls.  Plan for discharge to rehab when medically stable and pending insurance approval.

## 2022-12-26 ENCOUNTER — MOBILE ENCOUNTER (OUTPATIENT)
Dept: PAIN CLINIC | Facility: HOSPITAL | Age: 82
End: 2022-12-26

## 2022-12-26 VITALS
RESPIRATION RATE: 14 BRPM | SYSTOLIC BLOOD PRESSURE: 152 MMHG | DIASTOLIC BLOOD PRESSURE: 75 MMHG | BODY MASS INDEX: 21.33 KG/M2 | HEART RATE: 66 BPM | HEIGHT: 70 IN | TEMPERATURE: 99 F | WEIGHT: 149 LBS | OXYGEN SATURATION: 100 %

## 2022-12-26 LAB
ALBUMIN SERPL-MCNC: 2.7 G/DL (ref 3.4–5)
ANION GAP SERPL CALC-SCNC: 6 MMOL/L (ref 0–18)
BASOPHILS # BLD AUTO: 0.02 X10(3) UL (ref 0–0.2)
BASOPHILS NFR BLD AUTO: 0.1 %
BUN BLD-MCNC: 43 MG/DL (ref 7–18)
BUN/CREAT SERPL: 39.8 (ref 10–20)
CALCIUM BLD-MCNC: 8.6 MG/DL (ref 8.5–10.1)
CHLORIDE SERPL-SCNC: 108 MMOL/L (ref 98–112)
CO2 SERPL-SCNC: 25 MMOL/L (ref 21–32)
CREAT BLD-MCNC: 1.08 MG/DL
DEPRECATED RDW RBC AUTO: 48.7 FL (ref 35.1–46.3)
EOSINOPHIL # BLD AUTO: 0.02 X10(3) UL (ref 0–0.7)
EOSINOPHIL NFR BLD AUTO: 0.1 %
ERYTHROCYTE [DISTWIDTH] IN BLOOD BY AUTOMATED COUNT: 13.7 % (ref 11–15)
GFR SERPLBLD BASED ON 1.73 SQ M-ARVRAT: 69 ML/MIN/1.73M2 (ref 60–?)
GLUCOSE BLD-MCNC: 109 MG/DL (ref 70–99)
HCT VFR BLD AUTO: 29.6 %
HGB BLD-MCNC: 10 G/DL
IMM GRANULOCYTES # BLD AUTO: 0.27 X10(3) UL (ref 0–1)
IMM GRANULOCYTES NFR BLD: 1.9 %
LYMPHOCYTES # BLD AUTO: 0.27 X10(3) UL (ref 1–4)
LYMPHOCYTES NFR BLD AUTO: 1.9 %
MCH RBC QN AUTO: 32.7 PG (ref 26–34)
MCHC RBC AUTO-ENTMCNC: 33.8 G/DL (ref 31–37)
MCV RBC AUTO: 96.7 FL
MONOCYTES # BLD AUTO: 1.04 X10(3) UL (ref 0.1–1)
MONOCYTES NFR BLD AUTO: 7.2 %
NEUTROPHILS # BLD AUTO: 12.81 X10 (3) UL (ref 1.5–7.7)
NEUTROPHILS # BLD AUTO: 12.81 X10(3) UL (ref 1.5–7.7)
NEUTROPHILS NFR BLD AUTO: 88.8 %
OSMOLALITY SERPL CALC.SUM OF ELEC: 299 MOSM/KG (ref 275–295)
PHOSPHATE SERPL-MCNC: 2.9 MG/DL (ref 2.5–4.9)
PLATELET # BLD AUTO: 97 10(3)UL (ref 150–450)
POTASSIUM SERPL-SCNC: 3.8 MMOL/L (ref 3.5–5.1)
PROCALCITONIN SERPL-MCNC: 0.15 NG/ML (ref ?–0.16)
RBC # BLD AUTO: 3.06 X10(6)UL
SODIUM SERPL-SCNC: 139 MMOL/L (ref 136–145)
WBC # BLD AUTO: 14.4 X10(3) UL (ref 4–11)

## 2022-12-26 PROCEDURE — 99239 HOSP IP/OBS DSCHRG MGMT >30: CPT | Performed by: HOSPITALIST

## 2022-12-26 NOTE — PLAN OF CARE
Problem: Patient Centered Care  Goal: Patient preferences are identified and integrated in the patient's plan of care  Description: Interventions:  - What would you like us to know as we care for you? From home with wife  - Provide timely, complete, and accurate information to patient/family  - Incorporate patient and family knowledge, values, beliefs, and cultural backgrounds into the planning and delivery of care  - Encourage patient/family to participate in care and decision-making at the level they choose  - Honor patient and family perspectives and choices  Outcome: Adequate for Discharge     Problem: PAIN - ADULT  Goal: Verbalizes/displays adequate comfort level or patient's stated pain goal  Description: INTERVENTIONS:  - Encourage pt to monitor pain and request assistance  - Assess pain using appropriate pain scale  - Administer analgesics based on type and severity of pain and evaluate response  - Implement non-pharmacological measures as appropriate and evaluate response  - Consider cultural and social influences on pain and pain management  - Manage/alleviate anxiety  - Utilize distraction and/or relaxation techniques  - Monitor for opioid side effects  - Notify MD/LIP if interventions unsuccessful or patient reports new pain  - Anticipate increased pain with activity and pre-medicate as appropriate  Outcome: Adequate for Discharge     Problem: RISK FOR INFECTION - ADULT  Goal: Absence of fever/infection during anticipated neutropenic period  Description: INTERVENTIONS  - Monitor WBC  - Administer growth factors as ordered  - Implement neutropenic guidelines  Outcome: Adequate for Discharge     Problem: SAFETY ADULT - FALL  Goal: Free from fall injury  Description: INTERVENTIONS:  - Assess pt frequently for physical needs  - Identify cognitive and physical deficits and behaviors that affect risk of falls.   - [place] fall precautions as indicated by assessment.  - Educate pt/family on patient safety including physical limitations  - Instruct pt to call for assistance with activity based on assessment  - Modify environment to reduce risk of injury  - Provide assistive devices as appropriate  - Consider OT/PT consult to assist with strengthening/mobility  - Encourage toileting schedule  Outcome: Adequate for Discharge     Problem: DISCHARGE PLANNING  Goal: Discharge to home or other facility with appropriate resources  Description: INTERVENTIONS:  - Identify barriers to discharge w/pt and caregiver  - Include patient/family/discharge partner in discharge planning  - Arrange for needed discharge resources and transportation as appropriate  - Identify discharge learning needs (meds, wound care, etc)  - Arrange for interpreters to assist at discharge as needed  - Consider post-discharge preferences of patient/family/discharge partner  - Complete POLST form as appropriate  - Assess patient's ability to be responsible for managing their own health  - Refer to Case Management Department for coordinating discharge planning if the patient needs post-hospital services based on physician/LIP order or complex needs related to functional status, cognitive ability or social support system  Outcome: Adequate for Discharge     Problem: Patient/Family Goals  Goal: Patient/Family Long Term Goal  Description: Patient's Long Term Goal: go home from rehab    Interventions:  - PT/OT  - Monitor incision for any signs of infection.  - No bending, heavy lifting nor twisting above waist.  - Pain management with oral medication.  - May use ice to incision to prevent swelling.  - See additional Care Plan goals for specific interventions  Outcome: Adequate for Discharge  Goal: Patient/Family Short Term Goal  Description: Patient's Short Term Goal: pain management    Interventions:   - PT/OT  - Monitor incision for any signs of infection.  - No bending, heavy lifting nor twisting above waist.  - Pain management with oral medication.  - May use ice to incision to prevent swelling.  - See additional Care Plan goals for specific interventions  Outcome: Adequate for Discharge     Problem: SKIN/TISSUE INTEGRITY - ADULT  Goal: Incision(s), wounds(s) or drain site(s) healing without S/S of infection  Description: INTERVENTIONS:  - Assess and document risk factors for pressure ulcer development  - Assess and document skin integrity  - Assess and document dressing/incision, wound bed, drain sites and surrounding tissue  - Implement wound care per orders  - Initiate isolation precautions as appropriate  - Initiate Pressure Ulcer prevention bundle as indicated  Outcome: Adequate for Discharge     Problem: MUSCULOSKELETAL - ADULT  Goal: Return mobility to safest level of function  Description: INTERVENTIONS:  - Assess patient stability and activity tolerance for standing, transferring and ambulating w/ or w/o assistive devices  - Assist with transfers and ambulation using safe patient handling equipment as needed  - Ensure adequate protection for wounds/incisions during mobilization  - Obtain PT/OT consults as needed  - Advance activity as appropriate  - Communicate ordered activity level and limitations with patient/family  Outcome: Adequate for Discharge  Goal: Maintain proper alignment of affected body part  Description: INTERVENTIONS:  - Support and protect limb and body alignment per provider's orders  - Instruct and reinforce with patient and family use of appropriate assistive device and precautions (e.g. spinal or hip dislocation precautions)  Outcome: Adequate for Discharge     Problem: Impaired Functional Mobility  Goal: Achieve highest/safest level of mobility/gait  Description: Interventions:  - Assess patient's functional ability and stability  - Promote increasing activity/tolerance for mobility and gait  - Educate and engage patient/family in tolerated activity level and precautions  - Recommend use of  RW for transfers and ambulation  Outcome: Adequate for Discharge     Problem: Impaired Activities of Daily Living  Goal: Achieve highest/safest level of independence in self care  Description: Interventions:  - Assess ability and encourage patient to participate in ADLs to maximize function  - Promote sitting position while performing ADLs such as feeding, grooming, and bathing  - Educate and encourage patient/family in tolerated functional activity level and precautions during self-care  - Encourage patient to incorporate impaired side during daily activities to promote function  Outcome: Adequate for Discharge    Patient is alert and oriented, aware to call for help as needed. Patient is up with walker with SBA. Patient is currently in room air, denies shortness of breathing nor chest pain. Patient is tolerating pain at this time. Patient will be going to [facility] today. 1600  Full report given to Dharmesh Huddleston RN at [facility].

## 2022-12-26 NOTE — PLAN OF CARE
POD 5. A&O x4. Remote tele. Voiding via urinal. [place] thick liquids. Pain managed with Norco PRN. Up with 1 assist, stand an pivot. Family at bedside. Plan is to discharge to [facility]. Call light within reach, safety measures in place. Problem: Patient Centered Care  Goal: Patient preferences are identified and integrated in the patient's plan of care  Description: Interventions:  - What would you like us to know as we care for you?  From home with wife  - Provide timely, complete, and accurate information to patient/family  - Incorporate patient and family knowledge, values, beliefs, and cultural backgrounds into the planning and delivery of care  - Encourage patient/family to participate in care and decision-making at the level they choose  - Honor patient and family perspectives and choices  Outcome: Progressing     Problem: PAIN - ADULT  Goal: Verbalizes/displays adequate comfort level or patient's stated pain goal  Description: INTERVENTIONS:  - Encourage pt to monitor pain and request assistance  - Assess pain using appropriate pain scale  - Administer analgesics based on type and severity of pain and evaluate response  - Implement non-pharmacological measures as appropriate and evaluate response  - Consider cultural and social influences on pain and pain management  - Manage/alleviate anxiety  - Utilize distraction and/or relaxation techniques  - Monitor for opioid side effects  - Notify MD/LIP if interventions unsuccessful or patient reports new pain  - Anticipate increased pain with activity and pre-medicate as appropriate  Outcome: Progressing     Problem: RISK FOR INFECTION - ADULT  Goal: Absence of fever/infection during anticipated neutropenic period  Description: INTERVENTIONS  - Monitor WBC  - Administer growth factors as ordered  - Implement neutropenic guidelines  Outcome: Progressing     Problem: SAFETY ADULT - FALL  Goal: Free from fall injury  Description: INTERVENTIONS:  - Assess pt frequently for physical needs  - Identify cognitive and physical deficits and behaviors that affect risk of falls.   - [place] fall precautions as indicated by assessment.  - Educate pt/family on patient safety including physical limitations  - Instruct pt to call for assistance with activity based on assessment  - Modify environment to reduce risk of injury  - Provide assistive devices as appropriate  - Consider OT/PT consult to assist with strengthening/mobility  - Encourage toileting schedule  Outcome: Progressing     Problem: DISCHARGE PLANNING  Goal: Discharge to home or other facility with appropriate resources  Description: INTERVENTIONS:  - Identify barriers to discharge w/pt and caregiver  - Include patient/family/discharge partner in discharge planning  - Arrange for needed discharge resources and transportation as appropriate  - Identify discharge learning needs (meds, wound care, etc)  - Arrange for interpreters to assist at discharge as needed  - Consider post-discharge preferences of patient/family/discharge partner  - Complete POLST form as appropriate  - Assess patient's ability to be responsible for managing their own health  - Refer to Case Management Department for coordinating discharge planning if the patient needs post-hospital services based on physician/LIP order or complex needs related to functional status, cognitive ability or social support system  Outcome: Progressing     Problem: Patient/Family Goals  Goal: Patient/Family Long Term Goal  Description: Patient's Long Term Goal: go home    Interventions:  -ortho consult  -therapy  -medications  - See additional Care Plan goals for specific interventions  Outcome: Progressing  Goal: Patient/Family Short Term Goal  Description: Patient's Short Term Goal: pain management    Interventions:   - medications  -rest  - See additional Care Plan goals for specific interventions  Outcome: Progressing     Problem: SKIN/TISSUE INTEGRITY - ADULT  Goal: Incision(s), wounds(s) or drain site(s) healing without S/S of infection  Description: INTERVENTIONS:  - Assess and document risk factors for pressure ulcer development  - Assess and document skin integrity  - Assess and document dressing/incision, wound bed, drain sites and surrounding tissue  - Implement wound care per orders  - Initiate isolation precautions as appropriate  - Initiate Pressure Ulcer prevention bundle as indicated  Outcome: Progressing     Problem: MUSCULOSKELETAL - ADULT  Goal: Return mobility to safest level of function  Description: INTERVENTIONS:  - Assess patient stability and activity tolerance for standing, transferring and ambulating w/ or w/o assistive devices  - Assist with transfers and ambulation using safe patient handling equipment as needed  - Ensure adequate protection for wounds/incisions during mobilization  - Obtain PT/OT consults as needed  - Advance activity as appropriate  - Communicate ordered activity level and limitations with patient/family  Outcome: Progressing  Goal: Maintain proper alignment of affected body part  Description: INTERVENTIONS:  - Support and protect limb and body alignment per provider's orders  - Instruct and reinforce with patient and family use of appropriate assistive device and precautions (e.g. spinal or hip dislocation precautions)  Outcome: Progressing     Problem: Impaired Functional Mobility  Goal: Achieve highest/safest level of mobility/gait  Description: Interventions:  - Assess patient's functional ability and stability  - Promote increasing activity/tolerance for mobility and gait  - Educate and engage patient/family in tolerated activity level and precautions    Outcome: Progressing     Problem: Impaired Activities of Daily Living  Goal: Achieve highest/safest level of independence in self care  Description: Interventions:  - Assess ability and encourage patient to participate in ADLs to maximize function  - Promote sitting position while performing ADLs such as feeding, grooming, and bathing  - Educate and encourage patient/family in tolerated functional activity level and precautions during self-care    Outcome: Progressing

## 2022-12-26 NOTE — PLAN OF CARE
Pt A*O x4. RA. General diet. Pain medication as needed. Walking 1 with a walker to the rolling chair. Family at bedside. Voiding. BM. Patient has remained free from falls throughout the day. Hourly rounding maintained. Pt's bed in lowest position w/ side rails up. Patient has been educated and is compliant with hi light system. The patient has been frequently assessed  and evaluated pursuant to at risk medications. Pt's room tidy and clear from potential fall risk. Problem: Patient Centered Care  Goal: Patient preferences are identified and integrated in the patient's plan of care  Description: Interventions:  - What would you like us to know as we care for you?  From home with wife  - Provide timely, complete, and accurate information to patient/family  - Incorporate patient and family knowledge, values, beliefs, and cultural backgrounds into the planning and delivery of care  - Encourage patient/family to participate in care and decision-making at the level they choose  - Honor patient and family perspectives and choices  Outcome: Progressing     Problem: PAIN - ADULT  Goal: Verbalizes/displays adequate comfort level or patient's stated pain goal  Description: INTERVENTIONS:  - Encourage pt to monitor pain and request assistance  - Assess pain using appropriate pain scale  - Administer analgesics based on type and severity of pain and evaluate response  - Implement non-pharmacological measures as appropriate and evaluate response  - Consider cultural and social influences on pain and pain management  - Manage/alleviate anxiety  - Utilize distraction and/or relaxation techniques  - Monitor for opioid side effects  - Notify MD/LIP if interventions unsuccessful or patient reports new pain  - Anticipate increased pain with activity and pre-medicate as appropriate  Outcome: Progressing     Problem: RISK FOR INFECTION - ADULT  Goal: Absence of fever/infection during anticipated neutropenic period  Description: INTERVENTIONS  - Monitor WBC  - Administer growth factors as ordered  - Implement neutropenic guidelines  Outcome: Progressing     Problem: SAFETY ADULT - FALL  Goal: Free from fall injury  Description: INTERVENTIONS:  - Assess pt frequently for physical needs  - Identify cognitive and physical deficits and behaviors that affect risk of falls.   - [place] fall precautions as indicated by assessment.  - Educate pt/family on patient safety including physical limitations  - Instruct pt to call for assistance with activity based on assessment  - Modify environment to reduce risk of injury  - Provide assistive devices as appropriate  - Consider OT/PT consult to assist with strengthening/mobility  - Encourage toileting schedule  Outcome: Progressing     Problem: DISCHARGE PLANNING  Goal: Discharge to home or other facility with appropriate resources  Description: INTERVENTIONS:  - Identify barriers to discharge w/pt and caregiver  - Include patient/family/discharge partner in discharge planning  - Arrange for needed discharge resources and transportation as appropriate  - Identify discharge learning needs (meds, wound care, etc)  - Arrange for interpreters to assist at discharge as needed  - Consider post-discharge preferences of patient/family/discharge partner  - Complete POLST form as appropriate  - Assess patient's ability to be responsible for managing their own health  - Refer to Case Management Department for coordinating discharge planning if the patient needs post-hospital services based on physician/LIP order or complex needs related to functional status, cognitive ability or social support system  Outcome: Progressing     Problem: Patient/Family Goals  Goal: Patient/Family Long Term Goal  Description: Patient's Long Term Goal: go home    Interventions:  -ortho consult  -therapy  -medications  - See additional Care Plan goals for specific interventions  Outcome: Progressing  Goal: Patient/Family Short Term Goal  Description: Patient's Short Term Goal: pain management    Interventions:   - medications  -rest  - See additional Care Plan goals for specific interventions  Outcome: Progressing     Problem: SKIN/TISSUE INTEGRITY - ADULT  Goal: Incision(s), wounds(s) or drain site(s) healing without S/S of infection  Description: INTERVENTIONS:  - Assess and document risk factors for pressure ulcer development  - Assess and document skin integrity  - Assess and document dressing/incision, wound bed, drain sites and surrounding tissue  - Implement wound care per orders  - Initiate isolation precautions as appropriate  - Initiate Pressure Ulcer prevention bundle as indicated  Outcome: Progressing     Problem: MUSCULOSKELETAL - ADULT  Goal: Return mobility to safest level of function  Description: INTERVENTIONS:  - Assess patient stability and activity tolerance for standing, transferring and ambulating w/ or w/o assistive devices  - Assist with transfers and ambulation using safe patient handling equipment as needed  - Ensure adequate protection for wounds/incisions during mobilization  - Obtain PT/OT consults as needed  - Advance activity as appropriate  - Communicate ordered activity level and limitations with patient/family  Outcome: Progressing  Goal: Maintain proper alignment of affected body part  Description: INTERVENTIONS:  - Support and protect limb and body alignment per provider's orders  - Instruct and reinforce with patient and family use of appropriate assistive device and precautions (e.g. spinal or hip dislocation precautions)  Outcome: Progressing

## 2022-12-26 NOTE — PHYSICAL THERAPY NOTE
PHYSICAL THERAPY TREATMENT NOTE - INPATIENT     Room Number: 429/429-A       Presenting Problem: L1,L2 rt. side laminectomy    Problem List  Principal Problem:    Intractable back pain  Active Problems:    Intractable low back pain    Lumbosacral radiculopathy at L2      PHYSICAL THERAPY ASSESSMENT   Chart reviewed. RN  approved participation in physical therapy. PPE worn by therapist: mask, gloves and goggles. Patient was wearing a mask during session. Patient presented in bed with 6/10 pain. Patient with good  progress towards goals during this session. Education provided on Spine precautions, Physical therapy plan of care and physiological benefits of out of bed mobility. Patient with good carryover. Bed mobility: Mod assist  Transfers: Mod assist  Gait Assistance: Minimum assistance  Distance (ft): 25  Assistive Device: Rolling walker  Pattern: Shuffle;R Flexed knee          Pt seen daily. Mod a for bed mobility and transfer. EOB sitting balance activity with emphasis on core stabilization. Spinal precautions reviewed. Pt recall 2/3 spinal precautions. Family present;family education;all questions and concerns addressed. Pt amb 25 ft with RW and CGA;provided cuing for gait pattern as well as for postural awareness. Assisted pt to the bathroom;help with hygiene. Ther ex. Patient was left in bedside chair at end of session with all needs in reach. The patient's Approx Degree of Impairment: 50.57% has been calculated based on documentation in the [facility] '6 clicks' Inpatient Basic Mobility Short Form. Research supports that patients with this level of impairment may benefit from Subacute Rehab. RN aware of patient status post session. DISCHARGE RECOMMENDATIONS  PT Discharge Recommendations: Sub-acute rehabilitation     PLAN  PT Treatment Plan: Bed mobility; Body mechanics; Endurance;Gait training;Strengthening    SUBJECTIVE  Pt is please with his progress.     OBJECTIVE  Precautions: Spine    WEIGHT BEARING RESTRICTION  Weight Bearing Restriction: None                PAIN ASSESSMENT   Ratin  Location: back  Management Techniques: Activity promotion; Body mechanics; Relaxation;Repositioning    BALANCE                                                                                                                       Static Sitting: Fair +  Dynamic Sitting: Fair           Static Standing: Fair  Dynamic Standing: Fair -    ACTIVITY TOLERANCE                         O2 WALK       AM-PAC '6-Clicks' INPATIENT SHORT FORM - BASIC MOBILITY  How much difficulty does the patient currently have. .. Patient Difficulty: Turning over in bed (including adjusting bedclothes, sheets and blankets)?: A Little   Patient Difficulty: Sitting down on and standing up from a chair with arms (e.g., wheelchair, bedside commode, etc.): A Little   Patient Difficulty: Moving from lying on back to sitting on the side of the bed?: A Little   How much help from another person does the patient currently need. ..    Help from Another: Moving to and from a bed to a chair (including a wheelchair)?: A Little   Help from Another: Need to walk in hospital room?: A Little   Help from Another: Climbing 3-5 steps with a railing?: A Lot     AM-PAC Score:  Raw Score: 17   Approx Degree of Impairment: 50.57%   Standardized Score (AM-PAC Scale): 42.13   CMS Modifier (G-Code): CK      Additional information:     THERAPEUTIC EXERCISES  Lower Extremity Ankle pumps  Glut sets  Quad sets     Position Supine       Patient End of Session: Up in chair;Call light within reach    CURRENT GOALS     Patient Goal Patient's self-stated goal is:home with independent  With FWW   Goal #1 Patient is able to demonstrate supine - sit EOB @ level: modified independent     Goal #1   Current Status Mod a    Goal #2 Patient is able to demonstrate transfers Sit to/from Stand at assistance level: modified independent with rollator     Goal #2  Current Status Mod a   Goal #3 Patient is able to ambulate 300 feet with assist device: rollator at assistance level: modified independent   Goal #3   Current Status Pt amb 25 ft with RW and Min a   Goal #4    Goal #4   Current Status    Goal #5 Patient to demonstrate independence with home activity/exercise instructions provided to patient in preparation for discharge.    Goal #5   Current Status In progress   Goal #6    Goal #6  Current Status

## 2022-12-26 NOTE — CM/SW NOTE
12/26/22 1300   Discharge disposition   Expected discharge disposition [facility]   Discharge transportation [facility]     Pt discussed during nursing rounds. Pt is stable for dc today. MD dc order entered. Met with pt at bedside to confirm dc plan and provide rehab choice. Pt states that he wants to go to [facility]. [facility] reserved via [employer]. Bed available today. RN to call report to [facility] at [phone]  Rapid Covid needed, order entered (by MD). RN Marion Iqbal aware to collect prior to dc. [facility] also asking if RN can offer  Covid booster. [facility] liaison notified of dc to [facility] via secure chat. Plan: [facility] today via [address] at 3pm, PCS done. Pt aware of dc today and OOP cost of medicare. Pt agrees to both. / to remain available for support and/or discharge planning. Cherrie Cordero.  Haylie Watts RN, BSN  Nurse   [phone]

## 2023-01-03 ENCOUNTER — OFFICE VISIT (OUTPATIENT)
Dept: SURGERY | Facility: CLINIC | Age: 83
End: 2023-01-03
Payer: MEDICARE

## 2023-01-03 VITALS
WEIGHT: 140 LBS | BODY MASS INDEX: 20.04 KG/M2 | DIASTOLIC BLOOD PRESSURE: 82 MMHG | HEART RATE: 61 BPM | SYSTOLIC BLOOD PRESSURE: 122 MMHG | HEIGHT: 70 IN

## 2023-01-03 DIAGNOSIS — M47.816 LUMBAR SPONDYLOSIS: ICD-10-CM

## 2023-01-03 DIAGNOSIS — R29.898 WEAKNESS OF RIGHT LOWER EXTREMITY: Primary | ICD-10-CM

## 2023-01-03 DIAGNOSIS — M54.16 LUMBAR RADICULOPATHY: ICD-10-CM

## 2023-01-03 PROCEDURE — 1125F AMNT PAIN NOTED PAIN PRSNT: CPT | Performed by: STUDENT IN AN ORGANIZED HEALTH CARE EDUCATION/TRAINING PROGRAM

## 2023-01-03 PROCEDURE — 99024 POSTOP FOLLOW-UP VISIT: CPT | Performed by: STUDENT IN AN ORGANIZED HEALTH CARE EDUCATION/TRAINING PROGRAM

## 2023-01-03 PROCEDURE — 1111F DSCHRG MED/CURRENT MED MERGE: CPT | Performed by: STUDENT IN AN ORGANIZED HEALTH CARE EDUCATION/TRAINING PROGRAM

## 2023-01-03 RX ORDER — SELENIUM 50 MCG
TABLET ORAL
COMMUNITY

## 2023-01-17 ENCOUNTER — TELEPHONE (OUTPATIENT)
Dept: RHEUMATOLOGY | Facility: CLINIC | Age: 83
End: 2023-01-17

## 2023-01-17 ENCOUNTER — TELEPHONE (OUTPATIENT)
Dept: INTERNAL MEDICINE CLINIC | Facility: CLINIC | Age: 83
End: 2023-01-17

## 2023-01-17 ENCOUNTER — TELEPHONE (OUTPATIENT)
Dept: SURGERY | Facility: CLINIC | Age: 83
End: 2023-01-17

## 2023-01-17 ENCOUNTER — OFFICE VISIT (OUTPATIENT)
Dept: SURGERY | Facility: CLINIC | Age: 83
End: 2023-01-17
Payer: MEDICARE

## 2023-01-17 VITALS — SYSTOLIC BLOOD PRESSURE: 116 MMHG | DIASTOLIC BLOOD PRESSURE: 70 MMHG

## 2023-01-17 DIAGNOSIS — R29.898 WEAKNESS OF RIGHT LOWER EXTREMITY: ICD-10-CM

## 2023-01-17 DIAGNOSIS — M47.816 LUMBAR SPONDYLOSIS: Primary | ICD-10-CM

## 2023-01-17 DIAGNOSIS — M54.16 LUMBAR RADICULOPATHY: ICD-10-CM

## 2023-01-17 PROCEDURE — 1111F DSCHRG MED/CURRENT MED MERGE: CPT | Performed by: STUDENT IN AN ORGANIZED HEALTH CARE EDUCATION/TRAINING PROGRAM

## 2023-01-17 PROCEDURE — 99024 POSTOP FOLLOW-UP VISIT: CPT | Performed by: STUDENT IN AN ORGANIZED HEALTH CARE EDUCATION/TRAINING PROGRAM

## 2023-01-17 PROCEDURE — 1126F AMNT PAIN NOTED NONE PRSNT: CPT | Performed by: STUDENT IN AN ORGANIZED HEALTH CARE EDUCATION/TRAINING PROGRAM

## 2023-01-17 RX ORDER — ATORVASTATIN CALCIUM 20 MG/1
20 TABLET, FILM COATED ORAL NIGHTLY
COMMUNITY

## 2023-01-17 NOTE — TELEPHONE ENCOUNTER
Test results from [facility]. [address] [place]. 1/16/2023 sed rate 14. C-reactive protein negative. White count 8.8 hemoglobin 9.2 hematocrit 28.5  platelet count 81,796. Sodium 142 potassium 4.0 chloride 106 glucose 81 BUN 29 creatinine 1.17 calcium 8.3 total protein 4.6 albumin 2.8 ALT 16 AST 21 phosphatase 61 bilirubin 0.43 GFR  60.

## 2023-01-17 NOTE — PROGRESS NOTES
Patient is here for 4 week post op. He is s/p 12/21/2022 - minimally invasive right L1-2 laminotomy for decompression. He states his pain is tolerable but he is still weak in his right leg. He feels stronger than last visit. He is still working with PT at [facility].

## 2023-01-17 NOTE — TELEPHONE ENCOUNTER
Patient came to apt today from [facility] with recent records. Copy made and sent to scan. Returned records to patient.

## 2023-01-18 ENCOUNTER — TELEPHONE (OUTPATIENT)
Dept: RHEUMATOLOGY | Facility: CLINIC | Age: 83
End: 2023-01-18

## 2023-01-18 DIAGNOSIS — D64.9 CHRONIC ANEMIA: Primary | ICD-10-CM

## 2023-01-18 NOTE — TELEPHONE ENCOUNTER
Spoke with the nurse Rochel Dakins at [facility] regarding German Daly's lab tests. Recent labs show normal sed rate of 14 normal C-reactive protein he is chronically anemic at 9.2 and his low platelets at 49,631. 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 At the present time I feel he is stable continue present medicine which is prednisone 1 a day 5 mg and methotrexate 3/week, folic acid 1/day. He needs to have his labs rechecked in 3 months CBC, CHEM profile and sed rate. And be seen in the office.   Dr. Mando Stapleton being treated for PMR

## 2023-01-23 ENCOUNTER — TELEPHONE (OUTPATIENT)
Dept: RHEUMATOLOGY | Facility: CLINIC | Age: 83
End: 2023-01-23

## 2023-01-24 ENCOUNTER — TELEPHONE (OUTPATIENT)
Dept: INTERNAL MEDICINE CLINIC | Facility: CLINIC | Age: 83
End: 2023-01-24

## 2023-01-24 ENCOUNTER — OFFICE VISIT (OUTPATIENT)
Dept: RHEUMATOLOGY | Facility: CLINIC | Age: 83
End: 2023-01-24
Payer: MEDICARE

## 2023-01-24 VITALS
HEART RATE: 65 BPM | TEMPERATURE: 98 F | BODY MASS INDEX: 20.04 KG/M2 | SYSTOLIC BLOOD PRESSURE: 122 MMHG | OXYGEN SATURATION: 99 % | DIASTOLIC BLOOD PRESSURE: 62 MMHG | HEIGHT: 70 IN | RESPIRATION RATE: 16 BRPM | WEIGHT: 140 LBS

## 2023-01-24 DIAGNOSIS — M35.3 PMR (POLYMYALGIA RHEUMATICA) (HCC): Primary | ICD-10-CM

## 2023-01-24 DIAGNOSIS — Z95.2 S/P AVR: ICD-10-CM

## 2023-01-24 DIAGNOSIS — D89.89 KAPPA LIGHT CHAIN DISEASE (HCC): ICD-10-CM

## 2023-01-24 DIAGNOSIS — Z95.1 S/P CABG (CORONARY ARTERY BYPASS GRAFT): ICD-10-CM

## 2023-01-24 DIAGNOSIS — Z98.890 HISTORY OF BACK SURGERY: ICD-10-CM

## 2023-01-24 DIAGNOSIS — D64.89 ANEMIA DUE TO OTHER CAUSE, NOT CLASSIFIED: ICD-10-CM

## 2023-01-24 PROCEDURE — 99214 OFFICE O/P EST MOD 30 MIN: CPT | Performed by: INTERNAL MEDICINE

## 2023-01-24 PROCEDURE — 1111F DSCHRG MED/CURRENT MED MERGE: CPT | Performed by: INTERNAL MEDICINE

## 2023-01-24 PROCEDURE — 1125F AMNT PAIN NOTED PAIN PRSNT: CPT | Performed by: INTERNAL MEDICINE

## 2023-01-24 RX ORDER — AMLODIPINE BESYLATE 10 MG/1
10 TABLET ORAL DAILY
COMMUNITY

## 2023-01-24 NOTE — PATIENT INSTRUCTIONS
Continue home physical therapy. Exercise regularly. OK to discharge today from [facility]. Continue off of Methotrexate. Last sed rate was 14 which is excellent. Taper pff Prednisone - lower to 2.5 mg per day for one week, then lower to 2.5 mg every other day for the next week. If all goes well, then stop the Prednisone all together. Watch out for increased shoulder, hip and back pain when stopping Prednisone. PMR appears in remission. Right leg weakness is due to the damaged pinched nerve in the back,  a result of the bulged disc that was operated on and bony calcified spurs that were removed. Return to office 3 months.

## 2023-01-25 ENCOUNTER — TELEPHONE (OUTPATIENT)
Dept: INTERNAL MEDICINE CLINIC | Facility: CLINIC | Age: 83
End: 2023-01-25

## 2023-01-25 ENCOUNTER — TELEPHONE (OUTPATIENT)
Dept: SURGERY | Facility: CLINIC | Age: 83
End: 2023-01-25

## 2023-01-25 NOTE — TELEPHONE ENCOUNTER
Called Alejandra Hebert with [facility]. He states he saw the patient today and his incisional wound is now \"100% slough\". He states it needs debridement. His wife has been applying santil and dry dressings to the wound. He is asking if wound care should come to the patient's house for debridement and monitoring. Spoke to Dr Oksana Skinner. Wound care should be consulted so the wound can be debrided. They should do wet-to-dry dressings with NS BID. The patient will come to see Dr Kin Burnett every week for wound assessment. Called Alejandra Hebert to inform. He verbalized understanding. Called patient and spoke to his wife. Updated her on wound care being consulted for debridement. She states in the rehab facility the nurse was rubbing to wound before applying the \"cream\" and dry dressing. Informed her to just continue the santil, do not rub and cover with dry dressing until the debridement. After the debridement, will switch to wet-to-dry dressings. Scheduled patient an apt on 1-31-23 and 2-10-23 with Dr Oksana Skinner for wound assessment. She verbalized understanding.

## 2023-01-25 NOTE — TELEPHONE ENCOUNTER
Mi Hathaway from [facility] is calling to see if Dr. Puneet Layne will be signing home health orders.

## 2023-01-25 NOTE — TELEPHONE ENCOUNTER
[facility] admitting nurse Bryn Sue called with questions regarding pt's care.  Please advise  Office:[phone] opt 3  [place]:[phone]

## 2023-01-31 ENCOUNTER — OFFICE VISIT (OUTPATIENT)
Dept: SURGERY | Facility: CLINIC | Age: 83
End: 2023-01-31
Payer: MEDICARE

## 2023-01-31 VITALS
WEIGHT: 145 LBS | HEART RATE: 70 BPM | HEIGHT: 70.5 IN | BODY MASS INDEX: 20.53 KG/M2 | SYSTOLIC BLOOD PRESSURE: 114 MMHG | DIASTOLIC BLOOD PRESSURE: 70 MMHG

## 2023-01-31 DIAGNOSIS — R29.898 WEAKNESS OF RIGHT LOWER EXTREMITY: ICD-10-CM

## 2023-01-31 DIAGNOSIS — M54.16 LUMBAR RADICULOPATHY: ICD-10-CM

## 2023-01-31 DIAGNOSIS — M47.816 LUMBAR SPONDYLOSIS: Primary | ICD-10-CM

## 2023-01-31 PROCEDURE — 99024 POSTOP FOLLOW-UP VISIT: CPT | Performed by: STUDENT IN AN ORGANIZED HEALTH CARE EDUCATION/TRAINING PROGRAM

## 2023-01-31 PROCEDURE — 1125F AMNT PAIN NOTED PAIN PRSNT: CPT | Performed by: STUDENT IN AN ORGANIZED HEALTH CARE EDUCATION/TRAINING PROGRAM

## 2023-01-31 RX ORDER — PYRAZINAMIDE 500 MG/1
TABLET ORAL
COMMUNITY
Start: 2022-11-04

## 2023-01-31 RX ORDER — DULOXETIN HYDROCHLORIDE 60 MG/1
60 CAPSULE, DELAYED RELEASE ORAL DAILY
COMMUNITY
Start: 2023-01-23

## 2023-01-31 RX ORDER — COLLAGENASE SANTYL 250 [ARB'U]/G
OINTMENT TOPICAL
COMMUNITY
Start: 2023-01-20

## 2023-01-31 RX ORDER — TIZANIDINE 2 MG/1
1 TABLET ORAL
COMMUNITY
Start: 2022-11-01

## 2023-01-31 NOTE — PROGRESS NOTES
Pt here for wound check. Pt states he has incision pain- also right hip and right leg.  Pt also states he also fell

## 2023-02-07 ENCOUNTER — OFFICE VISIT (OUTPATIENT)
Dept: INTERNAL MEDICINE CLINIC | Facility: CLINIC | Age: 83
End: 2023-02-07
Payer: MEDICARE

## 2023-02-07 VITALS
BODY MASS INDEX: 20.53 KG/M2 | TEMPERATURE: 98 F | RESPIRATION RATE: 16 BRPM | DIASTOLIC BLOOD PRESSURE: 66 MMHG | OXYGEN SATURATION: 98 % | SYSTOLIC BLOOD PRESSURE: 118 MMHG | HEIGHT: 70.5 IN | WEIGHT: 145 LBS | HEART RATE: 73 BPM

## 2023-02-07 DIAGNOSIS — I25.10 CORONARY ARTERY DISEASE INVOLVING NATIVE CORONARY ARTERY OF NATIVE HEART WITHOUT ANGINA PECTORIS: ICD-10-CM

## 2023-02-07 DIAGNOSIS — R29.898 WEAKNESS OF RIGHT LOWER EXTREMITY: ICD-10-CM

## 2023-02-07 DIAGNOSIS — D64.89 ANEMIA DUE TO OTHER CAUSE, NOT CLASSIFIED: ICD-10-CM

## 2023-02-07 DIAGNOSIS — M35.3 PMR (POLYMYALGIA RHEUMATICA) (HCC): ICD-10-CM

## 2023-02-07 DIAGNOSIS — M54.31 RIGHT SIDED SCIATICA: ICD-10-CM

## 2023-02-07 DIAGNOSIS — S21.209S: ICD-10-CM

## 2023-02-07 DIAGNOSIS — E43 SEVERE PROTEIN-CALORIE MALNUTRITION (HCC): Primary | ICD-10-CM

## 2023-02-07 DIAGNOSIS — Z98.890 STATUS POST LUMBAR SURGERY: ICD-10-CM

## 2023-02-07 PROCEDURE — 99215 OFFICE O/P EST HI 40 MIN: CPT | Performed by: INTERNAL MEDICINE

## 2023-02-07 RX ORDER — FAMOTIDINE 20 MG/1
40 TABLET, FILM COATED ORAL 2 TIMES DAILY
Refills: 0 | COMMUNITY
Start: 2023-02-07

## 2023-02-07 RX ORDER — ACETAMINOPHEN 500 MG
500 TABLET ORAL
Refills: 0 | COMMUNITY
Start: 2023-02-07

## 2023-02-07 RX ORDER — ATORVASTATIN CALCIUM 20 MG/1
10 TABLET, FILM COATED ORAL NIGHTLY
Refills: 0 | COMMUNITY
Start: 2023-02-07

## 2023-02-07 RX ORDER — CHOLECALCIFEROL (VITAMIN D3) 1250 MCG
CAPSULE ORAL
Qty: 13 CAPSULE | Refills: 3 | COMMUNITY
Start: 2023-02-07

## 2023-02-07 RX ORDER — HYDROCODONE BITARTRATE AND ACETAMINOPHEN 5; 325 MG/1; MG/1
TABLET ORAL
Qty: 120 TABLET | Refills: 0 | COMMUNITY
Start: 2023-02-07

## 2023-02-07 RX ORDER — AMLODIPINE BESYLATE 10 MG/1
5 TABLET ORAL DAILY
Refills: 0 | COMMUNITY
Start: 2023-02-07

## 2023-02-07 RX ORDER — CALCIUM CARBONATE 200(500)MG
1 TABLET,CHEWABLE ORAL 2 TIMES DAILY
Refills: 0 | COMMUNITY
Start: 2023-02-07

## 2023-02-08 DIAGNOSIS — M35.3 PMR (POLYMYALGIA RHEUMATICA) (HCC): Primary | ICD-10-CM

## 2023-02-10 ENCOUNTER — OFFICE VISIT (OUTPATIENT)
Dept: SURGERY | Facility: CLINIC | Age: 83
End: 2023-02-10
Payer: MEDICARE

## 2023-02-10 VITALS
SYSTOLIC BLOOD PRESSURE: 110 MMHG | HEIGHT: 70.5 IN | HEART RATE: 74 BPM | BODY MASS INDEX: 20.53 KG/M2 | WEIGHT: 145 LBS | DIASTOLIC BLOOD PRESSURE: 80 MMHG

## 2023-02-10 DIAGNOSIS — R29.898 WEAKNESS OF RIGHT LOWER EXTREMITY: ICD-10-CM

## 2023-02-10 DIAGNOSIS — M54.16 LUMBAR RADICULOPATHY: ICD-10-CM

## 2023-02-10 DIAGNOSIS — M47.816 LUMBAR SPONDYLOSIS: Primary | ICD-10-CM

## 2023-02-13 DIAGNOSIS — R13.10 DYSPHAGIA, UNSPECIFIED TYPE: Primary | ICD-10-CM

## 2023-02-16 ENCOUNTER — ORDER TRANSCRIPTION (OUTPATIENT)
Dept: ADMINISTRATIVE | Facility: HOSPITAL | Age: 83
End: 2023-02-16

## 2023-02-16 DIAGNOSIS — Z11.59 SCREENING FOR VIRAL DISEASE: ICD-10-CM

## 2023-02-16 DIAGNOSIS — Z01.818 PRE-PROCEDURAL EXAMINATION: Primary | ICD-10-CM

## 2023-02-17 ENCOUNTER — TELEPHONE (OUTPATIENT)
Dept: INTERNAL MEDICINE CLINIC | Facility: CLINIC | Age: 83
End: 2023-02-17

## 2023-02-17 ENCOUNTER — VIRTUAL PHONE E/M (OUTPATIENT)
Dept: INTERNAL MEDICINE CLINIC | Facility: CLINIC | Age: 83
End: 2023-02-17
Payer: MEDICARE

## 2023-02-17 DIAGNOSIS — M35.3 PMR (POLYMYALGIA RHEUMATICA) (HCC): Primary | ICD-10-CM

## 2023-02-17 DIAGNOSIS — D64.9 CHRONIC ANEMIA: ICD-10-CM

## 2023-02-17 DIAGNOSIS — E43 SEVERE PROTEIN-CALORIE MALNUTRITION (HCC): ICD-10-CM

## 2023-02-17 DIAGNOSIS — N18.32 STAGE 3B CHRONIC KIDNEY DISEASE (HCC): ICD-10-CM

## 2023-02-17 RX ORDER — POTASSIUM CHLORIDE 750 MG/1
10 TABLET, FILM COATED, EXTENDED RELEASE ORAL 2 TIMES DAILY
Qty: 60 TABLET | Refills: 0 | Status: SHIPPED | OUTPATIENT
Start: 2023-02-17

## 2023-02-17 RX ORDER — PREDNISONE 1 MG/1
5 TABLET ORAL DAILY
Refills: 0 | COMMUNITY
Start: 2023-02-17

## 2023-02-17 NOTE — PROGRESS NOTES
Virtual Telephone Check-In    Conor Armas verbally consents to a Virtual/Telephone Check-In visit on 02/17/23. Patient has been referred to the [facility] website at [email or web address] to review the yearly Consent to Treat document. Patient understands and accepts financial responsibility for any deductible, co-insurance and/or co-pays associated with this service. Duration of the service: 20 minutes audio only      Summary of topics discussed:     Recovering slowing from back surgery at home, out of [facility] few weeks. Poor appetite, fatigue , myalgias, slow progress with home PT. Off prednisone about 3 weeks now spike in ESR and CRP and worsening CKD (eGFR 60 to 40)-related? Autoimmune kidney disease rare with PMR-could he now have SLE or some other immune CKG? Anemia better (9.2 to 10), albumin better (2.8 to 3.4), Plats normalized all good. Pot low at 3.3 will add as my Rx for Prednisone 5mg will lower it further counter-balanced by expected HYPERkalemia from worsening CKD. Will recheck with OV in 1mo and alert Team consultants Carmencita Kwan and Abebe to visit with him in March.          Inez Chang MD

## 2023-02-20 ENCOUNTER — TELEPHONE (OUTPATIENT)
Dept: RHEUMATOLOGY | Facility: CLINIC | Age: 83
End: 2023-02-20

## 2023-02-20 DIAGNOSIS — M35.3 PMR (POLYMYALGIA RHEUMATICA) (HCC): Primary | ICD-10-CM

## 2023-02-20 RX ORDER — METHOTREXATE 2.5 MG/1
10 TABLET ORAL WEEKLY
Qty: 20 TABLET | Refills: 5 | Status: SHIPPED | OUTPATIENT
Start: 2023-02-20 | End: 2023-03-20

## 2023-02-20 RX ORDER — FOLIC ACID 1 MG/1
1 TABLET ORAL DAILY
Qty: 30 TABLET | Refills: 5 | Status: SHIPPED | OUTPATIENT
Start: 2023-02-20

## 2023-02-21 DIAGNOSIS — I10 PRIMARY HYPERTENSION: ICD-10-CM

## 2023-02-21 DIAGNOSIS — M35.3 PMR (POLYMYALGIA RHEUMATICA) (HCC): Primary | ICD-10-CM

## 2023-02-23 ENCOUNTER — LAB ENCOUNTER (OUTPATIENT)
Dept: LAB | Facility: HOSPITAL | Age: 83
End: 2023-02-23
Attending: INTERNAL MEDICINE
Payer: MEDICARE

## 2023-02-23 ENCOUNTER — HOSPITAL ENCOUNTER (OUTPATIENT)
Dept: GENERAL RADIOLOGY | Facility: HOSPITAL | Age: 83
Discharge: HOME | End: 2023-02-23
Attending: INTERNAL MEDICINE
Payer: MEDICARE

## 2023-02-23 DIAGNOSIS — Z11.59 SCREENING FOR VIRAL DISEASE: ICD-10-CM

## 2023-02-23 DIAGNOSIS — Z01.818 PRE-PROCEDURAL EXAMINATION: ICD-10-CM

## 2023-02-23 DIAGNOSIS — R13.10 DYSPHAGIA, UNSPECIFIED TYPE: ICD-10-CM

## 2023-02-23 LAB — SARS-COV-2 RNA RESP QL NAA+PROBE: NOT DETECTED

## 2023-02-23 PROCEDURE — 92611 MOTION FLUOROSCOPY/SWALLOW: CPT

## 2023-02-23 PROCEDURE — 74230 X-RAY XM SWLNG FUNCJ C+: CPT | Performed by: INTERNAL MEDICINE

## 2023-02-23 NOTE — PATIENT INSTRUCTIONS
Diet Recommendations:  Solids: Regular  Liquids: Mildly thick liquids (nectar thick)    Recommended compensatory strategies:   Sit upright  Small sips  Alternate liquids and solids  No straw    Medication Administration:  Present with thickened liquids or puree    Further Follow-up:  Continue home health dysphagia therapy focusing on oral control, airway closure, base of tongue retraction. Repeat VFSS in 4-6 weeks.     Marcelo Sierra MA/LYNDA-SLP  Speech Language Pathologist  [address] [place] [address]  [phone]

## 2023-02-28 ENCOUNTER — APPOINTMENT (OUTPATIENT)
Dept: HEMATOLOGY/ONCOLOGY | Facility: HOSPITAL | Age: 83
End: 2023-02-28
Attending: INTERNAL MEDICINE
Payer: MEDICARE

## 2023-03-02 ENCOUNTER — VIRTUAL PHONE E/M (OUTPATIENT)
Dept: INTERNAL MEDICINE CLINIC | Facility: CLINIC | Age: 83
End: 2023-03-02
Payer: MEDICARE

## 2023-03-02 ENCOUNTER — TELEPHONE (OUTPATIENT)
Dept: INTERNAL MEDICINE CLINIC | Facility: CLINIC | Age: 83
End: 2023-03-02

## 2023-03-02 DIAGNOSIS — Z98.890 STATUS POST LUMBAR SURGERY: ICD-10-CM

## 2023-03-02 DIAGNOSIS — N18.32 STAGE 3B CHRONIC KIDNEY DISEASE (HCC): ICD-10-CM

## 2023-03-02 DIAGNOSIS — M35.3 PMR (POLYMYALGIA RHEUMATICA) (HCC): Primary | ICD-10-CM

## 2023-03-02 PROCEDURE — 99442 PHONE E/M BY PHYS 11-20 MIN: CPT | Performed by: INTERNAL MEDICINE

## 2023-03-02 RX ORDER — AMLODIPINE BESYLATE 2.5 MG/1
2.5 TABLET ORAL DAILY
Qty: 90 TABLET | Refills: 1 | Status: SHIPPED | OUTPATIENT
Start: 2023-03-02

## 2023-03-02 NOTE — PROGRESS NOTES
Virtual Telephone Check-In    Mark Crespo verbally consents to a Virtual/Telephone Check-In visit on 03/02/23. Patient has been referred to the [facility] website at [email or web address] to review the yearly Consent to Treat document. Patient understands and accepts financial responsibility for any deductible, co-insurance and/or co-pays associated with this service. Duration of the service: 15 minutes audio      Summary of topics discussed:     Sounds stronger on phone, better apetite per wife. Went over meds and labs-stable CKD, elevated CRP, mild stable anemia, elevated WBC from Prednisone. Recovering from back OR to see surgeon tomorrow-OR was  12/21/22. BPs low 100s can cut amlo 5 to 2.5/day. Has OV me 3/17. Wants to add CBD for chronic pain-no objection.           Trista Dent MD

## 2023-03-03 ENCOUNTER — TELEPHONE (OUTPATIENT)
Dept: RHEUMATOLOGY | Facility: CLINIC | Age: 83
End: 2023-03-03

## 2023-03-03 NOTE — TELEPHONE ENCOUNTER
Test Results - Tanvi Evans -[facility] lab 3/1/2023 sodium 144 potassium 4.9 chloride 106 glucose 82 BUN 35 creatinine 1.82 GFR 37. Calcium 8.9 AST 21 ALT 16 alkaline phosphatase 48 bilirubin 0.4 total protein 6.5 albumin 3.8 globulin 3.7 White count elevated 17.7 but on prednisone. Hemoglobin 10.2 hematocrit 34.3  platelet count 771,746. Urinalysis unremarkable. C-reactive protein 16.0. Normal reference range less than 10. Gretchen Martino forwarded these labs.

## 2023-03-06 ENCOUNTER — OFFICE VISIT (OUTPATIENT)
Dept: SURGERY | Facility: CLINIC | Age: 83
End: 2023-03-06
Payer: MEDICARE

## 2023-03-06 VITALS — HEART RATE: 70 BPM | DIASTOLIC BLOOD PRESSURE: 72 MMHG | SYSTOLIC BLOOD PRESSURE: 120 MMHG

## 2023-03-06 DIAGNOSIS — M54.16 LUMBAR RADICULOPATHY: ICD-10-CM

## 2023-03-06 DIAGNOSIS — M47.816 LUMBAR SPONDYLOSIS: Primary | ICD-10-CM

## 2023-03-06 DIAGNOSIS — R29.898 WEAKNESS OF RIGHT LOWER EXTREMITY: ICD-10-CM

## 2023-03-06 RX ORDER — ACETAMINOPHEN AND CODEINE PHOSPHATE 300; 30 MG/1; MG/1
TABLET ORAL
COMMUNITY
Start: 2023-03-04

## 2023-03-17 ENCOUNTER — OFFICE VISIT (OUTPATIENT)
Dept: INTERNAL MEDICINE CLINIC | Facility: CLINIC | Age: 83
End: 2023-03-17
Payer: MEDICARE

## 2023-03-17 VITALS
DIASTOLIC BLOOD PRESSURE: 62 MMHG | TEMPERATURE: 97 F | SYSTOLIC BLOOD PRESSURE: 122 MMHG | HEART RATE: 60 BPM | RESPIRATION RATE: 16 BRPM | OXYGEN SATURATION: 98 %

## 2023-03-17 DIAGNOSIS — Z98.890 STATUS POST LUMBAR SURGERY: ICD-10-CM

## 2023-03-17 DIAGNOSIS — R35.0 URINE FREQUENCY: ICD-10-CM

## 2023-03-17 DIAGNOSIS — M54.31 RIGHT SIDED SCIATICA: Primary | ICD-10-CM

## 2023-03-17 DIAGNOSIS — R53.82 CHRONIC FATIGUE: ICD-10-CM

## 2023-03-17 DIAGNOSIS — R22.9 MULTIPLE SKIN NODULES: ICD-10-CM

## 2023-03-17 DIAGNOSIS — M35.3 PMR (POLYMYALGIA RHEUMATICA) (HCC): ICD-10-CM

## 2023-03-17 LAB
APPEARANCE: CLEAR
BILIRUBIN: NEGATIVE
GLUCOSE (URINE DIPSTICK): NEGATIVE MG/DL
LEUKOCYTES: NEGATIVE
MULTISTIX LOT#: ABNORMAL NUMERIC
NITRITE, URINE: NEGATIVE
OCCULT BLOOD: NEGATIVE
PH, URINE: 5.5 (ref 4.5–8)
PROTEIN (URINE DIPSTICK): 30 MG/DL
SPECIFIC GRAVITY: 1.02 (ref 1–1.03)
URINE-COLOR: YELLOW
UROBILINOGEN,SEMI-QN: 0.2 MG/DL (ref 0–1.9)

## 2023-03-17 PROCEDURE — 99214 OFFICE O/P EST MOD 30 MIN: CPT | Performed by: INTERNAL MEDICINE

## 2023-03-17 PROCEDURE — 87086 URINE CULTURE/COLONY COUNT: CPT | Performed by: INTERNAL MEDICINE

## 2023-03-17 PROCEDURE — 81003 URINALYSIS AUTO W/O SCOPE: CPT | Performed by: INTERNAL MEDICINE

## 2023-03-17 RX ORDER — HYDROCODONE BITARTRATE AND ACETAMINOPHEN 5; 325 MG/1; MG/1
1 TABLET ORAL EVERY 4 HOURS PRN
COMMUNITY
End: 2023-03-18

## 2023-03-17 RX ORDER — ACETAMINOPHEN 500 MG
250 TABLET ORAL 4 TIMES DAILY
COMMUNITY

## 2023-03-18 ENCOUNTER — TELEPHONE (OUTPATIENT)
Dept: INTERNAL MEDICINE CLINIC | Facility: CLINIC | Age: 83
End: 2023-03-18

## 2023-03-18 RX ORDER — HYDROCODONE BITARTRATE AND ACETAMINOPHEN 5; 325 MG/1; MG/1
TABLET ORAL
Refills: 0 | COMMUNITY
Start: 2023-03-18

## 2023-03-20 ENCOUNTER — TELEPHONE (OUTPATIENT)
Dept: INTERNAL MEDICINE CLINIC | Facility: CLINIC | Age: 83
End: 2023-03-20

## 2023-03-20 NOTE — TELEPHONE ENCOUNTER
S/w wife   Pt is having home physical therapy following spine surgery     Wife states pt is having Right knee pain during PT. Therapist suggested pt get on a supplement. Wife asking if pt can take Glucosamine Chondroitin or something similar.     To Dr Dagmar Briseno RN

## 2023-03-21 RX ORDER — POTASSIUM CHLORIDE 750 MG/1
TABLET, FILM COATED, EXTENDED RELEASE ORAL
Qty: 60 TABLET | Refills: 3 | Status: SHIPPED | OUTPATIENT
Start: 2023-03-21

## 2023-03-22 ENCOUNTER — NURSE ONLY (OUTPATIENT)
Dept: HEMATOLOGY/ONCOLOGY | Facility: HOSPITAL | Age: 83
End: 2023-03-22
Attending: INTERNAL MEDICINE
Payer: MEDICARE

## 2023-03-22 VITALS
SYSTOLIC BLOOD PRESSURE: 149 MMHG | DIASTOLIC BLOOD PRESSURE: 72 MMHG | RESPIRATION RATE: 18 BRPM | OXYGEN SATURATION: 99 % | HEART RATE: 64 BPM | TEMPERATURE: 98 F

## 2023-03-22 DIAGNOSIS — D63.1 ANEMIA DUE TO STAGE 4 CHRONIC KIDNEY DISEASE (HCC): ICD-10-CM

## 2023-03-22 DIAGNOSIS — N18.4 ANEMIA DUE TO STAGE 4 CHRONIC KIDNEY DISEASE (HCC): ICD-10-CM

## 2023-03-22 DIAGNOSIS — N18.4 CKD (CHRONIC KIDNEY DISEASE) STAGE 4, GFR 15-29 ML/MIN (HCC): Primary | ICD-10-CM

## 2023-03-22 DIAGNOSIS — M35.3 PMR (POLYMYALGIA RHEUMATICA) (HCC): ICD-10-CM

## 2023-03-22 DIAGNOSIS — N18.4 CKD (CHRONIC KIDNEY DISEASE) STAGE 4, GFR 15-29 ML/MIN (HCC): ICD-10-CM

## 2023-03-22 DIAGNOSIS — D64.89 ANEMIA DUE TO OTHER CAUSE, NOT CLASSIFIED: Primary | ICD-10-CM

## 2023-03-22 LAB
ALBUMIN SERPL-MCNC: 3.4 G/DL (ref 3.4–5)
ALBUMIN/GLOB SERPL: 1 {RATIO} (ref 1–2)
ALP LIVER SERPL-CCNC: 49 U/L
ALT SERPL-CCNC: 19 U/L
ANION GAP SERPL CALC-SCNC: 9 MMOL/L (ref 0–18)
AST SERPL-CCNC: 23 U/L (ref 15–37)
BASOPHILS # BLD AUTO: 0.05 X10(3) UL (ref 0–0.2)
BASOPHILS NFR BLD AUTO: 0.5 %
BILIRUB SERPL-MCNC: 0.4 MG/DL (ref 0.1–2)
BUN BLD-MCNC: 32 MG/DL (ref 7–18)
BUN/CREAT SERPL: 17.2 (ref 10–20)
CALCIUM BLD-MCNC: 9.4 MG/DL (ref 8.5–10.1)
CHLORIDE SERPL-SCNC: 105 MMOL/L (ref 98–112)
CO2 SERPL-SCNC: 28 MMOL/L (ref 21–32)
CREAT BLD-MCNC: 1.86 MG/DL
DEPRECATED HBV CORE AB SER IA-ACNC: 155.8 NG/ML
DEPRECATED RDW RBC AUTO: 51.1 FL (ref 35.1–46.3)
EOSINOPHIL # BLD AUTO: 0.4 X10(3) UL (ref 0–0.7)
EOSINOPHIL NFR BLD AUTO: 3.9 %
ERYTHROCYTE [DISTWIDTH] IN BLOOD BY AUTOMATED COUNT: 14.1 % (ref 11–15)
ERYTHROCYTE [SEDIMENTATION RATE] IN BLOOD: 11 MM/HR
GFR SERPLBLD BASED ON 1.73 SQ M-ARVRAT: 36 ML/MIN/1.73M2 (ref 60–?)
GLOBULIN PLAS-MCNC: 3.3 G/DL (ref 2.8–4.4)
GLUCOSE BLD-MCNC: 103 MG/DL (ref 70–99)
HCT VFR BLD AUTO: 31.6 %
HGB BLD-MCNC: 10.3 G/DL
IMM GRANULOCYTES # BLD AUTO: 0.06 X10(3) UL (ref 0–1)
IMM GRANULOCYTES NFR BLD: 0.6 %
IRON SATN MFR SERPL: 24 %
IRON SERPL-MCNC: 80 UG/DL
LYMPHOCYTES # BLD AUTO: 0.98 X10(3) UL (ref 1–4)
LYMPHOCYTES NFR BLD AUTO: 9.5 %
MCH RBC QN AUTO: 32.5 PG (ref 26–34)
MCHC RBC AUTO-ENTMCNC: 32.6 G/DL (ref 31–37)
MCV RBC AUTO: 99.7 FL
MONOCYTES # BLD AUTO: 1.33 X10(3) UL (ref 0.1–1)
MONOCYTES NFR BLD AUTO: 12.9 %
NEUTROPHILS # BLD AUTO: 7.46 X10 (3) UL (ref 1.5–7.7)
NEUTROPHILS # BLD AUTO: 7.46 X10(3) UL (ref 1.5–7.7)
NEUTROPHILS NFR BLD AUTO: 72.6 %
OSMOLALITY SERPL CALC.SUM OF ELEC: 301 MOSM/KG (ref 275–295)
PLATELET # BLD AUTO: 158 10(3)UL (ref 150–450)
POTASSIUM SERPL-SCNC: 4.6 MMOL/L (ref 3.5–5.1)
PROT SERPL-MCNC: 6.7 G/DL (ref 6.4–8.2)
RBC # BLD AUTO: 3.17 X10(6)UL
SODIUM SERPL-SCNC: 142 MMOL/L (ref 136–145)
TIBC SERPL-MCNC: 337 UG/DL (ref 240–450)
TRANSFERRIN SERPL-MCNC: 226 MG/DL (ref 200–360)
WBC # BLD AUTO: 10.3 X10(3) UL (ref 4–11)

## 2023-03-22 PROCEDURE — 36415 COLL VENOUS BLD VENIPUNCTURE: CPT

## 2023-03-22 PROCEDURE — 80053 COMPREHEN METABOLIC PANEL: CPT

## 2023-03-22 PROCEDURE — 99214 OFFICE O/P EST MOD 30 MIN: CPT | Performed by: INTERNAL MEDICINE

## 2023-03-22 PROCEDURE — 83540 ASSAY OF IRON: CPT

## 2023-03-22 PROCEDURE — 84466 ASSAY OF TRANSFERRIN: CPT

## 2023-03-22 PROCEDURE — 85025 COMPLETE CBC W/AUTO DIFF WBC: CPT

## 2023-03-22 PROCEDURE — 85652 RBC SED RATE AUTOMATED: CPT

## 2023-03-22 PROCEDURE — 82728 ASSAY OF FERRITIN: CPT

## 2023-03-22 RX ORDER — HYDROCODONE BITARTRATE AND ACETAMINOPHEN 5; 325 MG/1; MG/1
TABLET ORAL
Qty: 75 TABLET | Refills: 0 | Status: SHIPPED | OUTPATIENT
Start: 2023-03-22

## 2023-03-23 NOTE — PROGRESS NOTES
Rolo Gutierrez    PATIENT'S NAME: Aurelia Mauricio   ATTENDING PHYSICIAN: Norma Dozier MD   PATIENT ACCOUNT #: [de-identified] LOCATION: [facility] [address] RECORD #: [id number] YOB: 1940   DATE OF SERVICE: 03/22/2023       CANCER CENTER PROGRESS NOTE    CHIEF COMPLAINT:  Followup for history of multifactorial anemia. HISTORY OF PRESENT ILLNESS:  The patient is an 82-year-old male. He sees Dr. Demetrice Linder for his primary care. He has multifactorial anemia and we have helped to try to manage this over the years. He has had actually a couple of bone marrows to make sure he did not have multiple evidence of MDS and this has not been proven. He has had anemia of chronic kidney disease. He has also had an anemia of inflammation. He has been able to maintain his hemoglobin a little bit better. He also had thrombocytopenia at one point. I last saw him in December. After this, he had back surgery in late December. He had transient decline in some of his counts. His hemoglobin dropped into the 9. His platelets dropped as low as 68,000 afterward. He had some delayed healing of the wound in his back. He is chronically on prednisone for a diagnosis of presumed polymyalgia rheumatica. His wound is now healed. He is functioning a little bit better, though he still in a wheelchair some of the time. He is using a walker around the house. He just saw Dr. Demetrice Linder within the last week. His weight went down with the surgery. He feels as though it is stabilizing and he is starting to eat a little bit better. MEDICATIONS:  His current medications include Tylenol p.r.n.; amlodipine 2.5 mg daily; aspirin 81 mg daily; atorvastatin 10 mg nightly; calcium carbonate 500 mg b.i.d., carvedilol 12.5 mg twice daily;  Cerefolin 1 tablet Monday, Wednesday, Friday; vitamin D3 at 50,000 units twice monthly; coenzyme Q10 at 50 mg twice daily; duloxetine 60 mg daily; famotidine 20 mg 2 tablets twice daily; folic acid 1 mg daily; glucosamine-chondroitin sulfate b.i.d., hydrocodone which he takes sparingly half a tablet in the morning, half tablet at noon, half tablet at dinnertime and full tablet in the evening; lidocaine menthol external patch at night; magnesium citrate 100 mg b.i.d.; methocarbamol 250 mg t.i.d. p.r.n.; methotrexate 10 mg weekly; naloxone p.r.n.; nitroglycerin sublingual p.r.n.; Juice Plus with fiber daily; potassium chloride 10 mEq twice daily; prednisone 5 mg daily; probiotics daily; turmeric daily. PHYSICAL EXAMINATION:    GENERAL:  He is a thin relatively frail male in no acute distress. VITAL SIGNS:  His performance status is 2. His weight is not measurable with certainty. Today, it was measured at 142, but he feels it is probably a couple of pounds heavier. Blood pressure is 149/72, pulse 64, respiratory rate 20, temperature is 98.0. HEENT:  Unremarkable. LYMPHATICS:  He has no adenopathy. LUNGS:  Clear. HEART:  Normal.  ABDOMEN:  No hepatosplenomegaly or tenderness. EXTREMITIES:  He has no clubbing, cyanosis, or edema. LABORATORY DATA:  White count is 10.3; hemoglobin is up to 10.3; platelets are up to 294,467. His differential shows modest monocytosis. His iron saturation is 24%. He has had normal B12 levels in the past.    IMPRESSION:  Multifactorial anemia. Some of his anemia is related to chronic inflammation and some is related to his chronic kidney disease. His hemoglobin is over 10, so he does not need an erythrocyte stimulating agent. He has gotten a very occasional dose of this in the past.  His counts dropped with the surgery and these have recovered. I have suggested he just continue to get periodic blood work done. He understands I am going to be retiring and I am transitioning his care at [facility] to Dr. Trever Granados who is taking over as medical director. I have asked him to see him in June with repeat labs.      Dictated By Chadd Jaramillo, MD  d: 03/22/2023 17:21:24  t: 03/22/2023 19:50:03  [facility] [id number]  /    cc: Dr. Jordi Dunn MD

## 2023-03-27 ENCOUNTER — OFFICE VISIT (OUTPATIENT)
Dept: RHEUMATOLOGY | Facility: CLINIC | Age: 83
End: 2023-03-27
Payer: MEDICARE

## 2023-03-27 VITALS
TEMPERATURE: 98 F | RESPIRATION RATE: 19 BRPM | SYSTOLIC BLOOD PRESSURE: 159 MMHG | OXYGEN SATURATION: 99 % | HEART RATE: 67 BPM | WEIGHT: 139.81 LBS | BODY MASS INDEX: 20 KG/M2 | DIASTOLIC BLOOD PRESSURE: 81 MMHG

## 2023-03-27 DIAGNOSIS — D89.89 KAPPA LIGHT CHAIN DISEASE (HCC): ICD-10-CM

## 2023-03-27 DIAGNOSIS — M35.3 PMR (POLYMYALGIA RHEUMATICA) (HCC): Primary | ICD-10-CM

## 2023-03-27 DIAGNOSIS — Z98.890 HISTORY OF BACK SURGERY: ICD-10-CM

## 2023-03-27 DIAGNOSIS — R29.898 WEAKNESS OF RIGHT LOWER EXTREMITY: ICD-10-CM

## 2023-03-27 DIAGNOSIS — N18.4 CKD (CHRONIC KIDNEY DISEASE) STAGE 4, GFR 15-29 ML/MIN (HCC): ICD-10-CM

## 2023-03-27 PROCEDURE — 99214 OFFICE O/P EST MOD 30 MIN: CPT | Performed by: INTERNAL MEDICINE

## 2023-03-27 RX ORDER — HYDROCHLOROTHIAZIDE 50 MG/1
50 TABLET ORAL DAILY PRN
COMMUNITY

## 2023-03-27 NOTE — PATIENT INSTRUCTIONS
Current plan continue methotrexate 4 tablets/week for polymyalgia rheumatica. Continue folic acid 1 mg a day. Continue prednisone at 5 mg/day. Continue physical therapy and home exercises as best you can. Try to build up your strength in the right leg. Right leg remains weak and painful following back surgery. This will take a few more months to start slowly improving. If there is mild pain you can take extra strength Tylenol 500 mg 1 or 2 twice a day. Current labs are basically stable. You do have mild renal insufficiency with a creatinine of 1.8. Normal renal function is 1.0. Your sed rate was good at 11. You also have mild anemia with a hemoglobin 10.3. Continue eating your low-fat diet, [place] diet. Stick with eating fresh fruit fresh vegetables and lean cuts of meat. Eventually your will weight gain will occur with this Mediterranean diet. Return office for recheck 3 months with recheck of labs.

## 2023-04-03 PROBLEM — Z86.010 PERSONAL HISTORY OF COLONIC POLYPS: Status: ACTIVE | Noted: 2020-01-17

## 2023-04-03 PROBLEM — Z95.2 PRESENCE OF PROSTHETIC HEART VALVE: Status: ACTIVE | Noted: 2020-01-17

## 2023-04-03 PROBLEM — M85.80 OTHER SPECIFIED DISORDERS OF BONE DENSITY AND STRUCTURE, UNSPECIFIED SITE: Status: ACTIVE | Noted: 2020-01-17

## 2023-04-03 PROBLEM — K44.9 DIAPHRAGMATIC HERNIA WITHOUT OBSTRUCTION OR GANGRENE: Status: ACTIVE | Noted: 2020-01-17

## 2023-04-03 PROBLEM — Z87.891 PERSONAL HISTORY OF NICOTINE DEPENDENCE: Status: ACTIVE | Noted: 2020-01-17

## 2023-04-03 PROBLEM — Z95.1 PRESENCE OF AORTOCORONARY BYPASS GRAFT: Status: ACTIVE | Noted: 2020-01-17

## 2023-04-03 PROBLEM — Z79.52 LONG TERM (CURRENT) USE OF SYSTEMIC STEROIDS: Status: ACTIVE | Noted: 2020-01-17

## 2023-04-03 PROBLEM — Z91.81 HISTORY OF FALLING: Status: ACTIVE | Noted: 2020-01-17

## 2023-04-03 PROBLEM — Z86.0100 PERSONAL HISTORY OF COLONIC POLYPS: Status: ACTIVE | Noted: 2020-01-17

## 2023-04-14 ENCOUNTER — OFFICE VISIT (OUTPATIENT)
Dept: INTERNAL MEDICINE CLINIC | Facility: CLINIC | Age: 83
End: 2023-04-14
Payer: MEDICARE

## 2023-04-14 VITALS
WEIGHT: 140 LBS | DIASTOLIC BLOOD PRESSURE: 68 MMHG | RESPIRATION RATE: 18 BRPM | TEMPERATURE: 98 F | BODY MASS INDEX: 20 KG/M2 | HEART RATE: 74 BPM | SYSTOLIC BLOOD PRESSURE: 140 MMHG | OXYGEN SATURATION: 97 %

## 2023-04-14 DIAGNOSIS — M54.31 RIGHT SIDED SCIATICA: ICD-10-CM

## 2023-04-14 DIAGNOSIS — S22.080D COMPRESSION FRACTURE OF T12 VERTEBRA WITH ROUTINE HEALING, SUBSEQUENT ENCOUNTER: ICD-10-CM

## 2023-04-14 DIAGNOSIS — Z98.890 STATUS POST LUMBAR SURGERY: ICD-10-CM

## 2023-04-14 DIAGNOSIS — R53.82 CHRONIC FATIGUE: ICD-10-CM

## 2023-04-14 DIAGNOSIS — E44.0 MODERATE PROTEIN-CALORIE MALNUTRITION (HCC): Primary | ICD-10-CM

## 2023-04-14 DIAGNOSIS — M35.3 PMR (POLYMYALGIA RHEUMATICA) (HCC): ICD-10-CM

## 2023-04-14 PROCEDURE — 1125F AMNT PAIN NOTED PAIN PRSNT: CPT | Performed by: INTERNAL MEDICINE

## 2023-04-14 PROCEDURE — 99214 OFFICE O/P EST MOD 30 MIN: CPT | Performed by: INTERNAL MEDICINE

## 2023-04-14 RX ORDER — TAMSULOSIN HYDROCHLORIDE 0.4 MG/1
0.4 CAPSULE ORAL
COMMUNITY
End: 2023-04-16

## 2023-04-14 RX ORDER — HYDROCODONE BITARTRATE AND ACETAMINOPHEN 5; 325 MG/1; MG/1
TABLET ORAL
Qty: 45 TABLET | Refills: 0 | Status: SHIPPED | OUTPATIENT
Start: 2023-04-17

## 2023-04-14 RX ORDER — HYDROCODONE BITARTRATE AND ACETAMINOPHEN 5; 325 MG/1; MG/1
TABLET ORAL
Qty: 45 TABLET | Refills: 0 | Status: CANCELLED | OUTPATIENT
Start: 2023-04-17

## 2023-04-16 RX ORDER — TAMSULOSIN HYDROCHLORIDE 0.4 MG/1
0.4 CAPSULE ORAL NIGHTLY
Qty: 30 CAPSULE | Refills: 0 | COMMUNITY
Start: 2023-04-16

## 2023-04-17 RX ORDER — OLMESARTAN MEDOXOMIL 40 MG/1
40 TABLET ORAL DAILY
Qty: 90 TABLET | Refills: 0 | Status: SHIPPED | OUTPATIENT
Start: 2023-04-17

## 2023-04-17 RX ORDER — PREDNISONE 10 MG/1
TABLET ORAL
Qty: 30 TABLET | Refills: 3 | Status: SHIPPED | OUTPATIENT
Start: 2023-04-17

## 2023-05-05 RX ORDER — CARVEDILOL 12.5 MG/1
TABLET ORAL
Qty: 180 TABLET | Refills: 0 | Status: SHIPPED | OUTPATIENT
Start: 2023-05-05

## 2023-05-09 ENCOUNTER — OFFICE VISIT (OUTPATIENT)
Dept: SURGERY | Facility: CLINIC | Age: 83
End: 2023-05-09
Payer: MEDICARE

## 2023-05-09 VITALS
HEART RATE: 60 BPM | SYSTOLIC BLOOD PRESSURE: 110 MMHG | HEIGHT: 70 IN | DIASTOLIC BLOOD PRESSURE: 72 MMHG | BODY MASS INDEX: 20.04 KG/M2 | WEIGHT: 140 LBS

## 2023-05-09 DIAGNOSIS — Z98.890 POST-OPERATIVE STATE: Primary | ICD-10-CM

## 2023-05-09 DIAGNOSIS — M79.604 RIGHT LEG PAIN: ICD-10-CM

## 2023-05-09 DIAGNOSIS — Z98.890 S/P LUMBAR SPINE OPERATION: ICD-10-CM

## 2023-05-09 DIAGNOSIS — R29.898 WEAKNESS OF LOWER EXTREMITY, UNSPECIFIED LATERALITY: ICD-10-CM

## 2023-05-09 DIAGNOSIS — Z48.89 ENCOUNTER FOR POSTOPERATIVE WOUND CHECK: ICD-10-CM

## 2023-05-09 PROCEDURE — 99214 OFFICE O/P EST MOD 30 MIN: CPT | Performed by: STUDENT IN AN ORGANIZED HEALTH CARE EDUCATION/TRAINING PROGRAM

## 2023-05-09 PROCEDURE — 1126F AMNT PAIN NOTED NONE PRSNT: CPT | Performed by: STUDENT IN AN ORGANIZED HEALTH CARE EDUCATION/TRAINING PROGRAM

## 2023-05-12 RX ORDER — METHOCARBAMOL 500 MG/1
250 TABLET, FILM COATED ORAL 3 TIMES DAILY
Qty: 45 TABLET | Refills: 0 | COMMUNITY
Start: 2023-05-12

## 2023-05-15 ENCOUNTER — TELEPHONE (OUTPATIENT)
Dept: INTERNAL MEDICINE CLINIC | Facility: CLINIC | Age: 83
End: 2023-05-15

## 2023-05-15 ENCOUNTER — OFFICE VISIT (OUTPATIENT)
Dept: INTERNAL MEDICINE CLINIC | Facility: CLINIC | Age: 83
End: 2023-05-15
Payer: MEDICARE

## 2023-05-15 VITALS
BODY MASS INDEX: 21 KG/M2 | RESPIRATION RATE: 17 BRPM | OXYGEN SATURATION: 99 % | HEART RATE: 66 BPM | SYSTOLIC BLOOD PRESSURE: 134 MMHG | WEIGHT: 145 LBS | TEMPERATURE: 98 F | DIASTOLIC BLOOD PRESSURE: 82 MMHG

## 2023-05-15 DIAGNOSIS — R53.82 CHRONIC FATIGUE: ICD-10-CM

## 2023-05-15 DIAGNOSIS — N32.81 OAB (OVERACTIVE BLADDER): ICD-10-CM

## 2023-05-15 DIAGNOSIS — Z95.2 S/P AVR: ICD-10-CM

## 2023-05-15 DIAGNOSIS — E44.0 MODERATE PROTEIN-CALORIE MALNUTRITION (HCC): ICD-10-CM

## 2023-05-15 DIAGNOSIS — M35.3 PMR (POLYMYALGIA RHEUMATICA) (HCC): Primary | ICD-10-CM

## 2023-05-15 DIAGNOSIS — G47.01 INSOMNIA DUE TO MEDICAL CONDITION: ICD-10-CM

## 2023-05-15 PROCEDURE — 99214 OFFICE O/P EST MOD 30 MIN: CPT | Performed by: INTERNAL MEDICINE

## 2023-05-15 PROCEDURE — 1125F AMNT PAIN NOTED PAIN PRSNT: CPT | Performed by: INTERNAL MEDICINE

## 2023-05-15 RX ORDER — HYDROCODONE BITARTRATE AND ACETAMINOPHEN 5; 325 MG/1; MG/1
0.5 TABLET ORAL 2 TIMES DAILY
Qty: 30 TABLET | Refills: 0 | Status: SHIPPED | OUTPATIENT
Start: 2023-05-20

## 2023-05-15 RX ORDER — FOLIC ACID 1 MG/1
1 TABLET ORAL
COMMUNITY
Start: 2023-05-16

## 2023-05-15 RX ORDER — PREDNISONE 5 MG/1
5 TABLET ORAL DAILY
COMMUNITY

## 2023-05-15 RX ORDER — SENNOSIDES 8.6 MG
8.6 TABLET ORAL NIGHTLY PRN
COMMUNITY

## 2023-05-15 RX ORDER — TOLTERODINE TARTRATE 2 MG/1
2 TABLET, EXTENDED RELEASE ORAL NIGHTLY
Qty: 30 TABLET | Refills: 0 | Status: SHIPPED | OUTPATIENT
Start: 2023-05-15

## 2023-05-15 NOTE — TELEPHONE ENCOUNTER
Spouse informed of results for urine specimen with trace of protein and not to be concerned as per Dr. Jay Borja.

## 2023-05-19 ENCOUNTER — TELEPHONE (OUTPATIENT)
Dept: INTERNAL MEDICINE CLINIC | Facility: CLINIC | Age: 83
End: 2023-05-19

## 2023-05-19 NOTE — TELEPHONE ENCOUNTER
S/w pt;s wife   Medicare will not cover Shingrix vaccine in the office. Will cover at local pharmacy.  Instructed to call to schedule an appt    verb'd understanding  Tona MOREIRA

## 2023-05-19 NOTE — TELEPHONE ENCOUNTER
Patient would like to come in today to have his shingles vaccine. Please place order and call patient to schedule.

## 2023-05-22 ENCOUNTER — TELEPHONE (OUTPATIENT)
Dept: INTERNAL MEDICINE CLINIC | Facility: CLINIC | Age: 83
End: 2023-05-22

## 2023-05-22 NOTE — TELEPHONE ENCOUNTER
Brittnee Castillo would like to speak to [facility] regarding Camacho's Shingles shot and Pneumonia shot that he is scheduled to get. She feels he should wait more that 2 weeks after the shingles shot to get the pneumonia shot since he slept for 3 days after his last shingles shot. Please call.

## 2023-06-08 ENCOUNTER — TELEPHONE (OUTPATIENT)
Dept: INTERNAL MEDICINE CLINIC | Facility: CLINIC | Age: 83
End: 2023-06-08

## 2023-06-08 NOTE — TELEPHONE ENCOUNTER
Wife called and would like to speak with [facility] tomorrow morning regarding her  not sleeping and talk about increasing one of his medications.

## 2023-06-09 RX ORDER — TOLTERODINE TARTRATE 2 MG/1
4 TABLET, EXTENDED RELEASE ORAL NIGHTLY
Qty: 60 TABLET | Refills: 3 | Status: SHIPPED | OUTPATIENT
Start: 2023-06-09 | End: 2023-07-09

## 2023-06-09 NOTE — TELEPHONE ENCOUNTER
Phone call. Was prescribed Detrol 2mg to take one hour before hs to help with frequent nighttime urination. Did help initially, but, has stopped working. Gets up 5-6 times at night to urinate. Could he take a higher dose?

## 2023-06-09 NOTE — TELEPHONE ENCOUNTER
Spoke with patients spouse. Will increase Detrol to 40mg before bed and scheduled phone visit next week to discuss.

## 2023-06-12 ENCOUNTER — OFFICE VISIT (OUTPATIENT)
Dept: SURGERY | Facility: CLINIC | Age: 83
End: 2023-06-12
Payer: MEDICARE

## 2023-06-12 VITALS
BODY MASS INDEX: 20.04 KG/M2 | HEART RATE: 70 BPM | HEIGHT: 70 IN | WEIGHT: 140 LBS | DIASTOLIC BLOOD PRESSURE: 76 MMHG | SYSTOLIC BLOOD PRESSURE: 112 MMHG

## 2023-06-12 DIAGNOSIS — R29.898 WEAKNESS OF RIGHT LOWER EXTREMITY: ICD-10-CM

## 2023-06-12 DIAGNOSIS — M47.816 LUMBAR SPONDYLOSIS: ICD-10-CM

## 2023-06-12 DIAGNOSIS — M54.16 LUMBAR RADICULOPATHY: ICD-10-CM

## 2023-06-12 DIAGNOSIS — Z98.890 POST-OPERATIVE STATE: Primary | ICD-10-CM

## 2023-06-12 DIAGNOSIS — R29.898 WEAKNESS OF LOWER EXTREMITY, UNSPECIFIED LATERALITY: ICD-10-CM

## 2023-06-12 DIAGNOSIS — Z98.890 S/P LUMBAR SPINE OPERATION: ICD-10-CM

## 2023-06-12 DIAGNOSIS — Z48.89 ENCOUNTER FOR POSTOPERATIVE WOUND CHECK: ICD-10-CM

## 2023-06-12 DIAGNOSIS — M79.604 RIGHT LEG PAIN: ICD-10-CM

## 2023-06-12 PROCEDURE — 99214 OFFICE O/P EST MOD 30 MIN: CPT | Performed by: STUDENT IN AN ORGANIZED HEALTH CARE EDUCATION/TRAINING PROGRAM

## 2023-06-12 RX ORDER — CLINDAMYCIN HYDROCHLORIDE 150 MG/1
150 CAPSULE ORAL 3 TIMES DAILY
COMMUNITY
Start: 2023-06-10

## 2023-06-21 ENCOUNTER — APPOINTMENT (OUTPATIENT)
Dept: HEMATOLOGY/ONCOLOGY | Facility: HOSPITAL | Age: 83
End: 2023-06-21
Attending: SPECIALIST
Payer: MEDICARE

## 2023-07-05 ENCOUNTER — APPOINTMENT (OUTPATIENT)
Dept: HEMATOLOGY/ONCOLOGY | Facility: HOSPITAL | Age: 83
End: 2023-07-05
Attending: INTERNAL MEDICINE
Payer: MEDICARE

## 2023-07-05 ENCOUNTER — OFFICE VISIT (OUTPATIENT)
Dept: PHYSICAL THERAPY | Facility: HOSPITAL | Age: 83
End: 2023-07-05
Attending: STUDENT IN AN ORGANIZED HEALTH CARE EDUCATION/TRAINING PROGRAM
Payer: MEDICARE

## 2023-07-05 DIAGNOSIS — Z48.89 ENCOUNTER FOR POSTOPERATIVE WOUND CHECK: ICD-10-CM

## 2023-07-05 DIAGNOSIS — M79.604 RIGHT LEG PAIN: ICD-10-CM

## 2023-07-05 DIAGNOSIS — Z98.890 S/P LUMBAR SPINE OPERATION: ICD-10-CM

## 2023-07-05 DIAGNOSIS — R29.898 WEAKNESS OF LOWER EXTREMITY, UNSPECIFIED LATERALITY: ICD-10-CM

## 2023-07-05 DIAGNOSIS — Z98.890 POST-OPERATIVE STATE: Primary | ICD-10-CM

## 2023-07-05 PROCEDURE — 97110 THERAPEUTIC EXERCISES: CPT

## 2023-07-05 PROCEDURE — 97161 PT EVAL LOW COMPLEX 20 MIN: CPT

## 2023-07-07 ENCOUNTER — OFFICE VISIT (OUTPATIENT)
Dept: HEMATOLOGY/ONCOLOGY | Facility: HOSPITAL | Age: 83
End: 2023-07-07
Attending: INTERNAL MEDICINE
Payer: MEDICARE

## 2023-07-07 VITALS
OXYGEN SATURATION: 98 % | WEIGHT: 144 LBS | HEART RATE: 69 BPM | BODY MASS INDEX: 20.62 KG/M2 | RESPIRATION RATE: 18 BRPM | HEIGHT: 70 IN | DIASTOLIC BLOOD PRESSURE: 72 MMHG | SYSTOLIC BLOOD PRESSURE: 133 MMHG | TEMPERATURE: 98 F

## 2023-07-07 DIAGNOSIS — D50.0 IRON DEFICIENCY ANEMIA DUE TO CHRONIC BLOOD LOSS: Primary | ICD-10-CM

## 2023-07-07 DIAGNOSIS — D64.89 ANEMIA DUE TO OTHER CAUSE, NOT CLASSIFIED: ICD-10-CM

## 2023-07-07 DIAGNOSIS — D63.1 ANEMIA DUE TO STAGE 4 CHRONIC KIDNEY DISEASE: ICD-10-CM

## 2023-07-07 DIAGNOSIS — D50.0 IRON DEFICIENCY ANEMIA DUE TO CHRONIC BLOOD LOSS: ICD-10-CM

## 2023-07-07 DIAGNOSIS — N18.4 ANEMIA DUE TO STAGE 4 CHRONIC KIDNEY DISEASE: ICD-10-CM

## 2023-07-07 LAB
BASOPHILS # BLD AUTO: 0.05 X10(3) UL (ref 0–0.2)
BASOPHILS NFR BLD AUTO: 0.4 %
DEPRECATED HBV CORE AB SER IA-ACNC: 100.5 NG/ML
DEPRECATED RDW RBC AUTO: 49.4 FL (ref 35.1–46.3)
EOSINOPHIL # BLD AUTO: 0.85 X10(3) UL (ref 0–0.7)
EOSINOPHIL NFR BLD AUTO: 7 %
ERYTHROCYTE [DISTWIDTH] IN BLOOD BY AUTOMATED COUNT: 13.6 % (ref 11–15)
HCT VFR BLD AUTO: 32.8 %
HGB BLD-MCNC: 10.7 G/DL
IMM GRANULOCYTES # BLD AUTO: 0.06 X10(3) UL (ref 0–1)
IMM GRANULOCYTES NFR BLD: 0.5 %
IRON SATN MFR SERPL: 24 %
IRON SERPL-MCNC: 75 UG/DL
LYMPHOCYTES # BLD AUTO: 1.03 X10(3) UL (ref 1–4)
LYMPHOCYTES NFR BLD AUTO: 8.5 %
MCH RBC QN AUTO: 33 PG (ref 26–34)
MCHC RBC AUTO-ENTMCNC: 32.6 G/DL (ref 31–37)
MCV RBC AUTO: 101.2 FL
MONOCYTES # BLD AUTO: 1.57 X10(3) UL (ref 0.1–1)
MONOCYTES NFR BLD AUTO: 13 %
NEUTROPHILS # BLD AUTO: 8.53 X10 (3) UL (ref 1.5–7.7)
NEUTROPHILS # BLD AUTO: 8.53 X10(3) UL (ref 1.5–7.7)
NEUTROPHILS NFR BLD AUTO: 70.6 %
PLATELET # BLD AUTO: 134 10(3)UL (ref 150–450)
RBC # BLD AUTO: 3.24 X10(6)UL
TIBC SERPL-MCNC: 311 UG/DL (ref 240–450)
TRANSFERRIN SERPL-MCNC: 209 MG/DL (ref 200–360)
WBC # BLD AUTO: 12.1 X10(3) UL (ref 4–11)

## 2023-07-07 PROCEDURE — 99213 OFFICE O/P EST LOW 20 MIN: CPT | Performed by: SPECIALIST

## 2023-07-07 PROCEDURE — 85025 COMPLETE CBC W/AUTO DIFF WBC: CPT

## 2023-07-07 PROCEDURE — 84466 ASSAY OF TRANSFERRIN: CPT

## 2023-07-07 PROCEDURE — 36415 COLL VENOUS BLD VENIPUNCTURE: CPT

## 2023-07-07 PROCEDURE — 82728 ASSAY OF FERRITIN: CPT

## 2023-07-07 PROCEDURE — 83540 ASSAY OF IRON: CPT

## 2023-07-11 ENCOUNTER — OFFICE VISIT (OUTPATIENT)
Dept: PHYSICAL THERAPY | Facility: HOSPITAL | Age: 83
End: 2023-07-11
Attending: STUDENT IN AN ORGANIZED HEALTH CARE EDUCATION/TRAINING PROGRAM
Payer: MEDICARE

## 2023-07-11 PROCEDURE — 97116 GAIT TRAINING THERAPY: CPT

## 2023-07-11 PROCEDURE — 97110 THERAPEUTIC EXERCISES: CPT

## 2023-07-11 PROCEDURE — 97112 NEUROMUSCULAR REEDUCATION: CPT

## 2023-07-11 NOTE — PROGRESS NOTES
Diagnosis:   Post-operative state (Z98.890)  Encounter for postoperative wound check (Z48.89)  S/P lumbar spine operation (D21.171)  Weakness of lower extremity, unspecified laterality (R29.898)  Right leg pain (M79.604)      Referring Provider: Jaswant Hudson MD (neurosurgery)   Date of Evaluation:    2023    Precautions:  osteopenia, hx of fx, HTN, CKD, sjogren's, fall risk Next MD visit:   none scheduled  Date of Surgery: 22     Insurance Primary/Secondary: [employer] / [employer] PPO     # Auth Visits: na            Subjective: Feeling well. Has been doing HEP. RLE weakness continues.     Pain: 0/10; 5-6/10 (R groin with hip flexion)      Objective:   Seated: L anterior pelvis; with correction L femur is still longer than R    Taken from IE:  Observation/Posture: forward slumped posture with severe thoracic hyperkyphosis and lumbar kyphosis in sitting and standing  Elevated L iliac crest and PSIS  Stands with L knee flexed d/t leg length discrepancy (LLE longer than RLE)  No pelvic obliquity or scoliosis seen in sitting     Neuro Screen: negative  Sensation: Grossly intact to light touch BLEs    Coordination:   Heel-to-Shin: WNL - R limited d/t HF strength  Toe Tap: WNL (initially decreased accuracy)    5xSTS: 15 seconds, with UE support, unable to perform without UE   Able to complete sit<>stand without UE from elevated surface    TELLEZ/56 - high fall risk    Trunk AROM: (* denotes performed with pain)  Flexion: 75% - limited d/t kyphotic posture  Extension: 0% - able to reach neutral with assistance  Rotation: R 25%; L 25%    Hip ROM:   Flexion: 90 deg (PROM supine) R  ER: 55 deg (PROM supine) R  IR: 25 deg (PROM supine) R    Accessory motion: hypomobile spine - globally   Hypomobile R hip AP    Strength: (* denotes performed with pain)  LE   Hip flexion (L2): R 3-/5; L 4+/5  Hip abduction: R 3-/5; L 4/5  Hip Extension: R 3+/5; L 4/5   Hip ER: R 3-/5; L 4/5    Knee Flexion: R 5/5; L 5/5   Knee extension (L3): R 4+/5; L 5/5   DF (L4): R 5-/5; L 5/5     Flexibility: (mild, moderate, severe tightness)  LE   Hip Flexor: R mild, L mild  Hamstrings: R WNL; L WNL  Piriformis: R WNL; L WNL  Quads: R WNL; L WNL       Special tests:   NA    Gait: pt ambulates on level ground with assistive device of rollator, decreased hip ext R with increased use of momentum needed to swing RLE forward, stooped posture/forward lean    Balance: DL: 1 minutes, back of knees on plinth after approx 30 seconds; SLS R unable, L unable   Trendelenburg RLE stance    Assessment: Significant RLE weakness limiting hip flexion, abduction ER. Buckling with SBQC but good reaction time. Will continue to progress functional strengthening to improve mobility and participation in daily activities. Goals:   Goals: (to be met in 12-16 visits)   Pt will demonstrate improved SLS to >3 seconds ARELI to promote safety and decrease risk of falls on uneven surfaces such as grass and gravel   Pt will improve TELLEZ balance scale score by at least 5 points in order to demonstrates decreased fall risk  Pt will be able to squat to  light objects around the house without loss of stability  Pt will ambulate with least restrictive AD for 6 mins in order to improve upright tolerance needed for household ambulation/daily tasks  Pt will improve functional hip strength to demonstrate ability to ascend/descend 1 flight of stairs reciprocally with use of handrail   Pt will be independent and compliant with comprehensive HEP to maintain progress achieved in PT       Plan: Patient will be seen for 1-2 x/week or a total of 12-16 visits over a 90 day period.  Treatment will include: Gait training, Manual Therapy, Neuromuscular Re-education, Therapeutic Activities, Therapeutic Exercise, and Home Exercise Program instruction  Date: 7/11/2023  TX#: 2/12-16 Date:                 TX#: 3/ Date:                 TX#: 4/ Date:                 TX#: 5/ Date: Tx#: 6/   Gait: 15 mins  - ambulation with RW  - ambulation with SBQC (heavy), x2 laps  - SBQC 2-cone weaving x1 lap         TE: 15 mins  - sit<>stand, hands on knees, elevated plinth, x10  - seated HS curls, GTB, 2x10 R (blocking HF)  - supine heel slides, x20 R  - PROM R hip         NR: 15 mins  - sit<>stand with LLE on airex, hands on knees, elevated plinth, 2x5  - seated cone taps (for hip flexion), 2x10  - seated hip flexion, 2x10 R (1 inch approx)  - supine SKTC on SB, x20 (guidance by PT x10)              HEP: heel slides, SLR, bridges, sit<>stand, walking routine    Charges: 1GT, 1TE, 1MT       Total Timed Treatment: 45 min  Total Treatment Time: 45 min

## 2023-07-12 ENCOUNTER — NURSE ONLY (OUTPATIENT)
Dept: INTERNAL MEDICINE CLINIC | Facility: CLINIC | Age: 83
End: 2023-07-12
Payer: MEDICARE

## 2023-07-12 DIAGNOSIS — Z00.00 LABORATORY EXAMINATION ORDERED AS PART OF A ROUTINE GENERAL MEDICAL EXAMINATION: ICD-10-CM

## 2023-07-12 DIAGNOSIS — M35.3 PMR (POLYMYALGIA RHEUMATICA) (HCC): Primary | ICD-10-CM

## 2023-07-12 LAB
AMB EXT CHLORIDE: 103
AMB EXT CHOL/HDL RATIO: 2.5
AMB EXT CHOLESTEROL, TOTAL: 158 MG/DL
AMB EXT CMP ALT: 17 U/L
AMB EXT CMP AST: 29 U/L
AMB EXT GLUCOSE: 91 MG/DL
AMB EXT HDL CHOLESTEROL: 62 MG/DL
AMB EXT HEMATOCRIT: 32.5
AMB EXT HEMOGLOBIN: 10.9
AMB EXT HGBA1C: 4.9 %
AMB EXT LDL CHOLESTEROL, DIRECT: 76 MG/DL
AMB EXT MCV: 98.8
AMB EXT NON HDL CHOL: 96 MG/DL
AMB EXT POSTASSIUM: 4.5 MMOL/L
AMB EXT PSA SCREEN: 0.72 NG/ML
AMB EXT SODIUM: 140 MMOL/L
AMB EXT TRIGLYCERIDES: 117 MG/DL
AMB EXT TSH: 2.07 MIU/ML
AMB EXT WBC: 10.3 X10(3)UL
BILIRUB UR QL STRIP.AUTO: NEGATIVE
COLOR UR AUTO: YELLOW
ERYTHROCYTE [SEDIMENTATION RATE] IN BLOOD: 15 MM/HR
GLUCOSE UR STRIP.AUTO-MCNC: NEGATIVE MG/DL
KETONES UR STRIP.AUTO-MCNC: NEGATIVE MG/DL
NITRITE UR QL STRIP.AUTO: NEGATIVE
PH UR STRIP.AUTO: 5 [PH] (ref 5–8)
PROT UR STRIP.AUTO-MCNC: 100 MG/DL
RBC UR QL AUTO: NEGATIVE
SP GR UR STRIP.AUTO: 1.02 (ref 1–1.03)
UROBILINOGEN UR STRIP.AUTO-MCNC: <2 MG/DL
WBC #/AREA URNS AUTO: >50 /HPF

## 2023-07-12 PROCEDURE — 92551 PURE TONE HEARING TEST AIR: CPT | Performed by: INTERNAL MEDICINE

## 2023-07-12 PROCEDURE — 81001 URINALYSIS AUTO W/SCOPE: CPT | Performed by: INTERNAL MEDICINE

## 2023-07-12 PROCEDURE — 93923 UPR/LXTR ART STDY 3+ LVLS: CPT | Performed by: INTERNAL MEDICINE

## 2023-07-12 PROCEDURE — 99173 VISUAL ACUITY SCREEN: CPT | Performed by: INTERNAL MEDICINE

## 2023-07-12 PROCEDURE — 85652 RBC SED RATE AUTOMATED: CPT | Performed by: INTERNAL MEDICINE

## 2023-07-12 NOTE — PROGRESS NOTES
VENIPUNCTURE:left arm 1 stick landed    ADD-ON TEST:    EKG:    SPIROMETRY:    URINE: yes      VISION: [address]     Right Eye 20/30      Left Eye 20/30     Both Eyes: 20/20      BALTAZAR:      Right Tibial Foot ___ divided by highest arm ___ = ___       Right Dorsal Foot ___ divided by highest arm ___ = ___       Left Tibial Foot ___ divided by highest arm ___ = ___       Left Dorsal Foot ___ divided by highest arm ___ = ___      ARM:   Right Brachial-     Left Brachial-      FOOT:   Right Tibial (Side)-    Right Dorsal (Top)-      Left Tibial (Side)-    Left Dorsal (Top)-      Greater than 1.3: results not reliable  1.01 to 1.3: correlated with history, especially if the patient has diabetes  0.97 to 1: normal  0.8 to 0.96: mild ischemia  0.4 to 0.79: moderate to severe ischemia  0.39 or less: severe ischemia; danger of limb loss

## 2023-07-14 RX ORDER — POTASSIUM CHLORIDE 750 MG/1
10 TABLET, FILM COATED, EXTENDED RELEASE ORAL 2 TIMES DAILY
Qty: 60 TABLET | Refills: 3 | Status: SHIPPED | OUTPATIENT
Start: 2023-07-14

## 2023-07-17 RX ORDER — POTASSIUM CHLORIDE 750 MG/1
10 TABLET, FILM COATED, EXTENDED RELEASE ORAL 2 TIMES DAILY
Qty: 60 TABLET | Refills: 0 | OUTPATIENT
Start: 2023-07-17

## 2023-07-18 ENCOUNTER — OFFICE VISIT (OUTPATIENT)
Dept: PHYSICAL THERAPY | Facility: HOSPITAL | Age: 83
End: 2023-07-18
Attending: STUDENT IN AN ORGANIZED HEALTH CARE EDUCATION/TRAINING PROGRAM
Payer: MEDICARE

## 2023-07-18 PROCEDURE — 97112 NEUROMUSCULAR REEDUCATION: CPT

## 2023-07-18 PROCEDURE — 97110 THERAPEUTIC EXERCISES: CPT

## 2023-07-18 PROCEDURE — 97116 GAIT TRAINING THERAPY: CPT

## 2023-07-18 NOTE — PROGRESS NOTES
Diagnosis:   Post-operative state (Z98.890)  Encounter for postoperative wound check (Z48.89)  S/P lumbar spine operation (Z53.416)  Weakness of lower extremity, unspecified laterality (R29.898)  Right leg pain (M79.604)      Referring Provider: Melinda Bone MD (neurosurgery)   Date of Evaluation:    2023    Precautions:  osteopenia, hx of fx, HTN, CKD, sjogren's, fall risk Next MD visit:   none scheduled  Date of Surgery: 22     Insurance Primary/Secondary: MEDICARE / [employer]     # Auth Visits: na            Subjective: No new complaints.     Pain: 0/10; 5-6/10 (R groin with hip flexion)      Objective:   Seated: L anterior pelvis; with correction L femur is still longer than R    Taken from IE:  Observation/Posture: forward slumped posture with severe thoracic hyperkyphosis and lumbar kyphosis in sitting and standing  Elevated L iliac crest and PSIS  Stands with L knee flexed d/t leg length discrepancy (LLE longer than RLE)  No pelvic obliquity or scoliosis seen in sitting     Neuro Screen: negative  Sensation: Grossly intact to light touch BLEs    Coordination:   Heel-to-Shin: WNL - R limited d/t HF strength  Toe Tap: WNL (initially decreased accuracy)    5xSTS: 15 seconds, with UE support, unable to perform without UE   Able to complete sit<>stand without UE from elevated surface    TELLEZ/56 - high fall risk    Trunk AROM: (* denotes performed with pain)  Flexion: 75% - limited d/t kyphotic posture  Extension: 0% - able to reach neutral with assistance  Rotation: R 25%; L 25%    Hip ROM:   Flexion: 90 deg (PROM supine) R  ER: 55 deg (PROM supine) R  IR: 25 deg (PROM supine) R    Accessory motion: hypomobile spine - globally   Hypomobile R hip AP    Strength: (* denotes performed with pain)  LE   Hip flexion (L2): R 3-/5; L 4+/5  Hip abduction: R 3-/5; L 4/5  Hip Extension: R 3+/5; L 4/5   Hip ER: R 3-/5; L 4/5    Knee Flexion: R 5/5; L 5/5   Knee extension (L3): R 4+/5; L 5/5   DF (L4): R 5-/5; L 5/5     Flexibility: (mild, moderate, severe tightness)  LE   Hip Flexor: R mild, L mild  Hamstrings: R WNL; L WNL  Piriformis: R WNL; L WNL  Quads: R WNL; L WNL       Special tests:   NA    Gait: pt ambulates on level ground with assistive device of rollator, decreased hip ext R with increased use of momentum needed to swing RLE forward, stooped posture/forward lean    Balance: DL: 1 minutes, back of knees on plinth after approx 30 seconds; SLS R unable, L unable   Trendelenburg RLE stance    Assessment: RLE weakness limiting ambulation and stairs. Buckling x1 with SBQC. Pain in L hip with SL shuttle but able to complete full sets. Will continue to work on strength and motor return to improve ambulation. Goals:   Goals: (to be met in 12-16 visits)   Pt will demonstrate improved SLS to >3 seconds ARELI to promote safety and decrease risk of falls on uneven surfaces such as grass and gravel   Pt will improve TELLEZ balance scale score by at least 5 points in order to demonstrates decreased fall risk  Pt will be able to squat to  light objects around the house without loss of stability  Pt will ambulate with least restrictive AD for 6 mins in order to improve upright tolerance needed for household ambulation/daily tasks  Pt will improve functional hip strength to demonstrate ability to ascend/descend 1 flight of stairs reciprocally with use of handrail   Pt will be independent and compliant with comprehensive HEP to maintain progress achieved in PT       Plan: Patient will be seen for 1-2 x/week or a total of 12-16 visits over a 90 day period. Treatment will include: Gait training, Manual Therapy, Neuromuscular Re-education, Therapeutic Activities, Therapeutic Exercise, and Home Exercise Program instruction  Date: 7/11/2023  TX#: 2/12-16 Date: 7/18/23                TX#: 3/12-16 Date:                 TX#: 4/ Date:                 TX#: 5/ Date:    Tx#: 6/   Gait: 15 mins  - ambulation with RW  - ambulation with SBQC (heavy), x2 laps  - SBQC 2-cone weaving x1 lap   GT: 15 mins  - ambulation SBQC, 2x40', 2x15'  - stairs, x3 laps with SPC        TE: 15 mins  - sit<>stand, hands on knees, elevated plinth, x10  - seated HS curls, GTB, 2x10 R (blocking HF)  - supine heel slides, x20 R  - PROM R hip   TE: 15 mins  - sit<>stand, hand on knees, elevated plinth, x10  - seated LAQ, 2x15 L  - seated HS curls, RTB, 2x15 L           NR: 15 mins  - sit<>stand with LLE on airex, hands on knees, elevated plinth, 2x5  - seated cone taps (for hip flexion), 2x10  - seated hip flexion, 2x10 R (1 inch approx)  - supine SKTC on SB, x20 (guidance by PT x10) NR: 15 mins  - sit<>stand LLE on airex, hands on knees, elevated plingth, x10  - seated cone taps, 2x12 L  - SL shuttle, 30#, 3x10 L (1x10 R)               HEP: heel slides, SLR, bridges, sit<>stand, walking routine    Charges: 1GT, 1TE, 1NR    Total Timed Treatment: 45 min  Total Treatment Time: 45 min

## 2023-07-19 ENCOUNTER — OFFICE VISIT (OUTPATIENT)
Dept: RHEUMATOLOGY | Facility: CLINIC | Age: 83
End: 2023-07-19
Payer: MEDICARE

## 2023-07-19 VITALS
BODY MASS INDEX: 20.04 KG/M2 | TEMPERATURE: 98 F | WEIGHT: 140 LBS | DIASTOLIC BLOOD PRESSURE: 80 MMHG | HEIGHT: 70 IN | SYSTOLIC BLOOD PRESSURE: 116 MMHG | RESPIRATION RATE: 18 BRPM | OXYGEN SATURATION: 99 % | HEART RATE: 73 BPM

## 2023-07-19 DIAGNOSIS — Z98.890 HISTORY OF BACK SURGERY: ICD-10-CM

## 2023-07-19 DIAGNOSIS — D89.89 KAPPA LIGHT CHAIN DISEASE (HCC): ICD-10-CM

## 2023-07-19 DIAGNOSIS — D64.89 ANEMIA DUE TO OTHER CAUSE, NOT CLASSIFIED: ICD-10-CM

## 2023-07-19 DIAGNOSIS — N18.4 CKD (CHRONIC KIDNEY DISEASE) STAGE 4, GFR 15-29 ML/MIN (HCC): ICD-10-CM

## 2023-07-19 DIAGNOSIS — R29.898 WEAKNESS OF RIGHT LOWER EXTREMITY: ICD-10-CM

## 2023-07-19 DIAGNOSIS — M35.3 PMR (POLYMYALGIA RHEUMATICA) (HCC): Primary | ICD-10-CM

## 2023-07-19 PROBLEM — M54.9 INTRACTABLE BACK PAIN: Status: RESOLVED | Noted: 2022-12-17 | Resolved: 2023-07-19

## 2023-07-19 PROCEDURE — 1126F AMNT PAIN NOTED NONE PRSNT: CPT | Performed by: INTERNAL MEDICINE

## 2023-07-19 PROCEDURE — 99214 OFFICE O/P EST MOD 30 MIN: CPT | Performed by: INTERNAL MEDICINE

## 2023-07-19 RX ORDER — ACETAMINOPHEN 80 MG/1
80 TABLET, CHEWABLE ORAL EVERY 4 HOURS PRN
COMMUNITY

## 2023-07-19 RX ORDER — PREDNISONE 2.5 MG/1
2.5 TABLET ORAL DAILY
Qty: 90 TABLET | Refills: 3 | Status: SHIPPED | OUTPATIENT
Start: 2023-07-19

## 2023-07-19 NOTE — PATIENT INSTRUCTIONS
Continue methotrexate 4 tablets/week for treatment of PMR/arthritis. Folic acid 1 mg/day. Prednisone is now at 2.5 mg/day lowered by Dr. Shanthi Juarez. Recent sed rate was 15 which is excellent. Recent blood count 10.7 reflects mild anemia. Eat a well-balanced diet. Exercise as best you can try. Try to build up some strength in your right leg by doing physical therapy and other exercises. Return to office for recheck 4 months. Do your labs before your next visit.

## 2023-07-20 ENCOUNTER — OFFICE VISIT (OUTPATIENT)
Dept: PHYSICAL THERAPY | Facility: HOSPITAL | Age: 83
End: 2023-07-20
Attending: STUDENT IN AN ORGANIZED HEALTH CARE EDUCATION/TRAINING PROGRAM
Payer: MEDICARE

## 2023-07-20 PROCEDURE — 97110 THERAPEUTIC EXERCISES: CPT

## 2023-07-20 PROCEDURE — 97140 MANUAL THERAPY 1/> REGIONS: CPT

## 2023-07-20 PROCEDURE — 97112 NEUROMUSCULAR REEDUCATION: CPT

## 2023-07-20 NOTE — PROGRESS NOTES
Diagnosis:   Post-operative state (Z98.890)  Encounter for postoperative wound check (Z48.89)  S/P lumbar spine operation (A05.596)  Weakness of lower extremity, unspecified laterality (R29.898)  Right leg pain (M79.604)      Referring Provider: Dc Yuen MD (neurosurgery)   Date of Evaluation:    2023    Precautions:  osteopenia, hx of fx, HTN, CKD, sjogren's, fall risk Next MD visit:   none scheduled  Date of Surgery: 22     Insurance Primary/Secondary: MEDICARE / [employer]     # Auth Visits: na            Subjective: No new complaints. RLE was sore after previous session. Hip/groin pain continues with certain movements.      Pain: 0/10; 5-6/10 (R groin with hip flexion)      Objective:   Glute - 1/5 in prone R      ---------  Seated: L anterior pelvis; with correction L femur is still longer than R    Taken from IE:  Observation/Posture: forward slumped posture with severe thoracic hyperkyphosis and lumbar kyphosis in sitting and standing  Elevated L iliac crest and PSIS  Stands with L knee flexed d/t leg length discrepancy (LLE longer than RLE)  No pelvic obliquity or scoliosis seen in sitting     Neuro Screen: negative  Sensation: Grossly intact to light touch BLEs    Coordination:   Heel-to-Shin: WNL - R limited d/t HF strength  Toe Tap: WNL (initially decreased accuracy)    5xSTS: 15 seconds, with UE support, unable to perform without UE   Able to complete sit<>stand without UE from elevated surface    TELLEZ/56 - high fall risk    Trunk AROM: (* denotes performed with pain)  Flexion: 75% - limited d/t kyphotic posture  Extension: 0% - able to reach neutral with assistance  Rotation: R 25%; L 25%    Hip ROM:   Flexion: 90 deg (PROM supine) R  ER: 55 deg (PROM supine) R  IR: 25 deg (PROM supine) R    Accessory motion: hypomobile spine - globally   Hypomobile R hip AP    Strength: (* denotes performed with pain)  LE   Hip flexion (L2): R 3-/5; L 4+/5  Hip abduction: R 3-/5; L 4/5  Hip Extension: R 3+/5; L 4/5   Hip ER: R 3-/5; L 4/5    Knee Flexion: R 5/5; L 5/5   Knee extension (L3): R 4+/5; L 5/5   DF (L4): R 5-/5; L 5/5     Flexibility: (mild, moderate, severe tightness)  LE   Hip Flexor: R mild, L mild  Hamstrings: R WNL; L WNL  Piriformis: R WNL; L WNL  Quads: R WNL; L WNL       Special tests:   NA    Gait: pt ambulates on level ground with assistive device of rollator, decreased hip ext R with increased use of momentum needed to swing RLE forward, stooped posture/forward lean    Balance: DL: 1 minutes, back of knees on plinth after approx 30 seconds; SLS R unable, L unable   Trendelenburg RLE stance    Assessment: Significatn RLE weakness, especially at glut limits ability to sit<>stand and ambulate without trendelenburg. Able to ambulate 1 large lap with SBCQ today but does have intermittent buckling with R hip/RLE pain. Goals:   Goals: (to be met in 12-16 visits)   Pt will demonstrate improved SLS to >3 seconds ARELI to promote safety and decrease risk of falls on uneven surfaces such as grass and gravel   Pt will improve TELLEZ balance scale score by at least 5 points in order to demonstrates decreased fall risk  Pt will be able to squat to  light objects around the house without loss of stability  Pt will ambulate with least restrictive AD for 6 mins in order to improve upright tolerance needed for household ambulation/daily tasks  Pt will improve functional hip strength to demonstrate ability to ascend/descend 1 flight of stairs reciprocally with use of handrail   Pt will be independent and compliant with comprehensive HEP to maintain progress achieved in PT       Plan: Patient will be seen for 1-2 x/week or a total of 12-16 visits over a 90 day period.  Treatment will include: Gait training, Manual Therapy, Neuromuscular Re-education, Therapeutic Activities, Therapeutic Exercise, and Home Exercise Program instruction  Date: 7/11/2023  TX#: 2/12-16 Date: 7/18/23                TX#: 3/12-16 Date:  7/20/23               TX#: 4/12-16 Date:                 TX#: 5/ Date:    Tx#: 6/   Gait: 15 mins  - ambulation with RW  - ambulation with SBQC (heavy), x2 laps  - SBQC 2-cone weaving x1 lap   GT: 15 mins  - ambulation SBQC, 2x40', 2x15'  - stairs, x3 laps with SPC   GT: 8 mins  - ambulation with RW  - ambulation, heavy SBQC, x1 large lap, 2x10'       TE: 15 mins  - sit<>stand, hands on knees, elevated plinth, x10  - seated HS curls, GTB, 2x10 R (blocking HF)  - supine heel slides, x20 R  - PROM R hip   TE: 15 mins  - sit<>stand, hand on knees, elevated plinth, x10  - seated LAQ, 2x15 L  - seated HS curls, RTB, 2x15 L      TE: 15 mins  - NuStep, L6, x6' - UE/Les  - hip ext, post toe tap // bar, 2x10-20 B  - standing HF stretch in // bars, x1' R  - sit<>stand from elevated chair (x2 airex pads), x10, 1UE on // bar, 1 on knee      NR: 15 mins  - sit<>stand with LLE on airex, hands on knees, elevated plinth, 2x5  - seated cone taps (for hip flexion), 2x10  - seated hip flexion, 2x10 R (1 inch approx)  - supine SKTC on SB, x20 (guidance by PT x10) NR: 15 mins  - sit<>stand LLE on airex, hands on knees, elevated plingth, x10  - seated cone taps, 2x12 L  - SL shuttle, 30#, 3x10 L (1x10 R)   NR: 30 mins  - SLS on RLE on 2\" step in // bars, 4x30\" R  - standing forw cone tap // bars, x20 R  - sit<>stand with LLE elevated on 2\"step + airex, x10 - pain R knee  - prone HS curl, 2x10 B - difficult tracking RLE  - prone TKE, tapping for glute activation, 3x12 B  - prone glute squeeze, x10  - staggered stance bridges, x10 (R close/L far)            HEP: heel slides, SLR, bridges, sit<>stand, walking routine    Charges: 1GT, 1TE, 2NR    Total Timed Treatment: 53 min  Total Treatment Time: 53 min

## 2023-07-25 ENCOUNTER — OFFICE VISIT (OUTPATIENT)
Dept: PHYSICAL THERAPY | Facility: HOSPITAL | Age: 83
End: 2023-07-25
Attending: STUDENT IN AN ORGANIZED HEALTH CARE EDUCATION/TRAINING PROGRAM
Payer: MEDICARE

## 2023-07-25 PROCEDURE — 97112 NEUROMUSCULAR REEDUCATION: CPT

## 2023-07-25 PROCEDURE — 97110 THERAPEUTIC EXERCISES: CPT

## 2023-07-25 NOTE — PROGRESS NOTES
Diagnosis:   Post-operative state (Z98.890)  Encounter for postoperative wound check (Z48.89)  S/P lumbar spine operation (L84.924)  Weakness of lower extremity, unspecified laterality (R29.898)  Right leg pain (M79.604)      Referring Provider: Jaswant Hudson MD (neurosurgery)   Date of Evaluation:    2023    Precautions:  osteopenia, hx of fx, HTN, CKD, sjogren's, fall risk Next MD visit:   none scheduled  Date of Surgery: 22     Insurance Primary/Secondary: MEDICARE / [employer] PPO     # Auth Visits: na            Subjective: No new complaints. RLE was sore after previous session. Hip/groin pain continues with certain movements.      Pain: 0/10; 5-6/10 (R groin with hip flexion)      Objective:   Glute - 1/5 in prone R      ---------  Seated: L anterior pelvis; with correction L femur is still longer than R    Taken from IE:  Observation/Posture: forward slumped posture with severe thoracic hyperkyphosis and lumbar kyphosis in sitting and standing  Elevated L iliac crest and PSIS  Stands with L knee flexed d/t leg length discrepancy (LLE longer than RLE)  No pelvic obliquity or scoliosis seen in sitting     Neuro Screen: negative  Sensation: Grossly intact to light touch BLEs    Coordination:   Heel-to-Shin: WNL - R limited d/t HF strength  Toe Tap: WNL (initially decreased accuracy)    5xSTS: 15 seconds, with UE support, unable to perform without UE   Able to complete sit<>stand without UE from elevated surface    TELLEZ/56 - high fall risk    Trunk AROM: (* denotes performed with pain)  Flexion: 75% - limited d/t kyphotic posture  Extension: 0% - able to reach neutral with assistance  Rotation: R 25%; L 25%    Hip ROM:   Flexion: 90 deg (PROM supine) R  ER: 55 deg (PROM supine) R  IR: 25 deg (PROM supine) R    Accessory motion: hypomobile spine - globally   Hypomobile R hip AP    Strength: (* denotes performed with pain)  LE   Hip flexion (L2): R 3-/5; L 4+/5  Hip abduction: R 3-/5; L 4/5  Hip Extension: R 3+/5; L 4/5   Hip ER: R 3-/5; L 4/5    Knee Flexion: R 5/5; L 5/5   Knee extension (L3): R 4+/5; L 5/5   DF (L4): R 5-/5; L 5/5     Flexibility: (mild, moderate, severe tightness)  LE   Hip Flexor: R mild, L mild  Hamstrings: R WNL; L WNL  Piriformis: R WNL; L WNL  Quads: R WNL; L WNL       Special tests:   NA    Gait: pt ambulates on level ground with assistive device of rollator, decreased hip ext R with increased use of momentum needed to swing RLE forward, stooped posture/forward lean    Balance: DL: 1 minutes, back of knees on plinth after approx 30 seconds; SLS R unable, L unable   Trendelenburg RLE stance    Assessment: Continues with glut and abd weakness on RLE which limits ability to sit<>stand and ambulate without trendelenburg and LE ER/adduction. Continues with RLE buckling with ambulation using SBQC. Goals:   Goals: (to be met in 12-16 visits)   Pt will demonstrate improved SLS to >3 seconds ARELI to promote safety and decrease risk of falls on uneven surfaces such as grass and gravel   Pt will improve TELLEZ balance scale score by at least 5 points in order to demonstrates decreased fall risk  Pt will be able to squat to  light objects around the house without loss of stability  Pt will ambulate with least restrictive AD for 6 mins in order to improve upright tolerance needed for household ambulation/daily tasks  Pt will improve functional hip strength to demonstrate ability to ascend/descend 1 flight of stairs reciprocally with use of handrail   Pt will be independent and compliant with comprehensive HEP to maintain progress achieved in PT       Plan: Patient will be seen for 1-2 x/week or a total of 12-16 visits over a 90 day period.  Treatment will include: Gait training, Manual Therapy, Neuromuscular Re-education, Therapeutic Activities, Therapeutic Exercise, and Home Exercise Program instruction  Date: 7/11/2023  TX#: 2/12-16 Date: 7/18/23 TX#: 3/12-16 Date:  7/20/23               TX#: 4/12-16 Date: 7/25/23                TX#: 5/12-16 Date:    Tx#: 6/   Gait: 15 mins  - ambulation with RW  - ambulation with SBQC (heavy), x2 laps  - SBQC 2-cone weaving x1 lap   GT: 15 mins  - ambulation SBQC, 2x40', 2x15'  - stairs, x3 laps with SPC   GT: 8 mins  - ambulation with RW  - ambulation, heavy SBQC, x1 large lap, 2x10'   GT: 5 mins  - ambulation with SBQC, x1 small lap - buckling    TE: 15 mins  - sit<>stand, hands on knees, elevated plinth, x10  - seated HS curls, GTB, 2x10 R (blocking HF)  - supine heel slides, x20 R  - PROM R hip   TE: 15 mins  - sit<>stand, hand on knees, elevated plinth, x10  - seated LAQ, 2x15 L  - seated HS curls, RTB, 2x15 L      TE: 15 mins  - NuStep, L6, x6' - UE/Les  - hip ext, post toe tap // bar, 2x10-20 B  - standing HF stretch in // bars, x1' R  - sit<>stand from elevated chair (x2 airex pads), x10, 1UE on // bar, 1 on knee  TE: 30  - seated LAQ, 2x10 B  - seated hip add, YTB, x20 B  - sit<>stand with YTB add R, x10, BUE  - seated hip abd, YTB, x20 B  - s/l clamshells, 2x10 R (2 pillows between knees)  - passive HF stretch and hip ROM R      NR: 15 mins  - sit<>stand with LLE on airex, hands on knees, elevated plinth, 2x5  - seated cone taps (for hip flexion), 2x10  - seated hip flexion, 2x10 R (1 inch approx)  - supine SKTC on SB, x20 (guidance by PT x10) NR: 15 mins  - sit<>stand LLE on airex, hands on knees, elevated plingth, x10  - seated cone taps, 2x12 L  - SL shuttle, 30#, 3x10 L (1x10 R)   NR: 30 mins  - SLS on RLE on 2\" step in // bars, 4x30\" R  - standing forw cone tap // bars, x20 R  - sit<>stand with LLE elevated on 2\"step + airex, x10 - pain R knee  - prone HS curl, 2x10 B - difficult tracking RLE  - prone TKE, tapping for glute activation, 3x12 B  - prone glute squeeze, x10  - staggered stance bridges, x10 (R close/L far) NR: 10 mins  - seated marching, 2x10 alt  - supine BKFO, YTB, x20 B  - hip abduction gravity eliminated supine, 2x10 (PT assisted) R             HEP: heel slides, SLR, bridges, sit<>stand, walking routine  7/25/23: add gravity eliminated hip abduction (supine)    Charges: 2TE, 1NR    Total Timed Treatment: 50 min  Total Treatment Time: 50 min

## 2023-07-25 NOTE — PROGRESS NOTES
1- Bone Dexa: 9/24/2021     2- Colonoscopy and EGD: 10/11/2021 by Jersey Do    3- Dental: Periodic     4- Eye Exam: 1/2023 Dr. Hong Walker     5- Full Skin Exam: Sees Dr. Luciana Velazquez     6- Heart Test:      7-  Strength:      8- Gait Speed:      9- Ankle Brachial Index:    ______________________________________________________________________     HSCRP: 2.1  Myeloperoxidase:  801     LIPIDS:      TOTAL CHOL:  Normal 158  HDL CHOL: Normal 62  TRIGLYCERIDES: Normal 117  LDL CHOL-C: Normal 76  CHOL/HDL-C: Normal 2.5  NON-HDL CHOL: Normal 96  APO B: Normal 69  LipoProtein (a):       GLUCOSE: Normal 91  HbA1C: Normal 4.9  TMAO: 19.1     VITAMIN B12: Normal 918     VITAMIN D: Normal     OMEGA CHECK: 5.1     CMP:      TSH: Normal 2.07     PSA: Normal 0.716     CBC:      UA:     Audiometry done on  :    Hearing aids Y/N = N      500 Hz 1000Hz 2000Hz 4000Hz   Left Ear: 25 dB   Y   Y   N   N     Left Ear:   40 dB Y Y N N   Right Ear: 25 dB   N   N   N   N     Right Ear:   40 dB Y Y N N

## 2023-07-26 ENCOUNTER — OFFICE VISIT (OUTPATIENT)
Dept: INTERNAL MEDICINE CLINIC | Facility: CLINIC | Age: 83
End: 2023-07-26
Payer: MEDICARE

## 2023-07-26 VITALS
SYSTOLIC BLOOD PRESSURE: 136 MMHG | HEART RATE: 78 BPM | RESPIRATION RATE: 18 BRPM | OXYGEN SATURATION: 99 % | WEIGHT: 140 LBS | BODY MASS INDEX: 20.97 KG/M2 | TEMPERATURE: 98 F | HEIGHT: 68.5 IN | DIASTOLIC BLOOD PRESSURE: 84 MMHG

## 2023-07-26 DIAGNOSIS — N18.4 CKD (CHRONIC KIDNEY DISEASE) STAGE 4, GFR 15-29 ML/MIN (HCC): ICD-10-CM

## 2023-07-26 DIAGNOSIS — R79.89 ELEVATED HOMOCYSTEINE: ICD-10-CM

## 2023-07-26 DIAGNOSIS — M35.3 PMR (POLYMYALGIA RHEUMATICA) (HCC): ICD-10-CM

## 2023-07-26 DIAGNOSIS — J84.10 PULMONARY FIBROSIS (HCC): ICD-10-CM

## 2023-07-26 DIAGNOSIS — E43 SEVERE PROTEIN-CALORIE MALNUTRITION (HCC): ICD-10-CM

## 2023-07-26 DIAGNOSIS — Z00.00 ANNUAL PHYSICAL EXAM: Primary | ICD-10-CM

## 2023-07-26 DIAGNOSIS — M54.17 LUMBOSACRAL RADICULOPATHY AT L2: ICD-10-CM

## 2023-07-26 DIAGNOSIS — S22.080D COMPRESSION FRACTURE OF T12 VERTEBRA WITH ROUTINE HEALING, SUBSEQUENT ENCOUNTER: ICD-10-CM

## 2023-07-26 PROBLEM — Z86.19 HISTORY OF CLOSTRIDIOIDES DIFFICILE COLITIS: Status: ACTIVE | Noted: 2022-06-01

## 2023-07-26 LAB
ATRIAL RATE: 63 BPM
P AXIS: 21 DEGREES
P-R INTERVAL: 258 MS
Q-T INTERVAL: 424 MS
QRS DURATION: 108 MS
QTC CALCULATION (BEZET): 433 MS
R AXIS: -48 DEGREES
T AXIS: 49 DEGREES
VENTRICULAR RATE: 63 BPM

## 2023-07-26 PROCEDURE — 93000 ELECTROCARDIOGRAM COMPLETE: CPT | Performed by: INTERNAL MEDICINE

## 2023-07-26 PROCEDURE — G0439 PPPS, SUBSEQ VISIT: HCPCS | Performed by: INTERNAL MEDICINE

## 2023-07-26 RX ORDER — POTASSIUM CHLORIDE 750 MG/1
10 TABLET, FILM COATED, EXTENDED RELEASE ORAL DAILY
Qty: 60 TABLET | Refills: 3 | COMMUNITY
Start: 2023-07-26

## 2023-07-27 ENCOUNTER — APPOINTMENT (OUTPATIENT)
Dept: PHYSICAL THERAPY | Facility: HOSPITAL | Age: 83
End: 2023-07-27
Attending: STUDENT IN AN ORGANIZED HEALTH CARE EDUCATION/TRAINING PROGRAM
Payer: MEDICARE

## 2023-07-27 ENCOUNTER — TELEPHONE (OUTPATIENT)
Dept: PHYSICAL THERAPY | Facility: HOSPITAL | Age: 83
End: 2023-07-27

## 2023-07-27 DIAGNOSIS — I10 PRIMARY HYPERTENSION: Primary | ICD-10-CM

## 2023-07-27 DIAGNOSIS — N18.4 CKD (CHRONIC KIDNEY DISEASE) STAGE 4, GFR 15-29 ML/MIN (HCC): ICD-10-CM

## 2023-07-27 DIAGNOSIS — Z95.2 S/P AVR: ICD-10-CM

## 2023-07-27 DIAGNOSIS — R80.9 PROTEINURIA, UNSPECIFIED TYPE: ICD-10-CM

## 2023-07-27 DIAGNOSIS — Z95.1 S/P CABG (CORONARY ARTERY BYPASS GRAFT): ICD-10-CM

## 2023-07-27 DIAGNOSIS — E78.5 DYSLIPIDEMIA: ICD-10-CM

## 2023-07-27 DIAGNOSIS — I25.10 CORONARY ARTERY DISEASE INVOLVING NATIVE CORONARY ARTERY OF NATIVE HEART WITHOUT ANGINA PECTORIS: ICD-10-CM

## 2023-07-27 RX ORDER — CIPROFLOXACIN 250 MG/1
250 TABLET, FILM COATED ORAL 2 TIMES DAILY
Qty: 10 TABLET | Refills: 0 | Status: SHIPPED | OUTPATIENT
Start: 2023-07-27 | End: 2023-08-01

## 2023-07-31 ENCOUNTER — TELEPHONE (OUTPATIENT)
Dept: INTERNAL MEDICINE CLINIC | Facility: CLINIC | Age: 83
End: 2023-07-31

## 2023-07-31 NOTE — TELEPHONE ENCOUNTER
S/w pt's wife    Last OV with Dr Demetrice Linder on 7/26-  he dc'd pt's Amlodipine 2.5mg daily    Since then pt's BP's have been running high  172/70, 175/99, 178/100, 177/104    Wife put pt back on Amlodipine 2.5mg daily along with his Carvedilol  BP today   138/80, 133/78    Told wife to continue Amlodipine. To Dr Eva Mcgregor for any further recommendations.        Maria Elena Guadalupe RN

## 2023-07-31 NOTE — TELEPHONE ENCOUNTER
Wife called requesting to speak with the nurse regarding her . Wife wouldn't give me any other information.

## 2023-07-31 NOTE — TELEPHONE ENCOUNTER
Wife instructed on recommendations.    Verb'd understanding   Will update us as she sees eric Carbone RN

## 2023-07-31 NOTE — TELEPHONE ENCOUNTER
Please continue with Amlodipine as scheduled. Thanks for sharing the updated #s and Baby Dayhoff should follow back up with Dr. Darryle Cliff accordingly. Yamilet Ibarra. Dominique Beck MD  Diplomate, American Board of Internal Medicine  Member, American College of Lifestyle Medicine  Member, [employer]  [address], [address] [place] [address],[address], [facility], [address] [place] [address] Rd  [phone] (phone); [phone] (fax)  [email or web address]

## 2023-08-01 ENCOUNTER — OFFICE VISIT (OUTPATIENT)
Dept: PHYSICAL THERAPY | Facility: HOSPITAL | Age: 83
End: 2023-08-01
Attending: STUDENT IN AN ORGANIZED HEALTH CARE EDUCATION/TRAINING PROGRAM
Payer: MEDICARE

## 2023-08-01 PROCEDURE — 97110 THERAPEUTIC EXERCISES: CPT

## 2023-08-01 PROCEDURE — 97112 NEUROMUSCULAR REEDUCATION: CPT

## 2023-08-01 NOTE — PROGRESS NOTES
Diagnosis:   Post-operative state (Z98.890)  Encounter for postoperative wound check (Z48.89)  S/P lumbar spine operation (K70.247)  Weakness of lower extremity, unspecified laterality (R29.898)  Right leg pain (M79.604)      Referring Provider: Starla Hayden MD (neurosurgery)   Date of Evaluation:    2023    Precautions:  osteopenia, hx of fx, HTN, CKD, sjogren's, fall risk Next MD visit:   none scheduled  Date of Surgery: 22     Insurance Primary/Secondary: MEDICARE / [employer] PPO     # Auth Visits: na            Subjective: Feels that he is \"getting better. The exercises are helping. \" Pain in R hip/groin continues and limits mobility but does not linger.     Pain: 5-6/10 (R groin with hip flexion)      Objective:   Glute - 1/5 in prone R      ---------  Seated: L anterior pelvis; with correction L femur is still longer than R    Taken from IE:  Observation/Posture: forward slumped posture with severe thoracic hyperkyphosis and lumbar kyphosis in sitting and standing  Elevated L iliac crest and PSIS  Stands with L knee flexed d/t leg length discrepancy (LLE longer than RLE)  No pelvic obliquity or scoliosis seen in sitting     Neuro Screen: negative  Sensation: Grossly intact to light touch BLEs    Coordination:   Heel-to-Shin: WNL - R limited d/t HF strength  Toe Tap: WNL (initially decreased accuracy)    5xSTS: 15 seconds, with UE support, unable to perform without UE   Able to complete sit<>stand without UE from elevated surface    TELLEZ/56 - high fall risk    Trunk AROM: (* denotes performed with pain)  Flexion: 75% - limited d/t kyphotic posture  Extension: 0% - able to reach neutral with assistance  Rotation: R 25%; L 25%    Hip ROM:   Flexion: 90 deg (PROM supine) R  ER: 55 deg (PROM supine) R  IR: 25 deg (PROM supine) R    Accessory motion: hypomobile spine - globally   Hypomobile R hip AP    Strength: (* denotes performed with pain)  LE   Hip flexion (L2): R 3-/5; L 4+/5  Hip abduction: R 3-/5; L 4/5  Hip Extension: R 3+/5; L 4/5   Hip ER: R 3-/5; L 4/5    Knee Flexion: R 5/5; L 5/5   Knee extension (L3): R 4+/5; L 5/5   DF (L4): R 5-/5; L 5/5     Flexibility: (mild, moderate, severe tightness)  LE   Hip Flexor: R mild, L mild  Hamstrings: R WNL; L WNL  Piriformis: R WNL; L WNL  Quads: R WNL; L WNL       Special tests:   NA    Gait: pt ambulates on level ground with assistive device of rollator, decreased hip ext R with increased use of momentum needed to swing RLE forward, stooped posture/forward lean    Balance: DL: 1 minutes, back of knees on plinth after approx 30 seconds; SLS R unable, L unable   Trendelenburg RLE stance    Assessment: Continues with glut and abd weakness on RLE which limits mobility and contributes to R hip pain. Reviewed HEP and gave print out for continued strengthening at home. Pt demonstrates good understanding. Will continue to work on strength and functional mobility to improve ambulation and sit<>stand transitions. Goals:   Goals: (to be met in 12-16 visits)   Pt will demonstrate improved SLS to >3 seconds ARELI to promote safety and decrease risk of falls on uneven surfaces such as grass and gravel   Pt will improve TELLEZ balance scale score by at least 5 points in order to demonstrates decreased fall risk  Pt will be able to squat to  light objects around the house without loss of stability  Pt will ambulate with least restrictive AD for 6 mins in order to improve upright tolerance needed for household ambulation/daily tasks  Pt will improve functional hip strength to demonstrate ability to ascend/descend 1 flight of stairs reciprocally with use of handrail   Pt will be independent and compliant with comprehensive HEP to maintain progress achieved in PT       Plan: Patient will be seen for 1-2 x/week or a total of 12-16 visits over a 90 day period.  Treatment will include: Gait training, Manual Therapy, Neuromuscular Re-education, Therapeutic Activities, Therapeutic Exercise, and Home Exercise Program instruction  Date: 7/11/2023  TX#: 2/12-16 Date: 7/18/23                TX#: 3/12-16 Date:  7/20/23               TX#: 4/12-16 Date: 7/25/23                TX#: 5/12-16 Date: 8/1/23  Tx#: 6/12-16   Gait: 15 mins  - ambulation with RW  - ambulation with SBQC (heavy), x2 laps  - SBQC 2-cone weaving x1 lap   GT: 15 mins  - ambulation SBQC, 2x40', 2x15'  - stairs, x3 laps with SPC   GT: 8 mins  - ambulation with RW  - ambulation, heavy SBQC, x1 large lap, 2x10'   GT: 5 mins  - ambulation with SBQC, x1 small lap - buckling GT: 5 mins  - ambulation in // bars no AD but light BUE support   TE: 15 mins  - sit<>stand, hands on knees, elevated plinth, x10  - seated HS curls, GTB, 2x10 R (blocking HF)  - supine heel slides, x20 R  - PROM R hip   TE: 15 mins  - sit<>stand, hand on knees, elevated plinth, x10  - seated LAQ, 2x15 L  - seated HS curls, RTB, 2x15 L      TE: 15 mins  - NuStep, L6, x6' - UE/Les  - hip ext, post toe tap // bar, 2x10-20 B  - standing HF stretch in // bars, x1' R  - sit<>stand from elevated chair (x2 airex pads), x10, 1UE on // bar, 1 on knee  TE: 30  - seated LAQ, 2x10 B  - seated hip add, YTB, x20 B  - sit<>stand with YTB add R, x10, BUE  - seated hip abd, YTB, x20 B  - s/l clamshells, 2x10 R (2 pillows between knees)  - passive HF stretch and hip ROM R   TE: 23 mins  - NuStep, LE only, L2, x10'  - bridges, x10  - clamshells, 2 pillows at knees, 2x10 R  - review HEP    NR: 15 mins  - sit<>stand with LLE on airex, hands on knees, elevated plinth, 2x5  - seated cone taps (for hip flexion), 2x10  - seated hip flexion, 2x10 R (1 inch approx)  - supine SKTC on SB, x20 (guidance by PT x10) NR: 15 mins  - sit<>stand LLE on airex, hands on knees, elevated plingth, x10  - seated cone taps, 2x12 L  - SL shuttle, 30#, 3x10 L (1x10 R)   NR: 30 mins  - SLS on RLE on 2\" step in // bars, 4x30\" R  - standing forw cone tap // bars, x20 R  - sit<>stand with LLE elevated on 2\"step + airex, x10 - pain R knee  - prone HS curl, 2x10 B - difficult tracking RLE  - prone TKE, tapping for glute activation, 3x12 B  - prone glute squeeze, x10  - staggered stance bridges, x10 (R close/L far) NR: 10 mins  - seated marching, 2x10 alt  - supine BKFO, YTB, x20 B  - hip abduction gravity eliminated supine, 2x10 (PT assisted) R   NR: 18 mins  - SL shuttle, 17#, 2x10 R  - SKTB on SB, 2x10 - PT guidance  - hip abd on SB with YTB resistance, x15 R  - BKFO, YTB, x10 B          HEP: heel slides, SLR, bridges, sit<>stand, walking routine  7/25/23: add gravity eliminated hip abduction (supine)  8/1/23: gave print out: prone glut set, bridges, supported clamshells, BKFO (YTB), gravity eliminated hip abd (supine)  Charges: 2TE, 1NR    Total Timed Treatment: 46 min  Total Treatment Time: 46 min

## 2023-08-02 ENCOUNTER — NURSE ONLY (OUTPATIENT)
Dept: INTERNAL MEDICINE CLINIC | Facility: CLINIC | Age: 83
End: 2023-08-02
Payer: MEDICARE

## 2023-08-02 ENCOUNTER — HOSPITAL ENCOUNTER (OUTPATIENT)
Dept: CV DIAGNOSTICS | Facility: HOSPITAL | Age: 83
Discharge: HOME | End: 2023-08-02
Attending: INTERNAL MEDICINE
Payer: MEDICARE

## 2023-08-02 ENCOUNTER — TELEPHONE (OUTPATIENT)
Dept: PHYSICAL THERAPY | Facility: HOSPITAL | Age: 83
End: 2023-08-02

## 2023-08-02 DIAGNOSIS — I10 PRIMARY HYPERTENSION: ICD-10-CM

## 2023-08-02 DIAGNOSIS — I25.10 CORONARY ARTERY DISEASE INVOLVING NATIVE CORONARY ARTERY OF NATIVE HEART WITHOUT ANGINA PECTORIS: ICD-10-CM

## 2023-08-02 DIAGNOSIS — Z95.1 S/P CABG (CORONARY ARTERY BYPASS GRAFT): ICD-10-CM

## 2023-08-02 DIAGNOSIS — Z95.2 S/P AVR: ICD-10-CM

## 2023-08-02 DIAGNOSIS — E78.5 DYSLIPIDEMIA: ICD-10-CM

## 2023-08-02 PROCEDURE — 93017 CV STRESS TEST TRACING ONLY: CPT | Performed by: INTERNAL MEDICINE

## 2023-08-02 PROCEDURE — 93018 CV STRESS TEST I&R ONLY: CPT | Performed by: INTERNAL MEDICINE

## 2023-08-02 PROCEDURE — 78452 HT MUSCLE IMAGE SPECT MULT: CPT | Performed by: INTERNAL MEDICINE

## 2023-08-02 RX ORDER — REGADENOSON 0.08 MG/ML
INJECTION, SOLUTION INTRAVENOUS
Status: COMPLETED
Start: 2023-08-02 | End: 2023-08-02

## 2023-08-02 RX ADMIN — REGADENOSON 0.4 MG: 0.08 INJECTION, SOLUTION INTRAVENOUS at 09:30:00

## 2023-08-07 ENCOUNTER — OFFICE VISIT (OUTPATIENT)
Dept: PHYSICAL THERAPY | Facility: HOSPITAL | Age: 83
End: 2023-08-07
Attending: INTERNAL MEDICINE
Payer: MEDICARE

## 2023-08-07 PROCEDURE — 97110 THERAPEUTIC EXERCISES: CPT

## 2023-08-07 PROCEDURE — 97116 GAIT TRAINING THERAPY: CPT

## 2023-08-07 PROCEDURE — 97112 NEUROMUSCULAR REEDUCATION: CPT

## 2023-08-07 PROCEDURE — 97140 MANUAL THERAPY 1/> REGIONS: CPT

## 2023-08-07 RX ORDER — CARVEDILOL 12.5 MG/1
TABLET ORAL
Qty: 180 TABLET | Refills: 0 | Status: SHIPPED | OUTPATIENT
Start: 2023-08-07

## 2023-08-07 NOTE — PROGRESS NOTES
Diagnosis:   Post-operative state (Z98.890)  Encounter for postoperative wound check (Z48.89)  S/P lumbar spine operation (R70.890)  Weakness of lower extremity, unspecified laterality (R29.898)  Right leg pain (M79.604)      Referring Provider: Suzanna Shankar MD (neurosurgery)   Date of Evaluation:    2023    Precautions:  osteopenia, hx of fx, HTN, CKD, sjogren's, fall risk Next MD visit:   none scheduled  Date of Surgery: 22     Insurance Primary/Secondary: MEDICARE / [employer] PPO     # Auth Visits: na            Subjective: Feeling sore \"in the other muscles. \" Has been doing HEP.     Pain: 5-6/10 (R groin with hip flexion), 7/10 (abduction)      Objective:   Glute - 1/5 in prone R      ---------  Seated: L anterior pelvis; with correction L femur is still longer than R    Taken from IE:  Observation/Posture: forward slumped posture with severe thoracic hyperkyphosis and lumbar kyphosis in sitting and standing  Elevated L iliac crest and PSIS  Stands with L knee flexed d/t leg length discrepancy (LLE longer than RLE)  No pelvic obliquity or scoliosis seen in sitting     Neuro Screen: negative  Sensation: Grossly intact to light touch BLEs    Coordination:   Heel-to-Shin: WNL - R limited d/t HF strength  Toe Tap: WNL (initially decreased accuracy)    5xSTS: 15 seconds, with UE support, unable to perform without UE   Able to complete sit<>stand without UE from elevated surface    TELLEZ/56 - high fall risk    Trunk AROM: (* denotes performed with pain)  Flexion: 75% - limited d/t kyphotic posture  Extension: 0% - able to reach neutral with assistance  Rotation: R 25%; L 25%    Hip ROM:   Flexion: 90 deg (PROM supine) R  ER: 55 deg (PROM supine) R  IR: 25 deg (PROM supine) R    Accessory motion: hypomobile spine - globally   Hypomobile R hip AP    Strength: (* denotes performed with pain)  LE   Hip flexion (L2): R 3-/5; L 4+/5  Hip abduction: R 3-/5; L 4/5  Hip Extension: R 3+/5; L 4/5   Hip ER: R 3-/5; L 4/5    Knee Flexion: R 5/5; L 5/5   Knee extension (L3): R 4+/5; L 5/5   DF (L4): R 5-/5; L 5/5     Flexibility: (mild, moderate, severe tightness)  LE   Hip Flexor: R mild, L mild  Hamstrings: R WNL; L WNL  Piriformis: R WNL; L WNL  Quads: R WNL; L WNL       Special tests:   NA    Gait: pt ambulates on level ground with assistive device of rollator, decreased hip ext R with increased use of momentum needed to swing RLE forward, stooped posture/forward lean    Balance: DL: 1 minutes, back of knees on plinth after approx 30 seconds; SLS R unable, L unable   Trendelenburg RLE stance    Assessment: Improved glut activation with assisted hip abduction today vs previous session. Increasing gait speed with [place] today, as well. Patient would benefit from continued skilled PT to work on hip/glute strength needed to improve tolerance to upright. Goals:   Goals: (to be met in 12-16 visits)   Pt will demonstrate improved SLS to >3 seconds ARELI to promote safety and decrease risk of falls on uneven surfaces such as grass and gravel   Pt will improve TELLEZ balance scale score by at least 5 points in order to demonstrates decreased fall risk  Pt will be able to squat to  light objects around the house without loss of stability  Pt will ambulate with least restrictive AD for 6 mins in order to improve upright tolerance needed for household ambulation/daily tasks  Pt will improve functional hip strength to demonstrate ability to ascend/descend 1 flight of stairs reciprocally with use of handrail   Pt will be independent and compliant with comprehensive HEP to maintain progress achieved in PT       Plan: Patient will be seen for 1-2 x/week or a total of 12-16 visits over a 90 day period.  Treatment will include: Gait training, Manual Therapy, Neuromuscular Re-education, Therapeutic Activities, Therapeutic Exercise, and Home Exercise Program instruction  Date: 7/11/2023  TX#: 2/12-16 Date: 7/18/23                TX#: 3/12-16 Date:  7/20/23               TX#: 4/12-16 Date: 7/25/23                TX#: 5/12-16 Date: 8/1/23  Tx#: 6/12-16 Date: 8/7/23  Tx: 7/12-16   Gait: 15 mins  - ambulation with RW  - ambulation with SBQC (heavy), x2 laps  - SBQC 2-cone weaving x1 lap   GT: 15 mins  - ambulation SBQC, 2x40', 2x15'  - stairs, x3 laps with SPC   GT: 8 mins  - ambulation with RW  - ambulation, heavy SBQC, x1 large lap, 2x10'   GT: 5 mins  - ambulation with SBQC, x1 small lap - buckling GT: 5 mins  - ambulation in // bars no AD but light BUE support GT: 12 mins  - ambulation LBQC x1 small lap, CGA  - ambulation SBQC x1, x2 small laps - seated rest breaks, CGA   TE: 15 mins  - sit<>stand, hands on knees, elevated plinth, x10  - seated HS curls, GTB, 2x10 R (blocking HF)  - supine heel slides, x20 R  - PROM R hip   TE: 15 mins  - sit<>stand, hand on knees, elevated plinth, x10  - seated LAQ, 2x15 L  - seated HS curls, RTB, 2x15 L      TE: 15 mins  - NuStep, L6, x6' - UE/Les  - hip ext, post toe tap // bar, 2x10-20 B  - standing HF stretch in // bars, x1' R  - sit<>stand from elevated chair (x2 airex pads), x10, 1UE on // bar, 1 on knee  TE: 30  - seated LAQ, 2x10 B  - seated hip add, YTB, x20 B  - sit<>stand with YTB add R, x10, BUE  - seated hip abd, YTB, x20 B  - s/l clamshells, 2x10 R (2 pillows between knees)  - passive HF stretch and hip ROM R   TE: 23 mins  - NuStep, LE only, L2, x10'  - bridges, x10  - clamshells, 2 pillows at knees, 2x10 R  - review HEP  TE: 16 mins  - NuStep, L4, x6' - LE only  - B hip abd in h/l, RTB, x10  - bridges, x10  - abd bridges, RTB, 2x10 (challenging/ painful R groin)  - clamshells with pillow, x5- d/c d/t pain  - assisted hip abd by PT, x5 - d/c d/t pain  - sit<>stand, BUE x5, elevated plinth       NR: 15 mins  - sit<>stand with LLE on airex, hands on knees, elevated plinth, 2x5  - seated cone taps (for hip flexion), 2x10  - seated hip flexion, 2x10 R (1 inch approx)  - supine SKTC on SB, x20 (guidance by PT x10) NR: 15 mins  - sit<>stand LLE on airex, hands on knees, elevated plingth, x10  - seated cone taps, 2x12 L  - SL shuttle, 30#, 3x10 L (1x10 R)   NR: 30 mins  - SLS on RLE on 2\" step in // bars, 4x30\" R  - standing forw cone tap // bars, x20 R  - sit<>stand with LLE elevated on 2\"step + airex, x10 - pain R knee  - prone HS curl, 2x10 B - difficult tracking RLE  - prone TKE, tapping for glute activation, 3x12 B  - prone glute squeeze, x10  - staggered stance bridges, x10 (R close/L far) NR: 10 mins  - seated marching, 2x10 alt  - supine BKFO, YTB, x20 B  - hip abduction gravity eliminated supine, 2x10 (PT assisted) R   NR: 18 mins  - SL shuttle, 17#, 2x10 R  - SKTB on SB, 2x10 - PT guidance  - hip abd on SB with YTB resistance, x15 R  - BKFO, YTB, x10 B NR: 16 mins  - BKFO, RTB, 3x10 R  - supine hip IR/ER, x20 R  - SKTC on SB, x20 R - PT assist  - hip abd/add on SB, x20 R          MT: 9 mins  - LAD RLE  - lumbar PA Gr II L2-3  - R pelvic post rot and inf mob   HEP: heel slides, SLR, bridges, sit<>stand, walking routine  7/25/23: add gravity eliminated hip abduction (supine)  8/1/23: gave print out: prone glut set, bridges, supported clamshells, BKFO (YTB), gravity eliminated hip abd (supine)  Charges: 1GT, 1TE, 1NR, 1MT   Total Timed Treatment: 53 min  Total Treatment Time: 53 min

## 2023-08-07 NOTE — TELEPHONE ENCOUNTER
Carvedilol 12.5 mg 1 tab bid filled 5/5/23 180 with 0 refills     LOV 7/26/23  Return in about 6 weeks (around 9/6/2023).    Next pat 9/6/23  Labs 7/12/23    SODIUM  mmol/L 140   POTASSIUM  mmol/L 4.5   AST  U/L 29   ALT  U/L 17   CHLORIDE 103

## 2023-08-08 RX ORDER — DULOXETIN HYDROCHLORIDE 30 MG/1
30 CAPSULE, DELAYED RELEASE ORAL DAILY
Qty: 30 CAPSULE | Refills: 3 | Status: SHIPPED | OUTPATIENT
Start: 2023-08-08

## 2023-08-15 ENCOUNTER — OFFICE VISIT (OUTPATIENT)
Dept: PHYSICAL THERAPY | Facility: HOSPITAL | Age: 83
End: 2023-08-15
Attending: STUDENT IN AN ORGANIZED HEALTH CARE EDUCATION/TRAINING PROGRAM
Payer: MEDICARE

## 2023-08-15 PROCEDURE — 97110 THERAPEUTIC EXERCISES: CPT

## 2023-08-15 PROCEDURE — 97112 NEUROMUSCULAR REEDUCATION: CPT

## 2023-08-15 NOTE — PROGRESS NOTES
Diagnosis:   Post-operative state (Z98.890)  Encounter for postoperative wound check (Z48.89)  S/P lumbar spine operation (N28.538)  Weakness of lower extremity, unspecified laterality (R29.898)  Right leg pain (M79.604)      Referring Provider: Helen Louis MD (neurosurgery)   Date of Evaluation:    2023    Precautions:  osteopenia, hx of fx, HTN, CKD, sjogren's, fall risk Next MD visit:   none scheduled  Date of Surgery: 22     Insurance Primary/Secondary: MEDICARE / [employer]     # Auth Visits: na            Subjective: No new complaints. Continues with R hip/groin pain limiting mobility with walking. Sitting and lying down are okay.     Pain: 5-6/10 (R groin with hip flexion), 7/10 (abduction)      Objective:   Glute - 1/5 in prone R      ---------  Seated: L anterior pelvis; with correction L femur is still longer than R    Taken from IE:  Observation/Posture: forward slumped posture with severe thoracic hyperkyphosis and lumbar kyphosis in sitting and standing  Elevated L iliac crest and PSIS  Stands with L knee flexed d/t leg length discrepancy (LLE longer than RLE)  No pelvic obliquity or scoliosis seen in sitting     Neuro Screen: negative  Sensation: Grossly intact to light touch BLEs    Coordination:   Heel-to-Shin: WNL - R limited d/t HF strength  Toe Tap: WNL (initially decreased accuracy)    5xSTS: 15 seconds, with UE support, unable to perform without UE   Able to complete sit<>stand without UE from elevated surface    TELLEZ/56 - high fall risk    Trunk AROM: (* denotes performed with pain)  Flexion: 75% - limited d/t kyphotic posture  Extension: 0% - able to reach neutral with assistance  Rotation: R 25%; L 25%    Hip ROM:   Flexion: 90 deg (PROM supine) R  ER: 55 deg (PROM supine) R  IR: 25 deg (PROM supine) R    Accessory motion: hypomobile spine - globally   Hypomobile R hip AP    Strength: (* denotes performed with pain)  LE   Hip flexion (L2): R 3-/5; L 4+/5  Hip abduction: R 3-/5; L 4/5  Hip Extension: R 3+/5; L 4/5   Hip ER: R 3-/5; L 4/5    Knee Flexion: R 5/5; L 5/5   Knee extension (L3): R 4+/5; L 5/5   DF (L4): R 5-/5; L 5/5     Flexibility: (mild, moderate, severe tightness)  LE   Hip Flexor: R mild, L mild  Hamstrings: R WNL; L WNL  Piriformis: R WNL; L WNL  Quads: R WNL; L WNL       Special tests:   NA    Gait: pt ambulates on level ground with assistive device of rollator, decreased hip ext R with increased use of momentum needed to swing RLE forward, stooped posture/forward lean    Balance: DL: 1 minutes, back of knees on plinth after approx 30 seconds; SLS R unable, L unable   Trendelenburg RLE stance    Assessment: Difficulty with ambulation using LBQC today. Continued R hip/groin pain and weakness. Elevated R pelvis and forward R innominate contribute to gait impairments. Will continue to work on prox RLE strength. Goals:   Goals: (to be met in 12-16 visits)   Pt will demonstrate improved SLS to >3 seconds ARELI to promote safety and decrease risk of falls on uneven surfaces such as grass and gravel   Pt will improve TELLEZ balance scale score by at least 5 points in order to demonstrates decreased fall risk  Pt will be able to squat to  light objects around the house without loss of stability  Pt will ambulate with least restrictive AD for 6 mins in order to improve upright tolerance needed for household ambulation/daily tasks  Pt will improve functional hip strength to demonstrate ability to ascend/descend 1 flight of stairs reciprocally with use of handrail   Pt will be independent and compliant with comprehensive HEP to maintain progress achieved in PT       Plan: Patient will be seen for 1-2 x/week or a total of 12-16 visits over a 90 day period.  Treatment will include: Gait training, Manual Therapy, Neuromuscular Re-education, Therapeutic Activities, Therapeutic Exercise, and Home Exercise Program instruction  Date: 7/11/2023  TX#: 2/12-16 Date: 7/18/23                TX#: 3/12-16 Date:  7/20/23               TX#: 4/12-16 Date: 7/25/23                TX#: 5/12-16 Date: 8/1/23  Tx#: 6/12-16 Date: 8/7/23  Tx: 7/12-16 Date: 8/15/23  Tx: 8/12-16   Gait: 15 mins  - ambulation with RW  - ambulation with SBQC (heavy), x2 laps  - SBQC 2-cone weaving x1 lap   GT: 15 mins  - ambulation SBQC, 2x40', 2x15'  - stairs, x3 laps with SPC   GT: 8 mins  - ambulation with RW  - ambulation, heavy SBQC, x1 large lap, 2x10'   GT: 5 mins  - ambulation with SBQC, x1 small lap - buckling GT: 5 mins  - ambulation in // bars no AD but light BUE support GT: 12 mins  - ambulation LBQC x1 small lap, CGA  - ambulation SBQC x1, x2 small laps - seated rest breaks, CGA GT: 3 mins  - ambulation with LBQC, x1/2 small lap - pain and buckling   TE: 15 mins  - sit<>stand, hands on knees, elevated plinth, x10  - seated HS curls, GTB, 2x10 R (blocking HF)  - supine heel slides, x20 R  - PROM R hip   TE: 15 mins  - sit<>stand, hand on knees, elevated plinth, x10  - seated LAQ, 2x15 L  - seated HS curls, RTB, 2x15 L      TE: 15 mins  - NuStep, L6, x6' - UE/Les  - hip ext, post toe tap // bar, 2x10-20 B  - standing HF stretch in // bars, x1' R  - sit<>stand from elevated chair (x2 airex pads), x10, 1UE on // bar, 1 on knee  TE: 30  - seated LAQ, 2x10 B  - seated hip add, YTB, x20 B  - sit<>stand with YTB add R, x10, BUE  - seated hip abd, YTB, x20 B  - s/l clamshells, 2x10 R (2 pillows between knees)  - passive HF stretch and hip ROM R   TE: 23 mins  - NuStep, LE only, L2, x10'  - bridges, x10  - clamshells, 2 pillows at knees, 2x10 R  - review HEP  TE: 16 mins  - NuStep, L4, x6' - LE only  - B hip abd in h/l, RTB, x10  - bridges, x10  - abd bridges, RTB, 2x10 (challenging/ painful R groin)  - clamshells with pillow, x5- d/c d/t pain  - assisted hip abd by PT, x5 - d/c d/t pain  - sit<>stand, BUE x5, elevated plinth     TE: 38 mins  - NuStep, L5, x10' - UE/Les  - stand hip ext slider, x20 R, BUE  - stand hip abd slider, x20 R, BUE  - h/l hip abd c/ ball, x20  - hip abd abd add in h/l with RTB (by PT), x20 ea R  - heel press in h/l, x10 R  - LAD RLE   NR: 15 mins  - sit<>stand with LLE on airex, hands on knees, elevated plinth, 2x5  - seated cone taps (for hip flexion), 2x10  - seated hip flexion, 2x10 R (1 inch approx)  - supine SKTC on SB, x20 (guidance by PT x10) NR: 15 mins  - sit<>stand LLE on airex, hands on knees, elevated plingth, x10  - seated cone taps, 2x12 L  - SL shuttle, 30#, 3x10 L (1x10 R)   NR: 30 mins  - SLS on RLE on 2\" step in // bars, 4x30\" R  - standing forw cone tap // bars, x20 R  - sit<>stand with LLE elevated on 2\"step + airex, x10 - pain R knee  - prone HS curl, 2x10 B - difficult tracking RLE  - prone TKE, tapping for glute activation, 3x12 B  - prone glute squeeze, x10  - staggered stance bridges, x10 (R close/L far) NR: 10 mins  - seated marching, 2x10 alt  - supine BKFO, YTB, x20 B  - hip abduction gravity eliminated supine, 2x10 (PT assisted) R   NR: 18 mins  - SL shuttle, 17#, 2x10 R  - SKTB on SB, 2x10 - PT guidance  - hip abd on SB with YTB resistance, x15 R  - BKFO, YTB, x10 B NR: 16 mins  - BKFO, RTB, 3x10 R  - supine hip IR/ER, x20 R  - SKTC on SB, x20 R - PT assist  - hip abd/add on SB, x20 R   NR: 12 mins  - lateral weight shift, BUE, x20 onto RLE  - forw/retro weight shift, x10 B, BUE  - sit<>stand, UE on knees, 2x12  - SKTC on SB (with and w/o RTB for cuing) R          MT: 9 mins  - LAD RLE  - lumbar PA Gr II L2-3  - R pelvic post rot and inf mob    HEP: heel slides, SLR, bridges, sit<>stand, walking routine  7/25/23: add gravity eliminated hip abduction (supine)  8/1/23: gave print out: prone glut set, bridges, supported clamshells, BKFO (YTB), gravity eliminated hip abd (supine)  Charges: 3TE, 1NR   Total Timed Treatment: 53 min  Total Treatment Time: 53 min

## 2023-08-22 ENCOUNTER — VIRTUAL PHONE E/M (OUTPATIENT)
Dept: INTERNAL MEDICINE CLINIC | Facility: CLINIC | Age: 83
End: 2023-08-22
Payer: MEDICARE

## 2023-08-22 DIAGNOSIS — E56.9 VITAMIN DEFICIENCY SYNDROME: ICD-10-CM

## 2023-08-22 DIAGNOSIS — E44.0 MODERATE PROTEIN-CALORIE MALNUTRITION (HCC): Primary | ICD-10-CM

## 2023-08-22 PROCEDURE — 99442 PHONE E/M BY PHYS 11-20 MIN: CPT | Performed by: INTERNAL MEDICINE

## 2023-08-22 RX ORDER — ACETYLCARNITINE HCL 100 %
POWDER (GRAM) MISCELLANEOUS
Refills: 0 | COMMUNITY
Start: 2023-08-22

## 2023-08-22 RX ORDER — MEDICAL SUPPLY, MISCELLANEOUS
EACH MISCELLANEOUS
Refills: 0 | COMMUNITY
Start: 2023-08-22

## 2023-08-22 RX ORDER — THIAMINE HCL 100 MG
TABLET ORAL
Qty: 30 TABLET | Refills: 0 | COMMUNITY
Start: 2023-08-22

## 2023-08-23 NOTE — PROGRESS NOTES
Virtual Telephone Check-In    Andrew Martinez verbally consents to a Virtual/Telephone Check-In visit on 08/22/23. Patient has been referred to the [facility] website at [email or web address] to review the yearly Consent to Treat document. Patient understands and accepts financial responsibility for any deductible, co-insurance and/or co-pays associated with this service. Duration of the service: 15 minutes audio only      Summary of topics discussed:     Discussed results of [employer] nutrient testing-mailed him hard copy. Basis of these results add B2 and B3 100mg each, LCarnitine 250mg , extra CORINNA omega blend and recheck in 6mos. rtc Raf Roe MD

## 2023-08-24 ENCOUNTER — OFFICE VISIT (OUTPATIENT)
Dept: NEPHROLOGY | Facility: CLINIC | Age: 83
End: 2023-08-24
Payer: MEDICARE

## 2023-08-24 VITALS — SYSTOLIC BLOOD PRESSURE: 142 MMHG | BODY MASS INDEX: 22 KG/M2 | DIASTOLIC BLOOD PRESSURE: 74 MMHG | WEIGHT: 144.38 LBS

## 2023-08-24 DIAGNOSIS — N18.4 CKD (CHRONIC KIDNEY DISEASE) STAGE 4, GFR 15-29 ML/MIN (HCC): Primary | ICD-10-CM

## 2023-08-24 DIAGNOSIS — I10 PRIMARY HYPERTENSION: ICD-10-CM

## 2023-08-24 PROCEDURE — 99215 OFFICE O/P EST HI 40 MIN: CPT | Performed by: INTERNAL MEDICINE

## 2023-08-25 ENCOUNTER — MED REC SCAN ONLY (OUTPATIENT)
Dept: INTERNAL MEDICINE CLINIC | Facility: CLINIC | Age: 83
End: 2023-08-25

## 2023-09-05 ENCOUNTER — OFFICE VISIT (OUTPATIENT)
Dept: PHYSICAL THERAPY | Facility: HOSPITAL | Age: 83
End: 2023-09-05
Attending: INTERNAL MEDICINE
Payer: MEDICARE

## 2023-09-05 PROCEDURE — 97116 GAIT TRAINING THERAPY: CPT

## 2023-09-05 PROCEDURE — 97112 NEUROMUSCULAR REEDUCATION: CPT

## 2023-09-05 PROCEDURE — 97110 THERAPEUTIC EXERCISES: CPT

## 2023-09-05 NOTE — PROGRESS NOTES
Diagnosis:   Post-operative state (Z98.890)  Encounter for postoperative wound check (Z48.89)  S/P lumbar spine operation (O70.727)  Weakness of lower extremity, unspecified laterality (R29.898)  Right leg pain (M79.604)      Referring Provider: Suzanna Shankar MD (neurosurgery)   Date of Evaluation:    2023    Precautions:  osteopenia, hx of fx, HTN, CKD, sjogren's, fall risk Next MD visit:   none scheduled  Date of Surgery: 22     Insurance Primary/Secondary: MEDICARE / [employer]     # Auth Visits: na            Subjective: No new complaints.    Pain: 5-6/10 (R groin with hip flexion), 7/10 (abduction)      Objective:   Glute - 1/5 in prone R      ---------  Seated: L anterior pelvis; with correction L femur is still longer than R    Taken from IE:  Observation/Posture: forward slumped posture with severe thoracic hyperkyphosis and lumbar kyphosis in sitting and standing  Elevated L iliac crest and PSIS  Stands with L knee flexed d/t leg length discrepancy (LLE longer than RLE)  No pelvic obliquity or scoliosis seen in sitting     Neuro Screen: negative  Sensation: Grossly intact to light touch BLEs    Coordination:   Heel-to-Shin: WNL - R limited d/t HF strength  Toe Tap: WNL (initially decreased accuracy)    5xSTS: 15 seconds, with UE support, unable to perform without UE   Able to complete sit<>stand without UE from elevated surface    TELLEZ/56 - high fall risk    Trunk AROM: (* denotes performed with pain)  Flexion: 75% - limited d/t kyphotic posture  Extension: 0% - able to reach neutral with assistance  Rotation: R 25%; L 25%    Hip ROM:   Flexion: 90 deg (PROM supine) R  ER: 55 deg (PROM supine) R  IR: 25 deg (PROM supine) R    Accessory motion: hypomobile spine - globally   Hypomobile R hip AP    Strength: (* denotes performed with pain)  LE   Hip flexion (L2): R 3-/5; L 4+/5  Hip abduction: R 3-/5; L 4/5  Hip Extension: R 3+/5; L 4/5   Hip ER: R 3-/5; L 4/5    Knee Flexion: R 5/5; L 5/5   Knee extension (L3): R 4+/5; L 5/5   DF (L4): R 5-/5; L 5/5     Flexibility: (mild, moderate, severe tightness)  LE   Hip Flexor: R mild, L mild  Hamstrings: R WNL; L WNL  Piriformis: R WNL; L WNL  Quads: R WNL; L WNL       Special tests:   NA    Gait: pt ambulates on level ground with assistive device of rollator, decreased hip ext R with increased use of momentum needed to swing RLE forward, stooped posture/forward lean    Balance: DL: 1 minutes, back of knees on plinth after approx 30 seconds; SLS R unable, L unable   Trendelenburg RLE stance    Assessment: RLE leg length difference (shorter) and significant R trendelenburg contribute to gait impairments. Origin of glut weakness may be contributed by several factors including lumbar pathology and possible glut tear from fall after surgery. Decreased R hip pain today with 1 episode of buckling during ambulation with [facility] with R hip pain. Goals:   Goals: (to be met in 12-16 visits)   Pt will demonstrate improved SLS to >3 seconds ARELI to promote safety and decrease risk of falls on uneven surfaces such as grass and gravel   Pt will improve TELLEZ balance scale score by at least 5 points in order to demonstrates decreased fall risk  Pt will be able to squat to  light objects around the house without loss of stability  Pt will ambulate with least restrictive AD for 6 mins in order to improve upright tolerance needed for household ambulation/daily tasks  Pt will improve functional hip strength to demonstrate ability to ascend/descend 1 flight of stairs reciprocally with use of handrail   Pt will be independent and compliant with comprehensive HEP to maintain progress achieved in PT       Plan: Patient will be seen for 1-2 x/week or a total of 12-16 visits over a 90 day period.  Treatment will include: Gait training, Manual Therapy, Neuromuscular Re-education, Therapeutic Activities, Therapeutic Exercise, and Home Exercise Program instruction  Date: 7/11/2023  TX#: 2/12-16 Date: 7/18/23                TX#: 3/12-16 Date:  7/20/23               TX#: 4/12-16 Date: 7/25/23                TX#: 5/12-16 Date: 8/1/23  Tx#: 6/12-16 Date: 8/7/23  Tx: 7/12-16 Date: 8/15/23  Tx: 8/12-16 Date: 9/5/23  Tx: 9/12-16   Gait: 15 mins  - ambulation with RW  - ambulation with SBQC (heavy), x2 laps  - SBQC 2-cone weaving x1 lap   GT: 15 mins  - ambulation SBQC, 2x40', 2x15'  - stairs, x3 laps with SPC   GT: 8 mins  - ambulation with RW  - ambulation, heavy SBQC, x1 large lap, 2x10'   GT: 5 mins  - ambulation with SBQC, x1 small lap - buckling GT: 5 mins  - ambulation in // bars no AD but light BUE support GT: 12 mins  - ambulation LBQC x1 small lap, CGA  - ambulation SBQC x1, x2 small laps - seated rest breaks, CGA GT: 3 mins  - ambulation with LBQC, x1/2 small lap - pain and buckling GT: 12 mins  - ambulation RX, x2 large laps  - ambulation LBQC, 2x40'    TE: 15 mins  - sit<>stand, hands on knees, elevated plinth, x10  - seated HS curls, GTB, 2x10 R (blocking HF)  - supine heel slides, x20 R  - PROM R hip   TE: 15 mins  - sit<>stand, hand on knees, elevated plinth, x10  - seated LAQ, 2x15 L  - seated HS curls, RTB, 2x15 L      TE: 15 mins  - NuStep, L6, x6' - UE/Les  - hip ext, post toe tap // bar, 2x10-20 B  - standing HF stretch in // bars, x1' R  - sit<>stand from elevated chair (x2 airex pads), x10, 1UE on // bar, 1 on knee  TE: 30  - seated LAQ, 2x10 B  - seated hip add, YTB, x20 B  - sit<>stand with YTB add R, x10, BUE  - seated hip abd, YTB, x20 B  - s/l clamshells, 2x10 R (2 pillows between knees)  - passive HF stretch and hip ROM R   TE: 23 mins  - NuStep, LE only, L2, x10'  - bridges, x10  - clamshells, 2 pillows at knees, 2x10 R  - review HEP  TE: 16 mins  - NuStep, L4, x6' - LE only  - B hip abd in h/l, RTB, x10  - bridges, x10  - abd bridges, RTB, 2x10 (challenging/ painful R groin)  - clamshells with pillow, x5- d/c d/t pain  - assisted hip abd by PT, x5 - d/c d/t pain  - sit<>stand, BUE x5, elevated plinth     TE: 38 mins  - NuStep, L5, x10' - UE/Les  - stand hip ext slider, x20 R, BUE  - stand hip abd slider, x20 R, BUE  - h/l hip abd c/ ball, x20  - hip abd abd add in h/l with RTB (by PT), x20 ea R  - heel press in h/l, x10 R  - LAD RLE TE: 20 mins  - NuStep, L5, x10' - UE/Les  - standing slider 3-way, 2#, x20 ea R, BUE   - cone tap - forw and lateral, 2# ankle, weight, x20 ea R, BUE     NR: 15 mins  - sit<>stand with LLE on airex, hands on knees, elevated plinth, 2x5  - seated cone taps (for hip flexion), 2x10  - seated hip flexion, 2x10 R (1 inch approx)  - supine SKTC on SB, x20 (guidance by PT x10) NR: 15 mins  - sit<>stand LLE on airex, hands on knees, elevated plingth, x10  - seated cone taps, 2x12 L  - SL shuttle, 30#, 3x10 L (1x10 R)   NR: 30 mins  - SLS on RLE on 2\" step in // bars, 4x30\" R  - standing forw cone tap // bars, x20 R  - sit<>stand with LLE elevated on 2\"step + airex, x10 - pain R knee  - prone HS curl, 2x10 B - difficult tracking RLE  - prone TKE, tapping for glute activation, 3x12 B  - prone glute squeeze, x10  - staggered stance bridges, x10 (R close/L far) NR: 10 mins  - seated marching, 2x10 alt  - supine BKFO, YTB, x20 B  - hip abduction gravity eliminated supine, 2x10 (PT assisted) R   NR: 18 mins  - SL shuttle, 17#, 2x10 R  - SKTB on SB, 2x10 - PT guidance  - hip abd on SB with YTB resistance, x15 R  - BKFO, YTB, x10 B NR: 16 mins  - BKFO, RTB, 3x10 R  - supine hip IR/ER, x20 R  - SKTC on SB, x20 R - PT assist  - hip abd/add on SB, x20 R   NR: 12 mins  - lateral weight shift, BUE, x20 onto RLE  - forw/retro weight shift, x10 B, BUE  - sit<>stand, UE on knees, 2x12  - SKTC on SB (with and w/o RTB for cuing) R   NR: 12 mins  - R SLS + L cone tap, BUE x10 forw  - sit<>stand from high chair, BUE on knees, 2x10          MT: 9 mins  - LAD RLE  - lumbar PA Gr II L2-3  - R pelvic post rot and inf mob     HEP: heel slides, SLR, bridges, sit<>stand, walking routine  7/25/23: add gravity eliminated hip abduction (supine)  8/1/23: gave print out: prone glut set, bridges, supported SHEEBA hoyos (KENDAL), gravity eliminated hip abd (supine)  Charges: 1GT, 1TE, 1NR   Total Timed Treatment: 44 min  Total Treatment Time: 44 min

## 2023-09-07 ENCOUNTER — OFFICE VISIT (OUTPATIENT)
Dept: PHYSICAL THERAPY | Facility: HOSPITAL | Age: 83
End: 2023-09-07
Attending: INTERNAL MEDICINE
Payer: MEDICARE

## 2023-09-07 PROCEDURE — 97116 GAIT TRAINING THERAPY: CPT

## 2023-09-07 PROCEDURE — 97112 NEUROMUSCULAR REEDUCATION: CPT

## 2023-09-07 PROCEDURE — 97110 THERAPEUTIC EXERCISES: CPT

## 2023-09-07 NOTE — PROGRESS NOTES
Diagnosis:   Post-operative state (Z98.890)  Encounter for postoperative wound check (Z48.89)  S/P lumbar spine operation (Z98.890)  Weakness of lower extremity, unspecified laterality (R29.898)  Right leg pain (M79.604)      Referring Provider: Isidro Eubanks MD (neurosurgery)   Date of Evaluation:    2023    Precautions:  osteopenia, hx of fx, HTN, CKD, sjogren's, fall risk Next MD visit:   none scheduled  Date of Surgery: 24     Insurance Primary/Secondary: Siobhan Demond / [employer] PPO     # Auth Visits: na            Subjective: Was a little sore after previous session but feels that his leg \"is better overall. \" Reports he is feeling stronger that he almost wants to try to walk without his walker.   Pain: 6-7/10 R glute into groin    Objective:   Glute - 2/5 in prone R    ---------  Seated: L anterior pelvis; with correction L femur is still longer than R    Taken from IE:  Observation/Posture: forward slumped posture with severe thoracic hyperkyphosis and lumbar kyphosis in sitting and standing  Elevated L iliac crest and PSIS  Stands with L knee flexed d/t leg length discrepancy (LLE longer than RLE)  No pelvic obliquity or scoliosis seen in sitting     Neuro Screen: negative  Sensation: Grossly intact to light touch BLEs    Coordination:   Heel-to-Shin: WNL - R limited d/t HF strength  Toe Tap: WNL (initially decreased accuracy)    5xSTS: 15 seconds, with UE support, unable to perform without UE   Able to complete sit<>stand without UE from elevated surface    TELLEZ/56 - high fall risk    Trunk AROM: (* denotes performed with pain)  Flexion: 75% - limited d/t kyphotic posture  Extension: 0% - able to reach neutral with assistance  Rotation: R 25%; L 25%    Hip ROM:   Flexion: 90 deg (PROM supine) R  ER: 55 deg (PROM supine) R  IR: 25 deg (PROM supine) R    Accessory motion: hypomobile spine - globally   Hypomobile R hip AP    Strength: (* denotes performed with pain)  LE Hip flexion (L2): R 3-/5; L 4+/5  Hip abduction: R 3-/5; L 4/5  Hip Extension: R 3+/5; L 4/5   Hip ER: R 3-/5; L 4/5    Knee Flexion: R 5/5; L 5/5   Knee extension (L3): R 4+/5; L 5/5   DF (L4): R 5-/5; L 5/5     Flexibility: (mild, moderate, severe tightness)  LE   Hip Flexor: R mild, L mild  Hamstrings: R WNL; L WNL  Piriformis: R WNL; L WNL  Quads: R WNL; L WNL       Special tests:   NA    Gait: pt ambulates on level ground with assistive device of rollator, decreased hip ext R with increased use of momentum needed to swing RLE forward, stooped posture/forward lean    Balance: DL: 1 minutes, back of knees on plinth after approx 30 seconds; SLS R unable, L unable   Trendelenburg RLE stance    Assessment: Improved ambulation with L/SBQC with one episode of buckling throughout session today. Glute atrophy is significant and contributes to patient's difficuly controlling RLE movement. Hip flexor also remains weak. Added additional heel lift to R shoe to help compensate for leg length difference. MaxA with bed mobility and supine<>sit transfers today. CGA with ambulation. Goals:   Goals: (to be met in 12-16 visits)   Pt will demonstrate improved SLS to >3 seconds ARELI to promote safety and decrease risk of falls on uneven surfaces such as grass and gravel   Pt will improve TELLEZ balance scale score by at least 5 points in order to demonstrates decreased fall risk  Pt will be able to squat to  light objects around the house without loss of stability  Pt will ambulate with least restrictive AD for 6 mins in order to improve upright tolerance needed for household ambulation/daily tasks  Pt will improve functional hip strength to demonstrate ability to ascend/descend 1 flight of stairs reciprocally with use of handrail   Pt will be independent and compliant with comprehensive HEP to maintain progress achieved in PT       Plan: Patient will be seen for 1-2 x/week or a total of 12-16 visits over a 90 day period. Treatment will include: Gait training, Manual Therapy, Neuromuscular Re-education, Therapeutic Activities, Therapeutic Exercise, and Home Exercise Program instruction  Date:  7/20/23               TX#: 4/12-16 Date: 7/25/23                TX#: 5/12-16 Date: 8/1/23  Tx#: 6/12-16 Date: 8/7/23  Tx: 7/12-16 Date: 8/15/23  Tx: 8/12-16 Date: 9/5/23  Tx: 9/12-16 Date: 9/7/23  Tx: 10/12-16   GT: 8 mins  - ambulation with RW  - ambulation, heavy SBQC, x1 large lap, 2x10'   GT: 5 mins  - ambulation with SBQC, x1 small lap - buckling GT: 5 mins  - ambulation in // bars no AD but light BUE support GT: 12 mins  - ambulation LBQC x1 small lap, CGA  - ambulation SBQC x1, x2 small laps - seated rest breaks, CGA GT: 3 mins  - ambulation with LBQC, x1/2 small lap - pain and buckling GT: 12 mins  - ambulation RX, x2 large laps  - ambulation LBQC, 2x40'  GT: 23 mins  - added shoe lift  -  ambulation with RW x1 large lap  - ambulation with LBQC x1 lap  - ambulation with SBQC, x1.5 lap   TE: 15 mins  - NuStep, L6, x6' - UE/Les  - hip ext, post toe tap // bar, 2x10-20 B  - standing HF stretch in // bars, x1' R  - sit<>stand from elevated chair (x2 airex pads), x10, 1UE on // bar, 1 on knee  TE: 30  - seated LAQ, 2x10 B  - seated hip add, YTB, x20 B  - sit<>stand with YTB add R, x10, BUE  - seated hip abd, YTB, x20 B  - s/l clamshells, 2x10 R (2 pillows between knees)  - passive HF stretch and hip ROM R   TE: 23 mins  - NuStep, LE only, L2, x10'  - bridges, x10  - clamshells, 2 pillows at knees, 2x10 R  - review HEP  TE: 16 mins  - NuStep, L4, x6' - LE only  - B hip abd in h/l, RTB, x10  - bridges, x10  - abd bridges, RTB, 2x10 (challenging/ painful R groin)  - clamshells with pillow, x5- d/c d/t pain  - assisted hip abd by PT, x5 - d/c d/t pain  - sit<>stand, BUE x5, elevated plinth     TE: 38 mins  - NuStep, L5, x10' - UE/Les  - stand hip ext slider, x20 R, BUE  - stand hip abd slider, x20 R, BUE  - h/l hip abd c/ ball, x20  - hip abd abd add in h/l with RTB (by PT), x20 ea R  - heel press in h/l, x10 R  - LAD RLE TE: 20 mins  - NuStep, L5, x10' - UE/Les  - standing slider 3-way, 2#, x20 ea R, BUE   - cone tap - forw and lateral, 2# ankle, weight, x20 ea R, BUE   TE: 12 mins  - NuStep, L3, x10'  - review HEP   NR: 30 mins  - SLS on RLE on 2\" step in // bars, 4x30\" R  - standing forw cone tap // bars, x20 R  - sit<>stand with LLE elevated on 2\"step + airex, x10 - pain R knee  - prone HS curl, 2x10 B - difficult tracking RLE  - prone TKE, tapping for glute activation, 3x12 B  - prone glute squeeze, x10  - staggered stance bridges, x10 (R close/L far) NR: 10 mins  - seated marching, 2x10 alt  - supine BKFO, YTB, x20 B  - hip abduction gravity eliminated supine, 2x10 (PT assisted) R   NR: 18 mins  - SL shuttle, 17#, 2x10 R  - SKTB on SB, 2x10 - PT guidance  - hip abd on SB with YTB resistance, x15 R  - BKFO, YTB, x10 B NR: 16 mins  - BKFO, RTB, 3x10 R  - supine hip IR/ER, x20 R  - SKTC on SB, x20 R - PT assist  - hip abd/add on SB, x20 R   NR: 12 mins  - lateral weight shift, BUE, x20 onto RLE  - forw/retro weight shift, x10 B, BUE  - sit<>stand, UE on knees, 2x12  - SKTC on SB (with and w/o RTB for cuing) R   NR: 12 mins  - R SLS + L cone tap, BUE x10 forw  - sit<>stand from high chair, BUE on knees, 2x10   NR: 19 mins  - SL shuttle, 17#, 2x15 R  - prone TKE, x10 R  - prone HS curl, x10 - excess hip ER  - prone glut set, x10 R        MT: 9 mins  - LAD RLE  - lumbar PA Gr II L2-3  - R pelvic post rot and inf mob      HEP: heel slides, SLR, bridges, sit<>stand, walking routine  7/25/23: add gravity eliminated hip abduction (supine)  8/1/23: gave print out: prone glut set, bridges, supported clamshells, BKFO (YTB), gravity eliminated hip abd (supine)  9/7/23: glut set (supine or seated)  Charges: 2GT, 1TE, 1NR   Total Timed Treatment: 54 min  Total Treatment Time: 60 min

## 2023-09-11 ENCOUNTER — TELEPHONE (OUTPATIENT)
Dept: INTERNAL MEDICINE CLINIC | Facility: CLINIC | Age: 83
End: 2023-09-11

## 2023-09-11 RX ORDER — TOLTERODINE 4 MG/1
4 CAPSULE, EXTENDED RELEASE ORAL 2 TIMES DAILY
Qty: 60 CAPSULE | Refills: 3 | Status: SHIPPED | OUTPATIENT
Start: 2023-09-11

## 2023-09-11 RX ORDER — TOLTERODINE TARTRATE 2 MG/1
2 TABLET, EXTENDED RELEASE ORAL 3 TIMES DAILY
Qty: 90 TABLET | Refills: 3 | Status: SHIPPED | OUTPATIENT
Start: 2023-09-11

## 2023-09-11 NOTE — TELEPHONE ENCOUNTER
Patient's wife called to request a refill on her 's prescription for tolterodine 2mg. He was taking 2 a day and Dr. Yaron Jones changed to 3 a day. He needs the prescription changed to get a 30 day supply. Wife would like to  prescription this afternoon.      [address] , [address] [place] [address], [phone]

## 2023-09-11 NOTE — TELEPHONE ENCOUNTER
[address] called stating that the prescription for tolterodine 2mg 3 tablets a day is not covered by his insurance. His insurance only covers 2 tablets a day.

## 2023-09-12 ENCOUNTER — OFFICE VISIT (OUTPATIENT)
Dept: PHYSICAL THERAPY | Facility: HOSPITAL | Age: 83
End: 2023-09-12
Attending: INTERNAL MEDICINE
Payer: MEDICARE

## 2023-09-12 PROCEDURE — 97116 GAIT TRAINING THERAPY: CPT

## 2023-09-12 PROCEDURE — 97110 THERAPEUTIC EXERCISES: CPT

## 2023-09-12 PROCEDURE — 97112 NEUROMUSCULAR REEDUCATION: CPT

## 2023-09-14 ENCOUNTER — OFFICE VISIT (OUTPATIENT)
Dept: PHYSICAL THERAPY | Facility: HOSPITAL | Age: 83
End: 2023-09-14
Attending: INTERNAL MEDICINE
Payer: MEDICARE

## 2023-09-14 PROCEDURE — 97110 THERAPEUTIC EXERCISES: CPT

## 2023-09-14 PROCEDURE — 97116 GAIT TRAINING THERAPY: CPT

## 2023-09-14 RX ORDER — TOLTERODINE TARTRATE 2 MG/1
TABLET, EXTENDED RELEASE ORAL
Qty: 60 TABLET | Refills: 3 | OUTPATIENT
Start: 2023-09-14

## 2023-09-14 RX ORDER — CHOLECALCIFEROL (VITAMIN D3) 1250 MCG
CAPSULE ORAL
Qty: 16 CAPSULE | Refills: 3 | OUTPATIENT
Start: 2023-09-14

## 2023-09-15 ENCOUNTER — OFFICE VISIT (OUTPATIENT)
Dept: SURGERY | Facility: CLINIC | Age: 83
End: 2023-09-15

## 2023-09-15 ENCOUNTER — TELEPHONE (OUTPATIENT)
Dept: RHEUMATOLOGY | Facility: CLINIC | Age: 83
End: 2023-09-15

## 2023-09-15 ENCOUNTER — TELEPHONE (OUTPATIENT)
Dept: INTERNAL MEDICINE CLINIC | Facility: CLINIC | Age: 83
End: 2023-09-15

## 2023-09-15 VITALS
SYSTOLIC BLOOD PRESSURE: 134 MMHG | DIASTOLIC BLOOD PRESSURE: 68 MMHG | HEIGHT: 70 IN | BODY MASS INDEX: 20.04 KG/M2 | HEART RATE: 70 BPM | WEIGHT: 140 LBS

## 2023-09-15 DIAGNOSIS — Z98.890 POST-OPERATIVE STATE: Primary | ICD-10-CM

## 2023-09-15 DIAGNOSIS — M54.16 LUMBAR RADICULOPATHY: ICD-10-CM

## 2023-09-15 DIAGNOSIS — M47.816 LUMBAR SPONDYLOSIS: ICD-10-CM

## 2023-09-15 DIAGNOSIS — Z98.890 S/P LUMBAR SPINE OPERATION: ICD-10-CM

## 2023-09-15 DIAGNOSIS — M79.604 RIGHT LEG PAIN: ICD-10-CM

## 2023-09-15 DIAGNOSIS — M21.70 LEG LENGTH DISCREPANCY: ICD-10-CM

## 2023-09-15 DIAGNOSIS — R29.898 WEAKNESS OF LOWER EXTREMITY, UNSPECIFIED LATERALITY: ICD-10-CM

## 2023-09-15 DIAGNOSIS — R29.898 WEAKNESS OF RIGHT LOWER EXTREMITY: ICD-10-CM

## 2023-09-15 PROCEDURE — 99214 OFFICE O/P EST MOD 30 MIN: CPT | Performed by: STUDENT IN AN ORGANIZED HEALTH CARE EDUCATION/TRAINING PROGRAM

## 2023-09-15 RX ORDER — CARVEDILOL 12.5 MG/1
12.5 TABLET ORAL 2 TIMES DAILY WITH MEALS
Qty: 180 TABLET | Refills: 3 | Status: SHIPPED | OUTPATIENT
Start: 2023-09-15

## 2023-09-15 NOTE — PROGRESS NOTES
Established patient:  Reason for follow up:   9 month post op    Numeric Rating Scale:       Pain at Present:  0/10 when sitting, 6/10 when walking       Distribution of Pain: right leg     Most recent treatments for Current Pain Condition:   Physical Therapy  Response to treatment: some relief

## 2023-09-15 NOTE — PROGRESS NOTES
2050 [facility]  Neurological Surgery Established Patient Clinic Note    Savanah Perkins  2/13/1940  [id number]  PCP: Tom Tsang MD  Referring Provider: Ashley Sosa MD (inpatient consult)     REASON FOR VISIT:  Postoperative follow up  Lumbalgia  Lumbar spondylosis (L1-2, L4-5)  Lumbar calcified disc with radiculopathy (L1-2 with right L1 and L2 radiculopathies)     NEUROSURGICAL PROCEDURES TO DATE:  12/21/2022 - minimally invasive right L1-2 laminotomy for decompression    HISTORY OF PRESENT ILLNESS:  Savanah Perkins is a(n) 82year old male who was admitted to the hospital with complaints of back pain with radiation to his right groin and right weakness of the hip flexor muscles. His presentation was consistent with right L1/L2 radiculopathies, which was corroborated by his imaging findings consisting of L1-2 calcified disc/endplate osteophyte complex leading to compression of the right L1 and L2 nerve roots. Pain had been unrelenting, refractory to nonoperative treatment including epidural steroid injections. Of note, he initially also had pain radiating down to his right foot which responded very well to steroid injection providing complete resolution of his likely superimposed lower lumbar radiculopathy (L4-5). He underwent minimally invasive right L1-2 laminotomy for decompression. Mr. Ryanne Land presented for 2-week postoperative follow-up today. He presented with his wife. He was in a wheelchair. He reported weight loss and deconditioning related to recent hospitalization. He remained in rehab, although they were somewhat concerned about under staffing issues and lack of intensive therapy during the holidays. He reported persistent right lower extremity weakness, which had somewhat improved postop but seemed weaker again on follow up. He also reported persistent right lateral thigh pain down to his knee, although this was improved from preop.   He stated this went from about a 12 out of 10 to about a 7 or 8 out of 10. He denied any right groin pain. Denied any back pain. His incision was cared for at the facility with dressing changes. There was a small area of granulation tissue, but they denied any redness, induration, purulent discharge. They also denied any CSF leak. Mr. Ryanne Land presented 1/17/2023 for 1 month postoperative follow up. Overall, his condition is improved. He states pain is much more manageable. He has reduced the amount of narcotic medications he necessitates. He remains in rehab and is working hard with them. When walking and with activity his pain increased to a 6, which is significantly improved from prior. He denies any new neurologic issues. He denies any new medical concerns. Mr. Ryanne Land presents today, 1/31/2023, for close postoperative follow up. He reports having fallen shortly after his last visit. He states he was in the bathroom and feel backward on his back. He is worried about his incision. He's had a small dehiscence that has been healing by secondary intention. They state that the discharge has gone from red tinged to now brown and they're concerned about infection. He also reports continued pain with certain movements of his leg (abduction). He otherwise states he has been told by therapists that his gait is improving albeit slowly. INTERVAL HISTORY 2/10/2023:  Mr. Ryanne Land presents today, 2/10/2023, for continued close postoperative follow-up. Today he reports stable right groin and leg pain with activity, especially after therapy. He feels as though he is improving, but very slowly. They have continued doing wet-to-dry dressings over his wound, which is almost entirely granulated. He reports having fallen recently as he has been attempting to get out of the wheelchair and doing more things on his own. We had a conversation about activity.   He also has been continuing to try to gain weight as he lost a lot of weight being in the hospital.  They report that the home health nurse that comes intermittently continues to clean the wound. The therapist has also been telling him they seem more muscle activation out of his right leg. INTERVAL HISTORY 3/6/2023:  Mr. Michael Mendez presents today, 3/6/2023, for continued close postoperative follow-up. He was unable to attend his previously scheduled follow-up last Friday, 3/3/2023 due to a GI illness. He states that he is doing much better today. His incision has been treated with wet-to-dry over the last few weeks and has completely healed up now. He reports continued slow improvement in his right lower extremity strength and pain. INTERVAL HISTORY 5/9/2023:  Mr. Michael Mendez presents today, 5/9/2023, for continued postoperative follow-up. Today he presents with a rolling walker as opposed to a wheelchair, which is a significant improvement from prior. He still has weakness in the right leg, but it has improved slowly. He is working with his grandson who is a therapist.  He is also gained healthy weight. He is inquiring about outpatient therapy. His incision is well-healed. INTERVAL HISTORY 6/12/2023:   Mr. Michael Mendez presents today for continued postoperative follow-up. His surgery date was on 12/21/2022. He is 6 months postop now. He reports slow but steady improvement. Today he demonstrates his right hip flexion is significantly improved from previous. He denies any back pain. He still continues to have some right thigh pain especially with twisting motions. Overall, he is happy with his progress. His wife does raise concerns that he is unable to gain weight. I suggested the bring this up to his primary care doctor. INTERVAL HISTORY 9/15/2023:   Mr. Michael Mendez presents today for continued postoperative follow-up. He is 9 months postop. At his last visit, his right hip flexion had significantly improved from previous.   Today, he demonstrates a small setback as his strength is slightly worse than before. He denies any back pain but now reports significant right groin pain with weightbearing, and ambulation. This is a 0 out of 10 when sitting, but rates this to a 6 out of 10 when walking. He remains in physical therapy.     PAST MEDICAL HISTORY:  Past Medical History:   Diagnosis Date    Acute left-sided low back pain without sciatica 07/20/2018    JOSE LUIS (acute kidney injury) ([address]     from omeprazole-resolved    Anemia 07/26/2019    Aortic stenosis 10/2013    AVR-bio    P[place]man esophagus     CAD (coronary artery disease)     stents, bypasses    Calculus of kidney     Carotid artery plaque 2011    ultrasound    Chronic anemia     bone marrow neg 12/2012    Chronic fatigue     CKD (chronic kidney disease) stage 4, GFR 15-29 ml/min ([facility])     Closed wedge compression fracture of T11 vertebra with routine healing 06/19/2018    Clostridium difficile colitis 06/03/2022    Colon polyps 11/2012    colonoscopy    Compression fracture of T12 vertebra [facility] 06/19/2018    Contrast dye induced nephropathy 02/04/2022    Dehydration 01/28/2022    Diverticulosis     Drug-induced interstitial nephritis 08/23/2020    Omeprazole    Dyslipidemia     Elevated homocysteine     Elevated lipoprotein A level     Elevated troponin     FUO (fever of unknown origin)     PMR or testicular/prostate infection    Gastritis     Hemorrhoids     Hiatal hernia with gastroesophageal reflux     History of Clostridioides difficile colitis 06/2022    History of community acquired pneumonia     HTN (hypertension)     Immunosuppression due to chronic steroid use      Inguinal lymphadenopathy     right-bx    Ischemic colitis ([address]     resolved    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[address]     Mesenteric adenitis     bx    Multiple renal cysts     both-not reported on recent CTs    SHIRA (obstructive sleep apnea)     Osteopenia     steroids    PMR (polymyalgia rheumatica) ([address]     Pneumonia 02/2020    Positive TRINY (antinuclear antibody) 08/28/2019    Precancerous lesion     skin    Proteinuria     mild    Psoriasis     Renal artery stenosis (HCC)     both    Renal stone     right    Right shoulder pain     injected-Liane    Sjogren's syndrome with myopathy ([facility] 06/03/2022    Sleep apnea     untreated    Thrombocytopenia (HCC)     resolved    Vitamin B12 nutritional deficiency     Vitamin D deficiency     Weight loss        PAST SURGICAL HISTORY:  Past Surgical History:   Procedure Laterality Date    ANGIOPLASTY (CORONARY)  1999    Dr. Garcia Senior      teenager    BONE MARROW BIOPSY AND ASPIRATION  1/2021    Hantel-neg    CABG  10/25/13    AORTIC VALVE REPLACEMENT; bypass x 2-3    CARDIAC CATHETERIZATION  2008/2013    CHOLECYSTECTOMY  1984    COLONOSCOPY  2005    [facility]    COLONOSCOPY  11/7/2012        COLONOSCOPY N/A 4/4/2018    Procedure: COLONOSCOPY;  Surgeon: Brandon Bolden MD;  Location: [address] [place] [address] ENDOSCOPY    COLONOSCOPY N/A 10/11/2021    Procedure: COLONOSCOPY, ESOPHAGOGASTRODUODENOSCOPY (EGD); Surgeon: Brandon Bolden MD;  Location: [facility] ENDOSCOPY    CORTISONE INJECTION  7/6/15    rt shoulder     CYTOLOGY LYMPH NODE FNA - JAR(S): 1  12/07/2012    excisional biopsy rt inguinal lymph node     EGD  10-    [facility]    IR KIDNEY BIOPSY (RENAL)RANDOM  08/11/2020    for drug induced nephritis    LAPAROSCOPIC LYMPH NODE BIOP  2/8/13    exploratory abd lymphadenopathy    REPLACE AORTIC VALVE OPEN  10/25/13    bio    SIGMOIDOSCOPY,DIAGNOSTIC  1995    SKIN TAGS  10/1714    plus histofreeze    UPPER GI ENDOSCOPY PERFORMED  2005/2007    [facility]afi     FAMILY HISTORY:  family history includes Cancer in his daughter and father; Heart Disorder in his brother; Heart Surgery in his brother; Mental Disorder in his mother; Neurological Disorder in his father; Other in his brother, brother, father, mother, and another family member. SOCIAL HISTORY:   reports that he quit smoking about 52 years ago. His smoking use included cigarettes.  He has never used smokeless tobacco. He reports that he does not currently use alcohol after a past usage of about 2.0 standard drinks of alcohol per week. He reports that he does not use drugs. ALLERGIES:    Codeine                 PAIN    Comment:abdominal  Lisinopril              HIVES  Niacin                  RASH  Nsaids                  OTHER (SEE COMMENTS)    Comment:CKD  Omeprazole              OTHER (SEE COMMENTS)    Comment:Kidney injury  Pcn [Bicillin C-R,]     HIVES  Ultram [Tramadol]       NAUSEA ONLY    Comment:And constipation  Zinc Acetate            NAUSEA ONLY    MEDICATIONS:  Betaine, Trimethylglycine, 500 MG Oral Cap, , Disp: , Rfl:   methotrexate 2.5 MG Oral Tab, TAKE 4 TABLETS BY MOUTH ONCE A WEEK, Disp: 20 tablet, Rfl: 1  tolterodine 2 MG Oral Tab, Take 1 tablet (2 mg total) by mouth in the morning, at noon, and at bedtime. , Disp: 90 tablet, Rfl: 3  tolterodine ER 4 MG Oral Capsule SR 24 Hr, Take 1 capsule (4 mg total) by mouth in the morning and 1 capsule (4 mg total) before bedtime. , Disp: 60 capsule, Rfl: 3  Riboflavin (B2) 100 MG Oral Tab, Take by mouth., Disp: 30 tablet, Rfl: 0  Elastic Bandages & Supports (B-3 EXTRA HIGH COMP HOSE WOMEN) Does not apply Misc, B3 100mg daily, Disp: , Rfl: 0  Acetylcarnitine HCl (ACETYL-L-CARNITINE HCL) Does not apply Powder, L-CARNITINE 250MG DAILY, Disp: , Rfl: 0  DULoxetine 30 MG Oral Cap DR Particles, Take 1 capsule by mouth once daily, Disp: 30 capsule, Rfl: 3  CARVEDILOL 12.5 MG Oral Tab, TAKE 1 TABLET BY MOUTH TWICE DAILY WITH MEALS, Disp: 180 tablet, Rfl: 0  Potassium Chloride ER 10 MEQ Oral Tab CR, Take 1 tablet (10 mEq total) by mouth daily. , Disp: 60 tablet, Rfl: 3  predniSONE 2.5 MG Oral Tab, Take 1 tablet (2.5 mg total) by mouth daily. , Disp: 90 tablet, Rfl: 3  temazepam (RESTORIL) 7.5 MG Oral Cap, Take 1 capsule (7.5 mg total) by mouth nightly as needed for Sleep., Disp: 30 capsule, Rfl: 1  sennosides 8.6 MG Oral Tab, Take 1 tablet (8.6 mg total) by mouth nightly as needed. , Disp: , Rfl:   folic acid 1 MG Oral Tab, Take 1 tablet (1 mg total) by mouth 4 (four) times a week. T,th,Sat and Sunday, Disp: , Rfl:   atorvastatin 20 MG Oral Tab, Take 0.5 tablets (10 mg total) by mouth nightly., Disp: , Rfl: 0  Cholecalciferol (VITAMIN D3) 1.25 MG (08676 UT) Oral Cap, Two/month, Disp: 13 capsule, Rfl: 3  famotidine 20 MG Oral Tab, Take 2 tablets (40 mg total) by mouth 2 (two) times daily. , Disp: , Rfl: 0  calcium carbonate 500 MG Oral Chew Tab, Chew 1 tablet (500 mg total) by mouth 2 (two) times daily. , Disp: , Rfl: 0  CEREFOLIN NAC 6-90.314-2-600 MG Oral Tab, TAKE 1 TABLET BY MOUTH ON MONDAY, WEDNESDAY AND FRIDAY, Disp: 36 tablet, Rfl: 3  Magnesium Citrate 100 MG Oral Cap, Take 100 mg by mouth 2 (two) times a day., Disp: , Rfl:   nitroglycerin 0.4 MG Sublingual SL Tab, Place 1 tablet (0.4 mg total) under the tongue every 5 (five) minutes as needed for Chest pain. Or SOB, Disp: 25 tablet, Rfl: 1  aspirin 81 MG Oral Tab EC, Take 1 tablet (81 mg total) by mouth 3 (three) times a week. MWF in the evening, Disp: , Rfl: 0  Turmeric, Curcuma [place]a, Does not apply Powder, One a day 300mg, Disp: , Rfl: 0  Coenzyme Q10 (CO Q 10 OR), Take 50 mg by mouth 2 (two) times daily. , Disp: , Rfl:   Nutritional Supplements (JUICE PLUS FIBRE OR), Take 2 capsules by mouth 2 (two) times daily. , Disp: , Rfl:   Naloxone HCl 4 MG/0.1ML Nasal Liquid, 4 mg by Nasal route as needed. If patient remains unresponsive, repeat dose in other nostril 2-5 minutes after first dose. (Patient not taking: Reported on 9/15/2023), Disp: 1 kit, Rfl: 0    No current facility-administered medications on file prior to visit. REVIEW OF SYSTEMS:  All other systems were reviewed and were negative except for those previously mentioned in the HPI     PHYSICAL EXAMINATION:  General: No acute distress. Respiratory: Non-labored respirations bilaterally.  No audible wheezing  Cardiovascular: Extremities warm and well-perfused. Abdomen: Soft, nontender, nondistended. Musculoskeletal: Moves all extremities well, symmetrically. Extremities: No edema. NEUROLOGIC EXAMINATION:  Mental status: Alert and oriented x 3  Speech: Clear, fluent  Cranial nerves: PERRLA, EOMI, face symmetric, with normal strength and sensation, tongue and palate midline, SCM 5/5 bilaterally  Motor:       5/5 throughout. Right hip flexion 4-/5. No pronator drift  Tone: Normal  Atrophy/Fasciculations: None  Sensation: Normal to light touch  Cerebellar: Normal finger nose finger  Gait: Deferred secondary to right lower extremity weakness      Reflexes: Hyperreflexic throughout, sustained clonus on the right, left 2-3 beats  Incision: Well healed  Positive right Tyrese's, tenderness along the right trochanter    IMAGING:  No new neurosurgical imaging for review    ASSESSMENT:  82 yoM with cervical and lumbar spondylosis. He presented with exacerbation of right lower extremity pain and weakness back in December 2022. After a thorough imaging work-up and evaluation as well as multiple neurosurgical opinions, it was felt he was most symptomatic from his L1-2 pathology leading to L1/L2 radiculopathies. While he also had lateral recess stenosis bilaterally R>L at L4-5, he no longer had complaints consistent with L5 radiculopathy after receiving a steroid injection addressing this level. Prior to injection he did have radiculopathy in a dermatomal distribution consistent with L5. I discussed the possibility of decompressing both levels at the same time during surgery, as well as alternatives such as targeting L1-2, and potentially L4-5 in the future if symptoms of L5 radiculopathy were to recur and remain refractory to aggressive medical management. Patient and wife were concerned about his fitness for a longer/bigger surgery and I agreed with this.  Together we agreed to proceed with single decompression at L1-2 with a goal of providing pain relief, and give him the best change for recovery of motor function. He is now s/p minimally invasive right L1-2 laminotomy for decompression. Postoperatively he was recovering slowly. He is now 9 months postoperatively. While he had significant improvement in hip flexors, today he seems slightly weaker than before. This may be due to pain limitation. He has significant pain with hip rotation, he has a positive Tyrese's, he also has tenderness in the groin and the trochanter. At this point, I suspect hip pathology may be contributory. Plan:  - RTC 3 months   - Consider hip injections with Dr. Trip Ledesma MD  Neurological Surgery    [facility], [address] [facility], [place]  [phone]  Pager [phone]  9/15/2023 12:08 PM      This note was created using a voice-recognition transcribing system. Incorrect words or phrases may have been missed during proofreading. Please interpret accordingly. Total Time    Established Patient Total Time       30  minutes. Activities       Preparing to see the patient (chart/tests/imaging review). Obtaining and/or reviewing separately obtained history. Performing a medically appropriate examination and/or evaluation. Counseling and educating the patient/family/caregiver. Ordering medications, tests, or procedures. Referring and communicating with other health care professionals (when not separately reported). Documenting clincal information in the electronic or other health record. Independently interpreting results (not separately reported). Communicating results to the patient/family/caregiver. Care coordination (not separately reported).

## 2023-09-18 ENCOUNTER — TELEPHONE (OUTPATIENT)
Dept: SURGERY | Facility: CLINIC | Age: 83
End: 2023-09-18

## 2023-09-19 ENCOUNTER — OFFICE VISIT (OUTPATIENT)
Dept: PHYSICAL THERAPY | Facility: HOSPITAL | Age: 83
End: 2023-09-19
Attending: INTERNAL MEDICINE
Payer: MEDICARE

## 2023-09-19 PROCEDURE — 97110 THERAPEUTIC EXERCISES: CPT

## 2023-09-19 PROCEDURE — 97116 GAIT TRAINING THERAPY: CPT

## 2023-09-19 PROCEDURE — 97112 NEUROMUSCULAR REEDUCATION: CPT

## 2023-09-19 NOTE — PROGRESS NOTES
Diagnosis:   Post-operative state (Z98.890)  Encounter for postoperative wound check (Z48.89)  S/P lumbar spine operation (K83.376)  Weakness of lower extremity, unspecified laterality (R29.898)  Right leg pain (M79.604)      Referring Provider: Dc Yuen MD (neurosurgery)   Date of Evaluation:    7/5/2023    Precautions:  osteopenia, hx of fx, HTN, CKD, sjogren's, fall risk Next MD visit:   none scheduled  Date of Surgery: 12/21/22     [facility]     # Auth Visits: na            Subjective: Had follow-up with neurosurgeon. May consider hip injection for pain with Dr. Ta Benson. Will follow-up in 3 months. Has order for shoe lift from [facility] [employer].     Pain: 3-4/10 R glute into groin    Objective:  Gait speed 9/19/23: 30second for 20 feet using SBQC        Glute - 2/5 in prone R    ---------  Seated: L anterior pelvis; with correction L femur is still longer than R    Taken from IE:  Observation/Posture: forward slumped posture with severe thoracic hyperkyphosis and lumbar kyphosis in sitting and standing  Elevated L iliac crest and PSIS  Stands with L knee flexed d/t leg length discrepancy (LLE longer than RLE)  No pelvic obliquity or scoliosis seen in sitting     Neuro Screen: negative  Sensation: Grossly intact to light touch BLEs    Coordination:   Heel-to-Shin: WNL - R limited d/t HF strength  Toe Tap: WNL (initially decreased accuracy)    5xSTS:   IE: 15 seconds, with UE support, unable to perform without UE    Able to complete sit<>stand without UE from elevated surface    TELLEZ:   IE: 23/56 - high fall risk    Trunk AROM: (* denotes performed with pain)  Flexion: 75% - limited d/t kyphotic posture  Extension: 0% - able to reach neutral with assistance  Rotation: R 25%; L 25%    Hip ROM:   Flexion: 90 deg (PROM supine) R  ER: 55 deg (PROM supine) R  IR: 25 deg (PROM supine) R    Accessory motion: hypomobile spine - globally   Hypomobile R hip AP    Strength: (* denotes performed with pain)  LE   Hip flexion (L2): R 3-/5; L 4+/5  Hip abduction: R 3-/5; L 4/5  Hip Extension: R 3+/5; L 4/5   Hip ER: R 3-/5; L 4/5    Knee Flexion: R 5/5; L 5/5   Knee extension (L3): R 4+/5; L 5/5   DF (L4): R 5-/5; L 5/5     Flexibility: (mild, moderate, severe tightness)  LE   Hip Flexor: R mild, L mild  Hamstrings: R WNL; L WNL  Piriformis: R WNL; L WNL  Quads: R WNL; L WNL       Special tests:   NA    Gait: pt ambulates on level ground with assistive device of rollator, decreased hip ext R with increased use of momentum needed to swing RLE forward, stooped posture/forward lean    Balance: DL: 1 minutes, back of knees on plinth after approx 30 seconds; SLS R unable, L unable   Trendelenburg RLE stance    Assessment: Improving glute activation with improved control with standing hip extension using slider today. Gait is most efficient and safe with RW at this time. Hip/groin pain continues and contributes to limitations with gait, along with prox hip/glutes weakness. Will continue to strengthen to improve stability. Goals:   Goals: (to be met in 12-16 visits)   Pt will demonstrate improved SLS to >3 seconds ARELI to promote safety and decrease risk of falls on uneven surfaces such as grass and gravel   Pt will improve TELLEZ balance scale score by at least 5 points in order to demonstrates decreased fall risk  Pt will be able to squat to  light objects around the house without loss of stability  Pt will ambulate with least restrictive AD for 6 mins in order to improve upright tolerance needed for household ambulation/daily tasks  Pt will improve functional hip strength to demonstrate ability to ascend/descend 1 flight of stairs reciprocally with use of handrail   Pt will be independent and compliant with comprehensive HEP to maintain progress achieved in PT       Plan: Patient will be seen for 1-2 x/week or a total of 12-16 visits over a 90 day period.  Treatment will include: Gait training, Manual Therapy, Neuromuscular Re-education, Therapeutic Activities, Therapeutic Exercise, and Home Exercise Program instruction  Date: 8/1/23  Tx#: 6/12-16 Date: 8/7/23  Tx: 7/12-16 Date: 8/15/23  Tx: 8/12-16 Date: 9/5/23  Tx: 9/12-16 Date: 9/7/23  Tx: 10/12-16 Date: 9/12/23  Tx: 11/12-16 Date: 9/14/23  Tx: 12/12-16 Date: 9/19/23  Tx: 13/16   GT: 5 mins  - ambulation in // bars no AD but light BUE support GT: 12 mins  - ambulation LBQC x1 small lap, CGA  - ambulation SBQC x1, x2 small laps - seated rest breaks, CGA GT: 3 mins  - ambulation with LBQC, x1/2 small lap - pain and buckling GT: 12 mins  - ambulation RX, x2 large laps  - ambulation LBQC, 2x40'  GT: 23 mins  - added shoe lift  -  ambulation with RW x1 large lap  - ambulation with LBQC x1 lap  - ambulation with SBQC, x1.5 lap GT: 15 mins  - ambulation with LBQC, x1 large lap -increased step length  - ambulation with SBQC, 1 large lap GT: 25 mins  - ambulation RW, x1 large lap  - ambulation SBQC, x1.5 lap, x1 lap - seated rest break*   GT: 10 mins  - ambulation RW, x1 large lap  - ambulation SBQC, x1 large lap   TE: 23 mins  - NuStep, LE only, L2, x10'  - bridges, x10  - clamshells, 2 pillows at knees, 2x10 R  - review HEP  TE: 16 mins  - NuStep, L4, x6' - LE only  - B hip abd in h/l, RTB, x10  - bridges, x10  - abd bridges, RTB, 2x10 (challenging/ painful R groin)  - clamshells with pillow, x5- d/c d/t pain  - assisted hip abd by PT, x5 - d/c d/t pain  - sit<>stand, BUE x5, elevated plinth     TE: 38 mins  - NuStep, L5, x10' - UE/Les  - stand hip ext slider, x20 R, BUE  - stand hip abd slider, x20 R, BUE  - h/l hip abd c/ ball, x20  - hip abd abd add in h/l with RTB (by PT), x20 ea R  - heel press in h/l, x10 R  - LAD RLE TE: 20 mins  - NuStep, L5, x10' - UE/Les  - standing slider 3-way, 2#, x20 ea R, BUE   - cone tap - forw and lateral, 2# ankle, weight, x20 ea R, BUE   TE: 12 mins  - NuStep, L3, x10'  - review HEP TE: 30 mins  - NuStep, L3, x10'  - h/l hip add iso w/ ball, x30  -h/l hip abd with pilates ring, x30  - h/l glute set with heel press into table, x30 R - blocking to avoid knee ext  - review HEP TE: 20 mins  - NuStep, L5, x10'  - DL shuttle, 30# x20  - SL shuttle, 25#, 2x10 R TE: 20 mins  - NuStep, L5, x5'  - DL shuttle, 42#, x10  - SL shuttle, 17#, 2x10 R  - seated hip add iso with ball, x20  - seated glute set, x20  - seated hip abd with ring, x20   NR: 18 mins  - SL shuttle, 17#, 2x10 R  - SKTB on SB, 2x10 - PT guidance  - hip abd on SB with YTB resistance, x15 R  - BKFO, YTB, x10 B NR: 16 mins  - BKFO, RTB, 3x10 R  - supine hip IR/ER, x20 R  - SKTC on SB, x20 R - PT assist  - hip abd/add on SB, x20 R   NR: 12 mins  - lateral weight shift, BUE, x20 onto RLE  - forw/retro weight shift, x10 B, BUE  - sit<>stand, UE on knees, 2x12  - SKTC on SB (with and w/o RTB for cuing) R   NR: 12 mins  - R SLS + L cone tap, BUE x10 forw  - sit<>stand from high chair, BUE on knees, 2x10   NR: 19 mins  - SL shuttle, 17#, 2x15 R  - prone TKE, x10 R  - prone HS curl, x10 - excess hip ER  - prone glut set, x10 R   NR: 15 mins  - SL shuttle, 17#, 2x20  - heel slide, x10  - SKTC on SB, 2x10 - PT assist for sagittal plane  - BKFO, YTB, x10 R    NR: 8 mins  - sit<>stand from high table, x10  - standing hip ext slider, x10 R - tapping for glute activation    MT: 9 mins  - LAD RLE  - lumbar PA Gr II L2-3  - R pelvic post rot and inf mob         HEP: heel slides, SLR, bridges, sit<>stand, walking routine  7/25/23: add gravity eliminated hip abduction (supine)  8/1/23: gave print out: prone glut set, bridges, supported clamshells, SHEEBA (EKNDAL), gravity eliminated hip abd (supine)  9/7/23: glut set (supine or seated)  9/12/23: focus on h/l glut set, hip add iso (ball/pillow), hip add iso (belt/hands), heel slide, SHEEBA (KENDAL)  Charges: 1GT, 1TE, 1NR   Total Timed Treatment: 38 min  Total Treatment Time: 38 min

## 2023-09-21 ENCOUNTER — APPOINTMENT (OUTPATIENT)
Dept: PHYSICAL THERAPY | Facility: HOSPITAL | Age: 83
End: 2023-09-21
Attending: INTERNAL MEDICINE
Payer: MEDICARE

## 2023-09-21 PROCEDURE — 97110 THERAPEUTIC EXERCISES: CPT

## 2023-09-21 PROCEDURE — 97530 THERAPEUTIC ACTIVITIES: CPT

## 2023-09-21 NOTE — PROGRESS NOTES
Diagnosis:   Post-operative state (Z98.890)  Encounter for postoperative wound check (Z48.89)  S/P lumbar spine operation (Z98.890)  Weakness of lower extremity, unspecified laterality (R29.898)  Right leg pain (M79.604)      Referring Provider: Naila Moser MD (neurosurgery)   Date of Evaluation:    7/5/2023    Precautions:  osteopenia, hx of fx, HTN, CKD, sjogren's, fall risk Next MD visit:   none scheduled  Date of Surgery: 12/21/22     Insurance Primary/Secondary: MEDICARE / [employer]     # Auth Visits: na            Subjective: No new complaints    Pain: 3-4/10 R glute into groin    Objective:  Gait speed 9/19/23: 30second for 20 feet using SBQC    Glute - 2/5 in prone R    ---------  Seated: L anterior pelvis; with correction L femur is still longer than R    Taken from IE:  Observation/Posture: forward slumped posture with severe thoracic hyperkyphosis and lumbar kyphosis in sitting and standing  Elevated L iliac crest and PSIS  Stands with L knee flexed d/t leg length discrepancy (LLE longer than RLE)  No pelvic obliquity or scoliosis seen in sitting     Neuro Screen: negative  Sensation: Grossly intact to light touch BLEs    Coordination:   Heel-to-Shin: WNL - R limited d/t HF strength  Toe Tap: WNL (initially decreased accuracy)    5xSTS:   IE: 15 seconds, with UE support, unable to perform without UE    Able to complete sit<>stand without UE from elevated surface    TELLEZ:   IE: 23/56 - high fall risk    Trunk AROM: (* denotes performed with pain)  Flexion: 75% - limited d/t kyphotic posture  Extension: 0% - able to reach neutral with assistance  Rotation: R 25%; L 25%    Hip ROM:   Flexion: 90 deg (PROM supine) R  ER: 55 deg (PROM supine) R  IR: 25 deg (PROM supine) R    Accessory motion: hypomobile spine - globally   Hypomobile R hip AP    Strength: (* denotes performed with pain)  LE   Hip flexion (L2): R 3-/5; L 4+/5  Hip abduction: R 3-/5; L 4/5  Hip Extension: R 3+/5; L 4/5   Hip ER: R 3-/5; L 4/5    Knee Flexion: R 5/5; L 5/5   Knee extension (L3): R 4+/5; L 5/5   DF (L4): R 5-/5; L 5/5     Flexibility: (mild, moderate, severe tightness)  LE   Hip Flexor: R mild, L mild  Hamstrings: R WNL; L WNL  Piriformis: R WNL; L WNL  Quads: R WNL; L WNL       Special tests:   NA    Gait: pt ambulates on level ground with assistive device of rollator, decreased hip ext R with increased use of momentum needed to swing RLE forward, stooped posture/forward lean    Balance: DL: 1 minutes, back of knees on plinth after approx 30 seconds; SLS R unable, L unable   Trendelenburg RLE stance    Assessment: Improved stability with gait while wearing 2# ankle weight; improved proprioception and decreased RLE pain. Continues to demonstrate safer ambulation with RW at this time. May consider SBQC to short distances as home and consider adding standing leg strengthening to HEP at next session. Goals:   Goals: (to be met in 12-16 visits)   Pt will demonstrate improved SLS to >3 seconds ARELI to promote safety and decrease risk of falls on uneven surfaces such as grass and gravel   Pt will improve TELLEZ balance scale score by at least 5 points in order to demonstrates decreased fall risk  Pt will be able to squat to  light objects around the house without loss of stability  Pt will ambulate with least restrictive AD for 6 mins in order to improve upright tolerance needed for household ambulation/daily tasks  Pt will improve functional hip strength to demonstrate ability to ascend/descend 1 flight of stairs reciprocally with use of handrail   Pt will be independent and compliant with comprehensive HEP to maintain progress achieved in PT       Plan: Patient will be seen for 1-2 x/week or a total of 12-16 visits over a 90 day period.  Treatment will include: Gait training, Manual Therapy, Neuromuscular Re-education, Therapeutic Activities, Therapeutic Exercise, and Home Exercise Program instruction  Date: 8/15/23  Tx: 8/12-16 Date: 9/5/23  Tx: 9/12-16 Date: 9/7/23  Tx: 10/12-16 Date: 9/12/23  Tx: 11/12-16 Date: 9/14/23  Tx: 12/12-16 Date: 9/19/23  Tx: 13/16 Date: 9/21/23  Tx: 14/16   GT: 3 mins  - ambulation with LBQC, x1/2 small lap - pain and buckling GT: 12 mins  - ambulation RX, x2 large laps  - ambulation LBQC, 2x40'  GT: 23 mins  - added shoe lift  -  ambulation with RW x1 large lap  - ambulation with LBQC x1 lap  - ambulation with SBQC, x1.5 lap GT: 15 mins  - ambulation with LBQC, x1 large lap -increased step length  - ambulation with SBQC, 1 large lap GT: 25 mins  - ambulation RW, x1 large lap  - ambulation SBQC, x1.5 lap, x1 lap - seated rest break*   GT: 10 mins  - ambulation RW, x1 large lap  - ambulation SBQC, x1 large lap TA: 15 mins  - sit<>stand from elevated plingth, 2x10, 0UE  - ambulation with SBQC wearing 2# ankle weight, x2 large laps   TE: 38 mins  - NuStep, L5, x10' - UE/Les  - stand hip ext slider, x20 R, BUE  - stand hip abd slider, x20 R, BUE  - h/l hip abd c/ ball, x20  - hip abd abd add in h/l with RTB (by PT), x20 ea R  - heel press in h/l, x10 R  - LAD RLE TE: 20 mins  - NuStep, L5, x10' - UE/Les  - standing slider 3-way, 2#, x20 ea R, BUE   - cone tap - forw and lateral, 2# ankle, weight, x20 ea R, BUE   TE: 12 mins  - NuStep, L3, x10'  - review HEP TE: 30 mins  - NuStep, L3, x10'  - h/l hip add iso w/ ball, x30  -h/l hip abd with pilates ring, x30  - h/l glute set with heel press into table, x30 R - blocking to avoid knee ext  - review HEP TE: 20 mins  - NuStep, L5, x10'  - DL shuttle, 30# x20  - SL shuttle, 25#, 2x10 R TE: 20 mins  - NuStep, L5, x5'  - DL shuttle, 42#, x10  - SL shuttle, 17#, 2x10 R  - seated hip add iso with ball, x20  - seated glute set, x20  - seated hip abd with ring, x20 TE: 25 mins  - NuStep, L5, x6'  - standing 3-way hip kicks, slider, 2x10 ea R, using RW for balance  - LAQ, x10 R - painful   NR: 12 mins  - lateral weight shift, BUE, x20 onto RLE  - forw/retro weight shift, x10 B, BUE  - sit<>stand, UE on knees, 2x12  - SKTC on SB (with and w/o RTB for cuing) R   NR: 12 mins  - R SLS + L cone tap, BUE x10 forw  - sit<>stand from high chair, BUE on knees, 2x10   NR: 19 mins  - SL shuttle, 17#, 2x15 R  - prone TKE, x10 R  - prone HS curl, x10 - excess hip ER  - prone glut set, x10 R   NR: 15 mins  - SL shuttle, 17#, 2x20  - heel slide, x10  - SKTC on SB, 2x10 - PT assist for sagittal plane  - BKFO, YTB, x10 R    NR: 8 mins  - sit<>stand from high table, x10  - standing hip ext slider, x10 R - tapping for glute activation NR: 5 mins  - seated reaching to retrieve cones core strength, x20  -             HEP: heel slides, SLR, bridges, sit<>stand, walking routine  7/25/23: add gravity eliminated hip abduction (supine)  8/1/23: gave print out: prone glut set, bridges, supported clamshells, BKFO (YTB), gravity eliminated hip abd (supine)  9/7/23: glut set (supine or seated)  9/12/23: focus on h/l glut set, hip add iso (ball/pillow), hip add iso (belt/hands), heel slide, BKFO (YTB)  9/21/23: add ankle weight with walking  Charges: 1TA, 2TE   Total Timed Treatment: 45 min  Total Treatment Time: 45 min

## 2023-09-26 ENCOUNTER — TELEPHONE (OUTPATIENT)
Dept: PHYSICAL THERAPY | Facility: HOSPITAL | Age: 83
End: 2023-09-26

## 2023-09-26 ENCOUNTER — OFFICE VISIT (OUTPATIENT)
Dept: PHYSICAL THERAPY | Facility: HOSPITAL | Age: 83
End: 2023-09-26
Attending: INTERNAL MEDICINE
Payer: MEDICARE

## 2023-09-26 PROCEDURE — 97530 THERAPEUTIC ACTIVITIES: CPT

## 2023-09-26 PROCEDURE — 97112 NEUROMUSCULAR REEDUCATION: CPT

## 2023-09-26 PROCEDURE — 97110 THERAPEUTIC EXERCISES: CPT

## 2023-09-26 NOTE — PROGRESS NOTES
Diagnosis:   Post-operative state (Z98.890)  Encounter for postoperative wound check (Z48.89)  S/P lumbar spine operation (H37.713)  Weakness of lower extremity, unspecified laterality (R29.898)  Right leg pain (M79.604)      Referring Provider: Melinda Bone MD (neurosurgery)   Date of Evaluation:    7/5/2023    Precautions:  osteopenia, hx of fx, HTN, CKD, sjogren's, fall risk Next MD visit:   none scheduled  Date of Surgery: 12/21/22     [facility] Primary[facility]Secondary[facility]     # Auth Visits: na            Subjective: No new complaints. Feels that leg is getting stronger. Certain movements are painful in hip.     Pain: 3-4/10 R glute into groin    Objective:  Gait speed 9/19/23: 30second for 20 feet using SBQC    Glute - 2/5 in prone R    ---------  Seated: L anterior pelvis; with correction L femur is still longer than R    Taken from IE:  Observation/Posture: forward slumped posture with severe thoracic hyperkyphosis and lumbar kyphosis in sitting and standing  Elevated L iliac crest and PSIS  Stands with L knee flexed d/t leg length discrepancy (LLE longer than RLE)  No pelvic obliquity or scoliosis seen in sitting     Neuro Screen: negative  Sensation: Grossly intact to light touch BLEs    Coordination:   Heel-to-Shin: WNL - R limited d/t HF strength  Toe Tap: WNL (initially decreased accuracy)    5xSTS:   IE: 15 seconds, with UE support, unable to perform without UE    Able to complete sit<>stand without UE from elevated surface    TELLEZ:   IE: 23/56 - high fall risk    Trunk AROM: (* denotes performed with pain)  Flexion: 75% - limited d/t kyphotic posture  Extension: 0% - able to reach neutral with assistance  Rotation: R 25%; L 25%    Hip ROM:   Flexion: 90 deg (PROM supine) R  ER: 55 deg (PROM supine) R  IR: 25 deg (PROM supine) R    Accessory motion: hypomobile spine - globally   Hypomobile R hip AP    Strength: (* denotes performed with pain)  LE   Hip flexion (L2): R 3-/5; L 4+/5  Hip abduction: R 3-/5; L 4/5  Hip Extension: R 3+/5; L 4/5   Hip ER: R 3-/5; L 4/5    Knee Flexion: R 5/5; L 5/5   Knee extension (L3): R 4+/5; L 5/5   DF (L4): R 5-/5; L 5/5     Flexibility: (mild, moderate, severe tightness)  LE   Hip Flexor: R mild, L mild  Hamstrings: R WNL; L WNL  Piriformis: R WNL; L WNL  Quads: R WNL; L WNL       Special tests:   NA    Gait: pt ambulates on level ground with assistive device of rollator, decreased hip ext R with increased use of momentum needed to swing RLE forward, stooped posture/forward lean    Balance: DL: 1 minutes, back of knees on plinth after approx 30 seconds; SLS R unable, L unable   Trendelenburg RLE stance    Assessment: Continued with improved stability with ambulation using SBQC; patient is safe to practice walking with SBQC at home. Recommend continued use of RW for efficiency with long distances. Added upright hip strengthening exercises to HEP to work on stability in standing to improve walking. Patient needs seated rest breaks throughout session but is progressing towards goals. Goals:   Goals: (to be met in 12-16 visits)   Pt will demonstrate improved SLS to >3 seconds ARELI to promote safety and decrease risk of falls on uneven surfaces such as grass and gravel   Pt will improve TELLEZ balance scale score by at least 5 points in order to demonstrates decreased fall risk  Pt will be able to squat to  light objects around the house without loss of stability  Pt will ambulate with least restrictive AD for 6 mins in order to improve upright tolerance needed for household ambulation/daily tasks  Pt will improve functional hip strength to demonstrate ability to ascend/descend 1 flight of stairs reciprocally with use of handrail   Pt will be independent and compliant with comprehensive HEP to maintain progress achieved in PT       Plan: Patient will be seen for 1-2 x/week or a total of 12-16 visits over a 90 day period.  Treatment will include: Gait training, Manual Therapy, Neuromuscular Re-education, Therapeutic Activities, Therapeutic Exercise, and Home Exercise Program instruction  Date: 8/15/23  Tx: 8/12-16 Date: 9/5/23  Tx: 9/12-16 Date: 9/7/23  Tx: 10/12-16 Date: 9/12/23  Tx: 11/12-16 Date: 9/14/23  Tx: 12/12-16 Date: 9/19/23  Tx: 13/16 Date: 9/21/23  Tx: 14/16 Date: 9/26/23  Tx: 15/16   GT: 3 mins  - ambulation with LBQC, x1/2 small lap - pain and buckling GT: 12 mins  - ambulation RX, x2 large laps  - ambulation LBQC, 2x40'  GT: 23 mins  - added shoe lift  -  ambulation with RW x1 large lap  - ambulation with LBQC x1 lap  - ambulation with SBQC, x1.5 lap GT: 15 mins  - ambulation with LBQC, x1 large lap -increased step length  - ambulation with SBQC, 1 large lap GT: 25 mins  - ambulation RW, x1 large lap  - ambulation SBQC, x1.5 lap, x1 lap - seated rest break*   GT: 10 mins  - ambulation RW, x1 large lap  - ambulation SBQC, x1 large lap TA: 15 mins  - sit<>stand from elevated plingth, 2x10, 0UE  - ambulation with SBQC wearing 2# ankle weight, x2 large laps TA: 15 mins  - sit<>stand, 2x10 (24\"/23\"), 0UE  - ambulation SBQC with 2# ankle weight, 2x1/2 large lap  - stairs (shallow), x2 laps, BUE, recip   TE: 38 mins  - NuStep, L5, x10' - UE/Les  - stand hip ext slider, x20 R, BUE  - stand hip abd slider, x20 R, BUE  - h/l hip abd c/ ball, x20  - hip abd abd add in h/l with RTB (by PT), x20 ea R  - heel press in h/l, x10 R  - LAD RLE TE: 20 mins  - NuStep, L5, x10' - UE/Les  - standing slider 3-way, 2#, x20 ea R, BUE   - cone tap - forw and lateral, 2# ankle, weight, x20 ea R, BUE   TE: 12 mins  - NuStep, L3, x10'  - review HEP TE: 30 mins  - NuStep, L3, x10'  - h/l hip add iso w/ ball, x30  -h/l hip abd with pilates ring, x30  - h/l glute set with heel press into table, x30 R - blocking to avoid knee ext  - review HEP TE: 20 mins  - NuStep, L5, x10'  - DL shuttle, 30# x20  - SL shuttle, 25#, 2x10 R TE: 20 mins  - NuStep, L5, x5'  - DL shuttle, 42#, x10  - SL shuttle, 17#, 2x10 R  - seated hip add iso with ball, x20  - seated glute set, x20  - seated hip abd with ring, x20 TE: 25 mins  - NuStep, L5, x6'  - standing 3-way hip kicks, slider, 2x10 ea R, using RW for balance  - LAQ, x10 R - painful TE: 20 mins  - NuStep, L5, x8' - UE/LE  - standing hip 3-way with 2# ankle weights and slider, 2x10 ea R, BUE on RW  - seated toe tap to cone forw, x30 R     NR: 12 mins  - lateral weight shift, BUE, x20 onto RLE  - forw/retro weight shift, x10 B, BUE  - sit<>stand, UE on knees, 2x12  - SKTC on SB (with and w/o RTB for cuing) R   NR: 12 mins  - R SLS + L cone tap, BUE x10 forw  - sit<>stand from high chair, BUE on knees, 2x10   NR: 19 mins  - SL shuttle, 17#, 2x15 R  - prone TKE, x10 R  - prone HS curl, x10 - excess hip ER  - prone glut set, x10 R   NR: 15 mins  - SL shuttle, 17#, 2x20  - heel slide, x10  - SKTC on SB, 2x10 - PT assist for sagittal plane  - BKFO, YTB, x10 R    NR: 8 mins  - sit<>stand from high table, x10  - standing hip ext slider, x10 R - tapping for glute activation NR: 5 mins  - seated reaching to retrieve cones core strength, x20  -  NR: 8 mins  - lateral stepping in hallway with bar, 2 laps (40'), BUE, 2# ankle weight R   - toe taps to 4\" step, 2# ankle weight R, alt x10, BUE             HEP: heel slides, SLR, bridges, sit<>stand, walking routine  7/25/23: add gravity eliminated hip abduction (supine)  8/1/23: gave print out: prone glut set, bridges, supported clamshells, BKFO (YTB), gravity eliminated hip abd (supine)  9/7/23: glut set (supine or seated)  9/12/23: focus on h/l glut set, hip add iso (ball/pillow), hip add iso (belt/hands), heel slide, BKFO (KENDAL)  9/21/23: add ankle weight with walking  9/26/23: standing hip 3-way with 2# ankle weight and slider  Charges: 1TA, 1TE, 1NR   Total Timed Treatment: 43 min  Total Treatment Time: 43 min

## 2023-09-27 ENCOUNTER — TELEPHONE (OUTPATIENT)
Dept: PHYSICAL THERAPY | Facility: HOSPITAL | Age: 83
End: 2023-09-27

## 2023-09-27 RX ORDER — TOLTERODINE TARTRATE 2 MG/1
TABLET, EXTENDED RELEASE ORAL
Qty: 90 TABLET | Refills: 3 | Status: SHIPPED | OUTPATIENT
Start: 2023-09-27

## 2023-09-27 RX ORDER — TOLTERODINE TARTRATE 2 MG/1
TABLET, EXTENDED RELEASE ORAL
Qty: 60 TABLET | Status: SHIPPED | OUTPATIENT
Start: 2023-09-27 | End: 2023-09-29

## 2023-09-28 ENCOUNTER — TELEPHONE (OUTPATIENT)
Dept: INTERNAL MEDICINE CLINIC | Facility: CLINIC | Age: 83
End: 2023-09-28

## 2023-09-28 ENCOUNTER — OFFICE VISIT (OUTPATIENT)
Dept: PHYSICAL THERAPY | Facility: HOSPITAL | Age: 83
End: 2023-09-28
Attending: INTERNAL MEDICINE
Payer: MEDICARE

## 2023-09-28 PROCEDURE — 97110 THERAPEUTIC EXERCISES: CPT

## 2023-09-28 PROCEDURE — 97112 NEUROMUSCULAR REEDUCATION: CPT

## 2023-09-28 PROCEDURE — 97530 THERAPEUTIC ACTIVITIES: CPT

## 2023-09-28 NOTE — PROGRESS NOTES
Progress Summary  Pt has attended 16 visits in Physical Therapy. Diagnosis:   Post-operative state (Z98.890)  Encounter for postoperative wound check (Z48.89)  S/P lumbar spine operation (G40.201)  Weakness of lower extremity, unspecified laterality (R29.898)  Right leg pain (M79.604)      Referring Provider: Malina Jolly MD (neurosurgery)   Date of Evaluation:    7/5/2023    Precautions:  osteopenia, hx of fx, HTN, CKD, sjogren's, fall risk Next MD visit:   none scheduled  Date of Surgery: 12/21/22     Insurance Primary/Secondary: Lissa Figueroa / Aretha Lang PPO     # Auth Visits: na            Subjective: Reports his R hip is feeling better.      Pain: 3-4/10 R glute into groin    Objective:  Gait speed 9/19/23: 30second for 20 feet using SBQC    Glute - 2/5 in prone R    ---------  Seated: L anterior pelvis; with correction L femur is still longer than R    Taken from IE:  Observation/Posture: forward slumped posture with severe thoracic hyperkyphosis and lumbar kyphosis in sitting and standing  Elevated L iliac crest and PSIS  Stands with L knee flexed d/t leg length discrepancy (LLE longer than RLE)  No pelvic obliquity or scoliosis seen in sitting     Neuro Screen: negative  Sensation: Grossly intact to light touch BLEs    Coordination:   Heel-to-Shin: WNL - R limited d/t HF strength  Toe Tap: WNL (initially decreased accuracy)    5xSTS:   IE: 15 seconds, with UE support, unable to perform without UE    Able to complete sit<>stand without UE from elevated surface    TELLEZ:   IE: 23/56 - high fall risk    Trunk AROM: (* denotes performed with pain)  Flexion: 75% - limited d/t kyphotic posture  Extension: 0% - able to reach neutral with assistance  Rotation: R 25%; L 25%    Hip ROM:   Flexion: 90 deg (PROM supine) R  ER: 55 deg (PROM supine) R  IR: 25 deg (PROM supine) R    Accessory motion: hypomobile spine - globally   Hypomobile R hip AP    Strength: (* denotes performed with pain)  LE Hip flexion (L2): R 3-/5; L 4+/5  Hip abduction: R 3-/5; L 4/5  Hip Extension: R 3+/5; L 4/5   Hip ER: R 3-/5; L 4/5    Knee Flexion: R 5/5; L 5/5   Knee extension (L3): R 4+/5; L 5/5   DF (L4): R 5-/5; L 5/5     Flexibility: (mild, moderate, severe tightness)  LE   Hip Flexor: R mild, L mild  Hamstrings: R WNL; L WNL  Piriformis: R WNL; L WNL  Quads: R WNL; L WNL       Special tests:   NA    Gait: pt ambulates on level ground with assistive device of rollator, decreased hip ext R with increased use of momentum needed to swing RLE forward, stooped posture/forward lean    Balance: DL: 1 minutes, back of knees on plinth after approx 30 seconds; SLS R unable, L unable   Trendelenburg RLE stance    Assessment: Tolerates standing exercises. Slowly improving LE strength. Patient will continue HEP for several weeks and return to PT for continued progression as appropriate. Good compliance with HEP at this time. Would benefit from continued PT to address deficits and improve functional mobility including walking.     Goals:   Goals: (to be met in 12-16 visits)   Pt will demonstrate improved SLS to >3 seconds ARELI to promote safety and decrease risk of falls on uneven surfaces such as grass and gravel - Progressing   Pt will improve TELLEZ balance scale score by at least 5 points in order to demonstrates decreased fall risk- progressing  Pt will be able to squat to  light objects around the house without loss of stability-  progressing  Pt will ambulate with least restrictive AD for 6 mins in order to improve upright tolerance needed for household ambulation/daily tasks - met with RW, progressing with SBQC  Pt will improve functional hip strength to demonstrate ability to ascend/descend 1 flight of stairs reciprocally with use of handrail - progressing  Pt will be independent and compliant with comprehensive HEP to maintain progress achieved in PT  - Met      Date: 9/7/23  Tx: 10/12-16 Date: 9/12/23  Tx: 11/12-16 Date: 9/14/23  Tx: 12/12-16 Date: 9/19/23  Tx: 13/16 Date: 9/21/23  Tx: 14/16 Date: 9/26/23  Tx: 15/16 Date: 9/28/23  Tx: 16/16   GT: 23 mins  - added shoe lift  -  ambulation with RW x1 large lap  - ambulation with LBQC x1 lap  - ambulation with SBQC, x1.5 lap GT: 15 mins  - ambulation with LBQC, x1 large lap -increased step length  - ambulation with SBQC, 1 large lap GT: 25 mins  - ambulation RW, x1 large lap  - ambulation SBQC, x1.5 lap, x1 lap - seated rest break*   GT: 10 mins  - ambulation RW, x1 large lap  - ambulation SBQC, x1 large lap TA: 15 mins  - sit<>stand from elevated plingth, 2x10, 0UE  - ambulation with SBQC wearing 2# ankle weight, x2 large laps TA: 15 mins  - sit<>stand, 2x10 (24\"/23\"), 0UE  - ambulation SBQC with 2# ankle weight, 2x1/2 large lap  - stairs (shallow), x2 laps, BUE, recip TA: 10 mins  - ambulation with SBQC, 2.5# ankle weight, x1 large lap  - sit<>stand, x10 , 0UE (24\")   TE: 12 mins  - NuStep, L3, x10'  - review HEP TE: 30 mins  - NuStep, L3, x10'  - h/l hip add iso w/ ball, x30  -h/l hip abd with pilates ring, x30  - h/l glute set with heel press into table, x30 R - blocking to avoid knee ext  - review HEP TE: 20 mins  - NuStep, L5, x10'  - DL shuttle, 30# x20  - SL shuttle, 25#, 2x10 R TE: 20 mins  - NuStep, L5, x5'  - DL shuttle, 42#, x10  - SL shuttle, 17#, 2x10 R  - seated hip add iso with ball, x20  - seated glute set, x20  - seated hip abd with ring, x20 TE: 25 mins  - NuStep, L5, x6'  - standing 3-way hip kicks, slider, 2x10 ea R, using RW for balance  - LAQ, x10 R - painful TE: 20 mins  - NuStep, L5, x8' - UE/LE  - standing hip 3-way with 2# ankle weights and slider, 2x10 ea R, BUE on RW  - seated toe tap to cone forw, x30 R   TE: 25 mins  - NuStep, L6, x6' - UE/LE  - DL shuttle, 55#, x20  - SL shuttle, 25#, 2x10 R  - LAQ, 2.5#, x30 R  - standing 3-way hip, 2.5# ankle weight, w/ slider, 2x10 ea R  - review HEP   NR: 19 mins  - SL shuttle, 17#, 2x15 R  - prone TKE, x10 R  - prone HS curl, x10 - excess hip ER  - prone glut set, x10 R   NR: 15 mins  - SL shuttle, 17#, 2x20  - heel slide, x10  - SKTC on SB, 2x10 - PT assist for sagittal plane  - BKFO, YTB, x10 R    NR: 8 mins  - sit<>stand from high table, x10  - standing hip ext slider, x10 R - tapping for glute activation NR: 5 mins  - seated reaching to retrieve cones core strength, x20  -  NR: 8 mins  - lateral stepping in hallway with bar, 2 laps (40'), BUE, 2# ankle weight R   - toe taps to 4\" step, 2# ankle weight R, alt x10, BUE NR: 18 mins  - seated toe taps to cone, x20 alt B  - seated reaching to retrieve cones core strength, 3x10  - lateral stepping in hallway, BUE on bar, 2.5# ankle weight, x2 laps            HEP: heel slides, SLR, bridges, sit<>stand, walking routine  7/25/23: add gravity eliminated hip abduction (supine)  8/1/23: gave print out: prone glut set, bridges, supported clamshells, BKFO (YTB), gravity eliminated hip abd (supine)  9/7/23: glut set (supine or seated)  9/12/23: focus on h/l glut set, hip add iso (ball/pillow), hip add iso (belt/hands), heel slide, BKFO (YTB)  9/21/23: add ankle weight with walking  9/26/23: standing hip 3-way with 2# ankle weight and slider  Charges: 1TA, 2TE, 1NR   Total Timed Treatment: 53 min  Total Treatment Time: 53 min    Plan: Continue skilled Physical Therapy 2 x/week or a total of 10 additional visits over a 90 day period. Treatment will include: Treatment will include: Gait training, Manual Therapy, Neuromuscular Re-education, Therapeutic Activities, Therapeutic Exercise, and Home Exercise Program instruction     Patient/Family/Caregiver was advised of these findings, precautions, and treatment options and has agreed to actively participate in planning and for this course of care. Thank you for your referral. If you have any questions, please contact me at Dept: [phone].     Sincerely,  Electronically signed by therapist: Andressa Hollis, PT     Certification From: 9/28/2023  To:12/27/2023

## 2023-09-28 NOTE — TELEPHONE ENCOUNTER
Please call [address]  [phone]    Need to clarify Tolterodine 2mg    Is it the ER tablet? Note from 9/11/23 changed to Detrol as insurance would not cover 3 tablets a day?     VM left for wife that we are clarifying antoine Carbone RN

## 2023-09-28 NOTE — TELEPHONE ENCOUNTER
Wife called and stated that the prescription for TOLTERODINE 2 MG Oral Tab was sent over incorrectly. Script should say 2mg AM and 4 MG bedtime. Please call pharmacy to clarify and contact wife with update.      [address], [address] [facility] [phone], [phone]

## 2023-09-29 ENCOUNTER — OFFICE VISIT (OUTPATIENT)
Dept: INTERNAL MEDICINE CLINIC | Facility: CLINIC | Age: 83
End: 2023-09-29
Payer: MEDICARE

## 2023-09-29 ENCOUNTER — LAB ENCOUNTER (OUTPATIENT)
Dept: LAB | Age: 83
End: 2023-09-29
Attending: INTERNAL MEDICINE
Payer: MEDICARE

## 2023-09-29 VITALS
RESPIRATION RATE: 18 BRPM | BODY MASS INDEX: 20 KG/M2 | TEMPERATURE: 98 F | HEART RATE: 72 BPM | DIASTOLIC BLOOD PRESSURE: 76 MMHG | SYSTOLIC BLOOD PRESSURE: 134 MMHG | WEIGHT: 140 LBS | OXYGEN SATURATION: 99 %

## 2023-09-29 DIAGNOSIS — M35.3 PMR (POLYMYALGIA RHEUMATICA) (HCC): ICD-10-CM

## 2023-09-29 DIAGNOSIS — E44.0 MODERATE PROTEIN-CALORIE MALNUTRITION (HCC): ICD-10-CM

## 2023-09-29 DIAGNOSIS — I25.10 CORONARY ARTERY DISEASE INVOLVING NATIVE CORONARY ARTERY OF NATIVE HEART WITHOUT ANGINA PECTORIS: ICD-10-CM

## 2023-09-29 DIAGNOSIS — I25.10 CORONARY ARTERY DISEASE INVOLVING NATIVE CORONARY ARTERY OF NATIVE HEART WITHOUT ANGINA PECTORIS: Primary | ICD-10-CM

## 2023-09-29 DIAGNOSIS — N18.4 CKD (CHRONIC KIDNEY DISEASE) STAGE 4, GFR 15-29 ML/MIN (HCC): ICD-10-CM

## 2023-09-29 LAB
ALBUMIN SERPL-MCNC: 3.6 G/DL (ref 3.4–5)
ALBUMIN/GLOB SERPL: 1 {RATIO} (ref 1–2)
ALP LIVER SERPL-CCNC: 50 U/L
ALT SERPL-CCNC: 25 U/L
ANION GAP SERPL CALC-SCNC: 6 MMOL/L (ref 0–18)
AST SERPL-CCNC: 21 U/L (ref 15–37)
BILIRUB SERPL-MCNC: 0.5 MG/DL (ref 0.1–2)
BUN BLD-MCNC: 45 MG/DL (ref 7–18)
CALCIUM BLD-MCNC: 9.5 MG/DL (ref 8.5–10.1)
CHLORIDE SERPL-SCNC: 106 MMOL/L (ref 98–112)
CO2 SERPL-SCNC: 26 MMOL/L (ref 21–32)
CREAT BLD-MCNC: 2.19 MG/DL
EGFRCR SERPLBLD CKD-EPI 2021: 30 ML/MIN/1.73M2 (ref 60–?)
ERYTHROCYTE [SEDIMENTATION RATE] IN BLOOD: 21 MM/HR
FASTING STATUS PATIENT QL REPORTED: NO
GLOBULIN PLAS-MCNC: 3.7 G/DL (ref 2.8–4.4)
GLUCOSE BLD-MCNC: 109 MG/DL (ref 70–99)
OSMOLALITY SERPL CALC.SUM OF ELEC: 298 MOSM/KG (ref 275–295)
POTASSIUM SERPL-SCNC: 4.5 MMOL/L (ref 3.5–5.1)
PROT SERPL-MCNC: 7.3 G/DL (ref 6.4–8.2)
SODIUM SERPL-SCNC: 138 MMOL/L (ref 136–145)

## 2023-09-29 PROCEDURE — 85652 RBC SED RATE AUTOMATED: CPT

## 2023-09-29 PROCEDURE — 36415 COLL VENOUS BLD VENIPUNCTURE: CPT

## 2023-09-29 PROCEDURE — 90662 IIV NO PRSV INCREASED AG IM: CPT | Performed by: INTERNAL MEDICINE

## 2023-09-29 PROCEDURE — 99214 OFFICE O/P EST MOD 30 MIN: CPT | Performed by: INTERNAL MEDICINE

## 2023-09-29 PROCEDURE — 80053 COMPREHEN METABOLIC PANEL: CPT

## 2023-09-29 PROCEDURE — G0008 ADMIN INFLUENZA VIRUS VAC: HCPCS | Performed by: INTERNAL MEDICINE

## 2023-09-29 RX ORDER — DULOXETIN HYDROCHLORIDE 20 MG/1
20 CAPSULE, DELAYED RELEASE ORAL DAILY
Qty: 90 CAPSULE | Refills: 3 | Status: SHIPPED | OUTPATIENT
Start: 2023-09-29

## 2023-09-29 RX ORDER — ATORVASTATIN CALCIUM 10 MG/1
10 TABLET, FILM COATED ORAL DAILY
Qty: 90 TABLET | Refills: 3 | Status: SHIPPED | OUTPATIENT
Start: 2023-09-29 | End: 2024-09-23

## 2023-10-01 NOTE — PROGRESS NOTES
Subjective:   Patient ID: Natasha Larios is a 82year old male. multiple problems    HPI    History/Other:   Review of Systems   Constitutional:  Positive for unexpected weight change (continued wt loss). HENT:  Positive for hearing loss. Respiratory:  Positive for apnea and shortness of breath (exertion). Cardiovascular:         CAD, Lipids, HTN now hypo-  AVR   Gastrointestinal:  Positive for constipation and nausea. Negative for abdominal pain and vomiting. Anorexia   Endocrine: Negative. Genitourinary:  Positive for frequency and urgency. Nocturia   Musculoskeletal:  Positive for arthralgias, back pain, gait problem and myalgias. PMR stable   Neurological:  Positive for weakness. Psychiatric/Behavioral:          Quiet and withdrawn     Current Outpatient Medications   Medication Sig Dispense Refill    atorvastatin (LIPITOR) 10 MG Oral Tab Take 1 tablet (10 mg total) by mouth daily. 90 tablet 3    DULoxetine 20 MG Oral Cap DR Particles Take 1 capsule (20 mg total) by mouth daily. 90 capsule 3    tolterodine 2 MG Oral Tab Take one am and 2 at hs total of 3 per day 90 tablet 3    Betaine, Trimethylglycine, 500 MG Oral Cap       carvedilol 12.5 MG Oral Tab Take 1 tablet (12.5 mg total) by mouth 2 (two) times daily with meals. 180 tablet 3    methotrexate 2.5 MG Oral Tab TAKE 4 TABLETS BY MOUTH ONCE A WEEK 20 tablet 1    Riboflavin (B2) 100 MG Oral Tab Take by mouth. 30 tablet 0    Acetylcarnitine HCl (ACETYL-L-CARNITINE HCL) Does not apply Powder L-CARNITINE 250MG DAILY  0    DULoxetine 30 MG Oral Cap DR Particles Take 1 capsule by mouth once daily 30 capsule 3    predniSONE 2.5 MG Oral Tab Take 1 tablet (2.5 mg total) by mouth daily. 90 tablet 3    temazepam (RESTORIL) 7.5 MG Oral Cap Take 1 capsule (7.5 mg total) by mouth nightly as needed for Sleep. 30 capsule 1    sennosides 8.6 MG Oral Tab Take 1 tablet (8.6 mg total) by mouth nightly as needed.       folic acid 1 MG Oral Tab Take 1 tablet (1 mg total) by mouth 4 (four) times a week. T,th,Sat and Sunday      atorvastatin 20 MG Oral Tab Take 0.5 tablets (10 mg total) by mouth nightly. 0    Cholecalciferol (VITAMIN D3) 1.25 MG (12182 UT) Oral Cap Two/month 13 capsule 3    famotidine 20 MG Oral Tab Take 2 tablets (40 mg total) by mouth 2 (two) times daily. 0    calcium carbonate 500 MG Oral Chew Tab Chew 1 tablet (500 mg total) by mouth 2 (two) times daily. 0    Naloxone HCl 4 MG/0.1ML Nasal Liquid 4 mg by Nasal route as needed. If patient remains unresponsive, repeat dose in other nostril 2-5 minutes after first dose. 1 kit 0    CEREFOLIN NAC 6-90.314-2-600 MG Oral Tab TAKE 1 TABLET BY MOUTH ON MONDAY, WEDNESDAY AND FRIDAY 36 tablet 3    Magnesium Citrate 100 MG Oral Cap Take 100 mg by mouth 2 (two) times a day. nitroglycerin 0.4 MG Sublingual SL Tab Place 1 tablet (0.4 mg total) under the tongue every 5 (five) minutes as needed for Chest pain. Or SOB 25 tablet 1    aspirin 81 MG Oral Tab EC Take 1 tablet (81 mg total) by mouth 3 (three) times a week. MWF in the evening  0    Turmeric, Curcuma Longa, Does not apply Powder One a day 300mg  0    Coenzyme Q10 (CO Q 10 OR) Take 50 mg by mouth 2 (two) times daily. Nutritional Supplements (JUICE PLUS FIBRE OR) Take 2 capsules by mouth 2 (two) times daily. Allergies:  Codeine                 PAIN    Comment:abdominal  Lisinopril              HIVES  Niacin                  RASH  Nsaids                  OTHER (SEE COMMENTS)    Comment:CKD  Omeprazole              OTHER (SEE COMMENTS)    Comment:Kidney injury  Pcn [Bicillin C-R,]     HIVES  Ultram [Tramadol]       NAUSEA ONLY    Comment:And constipation  Zinc Acetate            NAUSEA ONLY    Objective:   Physical Exam  Constitutional:       General: He is not in acute distress. HENT:      Ears:      Comments: Hearing loss  Cardiovascular:      Rate and Rhythm: Normal rate and regular rhythm.       Heart sounds: Murmur (2/6 sys) heard.   Pulmonary:      Effort: No respiratory distress. Breath sounds: Normal breath sounds. Abdominal:      Tenderness: There is abdominal tenderness. Skin:     Findings: Bruising present. Neurological:      Mental Status: He is alert. Motor: Weakness present. Gait: Gait abnormal (walker). Psychiatric:      Comments: Tolerating his condition without complaining     ESR 21, recent CBC-mild anemia, CNP=CKD stable, normal Pot can d/c supplement    Assessment & Plan:   Coronary artery disease involving native coronary artery of native heart without angina pectoris  (primary encounter diagnosis)  PMR (polymyalgia rheumatica) ([facility])  CKD (chronic kidney disease) stage 4, GFR 15-29 ml/min ([facility])  Moderate protein-calorie malnutrition ([facility] [address]    Orders Placed This Encounter      Comp Metabolic Panel (14) [E]      Sed Rate, Westergren (Automated) [E]      High Dose Fluzone 65 yr and older PFS [60516]    Meds refilled  Flushot today, RSV and Covid in Oct  Needs to dec meds, call into [facility]  RTC me in 2mos  Has protein shakes for protein-calorie malnutrition        Meds This Visit:  Requested Prescriptions     Signed Prescriptions Disp Refills    atorvastatin (LIPITOR) 10 MG Oral Tab 90 tablet 3     Sig: Take 1 tablet (10 mg total) by mouth daily. DULoxetine 20 MG Oral Cap DR Particles 90 capsule 3     Sig: Take 1 capsule (20 mg total) by mouth daily.        Imaging & Referrals:  FLU VACC HIGH DOSE PRSV FREE

## 2023-10-02 ENCOUNTER — TELEPHONE (OUTPATIENT)
Dept: PHYSICAL THERAPY | Facility: HOSPITAL | Age: 83
End: 2023-10-02

## 2023-10-02 ENCOUNTER — TELEPHONE (OUTPATIENT)
Dept: INTERNAL MEDICINE CLINIC | Facility: CLINIC | Age: 83
End: 2023-10-02

## 2023-10-02 ENCOUNTER — OFFICE VISIT (OUTPATIENT)
Dept: PAIN CLINIC | Facility: HOSPITAL | Age: 83
End: 2023-10-02
Attending: ANESTHESIOLOGY
Payer: MEDICARE

## 2023-10-02 VITALS — HEART RATE: 73 BPM | DIASTOLIC BLOOD PRESSURE: 76 MMHG | OXYGEN SATURATION: 98 % | SYSTOLIC BLOOD PRESSURE: 125 MMHG

## 2023-10-02 DIAGNOSIS — M25.551 HIP PAIN, ACUTE, RIGHT: Primary | ICD-10-CM

## 2023-10-02 DIAGNOSIS — M25.551 PAIN IN JOINT OF RIGHT HIP: ICD-10-CM

## 2023-10-02 DIAGNOSIS — M16.11 OSTEOARTHRITIS OF RIGHT HIP, UNSPECIFIED OSTEOARTHRITIS TYPE: ICD-10-CM

## 2023-10-02 DIAGNOSIS — M54.17 LUMBOSACRAL RADICULOPATHY AT L2: ICD-10-CM

## 2023-10-02 PROCEDURE — 99211 OFF/OP EST MAY X REQ PHY/QHP: CPT

## 2023-10-02 RX ORDER — METHOTREXATE 2.5 MG/1
TABLET ORAL
COMMUNITY
Start: 2023-10-02

## 2023-10-02 RX ORDER — TOLTERODINE TARTRATE 2 MG/1
2 TABLET, EXTENDED RELEASE ORAL 2 TIMES DAILY
Qty: 180 TABLET | Refills: 3 | Status: SHIPPED | OUTPATIENT
Start: 2023-10-02

## 2023-10-02 NOTE — TELEPHONE ENCOUNTER
Patient's wife called and stated that the directions for tolterodine 2 MG Oral Tab is incorrect. Needs to be changed to once in the morning and once at night.     [facility] - [place], [place] -  [address] [phone], [phone]

## 2023-10-02 NOTE — PROGRESS NOTES
PT presents to the [facility] with use of a front wheel walker. Wife in attendance. Pt has lumbar surgery on [de-identified] . PT has right sided groin and knee pain. Pain increases with walking. 3/10 currently that can get ot 6-7/10 while walking. R leg is shorter than left. Sitting and laying down helps relive pain. Dr. Jg King saw PT for med eval.  Refer to dictation for POC.

## 2023-10-02 NOTE — CHRONIC PAIN
Follow-up Note    HISTORY OF PRESENT ILLNESS:  Kae Lawler is a 82year old old male, originally referred to the pain clinic by  No ref. provider found, returns to the clinic forHip pain, acute, right  (primary encounter diagnosis)  Pain in joint of right hip  Osteoarthritis of right hip, unspecified osteoarthritis type  Lumbosacral radiculopathy at L2   patient returns the pain clinic for follow-up due to right hip pain which began with twisting. He is status post L1-2 discectomy due to groin pain which she states improved after surgery. Continues with physical therapy. The pain radiates from the hip and down the anterior thigh No recent injury/trauma. Pt denies numbness/tingling/weakness. There is no incontinence of bowel/bladder. Coughing/sneezing/straining does not exacerbate the pain. ALLERGIES:    Codeine                 PAIN    Comment:abdominal  Lisinopril              HIVES  Niacin                  RASH  Nsaids                  OTHER (SEE COMMENTS)    Comment:CKD  Omeprazole              OTHER (SEE COMMENTS)    Comment:Kidney injury  Pcn [Bicillin C-R,]     HIVES  Ultram [Tramadol]       NAUSEA ONLY    Comment:And constipation  Zinc Acetate            NAUSEA ONLY    MEDICATION LIST:  Current Outpatient Medications   Medication Sig Dispense Refill    tolterodine 2 MG Oral Tab Take 1 tablet (2 mg total) by mouth 2 (two) times daily. 180 tablet 3    methotrexate 2.5 MG Oral Tab Take 2 tabs per week      atorvastatin (LIPITOR) 10 MG Oral Tab Take 1 tablet (10 mg total) by mouth daily. 90 tablet 3    DULoxetine 20 MG Oral Cap DR Particles Take 1 capsule (20 mg total) by mouth daily. 90 capsule 3    tolterodine 2 MG Oral Tab Take one am and 2 at hs total of 3 per day 90 tablet 3    Betaine, Trimethylglycine, 500 MG Oral Cap       carvedilol 12.5 MG Oral Tab Take 1 tablet (12.5 mg total) by mouth 2 (two) times daily with meals. 180 tablet 3    Riboflavin (B2) 100 MG Oral Tab Take by mouth.  [address] Shah[address] Valencia tablet 0    Acetylcarnitine HCl (ACETYL-L-CARNITINE HCL) Does not apply Powder L-CARNITINE 250MG DAILY  0    predniSONE 2.5 MG Oral Tab Take 1 tablet (2.5 mg total) by mouth daily. 90 tablet 3    temazepam (RESTORIL) 7.5 MG Oral Cap Take 1 capsule (7.5 mg total) by mouth nightly as needed for Sleep. 30 capsule 1    sennosides 8.6 MG Oral Tab Take 1 tablet (8.6 mg total) by mouth nightly as needed. folic acid 1 MG Oral Tab Take 1 tablet (1 mg total) by mouth 4 (four) times a week. T,th,Sat and Sunday      Cholecalciferol (VITAMIN D3) 1.25 MG (53360 UT) Oral Cap Two/month 13 capsule 3    famotidine 20 MG Oral Tab Take 2 tablets (40 mg total) by mouth 2 (two) times daily. 0    calcium carbonate 500 MG Oral Chew Tab Chew 1 tablet (500 mg total) by mouth 2 (two) times daily. 0    Naloxone HCl 4 MG/0.1ML Nasal Liquid 4 mg by Nasal route as needed. If patient remains unresponsive, repeat dose in other nostril 2-5 minutes after first dose. 1 kit 0    CEREFOLIN NAC [id number] MG Oral Tab TAKE 1 TABLET BY MOUTH ON MONDAY, WEDNESDAY AND FRIDAY 36 tablet 3    Magnesium Citrate 100 MG Oral Cap Take 100 mg by mouth 2 (two) times a day. nitroglycerin 0.4 MG Sublingual SL Tab Place 1 tablet (0.4 mg total) under the tongue every 5 (five) minutes as needed for Chest pain. Or SOB 25 tablet 1    aspirin 81 MG Oral Tab EC Take 1 tablet (81 mg total) by mouth 3 (three) times a week. MWF in the evening  0    Turmeric, Curcuma Longa, Does not apply Powder One a day 300mg  0    Coenzyme Q10 (CO Q 10 OR) Take 50 mg by mouth 2 (two) times daily. Nutritional Supplements (JUICE PLUS FIBRE OR) Take 2 capsules by mouth 2 (two) times daily. REVIEW OF SYSTEMS:   Bowel/Bladder Incontinence: none  Coughing/sneezing/straining does not exacerbate the pain.   Numbness/tingling: as above  Weakness: as above  Weight Loss: Negative   Fever: Negative   Cardiovascular:  No current chest pain or palpitations   Respiratory:  No current shortness of breath   Gastrointestinal:  No active ulcer  Genitourinary:  Negative  Integumentary :  Negative  Psychiatric:  Negative  Hematologic: No active bleeding  Lymphatic: No current lymphedema  Allergic/Immunologic:  Negative  Musculoskeletal: As above  Neurological: As above  Denies chest pain, shortness of breath.     MEDICAL HISTORY:  Patient Active Problem List:     HTN (hypertension)     PMR (polymyalgia rheumatica) (HCC)     CAD (coronary artery disease)     Aortic stenosis     Psoriasis     Hiatal hernia with gastroesophageal reflux     Vitamin D deficiency     Diverticulosis     Colon polyps     Dyslipidemia     Carotid artery plaque     Osteopenia     S/P AVR     S/P CABG (coronary artery bypass graft)     Proteinuria     Multiple renal cysts     Renal stone     Closed wedge compression fracture of T11 vertebra with routine healing     Compression fracture of T12 vertebra (HCC)     Anemia     Chronic fatigue     Positive TRINY (antinuclear antibody)     Weakness generalized     Immunosuppression due to chronic steroid use      History of community acquired pneumonia     Elevated lipoprotein A level     Elevated homocysteine     Drug-induced interstitial nephritis     Right sided sciatica     CKD (chronic kidney disease) stage 4, GFR 15-29 ml/min (HCC)     Dehydration     Contrast dye induced nephropathy     Renal artery stenosis (HCC)     Pittman's esophagus     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     Sjogren's syndrome with myopathy (HCC)     Clostridium difficile colitis     SHIRA (obstructive sleep apnea)     Intractable low back pain     Lumbosacral radiculopathy at L2     History of back surgery-12/21/22     Weakness of right lower extremity     Diaphragmatic hernia without obstruction or gangrene     History of falling     Long term (current) use of systemic steroids     Other specified disorders of bone density and structure, unspecified site     Personal history of colonic polyps     Personal history of nicotine dependence     Presence of aortocoronary bypass graft     Presence of prosthetic heart valve     Pulmonary fibrosis (HCC)     History of Clostridioides difficile colitis    Past Medical History:   Diagnosis Date    Acute left-sided low back pain without sciatica 07/20/2018    JOSE LUIS (acute kidney injury) ([address]     from omeprazole-resolved    Anemia 07/26/2019    Aortic stenosis 10/2013    AVR-bio    Pittman esophagus     CAD (coronary artery disease)     stents, bypasses    Calculus of kidney     Carotid artery plaque 2011    ultrasound    Chronic anemia     bone marrow neg 12/2012    Chronic fatigue     CKD (chronic kidney disease) stage 4, GFR 15-29 ml/min ([facility]     Closed wedge compression fracture of T11 vertebra with routine healing 06/19/2018    Clostridium difficile colitis 06/03/2022    Colon polyps 11/2012    colonoscopy    Compression fracture of T12 vertebra ([facility] [address] [address] 06/19/2018    Contrast dye induced nephropathy 02/04/2022    Dehydration 01/28/2022    Diverticulosis     Drug-induced interstitial nephritis 08/23/2020    Omeprazole    Dyslipidemia     Elevated homocysteine     Elevated lipoprotein A level     Elevated troponin     FUO (fever of unknown origin)     PMR or testicular/prostate infection    Gastritis     Hemorrhoids     Hiatal hernia with gastroesophageal reflux     History of Clostridioides difficile colitis 06/2022    History of community acquired pneumonia     HTN (hypertension)     Immunosuppression due to chronic steroid use      Inguinal lymphadenopathy     right-bx    Ischemic colitis ([address]     resolved    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[address]     Mesenteric adenitis     bx    Multiple renal cysts     both-not reported on recent CTs    SHIRA (obstructive sleep apnea)     Osteopenia     steroids    PMR (polymyalgia rheumatica) ([address]     Pneumonia 02/2020    Positive TRINY (antinuclear antibody) 08/28/2019    Precancerous lesion     skin    Proteinuria     mild    Psoriasis     Renal artery stenosis ([facility]     both    Renal stone     right    Right shoulder pain     injected-Liane    Sjogren's syndrome with myopathy ([facility] [address] 06/03/2022    Sleep apnea     untreated    Thrombocytopenia ([facility] [address]     resolved    Vitamin B12 nutritional deficiency     Vitamin D deficiency     Weight loss        SURGICAL HISTORY:  Past Surgical History:   Procedure Laterality Date    ANGIOPLASTY (CORONARY)  1999    Dr. Jerry Vazquez      teenager    BONE MARROW BIOPSY AND ASPIRATION  1/2021    Hantel-neg    CABG  10/25/13    AORTIC VALVE REPLACEMENT; bypass x 2-3    CARDIAC CATHETERIZATION  2008/2013    CHOLECYSTECTOMY  1984    COLONOSCOPY  2005    Eliu    COLONOSCOPY  11/7/2012        COLONOSCOPY N/A 4/4/2018    Procedure: COLONOSCOPY;  Surgeon: Alexandra Haskins MD;  Location: [address] [place] [address] ENDOSCOPY    COLONOSCOPY N/A 10/11/2021    Procedure: COLONOSCOPY, ESOPHAGOGASTRODUODENOSCOPY (EGD);   Surgeon: Alexandra Haskins MD;  Location: [address] [place] [address] ENDOSCOPY    CORTISONE INJECTION  7/6/15    rt shoulder     CYTOLOGY LYMPH NODE FNA - JAR(S): 1  12/07/2012    excisional biopsy rt inguinal lymph node     EGD  10-    Rafi    IR KIDNEY BIOPSY (RENAL)RANDOM  08/11/2020    for drug induced nephritis    LAPAROSCOPIC LYMPH NODE BIOP  2/8/13    exploratory abd lymphadenopathy    REPLACE AORTIC VALVE OPEN  10/25/13    bio    SIGMOIDOSCOPY,DIAGNOSTIC  1995    SKIN TAGS  10/1714    plus histofreeze    UPPER GI ENDOSCOPY PERFORMED  2005/2007    Eliu/Rafi       FAMILY HISTORY:  Family History   Problem Relation Age of Onset    Neurological Disorder Father         parkinsons    Cancer Father         lung    Other (Other) Father     Mental Disorder Mother         alzheimers    Other (Other) Mother     Cancer Daughter         hodgkins at age 25    Heart Surgery Brother     Heart Disorder Brother     Other (Other) Brother     Other (Other) Brother     Other (Other) Other         seizures from neurofibromatosis       SOCIAL HISTORY:  Social History    Socioeconomic History      Marital status:       Spouse name: Tiki Del Real      Number of children: 3      Years of education: Not on file      Highest education level: Not on file    Occupational History      Occupation:         Employer: [employer]    Tobacco Use      Smoking status: Former        Types: Cigarettes        Quit date: 1971        Years since quittin.1      Smokeless tobacco: Never    Vaping Use      Vaping Use: Never used    Substance and Sexual Activity      Alcohol use: Not Currently        Alcohol/week: 2.0 standard drinks of alcohol        Types: 2 Standard drinks or equivalent per week        Comment: 2-3 drinks on the weekend      Drug use: No      Sexual activity: Not on file    Other Topics      Concerns:         Service: Not Asked        Blood Transfusions: Not Asked        Caffeine Concern: Yes          2 cups daily        Occupational Exposure: Not Asked        Hobby Hazards: Not Asked        Sleep Concern: Not Asked        Stress Concern: No        Weight Concern: No        Special Diet: No        Back Care: Not Asked        Exercise: Yes          walking daily         Bike Helmet: Not Asked        Seat Belt: Yes        Self-Exams: Not Asked    Social History Narrative      Not on file    Social Determinants of Health  Financial Resource Strain: Not on file  Food Insecurity: Not on file  Transportation Needs: Not on file  Physical Activity: Not on file  Stress: Not on file  Social Connections: Not on file  Housing Stability: Not on file  PHYSICAL EXAMINATION:   10/02/23  1214   BP: 125/76   Pulse: 73      General: Alert and oriented x3  Affect:  NAD  Eyes: anicteric; no injection  Gait: Antalgic;  cane user - Yes walker  Spine: Kyphosis  Straight leg raise: Negative bilaterally  Avelino's test: Positive right    IMAGING:    Narrative   PROCEDURE: XR HIP W OR WO PELVIS 2 OR 3 VIEWS, RIGHT (CPT=73502)     COMPARISON: None.     INDICATIONS: Lower back pain radiating to right hip x10 days. No known injury. TECHNIQUE:     Three views were obtained. FINDINGS:  BONES: No evidence of recent fracture or dislocation. There is demineralization of the bony structures. Spurring is seen about the right acetabulum, femoral head/neck and trochanters. There is narrowing of the hip joint space. Milder osteoarthritic  changes are seen about the left hip and lower lumbar spine. SOFT TISSUES: Negative. No visible soft tissue swelling. EFFUSION: None visible. OTHER: Surgical clips are seen from previous vasectomy. Vascular calcifications are noted. CONCLUSION:  1. Moderate osteoarthritis right hip. 2. Mild osteoarthritis lower lumbar spine and left hip. 3. Demineralization. 4. Atherosclerosis. 5. Prior vasectomy. Finalized by (CST): Lee Shin MD on 11/02/2022 at 3:22 PM                [place] PHYSICIAN MONITORING PROGRAM REVIEWED  No    ASSESSMENT:   Nanette Loco is a 82year old  male, with Hip pain, acute, right  (primary encounter diagnosis)  Pain in joint of right hip  Osteoarthritis of right hip, unspecified osteoarthritis type  Lumbosacral radiculopathy at L2       PLAN:  RECOMMENDATIONS:  1) right hip injection with fluoroscopy. Comprehensive analgesic plan was formulated. Conservative vs. Aggressive measures were discussed at length including pharmacotherapy (eg. Anti- inflammatories, muscle relaxants, neuropathic medications, oral steroids, analgesics), injections, and further testing. Risks and benefits of all options were discussed at length to patients satisfaction during a comprehensive interactive discussion. All questions were answered during extended questions and answer session. Patient agreeable to discussion plan.  Greater than 50% of the time was spent with counseling (nature of discussion centered around pain, therapy, and treatment options), face to face time, time spent reviewing data, obtaining patient information and discussing the care with the patients health care providers.      Pt will return to clinic 2 to 3 weeks postinjection    Total time: 28 minutes    Joleen Brennan MD  10/2/2023  [facility]

## 2023-10-03 ENCOUNTER — TELEPHONE (OUTPATIENT)
Dept: PAIN CLINIC | Facility: HOSPITAL | Age: 83
End: 2023-10-03

## 2023-10-03 DIAGNOSIS — M16.11 PRIMARY OSTEOARTHRITIS OF RIGHT HIP: ICD-10-CM

## 2023-10-03 DIAGNOSIS — M25.551 PAIN IN JOINT OF RIGHT HIP: Primary | ICD-10-CM

## 2023-10-03 NOTE — TELEPHONE ENCOUNTER
Scheduled procedure for: 10/16/23    Procedure follow-up for: 11/6/23 at 10:30 am .    Surgery scheduled in [address] [place] [address] Rd.

## 2023-10-03 NOTE — TELEPHONE ENCOUNTER
Right Intraarticular Hip Injection - Cpt 02330, 06919 - Dx M25.551, M16.11 - NO PA REQUIRED  Status: Pre-certification is not require. Per Medicare Guidelines: may be subject to review once claim is submitted. All Medicare coverage is based on Medical Necessity.

## 2023-10-13 ENCOUNTER — NURSE ONLY (OUTPATIENT)
Dept: HEMATOLOGY/ONCOLOGY | Facility: HOSPITAL | Age: 83
End: 2023-10-13
Attending: INTERNAL MEDICINE
Payer: MEDICARE

## 2023-10-13 DIAGNOSIS — D50.0 IRON DEFICIENCY ANEMIA DUE TO CHRONIC BLOOD LOSS: ICD-10-CM

## 2023-10-13 LAB
BASOPHILS # BLD AUTO: 0.05 X10(3) UL (ref 0–0.2)
BASOPHILS NFR BLD AUTO: 0.4 %
DEPRECATED HBV CORE AB SER IA-ACNC: 74.4 NG/ML
DEPRECATED RDW RBC AUTO: 48.1 FL (ref 35.1–46.3)
EOSINOPHIL # BLD AUTO: 0.79 X10(3) UL (ref 0–0.7)
EOSINOPHIL NFR BLD AUTO: 6.6 %
ERYTHROCYTE [DISTWIDTH] IN BLOOD BY AUTOMATED COUNT: 13.5 % (ref 11–15)
HCT VFR BLD AUTO: 31.1 %
HGB BLD-MCNC: 10.5 G/DL
IMM GRANULOCYTES # BLD AUTO: 0.03 X10(3) UL (ref 0–1)
IMM GRANULOCYTES NFR BLD: 0.2 %
IRON SATN MFR SERPL: 21 %
IRON SERPL-MCNC: 70 UG/DL
LYMPHOCYTES # BLD AUTO: 0.77 X10(3) UL (ref 1–4)
LYMPHOCYTES NFR BLD AUTO: 6.4 %
MCH RBC QN AUTO: 33.2 PG (ref 26–34)
MCHC RBC AUTO-ENTMCNC: 33.8 G/DL (ref 31–37)
MCV RBC AUTO: 98.4 FL
MONOCYTES # BLD AUTO: 1.7 X10(3) UL (ref 0.1–1)
MONOCYTES NFR BLD AUTO: 14.2 %
NEUTROPHILS # BLD AUTO: 8.67 X10 (3) UL (ref 1.5–7.7)
NEUTROPHILS # BLD AUTO: 8.67 X10(3) UL (ref 1.5–7.7)
NEUTROPHILS NFR BLD AUTO: 72.2 %
PLATELET # BLD AUTO: 149 10(3)UL (ref 150–450)
RBC # BLD AUTO: 3.16 X10(6)UL
TIBC SERPL-MCNC: 338 UG/DL (ref 240–450)
TRANSFERRIN SERPL-MCNC: 227 MG/DL (ref 200–360)
WBC # BLD AUTO: 12 X10(3) UL (ref 4–11)

## 2023-10-13 PROCEDURE — 36415 COLL VENOUS BLD VENIPUNCTURE: CPT

## 2023-10-13 PROCEDURE — 84466 ASSAY OF TRANSFERRIN: CPT

## 2023-10-13 PROCEDURE — 85025 COMPLETE CBC W/AUTO DIFF WBC: CPT

## 2023-10-13 PROCEDURE — 82728 ASSAY OF FERRITIN: CPT

## 2023-10-13 PROCEDURE — 83540 ASSAY OF IRON: CPT

## 2023-10-16 ENCOUNTER — HOSPITAL ENCOUNTER (OUTPATIENT)
Dept: GENERAL RADIOLOGY | Facility: HOSPITAL | Age: 83
Discharge: HOME | DRG: 469 | End: 2023-10-16
Attending: ANESTHESIOLOGY
Payer: MEDICARE

## 2023-10-16 ENCOUNTER — HOSPITAL ENCOUNTER (INPATIENT)
Facility: HOSPITAL | Age: 83
LOS: 5 days | Discharge: NURSING, REHAB OR LONG-TERM CARE | DRG: 469 | End: 2023-10-21
Attending: HOSPITALIST | Admitting: HOSPITALIST
Payer: MEDICARE

## 2023-10-16 DIAGNOSIS — G89.29 HIP PAIN, CHRONIC, RIGHT: Primary | ICD-10-CM

## 2023-10-16 DIAGNOSIS — M25.551 HIP PAIN, CHRONIC, RIGHT: ICD-10-CM

## 2023-10-16 DIAGNOSIS — M25.551 HIP PAIN, CHRONIC, RIGHT: Primary | ICD-10-CM

## 2023-10-16 DIAGNOSIS — G89.29 HIP PAIN, CHRONIC, RIGHT: ICD-10-CM

## 2023-10-16 PROBLEM — S72.001A CLOSED DISPLACED FRACTURE OF RIGHT FEMORAL NECK (HCC): Status: ACTIVE | Noted: 2023-10-16

## 2023-10-16 PROCEDURE — 99223 1ST HOSP IP/OBS HIGH 75: CPT | Performed by: HOSPITALIST

## 2023-10-16 PROCEDURE — 73502 X-RAY EXAM HIP UNI 2-3 VIEWS: CPT | Performed by: ANESTHESIOLOGY

## 2023-10-16 RX ORDER — HYDROMORPHONE HYDROCHLORIDE 1 MG/ML
0.2 INJECTION, SOLUTION INTRAMUSCULAR; INTRAVENOUS; SUBCUTANEOUS EVERY 2 HOUR PRN
Status: DISCONTINUED | OUTPATIENT
Start: 2023-10-16 | End: 2023-10-21

## 2023-10-16 RX ORDER — HYDROMORPHONE HYDROCHLORIDE 1 MG/ML
0.8 INJECTION, SOLUTION INTRAMUSCULAR; INTRAVENOUS; SUBCUTANEOUS EVERY 2 HOUR PRN
Status: DISCONTINUED | OUTPATIENT
Start: 2023-10-16 | End: 2023-10-21

## 2023-10-16 RX ORDER — HYDROMORPHONE HYDROCHLORIDE 1 MG/ML
0.4 INJECTION, SOLUTION INTRAMUSCULAR; INTRAVENOUS; SUBCUTANEOUS EVERY 2 HOUR PRN
Status: DISCONTINUED | OUTPATIENT
Start: 2023-10-16 | End: 2023-10-21

## 2023-10-16 RX ORDER — PHYTONADIONE (VIT K1) 100 MCG
1 TABLET ORAL
COMMUNITY

## 2023-10-16 RX ORDER — TEMAZEPAM 15 MG/1
15 CAPSULE ORAL NIGHTLY PRN
Status: DISCONTINUED | OUTPATIENT
Start: 2023-10-16 | End: 2023-10-17

## 2023-10-16 RX ORDER — ONDANSETRON 2 MG/ML
4 INJECTION INTRAMUSCULAR; INTRAVENOUS EVERY 6 HOURS PRN
Status: DISCONTINUED | OUTPATIENT
Start: 2023-10-16 | End: 2023-10-21

## 2023-10-16 RX ORDER — METOCLOPRAMIDE HYDROCHLORIDE 5 MG/ML
10 INJECTION INTRAMUSCULAR; INTRAVENOUS EVERY 8 HOURS PRN
Status: DISCONTINUED | OUTPATIENT
Start: 2023-10-16 | End: 2023-10-21

## 2023-10-16 RX ORDER — CALCIUM CARBONATE 500 MG/1
500 TABLET, CHEWABLE ORAL 3 TIMES DAILY PRN
Status: DISCONTINUED | OUTPATIENT
Start: 2023-10-16 | End: 2023-10-19

## 2023-10-16 RX ORDER — ACETAMINOPHEN 500 MG
500 TABLET ORAL EVERY 4 HOURS PRN
Status: DISCONTINUED | OUTPATIENT
Start: 2023-10-16 | End: 2023-10-21

## 2023-10-16 RX ORDER — ASCORBIC ACID 500 MG
500 TABLET ORAL DAILY
COMMUNITY

## 2023-10-17 ENCOUNTER — TELEPHONE (OUTPATIENT)
Dept: INTERNAL MEDICINE CLINIC | Facility: CLINIC | Age: 83
End: 2023-10-17

## 2023-10-17 LAB
ANION GAP SERPL CALC-SCNC: 9 MMOL/L (ref 0–18)
ATRIAL RATE: 62 BPM
BASOPHILS # BLD AUTO: 0.04 X10(3) UL (ref 0–0.2)
BASOPHILS NFR BLD AUTO: 0.4 %
BILIRUB UR QL: NEGATIVE
BUN BLD-MCNC: 34 MG/DL (ref 7–18)
BUN/CREAT SERPL: 20.2 (ref 10–20)
CALCIUM BLD-MCNC: 8.9 MG/DL (ref 8.5–10.1)
CHLORIDE SERPL-SCNC: 108 MMOL/L (ref 98–112)
CLARITY UR: CLEAR
CO2 SERPL-SCNC: 24 MMOL/L (ref 21–32)
CREAT BLD-MCNC: 1.68 MG/DL
DEPRECATED RDW RBC AUTO: 48.7 FL (ref 35.1–46.3)
EGFRCR SERPLBLD CKD-EPI 2021: 41 ML/MIN/1.73M2 (ref 60–?)
EOSINOPHIL # BLD AUTO: 0.88 X10(3) UL (ref 0–0.7)
EOSINOPHIL NFR BLD AUTO: 9.3 %
ERYTHROCYTE [DISTWIDTH] IN BLOOD BY AUTOMATED COUNT: 13.2 % (ref 11–15)
GLUCOSE BLD-MCNC: 85 MG/DL (ref 70–99)
GLUCOSE UR-MCNC: NORMAL MG/DL
HCT VFR BLD AUTO: 33 %
HGB BLD-MCNC: 10.9 G/DL
HGB UR QL STRIP.AUTO: NEGATIVE
IMM GRANULOCYTES # BLD AUTO: 0.03 X10(3) UL (ref 0–1)
IMM GRANULOCYTES NFR BLD: 0.3 %
KETONES UR-MCNC: NEGATIVE MG/DL
LEUKOCYTE ESTERASE UR QL STRIP.AUTO: NEGATIVE
LYMPHOCYTES # BLD AUTO: 0.84 X10(3) UL (ref 1–4)
LYMPHOCYTES NFR BLD AUTO: 8.8 %
MCH RBC QN AUTO: 33 PG (ref 26–34)
MCHC RBC AUTO-ENTMCNC: 33 G/DL (ref 31–37)
MCV RBC AUTO: 100 FL
MONOCYTES # BLD AUTO: 1.26 X10(3) UL (ref 0.1–1)
MONOCYTES NFR BLD AUTO: 13.3 %
MRSA DNA SPEC QL NAA+PROBE: NEGATIVE
NEUTROPHILS # BLD AUTO: 6.45 X10 (3) UL (ref 1.5–7.7)
NEUTROPHILS # BLD AUTO: 6.45 X10(3) UL (ref 1.5–7.7)
NEUTROPHILS NFR BLD AUTO: 67.9 %
NITRITE UR QL STRIP.AUTO: NEGATIVE
OSMOLALITY SERPL CALC.SUM OF ELEC: 299 MOSM/KG (ref 275–295)
P AXIS: 84 DEGREES
P-R INTERVAL: 238 MS
PH UR: 6.5 [PH] (ref 5–8)
PLATELET # BLD AUTO: 133 10(3)UL (ref 150–450)
POTASSIUM SERPL-SCNC: 4 MMOL/L (ref 3.5–5.1)
PROT UR-MCNC: 20 MG/DL
Q-T INTERVAL: 448 MS
QRS DURATION: 100 MS
QTC CALCULATION (BEZET): 454 MS
R AXIS: -56 DEGREES
RBC # BLD AUTO: 3.3 X10(6)UL
SODIUM SERPL-SCNC: 141 MMOL/L (ref 136–145)
SP GR UR STRIP: 1.02 (ref 1–1.03)
T AXIS: 59 DEGREES
UROBILINOGEN UR STRIP-ACNC: NORMAL
VENTRICULAR RATE: 62 BPM
WBC # BLD AUTO: 9.5 X10(3) UL (ref 4–11)

## 2023-10-17 PROCEDURE — 99233 SBSQ HOSP IP/OBS HIGH 50: CPT | Performed by: HOSPITALIST

## 2023-10-17 RX ORDER — AMLODIPINE BESYLATE 2.5 MG/1
2.5 TABLET ORAL DAILY
Status: DISCONTINUED | OUTPATIENT
Start: 2023-10-17 | End: 2023-10-21

## 2023-10-17 RX ORDER — ATORVASTATIN CALCIUM 10 MG/1
10 TABLET, FILM COATED ORAL DAILY
Status: DISCONTINUED | OUTPATIENT
Start: 2023-10-17 | End: 2023-10-21

## 2023-10-17 RX ORDER — TEMAZEPAM 7.5 MG/1
7.5 CAPSULE ORAL NIGHTLY PRN
Status: DISCONTINUED | OUTPATIENT
Start: 2023-10-17 | End: 2023-10-21

## 2023-10-17 RX ORDER — PREDNISONE 2.5 MG/1
2.5 TABLET ORAL DAILY
Status: DISCONTINUED | OUTPATIENT
Start: 2023-10-17 | End: 2023-10-21

## 2023-10-17 RX ORDER — OXYBUTYNIN CHLORIDE 5 MG/1
5 TABLET ORAL 2 TIMES DAILY
Status: DISCONTINUED | OUTPATIENT
Start: 2023-10-17 | End: 2023-10-21

## 2023-10-17 RX ORDER — CARVEDILOL 12.5 MG/1
12.5 TABLET ORAL 2 TIMES DAILY WITH MEALS
Status: DISCONTINUED | OUTPATIENT
Start: 2023-10-17 | End: 2023-10-21

## 2023-10-17 RX ORDER — DULOXETIN HYDROCHLORIDE 20 MG/1
20 CAPSULE, DELAYED RELEASE ORAL DAILY
Status: DISCONTINUED | OUTPATIENT
Start: 2023-10-17 | End: 2023-10-21

## 2023-10-17 NOTE — PLAN OF CARE
Pt A&Ox4. RA but hx of sleep apnea; has self oral appliances. Voiding freely. PRN Tums given. 1-assist with a walker for bathroom privileges, bedrest; tolerating ambulation. Denies pain. Cardiac clearance needed prior to possible surgery 10/18. This RN FU with MD this morning for resuming home medications. Safety plan in place with frequent rounding.      Problem: Patient Centered Care  Goal: Patient preferences are identified and integrated in the patient's plan of care  Description: Interventions:  - What would you like us to know as we care for you?   - Provide timely, complete, and accurate information to patient/family  - Incorporate patient and family knowledge, values, beliefs, and cultural backgrounds into the planning and delivery of care  - Encourage patient/family to participate in care and decision-making at the level they choose  - Honor patient and family perspectives and choices  Outcome: Progressing     Problem: Patient/Family Goals  Goal: Patient/Family Short Term Goal  Description: Patient's Short Term Goal:     Interventions:   - See additional Care Plan goals for specific interventions  Outcome: Progressing     Problem: PAIN - ADULT  Goal: Verbalizes/displays adequate comfort level or patient's stated pain goal  Description: INTERVENTIONS:  - Encourage pt to monitor pain and request assistance  - Assess pain using appropriate pain scale  - Administer analgesics based on type and severity of pain and evaluate response  - Implement non-pharmacological measures as appropriate and evaluate response  - Consider cultural and social influences on pain and pain management  - Manage/alleviate anxiety  - Utilize distraction and/or relaxation techniques  - Monitor for opioid side effects  - Notify MD/LIP if interventions unsuccessful or patient reports new pain  - Anticipate increased pain with activity and pre-medicate as appropriate  Outcome: Progressing     Problem: SAFETY ADULT - FALL  Goal: Free from fall injury  Description: INTERVENTIONS:  - Assess pt frequently for physical needs  - Identify cognitive and physical deficits and behaviors that affect risk of falls.   - [place] fall precautions as indicated by assessment.  - Educate pt/family on patient safety including physical limitations  - Instruct pt to call for assistance with activity based on assessment  - Modify environment to reduce risk of injury  - Provide assistive devices as appropriate  - Consider OT/PT consult to assist with strengthening/mobility  - Encourage toileting schedule  Outcome: Progressing     Problem: DISCHARGE PLANNING  Goal: Discharge to home or other facility with appropriate resources  Description: INTERVENTIONS:  - Identify barriers to discharge w/pt and caregiver  - Include patient/family/discharge partner in discharge planning  - Arrange for needed discharge resources and transportation as appropriate  - Identify discharge learning needs (meds, wound care, etc)  - Arrange for interpreters to assist at discharge as needed  - Consider post-discharge preferences of patient/family/discharge partner  - Complete POLST form as appropriate  - Assess patient's ability to be responsible for managing their own health  - Refer to Case Management Department for coordinating discharge planning if the patient needs post-hospital services based on physician/LIP order or complex needs related to functional status, cognitive ability or social support system  Outcome: Progressing     Problem: SKIN/TISSUE INTEGRITY - ADULT  Goal: Skin integrity remains intact  Description: INTERVENTIONS  - Assess and document risk factors for pressure ulcer development  - Assess and document skin integrity  - Monitor for areas of redness and/or skin breakdown  - Initiate interventions, skin care algorithm/standards of care as needed  Outcome: Progressing     Problem: Impaired Functional Mobility  Goal: Achieve highest/safest level of mobility/gait  Description: Interventions:  - Assess patient's functional ability and stability  - Promote increasing activity/tolerance for mobility and gait  - Educate and engage patient/family in tolerated activity level and precautions  - Recommend use of  RW for transfers and ambulation  Outcome: Progressing     Problem: Impaired Activities of Daily Living  Goal: Achieve highest/safest level of independence in self care  Description: Interventions:  - Assess ability and encourage patient to participate in ADLs to maximize function  - Promote sitting position while performing ADLs such as feeding, grooming, and bathing  - Educate and encourage patient/family in tolerated functional activity level and precautions during self-care  Outcome: Progressing

## 2023-10-17 NOTE — PROGRESS NOTES
Patient arrived direct admission with spouse. Oriented to room and call light. Admission completed and floor orders obtained. Plan is for OR on Wednesday with Dr Juan Gillespie.

## 2023-10-17 NOTE — CM/SW NOTE
Department  notified of request for [facility], HHC, and Acute Rehab, aidin referrals started. Assigned CM/SW to follow up with pt/family on further discharge planning.        [facility] Haro [facility]

## 2023-10-17 NOTE — H&P
[facility]    PATIENT'S NAME: Brad Rayo   ATTENDING PHYSICIAN: Miky Mcguire MD   PATIENT ACCOUNT#:   [id number]    LOCATION:  [address] [place] [address] RECORD #:   [id number]       YOB: 1940  ADMISSION DATE:       10/16/2023    HISTORY AND PHYSICAL EXAMINATION    CHIEF COMPLAINT:  Right femoral neck fracture with fragmentation of femoral head. HISTORY OF PRESENT ILLNESS:  The patient is an 82-year-old,  male who has been having progressive pain in his right hip forcing him to use a walker for the last several months. Today he was seen by his pain specialist for hip injection and because of his limping and shortening of his right lower extremity, x-ray of the hip was ordered and Orthopedic Surgery consult was obtained. X-ray of the hip returned with displaced right femoral neck fracture with fragmentation of the femoral head suggesting chronic process. The patient will be admitted to the hospital for further management. PAST MEDICAL HISTORY:  Coronary artery disease and aortic stenosis status post coronary artery bypass graft surgery and aortic valve replacement with bioprosthesis. His last nuclear stress test on record August 20, 2023, which was negative for reversible ischemia. He had history of osteoarthritis, degenerative joint disease of cervical and lumbar spine, hyperlipidemia, obstructive sleep apnea, hypertension, kidney stones and chronic kidney disease stage 3. He had history of C. diff infection. PAST SURGICAL HISTORY:  Lumbar laminectomy, appendectomy, coronary artery bypass graft surgery, bioprosthetic aortic valve replacement, cholecystectomy and laparoscopy with lymph node biopsy. MEDICATIONS:  Please see medication reconciliation list.    ALLERGIES:  Lisinopril, Penicillin, and Niacin. FAMILY HISTORY:  Father had Parkinson disease and lung cancer. Brother had coronary artery disease. SOCIAL HISTORY:  Ex-tobacco use.   No current tobacco, alcohol or drug use. Lives with his family. Uses a walker to ambulate around. REVIEW OF SYSTEMS:  Patient said he does have chronic right hip pain and chronic limping with his right lower extremity. He said that his right leg was always shorter than his left, but his pain has been progressively getting worse in the last several weeks. Last week he has been barely able to bear weight to his right lower extremity by using a walker. No recent trauma. Other 12-point review of systems is negative. PHYSICAL EXAMINATION:    GENERAL:  Alert, oriented to time, place and person. Mild-to-moderate distress. VITAL SIGNS:  Temperature 97.1. Pulse 67, respiratory rate 18, blood pressure 161/83, pulse ox 100% on room air. HEENT:  Atraumatic. Oropharynx clear. Moist mucous membranes. Ears, nose:  Normal.  Eyes:  Anicteric sclerae. NECK:  Neck supple. No lymphadenopathy. Trachea midline. Full range of motion. LUNGS:  Clear to auscultation bilaterally. Normal respiratory effort. HEART:  Regular rate and rhythm, S1, S2 auscultated. No murmur. ABDOMEN:  Soft, nondistended, no tenderness. Positive bowel sounds. EXTREMITIES:  No edema, clubbing or cyanosis. Tenderness to palpation over the right lateral hip. Right lower extremity is slightly shortened compared to the left. NEUROLOGIC:  Motor and sensory intact. ASSESSMENT AND PLAN:    1. Right femoral neck fracture, displaced, with fragmentation of the right femoral head suggestive of chronic progressive process. Possible underlying osteoporosis. 2.   Hypertension. 3.   Coronary artery disease. 4.   Chronic renal insufficiency, stage 3. The patient will be admitted to general medical floor. Monitor his clinical status. Pain control. Fall precautions. Orthopedic Surgery consult, Dr. Rodriguez Level. Timing of surgery per Orthopedic Surgery. Patient is cleared for surgical intervention from the cardiac standpoint.   Further recommendations to follow.      Dictated By Hoang Jones MD  d: 10/16/2023 17:43:16  t: 10/16/2023 17:46:48  [facility] [id number]  /

## 2023-10-17 NOTE — CM/SW NOTE
10/17/23 1400   CM/ Referral Data   Referral Source    Reason for Referral Discharge planning   Informant Patient;Daughter   Medical Hx   Does patient have an established PCP? Yes  Reino Spatz)   Significant Past Medical/Mental Health Hx Rt SYDNEE- by Dr. Vivian Carlson on 10/18   Patient Info   Patient's Current Mental Status at Time of Assessment Alert;Oriented   Patient's Home Environment Condo/Apt with elevator   Patient lives with Spouse/Significant other   Patient Status Prior to Admission   Independent with ADLs and Mobility No   Pt. requires assistance with Housework;Driving; Ambulating; Bathing   Discharge Needs   Anticipated D/C needs Subacute rehab;Acute rehab   Choice of Post-Acute Provider   Informed patient of right to choose their preferred provider Yes     CM self-ref for dc planning. Met with pt and dtr at bedside. Pt is from home and lives with his sp. They live in a condo with elevator access. Pts home is handicap accessible. Pts spouse assists with most ADL's. Pt is able to walk with the use of a walker. Pt has hx of RAYNE at [facility] and [facility]. Pt has walker, cane, wc and shower chair at home. Pt states he went to the pain dr. For an injection when they took an xray of his hip and found that it was dislocated and fractured. Pt admitted to the hospital for further management and rt total hip arthroplasty by Dr. Vivian Carlson. Scheduled for 10/18 at 1115am.     CM inquired about pts anticipated dc plan. Pt is hopeful to go home with home health but realizes that he may need to go to rehab. Pts dtr inquired if pt is a candidate for AR at [place] [address] or [facility] in [place]. CM explained that pt may qualify for AR, we will have to see what PT recommends and request a PMR consult. Dtr verbalized understanding. This CM agreed to send [facility] and AR ref's. CM requested [facility] to send ref's via Aidin.      Plan: HH vs RAYNE vs AR pending therapy recommendation    / to remain available for support and/or discharge planning. Jennifer Vasques.  Bryn Joel RN, BSN  Nurse   [phone]

## 2023-10-17 NOTE — PLAN OF CARE
Problem: Patient Centered Care  Goal: Patient preferences are identified and integrated in the patient's plan of care  Description: Interventions:  - What would you like us to know as we care for you? I am agreeable with having surgery tomorrow. - Provide timely, complete, and accurate information to patient/family  - Incorporate patient and family knowledge, values, beliefs, and cultural backgrounds into the planning and delivery of care  - Encourage patient/family to participate in care and decision-making at the level they choose  - Honor patient and family perspectives and choices  Outcome: Progressing     Problem: Patient/Family Goals  Goal: Patient/Family Long Term Goal  Description: Patient's Long Term Goal: to return to previous level of activity. Interventions:  - surgery planned  - See additional Care Plan goals for specific interventions  Outcome: Progressing  Goal: Patient/Family Short Term Goal  Description: Patient's Short Term Goal: to have surgery on 10/18/23    Interventions:   - pre-op prep/clearance in progress.   - See additional Care Plan goals for specific interventions  Outcome: Progressing     Problem: PAIN - ADULT  Goal: Verbalizes/displays adequate comfort level or patient's stated pain goal  Description: INTERVENTIONS:  - Encourage pt to monitor pain and request assistance  - Assess pain using appropriate pain scale  - Administer analgesics based on type and severity of pain and evaluate response  - Implement non-pharmacological measures as appropriate and evaluate response  - Consider cultural and social influences on pain and pain management  - Manage/alleviate anxiety  - Utilize distraction and/or relaxation techniques  - Monitor for opioid side effects  - Notify MD/LIP if interventions unsuccessful or patient reports new pain  - Anticipate increased pain with activity and pre-medicate as appropriate  Outcome: Progressing     Problem: SAFETY ADULT - FALL  Goal: Free from fall injury  Description: INTERVENTIONS:  - Assess pt frequently for physical needs  - Identify cognitive and physical deficits and behaviors that affect risk of falls.   - [place] fall precautions as indicated by assessment.  - Educate pt/family on patient safety including physical limitations  - Instruct pt to call for assistance with activity based on assessment  - Modify environment to reduce risk of injury  - Provide assistive devices as appropriate  - Consider OT/PT consult to assist with strengthening/mobility  - Encourage toileting schedule  Outcome: Progressing     Problem: DISCHARGE PLANNING  Goal: Discharge to home or other facility with appropriate resources  Description: INTERVENTIONS:  - Identify barriers to discharge w/pt and caregiver  - Include patient/family/discharge partner in discharge planning  - Arrange for needed discharge resources and transportation as appropriate  - Identify discharge learning needs (meds, wound care, etc)  - Arrange for interpreters to assist at discharge as needed  - Consider post-discharge preferences of patient/family/discharge partner  - Complete POLST form as appropriate  - Assess patient's ability to be responsible for managing their own health  - Refer to Case Management Department for coordinating discharge planning if the patient needs post-hospital services based on physician/LIP order or complex needs related to functional status, cognitive ability or social support system  Outcome: Progressing     Problem: SKIN/TISSUE INTEGRITY - ADULT  Goal: Skin integrity remains intact  Description: INTERVENTIONS  - Assess and document risk factors for pressure ulcer development  - Assess and document skin integrity  - Monitor for areas of redness and/or skin breakdown  - Initiate interventions, skin care algorithm/standards of care as needed  Outcome: Progressing     Problem: Impaired Functional Mobility  Goal: Achieve highest/safest level of mobility/gait  Description: Interventions:  - Assess patient's functional ability and stability  - Promote increasing activity/tolerance for mobility and gait  - Educate and engage patient/family in tolerated activity level and precautions    Outcome: Progressing     Problem: Impaired Activities of Daily Living  Goal: Achieve highest/safest level of independence in self care  Description: Interventions:  - Assess ability and encourage patient to participate in ADLs to maximize function  - Promote sitting position while performing ADLs such as feeding, grooming, and bathing  - Educate and encourage patient/family in tolerated functional activity level and precautions during self-care    Outcome: Progressing   Minimal pain. Walked to the bathroom with standby assist. Plan is for him to have a total right hip replacement tomorrow.

## 2023-10-17 NOTE — CDS QUERY
How to answer this Query:    1.) DON'T CLICK COSIGN BUTTON FIRST  2.) Click \"3 dots. Jodean Agapito Jodean Agapito \" to the right of cosign button and click EDIT on the toolbar.  2.) Type an \"X\" in the bracket for the diagnosis that applies. (You may also add additional clinical details as you feel necessary to substantiate your response). 3.) Finally click \"Sign\" to complete response. Thank You    CLINICAL DOCUMENTATION CLARIFICATION FORM    Dear Priyank Barajas information (provided below) suggests a condition associated with or contributing to the fracture. 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 Essentially the entire femoral head is no longer there and the hip is dislocated superiorly. Documentation includes mechanism of injury: 46 DR DICKINSON= The patient denies any specific trauma. PMH and/or Documentation suggests potential for impaired skeletal integrity:    Medication: METHOTREXATE, PREDNISONE   Risk Factors: OSTEOPENIA         Radiology Report: 10/16 XR-Right femoral neck fracture with superior lateral migration of the shaft. Fragmentation of the femoral head which is likely chronic. Mild-moderate left hip osteoarthritis. Other:     If you have any questions, please contact Clinical :  Cecille Lorenzo RN at [phone]     Thank You!     THIS FORM IS A PERMANENT PART OF THE MEDICAL RECORD

## 2023-10-17 NOTE — TELEPHONE ENCOUNTER
Spouse called. Patient went to have cortisone injection into right hip yesterday. X-ray revealed fracture, dislocation and necrosis. Admitted to [facility]. Having surgery tomorrow with Dr. Ishan Perera.

## 2023-10-18 ENCOUNTER — APPOINTMENT (OUTPATIENT)
Dept: GENERAL RADIOLOGY | Facility: HOSPITAL | Age: 83
DRG: 469 | End: 2023-10-18
Payer: MEDICARE

## 2023-10-18 ENCOUNTER — ANESTHESIA EVENT (OUTPATIENT)
Dept: SURGERY | Facility: HOSPITAL | Age: 83
DRG: 469 | End: 2023-10-18
Payer: MEDICARE

## 2023-10-18 ENCOUNTER — ANESTHESIA (OUTPATIENT)
Dept: SURGERY | Facility: HOSPITAL | Age: 83
DRG: 469 | End: 2023-10-18
Payer: MEDICARE

## 2023-10-18 LAB
ANION GAP SERPL CALC-SCNC: 5 MMOL/L (ref 0–18)
ANION GAP SERPL CALC-SCNC: 7 MMOL/L (ref 0–18)
ANTIBODY SCREEN: NEGATIVE
BASOPHILS # BLD AUTO: 0.04 X10(3) UL (ref 0–0.2)
BASOPHILS NFR BLD AUTO: 0.4 %
BUN BLD-MCNC: 33 MG/DL (ref 7–18)
BUN BLD-MCNC: 34 MG/DL (ref 7–18)
BUN/CREAT SERPL: 19.3 (ref 10–20)
BUN/CREAT SERPL: 20.2 (ref 10–20)
CALCIUM BLD-MCNC: 9 MG/DL (ref 8.5–10.1)
CALCIUM BLD-MCNC: 9.2 MG/DL (ref 8.5–10.1)
CHLORIDE SERPL-SCNC: 109 MMOL/L (ref 98–112)
CHLORIDE SERPL-SCNC: 109 MMOL/L (ref 98–112)
CO2 SERPL-SCNC: 26 MMOL/L (ref 21–32)
CO2 SERPL-SCNC: 27 MMOL/L (ref 21–32)
CREAT BLD-MCNC: 1.68 MG/DL
CREAT BLD-MCNC: 1.71 MG/DL
DEPRECATED RDW RBC AUTO: 47.8 FL (ref 35.1–46.3)
EGFRCR SERPLBLD CKD-EPI 2021: 40 ML/MIN/1.73M2 (ref 60–?)
EGFRCR SERPLBLD CKD-EPI 2021: 41 ML/MIN/1.73M2 (ref 60–?)
EOSINOPHIL # BLD AUTO: 0.92 X10(3) UL (ref 0–0.7)
EOSINOPHIL NFR BLD AUTO: 8.8 %
ERYTHROCYTE [DISTWIDTH] IN BLOOD BY AUTOMATED COUNT: 13.4 % (ref 11–15)
GLUCOSE BLD-MCNC: 138 MG/DL (ref 70–99)
GLUCOSE BLD-MCNC: 93 MG/DL (ref 70–99)
HCT VFR BLD AUTO: 33.2 %
HGB BLD-MCNC: 10.9 G/DL
IMM GRANULOCYTES # BLD AUTO: 0.05 X10(3) UL (ref 0–1)
IMM GRANULOCYTES NFR BLD: 0.5 %
LYMPHOCYTES # BLD AUTO: 0.85 X10(3) UL (ref 1–4)
LYMPHOCYTES NFR BLD AUTO: 8.1 %
MAGNESIUM SERPL-MCNC: 2.1 MG/DL (ref 1.6–2.6)
MCH RBC QN AUTO: 32.4 PG (ref 26–34)
MCHC RBC AUTO-ENTMCNC: 32.8 G/DL (ref 31–37)
MCV RBC AUTO: 98.8 FL
MONOCYTES # BLD AUTO: 1.34 X10(3) UL (ref 0.1–1)
MONOCYTES NFR BLD AUTO: 12.8 %
NEUTROPHILS # BLD AUTO: 7.27 X10 (3) UL (ref 1.5–7.7)
NEUTROPHILS # BLD AUTO: 7.27 X10(3) UL (ref 1.5–7.7)
NEUTROPHILS NFR BLD AUTO: 69.4 %
OSMOLALITY SERPL CALC.SUM OF ELEC: 301 MOSM/KG (ref 275–295)
OSMOLALITY SERPL CALC.SUM OF ELEC: 301 MOSM/KG (ref 275–295)
PHOSPHATE SERPL-MCNC: 4.1 MG/DL (ref 2.5–4.9)
PLATELET # BLD AUTO: 140 10(3)UL (ref 150–450)
POTASSIUM SERPL-SCNC: 3.7 MMOL/L (ref 3.5–5.1)
POTASSIUM SERPL-SCNC: 5 MMOL/L (ref 3.5–5.1)
RBC # BLD AUTO: 3.36 X10(6)UL
RH BLOOD TYPE: POSITIVE
SODIUM SERPL-SCNC: 141 MMOL/L (ref 136–145)
SODIUM SERPL-SCNC: 142 MMOL/L (ref 136–145)
WBC # BLD AUTO: 10.5 X10(3) UL (ref 4–11)

## 2023-10-18 PROCEDURE — 72170 X-RAY EXAM OF PELVIS: CPT

## 2023-10-18 PROCEDURE — 0SR904A REPLACEMENT OF RIGHT HIP JOINT WITH CERAMIC ON POLYETHYLENE SYNTHETIC SUBSTITUTE, UNCEMENTED, OPEN APPROACH: ICD-10-PCS | Performed by: ORTHOPAEDIC SURGERY

## 2023-10-18 PROCEDURE — 99233 SBSQ HOSP IP/OBS HIGH 50: CPT | Performed by: INTERNAL MEDICINE

## 2023-10-18 DEVICE — WAGNER SL REVISION® HIP STEM, UNCEMENTED, Ø 17/190, TAPER 12/14
Type: IMPLANTABLE DEVICE | Site: HIP | Status: FUNCTIONAL
Brand: WAGNER SL REVISION®

## 2023-10-18 DEVICE — IMPLANTABLE DEVICE: Type: IMPLANTABLE DEVICE | Site: HIP | Status: FUNCTIONAL

## 2023-10-18 DEVICE — BIOLOX® DELTA, FEMORAL HEAD, L, CERAMIC, Ø 40/+3.5, TAPER 12/14
Type: IMPLANTABLE DEVICE | Site: HIP | Status: FUNCTIONAL
Brand: BIOLOX® DELTA

## 2023-10-18 DEVICE — IMPLANTABLE DEVICE
Type: IMPLANTABLE DEVICE | Site: HIP | Status: FUNCTIONAL
Brand: G7® VIVACIT-E®

## 2023-10-18 DEVICE — IMPLANTABLE DEVICE
Type: IMPLANTABLE DEVICE | Site: HIP | Status: FUNCTIONAL
Brand: G7® ACETABULAR SYSTEM

## 2023-10-18 RX ORDER — NALOXONE HYDROCHLORIDE 0.4 MG/ML
0.08 INJECTION, SOLUTION INTRAMUSCULAR; INTRAVENOUS; SUBCUTANEOUS AS NEEDED
Status: DISCONTINUED | OUTPATIENT
Start: 2023-10-18 | End: 2023-10-18 | Stop reason: HOSPADM

## 2023-10-18 RX ORDER — DIPHENHYDRAMINE HYDROCHLORIDE 50 MG/ML
12.5 INJECTION INTRAMUSCULAR; INTRAVENOUS EVERY 4 HOURS PRN
Status: DISCONTINUED | OUTPATIENT
Start: 2023-10-18 | End: 2023-10-21

## 2023-10-18 RX ORDER — HYDROMORPHONE HYDROCHLORIDE 1 MG/ML
0.6 INJECTION, SOLUTION INTRAMUSCULAR; INTRAVENOUS; SUBCUTANEOUS EVERY 5 MIN PRN
Status: DISCONTINUED | OUTPATIENT
Start: 2023-10-18 | End: 2023-10-18 | Stop reason: HOSPADM

## 2023-10-18 RX ORDER — LIDOCAINE HYDROCHLORIDE 10 MG/ML
INJECTION, SOLUTION EPIDURAL; INFILTRATION; INTRACAUDAL; PERINEURAL AS NEEDED
Status: DISCONTINUED | OUTPATIENT
Start: 2023-10-18 | End: 2023-10-18 | Stop reason: SURG

## 2023-10-18 RX ORDER — MORPHINE SULFATE 10 MG/ML
6 INJECTION, SOLUTION INTRAMUSCULAR; INTRAVENOUS EVERY 10 MIN PRN
Status: DISCONTINUED | OUTPATIENT
Start: 2023-10-18 | End: 2023-10-18 | Stop reason: HOSPADM

## 2023-10-18 RX ORDER — GLYCOPYRROLATE 0.2 MG/ML
INJECTION, SOLUTION INTRAMUSCULAR; INTRAVENOUS AS NEEDED
Status: DISCONTINUED | OUTPATIENT
Start: 2023-10-18 | End: 2023-10-18 | Stop reason: SURG

## 2023-10-18 RX ORDER — PHENYLEPHRINE HCL 10 MG/ML
VIAL (ML) INJECTION AS NEEDED
Status: DISCONTINUED | OUTPATIENT
Start: 2023-10-18 | End: 2023-10-18 | Stop reason: SURG

## 2023-10-18 RX ORDER — HYDROMORPHONE HYDROCHLORIDE 1 MG/ML
0.2 INJECTION, SOLUTION INTRAMUSCULAR; INTRAVENOUS; SUBCUTANEOUS EVERY 5 MIN PRN
Status: DISCONTINUED | OUTPATIENT
Start: 2023-10-18 | End: 2023-10-18 | Stop reason: HOSPADM

## 2023-10-18 RX ORDER — DOCUSATE SODIUM 100 MG/1
100 CAPSULE, LIQUID FILLED ORAL 2 TIMES DAILY
Status: DISCONTINUED | OUTPATIENT
Start: 2023-10-18 | End: 2023-10-21

## 2023-10-18 RX ORDER — CEFAZOLIN SODIUM/WATER 2 G/20 ML
2 SYRINGE (ML) INTRAVENOUS ONCE
Status: COMPLETED | OUTPATIENT
Start: 2023-10-18 | End: 2023-10-18

## 2023-10-18 RX ORDER — ENEMA 19; 7 G/133ML; G/133ML
1 ENEMA RECTAL ONCE AS NEEDED
Status: DISCONTINUED | OUTPATIENT
Start: 2023-10-18 | End: 2023-10-21

## 2023-10-18 RX ORDER — HYDROMORPHONE HYDROCHLORIDE 1 MG/ML
0.4 INJECTION, SOLUTION INTRAMUSCULAR; INTRAVENOUS; SUBCUTANEOUS EVERY 5 MIN PRN
Status: DISCONTINUED | OUTPATIENT
Start: 2023-10-18 | End: 2023-10-18 | Stop reason: HOSPADM

## 2023-10-18 RX ORDER — CYCLOBENZAPRINE HCL 10 MG
10 TABLET ORAL EVERY 8 HOURS PRN
Status: DISCONTINUED | OUTPATIENT
Start: 2023-10-18 | End: 2023-10-21

## 2023-10-18 RX ORDER — MORPHINE SULFATE 4 MG/ML
4 INJECTION, SOLUTION INTRAMUSCULAR; INTRAVENOUS EVERY 10 MIN PRN
Status: DISCONTINUED | OUTPATIENT
Start: 2023-10-18 | End: 2023-10-18 | Stop reason: HOSPADM

## 2023-10-18 RX ORDER — BUPIVACAINE HYDROCHLORIDE AND EPINEPHRINE 2.5; 5 MG/ML; UG/ML
INJECTION, SOLUTION INFILTRATION; PERINEURAL AS NEEDED
Status: DISCONTINUED | OUTPATIENT
Start: 2023-10-18 | End: 2023-10-18 | Stop reason: HOSPADM

## 2023-10-18 RX ORDER — HYDRALAZINE HYDROCHLORIDE 20 MG/ML
10 INJECTION INTRAMUSCULAR; INTRAVENOUS EVERY 4 HOURS PRN
Status: DISCONTINUED | OUTPATIENT
Start: 2023-10-18 | End: 2023-10-21

## 2023-10-18 RX ORDER — SENNOSIDES 8.6 MG
17.2 TABLET ORAL NIGHTLY
Status: DISCONTINUED | OUTPATIENT
Start: 2023-10-18 | End: 2023-10-21

## 2023-10-18 RX ORDER — HYDROCODONE BITARTRATE AND ACETAMINOPHEN 5; 325 MG/1; MG/1
1 TABLET ORAL EVERY 4 HOURS PRN
Status: DISCONTINUED | OUTPATIENT
Start: 2023-10-18 | End: 2023-10-21

## 2023-10-18 RX ORDER — BISACODYL 10 MG
10 SUPPOSITORY, RECTAL RECTAL
Status: DISCONTINUED | OUTPATIENT
Start: 2023-10-18 | End: 2023-10-21

## 2023-10-18 RX ORDER — HYDROCODONE BITARTRATE AND ACETAMINOPHEN 10; 325 MG/1; MG/1
1 TABLET ORAL EVERY 4 HOURS PRN
Status: DISCONTINUED | OUTPATIENT
Start: 2023-10-18 | End: 2023-10-21

## 2023-10-18 RX ORDER — ASPIRIN 81 MG/1
81 TABLET ORAL 2 TIMES DAILY
Status: DISCONTINUED | OUTPATIENT
Start: 2023-10-18 | End: 2023-10-21

## 2023-10-18 RX ORDER — METOCLOPRAMIDE HYDROCHLORIDE 5 MG/ML
5 INJECTION INTRAMUSCULAR; INTRAVENOUS EVERY 8 HOURS PRN
Status: DISCONTINUED | OUTPATIENT
Start: 2023-10-18 | End: 2023-10-21

## 2023-10-18 RX ORDER — BUPIVACAINE HYDROCHLORIDE 2.5 MG/ML
INJECTION, SOLUTION EPIDURAL; INFILTRATION; INTRACAUDAL AS NEEDED
Status: DISCONTINUED | OUTPATIENT
Start: 2023-10-18 | End: 2023-10-18 | Stop reason: HOSPADM

## 2023-10-18 RX ORDER — EPHEDRINE SULFATE 50 MG/ML
INJECTION, SOLUTION INTRAVENOUS AS NEEDED
Status: DISCONTINUED | OUTPATIENT
Start: 2023-10-18 | End: 2023-10-18 | Stop reason: SURG

## 2023-10-18 RX ORDER — ROCURONIUM BROMIDE 10 MG/ML
INJECTION, SOLUTION INTRAVENOUS AS NEEDED
Status: DISCONTINUED | OUTPATIENT
Start: 2023-10-18 | End: 2023-10-18 | Stop reason: SURG

## 2023-10-18 RX ORDER — DIPHENHYDRAMINE HCL 25 MG
25 CAPSULE ORAL EVERY 4 HOURS PRN
Status: DISCONTINUED | OUTPATIENT
Start: 2023-10-18 | End: 2023-10-21

## 2023-10-18 RX ORDER — SODIUM CHLORIDE, SODIUM LACTATE, POTASSIUM CHLORIDE, CALCIUM CHLORIDE 600; 310; 30; 20 MG/100ML; MG/100ML; MG/100ML; MG/100ML
INJECTION, SOLUTION INTRAVENOUS CONTINUOUS
Status: DISCONTINUED | OUTPATIENT
Start: 2023-10-18 | End: 2023-10-18 | Stop reason: HOSPADM

## 2023-10-18 RX ORDER — HYDROMORPHONE HYDROCHLORIDE 1 MG/ML
0.5 INJECTION, SOLUTION INTRAMUSCULAR; INTRAVENOUS; SUBCUTANEOUS EVERY 4 HOURS PRN
Status: DISCONTINUED | OUTPATIENT
Start: 2023-10-18 | End: 2023-10-21

## 2023-10-18 RX ORDER — SODIUM CHLORIDE, SODIUM LACTATE, POTASSIUM CHLORIDE, CALCIUM CHLORIDE 600; 310; 30; 20 MG/100ML; MG/100ML; MG/100ML; MG/100ML
INJECTION, SOLUTION INTRAVENOUS CONTINUOUS PRN
Status: DISCONTINUED | OUTPATIENT
Start: 2023-10-18 | End: 2023-10-18 | Stop reason: SURG

## 2023-10-18 RX ORDER — DIPHENHYDRAMINE HYDROCHLORIDE 50 MG/ML
25 INJECTION INTRAMUSCULAR; INTRAVENOUS ONCE AS NEEDED
Status: ACTIVE | OUTPATIENT
Start: 2023-10-18 | End: 2023-10-18

## 2023-10-18 RX ORDER — POLYETHYLENE GLYCOL 3350 17 G/17G
17 POWDER, FOR SOLUTION ORAL DAILY PRN
Status: DISCONTINUED | OUTPATIENT
Start: 2023-10-18 | End: 2023-10-21

## 2023-10-18 RX ORDER — ACETAMINOPHEN 500 MG
1000 TABLET ORAL EVERY 8 HOURS PRN
Status: DISCONTINUED | OUTPATIENT
Start: 2023-10-18 | End: 2023-10-21

## 2023-10-18 RX ORDER — MORPHINE SULFATE 4 MG/ML
2 INJECTION, SOLUTION INTRAMUSCULAR; INTRAVENOUS EVERY 10 MIN PRN
Status: DISCONTINUED | OUTPATIENT
Start: 2023-10-18 | End: 2023-10-18 | Stop reason: HOSPADM

## 2023-10-18 RX ORDER — LABETALOL HYDROCHLORIDE 5 MG/ML
10 INJECTION, SOLUTION INTRAVENOUS ONCE
Status: COMPLETED | OUTPATIENT
Start: 2023-10-18 | End: 2023-10-18

## 2023-10-18 RX ORDER — DEXAMETHASONE SODIUM PHOSPHATE 4 MG/ML
VIAL (ML) INJECTION AS NEEDED
Status: DISCONTINUED | OUTPATIENT
Start: 2023-10-18 | End: 2023-10-18 | Stop reason: SURG

## 2023-10-18 RX ORDER — ONDANSETRON 2 MG/ML
4 INJECTION INTRAMUSCULAR; INTRAVENOUS EVERY 6 HOURS PRN
Status: DISCONTINUED | OUTPATIENT
Start: 2023-10-18 | End: 2023-10-21

## 2023-10-18 RX ADMIN — PHENYLEPHRINE HCL 100 MCG: 10 MG/ML VIAL (ML) INJECTION at 13:11:00

## 2023-10-18 RX ADMIN — PHENYLEPHRINE HCL 100 MCG: 10 MG/ML VIAL (ML) INJECTION at 12:35:00

## 2023-10-18 RX ADMIN — LIDOCAINE HYDROCHLORIDE 30 MG: 10 INJECTION, SOLUTION EPIDURAL; INFILTRATION; INTRACAUDAL; PERINEURAL at 12:06:00

## 2023-10-18 RX ADMIN — PHENYLEPHRINE HCL 100 MCG: 10 MG/ML VIAL (ML) INJECTION at 13:19:00

## 2023-10-18 RX ADMIN — CEFAZOLIN SODIUM/WATER 2 G: 2 G/20 ML SYRINGE (ML) INTRAVENOUS at 12:11:00

## 2023-10-18 RX ADMIN — SODIUM CHLORIDE, SODIUM LACTATE, POTASSIUM CHLORIDE, CALCIUM CHLORIDE: 600; 310; 30; 20 INJECTION, SOLUTION INTRAVENOUS at 12:01:00

## 2023-10-18 RX ADMIN — PHENYLEPHRINE HCL 100 MCG: 10 MG/ML VIAL (ML) INJECTION at 12:56:00

## 2023-10-18 RX ADMIN — DEXAMETHASONE SODIUM PHOSPHATE 4 MG: 4 MG/ML VIAL (ML) INJECTION at 12:25:00

## 2023-10-18 RX ADMIN — EPHEDRINE SULFATE 10 MG: 50 INJECTION, SOLUTION INTRAVENOUS at 12:35:00

## 2023-10-18 RX ADMIN — GLYCOPYRROLATE 0.2 MG: 0.2 INJECTION, SOLUTION INTRAMUSCULAR; INTRAVENOUS at 12:42:00

## 2023-10-18 RX ADMIN — DEXAMETHASONE SODIUM PHOSPHATE 4 MG: 4 MG/ML VIAL (ML) INJECTION at 12:41:00

## 2023-10-18 RX ADMIN — ROCURONIUM BROMIDE 40 MG: 10 INJECTION, SOLUTION INTRAVENOUS at 12:06:00

## 2023-10-18 RX ADMIN — PHENYLEPHRINE HCL 100 MCG: 10 MG/ML VIAL (ML) INJECTION at 12:12:00

## 2023-10-18 RX ADMIN — SODIUM CHLORIDE, SODIUM LACTATE, POTASSIUM CHLORIDE, CALCIUM CHLORIDE: 600; 310; 30; 20 INJECTION, SOLUTION INTRAVENOUS at 13:57:00

## 2023-10-18 NOTE — CM/SW NOTE
CM requested department  [facility]) to initiate AIDIN referral for RAYNE and Acute Rehab. PT/OT eval pending post surgery. PMR will be needed if AR is recommendation. SW/LAURIE will continue to follow. Cortney Sidhu.  [address] St RN, BSN  Nurse   [phone]

## 2023-10-18 NOTE — CM/SW NOTE
PASRR level 1 completed; level 2 not needed. SW uploaded PASRR screening to Aidin for RAYNE and AIR. SW/CM to remain available for support and/or discharge planning.      Ana Hernandez, [address]

## 2023-10-18 NOTE — ANESTHESIA PROCEDURE NOTES
Airway  Date/Time: 10/18/2023 12:09 PM  Urgency: Elective    Airway not difficult    General Information and Staff    Patient location during procedure: OR  Anesthesiologist: Lian Butler MD  Resident/CRNA: Liz Bruno CRNA  Performed: CRNA   Performed by: Liz Bruno CRNA  Authorized by: Lian Butler MD      Indications and Patient Condition  Indications for airway management: anesthesia  Sedation level: deep  Preoxygenated: yes  Patient position: sniffing  Mask difficulty assessment: 1 - vent by mask    Final Airway Details  Final airway type: endotracheal airway      Successful airway: ETT  Cuffed: yes   Successful intubation technique: direct laryngoscopy  Endotracheal tube insertion site: oral  Blade: Beatriz  Blade size: #3  ETT size (mm): 7.5    Cormack-Lehane Classification: grade I - full view of glottis  Placement verified by: capnometry   Cuff volume (mL): 8  Measured from: lips  ETT to lips (cm): 23  Number of attempts at approach: 1  Number of other approaches attempted: 0

## 2023-10-18 NOTE — H&P
History & Physical Examination    Patient Name: Mely Macario  MRN: [id number]  [facility]: [id number]  YOB: 1940    Diagnosis: right hip AVN    Present Illness: Right hip AVN  Vitamin K, Phytonadione, 100 MCG Oral Tab, Take 1 tablet (100 mcg total) by mouth Noon., Disp: , Rfl: , 10/15/2023 at 1200  Vitamin C 500 MG Oral Tab, Take 1 tablet (500 mg total) by mouth daily. , Disp: , Rfl: , 10/16/2023  acidophilus-pectin Oral Cap, Take 1 capsule by mouth daily. , Disp: , Rfl: , 10/16/2023  amLODIPine 2.5 MG Oral Tab, Take 1 tablet (2.5 mg total) by mouth daily. , Disp: , Rfl: , 10/16/2023  acetaminophen 500 MG Oral Tab, Take 0.5 tablets (250 mg total) by mouth in the morning, at noon, and at bedtime. , Disp: , Rfl: , 10/16/2023  tolterodine 2 MG Oral Tab, Take 1 tablet (2 mg total) by mouth 2 (two) times daily. , Disp: 180 tablet, Rfl: 3, 10/16/2023  atorvastatin (LIPITOR) 10 MG Oral Tab, Take 1 tablet (10 mg total) by mouth daily. , Disp: 90 tablet, Rfl: 3, 10/15/2023 at 2100  DULoxetine 20 MG Oral Cap DR Particles, Take 1 capsule (20 mg total) by mouth daily. , Disp: 90 capsule, Rfl: 3, 10/16/2023  Betaine, Trimethylglycine, 500 MG Oral Cap, Take 1,500 capsules by mouth in the morning and 1,500 capsules before bedtime. , Disp: , Rfl: , 10/16/2023  carvedilol 12.5 MG Oral Tab, Take 1 tablet (12.5 mg total) by mouth 2 (two) times daily with meals. , Disp: 180 tablet, Rfl: 3, 10/16/2023  Riboflavin (B2) 100 MG Oral Tab, Take by mouth., Disp: 30 tablet, Rfl: 0, 10/15/2023 at 1200  Acetylcarnitine HCl (ACETYL-L-CARNITINE HCL) Does not apply Powder, L-CARNITINE 250MG DAILY, Disp: , Rfl: 0, 10/15/2023 at 1200  predniSONE 2.5 MG Oral Tab, Take 1 tablet (2.5 mg total) by mouth daily. , Disp: 90 tablet, Rfl: 3, 33/54/6464  folic acid 1 MG Oral Tab, Take 1 tablet (1 mg total) by mouth 4 (four) times a week.  T,th,Sat and Sunday, Disp: , Rfl: , 10/16/2023  Cholecalciferol (VITAMIN D3) 1.25 MG (50919 UT) Oral Cap, Two/month on the  and , Disp: 13 capsule, Rfl: 3, 10/15/2023  famotidine 20 MG Oral Tab, Take 2 tablets (40 mg total) by mouth 2 (two) times daily. , Disp: , Rfl: 0, 10/16/2023  calcium carbonate 500 MG Oral Chew Tab, Chew 1 tablet (500 mg total) by mouth at bedtime. , Disp: , Rfl: 0, 10/15/2023 at 2100  CEREFOLIN NAC 6-90.314-2-600 MG Oral Tab, TAKE 1 TABLET BY MOUTH ON MONDAY, [facility] AND FRIDAY, Disp: 36 tablet, Rfl: 3, 10/15/2023 at 2100  Magnesium Citrate 100 MG Oral Cap, Take 100 mg by mouth 2 (two) times a day., Disp: , Rfl: , 10/16/2023  aspirin 81 MG Oral Tab EC, Take 1 tablet (81 mg total) by mouth 3 (three) times a week. MWF in the evening, Disp: , Rfl: 0, Past Week at 2100  Turmeric, Curcuma Longa, Does not apply Powder, One a day 300mg, Disp: , Rfl: 0, 10/15/2023 at 1200  Coenzyme Q10 (CO Q 10 OR), Take 50 mg by mouth 2 (two) times daily. , Disp: , Rfl: , 10/16/2023  Nutritional Supplements (JUICE PLUS FIBRE OR), Take 2 capsules by mouth 2 (two) times daily. , Disp: , Rfl: , 10/16/2023  methotrexate 2.5 MG Oral Tab, Take 2 tabs per week, Saturday mornings, Disp: , Rfl: , 10/14/2023 at 0800  tolterodine 2 MG Oral Tab, Take one am and 2 at hs total of 3 per day, Disp: 90 tablet, Rfl: 3  [] ciprofloxacin 250 MG Oral Tab, Take 1 tablet (250 mg total) by mouth 2 (two) times daily for 5 days. , Disp: 10 tablet, Rfl: 0  temazepam (RESTORIL) 7.5 MG Oral Cap, Take 1 capsule (7.5 mg total) by mouth nightly as needed for Sleep., Disp: 30 capsule, Rfl: 1  sennosides 8.6 MG Oral Tab, Take 1 tablet (8.6 mg total) by mouth nightly as needed. , Disp: , Rfl:   Naloxone HCl 4 MG/0.1ML Nasal Liquid, 4 mg by Nasal route as needed. If patient remains unresponsive, repeat dose in other nostril 2-5 minutes after first dose. (Patient not taking: Reported on 10/16/2023), Disp: 1 kit, Rfl: 0  nitroglycerin 0.4 MG Sublingual SL Tab, Place 1 tablet (0.4 mg total) under the tongue every 5 (five) minutes as needed for Chest pain.  Or SOB (Patient not taking: Reported on 10/16/2023), Disp: 25 tablet, Rfl: 1        ceFAZolin (Ancef) 2 g in 20mL IV syringe premix, 2 g, Intravenous, Once  [MAR Hold] amLODIPine (Norvasc) tab 2.5 mg, 2.5 mg, Oral, Daily  [MAR Hold] carvedilol (Coreg) tab 12.5 mg, 12.5 mg, Oral, BID with meals  [MAR Hold] DULoxetine (Cymbalta) DR cap 20 mg, 20 mg, Oral, Daily  [MAR Hold] predniSONE (Deltasone) tab 2.5 mg, 2.5 mg, Oral, Daily  [MAR Hold] temazepam (Restoril) cap 7.5 mg, 7.5 mg, Oral, Nightly PRN  [MAR Hold] atorvastatin (Lipitor) tab 10 mg, 10 mg, Oral, Daily  [MAR Hold] oxybutynin (Ditropan) tab 5 mg, 5 mg, Oral, BID  [MAR Hold] acetaminophen (Tylenol Extra Strength) tab 500 mg, 500 mg, Oral, Q4H PRN  [MAR Hold] HYDROmorphone (Dilaudid) 1 MG/ML injection 0.2 mg, 0.2 mg, Intravenous, Q2H PRN   Or  [MAR Hold] HYDROmorphone (Dilaudid) 1 MG/ML injection 0.4 mg, 0.4 mg, Intravenous, Q2H PRN   Or  [MAR Hold] HYDROmorphone (Dilaudid) 1 MG/ML injection 0.8 mg, 0.8 mg, Intravenous, Q2H PRN  [MAR Hold] ondansetron (Zofran) 4 MG/2ML injection 4 mg, 4 mg, Intravenous, Q6H PRN  [MAR Hold] metoclopramide (Reglan) 5 mg/mL injection 10 mg, 10 mg, Intravenous, Q8H PRN  [MAR Hold] calcium carbonate (Tums) chewable tab 500 mg, 500 mg, Oral, TID PRN        Allergies: Codeine; Lisinopril; Nsaids; Omeprazole; Pcn [Bicillin C-R,];  Niacin; Ultram [Tramadol]; and Zinc Acetate    Past Medical History:   Diagnosis Date    Acute left-sided low back pain without sciatica 07/20/2018    JOSE LUIS (acute kidney injury) ([facility] [address]     from omeprazole-resolved    Anemia 07/26/2019    Aortic stenosis 10/2013    AVR-bio    [place] esophagus     CAD (coronary artery disease)     stents, bypasses    Calculus of kidney     Carotid artery plaque 2011    ultrasound    Chronic anemia     bone marrow neg 12/2012    Chronic fatigue     CKD (chronic kidney disease) stage 4, GFR 15-29 ml/min ([facility])     Closed wedge compression fracture of T11 vertebra with routine healing 06/19/2018    Clostridium difficile colitis 06/03/2022    Colon polyps 11/2012    colonoscopy    Compression fracture of T12 vertebra ([address] 06/19/2018    Contrast dye induced nephropathy 02/04/2022    Dehydration 01/28/2022    Diverticulosis     Drug-induced interstitial nephritis 08/23/2020    Omeprazole    Dyslipidemia     Elevated homocysteine     Elevated lipoprotein A level     Elevated troponin     FUO (fever of unknown origin)     PMR or testicular/prostate infection    Gastritis     Hearing impairment     [employer]    Hemorrhoids     Hiatal hernia with gastroesophageal reflux     History of Clostridioides difficile colitis 06/2022    History of community acquired pneumonia     HTN (hypertension)     Immunosuppression due to chronic steroid use      Inguinal lymphadenopathy     right-bx    Ischemic colitis ([address]     resolved    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[address]     Mesenteric adenitis     bx    Multiple renal cysts     both-not reported on recent CTs    SHIRA (obstructive sleep apnea)     Osteopenia     steroids    PMR (polymyalgia rheumatica) ([address]     Pneumonia 02/2020    Positive TRINY (antinuclear antibody) 08/28/2019    Precancerous lesion     skin    Proteinuria     mild    Psoriasis     Renal artery stenosis (HCC)     both    Renal stone     right    Right shoulder pain     injected-Liane    Sjogren's syndrome with myopathy ([address] 06/03/2022    Sleep apnea     untreated    Thrombocytopenia ([address]     resolved    Visual impairment     Vitamin B12 nutritional deficiency     Vitamin D deficiency     Weight loss      Past Surgical History:   Procedure Laterality Date    ANGIOPLASTY (CORONARY)  1999    Dr. Corrales Solid      teenager    BONE MARROW BIOPSY AND ASPIRATION  1/2021    Hantel-neg    CABG  10/25/13    AORTIC VALVE REPLACEMENT; bypass x 2-3    CARDIAC CATHETERIZATION  2008/2013    CHOLECYSTECTOMY  1984    COLONOSCOPY  2005    Eliu    COLONOSCOPY  11/7/2012        COLONOSCOPY N/A 4/4/2018 Procedure: COLONOSCOPY;  Surgeon: Bill Bowman MD;  Location: [facility] ENDOSCOPY    COLONOSCOPY N/A 10/11/2021    Procedure: COLONOSCOPY, ESOPHAGOGASTRODUODENOSCOPY (EGD); Surgeon: Bill Bowman MD;  Location: [facility] ENDOSCOPY    CORTISONE INJECTION  7/6/15    rt shoulder     CYTOLOGY LYMPH NODE FNA - JAR(S): 1  2012    excisional biopsy rt inguinal lymph node     EGD  10-    Rafi    IR KIDNEY BIOPSY (RENAL)RANDOM  2020    for drug induced nephritis    LAPAROSCOPIC LYMPH NODE BIOP  13    exploratory abd lymphadenopathy    REPLACE AORTIC VALVE OPEN  10/25/13    bio    SIGMOIDOSCOPY,DIAGNOSTIC      SKIN TAGS  10/1714    plus histofreeze    UPPER GI ENDOSCOPY PERFORMED      Eliu/Rafi     Family History   Problem Relation Age of Onset    Neurological Disorder Father         parkinsons    Cancer Father         lung    Other (Other) Father     Mental Disorder Mother         alzheimers    Other (Other) Mother     Cancer Daughter         hodgkins at age 25    Heart Surgery Brother     Heart Disorder Brother     Other (Other) Brother     Other (Other) Brother     Other (Other) Other         seizures from neurofibromatosis     Social History    Tobacco Use      Smoking status: Former        Types: Cigarettes        Quit date: 1971        Years since quittin.2      Smokeless tobacco: Never    Alcohol use: Not Currently      Alcohol/week: 2.0 standard drinks of alcohol      Types: 2 Standard drinks or equivalent per week      Comment: 2-3 drinks on the weekend        SYSTEM Check if Review is Normal Check if Physical Exam is Normal If not normal, please explain:   HEENT [ Mayra Shivam [ ]    Barbara Carmonales [ Mayra Shivam [ ]    HEART [ [place] Shivam [ ]    Dc Laos [ Mayra Shivam [ ]    Diamond Terrell [ Mayra Shivam [ ]    [place] [ Mayra Shivam [ ]    EXTREMITIES [ ] X Pain with gentle ROM of the right hip. NVI distally. OTHER        [ x ] I have discussed the risks and benefits and alternatives with the patient/family.   They understand and agree to proceed with plan of care.       Shoshana Zavala PA-C  10/18/2023  11:55 AM  [phone]

## 2023-10-18 NOTE — PLAN OF CARE
Problem: Patient Centered Care  Goal: Patient preferences are identified and integrated in the patient's plan of care  Description: Interventions:  - What would you like us to know as we care for you? I would like to go to acute rehab after the hospital stay. - Provide timely, complete, and accurate information to patient/family  - Incorporate patient and family knowledge, values, beliefs, and cultural backgrounds into the planning and delivery of care  - Encourage patient/family to participate in care and decision-making at the level they choose  - Honor patient and family perspectives and choices  Outcome: Progressing     Problem: Patient/Family Goals  Goal: Patient/Family Long Term Goal  Description: Patient's Long Term Goal: to return to previous level of activity. Interventions:  - surgery planned  - See additional Care Plan goals for specific interventions  Outcome: Progressing  Goal: Patient/Family Short Term Goal  Description: Patient's Short Term Goal: to have surgery on 10/18/23    Interventions:   - pre-op prep/clearance in progress.   - See additional Care Plan goals for specific interventions  Outcome: Progressing     Problem: PAIN - ADULT  Goal: Verbalizes/displays adequate comfort level or patient's stated pain goal  Description: INTERVENTIONS:  - Encourage pt to monitor pain and request assistance  - Assess pain using appropriate pain scale  - Administer analgesics based on type and severity of pain and evaluate response  - Implement non-pharmacological measures as appropriate and evaluate response  - Consider cultural and social influences on pain and pain management  - Manage/alleviate anxiety  - Utilize distraction and/or relaxation techniques  - Monitor for opioid side effects  - Notify MD/LIP if interventions unsuccessful or patient reports new pain  - Anticipate increased pain with activity and pre-medicate as appropriate  Outcome: Progressing     Problem: SAFETY ADULT - FALL  Goal: Free from fall injury  Description: INTERVENTIONS:  - Assess pt frequently for physical needs  - Identify cognitive and physical deficits and behaviors that affect risk of falls.   - [place] fall precautions as indicated by assessment.  - Educate pt/family on patient safety including physical limitations  - Instruct pt to call for assistance with activity based on assessment  - Modify environment to reduce risk of injury  - Provide assistive devices as appropriate  - Consider OT/PT consult to assist with strengthening/mobility  - Encourage toileting schedule  Outcome: Progressing     Problem: DISCHARGE PLANNING  Goal: Discharge to home or other facility with appropriate resources  Description: INTERVENTIONS:  - Identify barriers to discharge w/pt and caregiver  - Include patient/family/discharge partner in discharge planning  - Arrange for needed discharge resources and transportation as appropriate  - Identify discharge learning needs (meds, wound care, etc)  - Arrange for interpreters to assist at discharge as needed  - Consider post-discharge preferences of patient/family/discharge partner  - Complete POLST form as appropriate  - Assess patient's ability to be responsible for managing their own health  - Refer to Case Management Department for coordinating discharge planning if the patient needs post-hospital services based on physician/LIP order or complex needs related to functional status, cognitive ability or social support system  Outcome: Progressing     Problem: SKIN/TISSUE INTEGRITY - ADULT  Goal: Skin integrity remains intact  Description: INTERVENTIONS  - Assess and document risk factors for pressure ulcer development  - Assess and document skin integrity  - Monitor for areas of redness and/or skin breakdown  - Initiate interventions, skin care algorithm/standards of care as needed  Outcome: Progressing     Problem: Impaired Functional Mobility  Goal: Achieve highest/safest level of mobility/gait  Description: Interventions:  - Assess patient's functional ability and stability  - Promote increasing activity/tolerance for mobility and gait  - Educate and engage patient/family in tolerated activity level and precautions    Outcome: Progressing     Problem: Impaired Activities of Daily Living  Goal: Achieve highest/safest level of independence in self care  Description: Interventions:  - Assess ability and encourage patient to participate in ADLs to maximize function  - Promote sitting position while performing ADLs such as feeding, grooming, and bathing  - Educate and encourage patient/family in tolerated functional activity level and precautions during self-care    Outcome: Progressing   Patient is stable. Tolerated oral intake. Pain is under control with oral pain meds. Awaiting post-op void.

## 2023-10-18 NOTE — DISCHARGE INSTRUCTIONS
The patient will call for an appointment in the next 2 weeks for follow-up. The patient has been instructed on wound care and may shower if wound is clean and dry. Perform dry dressing change daily. Do NOT remove Prineo mesh adhesive overlying surgical incision. If the wound has drainage then dry dressing changes should be performed daily until the wound is dry. The patient has been instructed to contact the office if increasing drainage, redness, or swelling to the operative wound/extremity occurs. Similarly, if they develop fevers and chills they should call the office ASAP. The patient will continue to wear WESLY hose to both legs for 3 weeks. They may remove them at night or if they are causing skin irritation. Prescribed DVT prophylaxis should be taken for 2-3 weeks after discharge (as written on prescription). Oral pain medications have been provided and instruction on administration given to the patient. If there are any questions or increasing or uncontrolled pain, the patient should call the office [phone]. The patient will resume a normal diet. They should anticipate a slight decrease in appetite after surgery, but should notify the office if there are any problems with nausea, vomiting, constipation or diarrhea. A stool softner has been ordered and should be taken regularly while on pain medications.

## 2023-10-18 NOTE — OPERATIVE REPORT
[address] [place] [address], [facility] ABREU    OPERATIVE REPORT    PATIENT NAME: Laura Ramires  MR#: [id number]    DATE OF PROCEDURE: 10/18/2023  PREOPERATIVE DIAGNOSIS: Osteonecrosis (e.g., AVN) of the Right hip  POSTOPERATIVE DIAGNOSIS: Osteonecrosis (e.g., AVN) of the Right hip  SECONDARY DIAGNOSIS: Degenerative Arthritis (e.g., OA)  OPERATION PERFORMED: Right total hip arthroplasty  SURGEON: Manohar Daniel  ASSISTANT(S): 1st: Sarah January and 2nd: Arnie PABLO  YOB: 1940  ANESTHESIA: General  BLOOD LOSS: 300  DRAIN: None  FLUIDS: 0044  COMPLICATIONS: None  FINDINGS: Osteonecrosis (e.g., AVN)  SPECIMENS: The Right femoral head was sent to pathology  IMPLANTS:  Femoral Stem: Marija - Renteria SL Revision Hip Stem ([id number], 17x190, 135 degree neck, Lot#: [id number])  Femoral Head: Marija - Biolox Delta Femoral Head (40mm, 3.5, Lot#: [id number])  Acetabular Cup: Biomet - G7 OsseoTi Multi Hole Acetabular Shell (Size F, 56mm, Cementless, Lot#: [id number])  Acetabular Liner: Marija - G7 Vivacit-E Vitamin E Highly Crosslinked Liner (Neutral, Size F, 40mm ID, Lot#: [id number])  Other Devices:  Marija - Trilogy Bone Screw (6.5 x 20mm, Self-Tapping, Lot#: [id number])  Marija - Trilogy Bone Screw (6.5 x 25mm, Self-Tapping, Lot#: [id number])  Marija - Trilogy Bone Screw (6.5 x 35mm, Lot#: [id number])      DISPOSITION: The patient was taken to the recovery room in stable condition. BMI: 19.8    INDICATIONS: The patient is a 72-year-old man who presented to the hospital with progressively worsening right hip pain and found to have AVN when getting an injection. His entire femoral head was noted to be resorbed and he was admitted for a right SYDNEE. His pain was refractory to all forms on non-operative management including anti-inflammatories, activity modification of the hip and corticosteroid injection.  The patient`s pain progressed to the point that his activities of daily living are limited and he has a very limited range of motion and limited ability to walk. This has negatively affected his ability to perform the basic activities of daily living. Based upon his radiographs, that showed severe degenerative joint disease of the right hip, he was indicated for a right total hip arthroplasty. We reviewed the risks, benefits and alternatives to the procedure and informed consent was obtained. The risks discussed included, but were not limited, to infection, nerve injury (sciatic and femoral nerves), arterial injury (femoral artery and it's branches), deep venous thrombosis, pulmonary embolus, wear, lysis, need for reoperation, incision numbness, dislocation, leg length discrepancy, loss of limb and loss of life. The patient understood these and informed consent was obtained as was medical clearance and there was no contraindications to surgery today. OPERATIVE TECHNIQUE: On Wednesday, October 18, 2023, the patient was identified in the holding area and taken to the operating room. Prior to going to the operating room 1300mg of oral tranexamic acid was administered in the holding area for intra-operative and post-operative blood management. After preoperative antibiotics 2 grams of Cefazolin were ordered to be completed within 24 hours of the surgery) were administered, Mr. Radha Hill was given an General anestheticand a iglesias catheter inserted under sterile conditions. The patient was then laid in the lateral decubitus position with his right hip facing up. His pelvis was securely affixed to the operating room table and the bony prominences on the contralateral side were well padded. We then sterilely prepped and draped the right lower extremity in standard surgical fashion and a timeout was called. It was noted that the right side was the appropriate side for the surgery and we were allowed to proceed.  We then effected an incision over the posterior aspect of the patient's right hip, dissecting down through the dermal and epidermal layers into the subcutaneous tissue. Bovie cautery was used to maintain hemostasis as we dissected sharply down to the level of the deep fascia. The deep fascia was identified and incised in line with our incision and our Charnley retractor was placed deep to this layer. We then identified the external rotators on the posterior aspect of the proximal femur and elevated these muscles and the capsular layer off the posterior aspect of the proximal femur as a single sleeve of tissue using the bovie cautery. This gave us excellent visualization of the hip joint and protection of the sciatic nerve. We then dislocated the hip with flexion, adduction and internal rotation and measured approximately a 10mm neck osteotomy proximal to the lesser trochanter as we templated pre-operatively. A sagittal saw was then used to make this cut and the femoral head was then removed and sent off to pathology. We then placed retractors circumferentially around the acetabulum and debrided the ligamentum teres, labrum and pulvinar. Once these were debrided, we had excellent visualization of the hip joint and we were able to successfully ream up to a size 56 reamer for a 56 G7 OsseoTi Multi Hole Acetabular Shell acetabular component. At this point we had an excellent bleeding bed of cancellous bone for component implantation. We then opened up our component and implanted in approximately 40 degrees of abduction and 20 degrees of anteversion. Once this was achieved we placed 3 screws through the posterior-superior quadrant of the cup obtaining good purchase with these screws. We then irrigated the wound with pulsatile lavage and placed our G7 Vivacit-E Vitamin E Highly Crosslinked Liner polyethylene liner for a 40mm diameter femoral head. We then impacted the liner into place assuring the locking mechanism was engaged and directed our attention towards the femur.     We lifted the femur from the wound using a femoral elevator and removed the lateral aspect of the femoral neck using the box osteotome. We then used a Charnley awl to open the medullary canal and a lateralizing reamer proximally. We then successfully broached up to a size 17after using tapered reamers up to a size 17 and noted that we had excellent fit and fill proximally within the femur and good rotational and axial stability. We then trialed our 40mm, 3.5mm offset head and noted that we had good range of motion, no impingement and good stability of the hip and that this would be our final implant. We then opened up our size 17 Standard offset Renteria SL Revision Hip Stem primary hip prosthesis and impacted it in approximately 15 degrees of anteversion. Once the stem was fully seated, we then cleaned and dried the trunion of the stem and placed our 40mm, 3.5mm offset trial femoral head in place and impacted it into position. Once it was fully seated, we then reduced the hip and took it through a range of motion. We had excellent range of motion, good stability and no impingement. Our leg lengths appeared to be close to equal, as measured at the medial malleoli, and at this point in time we inserted our final size 40mm, 3.5mm offset head. The galicia taper was cleaned and dried. This was impacted into place and once the [facility] taper was engaged, we again reduced the hip and then closed our short external rotator and capsular layer to the posterior aspect of the proximal femur and the gluteus medius tendon. The capsule was closed with a Ethibond #5 in interrupted fashion. Fascia closure was accomplished with a Quill #2 in continuous fashion. The subcutaneous layer was closed with Monocryl #2-0 in interrupted fashion. Skin closure was performed with a Monocryl #3-0 in interrupted fashion. A hemovac drain was not required. A sterile dressing was then placed over the hip. The patient was aroused in the operating room and taken to the recovery room in stable condition.  Prior to leaving the OR all needle and sponge counts were correct. Postoperatively, he will be weight bearing as tolerated. He will work with physical therapy. He will be on deep venous thrombosis prophylaxis for the next three weeks and edith-operative antibiotics for the next 24 hours. Based on medical history and extent of the surgery, the patient will be admitted to the hospital as an inpatient with an anticipated length of stay of 2-3 nights prior to discharge. Surgery was performed on 10/18/2023. The procedure performed was a Primary SYDNEE. The surgical assistant was Justin Cabrera. They were an active participant in the case and participated as a surgical assistant in all critical and noncritical steps including:  - Retractor placement and holding  - Operating room setup and patient positioning  - Closure of the various layers of soft tissue and skin  - Insertion of pins and holding screws of guides  - Dressing placement  - Transfer of patient to the stretcher and transport to the recovery room  Justin Cabrera was a critical part of the case, and the surgery could not be performed without a qualified surgical assistant. I was present for all critical portions of the surgery and a backup surgeon was available at all times in case of emergency.     DICTATED BY: Ely Joyce,  2023-10-18    SIGNED: 2023-10-18    DISTRIBUTION LIST:  Ely Joyce

## 2023-10-18 NOTE — OPERATIVE REPORT
[facility]    SYDNEE Brief Operative Note    Kae Lawler Patient Status:  Inpatient    1940 MRN [id number]   Location [place] [address] Attending Amairani Gupta MD     PCP Billy Juan MD       Preop DX: right hip degenerative joint disease   Postop DX: right hip degenerative joint disease  Procedure:  right total hip arthroplasty  Surgeon: Daniel Parker MD  Assistants:  Edith Mendoza; Greyson Alberts  Anesthesia: General Anesthesia  EBL: 300 mL  Fluids: 1000 mL  Findings: DJD  right hip  Drain: None  Specimens: Bone right hip  Implants: Kelechi Vallejo SL, G7   Complications: none  All needle and sponge counts correct prior to leaving the operating room. All assistants were a medical necessity to complete this procedure. Plan: Transfer to recovery room in stable condition. Transition to floor when stable.        Natali Arias MD  [phone] (c)  [phone] (o)  10/18/2023

## 2023-10-18 NOTE — PLAN OF CARE
No acute changes, patient will go for surgery at 1130am.  Npo status. No complaint of pain. Able to ambulate to bathroom with walker. Voiding freely. Chg bath given. Fall precautions in place. Problem: Patient Centered Care  Goal: Patient preferences are identified and integrated in the patient's plan of care  Description: Interventions:  - What would you like us to know as we care for you?   - Provide timely, complete, and accurate information to patient/family  - Incorporate patient and family knowledge, values, beliefs, and cultural backgrounds into the planning and delivery of care  - Encourage patient/family to participate in care and decision-making at the level they choose  - Honor patient and family perspectives and choices  Outcome: Progressing     Problem: Patient/Family Goals  Goal: Patient/Family Long Term Goal  Description: Patient's Long Term Goal: to return to previous level of activity. Interventions:  - surgery planned  - See additional Care Plan goals for specific interventions  Outcome: Progressing  Goal: Patient/Family Short Term Goal  Description: Patient's Short Term Goal: to have surgery on 10/18/23    Interventions:   - pre-op prep/clearance in progress.   - See additional Care Plan goals for specific interventions  Outcome: Progressing     Problem: PAIN - ADULT  Goal: Verbalizes/displays adequate comfort level or patient's stated pain goal  Description: INTERVENTIONS:  - Encourage pt to monitor pain and request assistance  - Assess pain using appropriate pain scale  - Administer analgesics based on type and severity of pain and evaluate response  - Implement non-pharmacological measures as appropriate and evaluate response  - Consider cultural and social influences on pain and pain management  - Manage/alleviate anxiety  - Utilize distraction and/or relaxation techniques  - Monitor for opioid side effects  - Notify MD/LIP if interventions unsuccessful or patient reports new pain  - Anticipate increased pain with activity and pre-medicate as appropriate  Outcome: Progressing     Problem: SAFETY ADULT - FALL  Goal: Free from fall injury  Description: INTERVENTIONS:  - Assess pt frequently for physical needs  - Identify cognitive and physical deficits and behaviors that affect risk of falls.   - [place] fall precautions as indicated by assessment.  - Educate pt/family on patient safety including physical limitations  - Instruct pt to call for assistance with activity based on assessment  - Modify environment to reduce risk of injury  - Provide assistive devices as appropriate  - Consider OT/PT consult to assist with strengthening/mobility  - Encourage toileting schedule  Outcome: Progressing     Problem: DISCHARGE PLANNING  Goal: Discharge to home or other facility with appropriate resources  Description: INTERVENTIONS:  - Identify barriers to discharge w/pt and caregiver  - Include patient/family/discharge partner in discharge planning  - Arrange for needed discharge resources and transportation as appropriate  - Identify discharge learning needs (meds, wound care, etc)  - Arrange for interpreters to assist at discharge as needed  - Consider post-discharge preferences of patient/family/discharge partner  - Complete POLST form as appropriate  - Assess patient's ability to be responsible for managing their own health  - Refer to Case Management Department for coordinating discharge planning if the patient needs post-hospital services based on physician/LIP order or complex needs related to functional status, cognitive ability or social support system  Outcome: Progressing

## 2023-10-19 LAB
ANION GAP SERPL CALC-SCNC: 9 MMOL/L (ref 0–18)
BASOPHILS # BLD AUTO: 0.02 X10(3) UL (ref 0–0.2)
BASOPHILS NFR BLD AUTO: 0.1 %
BUN BLD-MCNC: 40 MG/DL (ref 7–18)
BUN/CREAT SERPL: 21.3 (ref 10–20)
CALCIUM BLD-MCNC: 8.7 MG/DL (ref 8.5–10.1)
CHLORIDE SERPL-SCNC: 109 MMOL/L (ref 98–112)
CO2 SERPL-SCNC: 23 MMOL/L (ref 21–32)
CREAT BLD-MCNC: 1.88 MG/DL
DEPRECATED RDW RBC AUTO: 49 FL (ref 35.1–46.3)
EGFRCR SERPLBLD CKD-EPI 2021: 35 ML/MIN/1.73M2 (ref 60–?)
EOSINOPHIL # BLD AUTO: 0.01 X10(3) UL (ref 0–0.7)
EOSINOPHIL NFR BLD AUTO: 0.1 %
ERYTHROCYTE [DISTWIDTH] IN BLOOD BY AUTOMATED COUNT: 13.7 % (ref 11–15)
GLUCOSE BLD-MCNC: 120 MG/DL (ref 70–99)
HCT VFR BLD AUTO: 27.1 %
HGB BLD-MCNC: 8.9 G/DL
IMM GRANULOCYTES # BLD AUTO: 0.07 X10(3) UL (ref 0–1)
IMM GRANULOCYTES NFR BLD: 0.4 %
LYMPHOCYTES # BLD AUTO: 0.51 X10(3) UL (ref 1–4)
LYMPHOCYTES NFR BLD AUTO: 3.2 %
MCH RBC QN AUTO: 32.8 PG (ref 26–34)
MCHC RBC AUTO-ENTMCNC: 32.8 G/DL (ref 31–37)
MCV RBC AUTO: 100 FL
MONOCYTES # BLD AUTO: 1.83 X10(3) UL (ref 0.1–1)
MONOCYTES NFR BLD AUTO: 11.4 %
NEUTROPHILS # BLD AUTO: 13.55 X10 (3) UL (ref 1.5–7.7)
NEUTROPHILS # BLD AUTO: 13.55 X10(3) UL (ref 1.5–7.7)
NEUTROPHILS NFR BLD AUTO: 84.8 %
OSMOLALITY SERPL CALC.SUM OF ELEC: 303 MOSM/KG (ref 275–295)
PLATELET # BLD AUTO: 132 10(3)UL (ref 150–450)
POTASSIUM SERPL-SCNC: 4.7 MMOL/L (ref 3.5–5.1)
RBC # BLD AUTO: 2.71 X10(6)UL
SODIUM SERPL-SCNC: 141 MMOL/L (ref 136–145)
WBC # BLD AUTO: 16 X10(3) UL (ref 4–11)

## 2023-10-19 PROCEDURE — 99233 SBSQ HOSP IP/OBS HIGH 50: CPT | Performed by: INTERNAL MEDICINE

## 2023-10-19 RX ORDER — CALCIUM CARBONATE 500 MG/1
500 TABLET, CHEWABLE ORAL 3 TIMES DAILY PRN
Status: DISCONTINUED | OUTPATIENT
Start: 2023-10-19 | End: 2023-10-21

## 2023-10-19 RX ORDER — SODIUM CHLORIDE 9 MG/ML
INJECTION, SOLUTION INTRAVENOUS CONTINUOUS
Status: DISCONTINUED | OUTPATIENT
Start: 2023-10-19 | End: 2023-10-21

## 2023-10-19 NOTE — PHYSICAL THERAPY NOTE
PHYSICAL THERAPY TREATMENT NOTE - INPATIENT     Room Number: 433/433-A       Presenting Problem: R SYDNEE    Problem List  Active Problems:    Closed displaced fracture of right femoral neck ([address]      PHYSICAL THERAPY ASSESSMENT   Chart reviewed. RN approved participation in physical therapy. PPE worn by therapist: mask and gloves. Patient was wearing a mask during session. Patient presented in bed with 6/10 pain. Patient with fair  progress towards goals during this session. Education provided on REINFORCEMENT on Total Hip precautions, Weight bearing status, and Physical therapy plan of care. Patient with fair carryover. Pt has difficulty adhereing to THP's and needs constant verbal reminders. Bed mobility: mod A  Transfers: mod A  Gait Assistance: Minimum assistance  Distance (ft): 55  Assistive Device: Rolling walker  Pattern:  (step to)   Unsteady gait, standing rest breaks to recover balance. Patient was left in bed at end of session with all needs in reach. The patient's Approx Degree of Impairment: 46.58% has been calculated based on documentation in the [facility] '6 clicks' Inpatient Basic Mobility Short Form. Research supports that patients with this level of impairment may benefit from rehab. RN aware of patient status post session. DISCHARGE RECOMMENDATIONS  PT Discharge Recommendations: Acute rehabilitation     PLAN  PT Treatment Plan: Bed mobility; Patient education; Family education;Gait training;Transfer training    SUBJECTIVE      OBJECTIVE       WEIGHT BEARING RESTRICTION  Weight Bearing Restriction: L lower extremity           L Lower Extremity: Weight Bearing as Tolerated    PAIN ASSESSMENT   Ratin  Location:  (Left hip)       BALANCE                                                                                                                       Static Sitting: Fair -  Dynamic Sitting: Fair -           Static Standing: Fair -  Dynamic Standing: Fair -    ACTIVITY TOLERANCE BP: 144/73             O2 WALK       AM-PAC '6-Clicks' INPATIENT SHORT FORM - BASIC MOBILITY  How much difficulty does the patient currently have. .. Patient Difficulty: Turning over in bed (including adjusting bedclothes, sheets and blankets)?: A Little   Patient Difficulty: Sitting down on and standing up from a chair with arms (e.g., wheelchair, bedside commode, etc.): A Little   Patient Difficulty: Moving from lying on back to sitting on the side of the bed?: A Little   How much help from another person does the patient currently need. .. Help from Another: Moving to and from a bed to a chair (including a wheelchair)?: A Little   Help from Another: Need to walk in hospital room?: A Little   Help from Another: Climbing 3-5 steps with a railing?: A Little     AM-PAC Score:  Raw Score: 18   Approx Degree of Impairment: 46.58%   Standardized Score (AM-PAC Scale): 43.63   CMS Modifier (G-Code): CK    THERAPEUTIC EXERCISES  Lower Extremity Ankle pumps  Heel slides  LAQ  Quad sets     Position Sitting  10xea     Patient End of Session: Up in chair    CURRENT GOALS   Goals to be met by: 11/2  Patient Goal Patient's self-stated goal is: home   Goal #1 Patient is able to demonstrate supine - sit EOB @ level: supervision     Goal #1   Current Status Not met mod A   Goal #2 Patient is able to demonstrate transfers Sit to/from Stand at assistance level: supervision with none     Goal #2  Current Status Not met mod A   Goal #3 Patient is able to ambulate 150 feet with assist device: walker - rolling at assistance level: supervision   Goal #3   Current Status Not met Mod A   Goal #4 Patient will negotiate 3 stairs/one curb w/ assistive device and supervision   Goal #4   Current Status Not met Mod A   Goal #5 Patient to demonstrate independence with home activity/exercise instructions provided to patient in preparation for discharge.    Goal #5   Current Status Not met mod A   Goal #6    Goal #6  Current Status          Gait Trainin minutes  Therapeutic Activity: 15 minutes  Therapeutic Exercise: 8 minutes

## 2023-10-19 NOTE — CM/SW NOTE
Pt waiting AR at [facility] on dc. Message sent to Dr. Mel Contreras requesting PMR consult order. Order entered. [address] notified of consult and will have pt seen today. Dante Hunt RN, BSN  Nurse   [phone]

## 2023-10-19 NOTE — PLAN OF CARE
POD1 Right total hip. Voiding via urinal. Denies pain. Abductor pillow in place. Scds and betsy hose for dvt prophylaxis. Fall precautions in place. Plan is for acute rehab. Problem: Patient Centered Care  Goal: Patient preferences are identified and integrated in the patient's plan of care  Description: Interventions:  - What would you like us to know as we care for you?   - Provide timely, complete, and accurate information to patient/family  - Incorporate patient and family knowledge, values, beliefs, and cultural backgrounds into the planning and delivery of care  - Encourage patient/family to participate in care and decision-making at the level they choose  - Honor patient and family perspectives and choices  Outcome: Progressing     Problem: Patient/Family Goals  Goal: Patient/Family Long Term Goal  Description: Patient's Long Term Goal: to return to previous level of activity. Interventions:  - surgery planned  - See additional Care Plan goals for specific interventions  Outcome: Progressing  Goal: Patient/Family Short Term Goal  Description: Patient's Short Term Goal: to have surgery on 10/18/23    Interventions:   - pre-op prep/clearance in progress.   - See additional Care Plan goals for specific interventions  Outcome: Progressing     Problem: PAIN - ADULT  Goal: Verbalizes/displays adequate comfort level or patient's stated pain goal  Description: INTERVENTIONS:  - Encourage pt to monitor pain and request assistance  - Assess pain using appropriate pain scale  - Administer analgesics based on type and severity of pain and evaluate response  - Implement non-pharmacological measures as appropriate and evaluate response  - Consider cultural and social influences on pain and pain management  - Manage/alleviate anxiety  - Utilize distraction and/or relaxation techniques  - Monitor for opioid side effects  - Notify MD/LIP if interventions unsuccessful or patient reports new pain  - Anticipate increased pain with activity and pre-medicate as appropriate  Outcome: Progressing     Problem: SAFETY ADULT - FALL  Goal: Free from fall injury  Description: INTERVENTIONS:  - Assess pt frequently for physical needs  - Identify cognitive and physical deficits and behaviors that affect risk of falls.   - [place] fall precautions as indicated by assessment.  - Educate pt/family on patient safety including physical limitations  - Instruct pt to call for assistance with activity based on assessment  - Modify environment to reduce risk of injury  - Provide assistive devices as appropriate  - Consider OT/PT consult to assist with strengthening/mobility  - Encourage toileting schedule  Outcome: Progressing     Problem: DISCHARGE PLANNING  Goal: Discharge to home or other facility with appropriate resources  Description: INTERVENTIONS:  - Identify barriers to discharge w/pt and caregiver  - Include patient/family/discharge partner in discharge planning  - Arrange for needed discharge resources and transportation as appropriate  - Identify discharge learning needs (meds, wound care, etc)  - Arrange for interpreters to assist at discharge as needed  - Consider post-discharge preferences of patient/family/discharge partner  - Complete POLST form as appropriate  - Assess patient's ability to be responsible for managing their own health  - Refer to Case Management Department for coordinating discharge planning if the patient needs post-hospital services based on physician/LIP order or complex needs related to functional status, cognitive ability or social support system  Outcome: Progressing

## 2023-10-19 NOTE — PHYSICAL THERAPY NOTE
PHYSICAL THERAPY EVALUATION - INPATIENT     Room Number: 433/433-A  Evaluation Date: 10/19/2023  Type of Evaluation: Initial   Physician Order: PT Eval and Treat    Presenting Problem:  (R SYDNEE)  Reason for Therapy: Mobility Dysfunction and Discharge Planning    PHYSICAL THERAPY ASSESSMENT   Orders received and chart reviewed. RN approved participation in physical therapy. PPE worn by therapist: mask. Patient was wearing a mask during session. Patient is a 82year old male admitted 10/16/2023 for Presenting Problem:  (R SYDNEE). Patient presented in bed with 5/10 pain. Education provided on Total Hip precautions, Weight bearing status, Physical therapy plan of care, and physiological benefits of out of bed mobility. Patient with fair carryover. Bed mobility: Mod assist  Transfers: Mod assist  Gait Assistance: Minimum assistance  Distance (ft): 45  Assistive Device: Rolling walker  Pattern:  (step to)          Patient was left in bed at end of session with all needs in reach. Patient's current functional deficits include ambulation. The patient's Approx Degree of Impairment: 46.58% has been calculated based on documentation in the [facility] '6 clicks' Inpatient Basic Mobility Short Form. Research supports that patients with this level of impairment may benefit from Home with no services. RN aware of patient status post session. Patient will benefit from continued IP PT services to address these deficits in preparation for discharge. DISCHARGE RECOMMENDATIONS  PT Discharge Recommendations:  (rehab, family wants acute rehab)    PLAN  PT Treatment Plan: Bed mobility;Gait training;Patient education; Family education;Transfer training  Rehab Potential : Fair  Frequency (Obs): Daily       PHYSICAL THERAPY MEDICAL/SOCIAL HISTORY     History related to current admission: home     Problem List  Active Problems:    Closed displaced fracture of right femoral neck [facility])      Past Medical History  Past Medical History: Diagnosis Date    Acute left-sided low back pain without sciatica 07/20/2018    JOSE LUIS (acute kidney injury) ([address]     from omeprazole-resolved    Anemia 07/26/2019    Aortic stenosis 10/2013    AVR-bio    [place] esophagus     CAD (coronary artery disease)     stents, bypasses    Calculus of kidney     Carotid artery plaque 2011    ultrasound    Chronic anemia     bone marrow neg 12/2012    Chronic fatigue     CKD (chronic kidney disease) stage 4, GFR 15-29 ml/min ([facility])     Closed wedge compression fracture of T11 vertebra with routine healing 06/19/2018    Clostridium difficile colitis 06/03/2022    Colon polyps 11/2012    colonoscopy    Compression fracture of T12 vertebra ([address] 06/19/2018    Contrast dye induced nephropathy 02/04/2022    Dehydration 01/28/2022    Diverticulosis     Drug-induced interstitial nephritis 08/23/2020    Omeprazole    Dyslipidemia     Elevated homocysteine     Elevated lipoprotein A level     Elevated troponin     FUO (fever of unknown origin)     PMR or testicular/prostate infection    Gastritis     Hearing impairment     [place]    Hemorrhoids     Hiatal hernia with gastroesophageal reflux     History of Clostridioides difficile colitis 06/2022    History of community acquired pneumonia     HTN (hypertension)     Immunosuppression due to chronic steroid use      Inguinal lymphadenopathy     right-bx    Ischemic colitis ([address]     resolved    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[address]     Mesenteric adenitis     bx    Multiple renal cysts     both-not reported on recent CTs    SHIRA (obstructive sleep apnea)     Osteopenia     steroids    PMR (polymyalgia rheumatica) ([address]     Pneumonia 02/2020    Positive TRINY (antinuclear antibody) 08/28/2019    Precancerous lesion     skin    Proteinuria     mild    Psoriasis     Renal artery stenosis (HCC)     both    Renal stone     right    Right shoulder pain     injected-Liane    Sjogren's syndrome with myopathy ([address] 06/03/2022    Sleep apnea     untreated Thrombocytopenia ([address]     resolved    Visual impairment     Vitamin B12 nutritional deficiency     Vitamin D deficiency     Weight loss        Past Surgical History  Past Surgical History:   Procedure Laterality Date    ANGIOPLASTY (CORONARY)  1999    Dr. Margarita Babcock      teenager    BONE MARROW BIOPSY AND ASPIRATION  1/2021    Hantel-neg    CABG  10/25/13    AORTIC VALVE REPLACEMENT; bypass x 2-3    CARDIAC CATHETERIZATION  2008/2013    CHOLECYSTECTOMY  1984    COLONOSCOPY  2005    [facility]    COLONOSCOPY  11/7/2012        COLONOSCOPY N/A 4/4/2018    Procedure: COLONOSCOPY;  Surgeon: Kristie Moffett MD;  Location: [facility] ENDOSCOPY    COLONOSCOPY N/A 10/11/2021    Procedure: COLONOSCOPY, ESOPHAGOGASTRODUODENOSCOPY (EGD);   Surgeon: Kristie Moffett MD;  Location: [address] [place] [address] ENDOSCOPY    CORTISONE INJECTION  7/6/15    rt shoulder     CYTOLOGY LYMPH NODE FNA - JAR(S): 1  12/07/2012    excisional biopsy rt inguinal lymph node     EGD  10-    Rafi    IR KIDNEY BIOPSY (RENAL)RANDOM  08/11/2020    for drug induced nephritis    LAPAROSCOPIC LYMPH NODE BIOP  2/8/13    exploratory abd lymphadenopathy    REPLACE AORTIC VALVE OPEN  10/25/13    bio    SIGMOIDOSCOPY,DIAGNOSTIC  1995    SKIN TAGS  10/1714    plus histofreeze    UPPER GI ENDOSCOPY PERFORMED  2005/2007    Eliu/Rafi       HOME SITUATION  Type of Home: Condo   Home Layout: One level                    Patient Owned Equipment: Wheelchair       Prior Level of [place]: ind    SUBJECTIVE  \"I need to go home\"    PHYSICAL THERAPY EXAMINATION     OBJECTIVE     Fall Risk: High fall risk    WEIGHT BEARING RESTRICTION  Weight Bearing Restriction: L lower extremity           L Lower Extremity: Weight Bearing as Tolerated    PAIN ASSESSMENT             COGNITION  Overall Cognitive Status:  WFL - within functional limits    RANGE OF MOTION AND STRENGTH ASSESSMENT  Upper extremity ROM and strength are within functional limits     Lower extremity ROM is within functional limits     Lower extremity strength is within functional limits     BALANCE  Static Sitting: Fair  Dynamic Sitting: Fair  Static Standing: Fair -  Dynamic Standing: Fair -      ACTIVITY TOLERANCE           BP: 144/73             O2 WALK       AM-PAC '6-Clicks' INPATIENT SHORT FORM - BASIC MOBILITY  How much difficulty does the patient currently have. .. Patient Difficulty: Turning over in bed (including adjusting bedclothes, sheets and blankets)?: A Little   Patient Difficulty: Sitting down on and standing up from a chair with arms (e.g., wheelchair, bedside commode, etc.): A Little   Patient Difficulty: Moving from lying on back to sitting on the side of the bed?: A Little   How much help from another person does the patient currently need. .. Help from Another: Moving to and from a bed to a chair (including a wheelchair)?: A Little   Help from Another: Need to walk in hospital room?: A Little   Help from Another: Climbing 3-5 steps with a railing?: A Little     AM-PAC Score:  Raw Score: 18   Approx Degree of Impairment: 46.58%   Standardized Score (AM-PAC Scale): 43.63   CMS Modifier (G-Code): CK      Exercise/Education Provided:  Bed mobility  Gait training  Lower therapeutic exercise:   Ankle pumps  Heel slides  LAQ  Transfer training    Patient End of Session: Up in chair    CURRENT GOALS    Goals to be met by: 11/2  Patient Goal Patient's self-stated goal is: home   Goal #1 Patient is able to demonstrate supine - sit EOB @ level: supervision     Goal #1   Current Status    Goal #2 Patient is able to demonstrate transfers Sit to/from Stand at assistance level: supervision with none     Goal #2  Current Status    Goal #3 Patient is able to ambulate 150 feet with assist device: walker - rolling at assistance level: supervision   Goal #3   Current Status    Goal #4 Patient will negotiate 3 stairs/one curb w/ assistive device and supervision   Goal #4   Current Status    Goal #5 Patient to demonstrate independence with home activity/exercise instructions provided to patient in preparation for discharge.    Goal #5   Current Status    Goal #6    Goal #6  Current Status      Patient Evaluation Complexity Level:  History Low - no personal factors and/or co-morbidities   Examination of body systems Moderate - addressing a total of 3 or more elements   Clinical Presentation Moderate - Evolving   Clinical Decision Making Moderate Complexity       Gait Trainin minutes  Therapeutic Activity: 5 minutes  Therapeutic Exercise: 5 minutes

## 2023-10-20 LAB
ANION GAP SERPL CALC-SCNC: 6 MMOL/L (ref 0–18)
BASOPHILS # BLD AUTO: 0.02 X10(3) UL (ref 0–0.2)
BASOPHILS NFR BLD AUTO: 0.2 %
BUN BLD-MCNC: 47 MG/DL (ref 7–18)
BUN/CREAT SERPL: 23.9 (ref 10–20)
CALCIUM BLD-MCNC: 8.4 MG/DL (ref 8.5–10.1)
CHLORIDE SERPL-SCNC: 110 MMOL/L (ref 98–112)
CO2 SERPL-SCNC: 24 MMOL/L (ref 21–32)
CREAT BLD-MCNC: 1.97 MG/DL
DEPRECATED RDW RBC AUTO: 51.8 FL (ref 35.1–46.3)
EGFRCR SERPLBLD CKD-EPI 2021: 34 ML/MIN/1.73M2 (ref 60–?)
EOSINOPHIL # BLD AUTO: 0.33 X10(3) UL (ref 0–0.7)
EOSINOPHIL NFR BLD AUTO: 2.6 %
ERYTHROCYTE [DISTWIDTH] IN BLOOD BY AUTOMATED COUNT: 13.9 % (ref 11–15)
GLUCOSE BLD-MCNC: 88 MG/DL (ref 70–99)
HCT VFR BLD AUTO: 21.5 %
HGB BLD-MCNC: 6.9 G/DL
HGB BLD-MCNC: 8.8 G/DL
IMM GRANULOCYTES # BLD AUTO: 0.06 X10(3) UL (ref 0–1)
IMM GRANULOCYTES NFR BLD: 0.5 %
LYMPHOCYTES # BLD AUTO: 0.55 X10(3) UL (ref 1–4)
LYMPHOCYTES NFR BLD AUTO: 4.3 %
MCH RBC QN AUTO: 32.9 PG (ref 26–34)
MCHC RBC AUTO-ENTMCNC: 32.1 G/DL (ref 31–37)
MCV RBC AUTO: 102.4 FL
MONOCYTES # BLD AUTO: 1.83 X10(3) UL (ref 0.1–1)
MONOCYTES NFR BLD AUTO: 14.4 %
NEUTROPHILS # BLD AUTO: 9.91 X10 (3) UL (ref 1.5–7.7)
NEUTROPHILS # BLD AUTO: 9.91 X10(3) UL (ref 1.5–7.7)
NEUTROPHILS NFR BLD AUTO: 78 %
OSMOLALITY SERPL CALC.SUM OF ELEC: 302 MOSM/KG (ref 275–295)
PLATELET # BLD AUTO: 83 10(3)UL (ref 150–450)
POTASSIUM SERPL-SCNC: 3.9 MMOL/L (ref 3.5–5.1)
RBC # BLD AUTO: 2.1 X10(6)UL
SODIUM SERPL-SCNC: 140 MMOL/L (ref 136–145)
WBC # BLD AUTO: 12.7 X10(3) UL (ref 4–11)

## 2023-10-20 PROCEDURE — 30233R1 TRANSFUSION OF NONAUTOLOGOUS PLATELETS INTO PERIPHERAL VEIN, PERCUTANEOUS APPROACH: ICD-10-PCS | Performed by: INTERNAL MEDICINE

## 2023-10-20 PROCEDURE — 99233 SBSQ HOSP IP/OBS HIGH 50: CPT | Performed by: INTERNAL MEDICINE

## 2023-10-20 RX ORDER — MELATONIN
325
Status: DISCONTINUED | OUTPATIENT
Start: 2023-10-20 | End: 2023-10-21

## 2023-10-20 RX ORDER — SODIUM CHLORIDE 9 MG/ML
INJECTION, SOLUTION INTRAVENOUS ONCE
Status: COMPLETED | OUTPATIENT
Start: 2023-10-20 | End: 2023-10-20

## 2023-10-20 NOTE — CM/SW NOTE
CM met with pt at bedside. Informed him that the PMR  agreed with AR on dc and that this CM reserved a bed for him at [facility]. Pt Cr level's elevated and HGB 6.9 today. Pt will receive 1 unit of PRBC's. Per Ramiro Tillman at [facility] pt will need to have a BM prior to dc. Nursing aware. Plan: MJ when med stable. / to remain available for support and/or discharge planning. Osman Robertson.  Montserrat Hernández RN, BSN  Nurse   [phone]

## 2023-10-20 NOTE — PLAN OF CARE
Patient Alert and Oriented X4. Given PRN pain medication. Fall precautions in place, call light and personal belongings within reach. Receiving IV fluids as ordered. Voiding freely with the urinal.   Problem: Patient Centered Care  Goal: Patient preferences are identified and integrated in the patient's plan of care  Description: Interventions:  - What would you like us to know as we care for you?   - Provide timely, complete, and accurate information to patient/family  - Incorporate patient and family knowledge, values, beliefs, and cultural backgrounds into the planning and delivery of care  - Encourage patient/family to participate in care and decision-making at the level they choose  - Honor patient and family perspectives and choices  Outcome: Progressing     Problem: PAIN - ADULT  Goal: Verbalizes/displays adequate comfort level or patient's stated pain goal  Description: INTERVENTIONS:  - Encourage pt to monitor pain and request assistance  - Assess pain using appropriate pain scale  - Administer analgesics based on type and severity of pain and evaluate response  - Implement non-pharmacological measures as appropriate and evaluate response  - Consider cultural and social influences on pain and pain management  - Manage/alleviate anxiety  - Utilize distraction and/or relaxation techniques  - Monitor for opioid side effects  - Notify MD/LIP if interventions unsuccessful or patient reports new pain  - Anticipate increased pain with activity and pre-medicate as appropriate  Outcome: Progressing     Problem: SAFETY ADULT - FALL  Goal: Free from fall injury  Description: INTERVENTIONS:  - Assess pt frequently for physical needs  - Identify cognitive and physical deficits and behaviors that affect risk of falls.   - [place] fall precautions as indicated by assessment.  - Educate pt/family on patient safety including physical limitations  - Instruct pt to call for assistance with activity based on assessment  - Modify environment to reduce risk of injury  - Provide assistive devices as appropriate  - Consider OT/PT consult to assist with strengthening/mobility  - Encourage toileting schedule  Outcome: Progressing

## 2023-10-20 NOTE — PLAN OF CARE
Pt is A&Ox4. RA. SCDs and ASA for dvt prophylaxis. Voiding freely. Last BM was 10/18. RN offered miralax, pt refused. PRN norco for pain management. Up 1 with walker. General diet. Transfused 1unit PRBCs. R hip-aquacel. Redness noted on back, aquacel placed. Abd. Pillow. Plan for MJ pending medical clearance. Problem: Patient Centered Care  Goal: Patient preferences are identified and integrated in the patient's plan of care  Description: Interventions:  - What would you like us to know as we care for you? My wife was here  - Provide timely, complete, and accurate information to patient/family  - Incorporate patient and family knowledge, values, beliefs, and cultural backgrounds into the planning and delivery of care  - Encourage patient/family to participate in care and decision-making at the level they choose  - Honor patient and family perspectives and choices  Outcome: Progressing     Problem: Patient/Family Goals  Goal: Patient/Family Long Term Goal  Description: Patient's Long Term Goal: to return to previous level of activity. Interventions:  - surgery planned  - See additional Care Plan goals for specific interventions  Outcome: Progressing  Goal: Patient/Family Short Term Goal  Description: Patient's Short Term Goal: to have surgery on 10/18/23    Interventions:   - pre-op prep/clearance in progress.   - See additional Care Plan goals for specific interventions  Outcome: Progressing     Problem: PAIN - ADULT  Goal: Verbalizes/displays adequate comfort level or patient's stated pain goal  Description: INTERVENTIONS:  - Encourage pt to monitor pain and request assistance  - Assess pain using appropriate pain scale  - Administer analgesics based on type and severity of pain and evaluate response  - Implement non-pharmacological measures as appropriate and evaluate response  - Consider cultural and social influences on pain and pain management  - Manage/alleviate anxiety  - Utilize distraction and/or relaxation techniques  - Monitor for opioid side effects  - Notify MD/LIP if interventions unsuccessful or patient reports new pain  - Anticipate increased pain with activity and pre-medicate as appropriate  Outcome: Progressing     Problem: SAFETY ADULT - FALL  Goal: Free from fall injury  Description: INTERVENTIONS:  - Assess pt frequently for physical needs  - Identify cognitive and physical deficits and behaviors that affect risk of falls.   - [place] fall precautions as indicated by assessment.  - Educate pt/family on patient safety including physical limitations  - Instruct pt to call for assistance with activity based on assessment  - Modify environment to reduce risk of injury  - Provide assistive devices as appropriate  - Consider OT/PT consult to assist with strengthening/mobility  - Encourage toileting schedule  Outcome: Progressing     Problem: DISCHARGE PLANNING  Goal: Discharge to home or other facility with appropriate resources  Description: INTERVENTIONS:  - Identify barriers to discharge w/pt and caregiver  - Include patient/family/discharge partner in discharge planning  - Arrange for needed discharge resources and transportation as appropriate  - Identify discharge learning needs (meds, wound care, etc)  - Arrange for interpreters to assist at discharge as needed  - Consider post-discharge preferences of patient/family/discharge partner  - Complete POLST form as appropriate  - Assess patient's ability to be responsible for managing their own health  - Refer to Case Management Department for coordinating discharge planning if the patient needs post-hospital services based on physician/LIP order or complex needs related to functional status, cognitive ability or social support system  Outcome: Progressing     Problem: SKIN/TISSUE INTEGRITY - ADULT  Goal: Skin integrity remains intact  Description: INTERVENTIONS  - Assess and document risk factors for pressure ulcer development  - Assess and document skin integrity  - Monitor for areas of redness and/or skin breakdown  - Initiate interventions, skin care algorithm/standards of care as needed  Outcome: Progressing     Problem: Impaired Functional Mobility  Goal: Achieve highest/safest level of mobility/gait  Description: Interventions:  - Assess patient's functional ability and stability  - Promote increasing activity/tolerance for mobility and gait  - Educate and engage patient/family in tolerated activity level and precautions    Outcome: Progressing     Problem: Impaired Activities of Daily Living  Goal: Achieve highest/safest level of independence in self care  Description: Interventions:  - Assess ability and encourage patient to participate in ADLs to maximize function  - Promote sitting position while performing ADLs such as feeding, grooming, and bathing  - Educate and encourage patient/family in tolerated functional activity level and precautions during self-care  Outcome: Progressing

## 2023-10-20 NOTE — PLAN OF CARE
Post-op day #1. Dressing in place to right hip. Abduction pillow in place. Monitoring vital signs- stable at this time. Receiving IV fluids per MD order after patient did not void much during the shift. Bladder scan monitored. Tolerating diet. SCDs, teds, and aspirin for DVT prophylaxis. Norco as needed for pain. Up with standby assist and a walker. Encouraged frequent ambulation and use of incentive spirometer. Fall precautions maintained- bed alarm on, bed locked in lowest position, call light and personal belongings within reach, non-skid socks in place to bilateral feet. Frequent rounding by nursing staff. PT recommending acute rehab. PMR consult in place. Problem: Patient Centered Care  Goal: Patient preferences are identified and integrated in the patient's plan of care  Description: Interventions:  - What would you like us to know as we care for you?   - Provide timely, complete, and accurate information to patient/family  - Incorporate patient and family knowledge, values, beliefs, and cultural backgrounds into the planning and delivery of care  - Encourage patient/family to participate in care and decision-making at the level they choose  - Honor patient and family perspectives and choices  Outcome: Progressing     Problem: Patient/Family Goals  Goal: Patient/Family Long Term Goal  Description: Patient's Long Term Goal: to return to previous level of activity.     Interventions:  - surgery planned  - discharge planning  - PT/OT  - See additional Care Plan goals for specific interventions  Outcome: Progressing  Goal: Patient/Family Short Term Goal  Description: Patient's Short Term Goal: to have less pain    Interventions:   - follow MD Orders  - pain management plan  - ambulate  - See additional Care Plan goals for specific interventions  Outcome: Progressing     Problem: PAIN - ADULT  Goal: Verbalizes/displays adequate comfort level or patient's stated pain goal  Description: INTERVENTIONS:  - Encourage pt to monitor pain and request assistance  - Assess pain using appropriate pain scale  - Administer analgesics based on type and severity of pain and evaluate response  - Implement non-pharmacological measures as appropriate and evaluate response  - Consider cultural and social influences on pain and pain management  - Manage/alleviate anxiety  - Utilize distraction and/or relaxation techniques  - Monitor for opioid side effects  - Notify MD/LIP if interventions unsuccessful or patient reports new pain  - Anticipate increased pain with activity and pre-medicate as appropriate  Outcome: Progressing     Problem: SAFETY ADULT - FALL  Goal: Free from fall injury  Description: INTERVENTIONS:  - Assess pt frequently for physical needs  - Identify cognitive and physical deficits and behaviors that affect risk of falls.   - [place] fall precautions as indicated by assessment.  - Educate pt/family on patient safety including physical limitations  - Instruct pt to call for assistance with activity based on assessment  - Modify environment to reduce risk of injury  - Provide assistive devices as appropriate  - Consider OT/PT consult to assist with strengthening/mobility  - Encourage toileting schedule  Outcome: Progressing     Problem: DISCHARGE PLANNING  Goal: Discharge to home or other facility with appropriate resources  Description: INTERVENTIONS:  - Identify barriers to discharge w/pt and caregiver  - Include patient/family/discharge partner in discharge planning  - Arrange for needed discharge resources and transportation as appropriate  - Identify discharge learning needs (meds, wound care, etc)  - Arrange for interpreters to assist at discharge as needed  - Consider post-discharge preferences of patient/family/discharge partner  - Complete POLST form as appropriate  - Assess patient's ability to be responsible for managing their own health  - Refer to Case Management Department for coordinating discharge planning if the patient needs post-hospital services based on physician/LIP order or complex needs related to functional status, cognitive ability or social support system  Outcome: Progressing     Problem: SKIN/TISSUE INTEGRITY - ADULT  Goal: Skin integrity remains intact  Description: INTERVENTIONS  - Assess and document risk factors for pressure ulcer development  - Assess and document skin integrity  - Monitor for areas of redness and/or skin breakdown  - Initiate interventions, skin care algorithm/standards of care as needed  Outcome: Progressing     Problem: Impaired Functional Mobility  Goal: Achieve highest/safest level of mobility/gait  Description: Interventions:  - Assess patient's functional ability and stability  - Promote increasing activity/tolerance for mobility and gait  - Educate and engage patient/family in tolerated activity level and precautions    Outcome: Progressing     Problem: Impaired Activities of Daily Living  Goal: Achieve highest/safest level of independence in self care  Description: Interventions:  - Assess ability and encourage patient to participate in ADLs to maximize function  - Promote sitting position while performing ADLs such as feeding, grooming, and bathing  - Educate and encourage patient/family in tolerated functional activity level and precautions during self-care    Outcome: Progressing

## 2023-10-20 NOTE — TELEPHONE ENCOUNTER
Spouse called. States surgery yesterday went well. This am hgb 6.9. Proceeding with blood transfusion. Wanted Dr. Juan M Flores to be aware.

## 2023-10-20 NOTE — PHYSICAL THERAPY NOTE
PHYSICAL THERAPY TREATMENT NOTE - INPATIENT     Room Number: 433/433-A       Presenting Problem: R SYDNEE    Problem List  Active Problems:    Closed displaced fracture of right femoral neck ([address]      PHYSICAL THERAPY ASSESSMENT   Chart reviewed. HARISH Alba approved participation in physical therapy. Wife present in room for education. PPE worn by therapist: mask and gloves. Patient was not wearing a mask during session. Patient presented in bed with 4/10 pain. Patient with good  progress towards goals during this session. Education provided on posterior Total Hip precautions, Physical therapy plan of care, physiological benefits of out of bed mobility, and gait pattern with safety cues for transfers . Patient with good carryover. Educated patient and wife and reviewed hip precaution (he recall 2 of 3). Instructed on HEP in supine and in sit with cues for correct from. patient has no c/o during session but inc time to compete tasks d/t weakness. Bed mobility: Contact guard assist to Min A   Transfers: Contact guard assist  Gait Assistance: Contact guard assist  Distance (ft): 100 x 2  Assistive Device: Rolling walker  Pattern: L Decreased stance time      Patient was left in bedside chair at end of session with all needs in reach. The patient's Approx Degree of Impairment: 46.58% has been calculated based on documentation in the [facility] '6 clicks' Inpatient Basic Mobility Short Form. Research supports that patients with this level of impairment may benefit from Acute Rehab. RN aware of patient status post session. DISCHARGE RECOMMENDATIONS  PT Discharge Recommendations: Acute rehabilitation     PLAN  PT Treatment Plan: Endurance; Body mechanics;Gait training;Patient education; Family education;Transfer training;Balance training    SUBJECTIVE  I walk with you.     OBJECTIVE  Precautions: SYDNEE - posterior    WEIGHT BEARING RESTRICTION  Weight Bearing Restriction: L lower extremity           L Lower Extremity: Weight Bearing as Tolerated    PAIN ASSESSMENT   Ratin  Location: rt leg  Management Techniques: Activity promotion;Breathing techniques;Repositioning    BALANCE                                                                                                                       Static Sitting: Good  Dynamic Sitting: Fair +           Static Standing: Fair  Dynamic Standing: Fair    ACTIVITY TOLERANCE                         O2 WALK       AM-PAC '6-Clicks' INPATIENT SHORT FORM - BASIC MOBILITY  How much difficulty does the patient currently have. .. Patient Difficulty: Turning over in bed (including adjusting bedclothes, sheets and blankets)?: A Little   Patient Difficulty: Sitting down on and standing up from a chair with arms (e.g., wheelchair, bedside commode, etc.): A Little   Patient Difficulty: Moving from lying on back to sitting on the side of the bed?: A Little   How much help from another person does the patient currently need. .. Help from Another: Moving to and from a bed to a chair (including a wheelchair)?: A Little   Help from Another: Need to walk in hospital room?: A Little   Help from Another: Climbing 3-5 steps with a railing?: A Little     AM-PAC Score:  Raw Score: 18   Approx Degree of Impairment: 46.58%   Standardized Score (AM-PAC Scale): 43.63   CMS Modifier (G-Code): CK      Additional information:     THERAPEUTIC EXERCISES  Lower Extremity Alternating marching  Ankle pumps  Heel slides  Knee extension  Leg slides  Quad sets     Position Sitting and Supine       Patient End of Session: Up in chair; All patient questions and concerns addressed;RN aware of session/findings;Call light within reach; Needs met; Family present    CURRENT GOALS     Goals to be met by:   Patient Goal Patient's self-stated goal is: home   Goal #1 Patient is able to demonstrate supine - sit EOB @ level: supervision     Goal #1   Current Status Min A   Goal #2 Patient is able to demonstrate transfers Sit to/from Stand at assistance level: supervision with none     Goal #2  Current Status CGA   Goal #3 Patient is able to ambulate 150 feet with assist device: walker - rolling at assistance level: supervision   Goal #3   Current Status CGA  ft x 2   Goal #4 Patient will negotiate 3 stairs/one curb w/ assistive device and supervision   Goal #4   Current Status NT   Goal #5 Patient to demonstrate independence with home activity/exercise instructions provided to patient in preparation for discharge.    Goal #5   Current Status In progress   Goal #6    Goal #6  Current Status      Gait Training: 15 minutes  Therapeutic Activity: 15 minutes  Neuromuscular Re-education:  minutes  Therapeutic Exercise: 15 minutes   [place] Repositioning:  minutes  Manual Therapy:  minutes  Can add/delete any of these

## 2023-10-21 VITALS
BODY MASS INDEX: 20 KG/M2 | DIASTOLIC BLOOD PRESSURE: 68 MMHG | HEART RATE: 63 BPM | WEIGHT: 138 LBS | TEMPERATURE: 97 F | SYSTOLIC BLOOD PRESSURE: 144 MMHG | RESPIRATION RATE: 16 BRPM | OXYGEN SATURATION: 97 %

## 2023-10-21 LAB
ANION GAP SERPL CALC-SCNC: 5 MMOL/L (ref 0–18)
BASOPHILS # BLD AUTO: 0.03 X10(3) UL (ref 0–0.2)
BASOPHILS NFR BLD AUTO: 0.3 %
BLOOD TYPE BARCODE: 6200
BUN BLD-MCNC: 42 MG/DL (ref 7–18)
BUN/CREAT SERPL: 25.6 (ref 10–20)
CALCIUM BLD-MCNC: 8.4 MG/DL (ref 8.5–10.1)
CHLORIDE SERPL-SCNC: 111 MMOL/L (ref 98–112)
CO2 SERPL-SCNC: 25 MMOL/L (ref 21–32)
CREAT BLD-MCNC: 1.64 MG/DL
DEPRECATED RDW RBC AUTO: 51 FL (ref 35.1–46.3)
EGFRCR SERPLBLD CKD-EPI 2021: 42 ML/MIN/1.73M2 (ref 60–?)
EOSINOPHIL # BLD AUTO: 0.5 X10(3) UL (ref 0–0.7)
EOSINOPHIL NFR BLD AUTO: 4.9 %
ERYTHROCYTE [DISTWIDTH] IN BLOOD BY AUTOMATED COUNT: 14 % (ref 11–15)
GLUCOSE BLD-MCNC: 87 MG/DL (ref 70–99)
HCT VFR BLD AUTO: 23.3 %
HGB BLD-MCNC: 7.7 G/DL
IMM GRANULOCYTES # BLD AUTO: 0.05 X10(3) UL (ref 0–1)
IMM GRANULOCYTES NFR BLD: 0.5 %
LYMPHOCYTES # BLD AUTO: 0.56 X10(3) UL (ref 1–4)
LYMPHOCYTES NFR BLD AUTO: 5.5 %
MCH RBC QN AUTO: 33.2 PG (ref 26–34)
MCHC RBC AUTO-ENTMCNC: 33 G/DL (ref 31–37)
MCV RBC AUTO: 100.4 FL
MONOCYTES # BLD AUTO: 1.48 X10(3) UL (ref 0.1–1)
MONOCYTES NFR BLD AUTO: 14.6 %
NEUTROPHILS # BLD AUTO: 7.55 X10 (3) UL (ref 1.5–7.7)
NEUTROPHILS # BLD AUTO: 7.55 X10(3) UL (ref 1.5–7.7)
NEUTROPHILS NFR BLD AUTO: 74.2 %
OSMOLALITY SERPL CALC.SUM OF ELEC: 302 MOSM/KG (ref 275–295)
PLATELET # BLD AUTO: 80 10(3)UL (ref 150–450)
POTASSIUM SERPL-SCNC: 3.9 MMOL/L (ref 3.5–5.1)
RBC # BLD AUTO: 2.32 X10(6)UL
SODIUM SERPL-SCNC: 141 MMOL/L (ref 136–145)
UNIT VOLUME: 350 ML
WBC # BLD AUTO: 10.2 X10(3) UL (ref 4–11)

## 2023-10-21 PROCEDURE — 99239 HOSP IP/OBS DSCHRG MGMT >30: CPT | Performed by: INTERNAL MEDICINE

## 2023-10-21 RX ORDER — HYDROCODONE BITARTRATE AND ACETAMINOPHEN 10; 325 MG/1; MG/1
1 TABLET ORAL EVERY 4 HOURS PRN
Qty: 30 TABLET | Refills: 0 | Status: SHIPPED | OUTPATIENT
Start: 2023-10-21

## 2023-10-21 RX ORDER — MELATONIN
325
Qty: 30 TABLET | Refills: 0 | Status: SHIPPED | OUTPATIENT
Start: 2023-10-22

## 2023-10-21 NOTE — PHYSICAL THERAPY NOTE
PHYSICAL THERAPY HIP TREATMENT NOTE - INPATIENT    Room Number: 433/433-A            Presenting Problem: R SYDNEE  Co-Morbidities :  (residual weakness from prior spine surgery)    Problem List  Active Problems:    Closed displaced fracture of right femoral neck ([address]      PHYSICAL THERAPY ASSESSMENT     RN approved PT session and pt ready to work with therapist. Pt in bed with elevated b le's and abductor pillow between b le's. Instructed on correct positioning of b le;s to maintain hip posterior precautions. Pt able to recall 2/3 hip precautions and needs further education on observe them. Pt working on supine thera exs with ble's. Pt's wife present during PT session. Supine to sit with MIn a and extra time to complete task . Sitting nel on EOB with good sitting posture. Sit to stand with CGA and using RW  Pt amb with RW outside of room. Instructed on maintain upright posture and advance steps with RLE. Taking rest break after 70 ft of amb and cont to amb back to pt room. Pt with good carryover and working towards to set goal. After amb pt position in chair with all needs in reach and cushion placed to elevated seat, The patient's Approx Degree of Impairment: 46.58% has been calculated based on documentation in the [facility] '6 clicks' Inpatient Basic Mobility Short Form. Research supports that patients with this level of impairment may benefit from acute rehabilitation. Howard Simmons DISCHARGE RECOMMENDATIONS  PT Discharge Recommendations: Acute rehabilitation    PLAN  PT Treatment Plan: Bed mobility; Body mechanics; Endurance; Patient education; Family education;Gait training;Strengthening;Transfer training;Balance training  Frequency (Obs): Daily    SUBJECTIVE  I am going to rehab. OBJECTIVE  Precautions: SYDNEE - posterior    WEIGHT BEARING RESTRICTION           L Lower Extremity: Weight Bearing as Tolerated    PAIN ASSESSMENT   Ratin  Location: R hip area  Management Techniques: Activity promotion; Body mechanics; Relaxation;Repositioning    BALANCE  Static Sitting: Good  Dynamic Sitting: Fair +  Static Standing: Fair  Dynamic Standing: Fair  ACTIVITY TOLERANCE                         O2 WALK       AM-PAC '6-Clicks' INPATIENT SHORT FORM - BASIC MOBILITY  How much difficulty does the patient currently have. .. Patient Difficulty: Turning over in bed (including adjusting bedclothes, sheets and blankets)?: A Little   Patient Difficulty: Sitting down on and standing up from a chair with arms (e.g., wheelchair, bedside commode, etc.): A Little   Patient Difficulty: Moving from lying on back to sitting on the side of the bed?: A Little   How much help from another person does the patient currently need. .. Help from Another: Moving to and from a bed to a chair (including a wheelchair)?: A Little   Help from Another: Need to walk in hospital room?: A Little   Help from Another: Climbing 3-5 steps with a railing?: A Little     AM-PAC Score:  Raw Score: 18   Approx Degree of Impairment: 46.58%   Standardized Score (AM-PAC Scale): 43.63   CMS Modifier (G-Code): CK    FUNCTIONAL ABILITY STATUS  Functional Mobility/Gait Assessment  Gait Assistance: Contact guard assist  Distance (ft): 70 ft x2  Assistive Device: Cane  Pattern: R Decreased stance time  Stairs: Other (comment)    Additional Information: pt am with RW    Exercises AM Session    Ankle Pumps     10 reps    Quad Sets 10 reps    Glut Sets 10 reps    Hip Abd/Add 10 reps    Heel slides 10 reps    Saq 10 reps    Sitting Knee Extension 10 reps    Standing heel/toe raises 0 reps    Hamstring Curls 0 reps    Forward, back steps 0 reps    Short Squats 0 reps      Patient End of Session: Up in chair;Call light within reach;RN aware of session/findings; All patient questions and concerns addressed; Family present    CURRENT GOALS     Goals to be met by: 11/2  Patient Goal Patient's self-stated goal is: home   Goal #1 Patient is able to demonstrate supine - sit EOB @ level: supervision     Goal #1   Current Status Min A   Goal #2 Patient is able to demonstrate transfers Sit to/from Stand at assistance level: supervision with none     Goal #2  Current Status CGA   Goal #3 Patient is able to ambulate 150 feet with assist device: walker - rolling at assistance level: supervision   Goal #3   Current Status CGA RW 70 ft x 2   Goal #4 Patient will negotiate 3 stairs/one curb w/ assistive device and supervision   Goal #4   Current Status NT   Goal #5 Patient to demonstrate independence with home activity/exercise instructions provided to patient in preparation for discharge.    Goal #5   Current Status In progress   Goal #6    Goal #6  Current Status      Gait Training: 15 minutes  Therapeutic Activity: 15 minutes

## 2023-10-21 NOTE — PLAN OF CARE
Patient Alert and Oriented X4. Given PRN pain medication. SCD's in place, encourage use of incentive spirometer. Call light within reach and personal belongings within reach. Ambulates x1 assist with walker.    Problem: Patient Centered Care  Goal: Patient preferences are identified and integrated in the patient's plan of care  Description: Interventions:  - What would you like us to know as we care for you?   - Provide timely, complete, and accurate information to patient/family  - Incorporate patient and family knowledge, values, beliefs, and cultural backgrounds into the planning and delivery of care  - Encourage patient/family to participate in care and decision-making at the level they choose  - Honor patient and family perspectives and choices  Outcome: Progressing     Problem: PAIN - ADULT  Goal: Verbalizes/displays adequate comfort level or patient's stated pain goal  Description: INTERVENTIONS:  - Encourage pt to monitor pain and request assistance  - Assess pain using appropriate pain scale  - Administer analgesics based on type and severity of pain and evaluate response  - Implement non-pharmacological measures as appropriate and evaluate response  - Consider cultural and social influences on pain and pain management  - Manage/alleviate anxiety  - Utilize distraction and/or relaxation techniques  - Monitor for opioid side effects  - Notify MD/LIP if interventions unsuccessful or patient reports new pain  - Anticipate increased pain with activity and pre-medicate as appropriate  Outcome: Progressing

## 2023-10-21 NOTE — CM/SW NOTE
10/21/23 1100   Discharge disposition   Expected discharge disposition Rehab [address] Provider [facility]     MDO for dc. CM notified [facility] liaison Mary Ziegler of above. Await bed assignment. / to remain available for support and/or discharge planning.      Abida Snowden RN    Ext [phone]

## 2023-10-21 NOTE — CM/SW NOTE
10/21/23 1203   Discharge disposition   Post Acute Care Provider [facility]   Discharge transportation [facility]     MDO for dc    [address] approved admit today, await bed assignment. Per RN pt requested medicar, agreeable to fees. 1300   CM left vm for [facility] adm liaison req bed assignment, notified liaison that medicar wait times are currently after 4:30pm.    1330  Bed assignment rec'd. Plan  [facility] -room [address] [place] [address] is set for 4:30   pcs done  RN report [phone]    RN to upd pt/fam with time. / to remain available for support and/or discharge planning.      Jaqui Epperson, RN    Ext [phone]

## 2023-10-21 NOTE — PLAN OF CARE
Patient alert and orientated x4. Post-op day #3. Dressing in place to Right Hip. Monitoring vital signs- stable at this time. No acute changes noted throughout shift. Saline locked. Tolerating general diet. Voiding freely. Patient is on room air. SCDs on for DVT prophylaxis. PRN Norco given for Pain medication provided as needed. Up with x1 assist and a walker. Encouraged frequent ambulation and use of incentive spirometer. Fall precautions maintained- bed alarm on, bed locked in lowest position, call light and personal belongings within reach, non-skid socks in place to bilateral feet. Frequent rounding by nursing staff. Plan to discharge today to [facility] p/u scheduled at 1630 via [place]. Patient cleared by internal medicine, ortho surgery, PT/OT, and social work. Going to [facility]. IV removed, discharge education provided, patient sent with all personal belongings, medications, scripts, and discharge instructions. Addressed additional questions. Report given to receiving nurse Belkys Acharya. [phone]-Called superior for ETA,ETA [phone]-notified pt and family. Problem: Patient Centered Care  Goal: Patient preferences are identified and integrated in the patient's plan of care  Description: Interventions:  - What would you like us to know as we care for you?   - Provide timely, complete, and accurate information to patient/family  - Incorporate patient and family knowledge, values, beliefs, and cultural backgrounds into the planning and delivery of care  - Encourage patient/family to participate in care and decision-making at the level they choose  - Honor patient and family perspectives and choices  Outcome: Adequate for Discharge     Problem: Patient/Family Goals  Goal: Patient/Family Long Term Goal  Description: Patient's Long Term Goal: to return to previous level of activity.     Interventions:  - surgery planned  - See additional Care Plan goals for specific interventions  Outcome: Adequate for Discharge  Goal: Patient/Family Short Term Goal  Description: Patient's Short Term Goal: to have surgery on 10/18/23    Interventions:   - pre-op prep/clearance in progress. - See additional Care Plan goals for specific interventions  Outcome: Adequate for Discharge     Problem: PAIN - ADULT  Goal: Verbalizes/displays adequate comfort level or patient's stated pain goal  Description: INTERVENTIONS:  - Encourage pt to monitor pain and request assistance  - Assess pain using appropriate pain scale  - Administer analgesics based on type and severity of pain and evaluate response  - Implement non-pharmacological measures as appropriate and evaluate response  - Consider cultural and social influences on pain and pain management  - Manage/alleviate anxiety  - Utilize distraction and/or relaxation techniques  - Monitor for opioid side effects  - Notify MD/LIP if interventions unsuccessful or patient reports new pain  - Anticipate increased pain with activity and pre-medicate as appropriate  Outcome: Adequate for Discharge     Problem: SAFETY ADULT - FALL  Goal: Free from fall injury  Description: INTERVENTIONS:  - Assess pt frequently for physical needs  - Identify cognitive and physical deficits and behaviors that affect risk of falls.   - [place] fall precautions as indicated by assessment.  - Educate pt/family on patient safety including physical limitations  - Instruct pt to call for assistance with activity based on assessment  - Modify environment to reduce risk of injury  - Provide assistive devices as appropriate  - Consider OT/PT consult to assist with strengthening/mobility  - Encourage toileting schedule  Outcome: Adequate for Discharge     Problem: DISCHARGE PLANNING  Goal: Discharge to home or other facility with appropriate resources  Description: INTERVENTIONS:  - Identify barriers to discharge w/pt and caregiver  - Include patient/family/discharge partner in discharge planning  - Arrange for needed discharge resources and transportation as appropriate  - Identify discharge learning needs (meds, wound care, etc)  - Arrange for interpreters to assist at discharge as needed  - Consider post-discharge preferences of patient/family/discharge partner  - Complete POLST form as appropriate  - Assess patient's ability to be responsible for managing their own health  - Refer to Case Management Department for coordinating discharge planning if the patient needs post-hospital services based on physician/LIP order or complex needs related to functional status, cognitive ability or social support system  Outcome: Adequate for Discharge

## 2023-10-23 ENCOUNTER — TELEPHONE (OUTPATIENT)
Dept: INTERNAL MEDICINE CLINIC | Facility: CLINIC | Age: 83
End: 2023-10-23

## 2023-10-23 NOTE — TELEPHONE ENCOUNTER
Patient currently at [facility] for rehab. Anticipates a 2 week stay. Has not yet had latest Covid booster. Won't get one while in [place] [facility], wondering if he should increase his daily dose of Vitamin C, currently takes 500mg daily.

## 2023-10-25 ENCOUNTER — TELEPHONE (OUTPATIENT)
Dept: PHYSICAL THERAPY | Facility: HOSPITAL | Age: 83
End: 2023-10-25

## 2023-10-30 ENCOUNTER — APPOINTMENT (OUTPATIENT)
Dept: PHYSICAL THERAPY | Facility: HOSPITAL | Age: 83
End: 2023-10-30
Attending: INTERNAL MEDICINE
Payer: MEDICARE

## 2023-11-02 ENCOUNTER — APPOINTMENT (OUTPATIENT)
Dept: PHYSICAL THERAPY | Facility: HOSPITAL | Age: 83
End: 2023-11-02
Attending: INTERNAL MEDICINE
Payer: MEDICARE

## 2023-11-02 RX ORDER — L-MEFOL/A-CYST/MEB12/ALGAL OIL 6-600-2 MG
1 TABLET ORAL
Qty: 36 TABLET | Refills: 0 | Status: SHIPPED | OUTPATIENT
Start: 2023-11-03

## 2023-11-06 ENCOUNTER — APPOINTMENT (OUTPATIENT)
Dept: PHYSICAL THERAPY | Facility: HOSPITAL | Age: 83
End: 2023-11-06
Attending: INTERNAL MEDICINE
Payer: MEDICARE

## 2023-11-06 ENCOUNTER — TELEPHONE (OUTPATIENT)
Dept: INTERNAL MEDICINE CLINIC | Facility: CLINIC | Age: 83
End: 2023-11-06

## 2023-11-06 NOTE — TELEPHONE ENCOUNTER
Chris Case would like to know if we will increase the frequency of nurse visits due to the fact that the patient has a right hip incision, low back irritation and has developed a blister on his right heal at rehab.     Please call [phone]

## 2023-11-09 ENCOUNTER — APPOINTMENT (OUTPATIENT)
Dept: PHYSICAL THERAPY | Facility: HOSPITAL | Age: 83
End: 2023-11-09
Attending: INTERNAL MEDICINE
Payer: MEDICARE

## 2023-11-13 ENCOUNTER — APPOINTMENT (OUTPATIENT)
Dept: PHYSICAL THERAPY | Facility: HOSPITAL | Age: 83
End: 2023-11-13
Attending: INTERNAL MEDICINE
Payer: MEDICARE

## 2023-11-14 ENCOUNTER — TELEPHONE (OUTPATIENT)
Dept: PHYSICAL THERAPY | Facility: HOSPITAL | Age: 83
End: 2023-11-14

## 2023-11-14 DIAGNOSIS — Z96.641 PRESENCE OF RIGHT ARTIFICIAL HIP JOINT: ICD-10-CM

## 2023-11-14 DIAGNOSIS — Z96.641 HISTORY OF REVISION OF TOTAL REPLACEMENT OF RIGHT HIP JOINT: Primary | ICD-10-CM

## 2023-11-14 RX ORDER — FOLIC ACID 1 MG/1
1 TABLET ORAL DAILY
Qty: 90 TABLET | Refills: 0 | Status: SHIPPED | OUTPATIENT
Start: 2023-11-14

## 2023-11-14 NOTE — TELEPHONE ENCOUNTER
Future Appointments   Date Time Provider [place]   11/15/2023  1:30 PM Kely Craig MD EMG 24 [facility] [place]   [id number] 92:44 AM Kari Hammond MD EMGRHEUMHBSN EMG [facility]   12/8/2023 10:40 AM [facility]H DEXA RM1 CF DEXA EM [facility]   12/15/2023 11:30 AM Carroll Hogan MD EMG [address] [facility]   1/12/2024 11:00 AM CMA RESOURCE [facility] HEM ONC EMO   1/12/2024 12:00 PM Blanquita Rodriguez MD [facility] HEM ONC EMO     Last office visit: 7/19/2023    Last fill: 2/20/2023 30 tab, 5 refills

## 2023-11-15 ENCOUNTER — HOSPITAL ENCOUNTER (OUTPATIENT)
Dept: GENERAL RADIOLOGY | Facility: HOSPITAL | Age: 83
Discharge: HOME | End: 2023-11-15
Attending: INTERNAL MEDICINE
Payer: MEDICARE

## 2023-11-15 ENCOUNTER — OFFICE VISIT (OUTPATIENT)
Dept: INTERNAL MEDICINE CLINIC | Facility: CLINIC | Age: 83
End: 2023-11-15
Payer: MEDICARE

## 2023-11-15 VITALS
DIASTOLIC BLOOD PRESSURE: 64 MMHG | SYSTOLIC BLOOD PRESSURE: 160 MMHG | OXYGEN SATURATION: 99 % | HEART RATE: 64 BPM | BODY MASS INDEX: 20 KG/M2 | TEMPERATURE: 98 F | WEIGHT: 138 LBS | RESPIRATION RATE: 16 BRPM

## 2023-11-15 DIAGNOSIS — R53.1 WEAKNESS: ICD-10-CM

## 2023-11-15 DIAGNOSIS — R05.9 COUGH, UNSPECIFIED TYPE: ICD-10-CM

## 2023-11-15 DIAGNOSIS — R59.0 MESENTERIC LYMPHADENOPATHY: ICD-10-CM

## 2023-11-15 DIAGNOSIS — L29.9 GENERALIZED PRURITUS: ICD-10-CM

## 2023-11-15 DIAGNOSIS — R63.4 WEIGHT LOSS: Primary | ICD-10-CM

## 2023-11-15 PROCEDURE — 71046 X-RAY EXAM CHEST 2 VIEWS: CPT | Performed by: INTERNAL MEDICINE

## 2023-11-15 PROCEDURE — 1111F DSCHRG MED/CURRENT MED MERGE: CPT | Performed by: INTERNAL MEDICINE

## 2023-11-15 PROCEDURE — 1126F AMNT PAIN NOTED NONE PRSNT: CPT | Performed by: INTERNAL MEDICINE

## 2023-11-15 PROCEDURE — 99214 OFFICE O/P EST MOD 30 MIN: CPT | Performed by: INTERNAL MEDICINE

## 2023-11-15 RX ORDER — METHOTREXATE 2.5 MG/1
2.5 TABLET ORAL WEEKLY
COMMUNITY

## 2023-11-15 RX ORDER — PREDNISONE 2.5 MG/1
5 TABLET ORAL EVERY MORNING
COMMUNITY

## 2023-11-15 RX ORDER — TURMERIC 400 MG
1 CAPSULE ORAL DAILY
COMMUNITY
End: 2023-11-16

## 2023-11-15 RX ORDER — LORATADINE 10 MG/1
10 TABLET ORAL DAILY
COMMUNITY

## 2023-11-15 RX ORDER — L-MEFOL/A-CYST/MEB12/ALGAL OIL 6-600-2 MG
1 TABLET ORAL
COMMUNITY
End: 2023-11-15

## 2023-11-15 RX ORDER — METHOCARBAMOL 500 MG/1
1 TABLET, FILM COATED ORAL AS NEEDED
COMMUNITY
Start: 2023-01-20 | End: 2023-11-16

## 2023-11-16 ENCOUNTER — TELEPHONE (OUTPATIENT)
Dept: INTERNAL MEDICINE CLINIC | Facility: CLINIC | Age: 83
End: 2023-11-16

## 2023-11-16 ENCOUNTER — APPOINTMENT (OUTPATIENT)
Dept: PHYSICAL THERAPY | Facility: HOSPITAL | Age: 83
End: 2023-11-16
Attending: INTERNAL MEDICINE
Payer: MEDICARE

## 2023-11-16 NOTE — TELEPHONE ENCOUNTER
S/w wife    States pt feels good  She did not give him a extra BP med based on his readings. Will continue to monitor.     To Dr Malik Agarwal RN

## 2023-11-16 NOTE — TELEPHONE ENCOUNTER
Patient was seen yesterday for an office visit and was told to call back with his BP readings. Yesterday evening his BP was 147/81 69 pulse. This morning BP was 133/72 pulse 70.

## 2023-11-20 ENCOUNTER — TELEPHONE (OUTPATIENT)
Dept: RHEUMATOLOGY | Facility: CLINIC | Age: 83
End: 2023-11-20

## 2023-11-20 DIAGNOSIS — M35.3 PMR (POLYMYALGIA RHEUMATICA) (HCC): Primary | ICD-10-CM

## 2023-11-20 NOTE — TELEPHONE ENCOUNTER
Pt spouse called office, pt has upcoming appt this Wednesday and would like to have blood work completed.  Orders pended

## 2023-11-21 ENCOUNTER — LAB ENCOUNTER (OUTPATIENT)
Dept: LAB | Facility: HOSPITAL | Age: 83
End: 2023-11-21
Attending: INTERNAL MEDICINE
Payer: MEDICARE

## 2023-11-21 ENCOUNTER — TELEPHONE (OUTPATIENT)
Dept: INTERNAL MEDICINE CLINIC | Facility: CLINIC | Age: 83
End: 2023-11-21

## 2023-11-21 DIAGNOSIS — M35.3 PMR (POLYMYALGIA RHEUMATICA) (HCC): ICD-10-CM

## 2023-11-21 LAB
ALBUMIN SERPL-MCNC: 3.9 G/DL (ref 3.2–4.8)
ALBUMIN/GLOB SERPL: 1.3 {RATIO} (ref 1–2)
ALP LIVER SERPL-CCNC: 86 U/L
ALT SERPL-CCNC: 22 U/L
ANION GAP SERPL CALC-SCNC: 5 MMOL/L (ref 0–18)
AST SERPL-CCNC: 30 U/L (ref ?–34)
BASOPHILS # BLD AUTO: 0.06 X10(3) UL (ref 0–0.2)
BASOPHILS NFR BLD AUTO: 0.4 %
BILIRUB SERPL-MCNC: 0.5 MG/DL (ref 0.2–1.1)
BUN BLD-MCNC: 26 MG/DL (ref 9–23)
BUN/CREAT SERPL: 14.9 (ref 10–20)
CALCIUM BLD-MCNC: 9.3 MG/DL (ref 8.7–10.4)
CHLORIDE SERPL-SCNC: 107 MMOL/L (ref 98–112)
CO2 SERPL-SCNC: 27 MMOL/L (ref 21–32)
CREAT BLD-MCNC: 1.75 MG/DL
CRP SERPL-MCNC: <0.4 MG/DL (ref ?–1)
DEPRECATED RDW RBC AUTO: 51.7 FL (ref 35.1–46.3)
EGFRCR SERPLBLD CKD-EPI 2021: 39 ML/MIN/1.73M2 (ref 60–?)
EOSINOPHIL # BLD AUTO: 2.04 X10(3) UL (ref 0–0.7)
EOSINOPHIL NFR BLD AUTO: 12.8 %
ERYTHROCYTE [DISTWIDTH] IN BLOOD BY AUTOMATED COUNT: 13.7 % (ref 11–15)
ERYTHROCYTE [SEDIMENTATION RATE] IN BLOOD: 23 MM/HR
FASTING STATUS PATIENT QL REPORTED: NO
GLOBULIN PLAS-MCNC: 2.9 G/DL (ref 2.8–4.4)
GLUCOSE BLD-MCNC: 99 MG/DL (ref 70–99)
HCT VFR BLD AUTO: 34 %
HGB BLD-MCNC: 10.3 G/DL
IMM GRANULOCYTES # BLD AUTO: 0.07 X10(3) UL (ref 0–1)
IMM GRANULOCYTES NFR BLD: 0.4 %
LYMPHOCYTES # BLD AUTO: 0.75 X10(3) UL (ref 1–4)
LYMPHOCYTES NFR BLD AUTO: 4.7 %
MCH RBC QN AUTO: 31 PG (ref 26–34)
MCHC RBC AUTO-ENTMCNC: 30.3 G/DL (ref 31–37)
MCV RBC AUTO: 102.4 FL
MONOCYTES # BLD AUTO: 1.35 X10(3) UL (ref 0.1–1)
MONOCYTES NFR BLD AUTO: 8.5 %
NEUTROPHILS # BLD AUTO: 11.61 X10 (3) UL (ref 1.5–7.7)
NEUTROPHILS # BLD AUTO: 11.61 X10(3) UL (ref 1.5–7.7)
NEUTROPHILS NFR BLD AUTO: 73.2 %
OSMOLALITY SERPL CALC.SUM OF ELEC: 293 MOSM/KG (ref 275–295)
PLATELET # BLD AUTO: 161 10(3)UL (ref 150–450)
POTASSIUM SERPL-SCNC: 4.9 MMOL/L (ref 3.5–5.1)
PROT SERPL-MCNC: 6.8 G/DL (ref 5.7–8.2)
RBC # BLD AUTO: 3.32 X10(6)UL
SODIUM SERPL-SCNC: 139 MMOL/L (ref 136–145)
WBC # BLD AUTO: 15.9 X10(3) UL (ref 4–11)

## 2023-11-21 PROCEDURE — 36415 COLL VENOUS BLD VENIPUNCTURE: CPT

## 2023-11-21 PROCEDURE — 85060 BLOOD SMEAR INTERPRETATION: CPT

## 2023-11-21 PROCEDURE — 86140 C-REACTIVE PROTEIN: CPT

## 2023-11-21 PROCEDURE — 80053 COMPREHEN METABOLIC PANEL: CPT

## 2023-11-21 PROCEDURE — 85652 RBC SED RATE AUTOMATED: CPT

## 2023-11-21 PROCEDURE — 85025 COMPLETE CBC W/AUTO DIFF WBC: CPT

## 2023-11-21 NOTE — TELEPHONE ENCOUNTER
Patient's spouse would like to know is it time to decrease dosage on the Duloxetine? Dr. Nieves Valadez decreased it in October to 20mg.  Please call

## 2023-11-22 ENCOUNTER — OFFICE VISIT (OUTPATIENT)
Dept: RHEUMATOLOGY | Facility: CLINIC | Age: 83
End: 2023-11-22
Payer: MEDICARE

## 2023-11-22 VITALS
DIASTOLIC BLOOD PRESSURE: 70 MMHG | BODY MASS INDEX: 20.33 KG/M2 | RESPIRATION RATE: 22 BRPM | TEMPERATURE: 98 F | HEART RATE: 68 BPM | WEIGHT: 142 LBS | SYSTOLIC BLOOD PRESSURE: 134 MMHG | HEIGHT: 70 IN | OXYGEN SATURATION: 100 %

## 2023-11-22 DIAGNOSIS — D89.89 KAPPA LIGHT CHAIN DISEASE (HCC): ICD-10-CM

## 2023-11-22 DIAGNOSIS — Z96.641 HISTORY OF RIGHT HIP REPLACEMENT: ICD-10-CM

## 2023-11-22 DIAGNOSIS — Z98.890 HISTORY OF BACK SURGERY: ICD-10-CM

## 2023-11-22 DIAGNOSIS — N18.4 CKD (CHRONIC KIDNEY DISEASE) STAGE 4, GFR 15-29 ML/MIN (HCC): ICD-10-CM

## 2023-11-22 DIAGNOSIS — D64.89 ANEMIA DUE TO OTHER CAUSE, NOT CLASSIFIED: ICD-10-CM

## 2023-11-22 DIAGNOSIS — M35.3 PMR (POLYMYALGIA RHEUMATICA) (HCC): Primary | ICD-10-CM

## 2023-11-22 PROBLEM — E86.0 DEHYDRATION: Status: RESOLVED | Noted: 2022-01-28 | Resolved: 2023-11-22

## 2023-11-22 PROBLEM — M85.80 OTHER SPECIFIED DISORDERS OF BONE DENSITY AND STRUCTURE, UNSPECIFIED SITE: Status: RESOLVED | Noted: 2020-01-17 | Resolved: 2023-11-22

## 2023-11-22 PROCEDURE — 1126F AMNT PAIN NOTED NONE PRSNT: CPT | Performed by: INTERNAL MEDICINE

## 2023-11-22 PROCEDURE — 99214 OFFICE O/P EST MOD 30 MIN: CPT | Performed by: INTERNAL MEDICINE

## 2023-11-22 NOTE — PATIENT INSTRUCTIONS
Since you are doing well, PMR quiet, stop methotrexate. Only on one methotrexate per week, just stop it. Continue Prednisone 5 mg per day. Sed rate 23 ok  CRP  negative. Anemia present Hemoglobin 10.3  Iron one daily, multivitamin daily  Labs recheck 3 months. Return to office 3 months.

## 2023-11-27 ENCOUNTER — TELEPHONE (OUTPATIENT)
Dept: INTERNAL MEDICINE CLINIC | Facility: CLINIC | Age: 83
End: 2023-11-27

## 2023-11-27 ENCOUNTER — TELEPHONE (OUTPATIENT)
Dept: PHYSICAL THERAPY | Facility: HOSPITAL | Age: 83
End: 2023-11-27

## 2023-11-27 ENCOUNTER — TELEPHONE (OUTPATIENT)
Dept: RHEUMATOLOGY | Facility: CLINIC | Age: 83
End: 2023-11-27

## 2023-11-27 NOTE — TELEPHONE ENCOUNTER
Pt spouse called about the ordered CT of the abdomen. The order says will compare to CT that was taken in May 22, 2023. [facility] did not have a CT taken at that time per spouse.  Please call

## 2023-11-28 ENCOUNTER — TELEPHONE (OUTPATIENT)
Dept: INTERNAL MEDICINE CLINIC | Facility: CLINIC | Age: 83
End: 2023-11-28

## 2023-11-28 DIAGNOSIS — R63.4 LOSS OF WEIGHT: Primary | ICD-10-CM

## 2023-11-28 DIAGNOSIS — R59.0 VIRCHOW'S NODE: Primary | ICD-10-CM

## 2023-11-28 DIAGNOSIS — R63.4 LOSS OF WEIGHT: ICD-10-CM

## 2023-11-28 RX ORDER — CARVEDILOL 12.5 MG/1
12.5 TABLET ORAL 2 TIMES DAILY WITH MEALS
Qty: 180 TABLET | Refills: 3 | Status: SHIPPED | OUTPATIENT
Start: 2023-11-28

## 2023-11-28 NOTE — TELEPHONE ENCOUNTER
Patient would like a refill for Carvediol  12.5 mg sent to  [facility] [address] Future appt: Your appointments       Date & Time Appointment Department [facility])    Nov 29, 2023 11:45 AM CST PT Ortho Post Op Evaluation with Brooke Spice, [address] ([address] [place] [address])    Your appointment is located at [address] [place], [place]. Please park in the [address] [place] [address] parking lot and proceed to BEBE Luna. Dec 05, 2023  1:45 PM CST Physical Therapy Ortho Treatment with Brooke Spice, [address] ([address] [place] [address])    Your appointment is located at [address] [place], [place]. Please park in the [address] [place] [address] parking lot and proceed to BEBE [place]. Dec 07, 2023 12:15 PM CST Physical Therapy Ortho Treatment with Brooke Spice, [address] ([address] [place] [address])    Your appointment is located at [address] [place], [place]. Please park in the [address] [place] [address] parking lot and proceed to BEBE [place]. Dec 08, 2023 10:40 AM CST XR DEXA BONE DENSITOMETRY with [facility] DEXA [address] ([address] [place] [address])    It is recommended that you wear a 2 piece outfit that does not contain any metal such as snaps and zippers, etc.  Attention women: Debrpolly Morelosinion will need to be removed prior to the scan. If you have the report from a prior DEXA scan performed elsewhere, please bring the report with you to your appointment. Dec 11, 2023  2:30 PM CST Physical Therapy Ortho Treatment with Brooke Spice, [address] ([address] [place] [address])    Your appointment is located at [address] [place], [place]. Please park in the [address] [place] [address] parking lot and proceed to BEBE [place].         Dec 13, 2023 11:45 AM CST Follow Up Visit with Kely Craig MD [address], [address] [facility] ([facility])        Dec 15, 2023 11:30 AM CST Exam - Established with Amairani Ch MD [address], [address] [facility],[address] Floor, [facility])        Dec 18, 2023 11:45 AM CST Physical Therapy Ortho Treatment with Tash Pall, [address] ([facility] [address])    Your appointment is located at [address] [place], [place]. Please park in the [address] [place] [address] parking lot and proceed to BEBE Luna. Dec 20, 2023 11:45 AM CST Physical Therapy Ortho Treatment with Tash Pall, [address] ([address] [place] [address] Road)    Your appointment is located at [address] [place], [place]. Please park in the [address] [place] [address] parking lot and proceed to BEBE Luna. Dec 29, 2023 10:30 AM CST Physical Therapy Ortho Treatment with Tash Pall, [address] ([address] [place] [address] [address])    Your appointment is located at [address] [place], [place]. Please park in the [address] [place] [address] parking lot and proceed to BEBE Luna. Jan 02, 2024  1:00 PM CST Physical Therapy Ortho Treatment with Tash Pall, [address] ([address] [place] [address] [address])    Your appointment is located at [address] [place], [place]. Please park in the [address] [place] [address] parking lot and proceed to BEBE Luna. Jan 04, 2024  1:00 PM CST Physical Therapy Ortho Treatment with Tash Pall, [address] ([address] [place] [address] [address])    Your appointment is located at [address] [place], [place]. Please park in the [address] [place] [address] parking lot and proceed to BEBE Luna. Jan 08, 2024  1:45 PM CST Physical Therapy Ortho Treatment with Tash Pall, [address] ([address])    Your appointment is located at [address], [place], [place].  Please park in the [address] parking lot and proceed to BEBE [place]. Brian 10, 2024  1:45 PM CST Physical Therapy Ortho Treatment with Balaji Vargas, [address] ([address] [place] [address])    Your appointment is located at [address]. Please park in the [address] [place] [address] parking lot and proceed to BEBE Luna. Jan 12, 2024 11:00 AM CST Lab Visit with Vicenta 25 Hematology Oncology [facility]        Jan 12, 2024 12:00 PM CST HEMATOLOGY ONCOLOGY FOLLOW UP with Kailash Flores 19 Hematology Oncology [facility]        Brian 15, 2024  1:45 PM CST Physical Therapy Ortho Treatment with Balaji Vargas, [address] ([address] [place] [address])    Your appointment is located at [address]. Please park in the [address] [place] [address] parking lot and proceed to [place]BE Luna. Jan 17, 2024  1:45 PM CST Physical Therapy Ortho Treatment with Balaji Vargas, [address] ([address] [place] [address])    Your appointment is located at [address]. Please park in the [address] [place] [address] parking lot and proceed to BEBE Luna.         Feb 22, 2024 12:40 PM CST Exam - Established with Sachin Springer MD [address], [place] ([address])              [address], [address] [facility] [address] [phone] [facility]  [address] [facility] [address] [facility] [address] [place] [address] [phone] [address], [address], [place] [address] [address] [place] [address]  [id number] [place] [address] Loop [address]  [phone]    [address]  [facility]  [place] [address] [address]  [address] [facility] [address] Hematology Oncology  [facility]  [facility] [address] [phone]          Last Appointment:  11/15/2023  Cholesterol, Total (mg/dL)   Date Value   07/12/2023 158     CHOLESTEROL (mg/dL)   Date Value   07/17/2014 136     HDL Cholesterol (mg/dL)   Date Value   07/12/2023 62     HDL CHOL (mg/dL)   Date Value   07/17/2014 57     LDL Cholesterol (mg/dL)   Date Value   12/19/2019 51     LDL CHOLESTROL (mg/dL)   Date Value   07/17/2014 58     Triglycerides (mg/dL)   Date Value   07/12/2023 117     TRIGLYCERIDES (mg/dL)   Date Value   07/17/2014 104     Lab Results   Component Value Date     01/20/2016    A1C 4.9 07/12/2023     Lab Results   Component Value Date    T4F 0.9 01/06/2020    TSH 2.070 07/12/2023       No follow-ups on file.

## 2023-11-28 NOTE — TELEPHONE ENCOUNTER
Patient has gained 3 lbs recently. Appetite improving. Wondering if okay to hold off on CT abdomen as it was ordered due to weight loss.

## 2023-11-29 ENCOUNTER — OFFICE VISIT (OUTPATIENT)
Dept: PHYSICAL THERAPY | Facility: HOSPITAL | Age: 83
End: 2023-11-29
Attending: ORTHOPAEDIC SURGERY
Payer: MEDICARE

## 2023-11-29 DIAGNOSIS — Z96.641 PRESENCE OF RIGHT ARTIFICIAL HIP JOINT: ICD-10-CM

## 2023-11-29 DIAGNOSIS — Z96.641 HISTORY OF REVISION OF TOTAL REPLACEMENT OF RIGHT HIP JOINT: Primary | ICD-10-CM

## 2023-11-29 PROCEDURE — 97110 THERAPEUTIC EXERCISES: CPT

## 2023-11-29 PROCEDURE — 97161 PT EVAL LOW COMPLEX 20 MIN: CPT

## 2023-11-30 ENCOUNTER — APPOINTMENT (OUTPATIENT)
Dept: PHYSICAL THERAPY | Facility: HOSPITAL | Age: 83
End: 2023-11-30
Attending: ORTHOPAEDIC SURGERY
Payer: MEDICARE

## 2023-12-05 ENCOUNTER — OFFICE VISIT (OUTPATIENT)
Dept: PHYSICAL THERAPY | Facility: HOSPITAL | Age: 83
End: 2023-12-05
Attending: ORTHOPAEDIC SURGERY
Payer: MEDICARE

## 2023-12-05 PROCEDURE — 97116 GAIT TRAINING THERAPY: CPT

## 2023-12-05 PROCEDURE — 97140 MANUAL THERAPY 1/> REGIONS: CPT

## 2023-12-05 PROCEDURE — 97110 THERAPEUTIC EXERCISES: CPT

## 2023-12-05 NOTE — PROGRESS NOTES
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 Has been doing HEP.     Pain: 0/10 R hip      Objective:   Taken from IE:  Observation/Skin: Leg length: R shorter (mild), significant thoracic kyphosis  Palpation: TTP anterior hip, HF and prox quad  Sensation: intact light touch BLE    AROM (PROM): (* denotes performed with pain)  Hip   Flexion: R (90); L (90)  Extension: R 0; L 20  Abduction: R 0; L NT  ER: R (30); L (30)  IR: R (15); L (15)       Accessory motion: hypomobile hip with AP   Lumbar spine in flexion, hypomobile    Flexibility: (min, mod, sev tightness)  Hamstrings: R sev; L sev  Quads: R sev; L sev  Hip Flexor: Rs sev, L mod    Strength/MMT: (* denotes performed with pain)  Hip Knee   Flexion: R 3+/5; L 4+/5  Extension: R 3/5; L 3+/5  Abduction: R -3-/5; L 4/5  ER: R 3/5; L 4/5  IR: R 3+/5; L 4/5 Flexion: R 5-/5; L 5/5  Extension: R 5-/5; L 5/5        Balance: SLS: R <1 second, L 2 sec - no UE support; minimal R trendelenburg   30 seconds without issue/pain with UE support    Functional Mobility:  5x sit<>stand: unable to complete 5; does complete 3 reps from elevated surface with hands on knees  6MWT: with SBQC, unable, fatigues [id number]'       Gait: pt ambulates on level ground with assistive device of RW, quick makenzie, mild R trendelenburg with slightly decreased R hip ext in push-off  With SBQC, step to mechanics, slower speed, decreased stance phase R, decreased hip ext R, R knee held in flexion, and stooped posture/forward lean,     Assessment: Hip hypomobility addressed with manual intervention today; would benefit from continued work on hip flexion to improve seated posture. Improving hip strength; able to perform SLS activities with UE support on RLE but does fatigue. Smoother gait pattern today with light use of SPC; will continue to progress in future sessions. Goals:   Goals: (To be met in 12-16 visits)   Pt will have improved hip AROM Flex to at least 100 deg and ABD to 30 deg to be able to don/doff shoes and perform car transfers without difficulty  Pt will improve hip ABD and ER strength to at least 3+/5 or 4/5 to increase ease with standing and walking   Pt will demonstrate improved SLS to >3 seconds ARELI to promote safety and decrease risk of falls on uneven surfaces such as grass  Pt will be able to complete a 6MWT with SPC without LOB to demonstrate improved tolerance to upright needed for community and household ambulation  Pt will be able to squat to  light objects around the house with <2/10 hip pain   Pt will complete 5xSTS to demonstrate improved LE strength needed for daily transfers  Pt will improve functional hip strength to report ability to ascend/descend 1 flight of stairs reciprocally with minimal use of handrail  Pt will be independent and compliant with comprehensive HEP to maintain progress achieved in PT    Plan: Patient will be seen for 2 x/week or a total of 12-16 visits over a 90 day period. Treatment will include: Gait training, Manual Therapy, Neuromuscular Re-education, Therapeutic Activities, Therapeutic Exercise, and Home Exercise Program instruction  Date: 12/5/2023  TX#: 2/12-16 Date:                 TX#: 3/ Date:                 TX#: 4/ Date:                 TX#: 5/ Date:    Tx#: 6/   MT: 10 mins  - PROM R hip  - inf mob R hip, Gr III - stiff  - LAD R hip (minimal)  - STM R quad       TE: 19 mins  - NuStep, L5, x10\" - UE/LE  - DL shuttle, 50#, x20  - SL shuttle, 50#, 2x10 B  - STS with UE support, x10  - HF/quad stretch over EOB, R  - LAQ over EOB, 3#, x10 R       NR: 6 mins  - stand gliders, 3-way with 3# ankle weight, x5 ea B, 1UE  - standing hip ext glider 3# ankle weight, x10 B, BUE         GT: 8 mins  - ambulation with SBQC, CGA for safety, x3 large laps no rest break       HEP: SLR flex, clamshells, hip ext in prone, mini squat, toe taps for first step, practice ambulating with SBQC    Charges: 1MT, 1TE, 1GT       Total Timed Treatment: 43 min  Total Treatment Time: 43 min

## 2023-12-07 ENCOUNTER — APPOINTMENT (OUTPATIENT)
Dept: PHYSICAL THERAPY | Facility: HOSPITAL | Age: 83
End: 2023-12-07
Attending: ORTHOPAEDIC SURGERY
Payer: MEDICARE

## 2023-12-07 ENCOUNTER — HOME HEALTH CHARGES (OUTPATIENT)
Dept: INTERNAL MEDICINE CLINIC | Facility: CLINIC | Age: 83
End: 2023-12-07

## 2023-12-07 DIAGNOSIS — D63.1 ANEMIA IN CHRONIC KIDNEY DISEASE, UNSPECIFIED CKD STAGE: ICD-10-CM

## 2023-12-07 DIAGNOSIS — M47.27 OTHER SPONDYLOSIS WITH RADICULOPATHY, LUMBOSACRAL REGION: ICD-10-CM

## 2023-12-07 DIAGNOSIS — E53.8 DEFICIENCY OF OTHER SPECIFIED B GROUP VITAMINS: ICD-10-CM

## 2023-12-07 DIAGNOSIS — M35.03 SJOGREN'S SYNDROME WITH MYOPATHY (HCC): ICD-10-CM

## 2023-12-07 DIAGNOSIS — M35.3 POLYMYALGIA RHEUMATICA (HCC): ICD-10-CM

## 2023-12-07 DIAGNOSIS — G47.33 OBSTRUCTIVE SLEEP APNEA (ADULT) (PEDIATRIC): ICD-10-CM

## 2023-12-07 DIAGNOSIS — Z87.891 PERSONAL HISTORY OF NICOTINE DEPENDENCE: ICD-10-CM

## 2023-12-07 DIAGNOSIS — D62 ACUTE POSTHEMORRHAGIC ANEMIA: ICD-10-CM

## 2023-12-07 DIAGNOSIS — S91.301D OPEN WOUND OF RIGHT FOOT, SUBSEQUENT ENCOUNTER: ICD-10-CM

## 2023-12-07 DIAGNOSIS — I25.10 ATHEROSCLEROSIS OF NATIVE CORONARY ARTERY WITHOUT ANGINA PECTORIS, UNSPECIFIED WHETHER NATIVE OR TRANSPLANTED HEART: ICD-10-CM

## 2023-12-07 DIAGNOSIS — E78.5 HYPERLIPIDEMIA, UNSPECIFIED HYPERLIPIDEMIA TYPE: ICD-10-CM

## 2023-12-07 DIAGNOSIS — Z95.5 PRESENCE OF CORONARY ANGIOPLASTY IMPLANT AND GRAFT: ICD-10-CM

## 2023-12-07 DIAGNOSIS — Z96.641 PRESENCE OF RIGHT ARTIFICIAL HIP JOINT: ICD-10-CM

## 2023-12-07 DIAGNOSIS — R39.15 URGENCY OF URINATION: ICD-10-CM

## 2023-12-07 DIAGNOSIS — Z95.1 PRESENCE OF AORTOCORONARY BYPASS GRAFT: ICD-10-CM

## 2023-12-07 DIAGNOSIS — Z91.81 HISTORY OF FALLING: ICD-10-CM

## 2023-12-07 DIAGNOSIS — N18.9 ANEMIA IN CHRONIC KIDNEY DISEASE, UNSPECIFIED CKD STAGE: ICD-10-CM

## 2023-12-07 DIAGNOSIS — K22.70 BARRETT'S ESOPHAGUS WITHOUT DYSPLASIA: ICD-10-CM

## 2023-12-07 DIAGNOSIS — Z47.1 AFTERCARE FOLLOWING JOINT REPLACEMENT SURGERY, UNSPECIFIED JOINT: Primary | ICD-10-CM

## 2023-12-07 DIAGNOSIS — N18.4 CHRONIC KIDNEY DISEASE, STAGE IV (SEVERE) (HCC): ICD-10-CM

## 2023-12-07 DIAGNOSIS — M85.80 OTHER SPECIFIED DISORDERS OF BONE DENSITY AND STRUCTURE, UNSPECIFIED SITE: ICD-10-CM

## 2023-12-07 DIAGNOSIS — H91.90 HEARING LOSS, UNSPECIFIED HEARING LOSS TYPE, UNSPECIFIED LATERALITY: ICD-10-CM

## 2023-12-07 DIAGNOSIS — E55.9 VITAMIN D DEFICIENCY, UNSPECIFIED: ICD-10-CM

## 2023-12-07 DIAGNOSIS — I12.9 HYPERTENSIVE KIDNEY DISEASE WITH CHRONIC KIDNEY DISEASE, STAGE 1-4 OR UNSPECIFIED CHRONIC KIDNEY DISEASE: ICD-10-CM

## 2023-12-08 ENCOUNTER — HOSPITAL ENCOUNTER (OUTPATIENT)
Dept: BONE DENSITY | Facility: HOSPITAL | Age: 83
Discharge: HOME | End: 2023-12-08
Attending: INTERNAL MEDICINE
Payer: MEDICARE

## 2023-12-08 DIAGNOSIS — S22.080D COMPRESSION FRACTURE OF T12 VERTEBRA WITH ROUTINE HEALING, SUBSEQUENT ENCOUNTER: ICD-10-CM

## 2023-12-08 DIAGNOSIS — M54.17 LUMBOSACRAL RADICULOPATHY AT L2: ICD-10-CM

## 2023-12-08 PROCEDURE — 77080 DXA BONE DENSITY AXIAL: CPT | Performed by: INTERNAL MEDICINE

## 2023-12-11 ENCOUNTER — OFFICE VISIT (OUTPATIENT)
Dept: PHYSICAL THERAPY | Facility: HOSPITAL | Age: 83
End: 2023-12-11
Attending: ORTHOPAEDIC SURGERY
Payer: MEDICARE

## 2023-12-11 PROCEDURE — 97112 NEUROMUSCULAR REEDUCATION: CPT

## 2023-12-11 PROCEDURE — 97110 THERAPEUTIC EXERCISES: CPT

## 2023-12-11 PROCEDURE — 97116 GAIT TRAINING THERAPY: CPT

## 2023-12-11 NOTE — PROGRESS NOTES
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 \" Denies pain currently. Had follow up with MD and was cleared to drive. Reports xray \"looked good\" with no issues.     Pain: 0/10 R hip      Objective:   12/11/23:  TUG: with SPC, 45 seconds   With SBQC, 29 seconds   With RW, 22 seconds    ---------------  Taken from IE:  Observation/Skin: Leg length: R shorter (mild), significant thoracic kyphosis  Palpation: TTP anterior hip, HF and prox quad  Sensation: intact light touch BLE    AROM (PROM): (* denotes performed with pain)  Hip   Flexion: R (90); L (90)  Extension: R 0; L 20  Abduction: R 0; L NT  ER: R (30); L (30)  IR: R (15); L (15)       Accessory motion: hypomobile hip with AP   Lumbar spine in flexion, hypomobile    Flexibility: (min, mod, sev tightness)  Hamstrings: R sev; L sev  Quads: R sev; L sev  Hip Flexor: Rs sev, L mod    Strength/MMT: (* denotes performed with pain)  Hip Knee   Flexion: R 3+/5; L 4+/5  Extension: R 3/5; L 3+/5  Abduction: R -3-/5; L 4/5  ER: R 3/5; L 4/5  IR: R 3+/5; L 4/5 Flexion: R 5-/5; L 5/5  Extension: R 5-/5; L 5/5        Balance: SLS: R <1 second, L 2 sec - no UE support; minimal R trendelenburg   30 seconds without issue/pain with UE support    Functional Mobility:  5x sit<>stand: unable to complete 5; does complete 3 reps from elevated surface with hands on knees  6MWT: with SBQC, unable, fatigues [id number]         Gait: pt ambulates on level ground with assistive device of RW, quick makenzie, mild R trendelenburg with slightly decreased R hip ext in push-off  With SBQC, step to mechanics, slower speed, decreased stance phase R, decreased hip ext R, R knee held in flexion, and stooped posture/forward lean,     Assessment: Improving LE strength with quickened gait using SBQC. Trial of SPC today but will less stability. May use SBQC at home to continue to work on mobility. Difficulty with full RLE Wb'ing with retro stepping. Would benefit will continued prox LE strengthening to improve stability with upright activities. Seated rest breaks needed d/t LE fatigue. Overall progressing well. Goals:   Goals: (To be met in 12-16 visits)   Pt will have improved hip AROM Flex to at least 100 deg and ABD to 30 deg to be able to don/doff shoes and perform car transfers without difficulty  Pt will improve hip ABD and ER strength to at least 3+/5 or 4/5 to increase ease with standing and walking   Pt will demonstrate improved SLS to >3 seconds ARELI to promote safety and decrease risk of falls on uneven surfaces such as grass  Pt will be able to complete a 6MWT with SPC without LOB to demonstrate improved tolerance to upright needed for community and household ambulation  Pt will be able to squat to  light objects around the house with <2/10 hip pain   Pt will complete 5xSTS to demonstrate improved LE strength needed for daily transfers  Pt will improve functional hip strength to report ability to ascend/descend 1 flight of stairs reciprocally with minimal use of handrail  Pt will be independent and compliant with comprehensive HEP to maintain progress achieved in PT    Plan: Patient will be seen for 2 x/week or a total of 12-16 visits over a 90 day period.   Treatment will include: Gait training, Manual Therapy, Neuromuscular Re-education, Therapeutic Activities, Therapeutic Exercise, and Home Exercise Program instruction  Work on manual mobilizations, consider prone, hip strength and cont ambulation  Date: 12/5/2023  TX#: 2/12-16 Date:  12/11/23      TX#: 3/12-16 Date:                 TX#: 4/ Date:                 TX#: 5/ Date:    Tx#: 6/   MT: 10 mins  - PROM R hip  - inf mob R hip, Gr III - stiff  - LAD R hip (minimal)  - STM R quad       TE: 19 mins  - NuStep, L5, x10\" - UE/LE  - DL shuttle, 50#, x20  - SL shuttle, 50#, 2x10 B  - STS with UE support, x10  - HF/quad stretch over EOB, R  - LAQ over EOB, 3#, x10 R TE: 15 mins  - seated FF with SB, x20 (for hip flex)  - seated hip abd against ring, x20 (upright posture)  - seated hip add against ring, x20 (upright posture)        NR: 6 mins  - stand gliders, 3-way with 3# ankle weight, x5 ea B, 1UE  - standing hip ext glider 3# ankle weight, x10 B, BUE   NR: 15 mins  - seated cone tapping with 5# ankle weight, 3x10 R (2 cones)  - side stepping BUE on rail, x2 laps 40' 1 way  - retro stepping, 1UE on rail, 2x40\"      GT: 8 mins  - ambulation with SBQC, CGA for safety, x3 large laps no rest break GT: 15 mins  - SBQC, x2 large laps  - TUG with various AD  - weaving through cones wtih SBQC, 3 laps 2         HEP: SLR flex, clamshells, hip ext in prone, mini squat, toe taps for first step, practice ambulating with SBQC    Charges: 1TE, 1NR, 1GT       Total Timed Treatment: 45 min  Total Treatment Time: 45 min

## 2023-12-12 PROBLEM — I12.9 HYPERTENSIVE CHRONIC KIDNEY DISEASE W STG 1-4/UNSP CHR KDNY: Status: ACTIVE | Noted: 2023-01-01

## 2023-12-12 PROBLEM — S91.301D UNSPECIFIED OPEN WOUND, RIGHT FOOT, SUBSEQUENT ENCOUNTER: Status: ACTIVE | Noted: 2023-11-01

## 2023-12-12 PROBLEM — E78.5 HYPERLIPIDEMIA, UNSPECIFIED: Status: ACTIVE | Noted: 2023-01-01

## 2023-12-12 PROBLEM — M35.03: Status: ACTIVE | Noted: 2023-11-05

## 2023-12-12 PROBLEM — H91.90 UNSPECIFIED HEARING LOSS, UNSPECIFIED EAR: Status: ACTIVE | Noted: 2023-11-05

## 2023-12-12 PROBLEM — Z96.651 AFTERCARE FOLLOWING RIGHT KNEE JOINT REPLACEMENT SURGERY: Status: ACTIVE | Noted: 2023-11-05

## 2023-12-12 PROBLEM — N18.4 CHRONIC KIDNEY DISEASE, STAGE 4 (SEVERE) (HCC): Status: ACTIVE | Noted: 2023-01-01

## 2023-12-12 PROBLEM — M87.9 OSTEONECROSIS OF RIGHT HIP (HCC): Status: ACTIVE | Noted: 2023-10-21

## 2023-12-12 PROBLEM — N18.9 ANEMIA IN CHRONIC KIDNEY DISEASE: Status: ACTIVE | Noted: 2023-11-05

## 2023-12-12 PROBLEM — K22.70 BARRETT ESOPHAGUS: Status: ACTIVE | Noted: 2023-11-05

## 2023-12-12 PROBLEM — D62 ACUTE POSTHEMORRHAGIC ANEMIA: Status: ACTIVE | Noted: 2023-11-05

## 2023-12-12 PROBLEM — R39.15 URGENCY OF URINATION: Status: ACTIVE | Noted: 2023-11-05

## 2023-12-12 PROBLEM — D63.1 ANEMIA IN CHRONIC KIDNEY DISEASE: Status: ACTIVE | Noted: 2023-11-05

## 2023-12-12 PROBLEM — F45.42 PAIN DISORDER WITH PSYCHOLOGICAL FACTORS: Status: ACTIVE | Noted: 2023-11-01

## 2023-12-12 PROBLEM — M35.3 POLYMYALGIA RHEUMATICA (HCC): Status: ACTIVE | Noted: 2023-01-01

## 2023-12-12 PROBLEM — I25.10 ATHSCL HEART DISEASE OF NATIVE CORONARY ARTERY W/O ANG PCTRS: Status: ACTIVE | Noted: 2023-01-01

## 2023-12-12 PROBLEM — E55.9 VITAMIN D DEFICIENCY, UNSPECIFIED: Status: ACTIVE | Noted: 2023-11-05

## 2023-12-12 PROBLEM — F09 COGNITIVE DYSFUNCTION: Status: ACTIVE | Noted: 2023-11-01

## 2023-12-12 PROBLEM — Z96.641 PRESENCE OF RIGHT ARTIFICIAL HIP JOINT: Status: ACTIVE | Noted: 2023-11-05

## 2023-12-12 PROBLEM — G47.33 OBSTRUCTIVE SLEEP APNEA (ADULT) (PEDIATRIC): Status: ACTIVE | Noted: 2023-11-05

## 2023-12-12 PROBLEM — M87.051 AVASCULAR NECROSIS OF FEMORAL HEAD, RIGHT (HCC): Status: ACTIVE | Noted: 2023-10-21

## 2023-12-12 PROBLEM — Z95.5 PRESENCE OF CORONARY ANGIOPLASTY IMPLANT AND GRAFT: Status: ACTIVE | Noted: 2023-01-01

## 2023-12-12 PROBLEM — M47.27 OTHER SPONDYLOSIS WITH RADICULOPATHY, LUMBOSACRAL REGION: Status: ACTIVE | Noted: 2023-11-05

## 2023-12-12 PROBLEM — M85.80 OTHER SPECIFIED DISORDERS OF BONE DENSITY AND STRUCTURE, UNSPECIFIED SITE: Status: ACTIVE | Noted: 2023-11-05

## 2023-12-12 PROBLEM — Z47.1 AFTERCARE FOLLOWING RIGHT KNEE JOINT REPLACEMENT SURGERY: Status: ACTIVE | Noted: 2023-11-05

## 2023-12-12 PROBLEM — E53.8 DEFICIENCY OF OTHER SPECIFIED B GROUP VITAMINS: Status: ACTIVE | Noted: 2023-11-05

## 2023-12-13 ENCOUNTER — APPOINTMENT (OUTPATIENT)
Dept: PHYSICAL THERAPY | Facility: HOSPITAL | Age: 83
End: 2023-12-13
Attending: ORTHOPAEDIC SURGERY
Payer: MEDICARE

## 2023-12-13 ENCOUNTER — OFFICE VISIT (OUTPATIENT)
Dept: INTERNAL MEDICINE CLINIC | Facility: CLINIC | Age: 83
End: 2023-12-13
Payer: MEDICARE

## 2023-12-13 VITALS
HEART RATE: 78 BPM | OXYGEN SATURATION: 99 % | BODY MASS INDEX: 21 KG/M2 | TEMPERATURE: 97 F | DIASTOLIC BLOOD PRESSURE: 70 MMHG | WEIGHT: 145 LBS | SYSTOLIC BLOOD PRESSURE: 124 MMHG

## 2023-12-13 DIAGNOSIS — Z96.641 HISTORY OF TOTAL RIGHT HIP REPLACEMENT: ICD-10-CM

## 2023-12-13 DIAGNOSIS — E44.0 MODERATE PROTEIN-CALORIE MALNUTRITION (HCC): ICD-10-CM

## 2023-12-13 DIAGNOSIS — M35.3 PMR (POLYMYALGIA RHEUMATICA) (HCC): Primary | ICD-10-CM

## 2023-12-13 PROCEDURE — 99214 OFFICE O/P EST MOD 30 MIN: CPT | Performed by: INTERNAL MEDICINE

## 2023-12-13 RX ORDER — MELATONIN
325
COMMUNITY

## 2023-12-18 ENCOUNTER — OFFICE VISIT (OUTPATIENT)
Dept: PHYSICAL THERAPY | Facility: HOSPITAL | Age: 83
End: 2023-12-18
Attending: ORTHOPAEDIC SURGERY
Payer: MEDICARE

## 2023-12-18 PROCEDURE — 97112 NEUROMUSCULAR REEDUCATION: CPT

## 2023-12-18 PROCEDURE — 97140 MANUAL THERAPY 1/> REGIONS: CPT

## 2023-12-18 PROCEDURE — 97116 GAIT TRAINING THERAPY: CPT

## 2023-12-18 PROCEDURE — 97110 THERAPEUTIC EXERCISES: CPT

## 2023-12-18 NOTE — PROGRESS NOTES
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 Has some pain with getting ouf of bed and first steps in the AM but then pain resolves with movement. No pain currently.     Pain: 0/10 R hip      Objective:   12/11/23:  TUG: with SPC, 45 seconds   With SBQC, 29 seconds   With RW, 22 seconds    ---------------  Taken from IE:  Observation/Skin: Leg length: R shorter (mild), significant thoracic kyphosis  Palpation: TTP anterior hip, HF and prox quad  Sensation: intact light touch BLE    AROM (PROM): (* denotes performed with pain)  Hip   Flexion: R (90); L (90)  Extension: R 0; L 20  Abduction: R 0; L NT  ER: R (30); L (30)  IR: R (15); L (15)       Accessory motion: hypomobile hip with AP   Lumbar spine in flexion, hypomobile    Flexibility: (min, mod, sev tightness)  Hamstrings: R sev; L sev  Quads: R sev; L sev  Hip Flexor: Rs sev, L mod    Strength/MMT: (* denotes performed with pain)  Hip Knee   Flexion: R 3+/5; L 4+/5  Extension: R 3/5; L 3+/5  Abduction: R -3-/5; L 4/5  ER: R 3/5; L 4/5  IR: R 3+/5; L 4/5 Flexion: R 5-/5; L 5/5  Extension: R 5-/5; L 5/5        Balance: SLS: R <1 second, L 2 sec - no UE support; minimal R trendelenburg   30 seconds without issue/pain with UE support    Functional Mobility:  5x sit<>stand: unable to complete 5; does complete 3 reps from elevated surface with hands on knees  6MWT: with SBQC, unable, fatigues [id number]         Gait: pt ambulates on level ground with assistive device of RW, quick makenzie, mild R trendelenburg with slightly decreased R hip ext in push-off  With SBQC, step to mechanics, slower speed, decreased stance phase R, decreased hip ext R, R knee held in flexion, and stooped posture/forward lean,     Assessment: Progressing with gait with less R trendelenburg. Added to HEP to continue to work on R glute strength. Initiated stepping without UE support in // bars today. Goals:   Goals: (To be met in 12-16 visits)   Pt will have improved hip AROM Flex to at least 100 deg and ABD to 30 deg to be able to don/doff shoes and perform car transfers without difficulty  Pt will improve hip ABD and ER strength to at least 3+/5 or 4/5 to increase ease with standing and walking   Pt will demonstrate improved SLS to >3 seconds ARELI to promote safety and decrease risk of falls on uneven surfaces such as grass  Pt will be able to complete a 6MWT with SPC without LOB to demonstrate improved tolerance to upright needed for community and household ambulation  Pt will be able to squat to  light objects around the house with <2/10 hip pain   Pt will complete 5xSTS to demonstrate improved LE strength needed for daily transfers  Pt will improve functional hip strength to report ability to ascend/descend 1 flight of stairs reciprocally with minimal use of handrail  Pt will be independent and compliant with comprehensive HEP to maintain progress achieved in PT    Plan: Patient will be seen for 2 x/week or a total of 12-16 visits over a 90 day period. Treatment will include: Gait training, Manual Therapy, Neuromuscular Re-education, Therapeutic Activities, Therapeutic Exercise, and Home Exercise Program instruction  Work on manual mobilizations, consider prone, hip strength and cont ambulation  Date: 12/5/2023  TX#: 2/12-16 Date:  12/11/23      TX#: 3/12-16 Date:    12/18/23             TX#: 4/12-16 Date:                 TX#: 5/ Date:    Tx#: 6/   MT: 10 mins  - PROM R hip  - inf mob R hip, Gr III - stiff  - LAD R hip (minimal)  - STM R quad  MT: 10 mins  - PROM R hip  - inf hip mob w/ strap R     TE: 19 mins  - NuStep, L5, x10\" - UE/LE  - DL shuttle, 50#, x20  - SL shuttle, 50#, 2x10 B  - STS with UE support, x10  - HF/quad stretch over EOB, R  - LAQ over EOB, 3#, x10 R TE: 15 mins  - seated FF with SB, x20 (for hip flex)  - seated hip abd against ring, x20 (upright posture)  - seated hip add against ring, x20 (upright posture)   TE: 10 mins  - BKFO, YTB, x15 B  - abd bridges with YTB at knees, x20  - s/l hip abd SLR, 3x10 R (1 set with YTB at knees - difficult)     NR: 6 mins  - stand gliders, 3-way with 3# ankle weight, x5 ea B, 1UE  - standing hip ext glider 3# ankle weight, x10 B, BUE   NR: 15 mins  - seated cone tapping with 5# ankle weight, 3x10 R (2 cones)  - side stepping BUE on rail, x2 laps 40' 1 way  - retro stepping, 1UE on rail, 2x40\" NR: 10 mins  - STS, 2x5 from low plinth, UE on knees  - low hurdles, recip, 1-2UE  - stepping over airex in // bars, x5 laps       GT: 8 mins  - ambulation with SBQC, CGA for safety, x3 large laps no rest break GT: 15 mins  - SBQC, x2 large laps  - TUG with various AD  - weaving through cones wtih SBQC, 3 laps 2    GT: 15 mins  - ambulation wiht SBQC, x2 laps  - ambulation in // bars with out UE support  - retro stepping 1UE, x2 long laps  - side stepping, BUE x2 long laps     HEP: SLR flex, clamshells, hip ext in prone, mini squat, toe taps for first step, practice ambulating with [employer]  12/18/23: STS with low LLE lift, tandem stand (by counter)  Charges: 1MT, 1TE, 1NR, 1GT       Total Timed Treatment: 45 min  Total Treatment Time: 45 min

## 2023-12-20 ENCOUNTER — OFFICE VISIT (OUTPATIENT)
Dept: PHYSICAL THERAPY | Facility: HOSPITAL | Age: 83
End: 2023-12-20
Attending: ORTHOPAEDIC SURGERY
Payer: MEDICARE

## 2023-12-20 PROCEDURE — 97110 THERAPEUTIC EXERCISES: CPT

## 2023-12-20 PROCEDURE — 97112 NEUROMUSCULAR REEDUCATION: CPT

## 2023-12-20 PROCEDURE — 97116 GAIT TRAINING THERAPY: CPT

## 2023-12-20 NOTE — PROGRESS NOTES
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      Pain: 0/10 R hip      Objective:   12/11/23:  TUG: with SPC, 45 seconds   With SBQC, 29 seconds   With RW, 22 seconds    ---------------  Taken from IE:  Observation/Skin: Leg length: R shorter (mild), significant thoracic kyphosis  Palpation: TTP anterior hip, HF and prox quad  Sensation: intact light touch BLE    AROM (PROM): (* denotes performed with pain)  Hip   Flexion: R (90); L (90)  Extension: R 0; L 20  Abduction: R 0; L NT  ER: R (30); L (30)  IR: R (15); L (15)       Accessory motion: hypomobile hip with AP   Lumbar spine in flexion, hypomobile    Flexibility: (min, mod, sev tightness)  Hamstrings: R sev; L sev  Quads: R sev; L sev  Hip Flexor: Rs sev, L mod    Strength/MMT: (* denotes performed with pain)  Hip Knee   Flexion: R 3+/5; L 4+/5  Extension: R 3/5; L 3+/5  Abduction: R -3-/5; L 4/5  ER: R 3/5; L 4/5  IR: R 3+/5; L 4/5 Flexion: R 5-/5; L 5/5  Extension: R 5-/5; L 5/5        Balance: SLS: R <1 second, L 2 sec - no UE support; minimal R trendelenburg   30 seconds without issue/pain with UE support    Functional Mobility:  5x sit<>stand: unable to complete 5; does complete 3 reps from elevated surface with hands on knees  6MWT: with SBQC, unable, fatigues [id number]         Gait: pt ambulates on level ground with assistive device of RW, quick makenzie, mild R trendelenburg with slightly decreased R hip ext in push-off  With SBQC, step to mechanics, slower speed, decreased stance phase R, decreased hip ext R, R knee held in flexion, and stooped posture/forward lean,     Assessment: Progressing with gait, improved stability using SQBC. Encouraged patient to use SBQC primarily at home instead of RW. STS transition is smoother today from lower surface with less UE support demonstrating improving LE strength. Does continue to be limited by fatigue; will continue to work on proximal LE strength for support with upright activities such as walking. Goals:   Goals: (To be met in 12-16 visits)   Pt will have improved hip AROM Flex to at least 100 deg and ABD to 30 deg to be able to don/doff shoes and perform car transfers without difficulty  Pt will improve hip ABD and ER strength to at least 3+/5 or 4/5 to increase ease with standing and walking   Pt will demonstrate improved SLS to >3 seconds ARELI to promote safety and decrease risk of falls on uneven surfaces such as grass  Pt will be able to complete a 6MWT with SPC without LOB to demonstrate improved tolerance to upright needed for community and household ambulation  Pt will be able to squat to  light objects around the house with <2/10 hip pain   Pt will complete 5xSTS to demonstrate improved LE strength needed for daily transfers  Pt will improve functional hip strength to report ability to ascend/descend 1 flight of stairs reciprocally with minimal use of handrail  Pt will be independent and compliant with comprehensive HEP to maintain progress achieved in PT    Plan: Patient will be seen for 2 x/week or a total of 12-16 visits over a 90 day period. Treatment will include: Gait training, Manual Therapy, Neuromuscular Re-education, Therapeutic Activities, Therapeutic Exercise, and Home Exercise Program instruction  Work on manual mobilizations, consider prone, hip strength and cont ambulation  Date: 12/5/2023  TX#: 2/12-16 Date:  12/11/23      TX#: 3/12-16 Date:    12/18/23             TX#: 4/12-16 Date:     12/20/23            TX#: 5/12-16 Date:    Tx#: 6/ MT: 10 mins  - PROM R hip  - inf mob R hip, Gr III - stiff  - LAD R hip (minimal)  - STM R quad  MT: 10 mins  - PROM R hip  - inf hip mob w/ strap R MT: 5 mins  - LAD RLE        TE: 19 mins  - NuStep, L5, x10\" - UE/LE  - DL shuttle, 50#, x20  - SL shuttle, 50#, 2x10 B  - STS with UE support, x10  - HF/quad stretch over EOB, R  - LAQ over EOB, 3#, x10 R TE: 15 mins  - seated FF with SB, x20 (for hip flex)  - seated hip abd against ring, x20 (upright posture)  - seated hip add against ring, x20 (upright posture)   TE: 10 mins  - BKFO, YTB, x15 B  - abd bridges with YTB at knees, x20  - s/l hip abd SLR, 3x10 R (1 set with YTB at knees - difficult) TE: 10 mins  - SLS + hip slider post, 3x20 B, B  - step up/down from 6\" step forw, with SBQC, R-leading      NR: 6 mins  - stand gliders, 3-way with 3# ankle weight, x5 ea B, 1UE  - standing hip ext glider 3# ankle weight, x10 B, BUE   NR: 15 mins  - seated cone tapping with 5# ankle weight, 3x10 R (2 cones)  - side stepping BUE on rail, x2 laps 40' 1 way  - retro stepping, 1UE on rail, 2x40\" NR: 10 mins  - STS, 2x5 from low plinth, UE on knees  - low hurdles, recip, 1-2UE  - stepping over airex in // bars, x5 laps   NR: 15 mins  - STS, 2x10 B (high plinth, 0UE; low plinth BUE)  - seated rocking for glut clearance, 2x10  - step on/over airex, R-leading, x20 in // bars 0-2BUE    GT: 8 mins  - ambulation with SBQC, CGA for safety, x3 large laps no rest break GT: 15 mins  - SBQC, x2 large laps  - TUG with various AD  - weaving through cones wtih SBQC, 3 laps 2    GT: 15 mins  - ambulation wiht SBQC, x2 laps  - ambulation in // bars with out UE support  - retro stepping 1UE, x2 long laps  - side stepping, BUE x2 long laps GT: 15 ins  - ambulation with SBQC weaving through cones and stepping over 6\" step  - ambulation with SBQC x3 large laps  - ambulation wht SQBC up/down ramp x3 laps    HEP: SLR flex, clamshells, hip ext in prone, mini squat, toe taps for first step, practice ambulating with [facility]  12/18/23: STS with low LLE lift, tandem stand (by counter)  12/20/23: bridges, step ups (first stair), ambulate with [facility] primarily  Charges: 1TE, 1NR, 1GT       Total Timed Treatment: 45 min  Total Treatment Time: 45 min

## 2023-12-26 ENCOUNTER — LAB ENCOUNTER (OUTPATIENT)
Dept: LAB | Facility: HOSPITAL | Age: 83
End: 2023-12-26
Attending: INTERNAL MEDICINE
Payer: MEDICARE

## 2023-12-26 DIAGNOSIS — D89.89 KAPPA LIGHT CHAIN DISEASE (HCC): ICD-10-CM

## 2023-12-26 DIAGNOSIS — R29.898 WEAKNESS OF RIGHT LOWER EXTREMITY: ICD-10-CM

## 2023-12-26 DIAGNOSIS — M35.3 PMR (POLYMYALGIA RHEUMATICA) (HCC): ICD-10-CM

## 2023-12-26 DIAGNOSIS — N18.4 CKD (CHRONIC KIDNEY DISEASE) STAGE 4, GFR 15-29 ML/MIN (HCC): ICD-10-CM

## 2023-12-26 DIAGNOSIS — Z98.890 HISTORY OF BACK SURGERY: ICD-10-CM

## 2023-12-26 DIAGNOSIS — R80.9 PROTEINURIA, UNSPECIFIED TYPE: ICD-10-CM

## 2023-12-26 LAB
ALBUMIN SERPL-MCNC: 4 G/DL (ref 3.2–4.8)
ALBUMIN/GLOB SERPL: 1.2 {RATIO} (ref 1–2)
ALP LIVER SERPL-CCNC: 56 U/L
ALT SERPL-CCNC: 16 U/L
ANION GAP SERPL CALC-SCNC: 5 MMOL/L (ref 0–18)
AST SERPL-CCNC: 28 U/L (ref ?–34)
BASOPHILS # BLD AUTO: 0.04 X10(3) UL (ref 0–0.2)
BASOPHILS NFR BLD AUTO: 0.3 %
BILIRUB SERPL-MCNC: 0.4 MG/DL (ref 0.2–1.1)
BILIRUB UR QL: NEGATIVE
BUN BLD-MCNC: 36 MG/DL (ref 9–23)
BUN/CREAT SERPL: 17.8 (ref 10–20)
CALCIUM BLD-MCNC: 9.5 MG/DL (ref 8.7–10.4)
CHLORIDE SERPL-SCNC: 106 MMOL/L (ref 98–112)
CLARITY UR: CLEAR
CO2 SERPL-SCNC: 26 MMOL/L (ref 21–32)
COLOR UR: YELLOW
CREAT BLD-MCNC: 2.02 MG/DL
CREAT UR-SCNC: 88 MG/DL
CRP SERPL-MCNC: 1.9 MG/DL (ref ?–1)
DEPRECATED RDW RBC AUTO: 46.3 FL (ref 35.1–46.3)
EGFRCR SERPLBLD CKD-EPI 2021: 33 ML/MIN/1.73M2 (ref 60–?)
EOSINOPHIL # BLD AUTO: 1.27 X10(3) UL (ref 0–0.7)
EOSINOPHIL NFR BLD AUTO: 10.7 %
ERYTHROCYTE [DISTWIDTH] IN BLOOD BY AUTOMATED COUNT: 12.8 % (ref 11–15)
ERYTHROCYTE [SEDIMENTATION RATE] IN BLOOD: 47 MM/HR
FASTING STATUS PATIENT QL REPORTED: NO
GLOBULIN PLAS-MCNC: 3.3 G/DL (ref 2.8–4.4)
GLUCOSE BLD-MCNC: 101 MG/DL (ref 70–99)
GLUCOSE UR-MCNC: NORMAL MG/DL
HCT VFR BLD AUTO: 32.6 %
HGB BLD-MCNC: 10.3 G/DL
HGB UR QL STRIP.AUTO: NEGATIVE
HYALINE CASTS #/AREA URNS AUTO: PRESENT /LPF
IMM GRANULOCYTES # BLD AUTO: 0.04 X10(3) UL (ref 0–1)
IMM GRANULOCYTES NFR BLD: 0.3 %
KETONES UR-MCNC: NEGATIVE MG/DL
LEUKOCYTE ESTERASE UR QL STRIP.AUTO: NEGATIVE
LYMPHOCYTES # BLD AUTO: 0.6 X10(3) UL (ref 1–4)
LYMPHOCYTES NFR BLD AUTO: 5 %
MCH RBC QN AUTO: 30.9 PG (ref 26–34)
MCHC RBC AUTO-ENTMCNC: 31.6 G/DL (ref 31–37)
MCV RBC AUTO: 97.9 FL
MICROALBUMIN UR-MCNC: 12.2 MG/DL
MICROALBUMIN/CREAT 24H UR-RTO: 138.6 UG/MG (ref ?–30)
MONOCYTES # BLD AUTO: 0.83 X10(3) UL (ref 0.1–1)
MONOCYTES NFR BLD AUTO: 7 %
NEUTROPHILS # BLD AUTO: 9.13 X10 (3) UL (ref 1.5–7.7)
NEUTROPHILS # BLD AUTO: 9.13 X10(3) UL (ref 1.5–7.7)
NEUTROPHILS NFR BLD AUTO: 76.7 %
NITRITE UR QL STRIP.AUTO: NEGATIVE
OSMOLALITY SERPL CALC.SUM OF ELEC: 292 MOSM/KG (ref 275–295)
PH UR: 6 [PH] (ref 5–8)
PLATELET # BLD AUTO: 172 10(3)UL (ref 150–450)
POTASSIUM SERPL-SCNC: 4.5 MMOL/L (ref 3.5–5.1)
PROT SERPL-MCNC: 7.3 G/DL (ref 5.7–8.2)
PROT UR-MCNC: 30 MG/DL
RBC # BLD AUTO: 3.33 X10(6)UL
SODIUM SERPL-SCNC: 137 MMOL/L (ref 136–145)
SP GR UR STRIP: 1.02 (ref 1–1.03)
UROBILINOGEN UR STRIP-ACNC: NORMAL
WBC # BLD AUTO: 11.9 X10(3) UL (ref 4–11)

## 2023-12-26 PROCEDURE — 36415 COLL VENOUS BLD VENIPUNCTURE: CPT

## 2023-12-26 PROCEDURE — 82570 ASSAY OF URINE CREATININE: CPT

## 2023-12-26 PROCEDURE — 81001 URINALYSIS AUTO W/SCOPE: CPT

## 2023-12-26 PROCEDURE — 82043 UR ALBUMIN QUANTITATIVE: CPT

## 2023-12-26 PROCEDURE — 80053 COMPREHEN METABOLIC PANEL: CPT

## 2023-12-26 PROCEDURE — 85025 COMPLETE CBC W/AUTO DIFF WBC: CPT

## 2023-12-26 PROCEDURE — 86140 C-REACTIVE PROTEIN: CPT

## 2023-12-26 PROCEDURE — 85652 RBC SED RATE AUTOMATED: CPT

## 2023-12-27 ENCOUNTER — OFFICE VISIT (OUTPATIENT)
Dept: RHEUMATOLOGY | Facility: CLINIC | Age: 83
End: 2023-12-27
Payer: MEDICARE

## 2023-12-27 VITALS
TEMPERATURE: 98 F | WEIGHT: 148 LBS | BODY MASS INDEX: 21 KG/M2 | SYSTOLIC BLOOD PRESSURE: 134 MMHG | HEART RATE: 82 BPM | DIASTOLIC BLOOD PRESSURE: 72 MMHG | OXYGEN SATURATION: 97 % | RESPIRATION RATE: 18 BRPM

## 2023-12-27 DIAGNOSIS — L85.3 DRY SKIN: ICD-10-CM

## 2023-12-27 DIAGNOSIS — Z95.1 S/P CABG (CORONARY ARTERY BYPASS GRAFT): ICD-10-CM

## 2023-12-27 DIAGNOSIS — Z95.1 PRESENCE OF AORTOCORONARY BYPASS GRAFT: ICD-10-CM

## 2023-12-27 DIAGNOSIS — L29.9 ITCHING: ICD-10-CM

## 2023-12-27 DIAGNOSIS — D89.89 KAPPA LIGHT CHAIN DISEASE (HCC): ICD-10-CM

## 2023-12-27 DIAGNOSIS — M35.3 POLYMYALGIA RHEUMATICA (HCC): Primary | ICD-10-CM

## 2023-12-27 DIAGNOSIS — Z95.2 S/P AVR: ICD-10-CM

## 2023-12-27 DIAGNOSIS — Z96.641 HISTORY OF RIGHT HIP REPLACEMENT: ICD-10-CM

## 2023-12-27 PROCEDURE — 1126F AMNT PAIN NOTED NONE PRSNT: CPT | Performed by: INTERNAL MEDICINE

## 2023-12-27 PROCEDURE — 99214 OFFICE O/P EST MOD 30 MIN: CPT | Performed by: INTERNAL MEDICINE

## 2023-12-27 RX ORDER — LEVOCETIRIZINE DIHYDROCHLORIDE 5 MG/1
5 TABLET, FILM COATED ORAL 2 TIMES DAILY PRN
Qty: 60 TABLET | Refills: 2 | Status: SHIPPED | OUTPATIENT
Start: 2023-12-27

## 2023-12-27 NOTE — PATIENT INSTRUCTIONS
Continue Prednisone 5 mg per day. Off Methotrexate. PMR stable. Xyzal 5 mg twice a day as needed for itching due to dry skin. Use moisturizing lotions. - Aquaphor as needed. Anemia is present due to chronic kidney disease. If Hemoglobin drops below 10 units then a shot can be given to build up your blood by hematology. Recent iron level was stable. No need for iron supplement  Return to office for recheck 3 months.

## 2023-12-28 ENCOUNTER — TELEPHONE (OUTPATIENT)
Dept: INTERNAL MEDICINE CLINIC | Facility: CLINIC | Age: 83
End: 2023-12-28

## 2023-12-28 RX ORDER — METHOTREXATE 2.5 MG/1
5 TABLET ORAL WEEKLY
Qty: 24 TABLET | Refills: 3 | Status: SHIPPED | OUTPATIENT
Start: 2023-12-28

## 2023-12-28 NOTE — TELEPHONE ENCOUNTER
Wife called and stated that Dr. Bond started him on methotrexate 2.5 MG Oral Tab and wife wants to know what is expected to improve with him taking this medication.

## 2023-12-29 ENCOUNTER — HOSPITAL ENCOUNTER (OUTPATIENT)
Dept: CT IMAGING | Facility: HOSPITAL | Age: 83
Discharge: HOME | End: 2023-12-29
Attending: INTERNAL MEDICINE
Payer: MEDICARE

## 2023-12-29 ENCOUNTER — OFFICE VISIT (OUTPATIENT)
Dept: PHYSICAL THERAPY | Facility: HOSPITAL | Age: 83
End: 2023-12-29
Attending: ORTHOPAEDIC SURGERY
Payer: MEDICARE

## 2023-12-29 DIAGNOSIS — R63.4 LOSS OF WEIGHT: ICD-10-CM

## 2023-12-29 PROCEDURE — 97110 THERAPEUTIC EXERCISES: CPT

## 2023-12-29 PROCEDURE — 97112 NEUROMUSCULAR REEDUCATION: CPT

## 2023-12-29 PROCEDURE — 97116 GAIT TRAINING THERAPY: CPT

## 2023-12-29 PROCEDURE — 74176 CT ABD & PELVIS W/O CONTRAST: CPT | Performed by: INTERNAL MEDICINE

## 2023-12-29 NOTE — PROGRESS NOTES
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      Pain: 0/10 R hip      Objective:   12/11/23:  TUG: with SPC, 45 seconds   With SBQC, 29 seconds   With RW, 22 seconds    ---------------  Taken from IE:  Observation/Skin: Leg length: R shorter (mild), significant thoracic kyphosis  Palpation: TTP anterior hip, HF and prox quad  Sensation: intact light touch BLE    AROM (PROM): (* denotes performed with pain)  Hip   Flexion: R (90); L (90)  Extension: R 0; L 20  Abduction: R 0; L NT  ER: R (30); L (30)  IR: R (15); L (15)       Accessory motion: hypomobile hip with AP   Lumbar spine in flexion, hypomobile    Flexibility: (min, mod, sev tightness)  Hamstrings: R sev; L sev  Quads: R sev; L sev  Hip Flexor: Rs sev, L mod    Strength/MMT: (* denotes performed with pain)  Hip Knee   Flexion: R 3+/5; L 4+/5  Extension: R 3/5; L 3+/5  Abduction: R -3-/5; L 4/5  ER: R 3/5; L 4/5  IR: R 3+/5; L 4/5 Flexion: R 5-/5; L 5/5  Extension: R 5-/5; L 5/5        Balance: SLS: R <1 second, L 2 sec - no UE support; minimal R trendelenburg   30 seconds without issue/pain with UE support    Functional Mobility:  5x sit<>stand: unable to complete 5; does complete 3 reps from elevated surface with hands on knees  6MWT: with SBQC, unable, fatigues [id number]'         Gait: pt ambulates on level ground with assistive device of RW, quick makenzie, mild R trendelenburg with slightly decreased R hip ext in push-off  With SBQC, step to mechanics, slower speed, decreased stance phase R, decreased hip ext R, R knee held in flexion, and stooped posture/forward lean,     Assessment: Smooth gait with SBQC. RLE fatigue does continue and limits tolerance to upright activities. Would benefit from continued strengthening. Goals:   Goals: (To be met in 12-16 visits)   Pt will have improved hip AROM Flex to at least 100 deg and ABD to 30 deg to be able to don/doff shoes and perform car transfers without difficulty  Pt will improve hip ABD and ER strength to at least 3+/5 or 4/5 to increase ease with standing and walking   Pt will demonstrate improved SLS to >3 seconds ARELI to promote safety and decrease risk of falls on uneven surfaces such as grass  Pt will be able to complete a 6MWT with SPC without LOB to demonstrate improved tolerance to upright needed for community and household ambulation  Pt will be able to squat to  light objects around the house with <2/10 hip pain   Pt will complete 5xSTS to demonstrate improved LE strength needed for daily transfers  Pt will improve functional hip strength to report ability to ascend/descend 1 flight of stairs reciprocally with minimal use of handrail  Pt will be independent and compliant with comprehensive HEP to maintain progress achieved in PT    Plan: Patient will be seen for 2 x/week or a total of 12-16 visits over a 90 day period.   Treatment will include: Gait training, Manual Therapy, Neuromuscular Re-education, Therapeutic Activities, Therapeutic Exercise, and Home Exercise Program instruction  Work on manual mobilizations, consider prone, hip strength and cont ambulation  Date: 12/5/2023  TX#: 2/12-16 Date:  12/11/23      TX#: 3/12-16 Date:    12/18/23             TX#: 4/12-16 Date:     12/20/23            TX#: 5/12-16 Date: 12/29/23  Tx#: 6/12-16   MT: 10 mins  - PROM R hip  - inf mob R hip, Gr III - stiff  - LAD R hip (minimal)  - STM R quad  MT: 10 mins  - PROM R hip  - inf hip mob w/ strap R MT: 5 mins  - LAD RLE        TE: 19 mins  - NuStep, L5, x10\" - UE/LE  - DL shuttle, 50#, x20  - SL shuttle, 50#, 2x10 B  - STS with UE support, x10  - HF/quad stretch over EOB, R  - LAQ over EOB, 3#, x10 R TE: 15 mins  - seated FF with SB, x20 (for hip flex)  - seated hip abd against ring, x20 (upright posture)  - seated hip add against ring, x20 (upright posture)   TE: 10 mins  - BKFO, YTB, x15 B  - abd bridges with YTB at knees, x20  - s/l hip abd SLR, 3x10 R (1 set with YTB at knees - difficult) TE: 10 mins  - SLS + hip slider post, 3x20 B, B  - step up/down from 6\" step forw, with SBQC, R-leading   TE: 15 mins  - DL shuttle, 75#, x20  - SL shuttle, 50#, 2x10 B  - TRX squat, 2x8  - TrX post lunge, 2x8 B - challenging (R>L)   NR: 6 mins  - stand gliders, 3-way with 3# ankle weight, x5 ea B, 1UE  - standing hip ext glider 3# ankle weight, x10 B, BUE   NR: 15 mins  - seated cone tapping with 5# ankle weight, 3x10 R (2 cones)  - side stepping BUE on rail, x2 laps 40' 1 way  - retro stepping, 1UE on rail, 2x40\" NR: 10 mins  - STS, 2x5 from low plinth, UE on knees  - low hurdles, recip, 1-2UE  - stepping over airex in // bars, x5 laps   NR: 15 mins  - STS, 2x10 B (high plinth, 0UE; low plinth BUE)  - seated rocking for glut clearance, 2x10  - step on/over airex, R-leading, x20 in // bars 0-2BUE NR: 15 mins  - cone taps forw and lateral standing with UE support  - STS from 20\" plinth, 1-2 hands on knees, 3x5  - R SLS on airex + LLE lift, BUE support, for glut activation   GT: 8 mins  - ambulation with SBQC, CGA for safety, x3 large laps no rest break GT: 15 mins  - SBQC, x2 large laps  - TUG with various AD  - weaving through cones wtih SBQC, 3 laps 2    GT: 15 mins  - ambulation wiht SBQC, x2 laps  - ambulation in // bars with out UE support  - retro stepping 1UE, x2 long laps  - side stepping, BUE x2 long laps GT: 15 ins  - ambulation with SBQC weaving through cones and stepping over 6\" step  - ambulation with SBQC x3 large laps  - ambulation with SQBC up/down ramp x3 laps GT: 15 mins  - ambulation with SBQC, x3 large laps, supervision  - ambulatiown th SBQC up/down ramp and up/down 8\" step x4 laps     HEP: SLR flex, clamshells, hip ext in prone, mini squat, toe taps for first step, practice ambulating with [facility]  12/18/23: STS with low LLE lift, tandem stand (by counter)  12/20/23: bridges, step ups (first stair), ambulate with SBQC primarily  Charges: 1TE, 1NR, 1GT       Total Timed Treatment: 45 min  Total Treatment Time: 45 min

## 2024-01-02 ENCOUNTER — OFFICE VISIT (OUTPATIENT)
Dept: PHYSICAL THERAPY | Facility: HOSPITAL | Age: 84
End: 2024-01-02
Attending: ORTHOPAEDIC SURGERY
Payer: MEDICARE

## 2024-01-02 PROCEDURE — 97112 NEUROMUSCULAR REEDUCATION: CPT

## 2024-01-02 PROCEDURE — 97110 THERAPEUTIC EXERCISES: CPT

## 2024-01-02 PROCEDURE — 97116 GAIT TRAINING THERAPY: CPT

## 2024-01-02 NOTE — PROGRESS NOTES
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    Pain: 0/10 R hip   6/10 (R groin with hip abd SLR lower)    Objective:   12/11/23:  TUG: with SPC, 45 seconds   With SBQC, 29 seconds   With RW, 22 seconds    ---------------  Taken from IE:  Observation/Skin: Leg length: R shorter (mild), significant thoracic kyphosis  Palpation: TTP anterior hip, HF and prox quad  Sensation: intact light touch BLE    AROM (PROM): (* denotes performed with pain)  Hip   Flexion: R (90); L (90)  Extension: R 0; L 20  Abduction: R 0; L NT  ER: R (30); L (30)  IR: R (15); L (15)       Accessory motion: hypomobile hip with AP   Lumbar spine in flexion, hypomobile    Flexibility: (min, mod, sev tightness)  Hamstrings: R sev; L sev  Quads: R sev; L sev  Hip Flexor: Rs sev, L mod    Strength/MMT: (* denotes performed with pain)  Hip Knee   Flexion: R 3+/5; L 4+/5  Extension: R 3/5; L 3+/5  Abduction: R -3-/5; L 4/5  ER: R 3/5; L 4/5  IR: R 3+/5; L 4/5 Flexion: R 5-/5; L 5/5  Extension: R 5-/5; L 5/5        Balance: SLS: R <1 second, L 2 sec - no UE support; minimal R trendelenburg   30 seconds without issue/pain with UE support    Functional Mobility:  5x sit<>stand: unable to complete 5; does complete 3 reps from elevated surface with hands on knees  6MWT: with SBQC, unable, fatigues [id number]'         Gait: pt ambulates on level ground with assistive device of RW, quick makenzie, mild R trendelenburg with slightly decreased R hip ext in push-off  With SBQC, step to mechanics, slower speed, decreased stance phase R, decreased hip  ext R, R knee held in flexion, and stooped posture/forward lean,     Assessment: Weakness in R glut contributes to gait impairments with trendelenburg. Updated HEP to continue to addressing weakness at home.     Goals:   Goals: (To be met in 12-16 visits)   Pt will have improved hip AROM Flex to at least 100 deg and ABD to 30 deg to be able to don/doff shoes and perform car transfers without difficulty  Pt will improve hip ABD and ER strength to at least 3+/5 or 4/5 to increase ease with standing and walking   Pt will demonstrate improved SLS to >3 seconds ARELI to promote safety and decrease risk of falls on uneven surfaces such as grass  Pt will be able to complete a 6MWT with SPC without LOB to demonstrate improved tolerance to upright needed for community and household ambulation  Pt will be able to squat to  light objects around the house with <2/10 hip pain   Pt will complete 5xSTS to demonstrate improved LE strength needed for daily transfers  Pt will improve functional hip strength to report ability to ascend/descend 1 flight of stairs reciprocally with minimal use of handrail  Pt will be independent and compliant with comprehensive HEP to maintain progress achieved in PT    Plan: Patient will be seen for 2 x/week or a total of 12-16 visits over a 90 day period.  Treatment will include: Gait training, Manual Therapy, Neuromuscular Re-education, Therapeutic Activities, Therapeutic Exercise, and Home Exercise Program instruction  Work on manual mobilizations, consider prone, hip strength and cont ambulation  Date: 12/5/2023  TX#: 2/12-16 Date:  12/11/23      TX#: 3/12-16 Date:    12/18/23             TX#: 4/12-16 Date:     12/20/23            TX#: 5/12-16 Date: 12/29/23  Tx#: 6/12-16 Date: 1/2/24  Tx: 7/12-16   MT: 10 mins  - PROM R hip  - inf mob R hip, Gr III - stiff  - LAD R hip (minimal)  - STM R quad  MT: 10 mins  - PROM R hip  - inf hip mob w/ strap R MT: 5 mins  - LAD RLE         TE: 19 mins  -  NuStep, L5, x10\" - UE/LE  - DL shuttle, 50#, x20  - SL shuttle, 50#, 2x10 B  - STS with UE support, x10  - HF/quad stretch over EOB, R  - LAQ over EOB, 3#, x10 R TE: 15 mins  - seated FF with SB, x20 (for hip flex)  - seated hip abd against ring, x20 (upright posture)  - seated hip add against ring, x20 (upright posture)   TE: 10 mins  - BKFO, YTB, x15 B  - abd bridges with YTB at knees, x20  - s/l hip abd SLR, 3x10 R (1 set with YTB at knees - difficult) TE: 10 mins  - SLS + hip slider post, 3x20 B, B  - step up/down from 6\" step forw, with SBQC, R-leading   TE: 15 mins  - DL shuttle, 75#, x20  - SL shuttle, 50#, 2x10 B  - TRX squat, 2x8  - TrX post lunge, 2x8 B - challenging (R>L) TE: 20 mins  - abd bridges with ring, x30  - s/l clamshells, 2#/5#, 2x20 ea R  - hip abd then flex in s/l x6 - groin pain, painful to lower*  - s/l hip abd SLR, x10 R  - inverse clamshell, 2x10 R - limited ROM  - DL shuttle, 75#. x20  - SL shuttle, 50#, 2x10 B   NR: 6 mins  - stand gliders, 3-way with 3# ankle weight, x5 ea B, 1UE  - standing hip ext glider 3# ankle weight, x10 B, BUE   NR: 15 mins  - seated cone tapping with 5# ankle weight, 3x10 R (2 cones)  - side stepping BUE on rail, x2 laps 40' 1 way  - retro stepping, 1UE on rail, 2x40\" NR: 10 mins  - STS, 2x5 from low plinth, UE on knees  - low hurdles, recip, 1-2UE  - stepping over airex in // bars, x5 laps   NR: 15 mins  - STS, 2x10 B (high plinth, 0UE; low plinth BUE)  - seated rocking for glut clearance, 2x10  - step on/over airex, R-leading, x20 in // bars 0-2BUE NR: 15 mins  - cone taps forw and lateral standing with UE support  - STS from 20\" plinth, 1-2 hands on knees, 3x5  - R SLS on airex + LLE lift, BUE support, for glut activation NR: 15 mins  - R SLS + L stepping forw/back, 1UE, x10 -has difficulty feeling glut activation  - SLS RLE on airex with UE support - for glute activation  - R step on/over airex, 1UE, x1 laps  - AP rocker board, 2-0UE (CGA)   GT: 8 mins  -  ambulation with SBQC, CGA for safety, x3 large laps no rest break GT: 15 mins  - SBQC, x2 large laps  - TUG with various AD  - weaving through cones wtih SBQC, 3 laps 2    GT: 15 mins  - ambulation wiht SBQC, x2 laps  - ambulation in // bars with out UE support  - retro stepping 1UE, x2 long laps  - side stepping, BUE x2 long laps GT: 15 ins  - ambulation with SBQC weaving through cones and stepping over 6\" step  - ambulation with SBQC x3 large laps  - ambulation with SQBC up/down ramp x3 laps GT: 15 mins  - ambulation with SBQC, x3 large laps, supervision  - ambulatiown th SBQC up/down ramp and up/down 8\" step x4 laps   GT: 10 mins  - ambulation SBQC x2 large laps  - ambulation with B gold clubs for UE swing, x1 large lap  - ramp x1, SBQC   HEP: SLR flex, clamshells, hip ext in prone, mini squat, toe taps for first step, practice ambulating with SBQC  12/18/23: STS with low LLE lift, tandem stand (by counter)  12/20/23: bridges, step ups (first stair), ambulate with SBQC primarily  1/2/24: R SLS, R s/l hip abd SLR  Charges: 1TE, 1NR, 1GT       Total Timed Treatment: 45 min  Total Treatment Time: 45 min

## 2024-01-03 ENCOUNTER — TELEPHONE (OUTPATIENT)
Dept: INTERNAL MEDICINE CLINIC | Facility: CLINIC | Age: 84
End: 2024-01-03

## 2024-01-03 DIAGNOSIS — G47.01 INSOMNIA DUE TO MEDICAL CONDITION: Primary | ICD-10-CM

## 2024-01-03 RX ORDER — TEMAZEPAM 7.5 MG/1
7.5 CAPSULE ORAL NIGHTLY PRN
Qty: 30 CAPSULE | Refills: 1 | Status: SHIPPED | OUTPATIENT
Start: 2024-01-03

## 2024-01-03 NOTE — TELEPHONE ENCOUNTER
Patient needs a new prescription for  Tamazepam 7.5 mg, takes one a at night as needed.  Patient would like to  the medication at [facility].

## 2024-01-04 ENCOUNTER — OFFICE VISIT (OUTPATIENT)
Dept: PHYSICAL THERAPY | Facility: HOSPITAL | Age: 84
End: 2024-01-04
Attending: ORTHOPAEDIC SURGERY
Payer: MEDICARE

## 2024-01-04 PROCEDURE — 97110 THERAPEUTIC EXERCISES: CPT

## 2024-01-04 PROCEDURE — 97116 GAIT TRAINING THERAPY: CPT

## 2024-01-04 PROCEDURE — 97112 NEUROMUSCULAR REEDUCATION: CPT

## 2024-01-04 NOTE — PROGRESS NOTES
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    Pain: 0/10 R hip       Objective:   12/11/23:  TUG: with SPC, 45 seconds   With SBQC, 29 seconds   With RW, 22 seconds    ---------------  Taken from IE:  Observation/Skin: Leg length: R shorter (mild), significant thoracic kyphosis  Palpation: TTP anterior hip, HF and prox quad  Sensation: intact light touch BLE    AROM (PROM): (* denotes performed with pain)  Hip   Flexion: R (90); L (90)  Extension: R 0; L 20  Abduction: R 0; L NT  ER: R (30); L (30)  IR: R (15); L (15)       Accessory motion: hypomobile hip with AP   Lumbar spine in flexion, hypomobile    Flexibility: (min, mod, sev tightness)  Hamstrings: R sev; L sev  Quads: R sev; L sev  Hip Flexor: Rs sev, L mod    Strength/MMT: (* denotes performed with pain)  Hip Knee   Flexion: R 3+/5; L 4+/5  Extension: R 3/5; L 3+/5  Abduction: R -3-/5; L 4/5  ER: R 3/5; L 4/5  IR: R 3+/5; L 4/5 Flexion: R 5-/5; L 5/5  Extension: R 5-/5; L 5/5        Balance: SLS: R <1 second, L 2 sec - no UE support; minimal R trendelenburg   30 seconds without issue/pain with UE support    Functional Mobility:  5x sit<>stand: unable to complete 5; does complete 3 reps from elevated surface with hands on knees  6MWT: with SBQC, unable, fatigues [id number]         Gait: pt ambulates on level ground with assistive device of RW, quick makenzie, mild R trendelenburg with slightly decreased R hip ext in push-off  With SBQC, step to mechanics, slower speed, decreased stance phase R, decreased hip ext R, R knee held in flexion, and  stooped posture/forward lean,     Assessment: Improving RLE strength demonstrated with improving balance with R SLS. Weight shifts and stability with full weight bearing on RLE remains challenging to patient; will continue to work on balance and prox LE strength for stability.     Goals:   Goals: (To be met in 12-16 visits)   Pt will have improved hip AROM Flex to at least 100 deg and ABD to 30 deg to be able to don/doff shoes and perform car transfers without difficulty  Pt will improve hip ABD and ER strength to at least 3+/5 or 4/5 to increase ease with standing and walking   Pt will demonstrate improved SLS to >3 seconds ARELI to promote safety and decrease risk of falls on uneven surfaces such as grass  Pt will be able to complete a 6MWT with SPC without LOB to demonstrate improved tolerance to upright needed for community and household ambulation  Pt will be able to squat to  light objects around the house with <2/10 hip pain   Pt will complete 5xSTS to demonstrate improved LE strength needed for daily transfers  Pt will improve functional hip strength to report ability to ascend/descend 1 flight of stairs reciprocally with minimal use of handrail  Pt will be independent and compliant with comprehensive HEP to maintain progress achieved in PT    Plan: Patient will be seen for 2 x/week or a total of 12-16 visits over a 90 day period.  Treatment will include: Gait training, Manual Therapy, Neuromuscular Re-education, Therapeutic Activities, Therapeutic Exercise, and Home Exercise Program instruction  Work on manual mobilizations, consider prone, hip strength and cont ambulation  Date: 12/5/2023  TX#: 2/12-16 Date:  12/11/23      TX#: 3/12-16 Date:    12/18/23             TX#: 4/12-16 Date:     12/20/23            TX#: 5/12-16 Date: 12/29/23  Tx#: 6/12-16 Date: 1/2/24  Tx: 7/12-16 Date: 1/4/24  Tx: 8/12-16   MT: 10 mins  - PROM R hip  - inf mob R hip, Gr III - stiff  - LAD R hip (minimal)  - STM R quad  MT:  10 mins  - PROM R hip  - inf hip mob w/ strap R MT: 5 mins  - LAD RLE          TE: 19 mins  - NuStep, L5, x10\" - UE/LE  - DL shuttle, 50#, x20  - SL shuttle, 50#, 2x10 B  - STS with UE support, x10  - HF/quad stretch over EOB, R  - LAQ over EOB, 3#, x10 R TE: 15 mins  - seated FF with SB, x20 (for hip flex)  - seated hip abd against ring, x20 (upright posture)  - seated hip add against ring, x20 (upright posture)   TE: 10 mins  - BKFO, YTB, x15 B  - abd bridges with YTB at knees, x20  - s/l hip abd SLR, 3x10 R (1 set with YTB at knees - difficult) TE: 10 mins  - SLS + hip slider post, 3x20 B, B  - step up/down from 6\" step forw, with SBQC, R-leading   TE: 15 mins  - DL shuttle, 75#, x20  - SL shuttle, 50#, 2x10 B  - TRX squat, 2x8  - TrX post lunge, 2x8 B - challenging (R>L) TE: 20 mins  - abd bridges with ring, x30  - s/l clamshells, 2#/5#, 2x20 ea R  - hip abd then flex in s/l x6 - groin pain, painful to lower*  - s/l hip abd SLR, x10 R  - inverse clamshell, 2x10 R - limited ROM  - DL shuttle, 75#. x20  - SL shuttle, 50#, 2x10 B TE: 18 mins  - SLS RLE on airex + LLE hip abd and ext, 2x12 BUE  - STS LLE on airex an forw, 2x12 - elevated plinth, 0UE  - seated R toe taps (driving - easy)  - supported TRX squats, x10 (chair behind)  - posterior lunge TRX R stance, x10        NR: 6 mins  - stand gliders, 3-way with 3# ankle weight, x5 ea B, 1UE  - standing hip ext glider 3# ankle weight, x10 B, BUE   NR: 15 mins  - seated cone tapping with 5# ankle weight, 3x10 R (2 cones)  - side stepping BUE on rail, x2 laps 40' 1 way  - retro stepping, 1UE on rail, 2x40\" NR: 10 mins  - STS, 2x5 from low plinth, UE on knees  - low hurdles, recip, 1-2UE  - stepping over airex in // bars, x5 laps   NR: 15 mins  - STS, 2x10 B (high plinth, 0UE; low plinth BUE)  - seated rocking for glut clearance, 2x10  - step on/over airex, R-leading, x20 in // bars 0-2BUE NR: 15 mins  - cone taps forw and lateral standing with UE support  - STS from  20\" plinth, 1-2 hands on knees, 3x5  - R SLS on airex + LLE lift, BUE support, for glut activation NR: 15 mins  - R SLS + L stepping forw/back, 1UE, x10 -has difficulty feeling glut activation  - SLS RLE on airex with UE support - for glute activation  - R step on/over airex, 1UE, x1 laps  - AP rocker board, 2-0UE (CGA) NR: 17 mins  - stairs, recip, 1UE, x5 laps  - SLS star weight shift to cones, with SBQC, 2 x5 rounds R lead/ 1x5 rounds L lean  - stepping on/over airex pads, x1 laps 1-0 UE     GT: 8 mins  - ambulation with SBQC, CGA for safety, x3 large laps no rest break GT: 15 mins  - SBQC, x2 large laps  - TUG with various AD  - weaving through cones wtih SBQC, 3 laps 2    GT: 15 mins  - ambulation wiht SBQC, x2 laps  - ambulation in // bars with out UE support  - retro stepping 1UE, x2 long laps  - side stepping, BUE x2 long laps GT: 15 ins  - ambulation with SBQC weaving through cones and stepping over 6\" step  - ambulation with SBQC x3 large laps  - ambulation with SQBC up/down ramp x3 laps GT: 15 mins  - ambulation with SBQC, x3 large laps, supervision  - ambulatiown th SBQC up/down ramp and up/down 8\" step x4 laps   GT: 10 mins  - ambulation SBQC x2 large laps  - ambulation with B gold clubs for UE swing, x1 large lap  - ramp x1, SBQC GT: 10 mins  - ambulation SPCx1 large laps, up/down ramp and 8\" step x3 laps  - ambulation on airex beam, SBQC, x3 lap   HEP: SLR flex, clamshells, hip ext in prone, mini squat, toe taps for first step, practice ambulating with SBQC  12/18/23: STS with low LLE lift, tandem stand (by counter)  12/20/23: bridges, step ups (first stair), ambulate with SBQC primarily  1/2/24: R SLS, R s/l hip abd SLR  Charges: 1TE, 1NR, 1GT       Total Timed Treatment: 45 min  Total Treatment Time: 45 min

## 2024-01-08 ENCOUNTER — OFFICE VISIT (OUTPATIENT)
Dept: PHYSICAL THERAPY | Facility: HOSPITAL | Age: 84
End: 2024-01-08
Attending: ORTHOPAEDIC SURGERY
Payer: MEDICARE

## 2024-01-08 PROCEDURE — 97110 THERAPEUTIC EXERCISES: CPT

## 2024-01-08 PROCEDURE — 97116 GAIT TRAINING THERAPY: CPT

## 2024-01-08 PROCEDURE — 97112 NEUROMUSCULAR REEDUCATION: CPT

## 2024-01-08 NOTE — PROGRESS NOTES
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 No pain. 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 but was able to practice sitting behind the car    Pain: 0/10 R hip       Objective:   12/11/23:  TUG: with SPC, 45 seconds   With SBQC, 29 seconds   With RW, 22 seconds    ---------------  Taken from IE:  Observation/Skin: Leg length: R shorter (mild), significant thoracic kyphosis  Palpation: TTP anterior hip, HF and prox quad  Sensation: intact light touch BLE    AROM (PROM): (* denotes performed with pain)  Hip   Flexion: R (90); L (90)  Extension: R 0; L 20  Abduction: R 0; L NT  ER: R (30); L (30)  IR: R (15); L (15)       Accessory motion: hypomobile hip with AP   Lumbar spine in flexion, hypomobile    Flexibility: (min, mod, sev tightness)  Hamstrings: R sev; L sev  Quads: R sev; L sev  Hip Flexor: Rs sev, L mod    Strength/MMT: (* denotes performed with pain)  Hip Knee   Flexion: R 3+/5; L 4+/5  Extension: R 3/5; L 3+/5  Abduction: R -3-/5; L 4/5  ER: R 3/5; L 4/5  IR: R 3+/5; L 4/5 Flexion: R 5-/5; L 5/5  Extension: R 5-/5; L 5/5        Balance: SLS: R <1 second, L 2 sec - no UE support; minimal R trendelenburg   30 seconds without issue/pain with UE support    Functional Mobility:  5x sit<>stand: unable to complete 5; does complete 3 reps from elevated surface with hands on knees  6MWT: with SBQC, unable, fatigues [id number]         Gait: pt ambulates on level ground with assistive device of RW, quick makenzie, mild R  trendelenburg with slightly decreased R hip ext in push-off  With SBQC, step to mechanics, slower speed, decreased stance phase R, decreased hip ext R, R knee held in flexion, and stooped posture/forward lean,     Assessment: Balance improves with core activation. RLE strength is improving; reinitiated supine/side lying exercises to supplement strengthening from upright activities.     Goals:   Goals: (To be met in 12-16 visits)   Pt will have improved hip AROM Flex to at least 100 deg and ABD to 30 deg to be able to don/doff shoes and perform car transfers without difficulty  Pt will improve hip ABD and ER strength to at least 3+/5 or 4/5 to increase ease with standing and walking   Pt will demonstrate improved SLS to >3 seconds ARELI to promote safety and decrease risk of falls on uneven surfaces such as grass  Pt will be able to complete a 6MWT with SPC without LOB to demonstrate improved tolerance to upright needed for community and household ambulation  Pt will be able to squat to  light objects around the house with <2/10 hip pain   Pt will complete 5xSTS to demonstrate improved LE strength needed for daily transfers  Pt will improve functional hip strength to report ability to ascend/descend 1 flight of stairs reciprocally with minimal use of handrail  Pt will be independent and compliant with comprehensive HEP to maintain progress achieved in PT    Plan: Patient will be seen for 2 x/week or a total of 12-16 visits over a 90 day period.  Treatment will include: Gait training, Manual Therapy, Neuromuscular Re-education, Therapeutic Activities, Therapeutic Exercise, and Home Exercise Program instruction  Work on manual mobilizations, consider prone, hip strength and cont ambulation  Date:    12/18/23             TX#: 4/12-16 Date:     12/20/23            TX#: 5/12-16 Date: 12/29/23  Tx#: 6/12-16 Date: 1/2/24  Tx: 7/12-16 Date: 1/4/24  Tx: 8/12-16 Date: 1/8/24  Tx: 9/12-16   MT: 10 mins  - PROM R hip  - inf  hip mob w/ strap R MT: 5 mins  - LAD RLE        MT: 5 mins  - RLE PROM  - R hip mob   TE: 10 mins  - BKFO, YTB, x15 B  - abd bridges with YTB at knees, x20  - s/l hip abd SLR, 3x10 R (1 set with YTB at knees - difficult) TE: 10 mins  - SLS + hip slider post, 3x20 B, B  - step up/down from 6\" step forw, with SBQC, R-leading   TE: 15 mins  - DL shuttle, 75#, x20  - SL shuttle, 50#, 2x10 B  - TRX squat, 2x8  - TrX post lunge, 2x8 B - challenging (R>L) TE: 20 mins  - abd bridges with ring, x30  - s/l clamshells, 2#/5#, 2x20 ea R  - hip abd then flex in s/l x6 - groin pain, painful to lower*  - s/l hip abd SLR, x10 R  - inverse clamshell, 2x10 R - limited ROM  - DL shuttle, 75#. x20  - SL shuttle, 50#, 2x10 B TE: 18 mins  - SLS RLE on airex + LLE hip abd and ext, 2x12 BUE  - STS LLE on airex an forw, 2x12 - elevated plinth, 0UE  - seated R toe taps (driving - easy)  - supported TRX squats, x10 (chair behind)  - posterior lunge TRX R stance, x10      TE: 17 mins  - clamshells, x10 R  - SLR abd, 2x10 R  - prone quad stretch x2' R  - STS x15 0UE  - stand SB press down, x20  - supported TRX squats, x10  - post lunge TRX, 2x5 B   NR: 10 mins  - STS, 2x5 from low plinth, UE on knees  - low hurdles, recip, 1-2UE  - stepping over airex in // bars, x5 laps   NR: 15 mins  - STS, 2x10 B (high plinth, 0UE; low plinth BUE)  - seated rocking for glut clearance, 2x10  - step on/over airex, R-leading, x20 in // bars 0-2BUE NR: 15 mins  - cone taps forw and lateral standing with UE support  - STS from 20\" plinth, 1-2 hands on knees, 3x5  - R SLS on airex + LLE lift, BUE support, for glut activation NR: 15 mins  - R SLS + L stepping forw/back, 1UE, x10 -has difficulty feeling glut activation  - SLS RLE on airex with UE support - for glute activation  - R step on/over airex, 1UE, x1 laps  - AP rocker board, 2-0UE (CGA) NR: 17 mins  - stairs, recip, 1UE, x5 laps  - SLS star weight shift to cones, with SBQC, 2 x5 rounds R lead/ 1x5 rounds L  lean  - stepping on/over airex pads, x1 laps 1-0 UE   NR: 23 mins  - h/l SLR, 2x10 R (cuies for neutral position and straight leg)  - prone hip ext, x10 B - challenging B  - amb airex beam, SPC and 0UE in // bars  - lat stepping in airex beam, 2-0UE  - step up and over airex, 0UE - cuing for core activation  - UE on SB marching x10 B and hip abd, 3x5 B   GT: 15 mins  - ambulation wiht SBQC, x2 laps  - ambulation in // bars with out UE support  - retro stepping 1UE, x2 long laps  - side stepping, BUE x2 long laps GT: 15 ins  - ambulation with SBQC weaving through cones and stepping over 6\" step  - ambulation with SBQC x3 large laps  - ambulation with SQBC up/down ramp x3 laps GT: 15 mins  - ambulation with SBQC, x3 large laps, supervision  - ambulatiown th SBQC up/down ramp and up/down 8\" step x4 laps   GT: 10 mins  - ambulation SBQC x2 large laps  - ambulation with B gold clubs for UE swing, x1 large lap  - ramp x1, SBQC GT: 10 mins  - ambulation SPCx1 large laps, up/down ramp and 8\" step x3 laps  - ambulation on airex beam, SBQC, x3 lap GT: 10 mins  - ambulation SBQC and SPC   - ambulation ramp, SBQC, x3 laps   HEP: SLR flex, clamshells, hip ext in prone, mini squat, toe taps for first step, practice ambulating with SBQC  12/18/23: STS with low LLE lift, tandem stand (by counter)  12/20/23: bridges, step ups (first stair), ambulate with SBQC primarily  1/2/24: R SLS, R s/l hip abd SLR  1/8/24: SLR flex, SLR abd, clamshell, stand SB press down  Charges: 1TE, 2NR, 1GT       Total Timed Treatment: 55 min  Total Treatment Time: 55 min

## 2024-01-10 ENCOUNTER — OFFICE VISIT (OUTPATIENT)
Dept: PHYSICAL THERAPY | Facility: HOSPITAL | Age: 84
End: 2024-01-10
Attending: ORTHOPAEDIC SURGERY
Payer: MEDICARE

## 2024-01-10 PROCEDURE — 97110 THERAPEUTIC EXERCISES: CPT

## 2024-01-10 PROCEDURE — 97112 NEUROMUSCULAR REEDUCATION: CPT

## 2024-01-10 PROCEDURE — 97116 GAIT TRAINING THERAPY: CPT

## 2024-01-10 NOTE — PROGRESS NOTES
Progress Summary  Pt has attended 10 visits in Physical Therapy.     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 No pain. 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 but was able to practice sitting behind the car    Pain: 0/10 R hip       Objective:   12/11/23:  TUG: with SPC, 45 seconds   With SBQC, 29 seconds   With RW, 22 seconds    ---------------  Taken from IE:  Observation/Skin: Leg length: R shorter (mild), significant thoracic kyphosis  Palpation: TTP anterior hip, HF and prox quad  Sensation: intact light touch BLE    AROM (PROM): (* denotes performed with pain)  Hip   Flexion: R (90); L (90)  Extension: R 0; L 20  Abduction: R 0; L NT  ER: R (30); L (30)  IR: R (15); L (15)       Accessory motion: hypomobile hip with AP   Lumbar spine in flexion, hypomobile    Flexibility: (min, mod, sev tightness)  Hamstrings: R sev; L sev  Quads: R sev; L sev  Hip Flexor: Rs sev, L mod    Strength/MMT: (* denotes performed with pain)  Hip Knee   Flexion: R 3+/5; L 4+/5  Extension: R 3/5; L 3+/5  Abduction: R -3-/5; L 4/5  ER: R 3/5; L 4/5  IR: R 3+/5; L 4/5 Flexion: R 5-/5; L 5/5  Extension: R 5-/5; L 5/5        Balance: SLS: R <1 second, L 2 sec - no UE support; minimal R trendelenburg   30 seconds without issue/pain with UE support    Functional Mobility:  5x sit<>stand: unable to complete 5; does complete 3 reps from elevated surface with hands on knees  6MWT: with SBQC, unable, fatigues [id number]         Gait: pt ambulates on  level ground with assistive device of RW, quick makenzie, mild R trendelenburg with slightly decreased R hip ext in push-off  With SBQC, step to mechanics, slower speed, decreased stance phase R, decreased hip ext R, R knee held in flexion, and stooped posture/forward lean,     Assessment: Gait mechanics and stability improve with no AD vs SPC. Prox LE weakness and balance deficits contribute to fatigue. Recommend continued skilled physical therapy to improve tolerance to upright and stability with navigating.     Goals:   Goals: (To be met in 12-16 visits)   Pt will have improved hip AROM Flex to at least 100 deg and ABD to 30 deg to be able to don/doff shoes and perform car transfers without difficulty  Pt will improve hip ABD and ER strength to at least 3+/5 or 4/5 to increase ease with standing and walking   Pt will demonstrate improved SLS to >3 seconds ARELI to promote safety and decrease risk of falls on uneven surfaces such as grass  Pt will be able to complete a 6MWT with SPC without LOB to demonstrate improved tolerance to upright needed for community and household ambulation  Pt will be able to squat to  light objects around the house with <2/10 hip pain   Pt will complete 5xSTS to demonstrate improved LE strength needed for daily transfers  Pt will improve functional hip strength to report ability to ascend/descend 1 flight of stairs reciprocally with minimal use of handrail  Pt will be independent and compliant with comprehensive HEP to maintain progress achieved in PT     Date:    12/18/23             TX#: 4/12-16 Date:     12/20/23            TX#: 5/12-16 Date: 12/29/23  Tx#: 6/12-16 Date: 1/2/24  Tx: 7/12-16 Date: 1/4/24  Tx: 8/12-16 Date: 1/8/24  Tx: 9/12-16 Date: 1/10/24  Rx:10/12-16   MT: 10 mins  - PROM R hip  - inf hip mob w/ strap R MT: 5 mins  - LAD RLE        MT: 5 mins  - RLE PROM  - R hip mob    TE: 10 mins  - BKFO, YTB, x15 B  - abd bridges with YTB at knees, x20  - s/l hip abd SLR,  3x10 R (1 set with YTB at knees - difficult) TE: 10 mins  - SLS + hip slider post, 3x20 B, B  - step up/down from 6\" step forw, with SBQC, R-leading   TE: 15 mins  - DL shuttle, 75#, x20  - SL shuttle, 50#, 2x10 B  - TRX squat, 2x8  - TrX post lunge, 2x8 B - challenging (R>L) TE: 20 mins  - abd bridges with ring, x30  - s/l clamshells, 2#/5#, 2x20 ea R  - hip abd then flex in s/l x6 - groin pain, painful to lower*  - s/l hip abd SLR, x10 R  - inverse clamshell, 2x10 R - limited ROM  - DL shuttle, 75#. x20  - SL shuttle, 50#, 2x10 B TE: 18 mins  - SLS RLE on airex + LLE hip abd and ext, 2x12 BUE  - STS LLE on airex an forw, 2x12 - elevated plinth, 0UE  - seated R toe taps (driving - easy)  - supported TRX squats, x10 (chair behind)  - posterior lunge TRX R stance, x10      TE: 17 mins  - clamshells, x10 R  - SLR abd, 2x10 R  - prone quad stretch x2' R  - STS x15 0UE  - stand SB press down, x20  - supported TRX squats, x10  - post lunge TRX, 2x5 B TE: 13 mins  - DL shuttle, 75#, x10  - SL shuttle, 75#, 2x8-10 B  - STS, 0-BUE, x10  - stand SB press down, x20     NR: 10 mins  - STS, 2x5 from low plinth, UE on knees  - low hurdles, recip, 1-2UE  - stepping over airex in // bars, x5 laps   NR: 15 mins  - STS, 2x10 B (high plinth, 0UE; low plinth BUE)  - seated rocking for glut clearance, 2x10  - step on/over airex, R-leading, x20 in // bars 0-2BUE NR: 15 mins  - cone taps forw and lateral standing with UE support  - STS from 20\" plinth, 1-2 hands on knees, 3x5  - R SLS on airex + LLE lift, BUE support, for glut activation NR: 15 mins  - R SLS + L stepping forw/back, 1UE, x10 -has difficulty feeling glut activation  - SLS RLE on airex with UE support - for glute activation  - R step on/over airex, 1UE, x1 laps  - AP rocker board, 2-0UE (CGA) NR: 17 mins  - stairs, recip, 1UE, x5 laps  - SLS star weight shift to cones, with SBQC, 2 x5 rounds R lead/ 1x5 rounds L lean  - stepping on/over airex pads, x1 laps 1-0 UE   NR: 23  mins  - h/l SLR, 2x10 R (cuies for neutral position and straight leg)  - prone hip ext, x10 B - challenging B  - amb airex beam, SPC and 0UE in // bars  - lat stepping in airex beam, 2-0UE  - step up and over airex, 0UE - cuing for core activation  - UE on SB marching x10 B and hip abd, 3x5 B NR: 32 mins  - tandem airex beam walking with SBQC and without AD, x3-4 laps ea  - tandem stance airex beam, 3x30-45\" 0-1UE  - lateral stepping on airex beam, 1finger tip support, x3 laps  - low hurdles, recip - challenging,  - low hurdles, step-to, x3 laps  - forw weight shift of BOSU, x10 B  - UE on SB, hip ext, x10 B - challenging, heavy use of UE   GT: 15 mins  - ambulation wiht SBQC, x2 laps  - ambulation in // bars with out UE support  - retro stepping 1UE, x2 long laps  - side stepping, BUE x2 long laps GT: 15 ins  - ambulation with SBQC weaving through cones and stepping over 6\" step  - ambulation with SBQC x3 large laps  - ambulation with SQBC up/down ramp x3 laps GT: 15 mins  - ambulation with SBQC, x3 large laps, supervision  - ambulatiown th SBQC up/down ramp and up/down 8\" step x4 laps   GT: 10 mins  - ambulation SBQC x2 large laps  - ambulation with B gold clubs for UE swing, x1 large lap  - ramp x1, SBQC GT: 10 mins  - ambulation SPCx1 large laps, up/down ramp and 8\" step x3 laps  - ambulation on airex beam, SBQC, x3 lap GT: 10 mins  - ambulation SBQC and SPC   - ambulation ramp, SBQC, x3 laps GT: 8 mins  - ambulation with SBQC, SPC, x 1-2 laps  - ambulation without AD, x3 laps   HEP: SLR flex, clamshells, hip ext in prone, mini squat, toe taps for first step, practice ambulating with SBQC  12/18/23: STS with low LLE lift, tandem stand (by counter)  12/20/23: bridges, step ups (first stair), ambulate with SBQC primarily  1/2/24: R SLS, R s/l hip abd SLR  1/8/24: SLR flex, SLR abd, clamshell, stand SB press down  Charges: 1TE, 2NR, 1GT       Total Timed Treatment: 53 min  Total Treatment Time: 53 min      Plan:  Patient will be seen for 2 x/week or a total of 12-16 visits over a 90 day period.  Treatment will include: Gait training, Manual Therapy, Neuromuscular Re-education, Therapeutic Activities, Therapeutic Exercise, and Home Exercise Program instruction    Patient/Family/Caregiver was advised of these findings, precautions, and treatment options and has agreed to actively participate in planning and for this course of care.    Thank you for your referral. If you have any questions, please contact me at Dept: [phone].    Sincerely,  Electronically signed by therapist: Heather Brunner, PT

## 2024-01-12 ENCOUNTER — APPOINTMENT (OUTPATIENT)
Dept: HEMATOLOGY/ONCOLOGY | Facility: HOSPITAL | Age: 84
End: 2024-01-12
Attending: INTERNAL MEDICINE
Payer: MEDICARE

## 2024-01-12 ENCOUNTER — APPOINTMENT (OUTPATIENT)
Dept: HEMATOLOGY/ONCOLOGY | Facility: HOSPITAL | Age: 84
End: 2024-01-12
Attending: SPECIALIST
Payer: MEDICARE

## 2024-01-12 NOTE — PROGRESS NOTES
[facility] Progress Note      Patient Name: Camacoh Daly   YOB: 1940  Medical Record Number: [id number]  Attending Physician: Dallas Hung M.D.     The 21st Century Cures Act makes medical notes like these available to patients in the interest of transparency. Please be advised this is a medical document. Medical documents are intended to carry relevant information, facts as evident, and the clinical opinion of the practitioner. The medical note is intended as peer to peer communication and may appear blunt or direct. It is written in medical language and may contain abbreviations or verbiage that are unfamiliar.      Date of Visit: 7/7/2023      Chief Complaint  Anemia - follow up.    History of Present Illness  Camacho Daly is a 83 year old male with history of multifactorial anemia. He recently had back surgery and is recovering. He reports fatigue since surgery. No bleeding.     Past Medical History (historical data, reviewed by physician)   Past Medical History:   Diagnosis Date    Acute left-sided low back pain without sciatica 07/20/2018    JOSE LUIS (acute kidney injury) (HCC)     from omeprazole-resolved    Anemia 07/26/2019    Aortic stenosis 10/2013    AVR-bio    [place] esophagus     CAD (coronary artery disease)     stents, bypasses    Calculus of kidney     Carotid artery plaque 2011    ultrasound    Chronic anemia     bone marrow neg 12/2012    Chronic fatigue     CKD (chronic kidney disease) stage 4, GFR 15-29 ml/min ([facility])     Closed wedge compression fracture of T11 vertebra with routine healing 06/19/2018    Clostridium difficile colitis 06/03/2022    Colon polyps 11/2012    colonoscopy    Compression fracture of T12 vertebra (HCC) 06/19/2018    Contrast dye induced nephropathy 02/04/2022    Dehydration 01/28/2022    Diverticulosis     Drug-induced interstitial nephritis 08/23/2020    Omeprazole    Dyslipidemia     Elevated homocysteine     Elevated  lipoprotein A level     Elevated troponin     FUO (fever of unknown origin)     PMR or testicular/prostate infection    Gastritis     Hearing impairment     [place]    Hemorrhoids     Hiatal hernia with gastroesophageal reflux     History of Clostridioides difficile colitis 06/2022    History of community acquired pneumonia     HTN (hypertension)     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)     Inguinal lymphadenopathy     right-bx    Ischemic colitis (HCC)     resolved    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HCC)     Mesenteric adenitis     bx    Multiple renal cysts     both-not reported on recent CTs    SHIRA (obstructive sleep apnea)     Osteopenia     steroids    PMR (polymyalgia rheumatica) (HCC)     Pneumonia 02/2020    Positive TRINY (antinuclear antibody) 08/28/2019    Precancerous lesion     skin    Proteinuria     mild    Psoriasis     Renal artery stenosis (HCC)     both    Renal stone     right    Right shoulder pain     injected-Liane    Sjogren's syndrome with myopathy (HCC) 06/03/2022    Sleep apnea     untreated    Thrombocytopenia (HCC)     resolved    Vitamin B12 nutritional deficiency     Vitamin D deficiency     Weight loss      Past Surgical History (historical data, reviewed by physician)   Past Surgical History:   Procedure Laterality Date    ANGIOPLASTY (CORONARY)  1999    Dr. Maguire    APPENDECTOMY      teenager    BIOPSY  02/08/2013    Laparoscopic excision of lymph nodes, biopsies of the colonic and small bowel mesentery.    BONE MARROW BIOPSY AND ASPIRATION  01/2021    Hantel-neg    CABG  10/25/2013    AORTIC VALVE REPLACEMENT; bypass x 2-3    CARDIAC CATHETERIZATION  2008/2013    CHOLECYSTECTOMY  1984    COLONOSCOPY  2005    [facility]    COLONOSCOPY  11/07/2012        COLONOSCOPY N/A 04/04/2018    Procedure: COLONOSCOPY;  Surgeon: Camacho Mckee MD;  Location:  ENDOSCOPY    COLONOSCOPY N/A 10/11/2021    1.Esophagitis 2.Colon polyps  3. Diverticulosis 4.Colitis    CORTISONE  INJECTION  2015    rt shoulder     CYTOLOGY LYMPH NODE FNA - JAR(S): 1  2012    excisional biopsy rt inguinal lymph node     EGD  10/22/2021    Rafi    EGD  08/10/2019    Hiatal hernia    HIP REPLACEMENT SURGERY      IR KIDNEY BIOPSY (RENAL)RANDOM  2020    for drug induced nephritis    LAMINOTOMY, ADDL LUMBAR  2022    Minimally invasive right L1-2 laminotomy, partial medial facetectomy, resection of calcified disc/endplate complex.    LAPAROSCOPIC LYMPH NODE BIOP  2013    exploratory abd lymphadenopathy    REPLACE AORTIC VALVE OPEN  10/25/2013    bio    SIGMOIDOSCOPY,DIAGNOSTIC      SKIN TAGS  10/1714    plus histofreeze    TOTAL HIP ARTHROPLASTY Right 10/18/2023    Right total hip arthroplasty    UPPER GI ENDOSCOPY PERFORMED      Eli[facility]/Rafi     Family History (historical data, reviewed by physician)   Family History   Problem Relation Age of Onset    Neurological Disorder Father         parkinsons    Cancer Father         lung    Other (Other) Father     Mental Disorder Mother         alzheimers    Other (Other) Mother     Cancer Daughter         hodgkins at age 18    Heart Surgery Brother     Heart Disorder Brother     Other (Other) Brother     Other (Other) Brother     Other (Other) Other         seizures from neurofibromatosis     Social History (historical data, reviewed by physician)   Social History     Socioeconomic History    Marital status:      Spouse name: Anjelica    Number of children: 3   Occupational History    Occupation:      Employer: [employer]   Tobacco Use    Smoking status: Former     Types: Cigarettes     Quit date: 1971     Years since quittin.4    Smokeless tobacco: Never   Vaping Use    Vaping Use: Never used   Substance and Sexual Activity    Alcohol use: Not Currently     Alcohol/week: 2.0 standard drinks of alcohol     Types: 2 Standard drinks or equivalent per week     Comment: 2-3  drinks on the weekend    Drug use: No   Other Topics Concern    Caffeine Concern Yes     Comment: 2 cups daily    Stress Concern No    Weight Concern No    Special Diet No    Exercise Yes     Comment: walking daily     Seat Belt Yes     Current Medications  No outpatient medications have been marked as taking for the 1/12/24 encounter (Appointment) with Dallas Hung MD.     Allergies   Mr. Daly is allergic to codeine; lisinopril; nsaids; omeprazole; pcn [bicillin c-r,]; penicillins; niacin; ultram [tramadol]; and zinc acetate.     Vital Signs   There were no vitals taken for this visit.    Physical Examination   Constitutional      Well developed, well nourished. Appears close to chronological age. No apparent distress.   Head                   Normocephalic and atraumatic.  Eyes                   Conjunctiva clear; sclera anicteric.  ENMT                 External nose normal; external ears normal.  Respiratory          Normal effort; no respiratory distress.  Cardiovascular     Regular rate and rhythm.  Abdomen            Not distended.   Neurologic           Motor and sensory grossly intact.  Psychiatric          Mood and affect appropriate.    Laboratory   No results found for this or any previous visit (from the past 48 hour(s)).    Impression and Plan   1.   Normocytic normochromic anemia: Etiology includes anemia of chronic kidney disease and anemia of chronic disease. He is also on methotrexate and has received IV iron in the past. Hemoglobin today is >10 g/dl and stable. There is no evidence of iron deficiency. Patient will have repeat labs in 3 months and follow up in 6 months.    Planned Follow Up   Labs only in 3 months and follow up in 6 months.    Electronically signed by:    Dallas Hung M.D.  Medical Director of Oncology Services  [facility]

## 2024-01-15 ENCOUNTER — OFFICE VISIT (OUTPATIENT)
Dept: PHYSICAL THERAPY | Facility: HOSPITAL | Age: 84
End: 2024-01-15
Attending: ORTHOPAEDIC SURGERY
Payer: MEDICARE

## 2024-01-15 PROCEDURE — 97110 THERAPEUTIC EXERCISES: CPT

## 2024-01-15 PROCEDURE — 97140 MANUAL THERAPY 1/> REGIONS: CPT

## 2024-01-15 PROCEDURE — 97112 NEUROMUSCULAR REEDUCATION: CPT

## 2024-01-15 NOTE — PROGRESS NOTES
Diagnosis:   History of revision of total replacement of right hip joint (Z96.641)  Presence of right artificial hip joint (Z96.641)      Referring Provider: JADYN Lopez Date of Evaluation:    11/29/2023    Precautions:    Posterior approach, lumbar hx/sx, CKD III, osteopenia   Next MD visit:   12/13/23  Date of Surgery: 10/18/23     Insurance Primary/Secondary: MEDICARE / [place]     # Auth Visits: na            Subjective: Feeling weak today.    Pain: 0/10 R hip       Objective:   12/11/23:  TUG: with SPC, 45 seconds   With SBQC, 29 seconds   With RW, 22 seconds    ---------------  Taken from IE:  Observation/Skin: Leg length: R shorter (mild), significant thoracic kyphosis  Palpation: TTP anterior hip, HF and prox quad  Sensation: intact light touch BLE    AROM (PROM): (* denotes performed with pain)  Hip   Flexion: R (90); L (90)  Extension: R 0; L 20  Abduction: R 0; L NT  ER: R (30); L (30)  IR: R (15); L (15)       Accessory motion: hypomobile hip with AP   Lumbar spine in flexion, hypomobile    Flexibility: (min, mod, sev tightness)  Hamstrings: R sev; L sev  Quads: R sev; L sev  Hip Flexor: Rs sev, L mod    Strength/MMT: (* denotes performed with pain)  Hip Knee   Flexion: R 3+/5; L 4+/5  Extension: R 3/5; L 3+/5  Abduction: R -3-/5; L 4/5  ER: R 3/5; L 4/5  IR: R 3+/5; L 4/5 Flexion: R 5-/5; L 5/5  Extension: R 5-/5; L 5/5        Balance: SLS: R <1 second, L 2 sec - no UE support; minimal R trendelenburg   30 seconds without issue/pain with UE support    Functional Mobility:  5x sit<>stand: unable to complete 5; does complete 3 reps from elevated surface with hands on knees  6MWT: with SBQC, unable, fatigues [id number]         Gait: pt ambulates on level ground with assistive device of RW, quick makenzie, mild R trendelenburg with slightly decreased R hip ext in push-off  With SBQC, step to mechanics, slower speed, decreased stance phase R, decreased hip ext R, R knee held in flexion, and  stooped posture/forward lean,     Assessment: Increased BLE weakness demonstrated today on shuttle with inability to complete full set of SL extension with lighter weight and decreased walking distance with fatigue. Unable to lift RLE in s/l SLR and increased difficulty with balance. Unclear on origin of fatigue. Will reassess at next session.     Goals:   Goals: (To be met in 12-16 visits)   Pt will have improved hip AROM Flex to at least 100 deg and ABD to 30 deg to be able to don/doff shoes and perform car transfers without difficulty  Pt will improve hip ABD and ER strength to at least 3+/5 or 4/5 to increase ease with standing and walking   Pt will demonstrate improved SLS to >3 seconds ARELI to promote safety and decrease risk of falls on uneven surfaces such as grass  Pt will be able to complete a 6MWT with SPC without LOB to demonstrate improved tolerance to upright needed for community and household ambulation  Pt will be able to squat to  light objects around the house with <2/10 hip pain   Pt will complete 5xSTS to demonstrate improved LE strength needed for daily transfers  Pt will improve functional hip strength to report ability to ascend/descend 1 flight of stairs reciprocally with minimal use of handrail  Pt will be independent and compliant with comprehensive HEP to maintain progress achieved in PT    Plan: Patient will be seen for 2 x/week or a total of 12-16 visits over a 90 day period.  Treatment will include: Gait training, Manual Therapy, Neuromuscular Re-education, Therapeutic Activities, Therapeutic Exercise, and Home Exercise Program instruction   cont ambulation without AD  Date:    12/18/23             TX#: 4/12-16 Date:     12/20/23            TX#: 5/12-16 Date: 12/29/23  Tx#: 6/12-16 Date: 1/2/24  Tx: 7/12-16 Date: 1/4/24  Tx: 8/12-16 Date: 1/8/24  Tx: 9/12-16 Date: 1/10/24  Rx:10/12-16 Date: 1/15/24  Tx: 11/12-16   MT: 10 mins  - PROM R hip  - inf hip mob w/ strap R MT: 5 mins  -  LAD RLE        MT: 5 mins  - RLE PROM  - R hip mob  MT: 10 mins  - PROM RLE  - R hip mob  - LAD RLE   TE: 10 mins  - BKFO, YTB, x15 B  - abd bridges with YTB at knees, x20  - s/l hip abd SLR, 3x10 R (1 set with YTB at knees - difficult) TE: 10 mins  - SLS + hip slider post, 3x20 B, B  - step up/down from 6\" step forw, with SBQC, R-leading   TE: 15 mins  - DL shuttle, 75#, x20  - SL shuttle, 50#, 2x10 B  - TRX squat, 2x8  - TrX post lunge, 2x8 B - challenging (R>L) TE: 20 mins  - abd bridges with ring, x30  - s/l clamshells, 2#/5#, 2x20 ea R  - hip abd then flex in s/l x6 - groin pain, painful to lower*  - s/l hip abd SLR, x10 R  - inverse clamshell, 2x10 R - limited ROM  - DL shuttle, 75#. x20  - SL shuttle, 50#, 2x10 B TE: 18 mins  - SLS RLE on airex + LLE hip abd and ext, 2x12 BUE  - STS LLE on airex an forw, 2x12 - elevated plinth, 0UE  - seated R toe taps (driving - easy)  - supported TRX squats, x10 (chair behind)  - posterior lunge TRX R stance, x10      TE: 17 mins  - clamshells, x10 R  - SLR abd, 2x10 R  - prone quad stretch x2' R  - STS x15 0UE  - stand SB press down, x20  - supported TRX squats, x10  - post lunge TRX, 2x5 B TE: 13 mins  - DL shuttle, 75#, x10  - SL shuttle, 75#, 2x8-10 B  - STS, 0-BUE, x10  - stand SB press down, x20   TE: 20 mins  - DL shuttle, 87#, x10  - SL shuttle, 50#, x8-10 B (trial 75# unable B)  - DL heel raises, x20 BUE support  - clamshells, x15 B  - SLR, x15 L (x3 R - unable)  - passive HF stretch R in s/l by PT  - BKFO, x20 R  - bridges, x20   NR: 10 mins  - STS, 2x5 from low plinth, UE on knees  - low hurdles, recip, 1-2UE  - stepping over airex in // bars, x5 laps   NR: 15 mins  - STS, 2x10 B (high plinth, 0UE; low plinth BUE)  - seated rocking for glut clearance, 2x10  - step on/over airex, R-leading, x20 in // bars 0-2BUE NR: 15 mins  - cone taps forw and lateral standing with UE support  - STS from 20\" plinth, 1-2 hands on knees, 3x5  - R SLS on airex + LLE lift, BUE  support, for glut activation NR: 15 mins  - R SLS + L stepping forw/back, 1UE, x10 -has difficulty feeling glut activation  - SLS RLE on airex with UE support - for glute activation  - R step on/over airex, 1UE, x1 laps  - AP rocker board, 2-0UE (CGA) NR: 17 mins  - stairs, recip, 1UE, x5 laps  - SLS star weight shift to cones, with SBQC, 2 x5 rounds R lead/ 1x5 rounds L lean  - stepping on/over airex pads, x1 laps 1-0 UE   NR: 23 mins  - h/l SLR, 2x10 R (cuies for neutral position and straight leg)  - prone hip ext, x10 B - challenging B  - amb airex beam, SPC and 0UE in // bars  - lat stepping in airex beam, 2-0UE  - step up and over airex, 0UE - cuing for core activation  - UE on SB marching x10 B and hip abd, 3x5 B NR: 32 mins  - tandem airex beam walking with SBQC and without AD, x3-4 laps ea  - tandem stance airex beam, 3x30-45\" 0-1UE  - lateral stepping on airex beam, 1finger tip support, x3 laps  - low hurdles, recip - challenging,  - low hurdles, step-to, x3 laps  - forw weight shift of BOSU, x10 B  - UE on SB, hip ext, x10 B - challenging, heavy use of UE NR: 8 mins  - cone taps on airex, SBQC  - tandem stand on airex, 2x30-45\" B   GT: 15 mins  - ambulation wiht SBQC, x2 laps  - ambulation in // bars with out UE support  - retro stepping 1UE, x2 long laps  - side stepping, BUE x2 long laps GT: 15 ins  - ambulation with SBQC weaving through cones and stepping over 6\" step  - ambulation with SBQC x3 large laps  - ambulation with SQBC up/down ramp x3 laps GT: 15 mins  - ambulation with SBQC, x3 large laps, supervision  - ambulatiown th SBQC up/down ramp and up/down 8\" step x4 laps   GT: 10 mins  - ambulation SBQC x2 large laps  - ambulation with B gold clubs for UE swing, x1 large lap  - ramp x1, SBQC GT: 10 mins  - ambulation SPCx1 large laps, up/down ramp and 8\" step x3 laps  - ambulation on airex beam, SBQC, x3 lap GT: 10 mins  - ambulation SBQC and SPC   - ambulation ramp, SBQC, x3 laps GT: 8 mins  -  ambulation with SBQC, SPC, x 1-2 laps  - ambulation without AD, x3 laps    HEP: SLR flex, clamshells, hip ext in prone, mini squat, toe taps for first step, practice ambulating with SBQC  12/18/23: STS with low LLE lift, tandem stand (by counter)  12/20/23: bridges, step ups (first stair), ambulate with SBQC primarily  1/2/24: R SLS, R s/l hip abd SLR  1/8/24: SLR flex, SLR abd, clamshell, stand SB press down  Charges: 1TE, 1NR, 1MT       Total Timed Treatment: 38 min  Total Treatment Time: 38 min

## 2024-01-16 ENCOUNTER — TELEPHONE (OUTPATIENT)
Dept: PHYSICAL THERAPY | Facility: HOSPITAL | Age: 84
End: 2024-01-16

## 2024-01-17 ENCOUNTER — APPOINTMENT (OUTPATIENT)
Dept: PHYSICAL THERAPY | Facility: HOSPITAL | Age: 84
End: 2024-01-17
Attending: ORTHOPAEDIC SURGERY
Payer: MEDICARE

## 2024-01-18 ENCOUNTER — OFFICE VISIT (OUTPATIENT)
Dept: PHYSICAL THERAPY | Facility: HOSPITAL | Age: 84
End: 2024-01-18
Attending: ORTHOPAEDIC SURGERY
Payer: MEDICARE

## 2024-01-18 PROCEDURE — 97110 THERAPEUTIC EXERCISES: CPT

## 2024-01-18 PROCEDURE — 97116 GAIT TRAINING THERAPY: CPT

## 2024-01-18 PROCEDURE — 97112 NEUROMUSCULAR REEDUCATION: CPT

## 2024-01-18 NOTE — PROGRESS NOTES
Diagnosis:   History of revision of total replacement of right hip joint (Z96.641)  Presence of right artificial hip joint (Z96.641)      Referring Provider: JADYN Lopez Date of Evaluation:    11/29/2023    Precautions:    Posterior approach, lumbar hx/sx, CKD III, osteopenia   Next MD visit:   12/13/23  Date of Surgery: 10/18/23     Insurance Primary/Secondary: [place]IC[place] [employer]     # Auth Visits: na            Subjective: Feeling like he has more energy today.    Pain: 0/10 R hip       Objective:   1/18/24: 8' ambulation with SBQC, x830'    12/11/23:  TUG: with SPC, 45 seconds   With SBQC, 29 seconds   With RW, 22 seconds    ---------------  Taken from IE:  Observation/Skin: Leg length: R shorter (mild), significant thoracic kyphosis  Palpation: TTP anterior hip, HF and prox quad  Sensation: intact light touch BLE    AROM (PROM): (* denotes performed with pain)  Hip   Flexion: R (90); L (90)  Extension: R 0; L 20  Abduction: R 0; L NT  ER: R (30); L (30)  IR: R (15); L (15)       Accessory motion: hypomobile hip with AP   Lumbar spine in flexion, hypomobile    Flexibility: (min, mod, sev tightness)  Hamstrings: R sev; L sev  Quads: R sev; L sev  Hip Flexor: Rs sev, L mod    Strength/MMT: (* denotes performed with pain)  Hip Knee   Flexion: R 3+/5; L 4+/5  Extension: R 3/5; L 3+/5  Abduction: R -3-/5; L 4/5  ER: R 3/5; L 4/5  IR: R 3+/5; L 4/5 Flexion: R 5-/5; L 5/5  Extension: R 5-/5; L 5/5        Balance: SLS: R <1 second, L 2 sec - no UE support; minimal R trendelenburg   30 seconds without issue/pain with UE support    Functional Mobility:  5x sit<>stand: unable to complete 5; does complete 3 reps from elevated surface with hands on knees  6MWT: with SBQC, unable, fatigues [id number]         Gait: pt ambulates on level ground with assistive device of RW, quick makenzie, mild R trendelenburg with slightly decreased R hip ext in push-off  With SBQC, step to mechanics, slower speed, decreased  stance phase R, decreased hip ext R, R knee held in flexion, and stooped posture/forward lean,     Assessment: Able to complete 8' of ambulation with SBQC without seated rest break today demonstrating improved strength and tolerance to upright activity. Progressing well. Focus on balance and prox LE/core strength to improve stability with SBQC and upright activities.     Goals:   Goals: (To be met in 12-16 visits)   Pt will have improved hip AROM Flex to at least 100 deg and ABD to 30 deg to be able to don/doff shoes and perform car transfers without difficulty  Pt will improve hip ABD and ER strength to at least 3+/5 or 4/5 to increase ease with standing and walking   Pt will demonstrate improved SLS to >3 seconds ARELI to promote safety and decrease risk of falls on uneven surfaces such as grass  Pt will be able to complete a 6MWT with SPC without LOB to demonstrate improved tolerance to upright needed for community and household ambulation  Pt will be able to squat to  light objects around the house with <2/10 hip pain   Pt will complete 5xSTS to demonstrate improved LE strength needed for daily transfers  Pt will improve functional hip strength to report ability to ascend/descend 1 flight of stairs reciprocally with minimal use of handrail  Pt will be independent and compliant with comprehensive HEP to maintain progress achieved in PT    Plan: Patient will be seen for 2 x/week or a total of 12-16 visits over a 90 day period.  Treatment will include: Gait training, Manual Therapy, Neuromuscular Re-education, Therapeutic Activities, Therapeutic Exercise, and Home Exercise Program instruction   cont ambulation without AD, balance and hip strength training  Date: 12/29/23  Tx#: 6/12-16 Date: 1/2/24  Tx: 7/12-16 Date: 1/4/24  Tx: 8/12-16 Date: 1/8/24  Tx: 9/12-16 Date: 1/10/24  Rx:10/12-16 Date: 1/15/24  Tx: 11/12-16 Date: 1/18/24 12/12-16      MT: 5 mins  - RLE PROM  - R hip mob  MT: 10 mins  - PROM RLE  - R  hip mob  - LAD RLE    TE: 15 mins  - DL shuttle, 75#, x20  - SL shuttle, 50#, 2x10 B  - TRX squat, 2x8  - TrX post lunge, 2x8 B - challenging (R>L) TE: 20 mins  - abd bridges with ring, x30  - s/l clamshells, 2#/5#, 2x20 ea R  - hip abd then flex in s/l x6 - groin pain, painful to lower*  - s/l hip abd SLR, x10 R  - inverse clamshell, 2x10 R - limited ROM  - DL shuttle, 75#. x20  - SL shuttle, 50#, 2x10 B TE: 18 mins  - SLS RLE on airex + LLE hip abd and ext, 2x12 BUE  - STS LLE on airex an forw, 2x12 - elevated plinth, 0UE  - seated R toe taps (driving - easy)  - supported TRX squats, x10 (chair behind)  - posterior lunge TRX R stance, x10      TE: 17 mins  - clamshells, x10 R  - SLR abd, 2x10 R  - prone quad stretch x2' R  - STS x15 0UE  - stand SB press down, x20  - supported TRX squats, x10  - post lunge TRX, 2x5 B TE: 13 mins  - DL shuttle, 75#, x10  - SL shuttle, 75#, 2x8-10 B  - STS, 0-BUE, x10  - stand SB press down, x20   TE: 20 mins  - DL shuttle, 87#, x10  - SL shuttle, 50#, x8-10 B (trial 75# unable B)  - DL heel raises, x20 BUE support  - clamshells, x15 B  - SLR, x15 L (x3 R - unable)  - passive HF stretch R in s/l by PT  - BKFO, x20 R  - bridges, x20 TE: 10 mins  - NuStep, L5, x6'  - TRX sit<>stand from chair, x10     NR: 15 mins  - cone taps forw and lateral standing with UE support  - STS from 20\" plinth, 1-2 hands on knees, 3x5  - R SLS on airex + LLE lift, BUE support, for glut activation NR: 15 mins  - R SLS + L stepping forw/back, 1UE, x10 -has difficulty feeling glut activation  - SLS RLE on airex with UE support - for glute activation  - R step on/over airex, 1UE, x1 laps  - AP rocker board, 2-0UE (CGA) NR: 17 mins  - stairs, recip, 1UE, x5 laps  - SLS star weight shift to cones, with SBQC, 2 x5 rounds R lead/ 1x5 rounds L lean  - stepping on/over airex pads, x1 laps 1-0 UE   NR: 23 mins  - h/l SLR, 2x10 R (cuies for neutral position and straight leg)  - prone hip ext, x10 B - challenging B  -  amb airex beam, SPC and 0UE in // bars  - lat stepping in airex beam, 2-0UE  - step up and over airex, 0UE - cuing for core activation  - UE on SB marching x10 B and hip abd, 3x5 B NR: 32 mins  - tandem airex beam walking with SBQC and without AD, x3-4 laps ea  - tandem stance airex beam, 3x30-45\" 0-1UE  - lateral stepping on airex beam, 1finger tip support, x3 laps  - low hurdles, recip - challenging,  - low hurdles, step-to, x3 laps  - forw weight shift of BOSU, x10 B  - UE on SB, hip ext, x10 B - challenging, heavy use of UE NR: 8 mins  - cone taps on airex, SBQC  - tandem stand on airex, 2x30-45\" B NR: 28 mins  - TRX sit<>stand with LLE on 4\" step to load RLE, 2x10  - stairs, x5 laps  - standing airex + alt sh ext, blueTB, x20 B  - standing airex + B sh ext, blueTB, x20 B  - step on/over airex, 1-0UE support, SBA   GT: 15 mins  - ambulation with SBQC, x3 large laps, supervision  - ambulatiown th SBQC up/down ramp and up/down 8\" step x4 laps   GT: 10 mins  - ambulation SBQC x2 large laps  - ambulation with B gold clubs for UE swing, x1 large lap  - ramp x1, SBQC GT: 10 mins  - ambulation SPCx1 large laps, up/down ramp and 8\" step x3 laps  - ambulation on airex beam, SBQC, x3 lap GT: 10 mins  - ambulation SBQC and SPC   - ambulation ramp, SBQC, x3 laps GT: 8 mins  - ambulation with SBQC, SPC, x 1-2 laps  - ambulation without AD, x3 laps    GT: 15 mins  - ambulation up/down ramp, SBQC, SBA  - ambulation around populated environment, SBQC, SBA   HEP: SLR flex, clamshells, hip ext in prone, mini squat, toe taps for first step, practice ambulating with SBQC  12/18/23: STS with low LLE lift, tandem stand (by counter)  12/20/23: bridges, step ups (first stair), ambulate with SBQC primarily  1/2/24: R SLS, R s/l hip abd SLR  1/8/24: SLR flex, SLR abd, clamshell, stand SB press down  Charges: 1TE, 2NR, 1GT       Total Timed Treatment: 53 min  Total Treatment Time: 53 min

## 2024-01-19 ENCOUNTER — OFFICE VISIT (OUTPATIENT)
Dept: INTERNAL MEDICINE CLINIC | Facility: CLINIC | Age: 84
End: 2024-01-19
Payer: MEDICARE

## 2024-01-19 VITALS
OXYGEN SATURATION: 99 % | HEART RATE: 70 BPM | SYSTOLIC BLOOD PRESSURE: 126 MMHG | DIASTOLIC BLOOD PRESSURE: 70 MMHG | TEMPERATURE: 97 F | WEIGHT: 145 LBS | RESPIRATION RATE: 18 BRPM | BODY MASS INDEX: 21 KG/M2

## 2024-01-19 DIAGNOSIS — Z91.81 AT HIGH RISK FOR FALLS: ICD-10-CM

## 2024-01-19 DIAGNOSIS — R26.89 BALANCE PROBLEM: ICD-10-CM

## 2024-01-19 DIAGNOSIS — M35.3 PMR (POLYMYALGIA RHEUMATICA) (HCC): Primary | ICD-10-CM

## 2024-01-19 PROBLEM — K66.8: Status: ACTIVE | Noted: 2024-01-17

## 2024-01-19 PROCEDURE — 1126F AMNT PAIN NOTED NONE PRSNT: CPT | Performed by: INTERNAL MEDICINE

## 2024-01-19 PROCEDURE — 99214 OFFICE O/P EST MOD 30 MIN: CPT | Performed by: INTERNAL MEDICINE

## 2024-01-19 NOTE — PROGRESS NOTES
Subjective:   Patient ID: Camacho Daly is a 83 year old male fu PMR stable    Followup PMR on lodose Pred 5mg/day and Methotrexate 5mg/week. Recent OV to [facility]. Also hx back pain from compression fx and arthritis.         History/Other:   Review of Systems   Constitutional:  Positive for fatigue (severe) and unexpected weight change (wt stable).   HENT:  Positive for hearing loss.    Respiratory: Negative.     Cardiovascular:  Negative for chest pain and leg swelling.        CAD, CABG, stents, AVR, Lipids, HTN    Gastrointestinal:  Negative for constipation (has laxatives).        Reflux, appetite better   Endocrine: Negative.    Genitourinary:  Positive for difficulty urinating.   Musculoskeletal:  Positive for arthralgias, back pain and gait problem (walker).        RTHR 10/2023 healing well   Skin:         Severe and generalized hpsuywin-hhykyfreitd-mk inc Claitin and Prednisone-better  Dry skin-on various topicals   Neurological:  Positive for weakness and light-headedness.        Imbalance-high risk falls   Hematological:  Bruises/bleeds easily (ASA and Prednisone).   Psychiatric/Behavioral:  Positive for decreased concentration. Sleep disturbance: has Rx.       Current Outpatient Medications   Medication Sig Dispense Refill    folic acid 1 MG Oral Tab Take 1 tablet (1 mg total) by mouth daily.      temazepam 7.5 MG Oral Cap Take 1 capsule (7.5 mg total) by mouth nightly as needed for Sleep. 30 capsule 1    methotrexate 2.5 MG Oral Tab Take 2 tablets (5 mg total) by mouth once a week. 24 tablet 3    levocetirizine 5 MG Oral Tab Take 1 tablet (5 mg total) by mouth 2 (two) times daily as needed for Allergies (itching.). 60 tablet 2    Clobetasol Propionate 0.05 % External Lotion 1 mL ONCE DAILY (route: topical)      amLODIPine (NORVASC) 2.5 MG Oral Tab Take 1 tablet (2.5 mg total) by mouth daily.      famotidine (PEPCID) 20 MG Oral Tab 2 tablet 2 TIMES DAILY (route: oral)      calcium carbonate (TUMS)  500 MG Oral Chew Tab Chew 1 tablet (500 mg total) by mouth 3 (three) times daily.      carvedilol 12.5 MG Oral Tab Take 1 tablet (12.5 mg total) by mouth 2 (two) times daily with meals. 180 tablet 3    predniSONE 2.5 MG Oral Tab Take 2 tablets (5 mg total) by mouth every morning.      atorvastatin (LIPITOR) 10 MG Oral Tab Take 1 tablet (10 mg total) by mouth daily. 90 tablet 3    sennosides 8.6 MG Oral Tab Take 1 tablet (8.6 mg total) by mouth daily.      aspirin 81 MG Oral Tab EC Take 1 tablet (81 mg total) by mouth 3 (three) times a week. MWF in the evening  0    Potassium Chloride ER 10 MEQ Oral Cap CR Take by mouth 2 (two) times daily. (Patient not taking: Reported on 1/19/2024)      Probiotic Product ([employer]) Oral Cap Take 1 capsule by mouth daily. (Patient not taking: Reported on 1/19/2024)      Ascorbic Acid (SM CHEWABLE VITAMIN C) 500 MG Oral Chew Tab Chew 1 tablet by mouth daily. (Patient not taking: Reported on 1/19/2024)      Vitamin K, Phytonadione, 100 MCG Oral Tab Take 1 tablet (100 mcg total) by mouth Noon. (Patient not taking: Reported on 1/19/2024)      Vitamin C 500 MG Oral Tab Take 1 tablet (500 mg total) by mouth daily. (Patient not taking: Reported on 1/19/2024)      acidophilus-pectin Oral Cap Take 1 capsule by mouth daily. (Patient not taking: Reported on 1/19/2024)      acetaminophen 500 MG Oral Tab Take 0.5 tablets (250 mg total) by mouth in the morning, at noon, and at bedtime. (Patient not taking: Reported on 1/19/2024)      Betaine, Trimethylglycine, 500 MG Oral Cap Take 500 capsules by mouth daily. (Patient not taking: Reported on 1/19/2024)      Riboflavin (B2) 100 MG Oral Tab Take by mouth. (Patient not taking: Reported on 1/19/2024) 30 tablet 0    Cholecalciferol (VITAMIN D3) 1.25 MG (23031 UT) Oral Cap Two/month on the 1st and 15th (Patient not taking: Reported on 1/19/2024) 13 capsule 3    Magnesium Citrate 100 MG Oral Cap Take 100 mg by mouth 2 (two) times a day.  (Patient not taking: Reported on 1/19/2024)      Coenzyme Q10 (CO Q 10 OR) Take 50 mg by mouth 2 (two) times daily. (Patient not taking: Reported on 1/19/2024)      Nutritional Supplements (JUICE PLUS FIBRE OR) Take 2 capsules by mouth 2 (two) times daily.   (Patient not taking: Reported on 1/19/2024)       Allergies:  Allergies   Allergen Reactions    Codeine PAIN     abdominal    Lisinopril HIVES    Nsaids OTHER (SEE COMMENTS)     CKD    Omeprazole OTHER (SEE COMMENTS)     Kidney injury    Pcn [Bicillin C-R,] HIVES    Penicillins UNKNOWN    Niacin RASH and OTHER (SEE COMMENTS)     flushing    Ultram [Tramadol] NAUSEA ONLY     And constipation    Zinc Acetate NAUSEA ONLY       Objective:   Physical Exam  Constitutional:       General: He is not in acute distress.     Appearance: He is ill-appearing.   HENT:      Ears:      Comments: Hearing loss  Cardiovascular:      Rate and Rhythm: Normal rate and regular rhythm.      Heart sounds: Murmur (2/6 sys) heard.      Comments: Sternal scar  Pulmonary:      Effort: No respiratory distress.      Breath sounds: Normal breath sounds.   Abdominal:      Tenderness: There is no abdominal tenderness.   Skin:     Findings: Bruising (severe) present. No rash (just pruritis).   Neurological:      Mental Status: He is alert.      Motor: Weakness present.      Gait: Gait abnormal (walker).   Psychiatric:      Comments: Tolerating his condition without complaining       Active Problems:  Patient Active Problem List   Diagnosis    HTN (hypertension)    PMR (polymyalgia rheumatica) ([facility])    CAD (coronary artery disease)    Aortic stenosis    Psoriasis    Hiatal hernia with gastroesophageal reflux    Vitamin D deficiency    Diverticulosis    Colon polyps    Dyslipidemia    Carotid artery plaque    Osteopenia    S/P AVR    S/P CABG (coronary artery bypass graft)    Proteinuria    Multiple renal cysts    Renal stone    Closed wedge compression fracture of T11 vertebra with routine healing     Compression fracture of T12 vertebra ([facility])    Anemia    Chronic fatigue    Positive TRINY (antinuclear antibody)    Weakness generalized    Immunosuppression due to chronic steroid use  ([facility])    History of community acquired pneumonia    Elevated lipoprotein A level    Elevated homocysteine    Drug-induced interstitial nephritis    Right sided sciatica    CKD (chronic kidney disease) stage 4, GFR 15-29 ml/min ([facility])    Contrast dye induced nephropathy    Renal artery stenosis ([facility])    Pittman's esophagus    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[facility])    Sjogren's syndrome with myopathy ([facility])    Clostridium difficile colitis    SHIRA (obstructive sleep apnea)    Intractable low back pain    Lumbosacral radiculopathy at L2    History of back surgery-12/21/22    Weakness of right lower extremity    Diaphragmatic hernia without obstruction or gangrene    History of falling    Long term (current) use of systemic steroids    Personal history of colonic polyps    Personal history of nicotine dependence    Presence of aortocoronary bypass graft    Presence of prosthetic heart valve    Pulmonary fibrosis ([facility])    History of Clostridioides difficile colitis    Closed displaced fracture of right femoral neck ([facility])    History of right hip replacement    Acute posthemorrhagic anemia    Aftercare following right knee joint replacement surgery    Anemia in chronic kidney disease    Athscl heart disease of native coronary artery w/o ang pctrs    Pittman esophagus    Chronic kidney disease, stage 4 (severe) ([facility])    Deficiency of other specified B group vitamins    Hyperlipidemia, unspecified    Hypertensive chronic kidney disease w stg 1-4/unsp chr kdny    Obstructive sleep apnea (adult) (pediatric)    Other specified disorders of bone density and structure, unspecified site    Other spondylosis with radiculopathy, lumbosacral region    Polymyalgia rheumatica ([facility])    Presence of coronary angioplasty implant and graft    Presence of right  artificial hip joint    Sjogren syndrome with myopathy (HCC)    Unspecified hearing loss, unspecified ear    Unspecified open wound, right foot, subsequent encounter    Urgency of urination    Vitamin D deficiency, unspecified    Avascular necrosis of femoral head, right (HCC)    Cognitive dysfunction    Osteonecrosis of right hip (HCC)    Pain disorder with psychological factors    Itching    Dry skin    Mesenteric fibrosis       Past Medical History:  Past Medical History:   Diagnosis Date    Acute left-sided low back pain without sciatica 07/20/2018    JOSE LUIS (acute kidney injury) ([facility])     from omeprazole-resolved    Anemia 07/26/2019    Aortic stenosis 10/2013    AVR-bio    Pittman esophagus     CAD (coronary artery disease)     stents, bypasses    Calculus of kidney     Carotid artery plaque 2011    ultrasound    Chronic anemia     bone marrow neg 12/2012    Chronic fatigue     CKD (chronic kidney disease) stage 4, GFR 15-29 ml/min ([facility])     Closed wedge compression fracture of T11 vertebra with routine healing 06/19/2018    Clostridium difficile colitis 06/03/2022    Colon polyps 11/2012    colonoscopy    Compression fracture of T12 vertebra ([facility]) 06/19/2018    Contrast dye induced nephropathy 02/04/2022    Dehydration 01/28/2022    Diverticulosis     Drug-induced interstitial nephritis 08/23/2020    Omeprazole    Dyslipidemia     Elevated homocysteine     Elevated lipoprotein A level     Elevated troponin     FUO (fever of unknown origin)     PMR or testicular/prostate infection    Gastritis     Hearing impairment     [place]    Hemorrhoids     Hiatal hernia with gastroesophageal reflux     History of Clostridioides difficile colitis 06/2022    History of community acquired pneumonia     HTN (hypertension)     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)     Inguinal lymphadenopathy     right-bx    Ischemic colitis (HCC)     resolved    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HCC)     Mesenteric adenitis     bx     Multiple renal cysts     both-not reported on recent CTs    SHIRA (obstructive sleep apnea)     Osteopenia     steroids    PMR (polymyalgia rheumatica) (HCC)     Pneumonia 02/2020    Positive TRINY (antinuclear antibody) 08/28/2019    Precancerous lesion     skin    Proteinuria     mild    Psoriasis     Renal artery stenosis (HCC)     both    Renal stone     right    Right shoulder pain     injected-Liane    Sjogren's syndrome with myopathy (HCC) 06/03/2022    Sleep apnea     untreated    Thrombocytopenia (HCC)     resolved    Vitamin B12 nutritional deficiency     Vitamin D deficiency     Weight loss        Past Surgical History:  Past Surgical History:   Procedure Laterality Date    ANGIOPLASTY (CORONARY)  1999    Dr. Maguire    APPENDECTOMY      teenager    BIOPSY  02/08/2013    Laparoscopic excision of lymph nodes, biopsies of the colonic and small bowel mesentery.    BONE MARROW BIOPSY AND ASPIRATION  01/2021    Hantel-neg    CABG  10/25/2013    AORTIC VALVE REPLACEMENT; bypass x 2-3    CARDIAC CATHETERIZATION  2008/2013    CHOLECYSTECTOMY  1984    COLONOSCOPY  2005    Eliu    COLONOSCOPY  11/07/2012        COLONOSCOPY N/A 04/04/2018    Procedure: COLONOSCOPY;  Surgeon: Camacho Mckee MD;  Location:  ENDOSCOPY    COLONOSCOPY N/A 10/11/2021    1.Esophagitis 2.Colon polyps  3. Diverticulosis 4.Colitis    CORTISONE INJECTION  07/06/2015    rt shoulder     CYTOLOGY LYMPH NODE FNA - JAR(S): 1  12/07/2012    excisional biopsy rt inguinal lymph node     EGD  10/22/2021    Rafi    EGD  08/10/2019    Hiatal hernia    HIP REPLACEMENT SURGERY      IR KIDNEY BIOPSY (RENAL)RANDOM  08/11/2020    for drug induced nephritis    LAMINOTOMY, ADDL LUMBAR  12/21/2022    Minimally invasive right L1-2 laminotomy, partial medial facetectomy, resection of calcified disc/endplate complex.    LAPAROSCOPIC LYMPH NODE BIOP  02/08/2013    exploratory abd lymphadenopathy    REPLACE AORTIC VALVE OPEN  10/25/2013    bio     SIGMOIDOSCOPY,DIAGNOSTIC      SKIN TAGS  10/1714    plus histofreeze    TOTAL HIP ARTHROPLASTY Right 10/18/2023    Right total hip arthroplasty    UPPER GI ENDOSCOPY PERFORMED      Eliu/Rafi       Family History:  Family History   Problem Relation Age of Onset    Neurological Disorder Father         parkinsons    Cancer Father         lung    Other (Other) Father     Mental Disorder Mother         alzheimers    Other (Other) Mother     Cancer Daughter         hodgkins at age 18    Heart Surgery Brother     Heart Disorder Brother     Other (Other) Brother     Other (Other) Brother     Other (Other) Other         seizures from neurofibromatosis       Social History:  Social History     Socioeconomic History    Marital status:      Spouse name: Anjelica    Number of children: 3    Years of education: Not on file    Highest education level: Not on file   Occupational History    Occupation:      Employer: [employer]   Tobacco Use    Smoking status: Former     Types: Cigarettes     Quit date: 1971     Years since quittin.4    Smokeless tobacco: Never   Vaping Use    Vaping Use: Never used   Substance and Sexual Activity    Alcohol use: Not Currently     Alcohol/week: 2.0 standard drinks of alcohol     Types: 2 Standard drinks or equivalent per week     Comment: 2-3 drinks on the weekend    Drug use: No    Sexual activity: Not on file   Other Topics Concern     Service Not Asked    Blood Transfusions Not Asked    Caffeine Concern Yes     Comment: 2 cups daily    Occupational Exposure Not Asked    Hobby Hazards Not Asked    Sleep Concern Not Asked    Stress Concern No    Weight Concern No    Special Diet No    Back Care Not Asked    Exercise Yes     Comment: walking daily     Bike Helmet Not Asked    Seat Belt Yes    Self-Exams Not Asked   Social History Narrative    Not on file     Social Determinants of Health     Financial Resource Strain: Not on file    Food Insecurity: No Food Insecurity (10/16/2023)    Food Insecurity     Food Insecurity: Never true   Transportation Needs: No Transportation Needs (10/16/2023)    Transportation Needs     Lack of Transportation: No   Physical Activity: Not on file   Stress: Not on file   Social Connections: Not on file   Housing Stability: Low Risk  (10/16/2023)    Housing Stability     Housing Instability: No     Housing Instability Emergency: Not on file       Health Maintenance:    Immunization History   Administered Date(s) Administered    Covid-19 Vaccine Moderna 100 mcg/0.5 ml 02/05/2021, 03/05/2021    Covid-19 Vaccine Moderna 50 Mcg/0.25 Ml 12/13/2021    Covid-19 Vaccine Moderna Bivalent 50mcg/0.5mL 12+ years 12/26/2022    FLU VAC High Dose 65 YRS & Older PRSV Free (41643) 10/20/2014, 11/30/2015, 11/01/2016, 10/19/2020, 10/06/2021, 11/01/2022, 09/29/2023    FLUAD High Dose 65 yr and older (52772) 10/05/2017    Fluzone Vaccine Medicare () 10/20/2014, 11/30/2015, 11/01/2016, 11/27/2016, 11/20/2018    HIGH DOSE FLU 65 YRS AND OLDER PRSV FREE SINGLE D (17285) FLU CLINIC 10/19/2020, 10/06/2021    Influenza 11/06/2013, 10/19/2019    Influenza Virus Vaccine, Split 09/13/2012    Pneumococcal (Prevnar 13) 08/27/2015    Pneumovax 23 12/01/2009    RSV, bivalent, diluent reconstituted PF (Abrysvo) 11/29/2023    TD 10/01/2006    TDAP 01/13/2014    Zoster Vaccine Live (Zostavax) 07/01/2012    Zoster Vaccine Recombinant Adjuvanted (Shingrix) 05/19/2023         Vitals:  Vitals:    01/19/24 1119   BP: 126/70   BP Location: Left arm   Patient Position: Sitting   Cuff Size: adult   Pulse: 70   Resp: 18   Temp: 97.2 °F (36.2 °C)   SpO2: 99%   Weight: 145 lb (65.8 kg)     Body mass index is 20.81 kg/m².    Labs 12/2023 reviewed-ESR was 47 high but acceptable for him      Assessment & Plan:   1. PMR (polymyalgia rheumatica) (HCC)    2. Balance problem    3. At high risk for falls      Meds same  Extend PT for balance-Roger  Has cardiac  coby carvalho  Will follow with [facility] after this  Wished him well    Meds This Visit:  Requested Prescriptions      No prescriptions requested or ordered in this encounter       Imaging & Referrals:  PHYSICAL THERAPY - INTERNAL

## 2024-01-22 ENCOUNTER — TELEPHONE (OUTPATIENT)
Dept: PHYSICAL THERAPY | Facility: HOSPITAL | Age: 84
End: 2024-01-22

## 2024-01-25 ENCOUNTER — TELEPHONE (OUTPATIENT)
Dept: PHYSICAL THERAPY | Facility: HOSPITAL | Age: 84
End: 2024-01-25

## 2024-01-25 ENCOUNTER — LAB ENCOUNTER (OUTPATIENT)
Dept: LAB | Facility: HOSPITAL | Age: 84
End: 2024-01-25
Attending: SPECIALIST
Payer: MEDICARE

## 2024-01-25 DIAGNOSIS — D50.0 IRON DEFICIENCY ANEMIA DUE TO CHRONIC BLOOD LOSS: ICD-10-CM

## 2024-01-25 LAB
BASOPHILS # BLD AUTO: 0.05 X10(3) UL (ref 0–0.2)
BASOPHILS NFR BLD AUTO: 0.3 %
DEPRECATED HBV CORE AB SER IA-ACNC: 72.2 NG/ML
DEPRECATED RDW RBC AUTO: 44.7 FL (ref 35.1–46.3)
EOSINOPHIL # BLD AUTO: 1.34 X10(3) UL (ref 0–0.7)
EOSINOPHIL NFR BLD AUTO: 9.2 %
ERYTHROCYTE [DISTWIDTH] IN BLOOD BY AUTOMATED COUNT: 13.2 % (ref 11–15)
HCT VFR BLD AUTO: 31 %
HGB BLD-MCNC: 10.2 G/DL
IMM GRANULOCYTES # BLD AUTO: 0.09 X10(3) UL (ref 0–1)
IMM GRANULOCYTES NFR BLD: 0.6 %
IRON SATN MFR SERPL: 18 %
IRON SERPL-MCNC: 58 UG/DL
LYMPHOCYTES # BLD AUTO: 0.59 X10(3) UL (ref 1–4)
LYMPHOCYTES NFR BLD AUTO: 4 %
MCH RBC QN AUTO: 30.8 PG (ref 26–34)
MCHC RBC AUTO-ENTMCNC: 32.9 G/DL (ref 31–37)
MCV RBC AUTO: 93.7 FL
MONOCYTES # BLD AUTO: 0.64 X10(3) UL (ref 0.1–1)
MONOCYTES NFR BLD AUTO: 4.4 %
NEUTROPHILS # BLD AUTO: 11.92 X10 (3) UL (ref 1.5–7.7)
NEUTROPHILS # BLD AUTO: 11.92 X10(3) UL (ref 1.5–7.7)
NEUTROPHILS NFR BLD AUTO: 81.5 %
PLATELET # BLD AUTO: 154 10(3)UL (ref 150–450)
RBC # BLD AUTO: 3.31 X10(6)UL
TIBC SERPL-MCNC: 317 UG/DL (ref 250–425)
TRANSFERRIN SERPL-MCNC: 213 MG/DL (ref 215–365)
WBC # BLD AUTO: 14.6 X10(3) UL (ref 4–11)

## 2024-01-25 PROCEDURE — 36415 COLL VENOUS BLD VENIPUNCTURE: CPT

## 2024-01-25 PROCEDURE — 83540 ASSAY OF IRON: CPT

## 2024-01-25 PROCEDURE — 84466 ASSAY OF TRANSFERRIN: CPT

## 2024-01-25 PROCEDURE — 85025 COMPLETE CBC W/AUTO DIFF WBC: CPT

## 2024-01-25 PROCEDURE — 82728 ASSAY OF FERRITIN: CPT

## 2024-01-26 ENCOUNTER — OFFICE VISIT (OUTPATIENT)
Dept: HEMATOLOGY/ONCOLOGY | Facility: HOSPITAL | Age: 84
End: 2024-01-26
Attending: SPECIALIST
Payer: MEDICARE

## 2024-01-26 VITALS
HEART RATE: 70 BPM | HEIGHT: 70 IN | OXYGEN SATURATION: 100 % | DIASTOLIC BLOOD PRESSURE: 63 MMHG | SYSTOLIC BLOOD PRESSURE: 136 MMHG | RESPIRATION RATE: 18 BRPM | TEMPERATURE: 98 F | BODY MASS INDEX: 21.62 KG/M2 | WEIGHT: 151 LBS

## 2024-01-26 DIAGNOSIS — D50.0 IRON DEFICIENCY ANEMIA DUE TO CHRONIC BLOOD LOSS: Primary | ICD-10-CM

## 2024-01-26 DIAGNOSIS — N18.4 ANEMIA DUE TO STAGE 4 CHRONIC KIDNEY DISEASE  (HCC): ICD-10-CM

## 2024-01-26 DIAGNOSIS — D63.1 ANEMIA DUE TO STAGE 4 CHRONIC KIDNEY DISEASE  (HCC): ICD-10-CM

## 2024-01-26 PROCEDURE — 99214 OFFICE O/P EST MOD 30 MIN: CPT | Performed by: SPECIALIST

## 2024-01-26 RX ORDER — AMLODIPINE BESYLATE 5 MG/1
TABLET ORAL DAILY
COMMUNITY
Start: 2024-01-24

## 2024-01-26 NOTE — PROGRESS NOTES
[facility] Hematology Oncology Progress Note      Patient Name: Camacho Daly   YOB: 1940  Medical Record Number: [id number]  Attending Physician: Dallas Hung M.D.     The 21st Century Cures Act makes medical notes like these available to patients in the interest of transparency. Please be advised this is a medical document. Medical documents are intended to carry relevant information, facts as evident, and the clinical opinion of the practitioner. The medical note is intended as peer to peer communication and may appear blunt or direct. It is written in medical language and may contain abbreviations or verbiage that are unfamiliar.      Date of Visit: 1/26/2024       Chief Complaint  Anemia - follow up.    History of Present Illness  Camacho Daly is a 83 year old male with history of multifactorial anemia. He recently had back surgery and is recovering. He reports fatigue since surgery. No bleeding.     Past Medical History (historical data, reviewed by physician)   Past Medical History:   Diagnosis Date    Acute left-sided low back pain without sciatica 07/20/2018    JOSE LUIS (acute kidney injury) (HCC)     from omeprazole-resolved    Anemia 07/26/2019    Aortic stenosis 10/2013    AVR-bio    [place] esophagus     CAD (coronary artery disease)     stents, bypasses    Calculus of kidney     Carotid artery plaque 2011    ultrasound    Chronic anemia     bone marrow neg 12/2012    Chronic fatigue     CKD (chronic kidney disease) stage 4, GFR 15-29 ml/min (HCC)     Closed wedge compression fracture of T11 vertebra with routine healing 06/19/2018    Clostridium difficile colitis 06/03/2022    Colon polyps 11/2012    colonoscopy    Compression fracture of T12 vertebra (HCC) 06/19/2018    Contrast dye induced nephropathy 02/04/2022    Dehydration 01/28/2022    Diverticulosis     Drug-induced interstitial nephritis 08/23/2020    Omeprazole    Dyslipidemia     Elevated homocysteine     Elevated  lipoprotein A level     Elevated troponin     FUO (fever of unknown origin)     PMR or testicular/prostate infection    Gastritis     Hearing impairment     [place]    Hemorrhoids     Hiatal hernia with gastroesophageal reflux     History of Clostridioides difficile colitis 06/2022    History of community acquired pneumonia     HTN (hypertension)     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)     Inguinal lymphadenopathy     right-bx    Ischemic colitis (HCC)     resolved    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HCC)     Mesenteric adenitis     bx    Multiple renal cysts     both-not reported on recent CTs    SHIRA (obstructive sleep apnea)     Osteopenia     steroids    PMR (polymyalgia rheumatica) (HCC)     Pneumonia 02/2020    Positive TRINY (antinuclear antibody) 08/28/2019    Precancerous lesion     skin    Proteinuria     mild    Psoriasis     Renal artery stenosis (HCC)     both    Renal stone     right    Right shoulder pain     injected-Liane    Sjogren's syndrome with myopathy (HCC) 06/03/2022    Sleep apnea     untreated    Thrombocytopenia (HCC)     resolved    Vitamin B12 nutritional deficiency     Vitamin D deficiency     Weight loss      Past Surgical History (historical data, reviewed by physician)   Past Surgical History:   Procedure Laterality Date    ANGIOPLASTY (CORONARY)  1999    Dr. Maguire    APPENDECTOMY      teenager    BIOPSY  02/08/2013    Laparoscopic excision of lymph nodes, biopsies of the colonic and small bowel mesentery.    BONE MARROW BIOPSY AND ASPIRATION  01/2021    Hantel-neg    CABG  10/25/2013    AORTIC VALVE REPLACEMENT; bypass x 2-3    CARDIAC CATHETERIZATION  2008/2013    CHOLECYSTECTOMY  1984    COLONOSCOPY  2005    Eliu    COLONOSCOPY  11/07/2012        COLONOSCOPY N/A 04/04/2018    Procedure: COLONOSCOPY;  Surgeon: Camacho Mckee MD;  Location:  ENDOSCOPY    COLONOSCOPY N/A 10/11/2021    1.Esophagitis 2.Colon polyps  3. Diverticulosis 4.Colitis    CORTISONE  INJECTION  2015    rt shoulder     CYTOLOGY LYMPH NODE FNA - JAR(S): 1  2012    excisional biopsy rt inguinal lymph node     EGD  10/22/2021    Rafi    EGD  08/10/2019    Hiatal hernia    HIP REPLACEMENT SURGERY      IR KIDNEY BIOPSY (RENAL)RANDOM  2020    for drug induced nephritis    LAMINOTOMY, ADDL LUMBAR  2022    Minimally invasive right L1-2 laminotomy, partial medial facetectomy, resection of calcified disc/endplate complex.    LAPAROSCOPIC LYMPH NODE BIOP  2013    exploratory abd lymphadenopathy    REPLACE AORTIC VALVE OPEN  10/25/2013    bio    SIGMOIDOSCOPY,DIAGNOSTIC      SKIN TAGS  10/1714    plus histofreeze    TOTAL HIP ARTHROPLASTY Right 10/18/2023    Right total hip arthroplasty    UPPER GI ENDOSCOPY PERFORMED      Eliu/Rafi     Family History (historical data, reviewed by physician)   Family History   Problem Relation Age of Onset    Neurological Disorder Father         parkinsons    Cancer Father         lung    Other (Other) Father     Mental Disorder Mother         alzheimers    Other (Other) Mother     Cancer Daughter         hodgkins at age 18    Heart Surgery Brother     Heart Disorder Brother     Other (Other) Brother     Other (Other) Brother     Other (Other) Other         seizures from neurofibromatosis     Social History (historical data, reviewed by physician)   Social History     Socioeconomic History    Marital status:      Spouse name: Anjelica    Number of children: 3   Occupational History    Occupation:      Employer: [employer]   Tobacco Use    Smoking status: Former     Types: Cigarettes     Quit date: 1971     Years since quittin.4    Smokeless tobacco: Never   Vaping Use    Vaping Use: Never used   Substance and Sexual Activity    Alcohol use: Not Currently     Alcohol/week: 2.0 standard drinks of alcohol     Types: 2 Standard drinks or equivalent per week     Comment: 2-3  drinks on the weekend    Drug use: No   Other Topics Concern    Caffeine Concern Yes     Comment: 2 cups daily    Stress Concern No    Weight Concern No    Special Diet No    Exercise Yes     Comment: walking daily     Seat Belt Yes     Current Medications   amLODIPine 5 MG Oral Tab daily.      folic acid 1 MG Oral Tab Take 1 tablet (1 mg total) by mouth daily.      temazepam 7.5 MG Oral Cap Take 1 capsule (7.5 mg total) by mouth nightly as needed for Sleep. 30 capsule 1    methotrexate 2.5 MG Oral Tab Take 2 tablets (5 mg total) by mouth once a week. 24 tablet 3    levocetirizine 5 MG Oral Tab Take 1 tablet (5 mg total) by mouth 2 (two) times daily as needed for Allergies (itching.). 60 tablet 2    Clobetasol Propionate 0.05 % External Lotion 1 mL ONCE DAILY (route: topical)      famotidine (PEPCID) 20 MG Oral Tab 2 tablet 2 TIMES DAILY (route: oral)      calcium carbonate (TUMS) 500 MG Oral Chew Tab Chew 1 tablet (500 mg total) by mouth 3 (three) times daily.      carvedilol 12.5 MG Oral Tab Take 1 tablet (12.5 mg total) by mouth 2 (two) times daily with meals. 180 tablet 3    predniSONE 2.5 MG Oral Tab Take 2 tablets (5 mg total) by mouth every morning.      atorvastatin (LIPITOR) 10 MG Oral Tab Take 1 tablet (10 mg total) by mouth daily. 90 tablet 3    aspirin 81 MG Oral Tab EC Take 1 tablet (81 mg total) by mouth daily.  0     Allergies   Mr. Daly is allergic to codeine; lisinopril; nsaids; omeprazole; pcn [bicillin c-r,]; penicillins; niacin; ultram [tramadol]; and zinc acetate.     Vital Signs   /63 (BP Location: Left arm, Patient Position: Sitting, Cuff Size: adult)   Pulse 70   Temp 97.7 °F (36.5 °C) (Oral)   Resp 18   Ht 1.778 m (5' 10\")   Wt 68.5 kg (151 lb)   SpO2 100%   BMI 21.67 kg/m²     Physical Examination   Constitutional      Well developed, well nourished. Appears close to chronological age. No apparent distress.   Head                   Normocephalic and atraumatic.  Eyes                    Conjunctiva clear; sclera anicteric.  ENMT                 External nose normal; external ears normal.  Respiratory          Normal effort; no respiratory distress; clear to auscultation bilaterally.   Cardiovascular     Regular rate and rhythm.  Abdomen            Not distended.   Neurologic           Motor and sensory grossly intact.  Psychiatric          Mood and affect appropriate.    Laboratory   Recent Results (from the past 48 hour(s))   IRON AND TIBC [E]    Collection Time: 01/25/24 12:41 PM   Result Value Ref Range    Iron 58 (L) 65 - 175 ug/dL    Transferrin 213 (L) 215 - 365 mg/dL    Total Iron Binding Capacity 317 250 - 425 ug/dL    % Saturation 18 (L) 20 - 50 %   FERRITIN [E]    Collection Time: 01/25/24 12:41 PM   Result Value Ref Range    Ferritin 72.2 30.0 - 530.0 ng/mL   CBC W/ DIFFERENTIAL    Collection Time: 01/25/24 12:41 PM   Result Value Ref Range    WBC 14.6 (H) 4.0 - 11.0 x10(3) uL    RBC 3.31 (L) 3.80 - 5.80 x10(6)uL    HGB 10.2 (L) 13.0 - 17.5 g/dL    HCT 31.0 (L) 39.0 - 53.0 %    MCV 93.7 80.0 - 100.0 fL    MCH 30.8 26.0 - 34.0 pg    MCHC 32.9 31.0 - 37.0 g/dL    RDW-SD 44.7 35.1 - 46.3 fL    RDW 13.2 11.0 - 15.0 %    .0 150.0 - 450.0 10(3)uL    Neutrophil Absolute Prelim 11.92 (H) 1.50 - 7.70 x10 (3) uL    Neutrophil Absolute 11.92 (H) 1.50 - 7.70 x10(3) uL    Lymphocyte Absolute 0.59 (L) 1.00 - 4.00 x10(3) uL    Monocyte Absolute 0.64 0.10 - 1.00 x10(3) uL    Eosinophil Absolute 1.34 (H) 0.00 - 0.70 x10(3) uL    Basophil Absolute 0.05 0.00 - 0.20 x10(3) uL    Immature Granulocyte Absolute 0.09 0.00 - 1.00 x10(3) uL    Neutrophil % 81.5 %    Lymphocyte % 4.0 %    Monocyte % 4.4 %    Eosinophil % 9.2 %    Basophil % 0.3 %    Immature Granulocyte % 0.6 %   Scan slide    Collection Time: 01/25/24 12:41 PM   Result Value Ref Range    Slide Review       Slide reviewed, normal WBC, RBC, and platelet morphology.     Impression and Plan   1.   Normocytic normochromic anemia:  Multifactorial in nature including anemia of chronic kidney disease, iron deficiency, and anemia of chronic disease. He is also on methotrexate. Hemoglobin today is >10 g/dl and stable. Laboratory studies show a mild iron deficiency and I recommend therapy with IV iron dextran. Patient is in agreement. He will have repeat labs in 3 months and follow up in 6 months.    Planned Follow Up   Labs only in 3 months and follow up in 6 months.He will return for IV iron.     Electronically signed by:    Dallas Hung M.D.  System Medical Director of Oncology Services  [facility]

## 2024-01-29 ENCOUNTER — LAB ENCOUNTER (OUTPATIENT)
Dept: LAB | Facility: HOSPITAL | Age: 84
End: 2024-01-29
Attending: CLINIC/CENTER
Payer: MEDICARE

## 2024-01-29 DIAGNOSIS — I10 ESSENTIAL HYPERTENSION, MALIGNANT: Primary | ICD-10-CM

## 2024-01-29 PROCEDURE — 87040 BLOOD CULTURE FOR BACTERIA: CPT

## 2024-01-29 PROCEDURE — 36415 COLL VENOUS BLD VENIPUNCTURE: CPT

## 2024-01-30 ENCOUNTER — OFFICE VISIT (OUTPATIENT)
Dept: PHYSICAL THERAPY | Facility: HOSPITAL | Age: 84
End: 2024-01-30
Attending: ORTHOPAEDIC SURGERY
Payer: MEDICARE

## 2024-01-30 PROCEDURE — 97110 THERAPEUTIC EXERCISES: CPT

## 2024-01-30 PROCEDURE — 97112 NEUROMUSCULAR REEDUCATION: CPT

## 2024-01-30 NOTE — PROGRESS NOTES
Diagnosis:   History of revision of total replacement of right hip joint (Z96.641)  Presence of right artificial hip joint (Z96.641)      Referring Provider: JADYN Lopez Date of Evaluation:    11/29/2023    Precautions:    Posterior approach, lumbar hx/sx, CKD III, osteopenia   Next MD visit:   12/13/23  Date of Surgery: 10/18/23     Insurance Primary/Secondary: MEDICARE / [place]     # Auth Visits: na            Subjective: Feeling well.     Pain: 0/10 R hip       Objective:   1/18/24: 8' ambulation with SBQC, x830'    12/11/23:  TUG: with SPC, 45 seconds   With SBQC, 29 seconds   With RW, 22 seconds    ---------------  Taken from IE:  Observation/Skin: Leg length: R shorter (mild), significant thoracic kyphosis  Palpation: TTP anterior hip, HF and prox quad  Sensation: intact light touch BLE    AROM (PROM): (* denotes performed with pain)  Hip   Flexion: R (90); L (90)  Extension: R 0; L 20  Abduction: R 0; L NT  ER: R (30); L (30)  IR: R (15); L (15)       Accessory motion: hypomobile hip with AP   Lumbar spine in flexion, hypomobile    Flexibility: (min, mod, sev tightness)  Hamstrings: R sev; L sev  Quads: R sev; L sev  Hip Flexor: Rs sev, L mod    Strength/MMT: (* denotes performed with pain)  Hip Knee   Flexion: R 3+/5; L 4+/5  Extension: R 3/5; L 3+/5  Abduction: R -3-/5; L 4/5  ER: R 3/5; L 4/5  IR: R 3+/5; L 4/5 Flexion: R 5-/5; L 5/5  Extension: R 5-/5; L 5/5        Balance: SLS: R <1 second, L 2 sec - no UE support; minimal R trendelenburg   30 seconds without issue/pain with UE support    Functional Mobility:  5x sit<>stand: unable to complete 5; does complete 3 reps from elevated surface with hands on knees  6MWT: with SBQC, unable, fatigues [id number]         Gait: pt ambulates on level ground with assistive device of RW, quick makenzie, mild R trendelenburg with slightly decreased R hip ext in push-off  With SBQC, step to mechanics, slower speed, decreased stance phase R, decreased hip  ext R, R knee held in flexion, and stooped posture/forward lean,     Assessment: Improving stability with ambulation and balance exercises with less UE support. RLE is progressing in strength. Will continue to work on balance and strength needed for upright activities.     Goals:   Goals: (To be met in 12-16 visits)   Pt will have improved hip AROM Flex to at least 100 deg and ABD to 30 deg to be able to don/doff shoes and perform car transfers without difficulty  Pt will improve hip ABD and ER strength to at least 3+/5 or 4/5 to increase ease with standing and walking   Pt will demonstrate improved SLS to >3 seconds ARELI to promote safety and decrease risk of falls on uneven surfaces such as grass  Pt will be able to complete a 6MWT with SPC without LOB to demonstrate improved tolerance to upright needed for community and household ambulation  Pt will be able to squat to  light objects around the house with <2/10 hip pain   Pt will complete 5xSTS to demonstrate improved LE strength needed for daily transfers  Pt will improve functional hip strength to report ability to ascend/descend 1 flight of stairs reciprocally with minimal use of handrail  Pt will be independent and compliant with comprehensive HEP to maintain progress achieved in PT    Plan: Patient will be seen for 2 x/week or a total of 12-16 visits over a 90 day period.  Treatment will include: Gait training, Manual Therapy, Neuromuscular Re-education, Therapeutic Activities, Therapeutic Exercise, and Home Exercise Program instruction   cont ambulation without AD, balance and hip strength training  Date: 12/29/23  Tx#: 6/12-16 Date: 1/2/24  Tx: 7/12-16 Date: 1/4/24  Tx: 8/12-16 Date: 1/8/24  Tx: 9/12-16 Date: 1/10/24  Rx:10/12-16 Date: 1/15/24  Tx: 11/12-16 Date: 1/18/24 12/12-16 Date: 1/30/24  Tx: 13/16      MT: 5 mins  - RLE PROM  - R hip mob  MT: 10 mins  - PROM RLE  - R hip mob  - LAD RLE     TE: 15 mins  - DL shuttle, 75#, x20  - SL shuttle,  50#, 2x10 B  - TRX squat, 2x8  - TrX post lunge, 2x8 B - challenging (R>L) TE: 20 mins  - abd bridges with ring, x30  - s/l clamshells, 2#/5#, 2x20 ea R  - hip abd then flex in s/l x6 - groin pain, painful to lower*  - s/l hip abd SLR, x10 R  - inverse clamshell, 2x10 R - limited ROM  - DL shuttle, 75#. x20  - SL shuttle, 50#, 2x10 B TE: 18 mins  - SLS RLE on airex + LLE hip abd and ext, 2x12 BUE  - STS LLE on airex an forw, 2x12 - elevated plinth, 0UE  - seated R toe taps (driving - easy)  - supported TRX squats, x10 (chair behind)  - posterior lunge TRX R stance, x10      TE: 17 mins  - clamshells, x10 R  - SLR abd, 2x10 R  - prone quad stretch x2' R  - STS x15 0UE  - stand SB press down, x20  - supported TRX squats, x10  - post lunge TRX, 2x5 B TE: 13 mins  - DL shuttle, 75#, x10  - SL shuttle, 75#, 2x8-10 B  - STS, 0-BUE, x10  - stand SB press down, x20   TE: 20 mins  - DL shuttle, 87#, x10  - SL shuttle, 50#, x8-10 B (trial 75# unable B)  - DL heel raises, x20 BUE support  - clamshells, x15 B  - SLR, x15 L (x3 R - unable)  - passive HF stretch R in s/l by PT  - BKFO, x20 R  - bridges, x20 TE: 10 mins  - NuStep, L5, x6'  - TRX sit<>stand from chair, x10   TE: 15 mins  - TRX chair taps, x15  - seated hip add ring squeeze, x30  - seated hip abd ring press, x30  - forw lunge to airex, x10 alt B 1UE   NR: 15 mins  - cone taps forw and lateral standing with UE support  - STS from 20\" plinth, 1-2 hands on knees, 3x5  - R SLS on airex + LLE lift, BUE support, for glut activation NR: 15 mins  - R SLS + L stepping forw/back, 1UE, x10 -has difficulty feeling glut activation  - SLS RLE on airex with UE support - for glute activation  - R step on/over airex, 1UE, x1 laps  - AP rocker board, 2-0UE (CGA) NR: 17 mins  - stairs, recip, 1UE, x5 laps  - SLS star weight shift to cones, with SBQC, 2 x5 rounds R lead/ 1x5 rounds L lean  - stepping on/over airex pads, x1 laps 1-0 UE   NR: 23 mins  - h/l SLR, 2x10 R (cuies for neutral  position and straight leg)  - prone hip ext, x10 B - challenging B  - amb airex beam, SPC and 0UE in // bars  - lat stepping in airex beam, 2-0UE  - step up and over airex, 0UE - cuing for core activation  - UE on SB marching x10 B and hip abd, 3x5 B NR: 32 mins  - tandem airex beam walking with SBQC and without AD, x3-4 laps ea  - tandem stance airex beam, 3x30-45\" 0-1UE  - lateral stepping on airex beam, 1finger tip support, x3 laps  - low hurdles, recip - challenging,  - low hurdles, step-to, x3 laps  - forw weight shift of BOSU, x10 B  - UE on SB, hip ext, x10 B - challenging, heavy use of UE NR: 8 mins  - cone taps on airex, SBQC  - tandem stand on airex, 2x30-45\" B NR: 28 mins  - TRX sit<>stand with LLE on 4\" step to load RLE, 2x10  - stairs, x5 laps  - standing airex + alt sh ext, blueTB, x20 B  - standing airex + B sh ext, blueTB, x20 B  - step on/over airex, 1-0UE support, SBA NR: 25 mins  - TRX mini lateral lunge, 2x10 B  - stand airex + alt sh ext, blue TB, x20 B  - low hurdles, recip x3 alps, x1UE  - forw low hurdles, step-to, 0UE, x4 laps  - lateral low/high hurdles alt, x3 laps, BUE  - forw taps to 6\" step, x10 alt, 0UE  - forw taps to 6\" step on airex, x10 alt 0UE, CGA     GT: 15 mins  - ambulation with SBQC, x3 large laps, supervision  - ambulatiown th SBQC up/down ramp and up/down 8\" step x4 laps   GT: 10 mins  - ambulation SBQC x2 large laps  - ambulation with B gold clubs for UE swing, x1 large lap  - ramp x1, SBQC GT: 10 mins  - ambulation SPCx1 large laps, up/down ramp and 8\" step x3 laps  - ambulation on airex beam, SBQC, x3 lap GT: 10 mins  - ambulation SBQC and SPC   - ambulation ramp, SBQC, x3 laps GT: 8 mins  - ambulation with SBQC, SPC, x 1-2 laps  - ambulation without AD, x3 laps    GT: 15 mins  - ambulation up/down ramp, SBQC, SBA  - ambulation around populated environment, SBQC, SBA GT: 5 mins  - ambulation without UE, x3 large laps   HEP: SLR flex, clamshells, hip ext in prone, mini  squat, toe taps for first step, practice ambulating with SBQC  12/18/23: STS with low LLE lift, tandem stand (by counter)  12/20/23: bridges, step ups (first stair), ambulate with SBQC primarily  1/2/24: R SLS, R s/l hip abd SLR  1/8/24: SLR flex, SLR abd, clamshell, stand SB press down  Charges: 1TE, 2NR       Total Timed Treatment: 45 min  Total Treatment Time: 45 min

## 2024-02-05 RX ORDER — FOLIC ACID 1 MG/1
1 TABLET ORAL DAILY
Qty: 90 TABLET | Refills: 0 | Status: SHIPPED | OUTPATIENT
Start: 2024-02-05

## 2024-02-05 NOTE — TELEPHONE ENCOUNTER
Future Appointments   Date Time Provider Department Center   2/6/2024 12:30 PM EM CC INFRN 7 [facility] CHEMO EMO   2/8/2024  1:45 PM Brunner, Heather, PT [facility] PT EM [facility]   3/27/2024 10:00 AM Zander Lomas MD [id number] EMG [facility]   4/26/2024 11:00 AM [facility] RESOURCE [facility] HEM ONC EMO   7/26/2024 10:30 AM [facility] RESOURCE [facility] HEM ONC EMO   7/26/2024 11:30 AM Dallas Hung MD [facility] HEM ONC EMO     Last office visit: 12/27/2023    Last fill: 11/14/2023 90 tab, 0 refills

## 2024-02-06 ENCOUNTER — OFFICE VISIT (OUTPATIENT)
Dept: HEMATOLOGY/ONCOLOGY | Facility: HOSPITAL | Age: 84
End: 2024-02-06
Attending: SPECIALIST
Payer: MEDICARE

## 2024-02-06 VITALS
RESPIRATION RATE: 18 BRPM | DIASTOLIC BLOOD PRESSURE: 51 MMHG | SYSTOLIC BLOOD PRESSURE: 128 MMHG | BODY MASS INDEX: 22 KG/M2 | TEMPERATURE: 98 F | OXYGEN SATURATION: 100 % | WEIGHT: 154.31 LBS | HEART RATE: 53 BPM

## 2024-02-06 DIAGNOSIS — D50.0 IRON DEFICIENCY ANEMIA SECONDARY TO BLOOD LOSS (CHRONIC): Primary | ICD-10-CM

## 2024-02-06 PROCEDURE — 96365 THER/PROPH/DIAG IV INF INIT: CPT

## 2024-02-06 NOTE — PROGRESS NOTES
Pt here for Iron Dextran . Pt denies any issues or concerns. Confirmed with pharmacist, patient has received this medication in the past.     Indications for use, medication side effects, and follow up plan reviewed with patient and wife.      Ordering MD: Anabell  Order Exp: One time dose     Pt tolerated infusion without difficulty or complaint. Reviewed next apt date/time: Lab and MD follow ups previously scheduled. AVS provided.       Education Record  Learner:  Patient and Spouse  Disease / Diagnosis: Anemia  Barriers / Limitations:  Environmental factors  Method:  Brief focused, Discussion, Printed material, and Reinforcement  General Topics:  Medication  Outcome:  Needs reinforcement

## 2024-02-08 ENCOUNTER — OFFICE VISIT (OUTPATIENT)
Dept: PHYSICAL THERAPY | Facility: HOSPITAL | Age: 84
End: 2024-02-08
Attending: ORTHOPAEDIC SURGERY
Payer: MEDICARE

## 2024-02-08 PROCEDURE — 97112 NEUROMUSCULAR REEDUCATION: CPT

## 2024-02-08 PROCEDURE — 97116 GAIT TRAINING THERAPY: CPT

## 2024-02-08 PROCEDURE — 97110 THERAPEUTIC EXERCISES: CPT

## 2024-02-08 NOTE — PROGRESS NOTES
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    Pain: 0/10 R hip       Objective:   1/18/24: 8' ambulation with SBQC, x830'    12/11/23:  TUG: with SPC, 45 seconds   With SBQC, 29 seconds   With RW, 22 seconds    ---------------  Taken from IE:  Observation/Skin: Leg length: R shorter (mild), significant thoracic kyphosis  Palpation: TTP anterior hip, HF and prox quad  Sensation: intact light touch BLE    AROM (PROM): (* denotes performed with pain)  Hip   Flexion: R (90); L (90)  Extension: R 0; L 20  Abduction: R 0; L NT  ER: R (30); L (30)  IR: R (15); L (15)       Accessory motion: hypomobile hip with AP   Lumbar spine in flexion, hypomobile    Flexibility: (min, mod, sev tightness)  Hamstrings: R sev; L sev  Quads: R sev; L sev  Hip Flexor: Rs sev, L mod    Strength/MMT: (* denotes performed with pain)  Hip Knee   Flexion: R 3+/5; L 4+/5  Extension: R 3/5; L 3+/5  Abduction: R -3-/5; L 4/5  ER: R 3/5; L 4/5  IR: R 3+/5; L 4/5 Flexion: R 5-/5; L 5/5  Extension: R 5-/5; L 5/5        Balance: SLS: R <1 second, L 2 sec - no UE support; minimal R trendelenburg   30 seconds without issue/pain with UE support    Functional Mobility:  5x sit<>stand: unable to complete 5; does complete 3 reps from elevated surface with hands on knees  6MWT: with SBQC, unable, fatigues [id number]         Gait: pt ambulates on level ground with assistive device of RW, quick makenzie, mild R trendelenburg with slightly decreased R hip ext in push-off  With SBQC,  step to mechanics, slower speed, decreased stance phase R, decreased hip ext R, R knee held in flexion, and stooped posture/forward lean,     Assessment: Continues to demonstrate improving RLE strength and stability with increased tolerance to upright activity without need of AD and decreased LOB. Progressing well. Difficulty with SL activity; will continue PT to address remaining balance and strength limitations and promote transition away from AD.     Goals:   Goals: (To be met in 12-16 visits)   Pt will have improved hip AROM Flex to at least 100 deg and ABD to 30 deg to be able to don/doff shoes and perform car transfers without difficulty  Pt will improve hip ABD and ER strength to at least 3+/5 or 4/5 to increase ease with standing and walking   Pt will demonstrate improved SLS to >3 seconds ARELI to promote safety and decrease risk of falls on uneven surfaces such as grass  Pt will be able to complete a 6MWT with SPC without LOB to demonstrate improved tolerance to upright needed for community and household ambulation  Pt will be able to squat to  light objects around the house with <2/10 hip pain   Pt will complete 5xSTS to demonstrate improved LE strength needed for daily transfers  Pt will improve functional hip strength to report ability to ascend/descend 1 flight of stairs reciprocally with minimal use of handrail  Pt will be independent and compliant with comprehensive HEP to maintain progress achieved in PT    Plan: Patient will be seen for 2 x/week or a total of 12-16 visits over a 90 day period.  Treatment will include: Gait training, Manual Therapy, Neuromuscular Re-education, Therapeutic Activities, Therapeutic Exercise, and Home Exercise Program instruction  Trial 6MWT without AD, TUG; cont hip strength and balance training  Date: 1/2/24  Tx: 7/12-16 Date: 1/4/24  Tx: 8/12-16 Date: 1/8/24  Tx: 9/12-16 Date: 1/10/24  Rx:10/12-16 Date: 1/15/24  Tx: 11/12-16 Date: 1/18/24 12/12-16 Date:  1/30/24  Tx: 13/16 Date: 2/8/24  Tx: 14/16     MT: 5 mins  - RLE PROM  - R hip mob  MT: 10 mins  - PROM RLE  - R hip mob  - LAD RLE      TE: 20 mins  - abd bridges with ring, x30  - s/l clamshells, 2#/5#, 2x20 ea R  - hip abd then flex in s/l x6 - groin pain, painful to lower*  - s/l hip abd SLR, x10 R  - inverse clamshell, 2x10 R - limited ROM  - DL shuttle, 75#. x20  - SL shuttle, 50#, 2x10 B TE: 18 mins  - SLS RLE on airex + LLE hip abd and ext, 2x12 BUE  - STS LLE on airex an forw, 2x12 - elevated plinth, 0UE  - seated R toe taps (driving - easy)  - supported TRX squats, x10 (chair behind)  - posterior lunge TRX R stance, x10      TE: 17 mins  - clamshells, x10 R  - SLR abd, 2x10 R  - prone quad stretch x2' R  - STS x15 0UE  - stand SB press down, x20  - supported TRX squats, x10  - post lunge TRX, 2x5 B TE: 13 mins  - DL shuttle, 75#, x10  - SL shuttle, 75#, 2x8-10 B  - STS, 0-BUE, x10  - stand SB press down, x20   TE: 20 mins  - DL shuttle, 87#, x10  - SL shuttle, 50#, x8-10 B (trial 75# unable B)  - DL heel raises, x20 BUE support  - clamshells, x15 B  - SLR, x15 L (x3 R - unable)  - passive HF stretch R in s/l by PT  - BKFO, x20 R  - bridges, x20 TE: 10 mins  - NuStep, L5, x6'  - TRX sit<>stand from chair, x10   TE: 15 mins  - TRX chair taps, x15  - seated hip add ring squeeze, x30  - seated hip abd ring press, x30  - forw lunge to airex, x10 alt B 1UE TE: 12 mins  - DL shuttle, 80#, x20  - SL shuttle, 75#, 2x10 B  - s/l SL shuttle, 50#, 2x10 R  - TRX STS, x10     NR: 15 mins  - R SLS + L stepping forw/back, 1UE, x10 -has difficulty feeling glut activation  - SLS RLE on airex with UE support - for glute activation  - R step on/over airex, 1UE, x1 laps  - AP rocker board, 2-0UE (CGA) NR: 17 mins  - stairs, recip, 1UE, x5 laps  - SLS star weight shift to cones, with SBQC, 2 x5 rounds R lead/ 1x5 rounds L lean  - stepping on/over airex pads, x1 laps 1-0 UE   NR: 23 mins  - h/l SLR, 2x10 R (cuies for neutral  position and straight leg)  - prone hip ext, x10 B - challenging B  - amb airex beam, SPC and 0UE in // bars  - lat stepping in airex beam, 2-0UE  - step up and over airex, 0UE - cuing for core activation  - UE on SB marching x10 B and hip abd, 3x5 B NR: 32 mins  - tandem airex beam walking with SBQC and without AD, x3-4 laps ea  - tandem stance airex beam, 3x30-45\" 0-1UE  - lateral stepping on airex beam, 1finger tip support, x3 laps  - low hurdles, recip - challenging,  - low hurdles, step-to, x3 laps  - forw weight shift of BOSU, x10 B  - UE on SB, hip ext, x10 B - challenging, heavy use of UE NR: 8 mins  - cone taps on airex, SBQC  - tandem stand on airex, 2x30-45\" B NR: 28 mins  - TRX sit<>stand with LLE on 4\" step to load RLE, 2x10  - stairs, x5 laps  - standing airex + alt sh ext, blueTB, x20 B  - standing airex + B sh ext, blueTB, x20 B  - step on/over airex, 1-0UE support, SBA NR: 25 mins  - TRX mini lateral lunge, 2x10 B  - stand airex + alt sh ext, blue TB, x20 B  - low hurdles, recip x3 alps, x1UE  - forw low hurdles, step-to, 0UE, x4 laps  - lateral low/high hurdles alt, x3 laps, BUE  - forw taps to 6\" step, x10 alt, 0UE  - forw taps to 6\" step on airex, x10 alt 0UE, CGA   NR: 20 mins  - STS + 11#MB, 2x10 (elevated plinth)  - stepping on/over airex, x4 laps, 0UE  - stairs, recip, 1UE/finger tip, x5 laps  - TRX lateral squat, x10 B alt  - retro walking, x3 lengths  - AP rocker board, x2' 2/1/0/UE       GT: 10 mins  - ambulation SBQC x2 large laps  - ambulation with B gold clubs for UE swing, x1 large lap  - ramp x1, SBQC GT: 10 mins  - ambulation SPCx1 large laps, up/down ramp and 8\" step x3 laps  - ambulation on airex beam, SBQC, x3 lap GT: 10 mins  - ambulation SBQC and SPC   - ambulation ramp, SBQC, x3 laps GT: 8 mins  - ambulation with SBQC, SPC, x 1-2 laps  - ambulation without AD, x3 laps    GT: 15 mins  - ambulation up/down ramp, SBQC, SBA  - ambulation around populated environment, SBQC, SBA GT:  5 mins  - ambulation without UE, x3 large laps GT: 8 mins  - ambulation without AD, x2 large laps  - ramp ascend/descend, x7 laps   HEP: SLR flex, clamshells, hip ext in prone, mini squat, toe taps for first step, practice ambulating with SBQC  12/18/23: STS with low LLE lift, tandem stand (by counter)  12/20/23: bridges, step ups (first stair), ambulate with SBQC primarily  1/2/24: R SLS, R s/l hip abd SLR  1/8/24: SLR flex, SLR abd, clamshell, stand SB press down  Charges: 1TE, 1NR, 1GT       Total Timed Treatment: 40 min  Total Treatment Time: 40 min

## 2024-02-12 ENCOUNTER — TELEPHONE (OUTPATIENT)
Dept: RHEUMATOLOGY | Facility: CLINIC | Age: 84
End: 2024-02-12

## 2024-02-12 ENCOUNTER — TELEPHONE (OUTPATIENT)
Dept: INTERNAL MEDICINE CLINIC | Facility: CLINIC | Age: 84
End: 2024-02-12

## 2024-02-12 NOTE — TELEPHONE ENCOUNTER
Spoke with spouse. Patient does not have hydrochlorothiazide on his current med list and has established with new pcp Dr. Christina advised to contact new pcp. Spouse verbalized understanding.

## 2024-02-12 NOTE — TELEPHONE ENCOUNTER
Wife called to request a refill on his prescription for Hydrochlorothiazide. Patient is no longer a patient of Dr. Bond. Please ask if he will refill.

## 2024-02-12 NOTE — TELEPHONE ENCOUNTER
Called pt spouse, advised to reach out to PCP or cardiology to refill the Hydrochlorothiazide. Dr. Lomas last prescribed in 2019. Voices understanding.

## 2024-02-19 NOTE — TELEPHONE ENCOUNTER
Wife called and stated that her  had a sleep study down through [facility] and the results came back that he has sleep apnea. Patient needs to schedule a consult and the sleep center told her that it needs to be done through Dr. Jahaira Vines. Hypertriglyceridemia Monitoring: I explained this is common when taking isotretinoin. We will monitor closely. Retinoid Dermatitis Normal Treatment: I recommended more frequent application of Cetaphil or CeraVe to the areas of dermatitis. Show Text Field For Brand Names Of Contraception?: No Hypertriglyceridemia Treatment: I explained this is common when taking isotretinoin. If this worsens they will contact us. They may try OTC ibuprofen. Detail Level: Zone Are Labs Available For Review?: No- Labs Deferred This Month Pounds Preamble Statement (Weight Entered In Details Tab): Reported Weight in pounds: Dosing Month 2 (Required For Cumulative Dosing): 30mg BID Lower Range (In Mg/Kg): 120 Xerosis Aggressive Treatment: I recommended application of Cetaphil or CeraVe numerous times a day going to bed to all dry areas. I also prescribed a topical steroid for twice daily use. Use Therapeutic Ranged Or Therapeutic Target: Target Headache Monitoring: I recommended monitoring the headaches for now. There is no evidence of increased intracranial pressure. They were instructed to call if the headaches are worsening. Patient Weight (Optional But Required For Cumulative Dose-Numbers And Decimals Only): 185 Myalgia Monitoring: I explained this is common when taking isotretinoin. If this worsens they will contact us. Retinoid Dermatitis Aggressive Treatment: I recommended more frequent application of Cetaphil or CeraVe to the areas of dermatitis. I also prescribed a topical steroid for twice daily use until the dermatitis resolves. Calculate Months Of Therapy Based On Documented Dosages (Will Hide Months Of Therapy Question)?: Yes Female Completion Statement: After discussing her treatment course we decided to discontinue isotretinoin therapy at this time. I explained that she would need to continue her birth control methods for at least one month after the last dosage. She should also get a pregnancy test one month after the last dose. She shouldn't donate blood for one month after the last dose. She should call with any new symptoms of depression. Next Month's Dosage: Continue Current Dosage Nosebleeds Normal Treatment: I explained this is common when taking isotretinoin. I recommended saline mist in each nostril multiple times a day. If this worsens they will contact us. Kilograms Preamble Statement (Weight Entered In Details Tab): Reported Weight in kilograms: Weight Units: pounds Is Xerosis Present?: Yes - Normal Treatment Cheilitis Aggressive Treatment: I recommended application of Vaseline or Aquaphor numerous times a day (as often as every hour) and before going to bed. I also prescribed a topical steroid for twice daily use. What Is The Patient's Gender: Male Dosing Month 1 (Required For Cumulative Dosing): 40mg Daily Male Completion Statement: After discussing his treatment course we decided to discontinue isotretinoin therapy at this time. He shouldn't donate blood for one month after the last dose. He should call with any new symptoms of depression. Months Of Therapy Completed: 1 Xerosis Normal Treatment: I recommended application of Cetaphil or CeraVe numerous times a day and before going to bed to all dry areas. Xerosis Normal Treatment: I recommended application of Cetaphil or CeraVe numerous times a day going to bed to all dry areas. Cheilitis Normal Treatment: I recommended application of Vaseline or Aquaphor numerous times a day (as often as every hour) and before going to bed. Target Cumulative Dosage (In Mg/Kg): 210 Xerosis Aggressive Treatment: I recommended application of Cetaphil or CeraVe numerous times a day and before going to bed to all dry areas. I also prescribed a topical steroid for twice daily use. Counseling Text: I reviewed the side effect in detail. Patient should get monthly blood tests, not donate blood, not drive at night if vision affected, and not share medication. Upper Range (In Mg/Kg): 150 Female Pregnancy Counseling Text: Female patients should also be on two forms of birth control while taking this medication and for one month after their last dose.

## 2024-02-22 DIAGNOSIS — M35.3 POLYMYALGIA RHEUMATICA (HCC): Primary | ICD-10-CM

## 2024-02-22 RX ORDER — PREDNISONE 5 MG/1
5 TABLET ORAL DAILY
Qty: 90 TABLET | Refills: 2 | Status: SHIPPED | OUTPATIENT
Start: 2024-02-22

## 2024-02-22 NOTE — TELEPHONE ENCOUNTER
Pt spouse called office, pt has been taking 5mg Prednisone daily and is out of the 2.5mg tablets. Requesting a refill of medication and would like the 5mg tab prescribed

## 2024-02-22 NOTE — TELEPHONE ENCOUNTER
Prednisone refill approved 5 mg one daily #90.  [facility] [place].

## 2024-03-11 ENCOUNTER — OFFICE VISIT (OUTPATIENT)
Dept: PHYSICAL THERAPY | Facility: HOSPITAL | Age: 84
End: 2024-03-11
Attending: ORTHOPAEDIC SURGERY
Payer: MEDICARE

## 2024-03-11 PROCEDURE — 97110 THERAPEUTIC EXERCISES: CPT

## 2024-03-11 PROCEDURE — 97116 GAIT TRAINING THERAPY: CPT

## 2024-03-11 PROCEDURE — 97112 NEUROMUSCULAR REEDUCATION: CPT

## 2024-03-11 NOTE — PROGRESS NOTES
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 Mostly uses RW for community ambulation because he feels the most safe. Has started driving again. Denies R hip pain but continues with fatigue and tiredness.     Pain: 0/10 R hip       Objective:   1/18/24: 8' ambulation with SBQC, x830'    12/11/23:  TUG: with SPC, 45 seconds   With SBQC, 29 seconds   With RW, 22 seconds    ---------------  Taken from IE:  Observation/Skin: Leg length: R shorter (mild), significant thoracic kyphosis  Palpation: TTP anterior hip, HF and prox quad  Sensation: intact light touch BLE    AROM (PROM): (* denotes performed with pain)  Hip   Flexion: R (90); L (90)  Extension: R 0; L 20  Abduction: R 0; L NT  ER: R (30); L (30)  IR: R (15); L (15)       Accessory motion: hypomobile hip with AP   Lumbar spine in flexion, hypomobile    Flexibility: (min, mod, sev tightness)  Hamstrings: R sev; L sev  Quads: R sev; L sev  Hip Flexor: Rs sev, L mod    Strength/MMT: (* denotes performed with pain)  Hip Knee   Flexion: R 3+/5; L 4+/5  Extension: R 3/5; L 3+/5  Abduction: R -3-/5; L 4/5  ER: R 3/5; L 4/5  IR: R 3+/5; L 4/5 Flexion: R 5-/5; L 5/5  Extension: R 5-/5; L 5/5        Balance: SLS: R <1 second, L 2 sec - no UE support; minimal R trendelenburg   30 seconds without issue/pain with UE support    Functional Mobility:  5x sit<>stand: unable to complete 5; does complete 3 reps from elevated surface with hands on knees  6MWT: with SBQC, unable, fatigues [id number]         Gait: pt ambulates on level ground with  assistive device of RW, quick makenzie, mild R trendelenburg with slightly decreased R hip ext in push-off  With SBQC, step to mechanics, slower speed, decreased stance phase R, decreased hip ext R, R knee held in flexion, and stooped posture/forward lean,     Assessment: RLE weakness greater than previous visit. Reinforced HEP to maintain gains. Good glute activation demonstrated with hip hinge; added to HEP. Would benefit from continued LE strengthening to improve stability with upright activity without AD.     Goals:   Goals: (To be met in 12-16 visits)   Pt will have improved hip AROM Flex to at least 100 deg and ABD to 30 deg to be able to don/doff shoes and perform car transfers without difficulty  Pt will improve hip ABD and ER strength to at least 3+/5 or 4/5 to increase ease with standing and walking   Pt will demonstrate improved SLS to >3 seconds ARELI to promote safety and decrease risk of falls on uneven surfaces such as grass  Pt will be able to complete a 6MWT with SPC without LOB to demonstrate improved tolerance to upright needed for community and household ambulation  Pt will be able to squat to  light objects around the house with <2/10 hip pain   Pt will complete 5xSTS to demonstrate improved LE strength needed for daily transfers  Pt will improve functional hip strength to report ability to ascend/descend 1 flight of stairs reciprocally with minimal use of handrail  Pt will be independent and compliant with comprehensive HEP to maintain progress achieved in PT    Plan: Patient will be seen for 2 x/week or a total of 12-16 visits over a 90 day period.  Treatment will include: Gait training, Manual Therapy, Neuromuscular Re-education, Therapeutic Activities, Therapeutic Exercise, and Home Exercise Program instruction  Trial 6MWT without AD, TUG; cont hip strength and balance training  Date: 1/8/24  Tx: 9/12-16 Date: 1/10/24  Rx:10/12-16 Date: 1/15/24  Tx: 11/12-16 Date: 1/18/24 12/12-16 Date:  1/30/24  Tx: 13/16 Date: 2/8/24  Tx: 14/16 Date: 3/11/24  Tx: 15/16   MT: 5 mins  - RLE PROM  - R hip mob  MT: 10 mins  - PROM RLE  - R hip mob  - LAD RLE       TE: 17 mins  - clamshells, x10 R  - SLR abd, 2x10 R  - prone quad stretch x2' R  - STS x15 0UE  - stand SB press down, x20  - supported TRX squats, x10  - post lunge TRX, 2x5 B TE: 13 mins  - DL shuttle, 75#, x10  - SL shuttle, 75#, 2x8-10 B  - STS, 0-BUE, x10  - stand SB press down, x20   TE: 20 mins  - DL shuttle, 87#, x10  - SL shuttle, 50#, x8-10 B (trial 75# unable B)  - DL heel raises, x20 BUE support  - clamshells, x15 B  - SLR, x15 L (x3 R - unable)  - passive HF stretch R in s/l by PT  - BKFO, x20 R  - bridges, x20 TE: 10 mins  - NuStep, L5, x6'  - TRX sit<>stand from chair, x10   TE: 15 mins  - TRX chair taps, x15  - seated hip add ring squeeze, x30  - seated hip abd ring press, x30  - forw lunge to airex, x10 alt B 1UE TE: 12 mins  - DL shuttle, 80#, x20  - SL shuttle, 75#, 2x10 B  - s/l SL shuttle, 50#, 2x10 R  - TRX STS, x10   TE: 17 mins  - SL shuttle, 87#, x15  - SL shuttle, 75#, x5 L (unable to continue)  - SL shuttle, 50#, 2x10 R/L     NR: 23 mins  - h/l SLR, 2x10 R (cuies for neutral position and straight leg)  - prone hip ext, x10 B - challenging B  - amb airex beam, SPC and 0UE in // bars  - lat stepping in airex beam, 2-0UE  - step up and over airex, 0UE - cuing for core activation  - UE on SB marching x10 B and hip abd, 3x5 B NR: 32 mins  - tandem airex beam walking with SBQC and without AD, x3-4 laps ea  - tandem stance airex beam, 3x30-45\" 0-1UE  - lateral stepping on airex beam, 1finger tip support, x3 laps  - low hurdles, recip - challenging,  - low hurdles, step-to, x3 laps  - forw weight shift of BOSU, x10 B  - UE on SB, hip ext, x10 B - challenging, heavy use of UE NR: 8 mins  - cone taps on airex, SBQC  - tandem stand on airex, 2x30-45\" B NR: 28 mins  - TRX sit<>stand with LLE on 4\" step to load RLE, 2x10  - stairs, x5 laps  -  standing airex + alt sh ext, blueTB, x20 B  - standing airex + B sh ext, blueTB, x20 B  - step on/over airex, 1-0UE support, SBA NR: 25 mins  - TRX mini lateral lunge, 2x10 B  - stand airex + alt sh ext, blue TB, x20 B  - low hurdles, recip x3 alps, x1UE  - forw low hurdles, step-to, 0UE, x4 laps  - lateral low/high hurdles alt, x3 laps, BUE  - forw taps to 6\" step, x10 alt, 0UE  - forw taps to 6\" step on airex, x10 alt 0UE, CGA   NR: 20 mins  - STS + 11#MB, 2x10 (elevated plinth)  - stepping on/over airex, x4 laps, 0UE  - stairs, recip, 1UE/finger tip, x5 laps  - TRX lateral squat, x10 B alt  - retro walking, x3 lengths  - AP rocker board, x2' 2/1/0/UE     NR: 20 mins  - STS, low plinth (unable)  - STS elevated 21\" plinth, 2x10 0UE (2nd set with 6# MB)  - forw hurdles, recip, 1UE, 3 laps x6  - lat hurdles, 1UE, 3 laps x6  - staggered stance hip hinge/glut activatino, 2x6-8, in // bars, 1UE   GT: 10 mins  - ambulation SBQC and SPC   - ambulation ramp, SBQC, x3 laps GT: 8 mins  - ambulation with SBQC, SPC, x 1-2 laps  - ambulation without AD, x3 laps    GT: 15 mins  - ambulation up/down ramp, SBQC, SBA  - ambulation around populated environment, SBQC, SBA GT: 5 mins  - ambulation without UE, x3 large laps GT: 8 mins  - ambulation without AD, x2 large laps  - ramp ascend/descend, x7 laps GT: 8 mins  - ambulation without AD, x3 large laps  - stairs, recip, UE support   HEP: SLR flex, clamshells, hip ext in prone, mini squat, toe taps for first step, practice ambulating with SBQC  12/18/23: STS with low LLE lift, tandem stand (by counter)  12/20/23: bridges, step ups (first stair), ambulate with SBQC primarily  1/2/24: R SLS, R s/l hip abd SLR  1/8/24: SLR flex, SLR abd, clamshell, stand SB press down  3/11/24: staggered stance hip hinge, SL hip kicks (abd.ext) to work on balance  Charges: 1TE, 1NR, 1GT       Total Timed Treatment: 45 min  Total Treatment Time: 45 min

## 2024-03-12 ENCOUNTER — TELEPHONE (OUTPATIENT)
Dept: PHYSICAL THERAPY | Facility: HOSPITAL | Age: 84
End: 2024-03-12

## 2024-03-13 ENCOUNTER — OFFICE VISIT (OUTPATIENT)
Dept: PHYSICAL THERAPY | Facility: HOSPITAL | Age: 84
End: 2024-03-13
Attending: ORTHOPAEDIC SURGERY
Payer: MEDICARE

## 2024-03-13 PROCEDURE — 97112 NEUROMUSCULAR REEDUCATION: CPT

## 2024-03-13 PROCEDURE — 97110 THERAPEUTIC EXERCISES: CPT

## 2024-03-13 PROCEDURE — 97116 GAIT TRAINING THERAPY: CPT

## 2024-03-13 NOTE — PROGRESS NOTES
Progress Summary  Pt has attended 16 visits in Physical Therapy.     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 No pain. Does fatigue.    Pain: 0/10 R hip       Objective:   TUG: 15 sec; 14 seconds - no AD  80-98 y/o mean 11.3s (10.0-12.7s)]    6MWT: 773', no AD, no rest breaks    5xSTS: chair with airex cushion, 0UE, 38 seconds      -----------------  1/18/24: 8' ambulation with SBQC, x830'    12/11/23:  TUG: with SPC, 45 seconds   With SBQC, 29 seconds   With RW, 22 seconds    ---------------  Taken from IE:  Observation/Skin: Leg length: R shorter (mild), significant thoracic kyphosis  Palpation: TTP anterior hip, HF and prox quad  Sensation: intact light touch BLE    AROM (PROM): (* denotes performed with pain)  Hip   Flexion: R (90); L (90)  Extension: R 0; L 20  Abduction: R 0; L NT  ER: R (30); L (30)  IR: R (15); L (15)       Accessory motion: hypomobile hip with AP   Lumbar spine in flexion, hypomobile    Flexibility: (min, mod, sev tightness)  Hamstrings: R sev; L sev  Quads: R sev; L sev  Hip Flexor: Rs sev, L mod    Strength/MMT: (* denotes performed with pain)  Hip Knee   Flexion: R 3+/5; L 4+/5  Extension: R 3/5; L 3+/5  Abduction: R -3-/5; L 4/5  ER: R 3/5; L 4/5  IR: R 3+/5; L 4/5 Flexion: R 5-/5; L 5/5  Extension: R 5-/5; L 5/5        Balance: SLS: R <1 second, L 2 sec - no UE support; minimal R trendelenburg   30 seconds without issue/pain with UE support    Functional Mobility:  5x sit<>stand: unable to complete 5; does complete 3 reps from elevated surface with hands on  knees  6MWT: with SBQC, unable, fatigues [email or web address]         Gait: pt ambulates on level ground with assistive device of RW, quick makenzie, mild R trendelenburg with slightly decreased R hip ext in push-off  With SBQC, step to mechanics, slower speed, decreased stance phase R, decreased hip ext R, R knee held in flexion, and stooped posture/forward lean,     Assessment: Improving LE strength with decreased UE support needed with STS and able to tolerate 6MWT. Does fatigue with upright activity and reports feeling less stable without AD than with RW. Would benefit from continued PT to address remaining deficits and encourage return to full PLOF.     Goals:   Goals: (To be met in 12-16 visits)   Pt will have improved hip AROM Flex to at least 100 deg and ABD to 30 deg to be able to don/doff shoes and perform car transfers without difficulty - goal met  Pt will improve hip ABD and ER strength to at least 3+/5 or 4/5 to increase ease with standing and walking - in progress  Pt will demonstrate improved SLS to >3 seconds ARELI to promote safety and decrease risk of falls on uneven surfaces such as grass- in progress  Pt will be able to complete a 6MWT with SPC without LOB to demonstrate improved tolerance to upright needed for community and household ambulation- met, may continue to improve distance  Pt will be able to squat to  light objects around the house with <2/10 hip pain - in progress  Pt will complete 5xSTS to demonstrate improved LE strength needed for daily transfers- in progress  Pt will improve functional hip strength to report ability to ascend/descend 1 flight of stairs reciprocally with minimal use of handrail - met  Pt will be independent and compliant with comprehensive HEP to maintain progress achieved in PT- met for current plan    Date: 1/15/24  Tx: 11/12-16 Date: 1/18/24 12/12-16 Date: 1/30/24  Tx: 13/16 Date: 2/8/24  Tx: 14/16 Date: 3/11/24  Tx: 15/16 Date: 3/13/24  Tx: 16/16   MT: 10 mins  -  PROM RLE  - R hip mob  - LAD RLE        TE: 20 mins  - DL shuttle, 87#, x10  - SL shuttle, 50#, x8-10 B (trial 75# unable B)  - DL heel raises, x20 BUE support  - clamshells, x15 B  - SLR, x15 L (x3 R - unable)  - passive HF stretch R in s/l by PT  - BKFO, x20 R  - bridges, x20 TE: 10 mins  - NuStep, L5, x6'  - TRX sit<>stand from chair, x10   TE: 15 mins  - TRX chair taps, x15  - seated hip add ring squeeze, x30  - seated hip abd ring press, x30  - forw lunge to airex, x10 alt B 1UE TE: 12 mins  - DL shuttle, 80#, x20  - SL shuttle, 75#, 2x10 B  - s/l SL shuttle, 50#, 2x10 R  - TRX STS, x10   TE: 17 mins  - SL shuttle, 87#, x15  - SL shuttle, 75#, x5 L (unable to continue)  - SL shuttle, 50#, 2x10 R/L   TE: 18 mins  - STS elevated plinth, hands on knees, x10  - STS chair BUE on arm rests, x10  - STS from chair with airex cushion, 0UE, 2x5  - TRX squat, x12  - TRX lat lunge, 2x10 alt B   NR: 8 mins  - cone taps on airex, SBQC  - tandem stand on airex, 2x30-45\" B NR: 28 mins  - TRX sit<>stand with LLE on 4\" step to load RLE, 2x10  - stairs, x5 laps  - standing airex + alt sh ext, blueTB, x20 B  - standing airex + B sh ext, blueTB, x20 B  - step on/over airex, 1-0UE support, SBA NR: 25 mins  - TRX mini lateral lunge, 2x10 B  - stand airex + alt sh ext, blue TB, x20 B  - low hurdles, recip x3 alps, x1UE  - forw low hurdles, step-to, 0UE, x4 laps  - lateral low/high hurdles alt, x3 laps, BUE  - forw taps to 6\" step, x10 alt, 0UE  - forw taps to 6\" step on airex, x10 alt 0UE, CGA   NR: 20 mins  - STS + 11#MB, 2x10 (elevated plinth)  - stepping on/over airex, x4 laps, 0UE  - stairs, recip, 1UE/finger tip, x5 laps  - TRX lateral squat, x10 B alt  - retro walking, x3 lengths  - AP rocker board, x2' 2/1/0/UE     NR: 20 mins  - STS, low plinth (unable)  - STS elevated 21\" plinth, 2x10 0UE (2nd set with 6# MB)  - forw hurdles, recip, 1UE, 3 laps x6  - lat hurdles, 1UE, 3 laps x6  - staggered stance hip hinge/glut activatino,  2x6-8, in // bars, 1UE NR: 15 mins  - romberg airex EO and EC, 2x30\" B  - romberg airex EI heat turns horz and vert, x20 ea  - ant cone taps in // bars, 3x10 alt B, 0UE      GT: 15 mins  - ambulation up/down ramp, SBQC, SBA  - ambulation around populated environment, SBQC, SBA GT: 5 mins  - ambulation without UE, x3 large laps GT: 8 mins  - ambulation without AD, x2 large laps  - ramp ascend/descend, x7 laps GT: 8 mins  - ambulation without AD, x3 large laps  - stairs, recip, UE support GT: 12 mins  - ambulation without AD  - post stepping, x3 small laps, 0UE  - stairs, x3 laps   HEP: SLR flex, clamshells, hip ext in prone, mini squat, toe taps for first step, practice ambulating with SBQC  12/18/23: STS with low LLE lift, tandem stand (by counter)  12/20/23: bridges, step ups (first stair), ambulate with SBQC primarily  1/2/24: R SLS, R s/l hip abd SLR  1/8/24: SLR flex, SLR abd, clamshell, stand SB press down  3/11/24: staggered stance hip hinge, SL hip kicks (abd.ext) to work on balance  Charges: 1TE, 1NR, 1GT       Total Timed Treatment: 45 min  Total Treatment Time: 45 min    Plan: Continue skilled Physical Therapy 1-2 x/week or a total of 6-8 additional visits over a 90 day period. Treatment will include: gait training, Manual Therapy, Neuromuscular Re-education, Therapeutic Activities, Therapeutic Exercise, and Home Exercise Program instruction       Patient/Family/Caregiver was advised of these findings, precautions, and treatment options and has agreed to actively participate in planning and for this course of care.    Thank you for your referral. If you have any questions, please contact me at Dept: [phone].    Sincerely,  Electronically signed by therapist: Heather Brunner, PT

## 2024-03-20 ENCOUNTER — OFFICE VISIT (OUTPATIENT)
Dept: PHYSICAL THERAPY | Facility: HOSPITAL | Age: 84
End: 2024-03-20
Attending: ORTHOPAEDIC SURGERY
Payer: MEDICARE

## 2024-03-20 PROCEDURE — 97110 THERAPEUTIC EXERCISES: CPT

## 2024-03-20 PROCEDURE — 97112 NEUROMUSCULAR REEDUCATION: CPT

## 2024-03-20 NOTE — PROGRESS NOTES
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 No complaints today.    Pain: 0/10 R hip       Objective:   TUG: 15 sec; 14 seconds - no AD  80-98 y/o mean 11.3s (10.0-12.7s)]    6MWT: 773', no AD, no rest breaks    5xSTS: chair with airex cushion, 0UE, 38 seconds      -----------------  1/18/24: 8' ambulation with SBQC, x830'    12/11/23:  TUG: with SPC, 45 seconds   With SBQC, 29 seconds   With RW, 22 seconds    ---------------  Taken from IE:  Observation/Skin: Leg length: R shorter (mild), significant thoracic kyphosis  Palpation: TTP anterior hip, HF and prox quad  Sensation: intact light touch BLE    AROM (PROM): (* denotes performed with pain)  Hip   Flexion: R (90); L (90)  Extension: R 0; L 20  Abduction: R 0; L NT  ER: R (30); L (30)  IR: R (15); L (15)       Accessory motion: hypomobile hip with AP   Lumbar spine in flexion, hypomobile    Flexibility: (min, mod, sev tightness)  Hamstrings: R sev; L sev  Quads: R sev; L sev  Hip Flexor: Rs sev, L mod    Strength/MMT: (* denotes performed with pain)  Hip Knee   Flexion: R 3+/5; L 4+/5  Extension: R 3/5; L 3+/5  Abduction: R -3-/5; L 4/5  ER: R 3/5; L 4/5  IR: R 3+/5; L 4/5 Flexion: R 5-/5; L 5/5  Extension: R 5-/5; L 5/5        Balance: SLS: R <1 second, L 2 sec - no UE support; minimal R trendelenburg   30 seconds without issue/pain with UE support    Functional Mobility:  5x sit<>stand: unable to complete 5; does complete 3 reps from elevated surface with hands on knees  6MWT: with SBQC, unable, fatigues [email or web address]         Gait: pt ambulates on  level ground with assistive device of RW, quick makenzie, mild R trendelenburg with slightly decreased R hip ext in push-off  With SBQC, step to mechanics, slower speed, decreased stance phase R, decreased hip ext R, R knee held in flexion, and stooped posture/forward lean,     Assessment: Tolerates increased weight on shuttle today and able to perform STS from lower plinth with rocking demonstrating improving LE strength. Fatigue wtih upright activity may also be influenced by cardiac issues; patient is following-up with cardiologist. Added balance exercise to HEP to help work on stability with navigation during ambulation.       Goals:   Goals: (To be met in 12-16 visits)   Pt will have improved hip AROM Flex to at least 100 deg and ABD to 30 deg to be able to don/doff shoes and perform car transfers without difficulty - goal met  Pt will improve hip ABD and ER strength to at least 3+/5 or 4/5 to increase ease with standing and walking - in progress  Pt will demonstrate improved SLS to >3 seconds ARELI to promote safety and decrease risk of falls on uneven surfaces such as grass- in progress  Pt will be able to complete a 6MWT with SPC without LOB to demonstrate improved tolerance to upright needed for community and household ambulation- met, may continue to improve distance  Pt will be able to squat to  light objects around the house with <2/10 hip pain - in progress  Pt will complete 5xSTS to demonstrate improved LE strength needed for daily transfers- in progress  Pt will improve functional hip strength to report ability to ascend/descend 1 flight of stairs reciprocally with minimal use of handrail - met  Pt will be independent and compliant with comprehensive HEP to maintain progress achieved in PT- met for current plan    Plan: Continue skilled Physical Therapy 1-2 x/week or a total of 6-8 additional visits over a 90 day period. Treatment will include: gait training, Manual Therapy, Neuromuscular  Re-education, Therapeutic Activities, Therapeutic Exercise, and Home Exercise Program instruction       Date: 1/15/24  Tx: 11/12-16 Date: 1/18/24 12/12-16 Date: 1/30/24  Tx: 13/16 Date: 2/8/24  Tx: 14/16 Date: 3/11/24  Tx: 15/16 Date: 3/13/24  Tx: 16/16 Date: 3/20/24  Tx: 17/24-28   MT: 10 mins  - PROM RLE  - R hip mob  - LAD RLE         TE: 20 mins  - DL shuttle, 87#, x10  - SL shuttle, 50#, x8-10 B (trial 75# unable B)  - DL heel raises, x20 BUE support  - clamshells, x15 B  - SLR, x15 L (x3 R - unable)  - passive HF stretch R in s/l by PT  - BKFO, x20 R  - bridges, x20 TE: 10 mins  - NuStep, L5, x6'  - TRX sit<>stand from chair, x10   TE: 15 mins  - TRX chair taps, x15  - seated hip add ring squeeze, x30  - seated hip abd ring press, x30  - forw lunge to airex, x10 alt B 1UE TE: 12 mins  - DL shuttle, 80#, x20  - SL shuttle, 75#, 2x10 B  - s/l SL shuttle, 50#, 2x10 R  - TRX STS, x10   TE: 17 mins  - SL shuttle, 87#, x15  - SL shuttle, 75#, x5 L (unable to continue)  - SL shuttle, 50#, 2x10 R/L   TE: 18 mins  - STS elevated plinth, hands on knees, x10  - STS chair BUE on arm rests, x10  - STS from chair with airex cushion, 0UE, 2x5  - TRX squat, x12  - TRX lat lunge, 2x10 alt B TE: 28 mins  - STS low plinth,   - TRX squat, 2x12  - lateral lunge TRX, 2x8 R/L  - DL shuttle, 82#, 2x10  DL shuttle heel raises, 82#, 2x12  - SL shuttle, 75# (unable), 50# 2x10 R/L, 62# x10 R/L   NR: 8 mins  - cone taps on airex, SBQC  - tandem stand on airex, 2x30-45\" B NR: 28 mins  - TRX sit<>stand with LLE on 4\" step to load RLE, 2x10  - stairs, x5 laps  - standing airex + alt sh ext, blueTB, x20 B  - standing airex + B sh ext, blueTB, x20 B  - step on/over airex, 1-0UE support, SBA NR: 25 mins  - TRX mini lateral lunge, 2x10 B  - stand airex + alt sh ext, blue TB, x20 B  - low hurdles, recip x3 alps, x1UE  - forw low hurdles, step-to, 0UE, x4 laps  - lateral low/high hurdles alt, x3 laps, BUE  - forw taps to 6\" step, x10 alt, 0UE  -  forw taps to 6\" step on airex, x10 alt 0UE, CGA   NR: 20 mins  - STS + 11#MB, 2x10 (elevated plinth)  - stepping on/over airex, x4 laps, 0UE  - stairs, recip, 1UE/finger tip, x5 laps  - TRX lateral squat, x10 B alt  - retro walking, x3 lengths  - AP rocker board, x2' 2/1/0/UE     NR: 20 mins  - STS, low plinth (unable)  - STS elevated 21\" plinth, 2x10 0UE (2nd set with 6# MB)  - forw hurdles, recip, 1UE, 3 laps x6  - lat hurdles, 1UE, 3 laps x6  - staggered stance hip hinge/glut activatino, 2x6-8, in // bars, 1UE NR: 15 mins  - romberg airex EO and EC, 2x30\" B  - romberg airex EO heat turns horz and vert, x20 ea  - ant cone taps in // bars, 3x10 alt B, 0UE NR: 10 mins  - ambulation with head turns, vert and horz, x2 long laps, no AD  - romberg airex EC, 2x30\"  - romberg airex + head turns horz and vert, EO, 2x10-15 ea      GT: 15 mins  - ambulation up/down ramp, SBQC, SBA  - ambulation around populated environment, SBQC, SBA GT: 5 mins  - ambulation without UE, x3 large laps GT: 8 mins  - ambulation without AD, x2 large laps  - ramp ascend/descend, x7 laps GT: 8 mins  - ambulation without AD, x3 large laps  - stairs, recip, UE support GT: 12 mins  - ambulation without AD  - post stepping, x3 small laps, 0UE  - stairs, x3 laps    HEP: SLR flex, clamshells, hip ext in prone, mini squat, toe taps for first step, practice ambulating with SBQC  12/18/23: STS with low LLE lift, tandem stand (by counter)  12/20/23: bridges, step ups (first stair), ambulate with SBQC primarily  1/2/24: R SLS, R s/l hip abd SLR  1/8/24: SLR flex, SLR abd, clamshell, stand SB press down  3/11/24: staggered stance hip hinge, SL hip kicks (abd.ext) to work on balance  3/20/24: corner romberg + head turns  Charges: 2TE, 1NR       Total Timed Treatment: 38 min  Total Treatment Time: 38 min

## 2024-03-22 ENCOUNTER — EXTERNAL LAB (OUTPATIENT)
Dept: HEALTH INFORMATION MANAGEMENT | Facility: OTHER | Age: 84
End: 2024-03-22

## 2024-03-22 LAB
BUN SERPL-MCNC: 37 MG/DL (ref 6–20)
BUN/CREAT SERPL: 19 (ref 10–20)
CALCIUM SERPL-MCNC: 8.9 MG/DL (ref 8.6–10.3)
CHLORIDE SERPL-SCNC: 103 MMOL/L (ref 98–107)
CO2 SERPL-SCNC: 24.7 MMOL/L (ref 22–29)
CREAT SERPL-MCNC: 1.91 MG/DL (ref 0.7–1.2)
GFR SERPLBLD SCHWARTZ-ARVRAT: 31.91 ML/MIN/1.73 M2
GLUCOSE SERPL-MCNC: 105 MG/DL (ref 74–109)
LAB RESULT: NORMAL
LENGTH OF FAST TIME PATIENT: ABNORMAL H
POTASSIUM SERPL-SCNC: 5.1 MMOL/L (ref 3.5–5.2)
SODIUM SERPL-SCNC: 135 MMOL/L (ref 136–145)

## 2024-03-25 ENCOUNTER — OFFICE VISIT (OUTPATIENT)
Dept: PHYSICAL THERAPY | Facility: HOSPITAL | Age: 84
End: 2024-03-25
Attending: ORTHOPAEDIC SURGERY
Payer: MEDICARE

## 2024-03-25 PROCEDURE — 97112 NEUROMUSCULAR REEDUCATION: CPT

## 2024-03-25 PROCEDURE — 97110 THERAPEUTIC EXERCISES: CPT

## 2024-03-25 NOTE — PROGRESS NOTES
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 No complaints today.    Pain: 0/10 R hip       Objective:   TUG: 15 sec; 14 seconds - no AD  80-98 y/o mean 11.3s (10.0-12.7s)]    6MWT: 773', no AD, no rest breaks    5xSTS: chair with airex cushion, 0UE, 38 seconds      -----------------  1/18/24: 8' ambulation with SBQC, x830'    12/11/23:  TUG: with SPC, 45 seconds   With SBQC, 29 seconds   With RW, 22 seconds    ---------------  Taken from IE:  Observation/Skin: Leg length: R shorter (mild), significant thoracic kyphosis  Palpation: TTP anterior hip, HF and prox quad  Sensation: intact light touch BLE    AROM (PROM): (* denotes performed with pain)  Hip   Flexion: R (90); L (90)  Extension: R 0; L 20  Abduction: R 0; L NT  ER: R (30); L (30)  IR: R (15); L (15)       Accessory motion: hypomobile hip with AP   Lumbar spine in flexion, hypomobile    Flexibility: (min, mod, sev tightness)  Hamstrings: R sev; L sev  Quads: R sev; L sev  Hip Flexor: Rs sev, L mod    Strength/MMT: (* denotes performed with pain)  Hip Knee   Flexion: R 3+/5; L 4+/5  Extension: R 3/5; L 3+/5  Abduction: R -3-/5; L 4/5  ER: R 3/5; L 4/5  IR: R 3+/5; L 4/5 Flexion: R 5-/5; L 5/5  Extension: R 5-/5; L 5/5        Balance: SLS: R <1 second, L 2 sec - no UE support; minimal R trendelenburg   30 seconds without issue/pain with UE support    Functional Mobility:  5x sit<>stand: unable to complete 5; does complete 3 reps from elevated surface with hands on knees  6MWT: with SBQC, unable, fatigues [email or web address]         Gait: pt ambulates on  level ground with assistive device of RW, quick makenzie, mild R trendelenburg with slightly decreased R hip ext in push-off  With SBQC, step to mechanics, slower speed, decreased stance phase R, decreased hip ext R, R knee held in flexion, and stooped posture/forward lean,     Assessment: No LOB with airex and head turns today. Will reassess at next session; may consider transition to medical fitness program.       Goals:   Goals: (To be met in 12-16 visits)   Pt will have improved hip AROM Flex to at least 100 deg and ABD to 30 deg to be able to don/doff shoes and perform car transfers without difficulty - goal met  Pt will improve hip ABD and ER strength to at least 3+/5 or 4/5 to increase ease with standing and walking - in progress  Pt will demonstrate improved SLS to >3 seconds ARELI to promote safety and decrease risk of falls on uneven surfaces such as grass- in progress  Pt will be able to complete a 6MWT with SPC without LOB to demonstrate improved tolerance to upright needed for community and household ambulation- met, may continue to improve distance  Pt will be able to squat to  light objects around the house with <2/10 hip pain - in progress  Pt will complete 5xSTS to demonstrate improved LE strength needed for daily transfers- in progress  Pt will improve functional hip strength to report ability to ascend/descend 1 flight of stairs reciprocally with minimal use of handrail - met  Pt will be independent and compliant with comprehensive HEP to maintain progress achieved in PT- met for current plan    Plan: Continue skilled Physical Therapy 1-2 x/week or a total of 6-8 additional visits over a 90 day period. Treatment will include: gait training, Manual Therapy, Neuromuscular Re-education, Therapeutic Activities, Therapeutic Exercise, and Home Exercise Program instruction     Next session: Go over HEP, medical fitness program, POC    Date: 1/18/24 12/12-16 Date: 1/30/24  Tx: 13/16 Date: 2/8/24  Tx:  14/16 Date: 3/11/24  Tx: 15/16 Date: 3/13/24  Tx: 16/16 Date: 3/20/24  Tx: 17/24-28 Date: 3/25/24  Tx: 18/24-28            TE: 10 mins  - NuStep, L5, x6'  - TRX sit<>stand from chair, x10   TE: 15 mins  - TRX chair taps, x15  - seated hip add ring squeeze, x30  - seated hip abd ring press, x30  - forw lunge to airex, x10 alt B 1UE TE: 12 mins  - DL shuttle, 80#, x20  - SL shuttle, 75#, 2x10 B  - s/l SL shuttle, 50#, 2x10 R  - TRX STS, x10   TE: 17 mins  - SL shuttle, 87#, x15  - SL shuttle, 75#, x5 L (unable to continue)  - SL shuttle, 50#, 2x10 R/L   TE: 18 mins  - STS elevated plinth, hands on knees, x10  - STS chair BUE on arm rests, x10  - STS from chair with airex cushion, 0UE, 2x5  - TRX squat, x12  - TRX lat lunge, 2x10 alt B TE: 28 mins  - STS low plinth,   - TRX squat, 2x12  - lateral lunge TRX, 2x8 R/L  - DL shuttle, 82#, 2x10  DL shuttle heel raises, 82#, 2x12  - SL shuttle, 75# (unable), 50# 2x10 R/L, 62# x10 R/L TE: 17 mins  - squat to chair TRX, x10  - lateral squats TRX, x10 alt B  - DL shuttle, 89#, 3x10  - SL shuttle, 62#, 2x10 B   NR: 28 mins  - TRX sit<>stand with LLE on 4\" step to load RLE, 2x10  - stairs, x5 laps  - standing airex + alt sh ext, blueTB, x20 B  - standing airex + B sh ext, blueTB, x20 B  - step on/over airex, 1-0UE support, SBA NR: 25 mins  - TRX mini lateral lunge, 2x10 B  - stand airex + alt sh ext, blue TB, x20 B  - low hurdles, recip x3 alps, x1UE  - forw low hurdles, step-to, 0UE, x4 laps  - lateral low/high hurdles alt, x3 laps, BUE  - forw taps to 6\" step, x10 alt, 0UE  - forw taps to 6\" step on airex, x10 alt 0UE, CGA   NR: 20 mins  - STS + 11#MB, 2x10 (elevated plinth)  - stepping on/over airex, x4 laps, 0UE  - stairs, recip, 1UE/finger tip, x5 laps  - TRX lateral squat, x10 B alt  - retro walking, x3 lengths  - AP rocker board, x2' 2/1/0/UE     NR: 20 mins  - STS, low plinth (unable)  - STS elevated 21\" plinth, 2x10 0UE (2nd set with 6# MB)  - forw hurdles, recip, 1UE, 3  laps x6  - lat hurdles, 1UE, 3 laps x6  - staggered stance hip hinge/glut activatino, 2x6-8, in // bars, 1UE NR: 15 mins  - romberg airex EO and EC, 2x30\" B  - romberg airex EO heat turns horz and vert, x20 ea  - ant cone taps in // bars, 3x10 alt B, 0UE NR: 10 mins  - ambulation with head turns, vert and horz, x2 long laps, no AD  - romberg airex EC, 2x30\"  - romberg airex + head turns horz and vert, EO, 2x10-15 ea NR: 23 mins  - ambulation with no AD  - up/down ramps  - romberg airex EC, 2x1', 0UE  - romberg airex EC + head turns (vert and horz), 2x10 ea, 0UE  - lateral stepping, 3 laps x10  - stairs, recip, 2-0UE   GT: 15 mins  - ambulation up/down ramp, SBQC, SBA  - ambulation around populated environment, SBQC, SBA GT: 5 mins  - ambulation without UE, x3 large laps GT: 8 mins  - ambulation without AD, x2 large laps  - ramp ascend/descend, x7 laps GT: 8 mins  - ambulation without AD, x3 large laps  - stairs, recip, UE support GT: 12 mins  - ambulation without AD  - post stepping, x3 small laps, 0UE  - stairs, x3 laps     HEP: SLR flex, clamshells, hip ext in prone, mini squat, toe taps for first step, practice ambulating with SBQC  12/18/23: STS with low LLE lift, tandem stand (by counter)  12/20/23: bridges, step ups (first stair), ambulate with SBQC primarily  1/2/24: R SLS, R s/l hip abd SLR  1/8/24: SLR flex, SLR abd, clamshell, stand SB press down  3/11/24: staggered stance hip hinge, SL hip kicks (abd.ext) to work on balance  3/20/24: corner romberg + head turns  Charges: 1TE, 2NR       Total Timed Treatment: 40 min  Total Treatment Time: 40 min

## 2024-03-26 ENCOUNTER — LAB ENCOUNTER (OUTPATIENT)
Dept: LAB | Facility: HOSPITAL | Age: 84
End: 2024-03-26
Attending: INTERNAL MEDICINE
Payer: MEDICARE

## 2024-03-26 ENCOUNTER — TELEPHONE (OUTPATIENT)
Dept: RHEUMATOLOGY | Facility: CLINIC | Age: 84
End: 2024-03-26

## 2024-03-26 DIAGNOSIS — M35.3 POLYMYALGIA RHEUMATICA (HCC): Primary | ICD-10-CM

## 2024-03-26 DIAGNOSIS — M35.3 POLYMYALGIA RHEUMATICA (HCC): ICD-10-CM

## 2024-03-26 LAB
ALBUMIN SERPL-MCNC: 3.8 G/DL (ref 3.2–4.8)
ALBUMIN/GLOB SERPL: 1.5 {RATIO} (ref 1–2)
ALP LIVER SERPL-CCNC: 50 U/L
ALT SERPL-CCNC: 31 U/L
ANION GAP SERPL CALC-SCNC: 5 MMOL/L (ref 0–18)
AST SERPL-CCNC: 34 U/L (ref ?–34)
BASOPHILS # BLD AUTO: 0.05 X10(3) UL (ref 0–0.2)
BASOPHILS NFR BLD AUTO: 0.4 %
BILIRUB SERPL-MCNC: 0.9 MG/DL (ref 0.2–1.1)
BUN BLD-MCNC: 36 MG/DL (ref 9–23)
BUN/CREAT SERPL: 19.6 (ref 10–20)
CALCIUM BLD-MCNC: 9.3 MG/DL (ref 8.7–10.4)
CHLORIDE SERPL-SCNC: 108 MMOL/L (ref 98–112)
CO2 SERPL-SCNC: 25 MMOL/L (ref 21–32)
CREAT BLD-MCNC: 1.84 MG/DL
CRP SERPL-MCNC: <0.4 MG/DL (ref ?–1)
DEPRECATED RDW RBC AUTO: 55 FL (ref 35.1–46.3)
EGFRCR SERPLBLD CKD-EPI 2021: 36 ML/MIN/1.73M2 (ref 60–?)
EOSINOPHIL # BLD AUTO: 0.4 X10(3) UL (ref 0–0.7)
EOSINOPHIL NFR BLD AUTO: 3.4 %
ERYTHROCYTE [DISTWIDTH] IN BLOOD BY AUTOMATED COUNT: 15.6 % (ref 11–15)
ERYTHROCYTE [SEDIMENTATION RATE] IN BLOOD: 11 MM/HR
FASTING STATUS PATIENT QL REPORTED: NO
GLOBULIN PLAS-MCNC: 2.6 G/DL (ref 2.8–4.4)
GLUCOSE BLD-MCNC: 95 MG/DL (ref 70–99)
HCT VFR BLD AUTO: 30.3 %
HGB BLD-MCNC: 10.4 G/DL
IMM GRANULOCYTES # BLD AUTO: 0.06 X10(3) UL (ref 0–1)
IMM GRANULOCYTES NFR BLD: 0.5 %
LYMPHOCYTES # BLD AUTO: 0.52 X10(3) UL (ref 1–4)
LYMPHOCYTES NFR BLD AUTO: 4.4 %
MCH RBC QN AUTO: 33.7 PG (ref 26–34)
MCHC RBC AUTO-ENTMCNC: 34.3 G/DL (ref 31–37)
MCV RBC AUTO: 98.1 FL
MONOCYTES # BLD AUTO: 0.71 X10(3) UL (ref 0.1–1)
MONOCYTES NFR BLD AUTO: 6.1 %
NEUTROPHILS # BLD AUTO: 9.98 X10 (3) UL (ref 1.5–7.7)
NEUTROPHILS # BLD AUTO: 9.98 X10(3) UL (ref 1.5–7.7)
NEUTROPHILS NFR BLD AUTO: 85.2 %
OSMOLALITY SERPL CALC.SUM OF ELEC: 294 MOSM/KG (ref 275–295)
PLATELET # BLD AUTO: 111 10(3)UL (ref 150–450)
POTASSIUM SERPL-SCNC: 4.6 MMOL/L (ref 3.5–5.1)
PROT SERPL-MCNC: 6.4 G/DL (ref 5.7–8.2)
RBC # BLD AUTO: 3.09 X10(6)UL
SODIUM SERPL-SCNC: 138 MMOL/L (ref 136–145)
WBC # BLD AUTO: 11.7 X10(3) UL (ref 4–11)

## 2024-03-26 PROCEDURE — 80053 COMPREHEN METABOLIC PANEL: CPT

## 2024-03-26 PROCEDURE — 85025 COMPLETE CBC W/AUTO DIFF WBC: CPT

## 2024-03-26 PROCEDURE — 85652 RBC SED RATE AUTOMATED: CPT

## 2024-03-26 PROCEDURE — 86140 C-REACTIVE PROTEIN: CPT

## 2024-03-26 PROCEDURE — 36415 COLL VENOUS BLD VENIPUNCTURE: CPT

## 2024-03-26 NOTE — TELEPHONE ENCOUNTER
Rodolfo called office, pt is at lab requesting lab orders to be placed for upcoming appt.   Future Appointments   Date Time Provider Department Center   3/27/2024 10:00 AM Zander Lomas MD [facility]   4/1/2024 12:30 PM Brunner, Heather, PT [facility] PT EM [facility]   4/26/2024 11:00 AM [facility] RESOURCE [facility] HEM [facility]   7/26/2024 10:30 AM [facility] RESOURCE [facility] HEM [facility]   7/26/2024 11:30 AM Dallas Hung MD [facility] HEM [facility]

## 2024-03-27 ENCOUNTER — OFFICE VISIT (OUTPATIENT)
Dept: RHEUMATOLOGY | Facility: CLINIC | Age: 84
End: 2024-03-27
Payer: MEDICARE

## 2024-03-27 VITALS
OXYGEN SATURATION: 99 % | HEIGHT: 70 IN | HEART RATE: 66 BPM | DIASTOLIC BLOOD PRESSURE: 58 MMHG | SYSTOLIC BLOOD PRESSURE: 128 MMHG | WEIGHT: 149 LBS | BODY MASS INDEX: 21.33 KG/M2 | RESPIRATION RATE: 16 BRPM | TEMPERATURE: 97 F

## 2024-03-27 DIAGNOSIS — Z95.1 S/P CABG (CORONARY ARTERY BYPASS GRAFT): ICD-10-CM

## 2024-03-27 DIAGNOSIS — M35.3 PMR (POLYMYALGIA RHEUMATICA) (HCC): Primary | ICD-10-CM

## 2024-03-27 DIAGNOSIS — Z95.2 S/P AVR: ICD-10-CM

## 2024-03-27 DIAGNOSIS — N18.4 CKD (CHRONIC KIDNEY DISEASE) STAGE 4, GFR 15-29 ML/MIN (HCC): ICD-10-CM

## 2024-03-27 DIAGNOSIS — D89.89 KAPPA LIGHT CHAIN DISEASE (HCC): ICD-10-CM

## 2024-03-27 PROCEDURE — 99214 OFFICE O/P EST MOD 30 MIN: CPT | Performed by: INTERNAL MEDICINE

## 2024-03-27 RX ORDER — METHOTREXATE 2.5 MG/1
5 TABLET ORAL WEEKLY
Qty: 24 TABLET | Refills: 3 | Status: SHIPPED | OUTPATIENT
Start: 2024-03-27

## 2024-03-27 RX ORDER — ATORVASTATIN CALCIUM 20 MG/1
TABLET, FILM COATED ORAL
COMMUNITY
Start: 2024-03-18

## 2024-03-27 RX ORDER — PREDNISONE 2.5 MG/1
2.5 TABLET ORAL DAILY
Qty: 60 TABLET | Refills: 1 | Status: SHIPPED | OUTPATIENT
Start: 2024-03-27

## 2024-03-27 RX ORDER — FOLIC ACID 1 MG/1
1 TABLET ORAL DAILY
Qty: 90 TABLET | Refills: 1 | Status: SHIPPED | OUTPATIENT
Start: 2024-03-27

## 2024-03-27 NOTE — PROGRESS NOTES
[facility]  Dr. Lomas Progress Note     Subjective:   Camacho Daly is a(n) 84 year old male.   Current complaints:   Chief Complaint   Patient presents with    PMR     3 month f/u. Feeling better. Slight ankle swelling. No joint pain. No news symptoms. Stopped all supplements since January and itching has stopped as well. Havent begun to reintroduce them.    History of PMR, back surgery 12/2022, , Kappa light chain disease, S/P  R THR 10/2023.  S/P CABG    Feeling better.  Some ankle swelling. Now on Amlodipine 5 mg once a day for the leg edema.  Joint pain no.  No chest pain.  Going in for check up. AVR  11 years ago.  Might need a procedure for the aortic valve.  Dr. Daly the cardiologist.    Breathing ok.  Going to PT.    Dr. Jones is kidney doc. CKD present   On Prednisone 5 mg per day and Methotrexate 2 per week.  On  Methotrexate since January, started by Dr. Bond.  History of osteopenia, hoping to get rid or lower Prednisone.  February 6, 2024 had iron infusion for iron deficiency anemia with Dr. Hung of hematology.   Has sleep apnea.    Objective:   /58   Pulse 66   Temp 96.6 °F (35.9 °C)   Resp 16   Ht 5' 10\" (1.778 m)   Wt 149 lb (67.6 kg)   SpO2 99%   BMI 21.38 kg/m²   Lungs clear Heart nsr    3/26/24  glucoe 95   BUN  36  CRT  1,84  GFR  36    CRP  Negative    wbc  11.7  HB  10.4  Hematocrit  30.3  Plates  111,000  Sed rate 11    Assessment:     Encounter Diagnoses   Name Primary?    PMR (polymyalgia rheumatica) ([facility]) Yes    S/P AVR     S/P CABG (coronary artery bypass graft)     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H[facility])     CKD (chronic kidney disease) stage 4, GFR 15-29 ml/min ([facility])    PMR quiet/stable.   Plan:     Patient Instructions   Methotrexate 5 mg per week for PMR.  Folic acid 1 mg per day.  Current sed rate 11 is excellent.  Mild anemia present Hemoglobin 10.2, you just received IV iron.  Decrease Prednisone to 2.5 mg per day.  If after a month your RA remains stable, call me  , we will discuss stopping Prednisone, only if you are doing well.  Return to office 4 months.          Zander Lomas MD 3/27/2024 10:15 AM

## 2024-03-27 NOTE — PATIENT INSTRUCTIONS
Methotrexate 5 mg per week for PMR.  Folic acid 1 mg per day.  Current sed rate 11 is excellent.  Mild anemia present Hemoglobin 10.2, you just received IV iron.  Decrease Prednisone to 2.5 mg per day.  If after a month your RA remains stable, call me , we will discuss stopping Prednisone, only if you are doing well.  Return to office 4 months.

## 2024-04-01 ENCOUNTER — OFFICE VISIT (OUTPATIENT)
Dept: PHYSICAL THERAPY | Facility: HOSPITAL | Age: 84
End: 2024-04-01
Attending: ORTHOPAEDIC SURGERY
Payer: MEDICARE

## 2024-04-01 PROCEDURE — 97110 THERAPEUTIC EXERCISES: CPT

## 2024-04-01 PROCEDURE — 97112 NEUROMUSCULAR REEDUCATION: CPT

## 2024-04-01 RX ORDER — ATORVASTATIN CALCIUM 20 MG/1
20 TABLET, FILM COATED ORAL DAILY
COMMUNITY
Start: 2024-02-19

## 2024-04-01 RX ORDER — CARVEDILOL 12.5 MG/1
12.5 TABLET ORAL 2 TIMES DAILY WITH MEALS
COMMUNITY
Start: 2023-11-28

## 2024-04-01 RX ORDER — PREDNISONE 2.5 MG/1
2.5 TABLET ORAL DAILY
COMMUNITY

## 2024-04-01 RX ORDER — AMLODIPINE BESYLATE 5 MG/1
5 TABLET ORAL DAILY
COMMUNITY

## 2024-04-01 RX ORDER — LEVOCETIRIZINE DIHYDROCHLORIDE 5 MG/1
5 TABLET, FILM COATED ORAL DAILY PRN
COMMUNITY
Start: 2023-12-27

## 2024-04-01 RX ORDER — FOLIC ACID 1 MG/1
1 TABLET ORAL DAILY
COMMUNITY
Start: 2024-03-27

## 2024-04-01 RX ORDER — FAMOTIDINE 20 MG/1
40 TABLET, FILM COATED ORAL 2 TIMES DAILY
COMMUNITY
Start: 2023-11-05 | End: 2025-02-14

## 2024-04-01 RX ORDER — METHOTREXATE 2.5 MG/1
5 TABLET ORAL
COMMUNITY

## 2024-04-01 RX ORDER — ASPIRIN 81 MG/1
81 TABLET ORAL DAILY
COMMUNITY
Start: 2023-11-05

## 2024-04-01 NOTE — PROGRESS NOTES
Discharge Summary  Pt has attended 19 visits in Physical Therapy.     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 No complaints today.    Pain: 0/10 R hip       Objective:   6MWT: 950', no AD, no rest breaks    5xSTS: 30 seconds, chair with airex cushion - momentum for assistance    TU-12 seconds, no AD      -----------------  TUG: 15 sec; 14 seconds - no AD  80-98 y/o mean 11.3s (10.0-12.7s)]    6MWT: 773', no AD, no rest breaks    5xSTS: chair with airex cushion, 0UE, 38 seconds      -----------------  24: 8' ambulation with SBQC, x830'    23:  TUG: with SPC, 45 seconds   With SBQC, 29 seconds   With RW, 22 seconds    ---------------    Assessment: Pt has met goals. Demonstrates improved functional mobility with increased distance walk during 6MWT with no assistive device. Glute and hip strength has increased and balance has improved with decreased need for UE support and decreased LOB. Patient does demonstrate fatigue which may also be related to cardiac involvement. Patient will discharge skilled PT today and continue to work on HEP and regular exercise routine while he follows-up about cardiac issue. May return to PT as needed with new order. May also consider medical fitness program as cleared by MD to promote safe return to independent exercise. Pt demonstrates understanding and is agreeable with this plan of care. All questions answered.      Goals:   Goals: (To be met in 24-28 visits) - all goals met  Pt will have improved hip AROM Flex to at least 100 deg and ABD to 30 deg to  be able to don/doff shoes and perform car transfers without difficulty   Pt will improve hip ABD and ER strength to at least 3+/5 or 4/5 to increase ease with standing and walking -  Pt will demonstrate improved SLS to >3 seconds ARELI to promote safety and decrease risk of falls on uneven surfaces such as grass-   Pt will be able to complete a 6MWT with SPC without LOB to demonstrate improved tolerance to upright needed for community and household ambulation  Pt will be able to squat to  light objects around the house with <2/10 hip pain  Pt will complete 5xSTS to demonstrate improved LE strength needed for daily transfers-   Pt will improve functional hip strength to report ability to ascend/descend 1 flight of stairs reciprocally with minimal use of handrail  Pt will be independent and compliant with comprehensive HEP to maintain progress achieved in PT      Date: 2/8/24  Tx: 14/16 Date: 3/11/24  Tx: 15/16 Date: 3/13/24  Tx: 16/16 Date: 3/20/24  Tx: 17/24-28 Date: 3/25/24  Tx: 18/24-28 4/1/24: 4/1/24  Tx: 19/24-28           TE: 12 mins  - DL shuttle, 80#, x20  - SL shuttle, 75#, 2x10 B  - s/l SL shuttle, 50#, 2x10 R  - TRX STS, x10   TE: 17 mins  - SL shuttle, 87#, x15  - SL shuttle, 75#, x5 L (unable to continue)  - SL shuttle, 50#, 2x10 R/L   TE: 18 mins  - STS elevated plinth, hands on knees, x10  - STS chair BUE on arm rests, x10  - STS from chair with airex cushion, 0UE, 2x5  - TRX squat, x12  - TRX lat lunge, 2x10 alt B TE: 28 mins  - STS low plinth,   - TRX squat, 2x12  - lateral lunge TRX, 2x8 R/L  - DL shuttle, 82#, 2x10  DL shuttle heel raises, 82#, 2x12  - SL shuttle, 75# (unable), 50# 2x10 R/L, 62# x10 R/L TE: 17 mins  - squat to chair TRX, x10  - lateral squats TRX, x10 alt B  - DL shuttle, 89#, 3x10  - SL shuttle, 62#, 2x10 B TE: 30 mind  - reassessment  - ambulation without AD x7'  - STS 3z5  - STS, various surfaces  - education progress, POC, medical fitness program   NR: 20 mins  - STS +  11#MB, 2x10 (elevated plinth)  - stepping on/over airex, x4 laps, 0UE  - stairs, recip, 1UE/finger tip, x5 laps  - TRX lateral squat, x10 B alt  - retro walking, x3 lengths  - AP rocker board, x2' 2/1/0/UE     NR: 20 mins  - STS, low plinth (unable)  - STS elevated 21\" plinth, 2x10 0UE (2nd set with 6# MB)  - forw hurdles, recip, 1UE, 3 laps x6  - lat hurdles, 1UE, 3 laps x6  - staggered stance hip hinge/glut activatino, 2x6-8, in // bars, 1UE NR: 15 mins  - romberg airex EO and EC, 2x30\" B  - romberg airex EO heat turns horz and vert, x20 ea  - ant cone taps in // bars, 3x10 alt B, 0UE NR: 10 mins  - ambulation with head turns, vert and horz, x2 long laps, no AD  - romberg airex EC, 2x30\"  - romberg airex + head turns horz and vert, EO, 2x10-15 ea NR: 23 mins  - ambulation with no AD  - up/down ramps  - romberg airex EC, 2x1', 0UE  - romberg airex EC + head turns (vert and horz), 2x10 ea, 0UE  - lateral stepping, 3 laps x10  - stairs, recip, 2-0UE NR: 8 mins  - ambulation around cones  - ramp    GT: 8 mins  - ambulation without AD, x2 large laps  - ramp ascend/descend, x7 laps GT: 8 mins  - ambulation without AD, x3 large laps  - stairs, recip, UE support GT: 12 mins  - ambulation without AD  - post stepping, x3 small laps, 0UE  - stairs, x3 laps      HEP: SLR flex, clamshells, hip ext in prone, mini squat, toe taps for first step, practice ambulating with SBQC  12/18/23: STS with low LLE lift, tandem stand (by counter)  12/20/23: bridges, step ups (first stair), ambulate with SBQC primarily  1/2/24: R SLS, R s/l hip abd SLR  1/8/24: SLR flex, SLR abd, clamshell, stand SB press down  3/11/24: staggered stance hip hinge, SL hip kicks (abd.ext) to work on balance  3/20/24: corner romberg + head turns  Charges: 2TE, 1NR       Total Timed Treatment: 38 min  Total Treatment Time:  38 min    Plan: Discharge from PT. Pt to continue HEP. Will follow-up with MD for heart and shoe lifts. May consider medical fitness program  as needed/desired    Thank you for your referral. If you have any questions, please contact me at Dept: [phone].    Sincerely,  Electronically signed by therapist: Heather Brunner, PT

## 2024-04-03 ENCOUNTER — HOSPITAL ENCOUNTER (OUTPATIENT)
Dept: CARDIOLOGY | Age: 84
Discharge: HOME | End: 2024-04-03
Attending: INTERNAL MEDICINE

## 2024-04-03 VITALS
HEIGHT: 70 IN | HEART RATE: 62 BPM | RESPIRATION RATE: 16 BRPM | TEMPERATURE: 97.1 F | BODY MASS INDEX: 21.3 KG/M2 | SYSTOLIC BLOOD PRESSURE: 109 MMHG | WEIGHT: 148.81 LBS | OXYGEN SATURATION: 97 % | DIASTOLIC BLOOD PRESSURE: 64 MMHG

## 2024-04-03 DIAGNOSIS — Z95.2 S/P AVR (AORTIC VALVE REPLACEMENT): ICD-10-CM

## 2024-04-03 LAB
AV MEAN GRADIENT (AVMG): 24
AV MEAN VELOCITY (AVMV): 1.8
AV PEAK GRADIENT (AVPG): 24
AV PEAK VELOCITY (AVPV): 2.43
LV EF: NORMAL %
LVOT 2D (LVOTD): 19.3
LVOT VTI (LVOTVTI): 0.91

## 2024-04-03 PROCEDURE — 10002801 HB RX 250 W/O HCPCS: Performed by: INTERNAL MEDICINE

## 2024-04-03 PROCEDURE — 99152 MOD SED SAME PHYS/QHP 5/>YRS: CPT

## 2024-04-03 PROCEDURE — 13000001 HB PHASE II RECOVERY EA 30 MINUTES

## 2024-04-03 PROCEDURE — 93325 DOPPLER ECHO COLOR FLOW MAPG: CPT

## 2024-04-03 PROCEDURE — 10002800 HB RX 250 W HCPCS: Performed by: INTERNAL MEDICINE

## 2024-04-03 PROCEDURE — 99153 MOD SED SAME PHYS/QHP EA: CPT

## 2024-04-03 RX ORDER — 0.9 % SODIUM CHLORIDE 0.9 %
2 VIAL (ML) INJECTION EVERY 12 HOURS SCHEDULED
Status: DISCONTINUED | OUTPATIENT
Start: 2024-04-03 | End: 2024-04-04 | Stop reason: HOSPADM

## 2024-04-03 RX ORDER — NALOXONE HCL 0.4 MG/ML
VIAL (ML) INJECTION
Status: DISCONTINUED
Start: 2024-04-03 | End: 2024-04-03 | Stop reason: WASHOUT

## 2024-04-03 RX ORDER — MIDAZOLAM HYDROCHLORIDE 1 MG/ML
INJECTION, SOLUTION INTRAMUSCULAR; INTRAVENOUS
Status: DISPENSED
Start: 2024-04-03 | End: 2024-04-03

## 2024-04-03 RX ORDER — FLUMAZENIL 0.1 MG/ML
INJECTION, SOLUTION INTRAVENOUS
Status: DISCONTINUED
Start: 2024-04-03 | End: 2024-04-03 | Stop reason: WASHOUT

## 2024-04-03 RX ORDER — MIDAZOLAM HYDROCHLORIDE 1 MG/ML
INJECTION, SOLUTION INTRAMUSCULAR; INTRAVENOUS PRN
Status: COMPLETED | OUTPATIENT
Start: 2024-04-03 | End: 2024-04-03

## 2024-04-03 RX ADMIN — MIDAZOLAM HYDROCHLORIDE 1 MG: 1 INJECTION, SOLUTION INTRAMUSCULAR; INTRAVENOUS at 10:40

## 2024-04-03 RX ADMIN — FENTANYL CITRATE 50 MCG: 50 INJECTION INTRAMUSCULAR; INTRAVENOUS at 10:19

## 2024-04-03 RX ADMIN — FENTANYL CITRATE 25 MCG: 50 INJECTION INTRAMUSCULAR; INTRAVENOUS at 10:25

## 2024-04-03 RX ADMIN — FENTANYL CITRATE 25 MCG: 50 INJECTION INTRAMUSCULAR; INTRAVENOUS at 10:22

## 2024-04-03 RX ADMIN — MIDAZOLAM HYDROCHLORIDE 2 MG: 1 INJECTION, SOLUTION INTRAMUSCULAR; INTRAVENOUS at 10:19

## 2024-04-03 RX ADMIN — MIDAZOLAM HYDROCHLORIDE 1 MG: 1 INJECTION, SOLUTION INTRAMUSCULAR; INTRAVENOUS at 10:25

## 2024-04-03 RX ADMIN — FENTANYL CITRATE 25 MCG: 50 INJECTION INTRAMUSCULAR; INTRAVENOUS at 10:40

## 2024-04-03 RX ADMIN — BENZOCAINE 1 ML: 0.1 GEL TOPICAL at 10:19

## 2024-04-03 RX ADMIN — MIDAZOLAM HYDROCHLORIDE 1 MG: 1 INJECTION, SOLUTION INTRAMUSCULAR; INTRAVENOUS at 10:22

## 2024-04-03 ASSESSMENT — PAIN SCALES - GENERAL
PAINLEVEL_OUTOF10: 0

## 2024-04-16 ENCOUNTER — TELEPHONE (OUTPATIENT)
Dept: CARDIOLOGY | Age: 84
End: 2024-04-16

## 2024-04-25 ENCOUNTER — MED REC SCAN ONLY (OUTPATIENT)
Dept: INTERNAL MEDICINE CLINIC | Facility: CLINIC | Age: 84
End: 2024-04-25

## 2024-04-26 ENCOUNTER — NURSE ONLY (OUTPATIENT)
Dept: HEMATOLOGY/ONCOLOGY | Facility: HOSPITAL | Age: 84
End: 2024-04-26
Attending: SPECIALIST
Payer: MEDICARE

## 2024-04-26 DIAGNOSIS — D50.0 IRON DEFICIENCY ANEMIA DUE TO CHRONIC BLOOD LOSS: ICD-10-CM

## 2024-04-26 LAB
BASOPHILS # BLD AUTO: 0.06 X10(3) UL (ref 0–0.2)
BASOPHILS NFR BLD AUTO: 0.6 %
DEPRECATED HBV CORE AB SER IA-ACNC: 366 NG/ML
DEPRECATED RDW RBC AUTO: 52.3 FL (ref 35.1–46.3)
EOSINOPHIL # BLD AUTO: 1.05 X10(3) UL (ref 0–0.7)
EOSINOPHIL NFR BLD AUTO: 10.4 %
ERYTHROCYTE [DISTWIDTH] IN BLOOD BY AUTOMATED COUNT: 14.5 % (ref 11–15)
HCT VFR BLD AUTO: 30.3 %
HGB BLD-MCNC: 10.7 G/DL
IMM GRANULOCYTES # BLD AUTO: 0.03 X10(3) UL (ref 0–1)
IMM GRANULOCYTES NFR BLD: 0.3 %
IRON SATN MFR SERPL: 40 %
IRON SERPL-MCNC: 99 UG/DL
LYMPHOCYTES # BLD AUTO: 0.74 X10(3) UL (ref 1–4)
LYMPHOCYTES NFR BLD AUTO: 7.3 %
MCH RBC QN AUTO: 35.4 PG (ref 26–34)
MCHC RBC AUTO-ENTMCNC: 35.3 G/DL (ref 31–37)
MCV RBC AUTO: 100.3 FL
MONOCYTES # BLD AUTO: 1.2 X10(3) UL (ref 0.1–1)
MONOCYTES NFR BLD AUTO: 11.8 %
NEUTROPHILS # BLD AUTO: 7.05 X10 (3) UL (ref 1.5–7.7)
NEUTROPHILS # BLD AUTO: 7.05 X10(3) UL (ref 1.5–7.7)
NEUTROPHILS NFR BLD AUTO: 69.6 %
PLATELET # BLD AUTO: 113 10(3)UL (ref 150–450)
RBC # BLD AUTO: 3.02 X10(6)UL
TIBC SERPL-MCNC: 247 UG/DL (ref 250–425)
TRANSFERRIN SERPL-MCNC: 166 MG/DL (ref 215–365)
WBC # BLD AUTO: 10.1 X10(3) UL (ref 4–11)

## 2024-04-26 PROCEDURE — 85025 COMPLETE CBC W/AUTO DIFF WBC: CPT

## 2024-04-26 PROCEDURE — 82728 ASSAY OF FERRITIN: CPT

## 2024-04-26 PROCEDURE — 83540 ASSAY OF IRON: CPT

## 2024-04-26 PROCEDURE — 36415 COLL VENOUS BLD VENIPUNCTURE: CPT

## 2024-04-26 PROCEDURE — 84466 ASSAY OF TRANSFERRIN: CPT

## 2024-04-29 ENCOUNTER — TELEPHONE (OUTPATIENT)
Dept: HEMATOLOGY/ONCOLOGY | Facility: HOSPITAL | Age: 84
End: 2024-04-29

## 2024-04-29 NOTE — TELEPHONE ENCOUNTER
Test(s) completed: CBC, Iron Studies    Results: per Dr Hung \"Please call him. Let him know that his hemoglobin is good and stable and his iron levels are normal. I suggest he repeat the labs one more time in 3 months. He should get a lab appt in the cancer center so he has a reminder.\"    Plan: left patient a voicemail message with results.

## 2024-05-28 DIAGNOSIS — D89.89 KAPPA LIGHT CHAIN DISEASE (HCC): ICD-10-CM

## 2024-05-28 DIAGNOSIS — Z95.1 S/P CABG (CORONARY ARTERY BYPASS GRAFT): ICD-10-CM

## 2024-05-28 DIAGNOSIS — N18.4 CKD (CHRONIC KIDNEY DISEASE) STAGE 4, GFR 15-29 ML/MIN (HCC): ICD-10-CM

## 2024-05-28 DIAGNOSIS — Z95.2 S/P AVR: ICD-10-CM

## 2024-05-28 DIAGNOSIS — M35.3 PMR (POLYMYALGIA RHEUMATICA) (HCC): ICD-10-CM

## 2024-05-28 RX ORDER — PREDNISONE 2.5 MG/1
2.5 TABLET ORAL DAILY
Qty: 60 TABLET | Refills: 1 | Status: SHIPPED | OUTPATIENT
Start: 2024-05-28

## 2024-05-28 NOTE — TELEPHONE ENCOUNTER
Future Appointments   Date Time Provider Department Center   7/3/2024 10:20 AM Zander Lomas MD [facility]   7/26/2024 10:30 AM CMA [facility]   7/26/2024 11:30 AM Dallas Hung MD [facility]     Last office visit: 3/27/2024    Last fill; 3/27/2024 60 tab, 1 refill

## 2024-05-28 NOTE — TELEPHONE ENCOUNTER
Prednisone refill approved 2.5 mg 1 daily #90 x 1 to [place] [facility].

## 2024-06-28 ENCOUNTER — TELEPHONE (OUTPATIENT)
Dept: RHEUMATOLOGY | Facility: CLINIC | Age: 84
End: 2024-06-28

## 2024-07-03 ENCOUNTER — TELEPHONE (OUTPATIENT)
Dept: INTERNAL MEDICINE CLINIC | Facility: CLINIC | Age: 84
End: 2024-07-03

## 2024-07-03 NOTE — TELEPHONE ENCOUNTER
Spoke with patient    Patient and his wife would like to come back into practice with Dr Bond    Wife is currently in Hospice care and he will not be able to bring her into the office.     Patient would like to make an appt for himself.   Patient states he does not have anything acute going on currently.     Dr Bond's scheduled is fully booked next week    To Dr Bond  Ok to schedule patient for the following week?    Tona MOREIRA

## 2024-07-12 ENCOUNTER — OFFICE VISIT (OUTPATIENT)
Dept: INTERNAL MEDICINE CLINIC | Facility: CLINIC | Age: 84
End: 2024-07-12
Payer: MEDICARE

## 2024-07-12 DIAGNOSIS — E44.0 MODERATE PROTEIN-CALORIE MALNUTRITION (HCC): ICD-10-CM

## 2024-07-12 DIAGNOSIS — N18.4 CKD (CHRONIC KIDNEY DISEASE) STAGE 4, GFR 15-29 ML/MIN (HCC): ICD-10-CM

## 2024-07-12 DIAGNOSIS — R53.1 WEAKNESS GENERALIZED: ICD-10-CM

## 2024-07-12 DIAGNOSIS — Z95.1 S/P CABG (CORONARY ARTERY BYPASS GRAFT): ICD-10-CM

## 2024-07-12 DIAGNOSIS — G47.01 INSOMNIA DUE TO MEDICAL CONDITION: ICD-10-CM

## 2024-07-12 DIAGNOSIS — M35.3 PMR (POLYMYALGIA RHEUMATICA) (HCC): Primary | ICD-10-CM

## 2024-07-12 DIAGNOSIS — Z95.2 S/P AVR: ICD-10-CM

## 2024-07-12 PROCEDURE — 99349 HOME/RES VST EST MOD MDM 40: CPT | Performed by: INTERNAL MEDICINE

## 2024-07-12 RX ORDER — PREDNISONE 2.5 MG/1
2.5 TABLET ORAL DAILY
Qty: 90 TABLET | Refills: 3 | Status: SHIPPED | OUTPATIENT
Start: 2024-07-12

## 2024-07-12 RX ORDER — ATORVASTATIN CALCIUM 20 MG/1
20 TABLET, FILM COATED ORAL NIGHTLY
Qty: 90 TABLET | Refills: 3 | Status: SHIPPED | OUTPATIENT
Start: 2024-07-12

## 2024-07-12 RX ORDER — TEMAZEPAM 7.5 MG/1
7.5 CAPSULE ORAL NIGHTLY PRN
Qty: 90 CAPSULE | Refills: 1 | Status: SHIPPED | OUTPATIENT
Start: 2024-07-12

## 2024-07-12 RX ORDER — AMLODIPINE BESYLATE 5 MG/1
5 TABLET ORAL DAILY
Qty: 90 TABLET | Refills: 3 | Status: SHIPPED | OUTPATIENT
Start: 2024-07-12

## 2024-07-12 RX ORDER — CARVEDILOL 12.5 MG/1
12.5 TABLET ORAL 2 TIMES DAILY WITH MEALS
Qty: 180 TABLET | Refills: 3 | Status: SHIPPED | OUTPATIENT
Start: 2024-07-12

## 2024-07-12 RX ORDER — METHOTREXATE 2.5 MG/1
5 TABLET ORAL WEEKLY
Qty: 24 TABLET | Refills: 3 | Status: SHIPPED | OUTPATIENT
Start: 2024-07-12

## 2024-07-12 RX ORDER — ASPIRIN 81 MG/1
81 TABLET ORAL DAILY
Qty: 90 TABLET | Refills: 3 | Status: SHIPPED | OUTPATIENT
Start: 2024-07-12

## 2024-07-12 RX ORDER — MIRTAZAPINE 15 MG/1
15 TABLET, FILM COATED ORAL NIGHTLY
Qty: 30 TABLET | Refills: 3 | Status: SHIPPED | OUTPATIENT
Start: 2024-07-12

## 2024-07-12 RX ORDER — FOLIC ACID 1 MG/1
1 TABLET ORAL DAILY
Qty: 90 TABLET | Refills: 3 | Status: SHIPPED | OUTPATIENT
Start: 2024-07-12

## 2024-07-12 RX ORDER — FAMOTIDINE 20 MG/1
40 TABLET, FILM COATED ORAL 2 TIMES DAILY
Qty: 360 TABLET | Refills: 3 | Status: SHIPPED | OUTPATIENT
Start: 2024-07-12

## 2024-07-13 NOTE — PROGRESS NOTES
Swiched from PCP Osvaldo back to Me. House call today 30 min face to face and another 30min reviewing interim history after he left my practice, replacing prescriptions under my name, placing orders for blood work next week and putting in calls to his specialists-rheum Abebe, cardio Addy, Onc  Anabell, and former PCP Osvaldo. Had CHARISSA 4/2024 showing AVR stenosis and new Mitral valve disease. He is 84, malnourished and not eating, will add Remeron for appetite and depression(wife in hospice with brain tumor). 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 Spoke to daughters Jazzmine and eLs present. Will recheck labs-last 4/2024. Next visit 7/22 home or office

## 2024-07-19 ENCOUNTER — LAB ENCOUNTER (OUTPATIENT)
Dept: LAB | Facility: HOSPITAL | Age: 84
End: 2024-07-19
Attending: SPECIALIST
Payer: MEDICARE

## 2024-07-19 DIAGNOSIS — D50.0 IRON DEFICIENCY ANEMIA SECONDARY TO BLOOD LOSS (CHRONIC): ICD-10-CM

## 2024-07-19 DIAGNOSIS — D50.0 IRON DEFICIENCY ANEMIA DUE TO CHRONIC BLOOD LOSS: ICD-10-CM

## 2024-07-19 DIAGNOSIS — Z95.2 S/P AVR: ICD-10-CM

## 2024-07-19 DIAGNOSIS — M35.3 PMR (POLYMYALGIA RHEUMATICA) (HCC): ICD-10-CM

## 2024-07-19 DIAGNOSIS — N18.4 CKD (CHRONIC KIDNEY DISEASE) STAGE 4, GFR 15-29 ML/MIN (HCC): ICD-10-CM

## 2024-07-19 DIAGNOSIS — D89.89 KAPPA LIGHT CHAIN DISEASE (HCC): ICD-10-CM

## 2024-07-19 DIAGNOSIS — Z95.1 S/P CABG (CORONARY ARTERY BYPASS GRAFT): ICD-10-CM

## 2024-07-19 LAB
ALBUMIN SERPL-MCNC: 4.2 G/DL (ref 3.2–4.8)
ALBUMIN/GLOB SERPL: 1.7 {RATIO} (ref 1–2)
ALP LIVER SERPL-CCNC: 59 U/L
ALT SERPL-CCNC: 18 U/L
ANION GAP SERPL CALC-SCNC: 7 MMOL/L (ref 0–18)
AST SERPL-CCNC: 33 U/L (ref ?–34)
BASOPHILS # BLD AUTO: 0.05 X10(3) UL (ref 0–0.2)
BASOPHILS NFR BLD AUTO: 0.4 %
BILIRUB SERPL-MCNC: 0.6 MG/DL (ref 0.2–1.1)
BUN BLD-MCNC: 31 MG/DL (ref 9–23)
BUN/CREAT SERPL: 17 (ref 10–20)
CALCIUM BLD-MCNC: 9.4 MG/DL (ref 8.7–10.4)
CHLORIDE SERPL-SCNC: 109 MMOL/L (ref 98–112)
CO2 SERPL-SCNC: 26 MMOL/L (ref 21–32)
CREAT BLD-MCNC: 1.82 MG/DL
CRP SERPL-MCNC: <0.4 MG/DL (ref ?–1)
DEPRECATED HBV CORE AB SER IA-ACNC: 317.1 NG/ML
DEPRECATED RDW RBC AUTO: 50.8 FL (ref 35.1–46.3)
EGFRCR SERPLBLD CKD-EPI 2021: 37 ML/MIN/1.73M2 (ref 60–?)
EOSINOPHIL # BLD AUTO: 0.72 X10(3) UL (ref 0–0.7)
EOSINOPHIL NFR BLD AUTO: 5.9 %
ERYTHROCYTE [DISTWIDTH] IN BLOOD BY AUTOMATED COUNT: 13.5 % (ref 11–15)
ERYTHROCYTE [SEDIMENTATION RATE] IN BLOOD: 9 MM/HR
FASTING STATUS PATIENT QL REPORTED: YES
GLOBULIN PLAS-MCNC: 2.5 G/DL (ref 2–3.5)
GLUCOSE BLD-MCNC: 85 MG/DL (ref 70–99)
HCT VFR BLD AUTO: 35.7 %
HGB BLD-MCNC: 11.9 G/DL
IMM GRANULOCYTES # BLD AUTO: 0.07 X10(3) UL (ref 0–1)
IMM GRANULOCYTES NFR BLD: 0.6 %
IRON SATN MFR SERPL: 27 %
IRON SERPL-MCNC: 71 UG/DL
LYMPHOCYTES # BLD AUTO: 1.15 X10(3) UL (ref 1–4)
LYMPHOCYTES NFR BLD AUTO: 9.4 %
MCH RBC QN AUTO: 34.5 PG (ref 26–34)
MCHC RBC AUTO-ENTMCNC: 33.3 G/DL (ref 31–37)
MCV RBC AUTO: 103.5 FL
MONOCYTES # BLD AUTO: 1.3 X10(3) UL (ref 0.1–1)
MONOCYTES NFR BLD AUTO: 10.7 %
NEUTROPHILS # BLD AUTO: 8.89 X10 (3) UL (ref 1.5–7.7)
NEUTROPHILS # BLD AUTO: 8.89 X10(3) UL (ref 1.5–7.7)
NEUTROPHILS NFR BLD AUTO: 73 %
OSMOLALITY SERPL CALC.SUM OF ELEC: 300 MOSM/KG (ref 275–295)
PLATELET # BLD AUTO: 122 10(3)UL (ref 150–450)
POTASSIUM SERPL-SCNC: 3.9 MMOL/L (ref 3.5–5.1)
PROT SERPL-MCNC: 6.7 G/DL (ref 5.7–8.2)
RBC # BLD AUTO: 3.45 X10(6)UL
SODIUM SERPL-SCNC: 142 MMOL/L (ref 136–145)
TIBC SERPL-MCNC: 264 UG/DL (ref 250–425)
TRANSFERRIN SERPL-MCNC: 177 MG/DL (ref 215–365)
WBC # BLD AUTO: 12.2 X10(3) UL (ref 4–11)

## 2024-07-19 PROCEDURE — 80053 COMPREHEN METABOLIC PANEL: CPT

## 2024-07-19 PROCEDURE — 36415 COLL VENOUS BLD VENIPUNCTURE: CPT

## 2024-07-19 PROCEDURE — 85652 RBC SED RATE AUTOMATED: CPT

## 2024-07-19 PROCEDURE — 85025 COMPLETE CBC W/AUTO DIFF WBC: CPT

## 2024-07-19 PROCEDURE — 84466 ASSAY OF TRANSFERRIN: CPT

## 2024-07-19 PROCEDURE — 83540 ASSAY OF IRON: CPT

## 2024-07-19 PROCEDURE — 82728 ASSAY OF FERRITIN: CPT

## 2024-07-19 PROCEDURE — 86140 C-REACTIVE PROTEIN: CPT

## 2024-07-20 DIAGNOSIS — Z95.1 S/P CABG (CORONARY ARTERY BYPASS GRAFT): ICD-10-CM

## 2024-07-20 DIAGNOSIS — N18.4 CKD (CHRONIC KIDNEY DISEASE) STAGE 4, GFR 15-29 ML/MIN (HCC): ICD-10-CM

## 2024-07-20 DIAGNOSIS — M35.3 PMR (POLYMYALGIA RHEUMATICA) (HCC): ICD-10-CM

## 2024-07-20 DIAGNOSIS — Z95.2 S/P AVR: ICD-10-CM

## 2024-07-22 ENCOUNTER — VIRTUAL PHONE E/M (OUTPATIENT)
Dept: INTERNAL MEDICINE CLINIC | Facility: CLINIC | Age: 84
End: 2024-07-22
Payer: MEDICARE

## 2024-07-22 DIAGNOSIS — F41.9 ANXIETY: ICD-10-CM

## 2024-07-22 DIAGNOSIS — M35.3 PMR (POLYMYALGIA RHEUMATICA) (HCC): Primary | ICD-10-CM

## 2024-07-22 DIAGNOSIS — F43.21 GRIEF REACTION: ICD-10-CM

## 2024-07-22 DIAGNOSIS — I10 ESSENTIAL HYPERTENSION: ICD-10-CM

## 2024-07-22 RX ORDER — LEVOCETIRIZINE DIHYDROCHLORIDE 5 MG/1
5 TABLET, FILM COATED ORAL EVERY EVENING
COMMUNITY
Start: 2024-07-22

## 2024-07-22 RX ORDER — ALPRAZOLAM 0.25 MG/1
0.25 TABLET ORAL 4 TIMES DAILY PRN
Qty: 30 TABLET | Refills: 0 | Status: SHIPPED | OUTPATIENT
Start: 2024-07-22

## 2024-07-22 RX ORDER — PREDNISONE 2.5 MG/1
TABLET ORAL
COMMUNITY
Start: 2024-07-22

## 2024-07-22 RX ORDER — MULTIVIT-MIN/IRON/FOLIC ACID/K 18-600-40
CAPSULE ORAL
COMMUNITY
Start: 2024-07-22

## 2024-07-22 NOTE — TELEPHONE ENCOUNTER
Future Appointments   Date Time Provider Department Center   7/22/2024  4:00 PM Jeffy Bond MD [facility] [place]   7/26/2024 10:30 AM CMA [facility]   7/26/2024 11:30 AM Dallas Hung MD [facility]   8/6/2024 11:00 AM Matty Jones MD [facility] [place]     Duplicate refill request    Script sent by Dr. Bond on 7/12/2024

## 2024-07-23 NOTE — PROGRESS NOTES
Virtual Telephone Check-In    Camacho Daly verbally consents to a Virtual/Telephone Check-In visit on 24.  Patient has been referred to the [facility] website at [email or web address] to review the yearly Consent to Treat document.    Patient understands and accepts financial responsibility for any deductible, co-insurance and/or co-pays associated with this service.    Duration of the service: 15 minutes audio only      Summary of topics discussed:     Still grieving over wife's death. Wake tomorrow,  and burial next day. Wanted to go over labs. Daughter Mark on line also. Camacho sounds weak and shaky but understandable with recent loss. Labs mild stable CKD but remarkably NORMAL ESR and CRP with minimal muscle aches and pains can reduce Pred 2.5 every day to MWF only. Cont lodose Methotrexate 2.5 x2=5mg once weekly minimal dose. Will pass on to Rheum Lich[facility].  Hgb and Plats better, WBC little higher-ok  Started Remeron 15 hs for sleep. Wt loss, depression. Add Xanax prn just for wake and .   Stabe BP reports today 130/73 HR 67 on home machine controlled on meds  Will check back about 1mo        Jeffy Bond MD

## 2024-07-26 ENCOUNTER — APPOINTMENT (OUTPATIENT)
Dept: HEMATOLOGY/ONCOLOGY | Facility: HOSPITAL | Age: 84
End: 2024-07-26
Attending: SPECIALIST
Payer: MEDICARE

## 2024-08-04 ENCOUNTER — HOSPITAL ENCOUNTER (OUTPATIENT)
Facility: HOSPITAL | Age: 84
Setting detail: OBSERVATION
Discharge: HOME | End: 2024-08-05
Attending: EMERGENCY MEDICINE | Admitting: EMERGENCY MEDICINE
Payer: MEDICARE

## 2024-08-04 ENCOUNTER — APPOINTMENT (OUTPATIENT)
Dept: GENERAL RADIOLOGY | Facility: HOSPITAL | Age: 84
End: 2024-08-04
Payer: MEDICARE

## 2024-08-04 DIAGNOSIS — R55 SYNCOPE AND COLLAPSE: Primary | ICD-10-CM

## 2024-08-04 LAB
ALBUMIN SERPL-MCNC: 4.1 G/DL (ref 3.2–4.8)
ALBUMIN/GLOB SERPL: 1.6 {RATIO} (ref 1–2)
ALP LIVER SERPL-CCNC: 57 U/L
ALT SERPL-CCNC: 20 U/L
ANION GAP SERPL CALC-SCNC: 7 MMOL/L (ref 0–18)
AST SERPL-CCNC: 32 U/L (ref ?–34)
BASOPHILS # BLD AUTO: 0.03 X10(3) UL (ref 0–0.2)
BASOPHILS NFR BLD AUTO: 0.3 %
BILIRUB SERPL-MCNC: 0.8 MG/DL (ref 0.2–1.1)
BILIRUB UR QL: NEGATIVE
BUN BLD-MCNC: 30 MG/DL (ref 9–23)
BUN/CREAT SERPL: 15.2 (ref 10–20)
CALCIUM BLD-MCNC: 9.6 MG/DL (ref 8.7–10.4)
CHLORIDE SERPL-SCNC: 110 MMOL/L (ref 98–112)
CLARITY UR: CLEAR
CO2 SERPL-SCNC: 24 MMOL/L (ref 21–32)
COLOR UR: YELLOW
CREAT BLD-MCNC: 1.98 MG/DL
DEPRECATED RDW RBC AUTO: 50.4 FL (ref 35.1–46.3)
EGFRCR SERPLBLD CKD-EPI 2021: 33 ML/MIN/1.73M2 (ref 60–?)
EOSINOPHIL # BLD AUTO: 0.84 X10(3) UL (ref 0–0.7)
EOSINOPHIL NFR BLD AUTO: 8.5 %
ERYTHROCYTE [DISTWIDTH] IN BLOOD BY AUTOMATED COUNT: 13.7 % (ref 11–15)
GLOBULIN PLAS-MCNC: 2.5 G/DL (ref 2–3.5)
GLUCOSE BLD-MCNC: 93 MG/DL (ref 70–99)
GLUCOSE BLDC GLUCOMTR-MCNC: 97 MG/DL (ref 70–99)
GLUCOSE UR-MCNC: NORMAL MG/DL
HCT VFR BLD AUTO: 32.9 %
HGB BLD-MCNC: 11.1 G/DL
HGB UR QL STRIP.AUTO: NEGATIVE
IMM GRANULOCYTES # BLD AUTO: 0.07 X10(3) UL (ref 0–1)
IMM GRANULOCYTES NFR BLD: 0.7 %
INR BLD: 0.99 (ref 0.8–1.2)
KETONES UR-MCNC: NEGATIVE MG/DL
LEUKOCYTE ESTERASE UR QL STRIP.AUTO: NEGATIVE
LYMPHOCYTES # BLD AUTO: 0.64 X10(3) UL (ref 1–4)
LYMPHOCYTES NFR BLD AUTO: 6.5 %
MAGNESIUM SERPL-MCNC: 2 MG/DL (ref 1.6–2.6)
MCH RBC QN AUTO: 34.7 PG (ref 26–34)
MCHC RBC AUTO-ENTMCNC: 33.7 G/DL (ref 31–37)
MCV RBC AUTO: 102.8 FL
MONOCYTES # BLD AUTO: 1.18 X10(3) UL (ref 0.1–1)
MONOCYTES NFR BLD AUTO: 11.9 %
NEUTROPHILS # BLD AUTO: 7.15 X10 (3) UL (ref 1.5–7.7)
NEUTROPHILS # BLD AUTO: 7.15 X10(3) UL (ref 1.5–7.7)
NEUTROPHILS NFR BLD AUTO: 72.1 %
NITRITE UR QL STRIP.AUTO: NEGATIVE
OSMOLALITY SERPL CALC.SUM OF ELEC: 298 MOSM/KG (ref 275–295)
PH UR: 6 [PH] (ref 5–8)
PLATELET # BLD AUTO: 106 10(3)UL (ref 150–450)
POTASSIUM SERPL-SCNC: 4.3 MMOL/L (ref 3.5–5.1)
PROT SERPL-MCNC: 6.6 G/DL (ref 5.7–8.2)
PROT UR-MCNC: 20 MG/DL
PROTHROMBIN TIME: 13.7 SECONDS (ref 11.6–14.8)
RBC # BLD AUTO: 3.2 X10(6)UL
SODIUM SERPL-SCNC: 141 MMOL/L (ref 136–145)
SP GR UR STRIP: 1.02 (ref 1–1.03)
TROPONIN I SERPL HS-MCNC: 27 NG/L
UROBILINOGEN UR STRIP-ACNC: NORMAL
WBC # BLD AUTO: 9.9 X10(3) UL (ref 4–11)

## 2024-08-04 PROCEDURE — 99223 1ST HOSP IP/OBS HIGH 75: CPT | Performed by: HOSPITALIST

## 2024-08-04 PROCEDURE — 71045 X-RAY EXAM CHEST 1 VIEW: CPT | Performed by: EMERGENCY MEDICINE

## 2024-08-04 RX ORDER — BISACODYL 10 MG
10 SUPPOSITORY, RECTAL RECTAL
Status: DISCONTINUED | OUTPATIENT
Start: 2024-08-04 | End: 2024-08-05

## 2024-08-04 RX ORDER — ATORVASTATIN CALCIUM 20 MG/1
20 TABLET, FILM COATED ORAL NIGHTLY
Status: DISCONTINUED | OUTPATIENT
Start: 2024-08-04 | End: 2024-08-05

## 2024-08-04 RX ORDER — FAMOTIDINE 10 MG
10 TABLET ORAL DAILY
Status: DISCONTINUED | OUTPATIENT
Start: 2024-08-05 | End: 2024-08-04

## 2024-08-04 RX ORDER — CALCIUM CARBONATE 500 MG/1
500 TABLET, CHEWABLE ORAL EVERY 6 HOURS PRN
Status: DISCONTINUED | OUTPATIENT
Start: 2024-08-04 | End: 2024-08-05

## 2024-08-04 RX ORDER — SODIUM CHLORIDE 9 MG/ML
INJECTION, SOLUTION INTRAVENOUS CONTINUOUS
Status: DISCONTINUED | OUTPATIENT
Start: 2024-08-04 | End: 2024-08-05

## 2024-08-04 RX ORDER — ALPRAZOLAM 0.25 MG/1
0.25 TABLET ORAL 4 TIMES DAILY PRN
Status: DISCONTINUED | OUTPATIENT
Start: 2024-08-04 | End: 2024-08-05

## 2024-08-04 RX ORDER — METOCLOPRAMIDE HYDROCHLORIDE 5 MG/ML
5 INJECTION INTRAMUSCULAR; INTRAVENOUS EVERY 8 HOURS PRN
Status: DISCONTINUED | OUTPATIENT
Start: 2024-08-04 | End: 2024-08-05

## 2024-08-04 RX ORDER — CETIRIZINE HYDROCHLORIDE 5 MG/1
5 TABLET ORAL EVERY EVENING
Status: DISCONTINUED | OUTPATIENT
Start: 2024-08-04 | End: 2024-08-05

## 2024-08-04 RX ORDER — AMLODIPINE BESYLATE 2.5 MG/1
2.5 TABLET ORAL DAILY
Status: DISCONTINUED | OUTPATIENT
Start: 2024-08-05 | End: 2024-08-05

## 2024-08-04 RX ORDER — MIRTAZAPINE 15 MG/1
15 TABLET, FILM COATED ORAL NIGHTLY
Status: DISCONTINUED | OUTPATIENT
Start: 2024-08-04 | End: 2024-08-05

## 2024-08-04 RX ORDER — ASPIRIN 81 MG/1
81 TABLET ORAL DAILY
Status: DISCONTINUED | OUTPATIENT
Start: 2024-08-05 | End: 2024-08-05

## 2024-08-04 RX ORDER — SENNOSIDES 8.6 MG
17.2 TABLET ORAL NIGHTLY PRN
Status: DISCONTINUED | OUTPATIENT
Start: 2024-08-04 | End: 2024-08-05

## 2024-08-04 RX ORDER — ONDANSETRON 2 MG/ML
4 INJECTION INTRAMUSCULAR; INTRAVENOUS EVERY 6 HOURS PRN
Status: DISCONTINUED | OUTPATIENT
Start: 2024-08-04 | End: 2024-08-05

## 2024-08-04 RX ORDER — PREDNISONE 2.5 MG/1
2.5 TABLET ORAL DAILY
Status: DISCONTINUED | OUTPATIENT
Start: 2024-08-05 | End: 2024-08-05

## 2024-08-04 RX ORDER — HYDRALAZINE HYDROCHLORIDE 20 MG/ML
10 INJECTION INTRAMUSCULAR; INTRAVENOUS EVERY 6 HOURS PRN
Status: DISCONTINUED | OUTPATIENT
Start: 2024-08-04 | End: 2024-08-05

## 2024-08-04 RX ORDER — FOLIC ACID 1 MG/1
1 TABLET ORAL DAILY
Status: DISCONTINUED | OUTPATIENT
Start: 2024-08-05 | End: 2024-08-05

## 2024-08-04 RX ORDER — CARVEDILOL 12.5 MG/1
12.5 TABLET ORAL 2 TIMES DAILY WITH MEALS
Status: DISCONTINUED | OUTPATIENT
Start: 2024-08-04 | End: 2024-08-05

## 2024-08-04 RX ORDER — FAMOTIDINE 20 MG/1
40 TABLET, FILM COATED ORAL 2 TIMES DAILY
Status: DISCONTINUED | OUTPATIENT
Start: 2024-08-04 | End: 2024-08-05

## 2024-08-04 RX ORDER — HEPARIN SODIUM 5000 [USP'U]/ML
5000 INJECTION, SOLUTION INTRAVENOUS; SUBCUTANEOUS EVERY 12 HOURS SCHEDULED
Status: DISCONTINUED | OUTPATIENT
Start: 2024-08-04 | End: 2024-08-05

## 2024-08-04 RX ORDER — POLYETHYLENE GLYCOL 3350 17 G/17G
17 POWDER, FOR SOLUTION ORAL DAILY PRN
Status: DISCONTINUED | OUTPATIENT
Start: 2024-08-04 | End: 2024-08-05

## 2024-08-04 NOTE — ED INITIAL ASSESSMENT (HPI)
Patient to ED via EMS for syncopal episode at [facility]. Denies head injury. Denies anticoagulant use. Denies pain.

## 2024-08-04 NOTE — ED PROVIDER NOTES
Patient Seen in: [facility]         EMERGENCY DEPARTMENT NOTE    Dictated. Voice Transcription software has been utilized for this dictation (the reader should be aware that typographical errors are possible with voice transcription software and to please contact the dictating physician if there are questions.)         History     Chief Complaint   Patient presents with    Syncope       There may be discrepancies from triage note.     HPI    History provided by patient, patient's daughter and patient's son-in-law via telephone  84-year-old male, history as mentioned below presents to the emergency department for syncope.  States that he was sitting in mass when he slumped over.  No seizure-like activity.  States that he had no symptoms prior to his syncopal episode.  Daughter at bedside states that he slumped to his side.  No falls to the ground.  No history of cardiac arrhythmia.  Son-in-law via telephone informs me patient has not been eating or drinking much secondary to a grieving process.  Patient burried His wife 1 week ago.  Patient admits to not eating or drinking much last 1 week    No fevers, chills, nausea, vomiting, diarrhea, constipation, cough, cold symptoms, urinary complaints.  No chest pain, shortness of breath  No headache, neck pain, neck stiffness, incontinence.  No changes in mentation, no changes in vision, no total/new extremity weakness, no total/new extremity paresthesia, no difficulty speaking.  No alleviating or exacerbating factors.  Denies orthopnea, pnd, change in exercise tolerance limited by chest pain/sob , lower extremity edema/asymmetry.     No suicidal, homicidal ideation.      History reviewed.   Past Medical History:    Acute left-sided low back pain without sciatica    JOSE LUIS (acute kidney injury) (HCC)    from omeprazole-resolved    Anemia    Aortic stenosis    AVR-bio    Pittman esophagus    CAD (coronary artery disease)    stents, bypasses    Calculus of kidney     Carotid artery plaque    ultrasound    Chronic anemia    bone marrow neg 12/2012    Chronic fatigue    CKD (chronic kidney disease) stage 4, GFR 15-29 ml/min (HCC)    Closed wedge compression fracture of T11 vertebra with routine healing    Clostridium difficile colitis    Colon polyps    colonoscopy    Compression fracture of T12 vertebra (HCC)    Contrast dye induced nephropathy    Dehydration    Diverticulosis    Drug-induced interstitial nephritis    Omeprazole    Dyslipidemia    Elevated homocysteine    Elevated lipoprotein A level    Elevated troponin    FUO (fever of unknown origin)    PMR or testicular/prostate infection    Gastritis    Hearing impairment    [place]    Hemorrhoids    Hiatal hernia with gastroesophageal reflux    History of Clostridioides difficile colitis    History of community acquired pneumonia    HTN (hypertension)    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)    Inguinal lymphadenopathy    right-bx    Ischemic colitis (HCC)    resolved    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HCC)    Mesenteric adenitis    bx    Multiple renal cysts    both-not reported on recent CTs    SHIRA (obstructive sleep apnea)    Osteopenia    steroids    PMR (polymyalgia rheumatica) (HCC)    Pneumonia    Positive TRINY (antinuclear antibody)    Precancerous lesion    skin    Proteinuria    mild    Psoriasis    Renal artery stenosis (HCC)    both    Renal stone    right    Right shoulder pain    injected-Liane    Sjogren's syndrome with myopathy (HCC)    Sleep apnea    untreated    Thrombocytopenia (HCC)    resolved    Vitamin B12 nutritional deficiency    Vitamin D deficiency    Weight loss       History reviewed.   Past Surgical History:   Procedure Laterality Date    Angioplasty (coronary)  1999    Dr. Maguire    Appendectomy      teenager    Biopsy  02/08/2013    Laparoscopic excision of lymph nodes, biopsies of the colonic and small bowel mesentery.    Bone marrow biopsy and aspiration  01/2021    Hantel-neg    Cabg   10/25/2013    AORTIC VALVE REPLACEMENT; bypass x 2-3    Cardiac catheterization  2008/2013    Cholecystectomy  1984    Colonoscopy  2005    Eliu    Colonoscopy  11/07/2012        Colonoscopy N/A 04/04/2018    Procedure: COLONOSCOPY;  Surgeon: Camacho Mckee MD;  Location:  ENDOSCOPY    Colonoscopy N/A 10/11/2021    1.Esophagitis 2.Colon polyps  3. Diverticulosis 4.Colitis    Cortisone injection  07/06/2015    rt shoulder     Cytology lymph node fna - jar(s): 1  12/07/2012    excisional biopsy rt inguinal lymph node     Egd  10/22/2021    Rafi    Egd  08/10/2019    Hiatal hernia    Hip replacement surgery      Ir kidney biopsy (renal)random  08/11/2020    for drug induced nephritis    Laminotomy, addl lumbar  12/21/2022    Minimally invasive right L1-2 laminotomy, partial medial facetectomy, resection of calcified disc/endplate complex.    Laparoscopic lymph node biop  02/08/2013    exploratory abd lymphadenopathy    Replace aortic valve open  10/25/2013    bio    Sigmoidoscopy,diagnostic  1995    Skin tags  10/1714    plus histofreeze    Total hip arthroplasty Right 10/18/2023    Right total hip arthroplasty    Upper gi endoscopy performed  2005/2007    Eliu/Rafi         Medications :  (Not in a hospital admission)       Family History   Problem Relation Age of Onset    Neurological Disorder Father         parkinsons    Cancer Father         lung    Other (Other) Father     Mental Disorder Mother         alzheimers    Other (Other) Mother     Cancer Daughter         hodgkins at age 18    Heart Surgery Brother     Heart Disorder Brother     Other (Other) Brother     Other (Other) Brother     Other (Other) Other         seizures from neurofibromatosis       Smoking Status:   Social History     Socioeconomic History    Marital status:      Spouse name: Anjelica    Number of children: 3   Occupational History    Occupation:      Employer: [employer]    Tobacco Use    Smoking status: Former     Current packs/day: 0.00     Types: Cigarettes     Quit date: 1971     Years since quittin.0    Smokeless tobacco: Never   Vaping Use    Vaping status: Never Used   Substance and Sexual Activity    Alcohol use: Not Currently     Alcohol/week: 2.0 standard drinks of alcohol     Types: 2 Standard drinks or equivalent per week     Comment: 2-3 drinks on the weekend    Drug use: No   Other Topics Concern    Caffeine Concern Yes     Comment: 2 cups daily    Stress Concern No    Weight Concern No    Special Diet No    Exercise Yes     Comment: walking daily     Seat Belt Yes       Review of Systems   Constitutional: Negative.    HENT: Negative.     Eyes: Negative.    Respiratory: Negative.     Cardiovascular: Negative.    Gastrointestinal: Negative.    Genitourinary: Negative.    Musculoskeletal: Negative.    Skin: Negative.    Neurological: Negative.    Endo/Heme/Allergies: Negative.    Psychiatric/Behavioral: Negative.     All other systems reviewed and are negative.    Pertinent positives as listed.  All other organ systems are reviewed and are negative.    Constitutional and vital signs reviewed.      Social History and Family History elements reviewed from today, pertinent positives to the presenting problem noted.    Physical Exam     ED Triage Vitals [24 1051]   BP (!) 116/102   Pulse 58   Resp 16   Temp 98.4 °F (36.9 °C)   Temp src Temporal   SpO2 100 %   O2 Device None (Room air)       All measures to prevent infection transmission during my interaction with the patient were taken. The patient was already wearing a droplet mask on my arrival to the room. Personal protective equipment including droplet mask, eye protection, and gloves were worn throughout the duration of the exam.  Handwashing was performed prior to and after the exam.  Stethoscope and any equipment used during my examination was cleaned with super sani-cloth germicidal wipes following the  exam.     Physical Exam  Vitals and nursing note reviewed.   Constitutional:       General: He is not in acute distress.     Appearance: He is not ill-appearing or toxic-appearing.      Comments: Smiles, no distress   HENT:      Head: Normocephalic and atraumatic.      Mouth/Throat:      Mouth: Mucous membranes are dry.   Neck:      Vascular: No carotid bruit.   Cardiovascular:      Rate and Rhythm: Normal rate and regular rhythm.      Comments: Dorsalis pedis pulses 2+ bilaterally    Pulmonary:      Effort: Pulmonary effort is normal. No respiratory distress.      Breath sounds: No stridor. No wheezing, rhonchi or rales.   Chest:      Chest wall: No tenderness.   Abdominal:      General: There is no distension.      Palpations: Abdomen is soft.      Tenderness: There is no abdominal tenderness. There is no guarding or rebound.   Musculoskeletal:      Cervical back: Normal range of motion and neck supple.      Right lower leg: No edema.      Left lower leg: No edema.   Skin:     Capillary Refill: Capillary refill takes less than 2 seconds.      Coloration: Skin is not jaundiced or pale.      Findings: No bruising, erythema, lesion or rash.   Neurological:      Mental Status: He is alert.      Motor: No weakness.      Comments: Cranial nerves 3-12 intact.    5/5 bilateral finger , biceps, triceps, leg extension/flexion, dorsiflexion/plantarflexion.  Sensory function intact symmetrically bilaterally to face, upper extremities and lower extremities to soft touch.  Normal finger-to-nose.  Steady gait  Negative pronator drift       Psychiatric:         Mood and Affect: Mood normal.         Behavior: Behavior normal.           Review of prior notes in Care everywhere/Epic performed by myself:  Patient had a cardiac Lexiscan August 2023 with normal stress gated SPECT myocardial perfusion scan.  Normal systolic function.      - echocardiogram May 2022 with ejection fraction of 60-65  ED Course     If labs obtained, they  are personally reviewed by myself:     Labs Reviewed   COMP METABOLIC PANEL (14) - Abnormal; Notable for the following components:       Result Value    BUN 30 (*)     Creatinine 1.98 (*)     Calculated Osmolality 298 (*)     eGFR-Cr 33 (*)     All other components within normal limits   URINALYSIS, ROUTINE - Abnormal; Notable for the following components:    Protein Urine 20 (*)     All other components within normal limits   CBC W/ DIFFERENTIAL - Abnormal; Notable for the following components:    RBC 3.20 (*)     HGB 11.1 (*)     HCT 32.9 (*)     .8 (*)     MCH 34.7 (*)     RDW-SD 50.4 (*)     .0 (*)     Lymphocyte Absolute 0.64 (*)     Monocyte Absolute 1.18 (*)     Eosinophil Absolute 0.84 (*)     All other components within normal limits   TROPONIN I HIGH SENSITIVITY - Normal   PROTHROMBIN TIME (PT) - Normal   MAGNESIUM - Normal   POCT GLUCOSE - Normal   CBC WITH DIFFERENTIAL WITH PLATELET    Narrative:     The following orders were created for panel order CBC With Differential With Platelet.                  Procedure                               Abnormality         Status                                     ---------                               -----------         ------                                     CBC W/ DIFFERENTIAL[[id number]]          Abnormal            Final result                                                 Please view results for these tests on the individual orders.       If radiologic studies ordered during today's ER visit, my independent interpretation are seen directly below.  This is awaiting the radiologist's final interpretation.  Chest X-ray, independent interpretation completed by myself and awaiting formal radiology read:  No acute cardiopulmonary process, no obvious mass       Imaging Results read by radiology in ED: No results found.        ED Medications Administered:   Medications   sodium chloride 0.9 % IV bolus 500 mL (0 mL Intravenous Stopped 8/4/24 1221)    sodium chloride 0.9 % IV bolus 500 mL (500 mL Intravenous New Bag 8/4/24 1311)           Vitals:    08/04/24 1135 08/04/24 1136 08/04/24 1230 08/04/24 1311   BP: 119/72 129/67 129/62 128/62   Pulse: 60 67 57 58   Resp:   14 16   Temp:       TempSrc:       SpO2:   100% 100%   Weight:         *I personally reviewed and interpreted all ED vitals.    Pulse Ox interpretation by myself: 100%, Room air, Normal     Monitor Interpretation by myself:   normal sinus rhythm    If Ekg obtained during today's visit, it is independently interpreted by myself directly below:  EKG    Rate, intervals and axes as noted on EKG Report.  Rate: 58  Rhythm: Sinus Rhythm  Reading: Bradycardia, no ST elevation, no ST depression, normal T waves.  First-degree AV block.             Medical Record Review: I personally reviewed available prior medical records for any recent pertinent discharge summaries, testing, and procedures and reviewed those reports.      [facility]     Medical decision making/ED Course:   84-year-old male with syncopal episode while sitting.  Neurologically and vascularly intact with dry mucous membranes.  Cardiac arrhythmia versus vasovagal episode versus less likely ACS.  Troponin negative, EKG sinus.  Patient with no chest pain, shortness of breath/palpitations.   Patient's orthostatics are positive today.  Will give a total of 1 L of IV fluids at a slow rate given patient's known history of CKD.  Creatinine slightly worse than baseline.  1.98 today  Hemoglobin appears stable upon chart review at 11.1.  He denies bleeding from any orifice.  Platelet count of 106 and appears stable.  Intracranial bleed considered and seems unlikely given normal neurological exam, stable normal thrombocytopenia, and no headache  INR normal.  Electrolytes normal.  LFTs normal.  Accu-Chek upon arrival normal at 97.  Troponin negative.  No cardiac arrhythmias noted during his ER stay.  Given syncope at rest and sitting down, patient admitted for  observation for telemetry monitoring.  Case discussed with hospitalist and cardiology who agreed with your management.  Patient agreeable with admission    ACS, dissection, PE, CVA, among other life-threatening medical conditions considered and seems unlikely given patient's history, exam, and appearance.          Differential Diagnosis:  as listed above in medical decision making.   *Please note that in the presenting to the emergency department, illness/injury that poses a threat to life or function is considered during this patient's initial evaluation.    The complexity of this visit is therefore inherently more complex given the need to consider life threatening pathology prior to any other etiology for this patient's visit.    The differential diagnosis and medical decision above exemplify this rationale.       Medical Decision Making  Problems Addressed:  Syncope and collapse: acute illness or injury    Amount and/or Complexity of Data Reviewed  Independent Historian: EMS     Details: Daughter   External Data Reviewed: labs, radiology and notes.  Labs: ordered. Decision-making details documented in ED Course.  Radiology: ordered and independent interpretation performed. Decision-making details documented in ED Course.  ECG/medicine tests: ordered and independent interpretation performed. Decision-making details documented in ED Course.  Discussion of management or test interpretation with external provider(s): Hospitalist listed admission order  Cardiology listed in cardiology order    Risk  Decision regarding hospitalization.          ED Course as of 08/04/24 1422  ------------------------------------------------------------  Time: 08/04 1115  Comment: /77- no distress        Vitals:    08/04/24 1135 08/04/24 1136 08/04/24 1230 08/04/24 1311   BP: 119/72 129/67 129/62 128/62   Pulse: 60 67 57 58   Resp:   14 16   Temp:       TempSrc:       SpO2:   100% 100%   Weight:                 Complicating  Factors: Significant medical problems that contribute to the complexity of this emergency room evaluation is listed above.    Condition upon leaving the department: Stable    Disposition and Plan     Clinical Impression:  1. Syncope and collapse        Disposition:  Admit    Medications Prescribed:  Current Discharge Medication List                Hospital Problems       Present on Admission  Date Reviewed: 7/26/2024            ICD-10-CM Noted POA    * (Principal) Syncope and collapse R55 8/4/2024 Unknown

## 2024-08-04 NOTE — PLAN OF CARE
Patient is A&Ox4. Room air. Denies pain at this time. Standby assist w/ walker. Patient uses cane at home. Bed in lowest position and locked. Call light in hand. Patient's daughter to bring in med list to verify accuracy of PTA meds. Personal belongings w/in reach.     Problem: Patient Centered Care  Goal: Patient preferences are identified and integrated in the patient's plan of care  Description: Interventions:  - What would you like us to know as we care for you? Feeling better  - Provide timely, complete, and accurate information to patient/family  - Incorporate patient and family knowledge, values, beliefs, and cultural backgrounds into the planning and delivery of care  - Encourage patient/family to participate in care and decision-making at the level they choose  - Honor patient and family perspectives and choices  Outcome: Progressing     Problem: Patient/Family Goals  Goal: Patient/Family Long Term Goal  Description: Patient's Long Term Goal: To be discharged    Interventions:  - Assist patient to all tests and procedures  - See additional Care Plan goals for specific interventions  Outcome: Progressing  Goal: Patient/Family Short Term Goal  Description: Patient's Short Term Goal: To feel better    Interventions:   - Continue medication administration as ordered  - See additional Care Plan goals for specific interventions  Outcome: Progressing

## 2024-08-04 NOTE — RESTORATIVE THERAPY
Pt was evaluated for CPAP therapy. However, per pt, although he has a history of  SHIRA he does not use cpap therapy at home. Pt was offered and educated on the possible complications of not using therapy. Pt declined cpap therapy at this time for his admission.

## 2024-08-04 NOTE — ED QUICK NOTES
Orders for admission, patient is aware of plan and ready to go upstairs. Any questions, please call ED RN Abdiel at extension [phone].     Patient Covid vaccination status: Fully vaccinated     COVID Test Ordered in ED: None    COVID Suspicion at Admission: N/A    Running Infusions:      Mental Status/LOC at time of transport: A&Ox4    Other pertinent information:   CIWA score: N/A   NIH score:  N/A

## 2024-08-05 ENCOUNTER — APPOINTMENT (OUTPATIENT)
Dept: CV DIAGNOSTICS | Facility: HOSPITAL | Age: 84
End: 2024-08-05
Attending: HOSPITALIST
Payer: MEDICARE

## 2024-08-05 VITALS
TEMPERATURE: 98 F | BODY MASS INDEX: 21.9 KG/M2 | OXYGEN SATURATION: 99 % | HEART RATE: 64 BPM | HEIGHT: 70 IN | WEIGHT: 153 LBS | RESPIRATION RATE: 16 BRPM | DIASTOLIC BLOOD PRESSURE: 81 MMHG | SYSTOLIC BLOOD PRESSURE: 158 MMHG

## 2024-08-05 LAB
ANION GAP SERPL CALC-SCNC: 8 MMOL/L (ref 0–18)
ATRIAL RATE: 58 BPM
BASOPHILS # BLD AUTO: 0.03 X10(3) UL (ref 0–0.2)
BASOPHILS NFR BLD AUTO: 0.3 %
BUN BLD-MCNC: 25 MG/DL (ref 9–23)
BUN/CREAT SERPL: 16.4 (ref 10–20)
CALCIUM BLD-MCNC: 8.9 MG/DL (ref 8.7–10.4)
CHLORIDE SERPL-SCNC: 112 MMOL/L (ref 98–112)
CO2 SERPL-SCNC: 22 MMOL/L (ref 21–32)
CREAT BLD-MCNC: 1.52 MG/DL
DEPRECATED HBV CORE AB SER IA-ACNC: 268.4 NG/ML
DEPRECATED RDW RBC AUTO: 50.7 FL (ref 35.1–46.3)
EGFRCR SERPLBLD CKD-EPI 2021: 45 ML/MIN/1.73M2 (ref 60–?)
EOSINOPHIL # BLD AUTO: 0.59 X10(3) UL (ref 0–0.7)
EOSINOPHIL NFR BLD AUTO: 6.3 %
ERYTHROCYTE [DISTWIDTH] IN BLOOD BY AUTOMATED COUNT: 13.8 % (ref 11–15)
GLUCOSE BLD-MCNC: 83 MG/DL (ref 70–99)
HCT VFR BLD AUTO: 29 %
HGB BLD-MCNC: 9.9 G/DL
IMM GRANULOCYTES # BLD AUTO: 0.05 X10(3) UL (ref 0–1)
IMM GRANULOCYTES NFR BLD: 0.5 %
IRON SATN MFR SERPL: 42 %
IRON SERPL-MCNC: 91 UG/DL
LYMPHOCYTES # BLD AUTO: 0.8 X10(3) UL (ref 1–4)
LYMPHOCYTES NFR BLD AUTO: 8.5 %
MCH RBC QN AUTO: 34.7 PG (ref 26–34)
MCHC RBC AUTO-ENTMCNC: 34.1 G/DL (ref 31–37)
MCV RBC AUTO: 101.8 FL
MONOCYTES # BLD AUTO: 0.72 X10(3) UL (ref 0.1–1)
MONOCYTES NFR BLD AUTO: 7.7 %
NEUTROPHILS # BLD AUTO: 7.17 X10 (3) UL (ref 1.5–7.7)
NEUTROPHILS # BLD AUTO: 7.17 X10(3) UL (ref 1.5–7.7)
NEUTROPHILS NFR BLD AUTO: 76.7 %
OSMOLALITY SERPL CALC.SUM OF ELEC: 298 MOSM/KG (ref 275–295)
P AXIS: 29 DEGREES
P-R INTERVAL: 282 MS
PLATELET # BLD AUTO: 100 10(3)UL (ref 150–450)
PLATELETS.RETICULATED NFR BLD AUTO: 4 % (ref 0–7)
POTASSIUM SERPL-SCNC: 4.2 MMOL/L (ref 3.5–5.1)
Q-T INTERVAL: 450 MS
QRS DURATION: 88 MS
QTC CALCULATION (BEZET): 441 MS
R AXIS: -52 DEGREES
RBC # BLD AUTO: 2.85 X10(6)UL
SODIUM SERPL-SCNC: 142 MMOL/L (ref 136–145)
T AXIS: 40 DEGREES
TIBC SERPL-MCNC: 218 UG/DL (ref 250–425)
TRANSFERRIN SERPL-MCNC: 146 MG/DL (ref 215–365)
VENTRICULAR RATE: 58 BPM
WBC # BLD AUTO: 9.4 X10(3) UL (ref 4–11)

## 2024-08-05 PROCEDURE — 93306 TTE W/DOPPLER COMPLETE: CPT | Performed by: HOSPITALIST

## 2024-08-05 PROCEDURE — 99239 HOSP IP/OBS DSCHRG MGMT >30: CPT | Performed by: HOSPITALIST

## 2024-08-05 RX ORDER — CARVEDILOL 3.12 MG/1
3.12 TABLET ORAL 2 TIMES DAILY WITH MEALS
Status: DISCONTINUED | OUTPATIENT
Start: 2024-08-05 | End: 2024-08-05

## 2024-08-05 RX ORDER — CARVEDILOL 3.12 MG/1
3.12 TABLET ORAL 2 TIMES DAILY WITH MEALS
Qty: 60 TABLET | Refills: 0 | Status: SHIPPED | OUTPATIENT
Start: 2024-08-05

## 2024-08-05 RX ORDER — AMLODIPINE BESYLATE 2.5 MG/1
2.5 TABLET ORAL DAILY
Qty: 30 TABLET | Refills: 0 | Status: SHIPPED | OUTPATIENT
Start: 2024-08-06

## 2024-08-05 NOTE — PLAN OF CARE
Pt denies any dizziness or lightheadedness. Patient medically cleared for discharge. Tele and IV removed. Discharge education complete, by HARISH Feliz. Son at bedside providing pt a ride home.   Problem: Patient Centered Care  Goal: Patient preferences are identified and integrated in the patient's plan of care  Description: Interventions:    - Provide timely, complete, and accurate information to patient/family  - Incorporate patient and family knowledge, values, beliefs, and cultural backgrounds into the planning and delivery of care  - Encourage patient/family to participate in care and decision-making at the level they choose  - Honor patient and family perspectives and choices  8/5/2024 1807 by Parisa Dalton RN  Outcome: Adequate for Discharge  8/5/2024 1705 by Parisa Datlon RN  Outcome: Progressing  8/5/2024 1703 by Parisa Dalton RN  Outcome: Progressing       Problem: NEUROLOGICAL - ADULT  Goal: Achieves stable or improved neurological status  Description: INTERVENTIONS  - Assess for and report changes in neurological status  - Initiate measures to prevent increased intracranial pressure  - Maintain blood pressure and fluid volume within ordered parameters to optimize cerebral perfusion and minimize risk of hemorrhage  - Monitor temperature, glucose, and sodium. Initiate appropriate interventions as ordered  8/5/2024 1807 by Parisa Dalton RN  Outcome: Adequate for Discharge  8/5/2024 1705 by Parisa Dalton RN  Outcome: Progressing  8/5/2024 1703 by Parisa Dalton RN  Outcome: Progressing     Problem: Impaired Functional Mobility  Goal: Achieve highest/safest level of mobility/gait  Description: Interventions:  - Assess patient's functional ability and stability  - Promote increasing activity/tolerance for mobility and gait  - Educate and engage patient/family in tolerated activity level and precautions  8/5/2024 1807 by Parisa Dalton RN  Outcome: Adequate for Discharge  8/5/2024 1705 by Sha  Parisa RN  Outcome: Progressing  8/5/2024 1703 by Parisa Dalton, RN  Outcome: Progressing

## 2024-08-05 NOTE — DISCHARGE PLANNING
Confirmed with primary RN Parisa that patient has been cleared by all physicians for discharge. Patient was provided with discharge instructions, education, and follow up information. Patient's son present for discharge instructions with patient's consent. Prescriptions were already sent electronically to patient's pharmacy. Patient verbalizes understanding of follow up information, specifically PCP. Patient has no questions after reviewing all instructions and will be going home.     Triin MOREIRA, Discharge Leader [phone]

## 2024-08-05 NOTE — H&P
[facility]    PATIENT'S NAME: KELVIN TROTTER   ATTENDING PHYSICIAN: Clay Yin MD   PATIENT ACCOUNT#:   [id number]    LOCATION:  [address] [place] [address]  MEDICAL RECORD #:   [id number]       YOB: 1940  ADMISSION DATE:       08/04/2024    HISTORY AND PHYSICAL EXAMINATION    CHIEF COMPLAINT:  Syncope.    HISTORY OF PRESENT ILLNESS:  The patient is an 84-year-old male with past medical history of multiple comorbid conditions admitted to the hospital with a syncopal episode.  History has been obtained per the patient's family members.  He was sitting at [employer] when he slumped over.  No seizure-like activity.  No symptoms prior to syncopal episode.  No history of cardiac arrhythmia.  According to family, the patient has not been eating or drinking much.  In the emergency department, the patient found to be normotensive.  Blood work appears to be within normal limits.  The patient underwent an x-ray which was negative for any acute intrathoracic processes.  The patient was seen and examined.  He currently denies any chest pain, shortness of breath, palpitations, nausea, vomiting, or diarrhea.    PAST MEDICAL HISTORY:  Positive for acute left-sided low back pain, anemia, aortic stenosis, coronary artery disease, CKD, dyslipidemia, gastritis, hemorrhoids, hypertension.    PAST SURGICAL HISTORY:  Positive for angioplasty, appendectomy, bone marrow biopsy and aspiration, CABG, history of cardiac catheterization, cholecystectomy, colonoscopy, history of kidney biopsy, hip replacement.    ALLERGIES:  Please refer to the patient's chart for a detailed review regarding the patient's allergy list, extensive allergy list.    FAMILY HISTORY:  Mother had history of Alzheimer's disease.  Father had history of Parkinson's.  Brother has history of heart surgery.    SOCIAL HISTORY:  Former smoker, quit.  Currently denies illicits or alcohol.    REVIEW OF SYSTEMS:  Otherwise, a 10-point review of systems has been  obtained and otherwise negative.      PHYSICAL EXAMINATION:    GENERAL:  Patient lying in bed, appears to be in no acute distress.  He is alert.  VITAL SIGNS:  Refer to patient's chart.  HEENT:  Extraocular movements are intact.  Pupils equal, round, and reactive to light and accommodation.  Atraumatic, normocephalic.  LUNGS:  Good air entry bilaterally.  HEART:  S1, S2 appreciated.  ABDOMEN:  Soft, nontender, nondistended.  Positive bowel sounds.  EXTREMITIES:  Peripheral pulses are positive.   NEUROLOGIC:  No focal deficits noted at this time.     LABORATORY DATA:  Glucose 193, sodium 141, potassium 4.3, chloride 110, carbon dioxide 24, BUN 30, creatinine 1.98, calcium 9.6.    X-ray imaging as above.    ASSESSMENT AND PLAN:  The patient is an 84-year-old male with past medical history of multiple comorbid conditions who had come to the hospital with a syncopal episode.    1.   Syncope.  The patient does have a history of coronary artery disease with CABG.  At this time, we will admit the patient on telemetry.  We will order an echocardiogram as well as Cardiology consult.  Will appreciate recommendations.  Continue IV hydration with 0.9 normal saline.  Will continue to monitor closely.  2.   History of hypertension.  We will continue to monitor.  Resume the patient's home regimen and monitor on telemetry and titrate medications as needed.  3.   History of hyperlipidemia.  Continue the patient's Lipitor.  4.   VTE prophylaxis will be heparin subcutaneous 5000 units b.i.d.   5.   CKD stage 3, appears to be stable at this time.  6.   Disposition:  At this time, will continue to monitor closely.  The patient is a Full Code.  Further recommendations to follow.     Greater than 75 minutes was spent with greater than 50% of the time spent face-to-face.     Dictated By Clay Yin MD  d: 08/04/2024 15:50:41  t: 08/04/2024 16:07:25  Job [id number]  [facility] [place]/

## 2024-08-05 NOTE — PLAN OF CARE
Patient alert and oriented on room air with no complaints of pain. IVF continued overnight. Call light in reach and no needs noted at this time  Problem: Patient Centered Care  Goal: Patient preferences are identified and integrated in the patient's plan of care  Description: Interventions:  - What would you like us to know as we care for you?   - Provide timely, complete, and accurate information to patient/family  - Incorporate patient and family knowledge, values, beliefs, and cultural backgrounds into the planning and delivery of care  - Encourage patient/family to participate in care and decision-making at the level they choose  - Honor patient and family perspectives and choices  Outcome: Progressing     Problem: Patient/Family Goals  Goal: Patient/Family Long Term Goal  Description: Patient's Long Term Goal:     Interventions:  - See additional Care Plan goals for specific interventions  Outcome: Progressing  Goal: Patient/Family Short Term Goal  Description: Patient's Short Term Goal:     Interventions:   - See additional Care Plan goals for specific interventions  Outcome: Progressing     Problem: NEUROLOGICAL - ADULT  Goal: Achieves stable or improved neurological status  Description: INTERVENTIONS  - Assess for and report changes in neurological status  - Initiate measures to prevent increased intracranial pressure  - Maintain blood pressure and fluid volume within ordered parameters to optimize cerebral perfusion and minimize risk of hemorrhage  - Monitor temperature, glucose, and sodium. Initiate appropriate interventions as ordered  Outcome: Progressing     Problem: Impaired Functional Mobility  Goal: Achieve highest/safest level of mobility/gait  Description: Interventions:  - Assess patient's functional ability and stability  - Promote increasing activity/tolerance for mobility and gait  - Educate and engage patient/family in tolerated activity level and precautions  Outcome: Progressing

## 2024-08-05 NOTE — PLAN OF CARE
PT denies dizziness or lightheadedness. 2D echo. Call light within reach. Safety precautions in place.  Problem: Patient Centered Care  Goal: Patient preferences are identified and integrated in the patient's plan of care  Description: Interventions:  - What would you like us to know as we care for you? Home w/ Family   - Provide timely, complete, and accurate information to patient/family  - Incorporate patient and family knowledge, values, beliefs, and cultural backgrounds into the planning and delivery of care  - Encourage patient/family to participate in care and decision-making at the level they choose  - Honor patient and family perspectives and choices  8/5/2024 1705 by Parisa Dalton, RN  Outcome: Progressing  8/5/2024 1703 by Parisa Dalton, RN  Outcome: Progressing          Problem: NEUROLOGICAL - ADULT  Goal: Achieves stable or improved neurological status  Description: INTERVENTIONS  - Assess for and report changes in neurological status  - Initiate measures to prevent increased intracranial pressure  - Maintain blood pressure and fluid volume within ordered parameters to optimize cerebral perfusion and minimize risk of hemorrhage  - Monitor temperature, glucose, and sodium. Initiate appropriate interventions as ordered

## 2024-08-05 NOTE — PROGRESS NOTES
[facility]  part of [facility]    Progress Note    Camacho Daly Patient Status:  Observation    1940 MRN [id number]   Location [facility] 3W/SW Attending Clay Yin MD   Hosp Day # 0 PCP Jeffy Bond MD     Chief Complaint:     Syncope      Subjective:   Subjective:    Patient seen and examined  No acute complaints at this time afebrile,    Objective:   Blood pressure 154/75, pulse 74, temperature 98.4 °F (36.9 °C), temperature source Oral, resp. rate 18, height 5' 10\" (1.778 m), weight 153 lb (69.4 kg), SpO2 99%.  Physical Exam    General: Patient is alert and oriented x3 does not appear to be in acute distress at this time  HEENT: EOMI PERRLA, atraumatic normocephalic  Cardiac: S1-S2 appreciated  Lungs: Good air entry bilaterally clear to auscultation  Abdomen: Soft nontender nondistended positive bowel sounds  Ext: Peripheral pulses are positive  Neuro: No focal deficits noted  Psych: Normal mood  Skin: No rashes noted  MSK: Full range of motion intact      Results:   Lab Results   Component Value Date    WBC 9.4 2024    HGB 9.9 (L) 2024    HCT 29.0 (L) 2024    .0 (L) 2024    CREATSERUM 1.52 (H) 2024    BUN 25 (H) 2024     2024    K 4.2 2024     2024    CO2 22.0 2024    GLU 83 2024    CA 8.9 2024    ALB 4.1 2024    ALKPHO 57 2024    BILT 0.8 2024    TP 6.6 2024    AST 32 2024    ALT 20 2024    PTT 30.0 2022    INR 0.99 2024    T4F 0.9 2020    TSH 2.070 2023    RELL 50 2019     2020    PSA 0.35 10/08/2012    DDIMER 3.46 (H) 2019    ESRML 9 2024    CRP <0.40 2024    MG 2.0 2024    PHOS 4.1 10/18/2023    TROP 0.130 (HH) 2020    TROPHS 27 2024    CK 53 2022    B12 774 2022       No results found.  EKG 12 Lead    Result Date: 2024  Sinus bradycardia with  1st degree A-V block Left axis deviation Minimal voltage criteria for LVH, may be normal variant ( R in aVL ) Septal infarct (cited on or before 26-JUL-2023) Inferior infarct (cited on or before 26-JUL-2023) Abnormal ECG When compared with ECG of 16-OCT-2023 18:05, No significant change was found     Assessment & Plan:     Syncope.    -The patient does have a history of coronary artery disease with CABG.  At this time, we will admit the patient on telemetry.  We will order an echocardiogram as well as Cardiology consult.  Will appreciate recommendations.   -Likely from volume depletion as patient has not been eating well  -Continue IV hydration with 0.9 normal saline.  Will continue to monitor closely.    CAD  -hx of CABG  -bio AVR   -Continue home meds     History of hypertension.  We will continue to monitor.  Resume the patient's home regimen and monitor on telemetry and titrate medications as needed.  History of hyperlipidemia.  Continue the patient's Lipitor.    VTE prophylaxis will be heparin subcutaneous 5000 uni    Global A/P  -Medications optimized per cardiology  -Reviewed previous consultant notes  -Reviewed CBC, BMP, Mag, and Phos  -Reviewed tests ordered  -Repeat labs in am  -MDM: High, severe exacerbation of chronic illness posing a threat to life. IV medications requiring close inpatient monitoring.             Clay Yin MD

## 2024-08-06 ENCOUNTER — PATIENT OUTREACH (OUTPATIENT)
Dept: CASE MANAGEMENT | Age: 84
End: 2024-08-06

## 2024-08-06 DIAGNOSIS — Z02.9 ENCOUNTERS FOR ADMINISTRATIVE PURPOSE: Primary | ICD-10-CM

## 2024-08-06 DIAGNOSIS — R55 SYNCOPE AND COLLAPSE: ICD-10-CM

## 2024-08-06 PROCEDURE — 1111F DSCHRG MED/CURRENT MED MERGE: CPT

## 2024-08-06 NOTE — DISCHARGE SUMMARY
[facility]  part of [facility]     Discharge Summary    Camacho Daly Patient Status:  Observation    1940 MRN [id number]   Location [facility] 3W/SW Attending No att. providers found   Hosp Day # 0 PCP Jeffy Bond MD     Date of Admission: 2024    Date of Discharge: 2024  6:57 PM    Admitting Diagnosis: Syncope and collapse [R55]    Discharge Diagnosis:   Patient Active Problem List   Diagnosis    HTN (hypertension)    PMR (polymyalgia rheumatica) (HCC)    CAD (coronary artery disease)    Aortic stenosis    Psoriasis    Hiatal hernia with gastroesophageal reflux    Vitamin D deficiency    Diverticulosis    Colon polyps    Dyslipidemia    Carotid artery plaque    Osteopenia    S/P AVR    S/P CABG (coronary artery bypass graft)    Proteinuria    Multiple renal cysts    Renal stone    Closed wedge compression fracture of T11 vertebra with routine healing    Compression fracture of T12 vertebra (HCC)    Anemia    Chronic fatigue    Positive TRINY (antinuclear antibody)    Weakness generalized    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)    History of community acquired pneumonia    Elevated lipoprotein A level    Elevated homocysteine    Drug-induced interstitial nephritis    Right sided sciatica    CKD (chronic kidney disease) stage 4, GFR 15-29 ml/min (HCC)    Contrast dye induced nephropathy    Renal artery stenosis (HCC)    Pittman's esophagus    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HCC)    Sjogren's syndrome with myopathy (HCC)    Clostridium difficile colitis    SHIRA (obstructive sleep apnea)    Intractable low back pain    Lumbosacral radiculopathy at L2    History of back surgery-22    Weakness of right lower extremity    Diaphragmatic hernia without obstruction or gangrene    History of falling    Long term (current) use of systemic steroids    Personal history of colonic polyps    Personal history of nicotine dependence    Presence of aortocoronary bypass graft     Presence of prosthetic heart valve    Pulmonary fibrosis (HCC)    History of Clostridioides difficile colitis    Closed displaced fracture of right femoral neck (HCC)    History of right hip replacement    Acute posthemorrhagic anemia    Aftercare following right knee joint replacement surgery    Anemia in chronic kidney disease    Athscl heart disease of native coronary artery w/o ang pctrs    Pittman esophagus    Chronic kidney disease, stage 4 (severe) ([facility])    Deficiency of other specified B group vitamins    Hyperlipidemia, unspecified    Hypertensive chronic kidney disease w stg 1-4/unsp chr kdny    Obstructive sleep apnea (adult) (pediatric)    Other specified disorders of bone density and structure, unspecified site    Other spondylosis with radiculopathy, lumbosacral region    Polymyalgia rheumatica (HCC)    Presence of coronary angioplasty implant and graft    Presence of right artificial hip joint    Sjogren syndrome with myopathy ([facility])    Unspecified hearing loss, unspecified ear    Unspecified open wound, right foot, subsequent encounter    Urgency of urination    Vitamin D deficiency, unspecified    Avascular necrosis of femoral head, 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[facility])    Cognitive dysfunction    Osteonecrosis of right hip ([facility])    Pain disorder with psychological factors    Itching    Dry skin    Mesenteric fibrosis    Iron deficiency anemia secondary to blood loss (chronic)    Syncope and collapse       Reason for Admission: Syncope    Physical Exam:     General: Patient is alert and oriented x3 does not appear to be in acute distress at this time  HEENT: EOMI PERRLA, atraumatic normocephalic  Cardiac: S1-S2 appreciated  Lungs: Good air entry bilaterally clear to auscultation  Abdomen: Soft nontender nondistended positive bowel sounds  Ext: Peripheral pulses are positive  Neuro: No focal deficits noted  Psych: Normal mood  Skin: No rashes noted  MSK: Full range of motion intact      Hospital Course:     Syncope.    -The  patient does have a history of coronary artery disease with CABG.  At this time, we will admit the patient on telemetry.  We will order an echocardiogram as well as Cardiology consult.  Will appreciate recommendations.   -Likely from volume depletion as patient has not been eating well  -Continue IV hydration with 0.9 normal saline.  Will continue to monitor closely.     CAD  -hx of CABG  -bio AVR   -Continue home meds      History of hypertension.  We will continue to monitor.  Resume the patient's home regimen and monitor on telemetry and titrate medications as needed.  History of hyperlipidemia.  Continue the patient's Lipitor.     VTE prophylaxis will be heparin subcutaneous 5000 uni     Global A/P  -Medications optimized per cardiology  -Reviewed previous consultant notes  -Reviewed CBC, BMP, Mag, and Phos  -Reviewed tests ordered    History of Present Illness:     Per admitting  The patient is an 84-year-old male with past medical history of multiple comorbid conditions admitted to the hospital with a syncopal episode. History has been obtained per the patient's family members. He was sitting at [employer] when he slumped over. No seizure-like activity. No symptoms prior to syncopal episode. No history of cardiac arrhythmia. According to family, the patient has not been eating or drinking much. In the emergency department, the patient found to be normotensive. Blood work appears to be within normal limits. The patient underwent an x-ray which was negative for any acute intrathoracic processes. The patient was seen and examined. He currently denies any chest pain, shortness of breath, palpitations, nausea, vomiting, or diarrhea.     Disposition: Home or Self Care    Discharge Condition: Stable    Discharge Medications:   Discharge Medication List as of 8/5/2024  6:09 PM        CONTINUE these medications which have CHANGED    Details   amLODIPine 2.5 MG Oral Tab Take 1 tablet (2.5 mg total) by mouth daily., Normal,  Disp-30 tablet, R-0      carvedilol 3.125 MG Oral Tab Take 1 tablet (3.125 mg total) by mouth 2 (two) times daily with meals., Normal, Disp-60 tablet, R-0           CONTINUE these medications which have NOT CHANGED    Details   levocetirizine 5 MG Oral Tab Take 1 tablet (5 mg total) by mouth every evening., Historical      diphenhydrAMINE HCl 2 % External Cream Apply tid prn, Historical      Cholecalciferol (VITAMIN D) 50 MCG (2000 UT) Oral Cap Take by mouth., Historical      ALPRAZolam (XANAX) 0.25 MG Oral Tab Take 1 tablet (0.25 mg total) by mouth 4 (four) times daily as needed for Anxiety or Sleep., Normal, Disp-30 tablet, R-0      mirtazapine (REMERON) 15 MG Oral Tab Take 1 tablet (15 mg total) by mouth nightly., Normal, Disp-30 tablet, R-3Put in pillpack      folic acid 1 MG Oral Tab Take 1 tablet (1 mg total) by mouth daily., Normal, Disp-90 tablet, R-3Put in pillpack and under my name VARUN as new attending      methotrexate 2.5 MG Oral Tab Take 2 tablets (5 mg total) by mouth once a week., Normal, Disp-24 tablet, R-3Put in pillpack and under my name VARUN as new attending      aspirin 81 MG Oral Tab EC Take 1 tablet (81 mg total) by mouth daily., Normal, Disp-90 tablet, R-3Pillpack, my name Varun      atorvastatin 20 MG Oral Tab Take 1 tablet (20 mg total) by mouth nightly., Normal, Disp-90 tablet, R-3PillpackVarun      famotidine (PEPCID) 20 MG Oral Tab Take 2 tablets (40 mg total) by mouth 2 (two) times daily., Normal, Disp-360 tablet, R-3PillpackVarun, let family know they do not make a 40mg tab      Nutritional Supplements (JUICE PLUS FIBRE OR) Take 1 capsule by mouth 2 (two) times daily., Historical      predniSONE 2.5 MG Oral Tab One Mon Wed Fri, Historical             Total dc time > 30 min    Clay Yin MD  8/6/2024  2:56 PM     Hospital Discharge Diagnoses:  Syncope and collapse    Lace+ Score: 72  59-90 High Risk  29-58 Medium Risk  0-28   Low Risk.    TCM Follow-Up Recommendation:  LACE >  58: High Risk of readmission after discharge from the hospital.

## 2024-08-07 NOTE — PROGRESS NOTES
Initial Post Discharge Follow Up   Discharge Date: 8/5/24  Contact Date: 8/6/2024    Consent Verification:  Assessment Completed With: Other: patient's daughter Jazzmine  Permission received per patient?  verbal/written  HIPAA Verified?  Yes  Patient answered the phone and requested I speak with his daughter Jazzmine for TCM call    Discharge Dx:      Syncope and collapse   General:   How have you been since your discharge from the hospital?   Jazzmine states she feels her father is tired from being home. She states she feels he is not more weak but very tired. Jazzmine denies the patient having chest pain, shortness of breath, dizziness, lightheadedness, syncope, near-syncope, altered mental status or falls since being home from the hospital. He has a cane and a walker at home which he uses. Jazzmine states he has been trying to drink more water.  Jazzmine relates a recent loss (about two weeks ago) and patient is grieving and this may be contributing to how he is feeling. [facility] listened and provided support. Patient is on Remeron. [facility] did advise Jazzmine that since he is scheduled with his PCP Dr. Bond tomorrow that this may be something to discuss further to see if there may be a change with his antidepressant or other recommendations and she verbalized agreement with this. Patient's daughter states patient has not checked his blood pressure since being home from the hospital but states she will try to.     Do you have any pain since discharge?  No    When you were leaving the hospital were your discharge instructions reviewed with you? Yes  How well were your discharge instructions explained to you?   On a scale of 1-5   1- Very Poor and 5- Very well   Very Well  Do you have any questions about your discharge instructions?  No  Before leaving the hospital was your diagnoses explained to you? Yes  Do you have any questions about your diagnoses? No  Are you able to perform normal daily activities of living as you  have prior to your hospital stay (dressing, bathing, ambulating to the bathroom, etc)? yes  (NCM) Was patient given a different diet per AVS? no      Medications:   Current Outpatient Medications   Medication Sig Dispense Refill    amLODIPine 2.5 MG Oral Tab Take 1 tablet (2.5 mg total) by mouth daily. 30 tablet 0    carvedilol 3.125 MG Oral Tab Take 1 tablet (3.125 mg total) by mouth 2 (two) times daily with meals. 60 tablet 0    levocetirizine 5 MG Oral Tab Take 1 tablet (5 mg total) by mouth every evening.      diphenhydrAMINE HCl 2 % External Cream Apply tid prn      Cholecalciferol (VITAMIN D) 50 MCG (2000 UT) Oral Cap Take by mouth.      ALPRAZolam (XANAX) 0.25 MG Oral Tab Take 1 tablet (0.25 mg total) by mouth 4 (four) times daily as needed for Anxiety or Sleep. 30 tablet 0    predniSONE 2.5 MG Oral Tab One Mon Wed Fri      mirtazapine (REMERON) 15 MG Oral Tab Take 1 tablet (15 mg total) by mouth nightly. 30 tablet 3    folic acid 1 MG Oral Tab Take 1 tablet (1 mg total) by mouth daily. 90 tablet 3    methotrexate 2.5 MG Oral Tab Take 2 tablets (5 mg total) by mouth once a week. 24 tablet 3    aspirin 81 MG Oral Tab EC Take 1 tablet (81 mg total) by mouth daily. 90 tablet 3    atorvastatin 20 MG Oral Tab Take 1 tablet (20 mg total) by mouth nightly. 90 tablet 3    famotidine (PEPCID) 20 MG Oral Tab Take 2 tablets (40 mg total) by mouth 2 (two) times daily. 360 tablet 3    Nutritional Supplements (JUICE PLUS FIBRE OR) Take 1 capsule by mouth 2 (two) times daily.       Were there any changes to your current medication(s) noted on the AVS? Yes  NCM reviewed the following changes with the patient:   CHANGE how you take:  amLODIPine (Norvasc)  carvedilol (Coreg)    If so, were these medication changes discussed with you prior to leaving the hospital? Yes  If a new medication was prescribed:    Was the new medication's purpose & side effects reviewed? Yes  Do you have any questions about your new medication?  No  Did you  your discharge medications when you left the hospital? Yes  Let's go over your medications together to make sure we are not missing anything. Medications Reviewed  Are there any reasons that keep you from taking your medication as prescribed? No      Discharge medications reviewed/discussed/and reconciled against outpatient medications with patient.  Any changes or updates to medications sent to PCP.  Patient Acknowledged     Referrals/orders at D/C:  Referrals/orders placed at D/C? no      DME ordered at D/C? No      Discharge orders, AVS reviewed and discussed with patient. Any changes or updates to orders sent to PCP.  Patient Acknowledged      SDOH:   Transportation Needs: No Transportation Needs (8/7/2024)    Transportation Needs     Lack of Transportation: No     Car Seat: Not on file       Diagnosis specifics:     Home care  Drink at least 12, 8-ounce glasses of fluid every day until you are no longer dehydrated. Fluid can include:  Water  Orange juice  Lemonade  Apple, grape, or cranberry juice  Clear fruit drinks  Electrolyte replacement and sports drinks  Tea  Decaffeinated coffee  Don't drink alcohol.  When to seek medical advice  Call your healthcare provider right away if any of these occur:  Continued vomiting  Diarrhea that happens more than 5 times a day or mucus in diarrhea  Swollen belly (abdomen) or belly pain that gets worse  Less urine than usual or extreme thirst  Fever of 100.4°F (38°C) or higher, or as directed by your healthcare provider  Call 911  Call 911 right away if you have any of the following:  Weakness, dizziness, or fainting  Unusual drowsiness or confusion  Vomit or stool is red or black        Follow up appointments:      Your appointments       Date & Time Appointment Department (Center)    Aug 08, 2024 11:00 AM CDT Exam - Established with Jeffy Bond MD [facility] ([facility])        Sep 24,  2024 1:00 PM CDT Exam - Established with Matty Jones MD [facility] ([facility])              [facility]  [address]  [facility] [id number] [phone] [address]  [facility] [id number] [phone]            TCC  Was TCC ordered: No    PCP (If no TCC appointment)  Does patient already have a PCP appointment scheduled? Yes  NCM Confirmed PCP office HFU appointment with patient   If no appointment scheduled: Explain  TE sent to PCP office to change visit type to TCM appointment     Specialist    Does the patient have any other follow up appointment(s) needing to be scheduled? No  Is there any reason as to why you cannot make your appointment(s)?  No     Needs post D/C:   Now that you are home, are there any needs or concerns you need addressed before your next visit with your PCP?  (DME, meds, questions, etc.): No    Interventions by NCM:   NCM advised to stay hydrated and change positions slowly, allowing extra time in between activities. Advised fall precautions. TCM/HFU appointment confirmed for tomorrow 08/08/2024. All d/c instructions reviewed with pt.  Reviewed when to call MD vs when to go to ER/call 911.  Educated pt on the importance of taking all meds as prescribed as well as close f/u with PCP/specialists.  Pt verbalized understanding and will contact office with any further questions or concerns.       Overall Rating:   How would you rate the care you received while in the hospital? good    CCM referral placed:    No

## 2024-08-08 ENCOUNTER — OFFICE VISIT (OUTPATIENT)
Dept: INTERNAL MEDICINE CLINIC | Facility: CLINIC | Age: 84
End: 2024-08-08
Payer: MEDICARE

## 2024-08-08 ENCOUNTER — TELEPHONE (OUTPATIENT)
Dept: INTERNAL MEDICINE CLINIC | Facility: CLINIC | Age: 84
End: 2024-08-08

## 2024-08-08 VITALS
RESPIRATION RATE: 16 BRPM | BODY MASS INDEX: 21.19 KG/M2 | OXYGEN SATURATION: 99 % | TEMPERATURE: 98 F | WEIGHT: 148 LBS | DIASTOLIC BLOOD PRESSURE: 72 MMHG | SYSTOLIC BLOOD PRESSURE: 140 MMHG | HEART RATE: 95 BPM | HEIGHT: 70 IN

## 2024-08-08 DIAGNOSIS — M35.3 POLYMYALGIA RHEUMATICA (HCC): ICD-10-CM

## 2024-08-08 DIAGNOSIS — F43.21 GRIEF: ICD-10-CM

## 2024-08-08 DIAGNOSIS — R55 SYNCOPE, UNSPECIFIED SYNCOPE TYPE: Primary | ICD-10-CM

## 2024-08-08 DIAGNOSIS — I12.9 HYPERTENSIVE CHRONIC KIDNEY DISEASE W STG 1-4/UNSP CHR KDNY: ICD-10-CM

## 2024-08-08 NOTE — PROGRESS NOTES
Subjective:   Patient ID: Camacho Daly is a 84 year old male TCM admitted to [facility] for syncope    Admitted 8/4-5 for syncope complex hx Cardiac disease stressed by recent death of wife tyree from Brain tumor about 2 weeks ago. Neg workup.         History/Other:   Review of Systems   Constitutional:  Positive for fatigue (severe) and unexpected weight change (wt loss).   HENT:  Positive for hearing loss.    Respiratory: Negative.     Cardiovascular:  Negative for chest pain and leg swelling.        CAD, CABG, stents, AVR, Lipids, HTN    Gastrointestinal:  Negative for constipation (has laxatives).        Reflux, poor appetite, hx mesenteric adenitis   Endocrine: Negative.    Musculoskeletal:  Positive for arthralgias, back pain and gait problem (walker).        RTHR 10/2023, PMR on lodose Prednisone   Skin:         Severe and generalized ousrscdf-zdssatadjae-aj inc Claitin and Prednisone-better  Dry skin-on various topicals   Neurological:  Positive for weakness (generalized) and light-headedness.        Imbalance-high risk falls   Hematological:  Bruises/bleeds easily (ASA and Prednisone, low plats).        Chronic anemia   Psychiatric/Behavioral:  Positive for decreased concentration and dysphoric mood. Sleep disturbance: has Rx.     Current Outpatient Medications   Medication Sig Dispense Refill    amLODIPine 2.5 MG Oral Tab Take 1 tablet (2.5 mg total) by mouth daily. 30 tablet 0    carvedilol 3.125 MG Oral Tab Take 1 tablet (3.125 mg total) by mouth 2 (two) times daily with meals. 60 tablet 0    levocetirizine 5 MG Oral Tab Take 1 tablet (5 mg total) by mouth every evening.      diphenhydrAMINE HCl 2 % External Cream Apply tid prn      Cholecalciferol (VITAMIN D) 50 MCG (2000 UT) Oral Cap Take by mouth.      ALPRAZolam (XANAX) 0.25 MG Oral Tab Take 1 tablet (0.25 mg total) by mouth 4 (four) times daily as needed for Anxiety or Sleep. 30 tablet 0    predniSONE 2.5 MG Oral Tab One Mon Wed Fri      mirtazapine  (REMERON) 15 MG Oral Tab Take 1 tablet (15 mg total) by mouth nightly. 30 tablet 3    folic acid 1 MG Oral Tab Take 1 tablet (1 mg total) by mouth daily. 90 tablet 3    methotrexate 2.5 MG Oral Tab Take 2 tablets (5 mg total) by mouth once a week. 24 tablet 3    aspirin 81 MG Oral Tab EC Take 1 tablet (81 mg total) by mouth daily. 90 tablet 3    atorvastatin 20 MG Oral Tab Take 1 tablet (20 mg total) by mouth nightly. 90 tablet 3    famotidine (PEPCID) 20 MG Oral Tab Take 2 tablets (40 mg total) by mouth 2 (two) times daily. 360 tablet 3    Nutritional Supplements (JUICE PLUS FIBRE OR) Take 1 capsule by mouth 2 (two) times daily.       Allergies:  Allergies   Allergen Reactions    Codeine PAIN     abdominal    Lisinopril HIVES    Nsaids OTHER (SEE COMMENTS)     CKD    Omeprazole OTHER (SEE COMMENTS)     Kidney injury    Pcn [Bicillin C-R,] HIVES    Penicillins UNKNOWN    Niacin RASH and OTHER (SEE COMMENTS)     flushing    Ultram [Tramadol] NAUSEA ONLY     And constipation    Zinc Acetate NAUSEA ONLY       Objective:   Physical Exam  Constitutional:       Appearance: He is ill-appearing.   HENT:      Ears:      Comments: Hearing loss  Cardiovascular:      Rate and Rhythm: Normal rate and regular rhythm.      Heart sounds: Murmur (2/6 sys) heard.      Comments: Sternal scar  Pulmonary:      Effort: No respiratory distress.      Breath sounds: Normal breath sounds.   Abdominal:      Tenderness: There is no abdominal tenderness.   Skin:     Findings: Bruising (severe) present. No rash (just pruritis).   Neurological:      Mental Status: He is alert.      Motor: Weakness present.      Gait: Gait abnormal (walker).   Psychiatric:      Comments: Depressed mood, grief       Active Problems:  Patient Active Problem List   Diagnosis    HTN (hypertension)    PMR (polymyalgia rheumatica) ([facility])    CAD (coronary artery disease)    Aortic stenosis    Psoriasis    Hiatal hernia with gastroesophageal reflux    Vitamin D deficiency     Diverticulosis    Colon polyps    Dyslipidemia    Carotid artery plaque    Osteopenia    S/P AVR    S/P CABG (coronary artery bypass graft)    Proteinuria    Multiple renal cysts    Renal stone    Closed wedge compression fracture of T11 vertebra with routine healing    Compression fracture of T12 vertebra ([facility])    Anemia    Chronic fatigue    Positive TRINY (antinuclear antibody)    Weakness generalized    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[facility])    History of community acquired pneumonia    Elevated lipoprotein A level    Elevated homocysteine    Drug-induced interstitial nephritis    Right sided sciatica    CKD (chronic kidney disease) stage 4, GFR 15-29 ml/min ([facility])    Contrast dye induced nephropathy    Renal artery stenosis ([facility])    Pittman's esophagus    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[facility])    Sjogren's syndrome with myopathy ([facility])    Clostridium difficile colitis    SHIRA (obstructive sleep apnea)    Intractable low back pain    Lumbosacral radiculopathy at L2    History of back surgery-12/21/22    Weakness of right lower extremity    Diaphragmatic hernia without obstruction or gangrene    History of falling    Long term (current) use of systemic steroids    Personal history of colonic polyps    Personal history of nicotine dependence    Presence of aortocoronary bypass graft    Presence of prosthetic heart valve    Pulmonary fibrosis ([facility])    History of Clostridioides difficile colitis    Closed displaced fracture of right femoral neck ([facility])    History of right hip replacement    Acute posthemorrhagic anemia    Aftercare following right knee joint replacement surgery    Anemia in chronic kidney disease    Athscl heart disease of native coronary artery w/o ang pctrs    Pittman esophagus    Chronic kidney disease, stage 4 (severe) ([facility])    Deficiency of other specified B group vitamins    Hyperlipidemia, unspecified    Hypertensive chronic kidney disease w stg 1-4/unsp chr kdny    Obstructive sleep apnea  (adult) (pediatric)    Other specified disorders of bone density and structure, unspecified site    Other spondylosis with radiculopathy, lumbosacral region    Polymyalgia rheumatica (HCC)    Presence of coronary angioplasty implant and graft    Presence of right artificial hip joint    Sjogren syndrome with myopathy (HCC)    Unspecified hearing loss, unspecified ear    Unspecified open wound, right foot, subsequent encounter    Urgency of urination    Vitamin D deficiency, unspecified    Avascular necrosis of femoral head, right (HCC)    Cognitive dysfunction    Osteonecrosis of right hip ([facility])    Pain disorder with psychological factors    Itching    Dry skin    Mesenteric fibrosis    Iron deficiency anemia secondary to blood loss (chronic)    Syncope and collapse       Past Medical History:  Past Medical History:    Acute left-sided low back pain without sciatica    JOSE LUIS (acute kidney injury) ([facility])    from omeprazole-resolved    Anemia    Aortic stenosis    AVR-bio    Pittman esophagus    CAD (coronary artery disease)    stents, bypasses    Calculus of kidney    Carotid artery plaque    ultrasound    Chronic anemia    bone marrow neg 12/2012    Chronic fatigue    CKD (chronic kidney disease) stage 4, GFR 15-29 ml/min ([facility])    Closed wedge compression fracture of T11 vertebra with routine healing    Clostridium difficile colitis    Colon polyps    colonoscopy    Compression fracture of T12 vertebra ([facility])    Contrast dye induced nephropathy    Dehydration    Diverticulosis    Drug-induced interstitial nephritis    Omeprazole    Dyslipidemia    Elevated homocysteine    Elevated lipoprotein A level    Elevated troponin    FUO (fever of unknown origin)    PMR or testicular/prostate infection    Gastritis    Hearing impairment    [place]    Hemorrhoids    Hiatal hernia with gastroesophageal reflux    History of Clostridioides difficile colitis    History of community acquired pneumonia    HTN (hypertension)     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)    Inguinal lymphadenopathy    right-bx    Ischemic colitis (HCC)    resolved    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HCC)    Mesenteric adenitis    bx    Multiple renal cysts    both-not reported on recent CTs    SHIRA (obstructive sleep apnea)    Osteopenia    steroids    PMR (polymyalgia rheumatica) (HCC)    Pneumonia    Positive TRINY (antinuclear antibody)    Precancerous lesion    skin    Proteinuria    mild    Psoriasis    Renal artery stenosis (HCC)    both    Renal stone    right    Right shoulder pain    injected-Liane    Sjogren's syndrome with myopathy (HCC)    Sleep apnea    untreated    Thrombocytopenia (HCC)    resolved    Vitamin B12 nutritional deficiency    Vitamin D deficiency    Weight loss       Past Surgical History:  Past Surgical History:   Procedure Laterality Date    Angioplasty (coronary)  1999    Dr. Maguire    Appendectomy      teenager    Biopsy  02/08/2013    Laparoscopic excision of lymph nodes, biopsies of the colonic and small bowel mesentery.    Bone marrow biopsy and aspiration  01/2021    Hantel-neg    Cabg  10/25/2013    AORTIC VALVE REPLACEMENT; bypass x 2-3    Cardiac catheterization  2008/2013    Cholecystectomy  1984    Colonoscopy  2005    Eliu    Colonoscopy  11/07/2012        Colonoscopy N/A 04/04/2018    Procedure: COLONOSCOPY;  Surgeon: Camacho Mckee MD;  Location:  ENDOSCOPY    Colonoscopy N/A 10/11/2021    1.Esophagitis 2.Colon polyps  3. Diverticulosis 4.Colitis    Cortisone injection  07/06/2015    rt shoulder     Cytology lymph node fna - jar(s): 1  12/07/2012    excisional biopsy rt inguinal lymph node     Egd  10/22/2021    Rafi    Egd  08/10/2019    Hiatal hernia    Hip replacement surgery      Ir kidney biopsy (renal)random  08/11/2020    for drug induced nephritis    Laminotomy, addl lumbar  12/21/2022    Minimally invasive right L1-2 laminotomy, partial medial facetectomy, resection of  calcified disc/endplate complex.    Laparoscopic lymph node biop  2013    exploratory abd lymphadenopathy    Replace aortic valve open  10/25/2013    bio    Sigmoidoscopy,diagnostic      Skin tags  10/1714    plus histofreeze    Total hip arthroplasty Right 10/18/2023    Right total hip arthroplasty    Upper gi endoscopy performed      Eliu/Rafi       Family History:  Family History   Problem Relation Age of Onset    Neurological Disorder Father         parkinsons    Cancer Father         lung    Other (Other) Father     Mental Disorder Mother         alzheimers    Other (Other) Mother     Cancer Daughter         hodgkins at age 18    Heart Surgery Brother     Heart Disorder Brother     Other (Other) Brother     Other (Other) Brother     Other (Other) Other         seizures from neurofibromatosis       Social History:  Social History     Socioeconomic History    Marital status:      Spouse name: Anjelica    Number of children: 3    Years of education: Not on file    Highest education level: Not on file   Occupational History    Occupation:      Employer: [employer]   Tobacco Use    Smoking status: Former     Current packs/day: 0.00     Types: Cigarettes     Quit date: 1971     Years since quittin.0    Smokeless tobacco: Never   Vaping Use    Vaping status: Never Used   Substance and Sexual Activity    Alcohol use: Yes     Alcohol/week: 2.0 standard drinks of alcohol     Types: 2 Standard drinks or equivalent per week     Comment: 1 drink at night    Drug use: No    Sexual activity: Not on file   Other Topics Concern     Service Not Asked    Blood Transfusions Not Asked    Caffeine Concern Yes     Comment: 2 cups daily    Occupational Exposure Not Asked    Hobby Hazards Not Asked    Sleep Concern Not Asked    Stress Concern No    Weight Concern No    Special Diet No    Back Care Not Asked    Exercise Yes     Comment: walking daily     Bike  Helmet Not Asked    Seat Belt Yes    Self-Exams Not Asked   Social History Narrative    Not on file     Social Determinants of Health     Financial Resource Strain: Not on file   Food Insecurity: Low Risk  (10/21/2023)    [facility]    Food Insecurity     Have there been times that your food ran out, and you didn't have money to get more?: No     Are there times that you worry that this might happen?: No   Transportation Needs: No Transportation Needs (8/7/2024)    Transportation Needs     Lack of Transportation: No     Car Seat: Not on file   Physical Activity: Not on file   Stress: Not on file   Social Connections: Not on file   Housing Stability: Not on file (10/21/2023)       Health Maintenance:    Immunization History   Administered Date(s) Administered    Covid-19 Vaccine Moderna 100 mcg/0.5 ml 02/05/2021, 03/05/2021    Covid-19 Vaccine Moderna 50 Mcg/0.25 Ml 12/13/2021    Covid-19 Vaccine Moderna Bivalent 50mcg/0.5mL 12+ years 12/26/2022    FLU VAC High Dose 65 YRS & Older PRSV Free (18749) 10/20/2014, 11/30/2015, 11/01/2016, 10/19/2020, 10/06/2021, 11/01/2022, 09/29/2023    FLUAD High Dose 65 yr and older (35618) 10/05/2017    Fluzone Vaccine Medicare () 10/20/2014, 11/30/2015, 11/01/2016, 11/27/2016, 11/20/2018    HIGH DOSE FLU 65 YRS AND OLDER PRSV FREE SINGLE D (94665) FLU CLINIC 10/19/2020, 10/06/2021    Influenza 11/06/2013, 10/19/2019    Influenza Virus Vaccine, Split 09/13/2012    Pneumococcal (Prevnar 13) 08/27/2015    Pneumovax 23 12/01/2009    RSV, bivalent, diluent reconstituted PF (Abrysvo) 11/29/2023    TD 10/01/2006    TDAP 01/13/2014    Zoster Vaccine Live (Zostavax) 07/01/2012    Zoster Vaccine Recombinant Adjuvanted (Shingrix) 05/19/2023       Labs and Tests:  Patient Active Problem List   Component Date Value Ref Range Status    Glucose 08/04/2024 93  70 - 99 mg/dL Final    Sodium 08/04/2024 141  136 - 145 mmol/L  Final    Potassium 08/04/2024 4.3  3.5 - 5.1 mmol/L Final    Chloride 08/04/2024 110  98 - 112 mmol/L Final    CO2 08/04/2024 24.0  21.0 - 32.0 mmol/L Final    Anion Gap 08/04/2024 7  0 - 18 mmol/L Final    BUN 08/04/2024 30 (H)  9 - 23 mg/dL Final    Creatinine 08/04/2024 1.98 (H)  0.70 - 1.30 mg/dL Final    BUN/CREA Ratio 08/04/2024 15.2  10.0 - 20.0 Final    Calcium, Total 08/04/2024 9.6  8.7 - 10.4 mg/dL Final    Calculated Osmolality 08/04/2024 298 (H)  275 - 295 mOsm/kg Final    eGFR-Cr 08/04/2024 33 (L)  >=60 mL/min/1.73m2 Final    ALT 08/04/2024 20  10 - 49 U/L Final    AST 08/04/2024 32  <34 U/L Final    Alkaline Phosphatase 08/04/2024 57  45 - 117 U/L Final    Bilirubin, Total 08/04/2024 0.8  0.2 - 1.1 mg/dL Final    Total Protein 08/04/2024 6.6  5.7 - 8.2 g/dL Final    Albumin 08/04/2024 4.1  3.2 - 4.8 g/dL Final    Globulin  08/04/2024 2.5  2.0 - 3.5 g/dL Final    A/G Ratio 08/04/2024 1.6  1.0 - 2.0 Final    Troponin I (High Sensitivity) 08/04/2024 27  <=53 ng/L Final    Ventricular rate 08/04/2024 58  BPM Final    Atrial rate 08/04/2024 58  BPM Final    P-R Interval 08/04/2024 282  ms Final    QRS Duration 08/04/2024 88  ms Final    Q-T Interval 08/04/2024 450  ms Final    QTC Calculation (Bezet) 08/04/2024 441  ms Final    P Axis 08/04/2024 29  degrees Final    R Axis 08/04/2024 -52  degrees Final    T Axis 08/04/2024 40  degrees Final    WBC 08/04/2024 9.9  4.0 - 11.0 x10(3) uL Final    RBC 08/04/2024 3.20 (L)  3.80 - 5.80 x10(6)uL Final    HGB 08/04/2024 11.1 (L)  13.0 - 17.5 g/dL Final    HCT 08/04/2024 32.9 (L)  39.0 - 53.0 % Final    MCV 08/04/2024 102.8 (H)  80.0 - 100.0 fL Final    MCH 08/04/2024 34.7 (H)  26.0 - 34.0 pg Final    MCHC 08/04/2024 33.7  31.0 - 37.0 g/dL Final    RDW-SD 08/04/2024 50.4 (H)  35.1 - 46.3 fL Final    RDW 08/04/2024 13.7  11.0 - 15.0 % Final    PLT 08/04/2024 106.0 (L)  150.0 - 450.0 10(3)uL Final    Neutrophil Absolute Prelim 08/04/2024 7.15  1.50 - 7.70 x10 (3) uL  Final    Neutrophil Absolute 08/04/2024 7.15  1.50 - 7.70 x10(3) uL Final    Lymphocyte Absolute 08/04/2024 0.64 (L)  1.00 - 4.00 x10(3) uL Final    Monocyte Absolute 08/04/2024 1.18 (H)  0.10 - 1.00 x10(3) uL Final    Eosinophil Absolute 08/04/2024 0.84 (H)  0.00 - 0.70 x10(3) uL Final    Basophil Absolute 08/04/2024 0.03  0.00 - 0.20 x10(3) uL Final    Immature Granulocyte Absolute 08/04/2024 0.07  0.00 - 1.00 x10(3) uL Final    Neutrophil % 08/04/2024 72.1  % Final    Lymphocyte % 08/04/2024 6.5  % Final    Monocyte % 08/04/2024 11.9  % Final    Eosinophil % 08/04/2024 8.5  % Final    Basophil % 08/04/2024 0.3  % Final    Immature Granulocyte % 08/04/2024 0.7  % Final    PT 08/04/2024 13.7  11.6 - 14.8 seconds Final    INR 08/04/2024 0.99  0.80 - 1.20 Final    Therapeutic INR range for patients on warfarin:  2.0-3.0 for most medical conditions and surgical prophylaxis   2.5-3.5 for mechanical heart valves and recurrent thromboembolism    Direct thrombin inhibitors (e.g. argatroban, bivalirudin), factor Xa inhibitors (e.g. apixaban, rivaroxaban), and conditions such as coagulation factor deficiency, vitamin K deficiency, and liver disease will prolong the prothrombin time/INR.    Unfractionated heparin and low molecular weight heparin do not affect the prothrombin time/INR up to a concentration of 1 IU/mL and 1.5 IU/mL, respectively.        Urine Color 08/04/2024 Yellow  Yellow Final    Clarity Urine 08/04/2024 Clear  Clear Final    Spec Gravity 08/04/2024 1.020  1.005 - 1.030 Final    Glucose Urine 08/04/2024 Normal  Normal mg/dL Final    Bilirubin Urine 08/04/2024 Negative  Negative Final    Ketones Urine 08/04/2024 Negative  Negative mg/dL Final    Blood Urine 08/04/2024 Negative  Negative Final    pH Urine 08/04/2024 6.0  5.0 - 8.0 Final    Protein Urine 08/04/2024 20 (A)  Negative mg/dL Final    Urobilinogen Urine 08/04/2024 Normal  Normal Final    Nitrite Urine 08/04/2024 Negative  Negative Final     Leukocyte Esterase Urine 08/04/2024 Negative  Negative Final    Magnesium 08/04/2024 2.0  1.6 - 2.6 mg/dL Final    POC Glucose  08/04/2024 97  70 - 99 mg/dL Final    Glucose 08/05/2024 83  70 - 99 mg/dL Final    Sodium 08/05/2024 142  136 - 145 mmol/L Final    Potassium 08/05/2024 4.2  3.5 - 5.1 mmol/L Final    Chloride 08/05/2024 112  98 - 112 mmol/L Final    CO2 08/05/2024 22.0  21.0 - 32.0 mmol/L Final    Anion Gap 08/05/2024 8  0 - 18 mmol/L Final    BUN 08/05/2024 25 (H)  9 - 23 mg/dL Final    Creatinine 08/05/2024 1.52 (H)  0.70 - 1.30 mg/dL Final    BUN/CREA Ratio 08/05/2024 16.4  10.0 - 20.0 Final    Calcium, Total 08/05/2024 8.9  8.7 - 10.4 mg/dL Final    Calculated Osmolality 08/05/2024 298 (H)  275 - 295 mOsm/kg Final    eGFR-Cr 08/05/2024 45 (L)  >=60 mL/min/1.73m2 Final    WBC 08/05/2024 9.4  4.0 - 11.0 x10(3) uL Final    RBC 08/05/2024 2.85 (L)  3.80 - 5.80 x10(6)uL Final    HGB 08/05/2024 9.9 (L)  13.0 - 17.5 g/dL Final    HCT 08/05/2024 29.0 (L)  39.0 - 53.0 % Final    MCV 08/05/2024 101.8 (H)  80.0 - 100.0 fL Final    MCH 08/05/2024 34.7 (H)  26.0 - 34.0 pg Final    MCHC 08/05/2024 34.1  31.0 - 37.0 g/dL Final    RDW-SD 08/05/2024 50.7 (H)  35.1 - 46.3 fL Final    RDW 08/05/2024 13.8  11.0 - 15.0 % Final    PLT 08/05/2024 100.0 (L)  150.0 - 450.0 10(3)uL Final    Immature Platelet Fraction 08/05/2024 4.0  0.0 - 7.0 % Final    Neutrophil Absolute Prelim 08/05/2024 7.17  1.50 - 7.70 x10 (3) uL Final    Neutrophil Absolute 08/05/2024 7.17  1.50 - 7.70 x10(3) uL Final    Lymphocyte Absolute 08/05/2024 0.80 (L)  1.00 - 4.00 x10(3) uL Final    Monocyte Absolute 08/05/2024 0.72  0.10 - 1.00 x10(3) uL Final    Eosinophil Absolute 08/05/2024 0.59  0.00 - 0.70 x10(3) uL Final    Basophil Absolute 08/05/2024 0.03  0.00 - 0.20 x10(3) uL Final    Immature Granulocyte Absolute 08/05/2024 0.05  0.00 - 1.00 x10(3) uL Final    Neutrophil % 08/05/2024 76.7  % Final    Lymphocyte % 08/05/2024 8.5  % Final    Monocyte  % 08/05/2024 7.7  % Final    Eosinophil % 08/05/2024 6.3  % Final    Basophil % 08/05/2024 0.3  % Final    Immature Granulocyte % 08/05/2024 0.5  % Final    Ferritin 08/05/2024 268.4  50.0 - 336.0 ng/mL Final    Iron 08/05/2024 91  65 - 175 ug/dL Final    Transferrin 08/05/2024 146 (L)  215 - 365 mg/dL Final    Total Iron Binding Capacity 08/05/2024 218 (L)  250 - 425 ug/dL Final    % Saturation 08/05/2024 42  20 - 50 % Final       Vitals:  Vitals:    08/08/24 1108   BP: 140/72   BP Location: Left arm   Patient Position: Sitting   Cuff Size: adult   Pulse: 95   Resp: 16   Temp: 98.1 °F (36.7 °C)   TempSrc: Temporal   SpO2: 99%   Weight: 148 lb (67.1 kg)   Height: 5' 10\" (1.778 m)     Body mass index is 21.24 kg/m².      Assessment & Plan:   1. Syncope, unspecified syncope type    2. Grief    3. Polymyalgia rheumatica (HCC)    4. Hypertensive chronic kidney disease w stg 1-4/unsp chr kdny      Meds reconciled and spoke to daughter present  Reviewed Vendor info-labs, EKG, Heart echo, no CT head  Add CT head aisha with wife dying of brain tumor 2 weeks ago  Offered but declines home services-has 3 daughters to help out  RTC 6 weeks same day as renal OV Fang  Encouraged to take a protein shake once daily, try Luminate for mental and physical energy  Continue Remeron hs for sleep, depression, appetite stimulation        Meds This Visit:  Requested Prescriptions      No prescriptions requested or ordered in this encounter       Imaging & Referrals:  CT BRAIN OR HEAD (CPT=70450)

## 2024-08-09 RX ORDER — CARVEDILOL 12.5 MG/1
TABLET ORAL
Qty: 180 TABLET | Refills: 0 | OUTPATIENT
Start: 2024-08-09

## 2024-08-13 RX ORDER — CARVEDILOL 3.12 MG/1
3.12 TABLET ORAL 2 TIMES DAILY WITH MEALS
Qty: 60 TABLET | Refills: 0 | Status: SHIPPED | OUTPATIENT
Start: 2024-08-13

## 2024-08-13 RX ORDER — CARVEDILOL 3.12 MG/1
3.12 TABLET ORAL 2 TIMES DAILY WITH MEALS
Qty: 180 TABLET | Refills: 0 | OUTPATIENT
Start: 2024-08-13

## 2024-08-13 RX ORDER — PREDNISONE 2.5 MG/1
2.5 TABLET ORAL DAILY
Qty: 60 TABLET | Refills: 1 | OUTPATIENT
Start: 2024-08-13

## 2024-08-22 ENCOUNTER — HOSPITAL ENCOUNTER (OUTPATIENT)
Dept: CT IMAGING | Facility: HOSPITAL | Age: 84
Discharge: HOME | End: 2024-08-22
Attending: INTERNAL MEDICINE
Payer: MEDICARE

## 2024-08-22 DIAGNOSIS — R55 SYNCOPE, UNSPECIFIED SYNCOPE TYPE: ICD-10-CM

## 2024-08-22 PROCEDURE — 70450 CT HEAD/BRAIN W/O DYE: CPT | Performed by: INTERNAL MEDICINE

## 2024-09-06 ENCOUNTER — TELEPHONE (OUTPATIENT)
Dept: INTERNAL MEDICINE CLINIC | Facility: CLINIC | Age: 84
End: 2024-09-06

## 2024-09-06 DIAGNOSIS — N18.4 CKD (CHRONIC KIDNEY DISEASE) STAGE 4, GFR 15-29 ML/MIN (HCC): ICD-10-CM

## 2024-09-06 DIAGNOSIS — M35.3 PMR (POLYMYALGIA RHEUMATICA) (HCC): Primary | ICD-10-CM

## 2024-09-06 DIAGNOSIS — D50.8 OTHER IRON DEFICIENCY ANEMIA: ICD-10-CM

## 2024-09-06 NOTE — TELEPHONE ENCOUNTER
Patient has an OV with Dr. Bond on 9/24/24.  Does Dr. Bond want the patient to have any labs done before the appointment?  If yes, the patient would like to have the labs completed at [facility] which is closer to his house.  Please provide the patient's daughter with a phone number so she can schedule this lab draw at [email or web address].

## 2024-09-06 NOTE — TELEPHONE ENCOUNTER
Spoke with daughter Patricia.     Blood work orders place and reviewed   Daughter agreeable     Wanted Dr Bond to know that patient's ESR in July was 9  The lowest it has ever been.   Patient is not having any pain.       To Dr Varun Carbone RN

## 2024-09-17 DIAGNOSIS — I10 PRIMARY HYPERTENSION: ICD-10-CM

## 2024-09-17 DIAGNOSIS — N18.4 CKD (CHRONIC KIDNEY DISEASE) STAGE 4, GFR 15-29 ML/MIN (HCC): Primary | ICD-10-CM

## 2024-09-18 ENCOUNTER — LAB ENCOUNTER (OUTPATIENT)
Dept: LAB | Facility: HOSPITAL | Age: 84
End: 2024-09-18
Attending: INTERNAL MEDICINE
Payer: MEDICARE

## 2024-09-18 DIAGNOSIS — M35.3 PMR (POLYMYALGIA RHEUMATICA) (HCC): ICD-10-CM

## 2024-09-18 DIAGNOSIS — D50.8 OTHER IRON DEFICIENCY ANEMIA: ICD-10-CM

## 2024-09-18 DIAGNOSIS — N18.4 CKD (CHRONIC KIDNEY DISEASE) STAGE 4, GFR 15-29 ML/MIN (HCC): ICD-10-CM

## 2024-09-18 DIAGNOSIS — I10 PRIMARY HYPERTENSION: ICD-10-CM

## 2024-09-18 LAB
ALBUMIN SERPL-MCNC: 4.2 G/DL (ref 3.2–4.8)
ALBUMIN/GLOB SERPL: 1.6 {RATIO} (ref 1–2)
ALP LIVER SERPL-CCNC: 59 U/L
ALT SERPL-CCNC: 17 U/L
ANION GAP SERPL CALC-SCNC: 6 MMOL/L (ref 0–18)
AST SERPL-CCNC: 33 U/L (ref ?–34)
BASOPHILS # BLD AUTO: 0.03 X10(3) UL (ref 0–0.2)
BASOPHILS NFR BLD AUTO: 0.4 %
BILIRUB SERPL-MCNC: 1.1 MG/DL (ref 0.2–1.1)
BUN BLD-MCNC: 29 MG/DL (ref 9–23)
BUN/CREAT SERPL: 15.5 (ref 10–20)
CALCIUM BLD-MCNC: 9.6 MG/DL (ref 8.7–10.4)
CHLORIDE SERPL-SCNC: 108 MMOL/L (ref 98–112)
CO2 SERPL-SCNC: 27 MMOL/L (ref 21–32)
CREAT BLD-MCNC: 1.87 MG/DL
DEPRECATED RDW RBC AUTO: 51.2 FL (ref 35.1–46.3)
EGFRCR SERPLBLD CKD-EPI 2021: 35 ML/MIN/1.73M2 (ref 60–?)
EOSINOPHIL # BLD AUTO: 0.56 X10(3) UL (ref 0–0.7)
EOSINOPHIL NFR BLD AUTO: 7.1 %
ERYTHROCYTE [DISTWIDTH] IN BLOOD BY AUTOMATED COUNT: 13.4 % (ref 11–15)
ERYTHROCYTE [SEDIMENTATION RATE] IN BLOOD: 7 MM/HR
FASTING STATUS PATIENT QL REPORTED: YES
GLOBULIN PLAS-MCNC: 2.6 G/DL (ref 2–3.5)
GLUCOSE BLD-MCNC: 88 MG/DL (ref 70–99)
HCT VFR BLD AUTO: 34.8 %
HGB BLD-MCNC: 11.5 G/DL
IMM GRANULOCYTES # BLD AUTO: 0.02 X10(3) UL (ref 0–1)
IMM GRANULOCYTES NFR BLD: 0.3 %
LYMPHOCYTES # BLD AUTO: 0.88 X10(3) UL (ref 1–4)
LYMPHOCYTES NFR BLD AUTO: 11.1 %
MCH RBC QN AUTO: 34.3 PG (ref 26–34)
MCHC RBC AUTO-ENTMCNC: 33 G/DL (ref 31–37)
MCV RBC AUTO: 103.9 FL
MONOCYTES # BLD AUTO: 0.47 X10(3) UL (ref 0.1–1)
MONOCYTES NFR BLD AUTO: 5.9 %
NEUTROPHILS # BLD AUTO: 5.96 X10 (3) UL (ref 1.5–7.7)
NEUTROPHILS # BLD AUTO: 5.96 X10(3) UL (ref 1.5–7.7)
NEUTROPHILS NFR BLD AUTO: 75.2 %
OSMOLALITY SERPL CALC.SUM OF ELEC: 297 MOSM/KG (ref 275–295)
PLATELET # BLD AUTO: 146 10(3)UL (ref 150–450)
PLATELETS.RETICULATED NFR BLD AUTO: 6.7 % (ref 0–7)
POTASSIUM SERPL-SCNC: 4.6 MMOL/L (ref 3.5–5.1)
PROT SERPL-MCNC: 6.8 G/DL (ref 5.7–8.2)
RBC # BLD AUTO: 3.35 X10(6)UL
SODIUM SERPL-SCNC: 141 MMOL/L (ref 136–145)
WBC # BLD AUTO: 7.9 X10(3) UL (ref 4–11)

## 2024-09-18 PROCEDURE — 80053 COMPREHEN METABOLIC PANEL: CPT

## 2024-09-18 PROCEDURE — 36415 COLL VENOUS BLD VENIPUNCTURE: CPT

## 2024-09-18 PROCEDURE — 85652 RBC SED RATE AUTOMATED: CPT

## 2024-09-18 PROCEDURE — 85025 COMPLETE CBC W/AUTO DIFF WBC: CPT

## 2024-09-19 RX ORDER — PREDNISONE 2.5 MG/1
TABLET ORAL
COMMUNITY
Start: 2024-09-19

## 2024-09-24 ENCOUNTER — OFFICE VISIT (OUTPATIENT)
Dept: INTERNAL MEDICINE CLINIC | Facility: CLINIC | Age: 84
End: 2024-09-24
Payer: MEDICARE

## 2024-09-24 ENCOUNTER — OFFICE VISIT (OUTPATIENT)
Dept: NEPHROLOGY | Facility: CLINIC | Age: 84
End: 2024-09-24
Payer: MEDICARE

## 2024-09-24 VITALS
OXYGEN SATURATION: 90 % | DIASTOLIC BLOOD PRESSURE: 88 MMHG | SYSTOLIC BLOOD PRESSURE: 160 MMHG | TEMPERATURE: 98 F | HEART RATE: 73 BPM | HEIGHT: 70 IN | BODY MASS INDEX: 20.76 KG/M2 | WEIGHT: 145 LBS

## 2024-09-24 VITALS — BODY MASS INDEX: 21 KG/M2 | WEIGHT: 145.13 LBS | SYSTOLIC BLOOD PRESSURE: 148 MMHG | DIASTOLIC BLOOD PRESSURE: 80 MMHG

## 2024-09-24 DIAGNOSIS — I10 PRIMARY HYPERTENSION: ICD-10-CM

## 2024-09-24 DIAGNOSIS — R53.82 CHRONIC FATIGUE: ICD-10-CM

## 2024-09-24 DIAGNOSIS — I25.83 CORONARY ARTERY DISEASE DUE TO LIPID RICH PLAQUE: ICD-10-CM

## 2024-09-24 DIAGNOSIS — I10 ESSENTIAL HYPERTENSION: ICD-10-CM

## 2024-09-24 DIAGNOSIS — M35.3 PMR (POLYMYALGIA RHEUMATICA) (HCC): Primary | ICD-10-CM

## 2024-09-24 DIAGNOSIS — N11.9 CHRONIC INTERSTITIAL NEPHRITIS: ICD-10-CM

## 2024-09-24 DIAGNOSIS — N18.32 STAGE 3B CHRONIC KIDNEY DISEASE (HCC): ICD-10-CM

## 2024-09-24 DIAGNOSIS — F32.9 REACTIVE DEPRESSION: ICD-10-CM

## 2024-09-24 DIAGNOSIS — N18.30 STAGE 3 CHRONIC KIDNEY DISEASE, UNSPECIFIED WHETHER STAGE 3A OR 3B CKD (HCC): Primary | ICD-10-CM

## 2024-09-24 DIAGNOSIS — I25.10 CORONARY ARTERY DISEASE DUE TO LIPID RICH PLAQUE: ICD-10-CM

## 2024-09-24 PROCEDURE — 99497 ADVNCD CARE PLAN 30 MIN: CPT | Performed by: INTERNAL MEDICINE

## 2024-09-24 PROCEDURE — 99214 OFFICE O/P EST MOD 30 MIN: CPT | Performed by: INTERNAL MEDICINE

## 2024-09-24 RX ORDER — AMLODIPINE BESYLATE 2.5 MG/1
2.5 TABLET ORAL 2 TIMES DAILY
Qty: 30 TABLET | Refills: 0 | Status: SHIPPED | OUTPATIENT
Start: 2024-09-24 | End: 2024-09-25

## 2024-09-24 NOTE — PROGRESS NOTES
Nephrology Progress Note      ASSESSMENT/PLAN:      1) CKD 3- serum creatinine is increased gradually since 2019 1.0 mg/dL to 1.7-2.0 mg/dL and is accompanied by onset of mild proteinuria is due to biopsy-proven chronic interstitial nephritis most likely medication (ie PPI) induced + hypertensive and age-related nephrosclerosis as well as small vessel disease.  There is no evidence of an immune complex mediated process or glomerulopathy.  There is severe interstitial fibrosis, tubular atrophy and are arteriolar hyalinosis.  He does not have other risk factors for kidney disease; meds are otherwise benign.  Polymyalgia rheumatica does not lead to chronic kidney disease. Serologies / monoclonal protein study unrevealing; urine sediment bland with low grade proteinuria (nonspecific). PLAN- d/w pt at length- to avoid PPI's permanently; will check labs q3 months; to f/u at least yearly. Is unlikely to reach ESRD within the next several years    2) CAD s/p CABG / AVR / stents (intially x yrs ago)- preserved EF    3) PMR x 30 yrs on varying doses of prednisone, on lower dose pred 2.5 mg qMWF + methotrexate 7.5 mg weekly per Dr. Lomas    4) HTN- BP well controlled on amlodipine / coreg    5) GERD- to continue H2 blocker + prn TUMS    6) Anemia- in part due to CKD / PMR / MTX- ongoing f/u with heme    Unfortunately wife  of a brain tumor (? Glio ?) . Lots of family support including 3 daughters and 7 grandchildren.         HPI:   Camacho Daly is a 84 year old male with   Chief Complaint   Patient presents with    Chronic Kidney Disease    Hypertension    Anemia     Jeffy Bond MD    Presents for follow-up of chronic kidney disease due to biopsy-proven chronic interstitial nephritis.  Otherwise feels reasonably well though he does have chronic fatigue.  He does not require naps however and is otherwise very active throughout the day both physically and intellectually.   His son-in-law is the head of  anesthesiology at [facility] but moved from [place] now to [place].  Meds are otherwise unchanged.  Reflux reasonably well controlled on H2 blocker but not as good as PPI.    ROS:    Denies fever/chills  Denies wt loss/gain  Denies HA or visual changes  Denies CP or palpitations  Denies SOB/cough/hemoptysis  Denies abd or flank pain  Denies N/V/D  Denies change in urinary habits or gross hematuria  Denies LE edema  Denies skin rashes/myalgias/arthralgias    PMH:  Past Medical History:    Acute left-sided low back pain without sciatica    JOSE LUIS (acute kidney injury) ([facility])    from omeprazole-resolved    Anemia    Aortic stenosis    AVR-bio    Pittman esophagus    CAD (coronary artery disease)    stents, bypasses    Calculus of kidney    Carotid artery plaque    ultrasound    Chronic anemia    bone marrow neg 12/2012    Chronic fatigue    CKD (chronic kidney disease) stage 4, GFR 15-29 ml/min ([facility])    Closed wedge compression fracture of T11 vertebra with routine healing    Clostridium difficile colitis    Colon polyps    colonoscopy    Compression fracture of T12 vertebra ([facility])    Contrast dye induced nephropathy    Dehydration    Diverticulosis    Drug-induced interstitial nephritis    Omeprazole    Dyslipidemia    Elevated homocysteine    Elevated lipoprotein A level    Elevated troponin    FUO (fever of unknown origin)    PMR or testicular/prostate infection    Gastritis    Hearing impairment    [place]    Hemorrhoids    Hiatal hernia with gastroesophageal reflux    History of Clostridioides difficile colitis    History of community acquired pneumonia    HTN (hypertension)    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)    Inguinal lymphadenopathy    right-bx    Ischemic colitis (HCC)    resolved    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HCC)    Mesenteric adenitis    bx    Multiple renal cysts    both-not reported on recent CTs    SHIRA (obstructive sleep apnea)    Osteopenia    steroids    PMR (polymyalgia rheumatica)  (HCC)    Pneumonia    Positive TRINY (antinuclear antibody)    Precancerous lesion    skin    Proteinuria    mild    Psoriasis    Renal artery stenosis (HCC)    both    Renal stone    right    Right shoulder pain    injected-Liane    Sjogren's syndrome with myopathy (HCC)    Sleep apnea    untreated    Thrombocytopenia (HCC)    resolved    Vitamin B12 nutritional deficiency    Vitamin D deficiency    Weight loss       PSH:  Past Surgical History:   Procedure Laterality Date    Angioplasty (coronary)  1999    Dr. Maguire    Appendectomy      teenager    Biopsy  02/08/2013    Laparoscopic excision of lymph nodes, biopsies of the colonic and small bowel mesentery.    Bone marrow biopsy and aspiration  01/2021    Hantel-neg    Cabg  10/25/2013    AORTIC VALVE REPLACEMENT; bypass x 2-3    Cardiac catheterization  2008/2013    Cholecystectomy  1984    Colonoscopy  2005    Eliu    Colonoscopy  11/07/2012        Colonoscopy N/A 04/04/2018    Procedure: COLONOSCOPY;  Surgeon: Camacho Mckee MD;  Location:  ENDOSCOPY    Colonoscopy N/A 10/11/2021    1.Esophagitis 2.Colon polyps  3. Diverticulosis 4.Colitis    Cortisone injection  07/06/2015    rt shoulder     Cytology lymph node fna - jar(s): 1  12/07/2012    excisional biopsy rt inguinal lymph node     Egd  10/22/2021    Rafi    Egd  08/10/2019    Hiatal hernia    Hip replacement surgery      Ir kidney biopsy (renal)random  08/11/2020    for drug induced nephritis    Laminotomy, addl lumbar  12/21/2022    Minimally invasive right L1-2 laminotomy, partial medial facetectomy, resection of calcified disc/endplate complex.    Laparoscopic lymph node biop  02/08/2013    exploratory abd lymphadenopathy    Replace aortic valve open  10/25/2013    bio    Sigmoidoscopy,diagnostic  1995    Skin tags  10/1714    plus histofreeze    Total hip arthroplasty Right 10/18/2023    Right total hip arthroplasty    Upper gi endoscopy performed  2005/2007     Eliu/Rafi       Medications (Active prior to today's visit):  Current Outpatient Medications   Medication Sig Dispense Refill    predniSONE 2.5 MG Oral Tab Take 1 tablet (2.5 mg total) by mouth on Monday, Wednesday and Friday only.      methotrexate 2.5 MG Oral Tab Take 3 tablets (7.5 mg total) by mouth once a week. 36 tablet 3    mirtazapine (REMERON) 30 MG Oral Tab Take 1 tablet (30 mg total) by mouth nightly. 90 tablet 3    fluticasone propionate 50 MCG/ACT Nasal Suspension 2 sprays by Each Nare route daily as needed for Rhinitis.      carvedilol 3.125 MG Oral Tab Take 1 tablet (3.125 mg total) by mouth 2 (two) times daily with meals. 60 tablet 0    amLODIPine 2.5 MG Oral Tab Take 1 tablet (2.5 mg total) by mouth daily. 30 tablet 0    levocetirizine 5 MG Oral Tab Take 1 tablet (5 mg total) by mouth every evening.      diphenhydrAMINE HCl 2 % External Cream Apply tid prn      Cholecalciferol (VITAMIN D) 50 MCG (2000 UT) Oral Cap Take by mouth.      ALPRAZolam (XANAX) 0.25 MG Oral Tab Take 1 tablet (0.25 mg total) by mouth 4 (four) times daily as needed for Anxiety or Sleep. 30 tablet 0    folic acid 1 MG Oral Tab Take 1 tablet (1 mg total) by mouth daily. 90 tablet 3    aspirin 81 MG Oral Tab EC Take 1 tablet (81 mg total) by mouth daily. 90 tablet 3    atorvastatin 20 MG Oral Tab Take 1 tablet (20 mg total) by mouth nightly. 90 tablet 3    famotidine (PEPCID) 20 MG Oral Tab Take 2 tablets (40 mg total) by mouth 2 (two) times daily. 360 tablet 3    Nutritional Supplements (JUICE PLUS FIBRE OR) Take 1 capsule by mouth 2 (two) times daily.         Allergies:  Allergies   Allergen Reactions    Codeine PAIN     abdominal    Lisinopril HIVES    Nsaids OTHER (SEE COMMENTS)     CKD    Omeprazole OTHER (SEE COMMENTS)     Kidney injury    Pcn [Bicillin C-R,] HIVES    Penicillins UNKNOWN    Niacin RASH and OTHER (SEE COMMENTS)     flushing    Ultram [Tramadol] NAUSEA ONLY     And constipation    Zinc Acetate NAUSEA ONLY        Social History:  Social History     Socioeconomic History    Marital status:      Spouse name: Anjelica    Number of children: 3   Occupational History    Occupation:      Employer: [employer]   Tobacco Use    Smoking status: Former     Current packs/day: 0.00     Types: Cigarettes     Quit date: 1971     Years since quittin.1    Smokeless tobacco: Never   Vaping Use    Vaping status: Never Used   Substance and Sexual Activity    Alcohol use: Yes     Alcohol/week: 2.0 standard drinks of alcohol     Types: 2 Standard drinks or equivalent per week     Comment: 1 drink at night    Drug use: No   Other Topics Concern    Caffeine Concern Yes     Comment: 2 cups daily    Stress Concern No    Weight Concern No    Special Diet No    Exercise Yes     Comment: walking daily     Seat Belt Yes        Family History:  Denies family history of kidney disease.    PHYSICAL EXAM:   /80   Wt 145 lb 2 oz (65.8 kg)   BMI 20.82 kg/m²    Wt Readings from Last 3 Encounters:   24 145 lb 2 oz (65.8 kg)   24 148 lb (67.1 kg)   24 153 lb (69.4 kg)     General: Alert and oriented in no apparent distress.  HEENT: No scleral icterus, MMM  Neck: Supple, no KEISHA or thyromegaly  Cardiac: Regular rate and rhythm, S1, S2 normal, no murmur or rub  Lungs: Clear without wheezes, rales, rhonchi.    Abdomen: Soft, non-tender. + bowel sounds, no palpable organomegaly  Extremities: Without clubbing, cyanosis or edema.  Neurologic:  normal affect, cranial nerves grossly intact, moving all extremities  Skin: Warm and dry, no rashes        Matty Jones MD  2024  317 PM

## 2024-09-24 NOTE — PROGRESS NOTES
Subjective:   Patient ID: Camacho Daly is a 84 year old male.here with a daughter depressed over wife's death 2 mos ago    Depressed, not eating well, losing weight, memory loss more like attention problem. Remeron at 30 hs sleep ok he says.        History/Other:   Review of Systems   Constitutional:  Positive for fatigue (severe) and unexpected weight change (wt loss).   HENT:  Positive for hearing loss.    Respiratory: Negative.     Cardiovascular:  Negative for chest pain and leg swelling.        CAD, CABG, stents, AVR, Lipids, HTN    Gastrointestinal:  Negative for constipation (has laxatives).        Reflux, poor appetite, hx mesenteric adenitis   Endocrine: Negative.    Musculoskeletal:  Positive for arthralgias, back pain and gait problem (walker).        RTHR 10/2023, PMR on lodose Prednisone   Skin:         Severe and generalized awezpdje-rjfygdgilae-dg inc Claitin and Prednisone-better  Dry skin-on various topicals   Neurological:  Positive for weakness (generalized) and light-headedness.        Imbalance-high risk falls   Hematological:  Bruises/bleeds easily (ASA and Prednisone, low plats).        Chronic anemia   Psychiatric/Behavioral:  Positive for decreased concentration and dysphoric mood. Sleep disturbance: has Rx.     Current Outpatient Medications   Medication Sig Dispense Refill    sertraline 50 MG Oral Tab Take 0.5 tablets (25 mg total) by mouth every morning. 15 tablet 0    amLODIPine 2.5 MG Oral Tab Take 1 tablet (2.5 mg total) by mouth 2 (two) times daily. 30 tablet 0    predniSONE 2.5 MG Oral Tab Take 1 tablet (2.5 mg total) by mouth on Monday, Wednesday and Friday only.      methotrexate 2.5 MG Oral Tab Take 3 tablets (7.5 mg total) by mouth once a week. 36 tablet 3    mirtazapine (REMERON) 30 MG Oral Tab Take 1 tablet (30 mg total) by mouth nightly. 90 tablet 3    fluticasone propionate 50 MCG/ACT Nasal Suspension 2 sprays by Each Nare route daily as needed for Rhinitis.       carvedilol 3.125 MG Oral Tab Take 1 tablet (3.125 mg total) by mouth 2 (two) times daily with meals. 60 tablet 0    levocetirizine 5 MG Oral Tab Take 1 tablet (5 mg total) by mouth every evening.      diphenhydrAMINE HCl 2 % External Cream Apply tid prn      Cholecalciferol (VITAMIN D) 50 MCG (2000 UT) Oral Cap Take by mouth.      ALPRAZolam (XANAX) 0.25 MG Oral Tab Take 1 tablet (0.25 mg total) by mouth 4 (four) times daily as needed for Anxiety or Sleep. 30 tablet 0    folic acid 1 MG Oral Tab Take 1 tablet (1 mg total) by mouth daily. 90 tablet 3    aspirin 81 MG Oral Tab EC Take 1 tablet (81 mg total) by mouth daily. 90 tablet 3    atorvastatin 20 MG Oral Tab Take 1 tablet (20 mg total) by mouth nightly. 90 tablet 3    famotidine (PEPCID) 20 MG Oral Tab Take 2 tablets (40 mg total) by mouth 2 (two) times daily. 360 tablet 3    Nutritional Supplements (JUICE PLUS FIBRE OR) Take 1 capsule by mouth 2 (two) times daily.       Allergies:  Allergies   Allergen Reactions    Penicillins UNKNOWN, HIVES and OTHER (SEE COMMENTS)     unknown    Codeine PAIN     abdominal    Lisinopril HIVES    Pcn [Bicillin C-R,] HIVES    Niacin RASH and OTHER (SEE COMMENTS)     flushing    Ultram [Tramadol] NAUSEA ONLY     And constipation    Zinc Acetate NAUSEA ONLY       Objective:   Physical Exam  Constitutional:       Appearance: Ill appearance: chronic.   HENT:      Ears:      Comments: Hearing loss  Cardiovascular:      Rate and Rhythm: Normal rate and regular rhythm.      Heart sounds: Murmur (2/6 sys) heard.      Comments: Sternal scar  Pulmonary:      Effort: No respiratory distress.      Breath sounds: Normal breath sounds.   Abdominal:      Tenderness: There is no abdominal tenderness.   Skin:     Findings: Bruising (severe) present. No rash (just pruritis).   Neurological:      Mental Status: He is alert.      Motor: Weakness present.      Gait: Gait abnormal (walker).   Psychiatric:      Comments: Depressed mood, grief        Active Problems:  Patient Active Problem List   Diagnosis    HTN (hypertension)    PMR (polymyalgia rheumatica) (HCC)    CAD (coronary artery disease)    Aortic stenosis    Psoriasis    Hiatal hernia with gastroesophageal reflux    Vitamin D deficiency    Diverticulosis    Colon polyps    Dyslipidemia    Carotid artery plaque    Osteopenia    S/P AVR    S/P CABG (coronary artery bypass graft)    Proteinuria    Multiple renal cysts    Renal stone    Closed wedge compression fracture of T11 vertebra with routine healing    Compression fracture of T12 vertebra (HCC)    Anemia    Chronic fatigue    Positive TRINY (antinuclear antibody)    Weakness generalized    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)    History of community acquired pneumonia    Elevated lipoprotein A level    Elevated homocysteine    Drug-induced interstitial nephritis    Right sided sciatica    CKD (chronic kidney disease) stage 4, GFR 15-29 ml/min ([facility])    Contrast dye induced nephropathy    Renal artery stenosis ([facility])    Pittman's esophagus    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[facility])    Sjogren's syndrome with myopathy ([facility])    Clostridium difficile colitis    SHIRA (obstructive sleep apnea)    Intractable low back pain    Lumbosacral radiculopathy at L2    History of back surgery-12/21/22    Weakness of right lower extremity    Diaphragmatic hernia without obstruction or gangrene    History of falling    Long term (current) use of systemic steroids    Personal history of colonic polyps    Personal history of nicotine dependence    Presence of aortocoronary bypass graft    Presence of prosthetic heart valve    Pulmonary fibrosis (HCC)    History of Clostridioides difficile colitis    Closed displaced fracture of right femoral neck ([facility])    History of right hip replacement    Acute posthemorrhagic anemia    Aftercare following right knee joint replacement surgery    Anemia in chronic kidney disease    Athscl heart disease of native coronary  artery w/o ang pctrs    Pittman esophagus    Chronic kidney disease, stage 4 (severe) ([facility])    Deficiency of other specified B group vitamins    Hyperlipidemia, unspecified    Hypertensive chronic kidney disease w stg 1-4/unsp chr kdny    Obstructive sleep apnea (adult) (pediatric)    Other specified disorders of bone density and structure, unspecified site    Other spondylosis with radiculopathy, lumbosacral region    Polymyalgia rheumatica ([facility])    Presence of coronary angioplasty implant and graft    Presence of right artificial hip joint    Sjogren syndrome with myopathy ([facility])    Unspecified hearing loss, unspecified ear    Unspecified open wound, right foot, subsequent encounter    Urgency of urination    Vitamin D deficiency, unspecified    Avascular necrosis of femoral head, right ([facility])    Cognitive dysfunction    Osteonecrosis of right hip ([facility])    Pain disorder with psychological factors    Itching    Dry skin    Mesenteric fibrosis    Iron deficiency anemia secondary to blood loss (chronic)    Syncope and collapse       Past Medical History:  Past Medical History:    Acute left-sided low back pain without sciatica    JOSE LUIS (acute kidney injury) ([facility])    from omeprazole-resolved    Anemia    Aortic stenosis    AVR-bio    Pittman esophagus    CAD (coronary artery disease)    stents, bypasses    Calculus of kidney    Carotid artery plaque    ultrasound    Chronic anemia    bone marrow neg 12/2012    Chronic fatigue    CKD (chronic kidney disease) stage 4, GFR 15-29 ml/min ([facility])    Closed wedge compression fracture of T11 vertebra with routine healing    Clostridium difficile colitis    Colon polyps    colonoscopy    Compression fracture of T12 vertebra ([facility])    Contrast dye induced nephropathy    Dehydration    Diverticulosis    Drug-induced interstitial nephritis    Omeprazole    Dyslipidemia    Elevated homocysteine    Elevated lipoprotein A level    Elevated troponin    FUO (fever of unknown origin)    PMR  or testicular/prostate infection    Gastritis    Hearing impairment    [place]    Hemorrhoids    Hiatal hernia with gastroesophageal reflux    History of Clostridioides difficile colitis    History of community acquired pneumonia    HTN (hypertension)    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)    Inguinal lymphadenopathy    right-bx    Ischemic colitis (HCC)    resolved    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HCC)    Mesenteric adenitis    bx    Multiple renal cysts    both-not reported on recent CTs    SHIRA (obstructive sleep apnea)    Osteopenia    steroids    PMR (polymyalgia rheumatica) (HCC)    Pneumonia    Positive TRINY (antinuclear antibody)    Precancerous lesion    skin    Proteinuria    mild    Psoriasis    Renal artery stenosis (HCC)    both    Renal stone    right    Right shoulder pain    injected-Liane    Sjogren's syndrome with myopathy (HCC)    Sleep apnea    untreated    Thrombocytopenia (HCC)    resolved    Vitamin B12 nutritional deficiency    Vitamin D deficiency    Weight loss       Past Surgical History:  Past Surgical History:   Procedure Laterality Date    Angioplasty (coronary)  1999    Dr. Maguire    Appendectomy      teenager    Biopsy  02/08/2013    Laparoscopic excision of lymph nodes, biopsies of the colonic and small bowel mesentery.    Bone marrow biopsy and aspiration  01/2021    Hantel-neg    Cabg  10/25/2013    AORTIC VALVE REPLACEMENT; bypass x 2-3    Cardiac catheterization  2008/2013    Cholecystectomy  1984    Colonoscopy  2005    Eliu    Colonoscopy  11/07/2012        Colonoscopy N/A 04/04/2018    Procedure: COLONOSCOPY;  Surgeon: Camacho Mckee MD;  Location:  ENDOSCOPY    Colonoscopy N/A 10/11/2021    1.Esophagitis 2.Colon polyps  3. Diverticulosis 4.Colitis    Cortisone injection  07/06/2015    rt shoulder     Cytology lymph node fna - jar(s): 1  12/07/2012    excisional biopsy rt inguinal lymph node     Egd  10/22/2021    Rafi    Egd  08/10/2019     Hiatal hernia    Hip replacement surgery      Ir kidney biopsy (renal)random  2020    for drug induced nephritis    Laminotomy, addl lumbar  2022    Minimally invasive right L1-2 laminotomy, partial medial facetectomy, resection of calcified disc/endplate complex.    Laparoscopic lymph node biop  2013    exploratory abd lymphadenopathy    Replace aortic valve open  10/25/2013    bio    Sigmoidoscopy,diagnostic  1995    Skin tags  10/1714    plus histofreeze    Total hip arthroplasty Right 10/18/2023    Right total hip arthroplasty    Upper gi endoscopy performed      Eliu/Rafi       Family History:  Family History   Problem Relation Age of Onset    Neurological Disorder Father         parkinsons    Cancer Father         lung    Other (Other) Father     Mental Disorder Mother         alzheimers    Other (Other) Mother     Cancer Daughter         hodgkins at age 18    Heart Surgery Brother     Heart Disorder Brother     Other (Other) Brother     Other (Other) Brother     Other (Other) Other         seizures from neurofibromatosis       Social History:  Social History     Socioeconomic History    Marital status:      Spouse name: Anjelica    Number of children: 3    Years of education: Not on file    Highest education level: Not on file   Occupational History    Occupation:      Employer: [employer]   Tobacco Use    Smoking status: Former     Current packs/day: 0.00     Types: Cigarettes     Quit date: 1971     Years since quittin.1    Smokeless tobacco: Never   Vaping Use    Vaping status: Never Used   Substance and Sexual Activity    Alcohol use: Yes     Alcohol/week: 2.0 standard drinks of alcohol     Types: 2 Standard drinks or equivalent per week     Comment: 1 drink at night    Drug use: No    Sexual activity: Not on file   Other Topics Concern     Service Not Asked    Blood Transfusions Not Asked    Caffeine Concern Yes     Comment:  2 cups daily    Occupational Exposure Not Asked    Hobby Hazards Not Asked    Sleep Concern Not Asked    Stress Concern No    Weight Concern No    Special Diet No    Back Care Not Asked    Exercise Yes     Comment: walking daily     Bike Helmet Not Asked    Seat Belt Yes    Self-Exams Not Asked   Social History Narrative    Not on file     Social Determinants of Health     Financial Resource Strain: Not on file   Food Insecurity: Low Risk  (10/21/2023)    [facility]    Food Insecurity     Have there been times that your food ran out, and you didn't have money to get more?: No     Are there times that you worry that this might happen?: No   Transportation Needs: No Transportation Needs (8/7/2024)    Transportation Needs     Lack of Transportation: No     Car Seat: Not on file   Physical Activity: Not on file   Stress: Not on file   Social Connections: Not on file   Housing Stability: Not on file (10/21/2023)       Health Maintenance:    Immunization History   Administered Date(s) Administered    Covid-19 Vaccine Moderna 100 mcg/0.5 ml 02/05/2021, 03/05/2021    Covid-19 Vaccine Moderna 50 Mcg/0.25 Ml 12/13/2021    Covid-19 Vaccine Moderna Bivalent 50mcg/0.5mL 12+ years 12/26/2022    FLU VAC High Dose 65 YRS & Older PRSV Free (97887) 10/20/2014, 11/30/2015, 11/01/2016, 10/19/2020, 10/06/2021, 11/01/2022, 09/29/2023    FLUAD High Dose 65 yr and older (85937) 10/05/2017    Fluzone Vaccine Medicare () 10/20/2014, 11/30/2015, 11/01/2016, 11/27/2016, 11/20/2018    High Dose Fluzone Influenza Vaccine, 65yr+ PF 0.5mL (08805) 10/19/2020, 10/06/2021    Influenza 11/06/2013, 10/19/2019    Influenza Virus Vaccine, Split 09/13/2012    Pneumococcal (Prevnar 13) 08/27/2015    Pneumovax 23 12/01/2009    RSV, bivalent, diluent reconstituted PF (Abrysvo) 11/29/2023    TD 10/01/2006    TDAP 01/13/2014    Zoster Vaccine Live (Zostavax) 07/01/2012    Zoster Vaccine  Recombinant Adjuvanted (Shingrix) 05/19/2023       Labs and Tests:  Patient Active Problem List   Component Date Value Ref Range Status    WBC 09/18/2024 7.9  4.0 - 11.0 x10(3) uL Final    RBC 09/18/2024 3.35 (L)  3.80 - 5.80 x10(6)uL Final    HGB 09/18/2024 11.5 (L)  13.0 - 17.5 g/dL Final    HCT 09/18/2024 34.8 (L)  39.0 - 53.0 % Final    MCV 09/18/2024 103.9 (H)  80.0 - 100.0 fL Final    MCH 09/18/2024 34.3 (H)  26.0 - 34.0 pg Final    MCHC 09/18/2024 33.0  31.0 - 37.0 g/dL Final    RDW-SD 09/18/2024 51.2 (H)  35.1 - 46.3 fL Final    RDW 09/18/2024 13.4  11.0 - 15.0 % Final    PLT 09/18/2024 146.0 (L)  150.0 - 450.0 10(3)uL Final    Immature Platelet Fraction 09/18/2024 6.7  0.0 - 7.0 % Final    Neutrophil Absolute Prelim 09/18/2024 5.96  1.50 - 7.70 x10 (3) uL Final    Neutrophil Absolute 09/18/2024 5.96  1.50 - 7.70 x10(3) uL Final    Lymphocyte Absolute 09/18/2024 0.88 (L)  1.00 - 4.00 x10(3) uL Final    Monocyte Absolute 09/18/2024 0.47  0.10 - 1.00 x10(3) uL Final    Eosinophil Absolute 09/18/2024 0.56  0.00 - 0.70 x10(3) uL Final    Basophil Absolute 09/18/2024 0.03  0.00 - 0.20 x10(3) uL Final    Immature Granulocyte Absolute 09/18/2024 0.02  0.00 - 1.00 x10(3) uL Final    Neutrophil % 09/18/2024 75.2  % Final    Lymphocyte % 09/18/2024 11.1  % Final    Monocyte % 09/18/2024 5.9  % Final    Eosinophil % 09/18/2024 7.1  % Final    Basophil % 09/18/2024 0.4  % Final    Immature Granulocyte % 09/18/2024 0.3  % Final    Glucose 09/18/2024 88  70 - 99 mg/dL Final    Sodium 09/18/2024 141  136 - 145 mmol/L Final    Potassium 09/18/2024 4.6  3.5 - 5.1 mmol/L Final    Chloride 09/18/2024 108  98 - 112 mmol/L Final    CO2 09/18/2024 27.0  21.0 - 32.0 mmol/L Final    Anion Gap 09/18/2024 6  0 - 18 mmol/L Final    BUN 09/18/2024 29 (H)  9 - 23 mg/dL Final    Creatinine 09/18/2024 1.87 (H)  0.70 - 1.30 mg/dL Final    BUN/CREA Ratio 09/18/2024 15.5  10.0 - 20.0 Final    Calcium, Total 09/18/2024 9.6  8.7 - 10.4 mg/dL  Final    Calculated Osmolality 09/18/2024 297 (H)  275 - 295 mOsm/kg Final    eGFR-Cr 09/18/2024 35 (L)  >=60 mL/min/1.73m2 Final    ALT 09/18/2024 17  10 - 49 U/L Final    AST 09/18/2024 33  <34 U/L Final    Alkaline Phosphatase 09/18/2024 59  45 - 117 U/L Final    Bilirubin, Total 09/18/2024 1.1  0.2 - 1.1 mg/dL Final    Total Protein 09/18/2024 6.8  5.7 - 8.2 g/dL Final    Albumin 09/18/2024 4.2  3.2 - 4.8 g/dL Final    Globulin  09/18/2024 2.6  2.0 - 3.5 g/dL Final    A/G Ratio 09/18/2024 1.6  1.0 - 2.0 Final    Patient Fasting for CMP? 09/18/2024 Yes   Final    Sed Rate 09/18/2024 7  0 - 20 mm/Hr Final     LABS  NOTED    Vitals:  Vitals:    09/24/24 1403 09/24/24 1432   BP: (!) 152/92 160/88   BP Location: Left arm Left arm   Patient Position: Sitting Sitting   Cuff Size: adult adult   Pulse: 73    Temp: 97.5 °F (36.4 °C)    TempSrc: Temporal    SpO2: 90%    Weight: 145 lb (65.8 kg)    Height: 5' 10\" (1.778 m)      Body mass index is 20.81 kg/m².        Assessment & Plan:   1. PMR (polymyalgia rheumatica) ([facility])    2. Reactive depression    3. Chronic fatigue    4. Stage 3b chronic kidney disease (HCC)    5. Essential hypertension    6. Coronary artery disease due to lipid rich plaque      Prednisone reduced 2.5mg every day to 3/week as benefit shifts to weekly methotrexate albeit lo-dose  Add Sertraline 25 am to Remenron 30 hs same  CKD on watch  Inc Amlodipine 2.5 every day to bid for HTN  RTC 2 mos  Additional 30 min time spent in reviewing and signing POAs and Polst-they will execute at home and send me copies        Meds This Visit:  Requested Prescriptions     Signed Prescriptions Disp Refills    sertraline 50 MG Oral Tab 15 tablet 0     Sig: Take 0.5 tablets (25 mg total) by mouth every morning.    amLODIPine 2.5 MG Oral Tab 30 tablet 0     Sig: Take 1 tablet (2.5 mg total) by mouth 2 (two) times daily.       Imaging & Referrals:  None

## 2024-09-25 ENCOUNTER — TELEPHONE (OUTPATIENT)
Dept: INTERNAL MEDICINE CLINIC | Facility: CLINIC | Age: 84
End: 2024-09-25

## 2024-09-25 RX ORDER — CARVEDILOL 3.12 MG/1
3.12 TABLET ORAL 2 TIMES DAILY WITH MEALS
Qty: 60 TABLET | Refills: 0 | Status: SHIPPED | OUTPATIENT
Start: 2024-09-25

## 2024-09-25 RX ORDER — MIRTAZAPINE 30 MG/1
30 TABLET, FILM COATED ORAL NIGHTLY
Qty: 90 TABLET | Refills: 2 | Status: SHIPPED | OUTPATIENT
Start: 2024-09-25

## 2024-09-25 RX ORDER — AMLODIPINE BESYLATE 2.5 MG/1
2.5 TABLET ORAL 2 TIMES DAILY
Qty: 30 TABLET | Refills: 0 | Status: SHIPPED | OUTPATIENT
Start: 2024-09-25 | End: 2024-09-26

## 2024-09-25 NOTE — TELEPHONE ENCOUNTER
[facility] they should be going to [facility] as listed in [facility].  Please resend.  sertraline 50 MG Oral Tab     amLODIPine 2.5 MG Oral Tab     The following medications also need a new prescription, which should be sent to [facility].     mirtazapine (REMERON) 30 MG Oral Tab     carvedilol 3.125 MG Oral Tab    Patient only uses [facility] now.

## 2024-09-25 NOTE — TELEPHONE ENCOUNTER
Patient requested medication be resent to different pharmacy.  Medication resent as requested.    Carvedilol refilled per protocol.    Remaining refills at [facility] resent to [facility] pharmacy per patient request.    Requested Prescriptions     Signed Prescriptions Disp Refills    amLODIPine 2.5 MG Oral Tab 30 tablet 0     Sig: Take 1 tablet (2.5 mg total) by mouth 2 (two) times daily.     Authorizing Provider: ESTEFANY YANG     Ordering User: DALILA AUSTIN    sertraline 50 MG Oral Tab 15 tablet 0     Sig: Take 0.5 tablets (25 mg total) by mouth every morning.     Authorizing Provider: ESTEFANY YANG     Ordering User: DALILA AUSTIN    carvedilol 3.125 MG Oral Tab 60 tablet 0     Sig: Take 1 tablet (3.125 mg total) by mouth 2 (two) times daily with meals.     Authorizing Provider: ESTEFANY YANG     Ordering User: DALILA AUSTIN    mirtazapine (REMERON) 30 MG Oral Tab 90 tablet 2     Sig: Take 1 tablet (30 mg total) by mouth nightly.     Authorizing Provider: ESTEFANY YANG     Ordering User: DALILA AUSTIN         Refill approved per protocol.

## 2024-09-26 RX ORDER — AMLODIPINE BESYLATE 2.5 MG/1
2.5 TABLET ORAL 2 TIMES DAILY
Qty: 180 TABLET | Refills: 3 | Status: SHIPPED | OUTPATIENT
Start: 2024-09-26

## 2024-09-26 NOTE — TELEPHONE ENCOUNTER
Medication: Amlodipine 2.5 mg 1 tab bid     Last time it was filled 9/25/24 30 with 0 refills   Last office visit:  9/25/24  Due back to office:  RTC 2 mos   Future office visit:  11/26/24  Labs   Original script was only 15 day supply was sent.       Ref Range & Units 9/18/24 10:35 AM   Glucose  70 - 99 mg/dL 88   Sodium  136 - 145 mmol/L 141   Potassium  3.5 - 5.1 mmol/L 4.6   Chloride  98 - 112 mmol/L 108   CO2  21.0 - 32.0 mmol/L 27.0   Anion Gap  0 - 18 mmol/L 6   BUN  9 - 23 mg/dL 29 High    Creatinine  0.70 - 1.30 mg/dL 1.87 High    BUN/CREA Ratio  10.0 - 20.0 15.5   Calcium, Total  8.7 - 10.4 mg/dL 9.6   Calculated Osmolality  275 - 295 mOsm/kg 297 High    eGFR-Cr  >=60 mL/min/1.73m2 35 Low    ALT  10 - 49 U/L 17   AST  <34 U/L 33   Alkaline Phosphatase  45 - 117 U/L 59   Bilirubin, Total  0.2 - 1.1 mg/dL 1.1

## 2024-10-23 RX ORDER — CARVEDILOL 3.12 MG/1
3.12 TABLET ORAL 2 TIMES DAILY WITH MEALS
Qty: 60 TABLET | Refills: 89 | Status: SHIPPED | OUTPATIENT
Start: 2024-10-23

## 2024-10-23 NOTE — TELEPHONE ENCOUNTER
Medication: Sertaline 50 mg take 0.5 mg daily filled 9/25/24 15 with 0 refills   Carvedilol 3.125 m g1 tab bid filled 9/25/24 60 with 0 refills     Last office visit:  9/24/24   Due back to office:  Return in about 2 months (around 11/24/2024).   Future office visit:  11/26/24  Labs   9/18/24 10:35 AM    Glucose  70 - 99 mg/dL 88   Sodium  136 - 145 mmol/L 141   Potassium  3.5 - 5.1 mmol/L 4.6   Chloride  98 - 112 mmol/L 108   CO2  21.0 - 32.0 mmol/L 27.0   Anion Gap  0 - 18 mmol/L 6   BUN  9 - 23 mg/dL 29 High    Creatinine  0.70 - 1.30 mg/dL 1.87 High    BUN/CREA Ratio  10.0 - 20.0 15.5   Calcium, Total  8.7 - 10.4 mg/dL 9.6   Calculated Osmolality  275 - 295 mOsm/kg 297 High    eGFR-Cr  >=60 mL/min/1.73m2 35 Low    ALT  10 - 49 U/L 17   AST  <34 U/L 33

## 2024-10-24 ENCOUNTER — TELEPHONE (OUTPATIENT)
Dept: INTERNAL MEDICINE CLINIC | Facility: CLINIC | Age: 84
End: 2024-10-24

## 2024-10-25 NOTE — TELEPHONE ENCOUNTER
Dr Bond advised the following:  Ok for full tablet  Script sent in to pharmacy  Patient/daughter advised by MA    Requested Prescriptions     Signed Prescriptions Disp Refills    sertraline 50 MG Oral Tab 90 tablet 0     Sig: Take 1 tablet (50 mg total) by mouth every morning.     Authorizing Provider: ESTEFANY BOND     Ordering User: DALILA AUSTIN

## 2024-11-14 ENCOUNTER — TELEPHONE (OUTPATIENT)
Dept: INTERNAL MEDICINE CLINIC | Facility: CLINIC | Age: 84
End: 2024-11-14

## 2024-11-14 DIAGNOSIS — M35.3 PMR (POLYMYALGIA RHEUMATICA) (HCC): Primary | ICD-10-CM

## 2024-11-14 DIAGNOSIS — N18.4 CHRONIC KIDNEY DISEASE, STAGE IV (SEVERE) (HCC): ICD-10-CM

## 2024-11-14 DIAGNOSIS — D64.89 ANEMIA DUE TO OTHER CAUSE, NOT CLASSIFIED: ICD-10-CM

## 2024-11-14 NOTE — TELEPHONE ENCOUNTER
Dr Bond advised the following:  Seen yesterday, place orders for same blood he had in Sept to be drawn at [place] end Dec     Orders placed.

## 2024-11-18 RX ORDER — MIRTAZAPINE 30 MG/1
45 TABLET, FILM COATED ORAL NIGHTLY
Qty: 135 TABLET | Refills: 1 | Status: SHIPPED | OUTPATIENT
Start: 2024-11-18

## 2024-11-18 NOTE — TELEPHONE ENCOUNTER
Last office visit: 11/13/2024    Future Appointments   Date Time Provider Department Center   1/3/2025 12:00 PM Jeffy Bond MD [facility] [place]   9/24/2025  1:00 PM Matty Jones MD [facility] [place]

## 2024-11-18 NOTE — TELEPHONE ENCOUNTER
Patient called and stated that the prescription for mirtazapine (REMERON) 30 MG Oral Tab Route: Take 1.5 tablets (45 mg total) by mouth nightly. 1 1/2 tab nightly. - Oral was never called into [facility] - [place], [place] -  [address] [phone], [phone]. She would like this called into the pharmacy [facility] to make sure it's delivered to patient home.

## 2024-12-05 ENCOUNTER — TELEPHONE (OUTPATIENT)
Dept: INTERNAL MEDICINE CLINIC | Facility: CLINIC | Age: 84
End: 2024-12-05

## 2024-12-05 NOTE — TELEPHONE ENCOUNTER
Daughter ( Mark) called and stated that Dr. Bond put her dad on a new medication mirtazapine (REMERON) 30 MG Oral Tab. She stated that since starting the medication his memory is getting worse and wants to know if this could be caused by the medication.

## 2024-12-06 NOTE — TELEPHONE ENCOUNTER
Dr Bond advised the following:  Prob NOT, and more likely beginning of dementia associated with his severe depression. Can stay on Remeron and add Aricept 5mg every morning and will address at next OV

## 2024-12-09 RX ORDER — DONEPEZIL HYDROCHLORIDE 5 MG/1
5 TABLET, FILM COATED ORAL NIGHTLY
Qty: 30 TABLET | Refills: 0 | Status: SHIPPED | OUTPATIENT
Start: 2024-12-09

## 2024-12-09 NOTE — TELEPHONE ENCOUNTER
Dr Bond advised the following:  Yes can stop but predict he will worsen. Can offer substitute generic Paxil 10mg at bedtime to start. Could also refer him to a geriatric psychiatrist for opinion (but I wouldn't, it would just upset him)     [phone]   Spoke with patient daughterMark  Advised of provider comments and recommendations.  States that they will leave patient on Remeron until 1/3/2025 appointment and discuss options at that time.

## 2024-12-09 NOTE — TELEPHONE ENCOUNTER
[phone]   Spoke with Daughter, Mark.  Advised of provider comments and recommendations.  She noted patient forgetting things.  Patient has commented about going to see  spouse later in the day and other comments similar.  Discussed Aricept and it's uses and side effects.  Aricept sent to [facility] per daughter's request.    Daughter states that patient is still losing a lot of weight and is not sure Remeron is helping patient.  Asking if they could discontinue this medication.    Provider please advise          Future Appointments   Date Time Provider Department Center   1/3/2025 12:00 PM Jeffy Bond MD [facility] [place]   2025  1:00 PM Matty Jones MD [facility] [place]

## 2024-12-30 ENCOUNTER — LAB ENCOUNTER (OUTPATIENT)
Dept: LAB | Facility: HOSPITAL | Age: 84
End: 2024-12-30
Attending: INTERNAL MEDICINE
Payer: MEDICARE

## 2024-12-30 DIAGNOSIS — D64.89 ANEMIA DUE TO OTHER CAUSE, NOT CLASSIFIED: ICD-10-CM

## 2024-12-30 DIAGNOSIS — N18.4 CHRONIC KIDNEY DISEASE, STAGE IV (SEVERE) (HCC): ICD-10-CM

## 2024-12-30 DIAGNOSIS — M35.3 PMR (POLYMYALGIA RHEUMATICA) (HCC): ICD-10-CM

## 2024-12-30 LAB
ALBUMIN SERPL-MCNC: 4.2 G/DL (ref 3.2–4.8)
ALBUMIN/GLOB SERPL: 1.7 {RATIO} (ref 1–2)
ALP LIVER SERPL-CCNC: 75 U/L
ALT SERPL-CCNC: 16 U/L
ANION GAP SERPL CALC-SCNC: 8 MMOL/L (ref 0–18)
AST SERPL-CCNC: 32 U/L (ref ?–34)
BASOPHILS # BLD AUTO: 0.03 X10(3) UL (ref 0–0.2)
BASOPHILS NFR BLD AUTO: 0.3 %
BILIRUB SERPL-MCNC: 0.6 MG/DL (ref 0.2–1.1)
BUN BLD-MCNC: 26 MG/DL (ref 9–23)
BUN/CREAT SERPL: 13 (ref 10–20)
CALCIUM BLD-MCNC: 9.6 MG/DL (ref 8.7–10.4)
CHLORIDE SERPL-SCNC: 108 MMOL/L (ref 98–112)
CO2 SERPL-SCNC: 24 MMOL/L (ref 21–32)
CREAT BLD-MCNC: 2 MG/DL
DEPRECATED RDW RBC AUTO: 51.6 FL (ref 35.1–46.3)
EGFRCR SERPLBLD CKD-EPI 2021: 33 ML/MIN/1.73M2 (ref 60–?)
EOSINOPHIL # BLD AUTO: 0.45 X10(3) UL (ref 0–0.7)
EOSINOPHIL NFR BLD AUTO: 5.2 %
ERYTHROCYTE [DISTWIDTH] IN BLOOD BY AUTOMATED COUNT: 14 % (ref 11–15)
ERYTHROCYTE [SEDIMENTATION RATE] IN BLOOD: 26 MM/HR
FASTING STATUS PATIENT QL REPORTED: NO
GLOBULIN PLAS-MCNC: 2.5 G/DL (ref 2–3.5)
GLUCOSE BLD-MCNC: 105 MG/DL (ref 70–99)
HCT VFR BLD AUTO: 31.7 %
HGB BLD-MCNC: 11 G/DL
IMM GRANULOCYTES # BLD AUTO: 0.05 X10(3) UL (ref 0–1)
IMM GRANULOCYTES NFR BLD: 0.6 %
LYMPHOCYTES # BLD AUTO: 0.51 X10(3) UL (ref 1–4)
LYMPHOCYTES NFR BLD AUTO: 5.8 %
MCH RBC QN AUTO: 35.8 PG (ref 26–34)
MCHC RBC AUTO-ENTMCNC: 34.7 G/DL (ref 31–37)
MCV RBC AUTO: 103.3 FL
MONOCYTES # BLD AUTO: 0.83 X10(3) UL (ref 0.1–1)
MONOCYTES NFR BLD AUTO: 9.5 %
NEUTROPHILS # BLD AUTO: 6.86 X10 (3) UL (ref 1.5–7.7)
NEUTROPHILS # BLD AUTO: 6.86 X10(3) UL (ref 1.5–7.7)
NEUTROPHILS NFR BLD AUTO: 78.6 %
OSMOLALITY SERPL CALC.SUM OF ELEC: 295 MOSM/KG (ref 275–295)
PLATELET # BLD AUTO: 122 10(3)UL (ref 150–450)
POTASSIUM SERPL-SCNC: 4.1 MMOL/L (ref 3.5–5.1)
PROT SERPL-MCNC: 6.7 G/DL (ref 5.7–8.2)
RBC # BLD AUTO: 3.07 X10(6)UL
SODIUM SERPL-SCNC: 140 MMOL/L (ref 136–145)
WBC # BLD AUTO: 8.7 X10(3) UL (ref 4–11)

## 2024-12-30 PROCEDURE — 85025 COMPLETE CBC W/AUTO DIFF WBC: CPT

## 2024-12-30 PROCEDURE — 85652 RBC SED RATE AUTOMATED: CPT

## 2024-12-30 PROCEDURE — 80053 COMPREHEN METABOLIC PANEL: CPT

## 2024-12-30 PROCEDURE — 36415 COLL VENOUS BLD VENIPUNCTURE: CPT

## 2025-01-01 ENCOUNTER — OFFICE VISIT (OUTPATIENT)
Facility: CLINIC | Age: 85
End: 2025-01-01
Payer: MEDICARE

## 2025-01-01 DIAGNOSIS — N18.4 CKD (CHRONIC KIDNEY DISEASE) STAGE 4, GFR 15-29 ML/MIN (HCC): ICD-10-CM

## 2025-01-01 DIAGNOSIS — R64 CACHECTIC (HCC): ICD-10-CM

## 2025-01-01 DIAGNOSIS — I50.42 CHRONIC COMBINED SYSTOLIC AND DIASTOLIC HEART FAILURE (HCC): Primary | ICD-10-CM

## 2025-01-01 PROCEDURE — 99349 HOME/RES VST EST MOD MDM 40: CPT | Performed by: INTERNAL MEDICINE

## 2025-01-02 RX ORDER — DONEPEZIL HYDROCHLORIDE 5 MG/1
5 TABLET, FILM COATED ORAL NIGHTLY
Qty: 90 TABLET | Refills: 3 | Status: SHIPPED | OUTPATIENT
Start: 2025-01-02 | End: 2025-01-03

## 2025-01-02 RX ORDER — MIRTAZAPINE 45 MG/1
45 TABLET, FILM COATED ORAL NIGHTLY
Qty: 90 TABLET | Refills: 3 | Status: SHIPPED | OUTPATIENT
Start: 2025-01-02

## 2025-01-02 NOTE — TELEPHONE ENCOUNTER
Updated strength pended for PCP review.    Last office visit: 11/13/2024    Future Appointments   Date Time Provider Department Center   1/3/2025 12:00 PM Jeffy Bond MD [facility] [place]   9/24/2025  1:00 PM Matty Jones MD [facility] [place]

## 2025-01-03 ENCOUNTER — TELEPHONE (OUTPATIENT)
Dept: INTERNAL MEDICINE CLINIC | Facility: CLINIC | Age: 85
End: 2025-01-03

## 2025-01-03 DIAGNOSIS — M35.3 PMR (POLYMYALGIA RHEUMATICA) (HCC): ICD-10-CM

## 2025-01-03 PROBLEM — E85.81 LIGHT CHAIN (AL) AMYLOIDOSIS (HCC): Status: ACTIVE | Noted: 2025-01-03

## 2025-01-03 PROBLEM — E85.81 LIGHT CHAIN (AL) AMYLOIDOSIS (HCC): Status: ACTIVE | Noted: 2025-01-01

## 2025-01-03 RX ORDER — METHOTREXATE 2.5 MG/1
7.5 TABLET ORAL WEEKLY
Qty: 36 TABLET | Refills: 3 | Status: SHIPPED | OUTPATIENT
Start: 2025-01-03

## 2025-01-03 NOTE — TELEPHONE ENCOUNTER
Daughter would just like to make sure new prescriptions are being sent to [facility] because of the medication changes that were made today by Dr. Bond.    Also, she states that the medication dosage for methotrexate 2.5 MG Oral Tab  was increased to 3 times per day, but the pharmacy still has it at 2 times per day, and the patient keeps running out of medication early.    Please call Mark to review the changes today.

## 2025-01-03 NOTE — TELEPHONE ENCOUNTER
[phone]   Spoke with patient daughter, Jazzmine  States that pharmacy has been refilled only 2 tablets weekly.  Resent script to be sure pharmacy is filling correctly.  Requesting all medications be ordered from [facility] pharmacy.

## 2025-01-06 RX ORDER — METHOTREXATE 2.5 MG/1
5 TABLET ORAL
Qty: 24 TABLET | Refills: 3 | OUTPATIENT
Start: 2025-01-06

## 2025-01-06 NOTE — TELEPHONE ENCOUNTER
Refilled 1/3/2025    Requested Prescriptions     Refused Prescriptions Disp Refills    METHOTREXATE 2.5 MG Oral Tab [Pharmacy Med Name: METHOTREXATE SODIUM 2.5MG TABLET] 24 tablet 3     Sig: TAKE 2 TABLETS (5 MG TOTAL) BY MOUTH ONCE A WEEK.     Refused By: DALILA AUSTIN     Reason for Refusal: Request already responded to by other means (e.g. phone or fax)

## 2025-01-17 ENCOUNTER — TELEPHONE (OUTPATIENT)
Dept: INTERNAL MEDICINE CLINIC | Facility: CLINIC | Age: 85
End: 2025-01-17

## 2025-01-17 RX ORDER — DONEPEZIL HYDROCHLORIDE 10 MG/1
10 TABLET, FILM COATED ORAL EVERY MORNING
Qty: 90 TABLET | Refills: 3 | Status: SHIPPED | OUTPATIENT
Start: 2025-01-17

## 2025-01-17 NOTE — TELEPHONE ENCOUNTER
Pt's daughter called stating that the pharmacy gave her donepezil 10 mg already cut in half, but Dr. Bond increased his dosage to 10 mg daily from 5 mg at recent OV. She would like new RX sent to pharmacy.    Per OV note from 1/3/25;   Will increase Aricept 5 to 10 for dementia    Corrected script sent to pharmacy with correct sig.

## 2025-01-20 ENCOUNTER — TELEPHONE (OUTPATIENT)
Dept: INTERNAL MEDICINE CLINIC | Facility: CLINIC | Age: 85
End: 2025-01-20

## 2025-01-20 RX ORDER — PREDNISONE 2.5 MG/1
2.5 TABLET ORAL 2 TIMES DAILY
Qty: 180 TABLET | Refills: 3 | Status: SHIPPED | OUTPATIENT
Start: 2025-01-20

## 2025-01-20 RX ORDER — BUPROPION HYDROCHLORIDE 75 MG/1
75 TABLET ORAL 2 TIMES DAILY
Qty: 180 TABLET | Refills: 3 | Status: SHIPPED | OUTPATIENT
Start: 2025-01-20

## 2025-01-20 NOTE — TELEPHONE ENCOUNTER
Patient daughter Jazzmine calling in  States that patient has lost a lot of weight.  Feels that patient depression is not getting better.  Weakness is getting worse.  Patient has recently requested to got to hospital.  Loss of interest.  Does not feel that medication is helping.  Is not eating anywhere near what he needs to sustain life.  Concerned that patient is eating closer to child like portions and portions seem to be shrinking.  Is sleeping day and night.  Sleeping 13-14 hours overnight and then back to bed a few hours later.  She is asking if PCP has any recommendations.  Commented today that he knows that when one spouse dies he knows the other deteriorates.    Provider please advise

## 2025-01-20 NOTE — TELEPHONE ENCOUNTER
Patient is not getting better, he is weak and consciously losing weight per daughter. Please call

## 2025-01-20 NOTE — TELEPHONE ENCOUNTER
Dr Bond advised the following:  I spoke to her told to inc Wellbutrin 75 every day to bid and pred 2.5 every day to bid both dosed 8am and 4pm please ep new [id number]    Requested Prescriptions     Signed Prescriptions Disp Refills    buPROPion 75 MG Oral Tab 180 tablet 3     Sig: Take 1 tablet (75 mg total) by mouth 2 (two) times daily.     Authorizing Provider: ESTEFANY BOND     Ordering User: DALILA AUSTIN    predniSONE 2.5 MG Oral Tab 180 tablet 3     Sig: Take 1 tablet (2.5 mg total) by mouth 2 (two) times daily.     Authorizing Provider: ESTEFANY BOND     Ordering User: DALILA AUSTIN

## 2025-02-24 ENCOUNTER — TELEPHONE (OUTPATIENT)
Dept: INTERNAL MEDICINE CLINIC | Facility: CLINIC | Age: 85
End: 2025-02-24

## 2025-02-24 NOTE — TELEPHONE ENCOUNTER
Dr Bond advised the following:  Yes 5mg instead of 10mg once daily and Flonase OTC prn.     [phone]   Spoke with patient daughter, Jazzmine Cevallos per HIPAA/Phone/Verbal consent   Advised of provider comments and recommendations.  Patient daughter informed and verbalized understanding.    States that patient has become increasing weak when standing and walking.  Does not do any type of exercise outside home.  Difficulty standing for more than a few minutes. Concerns that weight loss is loss of muscle mass too and would like to discuss at office visit.  Will be in for labs on Thursday and appointment on Monday.    Future Appointments   Date Time Provider Department Center   3/3/2025 11:00 AM Jeffy Bond MD [facility] [place]   9/24/2025  1:00 PM Matty Jones MD [facility]     Routed to PCP as JOZEF

## 2025-02-25 NOTE — TELEPHONE ENCOUNTER
Dr Bond advised the following:  Offer [facility]     [phone]   Spoke with patient daughter, Jazzmine Cevallos per HIPAA/Phone/Verbal consent   Advised of provider comments and recommendations.  States that she would like to see PCP to discuss first.  Verbalized Understanding

## 2025-02-27 ENCOUNTER — LAB ENCOUNTER (OUTPATIENT)
Dept: LAB | Facility: HOSPITAL | Age: 85
End: 2025-02-27
Attending: INTERNAL MEDICINE
Payer: MEDICARE

## 2025-02-27 DIAGNOSIS — M35.3 PMR (POLYMYALGIA RHEUMATICA) (HCC): ICD-10-CM

## 2025-02-27 DIAGNOSIS — N18.9 CHRONIC KIDNEY DISEASE, UNSPECIFIED CKD STAGE: ICD-10-CM

## 2025-02-27 LAB
ALBUMIN SERPL-MCNC: 4.1 G/DL (ref 3.2–4.8)
ALBUMIN/GLOB SERPL: 1.7 {RATIO} (ref 1–2)
ALP LIVER SERPL-CCNC: 65 U/L
ALT SERPL-CCNC: 18 U/L
ANION GAP SERPL CALC-SCNC: 9 MMOL/L (ref 0–18)
AST SERPL-CCNC: 28 U/L (ref ?–34)
BASOPHILS # BLD AUTO: 0.03 X10(3) UL (ref 0–0.2)
BASOPHILS NFR BLD AUTO: 0.4 %
BILIRUB SERPL-MCNC: 0.7 MG/DL (ref 0.2–1.1)
BUN BLD-MCNC: 29 MG/DL (ref 9–23)
BUN/CREAT SERPL: 16.2 (ref 10–20)
CALCIUM BLD-MCNC: 9 MG/DL (ref 8.7–10.4)
CHLORIDE SERPL-SCNC: 106 MMOL/L (ref 98–112)
CO2 SERPL-SCNC: 25 MMOL/L (ref 21–32)
CREAT BLD-MCNC: 1.79 MG/DL
DEPRECATED RDW RBC AUTO: 54.1 FL (ref 35.1–46.3)
EGFRCR SERPLBLD CKD-EPI 2021: 37 ML/MIN/1.73M2 (ref 60–?)
EOSINOPHIL # BLD AUTO: 0.4 X10(3) UL (ref 0–0.7)
EOSINOPHIL NFR BLD AUTO: 5.2 %
ERYTHROCYTE [DISTWIDTH] IN BLOOD BY AUTOMATED COUNT: 14.5 % (ref 11–15)
ERYTHROCYTE [SEDIMENTATION RATE] IN BLOOD: 12 MM/HR
FASTING STATUS PATIENT QL REPORTED: YES
GLOBULIN PLAS-MCNC: 2.4 G/DL (ref 2–3.5)
GLUCOSE BLD-MCNC: 88 MG/DL (ref 70–99)
HCT VFR BLD AUTO: 31.8 %
HGB BLD-MCNC: 11 G/DL
IMM GRANULOCYTES # BLD AUTO: 0.03 X10(3) UL (ref 0–1)
IMM GRANULOCYTES NFR BLD: 0.4 %
LYMPHOCYTES # BLD AUTO: 0.61 X10(3) UL (ref 1–4)
LYMPHOCYTES NFR BLD AUTO: 8 %
MCH RBC QN AUTO: 35.8 PG (ref 26–34)
MCHC RBC AUTO-ENTMCNC: 34.6 G/DL (ref 31–37)
MCV RBC AUTO: 103.6 FL
MONOCYTES # BLD AUTO: 0.63 X10(3) UL (ref 0.1–1)
MONOCYTES NFR BLD AUTO: 8.3 %
NEUTROPHILS # BLD AUTO: 5.93 X10 (3) UL (ref 1.5–7.7)
NEUTROPHILS # BLD AUTO: 5.93 X10(3) UL (ref 1.5–7.7)
NEUTROPHILS NFR BLD AUTO: 77.7 %
OSMOLALITY SERPL CALC.SUM OF ELEC: 295 MOSM/KG (ref 275–295)
PLATELET # BLD AUTO: 114 10(3)UL (ref 150–450)
POTASSIUM SERPL-SCNC: 4.4 MMOL/L (ref 3.5–5.1)
PROT SERPL-MCNC: 6.5 G/DL (ref 5.7–8.2)
RBC # BLD AUTO: 3.07 X10(6)UL
SODIUM SERPL-SCNC: 140 MMOL/L (ref 136–145)
WBC # BLD AUTO: 7.6 X10(3) UL (ref 4–11)

## 2025-02-27 PROCEDURE — 80053 COMPREHEN METABOLIC PANEL: CPT

## 2025-02-27 PROCEDURE — 85652 RBC SED RATE AUTOMATED: CPT

## 2025-02-27 PROCEDURE — 36415 COLL VENOUS BLD VENIPUNCTURE: CPT

## 2025-02-27 PROCEDURE — 85025 COMPLETE CBC W/AUTO DIFF WBC: CPT

## 2025-03-03 ENCOUNTER — OFFICE VISIT (OUTPATIENT)
Dept: INTERNAL MEDICINE CLINIC | Facility: CLINIC | Age: 85
End: 2025-03-03
Payer: MEDICARE

## 2025-03-03 VITALS
HEART RATE: 89 BPM | SYSTOLIC BLOOD PRESSURE: 136 MMHG | OXYGEN SATURATION: 99 % | BODY MASS INDEX: 19.04 KG/M2 | HEIGHT: 70 IN | WEIGHT: 133 LBS | TEMPERATURE: 98 F | DIASTOLIC BLOOD PRESSURE: 76 MMHG

## 2025-03-03 DIAGNOSIS — I10 ESSENTIAL HYPERTENSION: ICD-10-CM

## 2025-03-03 DIAGNOSIS — F32.9 REACTIVE DEPRESSION: ICD-10-CM

## 2025-03-03 DIAGNOSIS — M35.3 PMR (POLYMYALGIA RHEUMATICA) (HCC): Primary | ICD-10-CM

## 2025-03-03 DIAGNOSIS — E44.0 MODERATE PROTEIN-CALORIE MALNUTRITION (HCC): ICD-10-CM

## 2025-03-03 PROCEDURE — 99214 OFFICE O/P EST MOD 30 MIN: CPT | Performed by: INTERNAL MEDICINE

## 2025-03-03 PROCEDURE — G2211 COMPLEX E/M VISIT ADD ON: HCPCS | Performed by: INTERNAL MEDICINE

## 2025-03-03 RX ORDER — AMLODIPINE BESYLATE 2.5 MG/1
2.5 TABLET ORAL DAILY
COMMUNITY
Start: 2025-03-03

## 2025-03-03 RX ORDER — BUPROPION HYDROCHLORIDE 150 MG/1
150 TABLET ORAL
Qty: 90 TABLET | Refills: 3 | Status: SHIPPED | OUTPATIENT
Start: 2025-03-03

## 2025-03-03 RX ORDER — METHOTREXATE 2.5 MG/1
5 TABLET ORAL WEEKLY
COMMUNITY
Start: 2025-03-03

## 2025-03-03 RX ORDER — PREDNISONE 2.5 MG/1
2.5 TABLET ORAL DAILY
COMMUNITY
Start: 2025-03-03

## 2025-03-03 NOTE — PROGRESS NOTES
Subjective:   Patient ID: Camacho Daly is a 85 year old male.complex fu multiple medical problems    HTN and heart disease, Dementia, depression over loss wife 7 mos ago here with daughter. Losing weight not eating very weak mild insomnia        History/Other:   Review of Systems   Constitutional:  Positive for fatigue (severe and chronic) and unexpected weight change (losing wt).   HENT:  Positive for hearing loss.    Respiratory: Negative.     Cardiovascular:  Negative for chest pain and leg swelling.        CAD, CABG, stents, AVR, Lipids, HTN    Gastrointestinal:  Negative for constipation (has laxatives).        Reflux, appetite better   Endocrine: Negative.    Genitourinary:  Positive for difficulty urinating.        CKD   Musculoskeletal:  Positive for arthralgias, back pain and gait problem (walker).        RTHR 10/2023 healing well  PMR on lodose Methotrexate and Prednisone-stable   Skin:         Severe and generalized gwvyktvg-vwoxspfcfhp-qa inc Claitin and Prednisone-better  Dry skin-on various topicals   Neurological:  Positive for light-headedness. Weakness: generalized.       Imbalance-high risk falls   Hematological:  Bruises/bleeds easily (ASA and Prednisone).   Psychiatric/Behavioral:  Positive for decreased concentration, dysphoric mood (severe) and sleep disturbance.      Current Outpatient Medications   Medication Sig Dispense Refill    amLODIPine 2.5 MG Oral Tab Take 1 tablet (2.5 mg total) by mouth daily.      methotrexate 2.5 MG Oral Tab Take 2 tablets (5 mg total) by mouth once a week.      predniSONE 2.5 MG Oral Tab Take 1 tablet (2.5 mg total) by mouth daily.      buPROPion  MG Oral Tablet 24 Hr Take 1 tablet (150 mg total) by mouth every morning before breakfast. 90 tablet 3    donepezil 10 MG Oral Tab Take 1 tablet (10 mg total) by mouth every morning. 90 tablet 3    mirtazapine 45 MG Oral Tab Take 1 tablet (45 mg total) by mouth nightly. 90 tablet 3    CARVEDILOL 3.125 MG Oral  Tab TAKE 1 TABLET (3.125 MG TOTAL) BY MOUTH 2 (TWO) TIMES DAILY WITH MEALS. 60 tablet 89    diphenhydrAMINE HCl 2 % External Cream Apply tid prn      Cholecalciferol (VITAMIN D) 50 MCG (2000 UT) Oral Cap Take by mouth.      folic acid 1 MG Oral Tab Take 1 tablet (1 mg total) by mouth daily. 90 tablet 3    aspirin 81 MG Oral Tab EC Take 1 tablet (81 mg total) by mouth daily. 90 tablet 3    atorvastatin 20 MG Oral Tab Take 1 tablet (20 mg total) by mouth nightly. 90 tablet 3    famotidine (PEPCID) 20 MG Oral Tab Take 2 tablets (40 mg total) by mouth 2 (two) times daily. 360 tablet 3    Nutritional Supplements (JUICE PLUS FIBRE OR) Take 1 capsule by mouth 2 (two) times daily. Juice Plus Fiber       Allergies:Allergies[1]    Objective:   Physical Exam  Constitutional:       Appearance: He is ill-appearing (chronic).      Comments: Looks malnourished   HENT:      Ears:      Comments: Hearing loss  Cardiovascular:      Rate and Rhythm: Normal rate and regular rhythm.      Heart sounds: Murmur (2/6 sys) heard.      Comments: Sternal scar  Pulmonary:      Effort: No respiratory distress.      Breath sounds: Normal breath sounds.   Abdominal:      Tenderness: There is no abdominal tenderness.   Skin:     Findings: Bruising (severe) present. No rash (just pruritis).   Neurological:      Mental Status: He is alert.      Motor: Weakness present.      Gait: Gait abnormal (walker).   Psychiatric:      Comments: Depressed mood, grief, teary eyed, not suicidal.        Active Problems:  Patient Active Problem List   Diagnosis    HTN (hypertension)    PMR (polymyalgia rheumatica) ([facility])    CAD (coronary artery disease)    Aortic stenosis    Psoriasis    Hiatal hernia with gastroesophageal reflux    Vitamin D deficiency    Diverticulosis    Colon polyps    Dyslipidemia    Carotid artery plaque    Osteopenia    S/P AVR    S/P CABG (coronary artery bypass graft)    Proteinuria    Multiple renal cysts    Renal stone    Closed wedge  compression fracture of T11 vertebra with routine healing    Compression fracture of T12 vertebra ([facility])    Anemia    Chronic fatigue    Positive TRINY (antinuclear antibody)    Weakness generalized    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[facility])    History of community acquired pneumonia    Elevated lipoprotein A level    Elevated homocysteine    Drug-induced interstitial nephritis    Right sided sciatica    CKD (chronic kidney disease) stage 4, GFR 15-29 ml/min ([facility])    Contrast dye induced nephropathy    Renal artery stenosis    Pittman's esophagus    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[facility])    Sjogren's syndrome with myopathy ([facility])    Clostridium difficile colitis    SHIRA (obstructive sleep apnea)    Intractable low back pain    Lumbosacral radiculopathy at L2    History of back surgery-12/21/22    Weakness of right lower extremity    Diaphragmatic hernia without obstruction or gangrene    History of falling    Long term (current) use of systemic steroids    Personal history of colonic polyps    Personal history of nicotine dependence    Presence of aortocoronary bypass graft    Presence of prosthetic heart valve    Pulmonary fibrosis ([facility])    History of Clostridioides difficile colitis    Closed displaced fracture of right femoral neck ([facility])    History of right hip replacement    Acute posthemorrhagic anemia    Aftercare following right knee joint replacement surgery    Anemia in chronic kidney disease    Athscl heart disease of native coronary artery w/o ang pctrs    Pittman esophagus    Chronic kidney disease, stage 4 (severe) ([facility])    Deficiency of other specified B group vitamins    Hyperlipidemia, unspecified    Hypertensive chronic kidney disease w stg 1-4/unsp chr kdny    Obstructive sleep apnea (adult) (pediatric)    Other specified disorders of bone density and structure, unspecified site    Other spondylosis with radiculopathy, lumbosacral region    Polymyalgia rheumatica ([facility])    Presence of coronary  angioplasty implant and graft    Presence of right artificial hip joint    Sjogren syndrome with myopathy (HCC)    Unspecified hearing loss, unspecified ear    Unspecified open wound, right foot, subsequent encounter    Urgency of urination    Vitamin D deficiency, unspecified    Avascular necrosis of femoral head, right (HCC)    Cognitive dysfunction    Osteonecrosis of right hip (HCC)    Pain disorder with psychological factors    Itching    Dry skin    Mesenteric fibrosis    Iron deficiency anemia secondary to blood loss (chronic)    Syncope and collapse    Light chain (AL) amyloidosis (HCC)       Past Medical History:  Past Medical History:    Acute left-sided low back pain without sciatica    JOSE LUIS (acute kidney injury)    from omeprazole-resolved    Anemia    Aortic stenosis    AVR-bio    Pittman esophagus    CAD (coronary artery disease)    stents, bypasses    Calculus of kidney    Carotid artery plaque    ultrasound    Chronic anemia    bone marrow neg 12/2012    Chronic fatigue    CKD (chronic kidney disease) stage 4, GFR 15-29 ml/min ([facility])    Closed wedge compression fracture of T11 vertebra with routine healing    Clostridium difficile colitis    Colon polyps    colonoscopy    Compression fracture of T12 vertebra (HCC)    Contrast dye induced nephropathy    Dehydration    Diverticulosis    Drug-induced interstitial nephritis    Omeprazole    Dyslipidemia    Elevated homocysteine    Elevated lipoprotein A level    Elevated troponin    FUO (fever of unknown origin)    PMR or testicular/prostate infection    Gastritis    Hearing impairment    [place]    Hemorrhoids    Hiatal hernia with gastroesophageal reflux    History of Clostridioides difficile colitis    History of community acquired pneumonia    HTN (hypertension)    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)    Inguinal lymphadenopathy    right-bx    Ischemic colitis (HCC)    resolved    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HCC)    Mesenteric adenitis    bx     Multiple renal cysts    both-not reported on recent CTs    SHIRA (obstructive sleep apnea)    Osteopenia    steroids    PMR (polymyalgia rheumatica) (HCC)    Pneumonia    Positive TRINY (antinuclear antibody)    Precancerous lesion    skin    Proteinuria    mild    Psoriasis    Renal artery stenosis    both    Renal stone    right    Right shoulder pain    injected-iLane    Sjogren's syndrome with myopathy (HCC)    Sleep apnea    untreated    Thrombocytopenia    resolved    Vitamin B12 nutritional deficiency    Vitamin D deficiency    Weight loss       Past Surgical History:  Past Surgical History:   Procedure Laterality Date    Angioplasty (coronary)  1999    Dr. Maguire    Appendectomy      teenager    Biopsy  02/08/2013    Laparoscopic excision of lymph nodes, biopsies of the colonic and small bowel mesentery.    Bone marrow biopsy and aspiration  01/2021    Hantel-neg    Cabg  10/25/2013    AORTIC VALVE REPLACEMENT; bypass x 2-3    Cardiac catheterization  2008/2013    Cholecystectomy  1984    Colonoscopy  2005    Eliu    Colonoscopy  11/07/2012        Colonoscopy N/A 04/04/2018    Procedure: COLONOSCOPY;  Surgeon: Camacho Mckee MD;  Location:  ENDOSCOPY    Colonoscopy N/A 10/11/2021    1.Esophagitis 2.Colon polyps  3. Diverticulosis 4.Colitis    Cortisone injection  07/06/2015    rt shoulder     Cytology lymph node fna - jar(s): 1  12/07/2012    excisional biopsy rt inguinal lymph node     Egd  10/22/2021    Rafi    Egd  08/10/2019    Hiatal hernia    Hip replacement surgery      Ir kidney biopsy (renal)random  08/11/2020    for drug induced nephritis    Laminotomy, addl lumbar  12/21/2022    Minimally invasive right L1-2 laminotomy, partial medial facetectomy, resection of calcified disc/endplate complex.    Laparoscopic lymph node biop  02/08/2013    exploratory abd lymphadenopathy    Replace aortic valve open  10/25/2013    bio    Sigmoidoscopy,diagnostic  1995    Skin tags   10/1714    plus histofreeze    Total hip arthroplasty Right 10/18/2023    Right total hip arthroplasty    Upper gi endoscopy performed      Eliu/Rafi       Family History:  Family History   Problem Relation Age of Onset    Neurological Disorder Father         parkinsons    Cancer Father         lung    Other (Other) Father     Mental Disorder Mother         alzheimers    Other (Other) Mother     Cancer Daughter         hodgkins at age 18    Heart Surgery Brother     Heart Disorder Brother     Other (Other) Brother     Other (Other) Brother     Other (Other) Other         seizures from neurofibromatosis       Social History:  Social History     Socioeconomic History    Marital status:      Spouse name: Anjelica    Number of children: 3    Years of education: Not on file    Highest education level: Not on file   Occupational History    Occupation:      Employer: [employer]   Tobacco Use    Smoking status: Former     Current packs/day: 0.00     Types: Cigarettes     Quit date: 1971     Years since quittin.5    Smokeless tobacco: Never   Vaping Use    Vaping status: Never Used   Substance and Sexual Activity    Alcohol use: Yes     Alcohol/week: 2.0 standard drinks of alcohol     Types: 2 Standard drinks or equivalent per week     Comment: 2 drink at night    Drug use: No    Sexual activity: Not on file   Other Topics Concern     Service Not Asked    Blood Transfusions Not Asked    Caffeine Concern Yes     Comment: 2 cups daily    Occupational Exposure Not Asked    Hobby Hazards Not Asked    Sleep Concern Not Asked    Stress Concern No    Weight Concern No    Special Diet No    Back Care Not Asked    Exercise Yes     Comment: walking daily     Bike Helmet Not Asked    Seat Belt Yes    Self-Exams Not Asked   Social History Narrative    Not on file     Social Drivers of Health     Food Insecurity: No Food Insecurity (3/3/2025)    NCSS - Food Insecurity      Worried About Running Out of Food in the Last Year: No     Ran Out of Food in the Last Year: No   Transportation Needs: No Transportation Needs (3/3/2025)    NCSS - Transportation     Lack of Transportation: No   Stress: Not on file   Housing Stability: Not At Risk (3/3/2025)    NCSS - Housing/Utilities     Has Housing: Yes     Worried About Losing Housing: No     Unable to Get Utilities: No       Health Maintenance:    Immunization History   Administered Date(s) Administered    Covid-19 Vaccine Moderna 100 mcg/0.5 ml 02/05/2021, 03/05/2021    Covid-19 Vaccine Moderna 50 Mcg/0.25 Ml 12/13/2021    Covid-19 Vaccine Moderna Bivalent 50mcg/0.5mL 12+ years 12/26/2022    FLU VAC High Dose 65 YRS & Older PRSV Free (41242) 10/20/2014, 11/30/2015, 11/01/2016, 10/19/2020, 10/06/2021, 11/01/2022, 09/29/2023    FLUAD High Dose 65 yr and older (44082) 10/05/2017    Fluzone Vaccine Medicare () 10/20/2014, 11/30/2015, 11/01/2016, 11/27/2016, 11/20/2018    High Dose Fluzone Influenza Vaccine, 65yr+ PF 0.5mL (89344) 10/19/2020, 10/06/2021, 11/13/2024    Influenza 11/06/2013, 10/19/2019    Influenza Virus Vaccine, Split 09/13/2012    Moderna Covid-19 Vaccine 50mcg/0.5ml 12yrs+ 01/02/2024, 07/22/2024    Pneumococcal (Prevnar 13) 08/27/2015    Pneumovax 23 12/01/2009    RSV, bivalent, diluent reconstituted PF (Abrysvo) 11/29/2023    TD 10/01/2006    TDAP 01/13/2014    Tb Intradermal Test 12/26/2022, 01/05/2023    Tetanus 01/13/2014    Zoster Vaccine Live (Zostavax) 07/01/2012    Zoster Vaccine Recombinant Adjuvanted (Shingrix) 07/01/2012, 05/19/2023, 02/03/2024       Labs and Tests:  Patient Active Problem List   Component Date Value Ref Range Status    Glucose 02/27/2025 88  70 - 99 mg/dL Final    Sodium 02/27/2025 140  136 - 145 mmol/L Final    Potassium 02/27/2025 4.4  3.5 - 5.1 mmol/L Final    Chloride 02/27/2025 106  98 - 112 mmol/L Final    CO2 02/27/2025 25.0  21.0 - 32.0 mmol/L Final    Anion Gap 02/27/2025 9  0 - 18  mmol/L Final    BUN 02/27/2025 29 (H)  9 - 23 mg/dL Final    Creatinine 02/27/2025 1.79 (H)  0.70 - 1.30 mg/dL Final    BUN/CREA Ratio 02/27/2025 16.2  10.0 - 20.0 Final    Calcium, Total 02/27/2025 9.0  8.7 - 10.4 mg/dL Final    Calculated Osmolality 02/27/2025 295  275 - 295 mOsm/kg Final    eGFR-Cr 02/27/2025 37 (L)  >=60 mL/min/1.73m2 Final    ALT 02/27/2025 18  10 - 49 U/L Final    AST 02/27/2025 28  <34 U/L Final    Alkaline Phosphatase 02/27/2025 65  45 - 117 U/L Final    Bilirubin, Total 02/27/2025 0.7  0.2 - 1.1 mg/dL Final    Total Protein 02/27/2025 6.5  5.7 - 8.2 g/dL Final    Albumin 02/27/2025 4.1  3.2 - 4.8 g/dL Final    Globulin  02/27/2025 2.4  2.0 - 3.5 g/dL Final    A/G Ratio 02/27/2025 1.7  1.0 - 2.0 Final    Patient Fasting for CMP? 02/27/2025 Yes   Final    WBC 02/27/2025 7.6  4.0 - 11.0 x10(3) uL Final    RBC 02/27/2025 3.07 (L)  3.80 - 5.80 x10(6)uL Final    HGB 02/27/2025 11.0 (L)  13.0 - 17.5 g/dL Final    HCT 02/27/2025 31.8 (L)  39.0 - 53.0 % Final    MCV 02/27/2025 103.6 (H)  80.0 - 100.0 fL Final    MCH 02/27/2025 35.8 (H)  26.0 - 34.0 pg Final    MCHC 02/27/2025 34.6  31.0 - 37.0 g/dL Final    RDW-SD 02/27/2025 54.1 (H)  35.1 - 46.3 fL Final    RDW 02/27/2025 14.5  11.0 - 15.0 % Final    PLT 02/27/2025 114.0 (L)  150.0 - 450.0 10(3)uL Final    Neutrophil Absolute Prelim 02/27/2025 5.93  1.50 - 7.70 x10 (3) uL Final    Neutrophil Absolute 02/27/2025 5.93  1.50 - 7.70 x10(3) uL Final    Lymphocyte Absolute 02/27/2025 0.61 (L)  1.00 - 4.00 x10(3) uL Final    Monocyte Absolute 02/27/2025 0.63  0.10 - 1.00 x10(3) uL Final    Eosinophil Absolute 02/27/2025 0.40  0.00 - 0.70 x10(3) uL Final    Basophil Absolute 02/27/2025 0.03  0.00 - 0.20 x10(3) uL Final    Immature Granulocyte Absolute 02/27/2025 0.03  0.00 - 1.00 x10(3) uL Final    Neutrophil % 02/27/2025 77.7  % Final    Lymphocyte % 02/27/2025 8.0  % Final    Monocyte % 02/27/2025 8.3  % Final    Eosinophil % 02/27/2025 5.2  % Final     Basophil % 02/27/2025 0.4  % Final    Immature Granulocyte % 02/27/2025 0.4  % Final    Sed Rate 02/27/2025 12  0 - 20 mm/Hr Final       Vitals:  Vitals:    03/03/25 1111   BP: 136/76   BP Location: Right arm   Patient Position: Sitting   Cuff Size: adult   Pulse: 89   Temp: 97.9 °F (36.6 °C)   SpO2: 99%   Weight: 133 lb (60.3 kg)   Height: 5' 10\" (1.778 m)     Body mass index is 19.08 kg/m².    Labs decent-see above    Assessment & Plan:   1. PMR (polymyalgia rheumatica) (HCC)    2. Essential hypertension    3. Reactive depression    4. Moderate protein-calorie malnutrition (HCC)      Change Wellbutrin 75 bid to 150 am only, reduce Pred 2.5 bid to am only both should allow better sleep at night  HTN stable with wt loss. Dec Norvasc 2.5 bid to every day  Inc Boost every day to bid and more H2O for dehydration and malnutrition  Reduced Pred and will reduce Methotrexate from 3 to 2 tabs/week as PMR seems in remission  Declines referral to psych  Spoke to daughter  He has a grandson who is a physical therapist and will reach out for PT fro deconditioning  RTC 6 weeks for ongoing complex care        Meds This Visit:  Requested Prescriptions     Signed Prescriptions Disp Refills    buPROPion  MG Oral Tablet 24 Hr 90 tablet 3     Sig: Take 1 tablet (150 mg total) by mouth every morning before breakfast.       Imaging & Referrals:  None         [1]   Allergies  Allergen Reactions    Penicillins UNKNOWN, HIVES and OTHER (SEE COMMENTS)     unknown    Codeine PAIN     abdominal    Lisinopril HIVES    Pcn [Bicillin C-R,] HIVES    Niacin RASH and OTHER (SEE COMMENTS)     flushing    Ultram [Tramadol] NAUSEA ONLY     And constipation    Zinc Acetate NAUSEA ONLY

## 2025-03-10 ENCOUNTER — TELEPHONE (OUTPATIENT)
Facility: CLINIC | Age: 85
End: 2025-03-10

## 2025-03-10 NOTE — TELEPHONE ENCOUNTER
[phone]   Spoke with patient daughter, Jazzmine Cevallos per HIPAA/Phone/Verbal consent   States that patient was advised to take 5 mg Claritin states it is not helping would like to increased to 10 mg.  After chart review, advised patient was to try Flonase, daughter states that she forgot and patient never tried Flonase.  She will pick that up today.    Would provider advise increase or trial of Flonase first?

## 2025-03-10 NOTE — TELEPHONE ENCOUNTER
Dr Bond advised the following:  Do claritin 10 daily the normal adult dose and Flonase prn     [phone]  Spoke with patient daughter, Jazzmine Cevallos per HIPAA/Phone/Verbal consent   Advised of provider comments and recommendations.  Patient daughter informed and verbalized understanding.

## 2025-03-25 ENCOUNTER — DOCUMENTATION ONLY (OUTPATIENT)
Facility: CLINIC | Age: 85
End: 2025-03-25

## 2025-04-07 RX ORDER — AMLODIPINE BESYLATE 2.5 MG/1
2.5 TABLET ORAL DAILY
Qty: 90 TABLET | Refills: 0 | Status: SHIPPED | OUTPATIENT
Start: 2025-04-07

## 2025-04-07 NOTE — TELEPHONE ENCOUNTER
Last office visit: 3/3/2025    Return to Clinic: 6 weeks    Future Appointments   Date Time Provider Department Center   4/29/2025 11:30 AM Estefany Bond MD [facility] [place]   9/24/2025  1:00 PM Matty Jones MD [facility] [place]     Dosage decreased to 2.5mg 3/3/2025    Requested Prescriptions     Signed Prescriptions Disp Refills    amLODIPine 2.5 MG Oral Tab 90 tablet 0     Sig: Take 1 tablet (2.5 mg total) by mouth daily.     Authorizing Provider: ESTEFANY BOND     Ordering User: DALILA AUSTIN         Refill approved per protocol.

## 2025-04-08 NOTE — PROGRESS NOTES
Assessment/Plan   Last dilated:  9/12/24  color plates:  10/10 OU    1.   Primary Open-Angle Glaucoma OU:  /600.  Tm 20/22 (immediately post-op OS).  s/p combined with goniotomy OD 10/15/24:  pre-op VA 20/50, IOP = 11 mmHg.  Today, IOP is very well controlled OU      Plan:  cont timolol OS Qam             cont brimonidine 0.2% OS BID             cont latanoprost OS QHS    2.  Branch Retinal Vein Occlusion OD:  pt with some underline macular edema.  Pt active patient of Dr. Anne's       Plan:  as per Dr. Anne    3.  Pseudophakia (PCIOL) OU:  s/p YAG Cap OS.  Pt with some PCO OD.  Pt feels that vision OD has regressed nearly back to pre-op levels.  Pt with underlying branch retinal vein occlusion (BRVO) c macular edema and significant VF loss from glaucoma OD.  Degree of PCO is mild.  Pt is interested in anything that could improve b/c she is having trouble with using the computer.  I am not confident that there would be much improvement with a YAG cap, but it's removal could potential help her vision.  Previously Discussed options 1) Cpm, 2) YAG Cap.  R/B/A reviewed.  Pt clearly articulates back that the YAG Cap may not improve vision.      Plan:  pt wishes to proceed with YAG Cap OD (RIGHT) today 4/8/25 OD pre-laser VA = 20/30 OD   COURTNEY HOSPITALIST  Progress Note     [facility] Patient Status:  Observation    1940 MRN [id number]   [facility] 4NW-A Attending Cindi Tyson MD   Hosp Day # 0 PCP Sharona Steven MD     Chief Complaint: Weakness    S: Patient feel K 3.1* 2.8* 3.4* 3.1* 3.6  3.6   CL 98 100 111 111 117*   CO2 24.0 27.0 23.0 23.0 22.0   ALKPHO 65 82  --  59  --    AST 61* 72*  --  50*  --    * 108*  --  82*  --    BILT 0.8 0.8  --  0.6  --    TP 7.2 7.3  --  6.0*  --        Estimated Creatini TBD    Plan of care discussed with pt and RN.     Scottie Acevedo MD

## 2025-04-29 ENCOUNTER — OFFICE VISIT (OUTPATIENT)
Facility: CLINIC | Age: 85
End: 2025-04-29
Payer: MEDICARE

## 2025-04-29 ENCOUNTER — TELEPHONE (OUTPATIENT)
Facility: CLINIC | Age: 85
End: 2025-04-29

## 2025-04-29 VITALS
DIASTOLIC BLOOD PRESSURE: 82 MMHG | BODY MASS INDEX: 18.61 KG/M2 | TEMPERATURE: 98 F | HEART RATE: 76 BPM | OXYGEN SATURATION: 99 % | HEIGHT: 70 IN | WEIGHT: 130 LBS | SYSTOLIC BLOOD PRESSURE: 152 MMHG

## 2025-04-29 DIAGNOSIS — R63.4 WEIGHT LOSS: ICD-10-CM

## 2025-04-29 DIAGNOSIS — R64 CACHECTIC (HCC): Primary | ICD-10-CM

## 2025-04-29 DIAGNOSIS — K65.4 MESENTERIC PANNICULITIS (HCC): ICD-10-CM

## 2025-04-29 DIAGNOSIS — N18.9 CHRONIC KIDNEY DISEASE, UNSPECIFIED CKD STAGE: ICD-10-CM

## 2025-04-29 DIAGNOSIS — R54 FRAILTY SYNDROME IN GERIATRIC PATIENT: ICD-10-CM

## 2025-04-29 DIAGNOSIS — M35.3 PMR (POLYMYALGIA RHEUMATICA) (HCC): ICD-10-CM

## 2025-04-29 LAB
ANION GAP SERPL CALC-SCNC: 8 MMOL/L (ref 0–18)
BASOPHILS # BLD AUTO: 0.05 X10(3) UL (ref 0–0.2)
BASOPHILS NFR BLD AUTO: 0.6 %
BUN BLD-MCNC: 30 MG/DL (ref 9–23)
CALCIUM BLD-MCNC: 9.5 MG/DL (ref 8.7–10.6)
CHLORIDE SERPL-SCNC: 106 MMOL/L (ref 98–112)
CO2 SERPL-SCNC: 26 MMOL/L (ref 21–32)
CREAT BLD-MCNC: 2.19 MG/DL (ref 0.7–1.3)
EGFRCR SERPLBLD CKD-EPI 2021: 29 ML/MIN/1.73M2 (ref 60–?)
EOSINOPHIL # BLD AUTO: 0.38 X10(3) UL (ref 0–0.7)
EOSINOPHIL NFR BLD AUTO: 4.6 %
ERYTHROCYTE [DISTWIDTH] IN BLOOD BY AUTOMATED COUNT: 14.3 %
ERYTHROCYTE [SEDIMENTATION RATE] IN BLOOD: 13 MM/HR (ref 0–20)
FASTING STATUS PATIENT QL REPORTED: NO
GLUCOSE BLD-MCNC: 91 MG/DL (ref 70–99)
HCT VFR BLD AUTO: 33.5 % (ref 39–53)
HGB BLD-MCNC: 10.9 G/DL (ref 13–17.5)
IMM GRANULOCYTES # BLD AUTO: 0.03 X10(3) UL (ref 0–1)
IMM GRANULOCYTES NFR BLD: 0.4 %
LYMPHOCYTES # BLD AUTO: 0.66 X10(3) UL (ref 1–4)
LYMPHOCYTES NFR BLD AUTO: 8 %
MCH RBC QN AUTO: 35.2 PG (ref 26–34)
MCHC RBC AUTO-ENTMCNC: 32.5 G/DL (ref 31–37)
MCV RBC AUTO: 108.1 FL (ref 80–100)
MONOCYTES # BLD AUTO: 0.47 X10(3) UL (ref 0.1–1)
MONOCYTES NFR BLD AUTO: 5.7 %
NEUTROPHILS # BLD AUTO: 6.7 X10 (3) UL (ref 1.5–7.7)
NEUTROPHILS # BLD AUTO: 6.7 X10(3) UL (ref 1.5–7.7)
NEUTROPHILS NFR BLD AUTO: 80.7 %
OSMOLALITY SERPL CALC.SUM OF ELEC: 296 MOSM/KG (ref 275–295)
PLATELET # BLD AUTO: 126 10(3)UL (ref 150–450)
POTASSIUM SERPL-SCNC: 4.6 MMOL/L (ref 3.5–5.1)
RBC # BLD AUTO: 3.1 X10(6)UL (ref 3.8–5.8)
SODIUM SERPL-SCNC: 140 MMOL/L (ref 136–145)
WBC # BLD AUTO: 8.3 X10(3) UL (ref 4–11)

## 2025-04-29 PROCEDURE — 80048 BASIC METABOLIC PNL TOTAL CA: CPT | Performed by: INTERNAL MEDICINE

## 2025-04-29 PROCEDURE — 85025 COMPLETE CBC W/AUTO DIFF WBC: CPT | Performed by: INTERNAL MEDICINE

## 2025-04-29 PROCEDURE — 99214 OFFICE O/P EST MOD 30 MIN: CPT | Performed by: INTERNAL MEDICINE

## 2025-04-29 PROCEDURE — G2211 COMPLEX E/M VISIT ADD ON: HCPCS | Performed by: INTERNAL MEDICINE

## 2025-04-29 PROCEDURE — 85652 RBC SED RATE AUTOMATED: CPT | Performed by: INTERNAL MEDICINE

## 2025-04-29 RX ORDER — AZELASTINE HCL 205.5 UG/1
1 SPRAY NASAL 2 TIMES DAILY
COMMUNITY

## 2025-04-29 NOTE — TELEPHONE ENCOUNTER
Patient daughter, mentioned that flonase gave the patient a face rash and would like an alternative option.     Spoke to Dr. Bond, he suggested Astepro OTC, 1 spray in each nostril twice a day.      Attempted to call patient daughter, LVM to call back.

## 2025-04-30 NOTE — TELEPHONE ENCOUNTER
Spoke to daughter Chuy (EC), patient's name and  verified and informed of PCP's recommendations.  Daughter verbalized understanding.     Please advise.  Chuy (EC) would like to speak to Dr. Bond regarding concerns of patient. Thank you.

## 2025-04-30 NOTE — PROGRESS NOTES
Subjective:   Patient ID: Camacho Daly is a 85 year old male.here with daughter Mark thin and frail    Weight loss has stable-actually up to 130 from 125 with help of Boost suppl. Has multiple medical problems and depressed since losing his wife.         History/Other:   Review of Systems   Constitutional:  Positive for fatigue (severe and chronic) and unexpected weight change (stable but skinny).   HENT:  Positive for hearing loss.    Respiratory: Negative.     Cardiovascular:  Negative for chest pain and leg swelling.        CAD, CABG, stents, AVR, Lipids, HTN    Gastrointestinal:  Negative for constipation (has laxatives).        Reflux, appetite better   Endocrine: Negative.    Genitourinary:  Positive for difficulty urinating.        CKD   Musculoskeletal:  Positive for arthralgias, back pain and gait problem (walker).        RTHR 10/2023 healing well  PMR on lodose Methotrexate and Prednisone-stable   Skin:         Severe and generalized fgzubnbm-kcjfvsrwvxh-kh inc Claitin and Prednisone-better  Dry skin-on various topicals   Neurological:  Positive for light-headedness. Weakness: generalized.       Imbalance-high risk falls   Hematological:  Bruises/bleeds easily (ASA and Prednisone).   Psychiatric/Behavioral:  Positive for decreased concentration, dysphoric mood (severe) and sleep disturbance.      Current Medications[1]  Allergies:Allergies[2]    Objective:   Physical Exam  Constitutional:       General: He is not in acute distress.     Appearance: He is ill-appearing (chronic).      Comments: Looks malnourished   HENT:      Ears:      Comments: Hearing loss  Cardiovascular:      Rate and Rhythm: Normal rate and regular rhythm.      Heart sounds: Murmur (2/6 sys) heard.      Comments: Sternal scar  Pulmonary:      Effort: No respiratory distress.      Breath sounds: Normal breath sounds.   Abdominal:      Tenderness: There is no abdominal tenderness.   Skin:     Findings: Bruising (severe) present. No  rash (just pruritis).   Neurological:      Mental Status: He is alert.      Motor: Weakness present.      Gait: Gait abnormal (walker).   Psychiatric:      Comments: Depressed mood, grief, teary eyed, not suicidal.        Active Problems:  Problem List[3]    Past Medical History:  Past Medical History[4]    Past Surgical History:  Past Surgical History[5]    Family History:  Family History[6]    Social History:  Social History     Socioeconomic History    Marital status:      Spouse name: Anjelica    Number of children: 3    Years of education: Not on file    Highest education level: Not on file   Occupational History    Occupation:      Employer: [employer]   Tobacco Use    Smoking status: Former     Current packs/day: 0.00     Types: Cigarettes     Quit date: 1971     Years since quittin.7    Smokeless tobacco: Never   Vaping Use    Vaping status: Never Used   Substance and Sexual Activity    Alcohol use: Yes     Alcohol/week: 2.0 standard drinks of alcohol     Types: 2 Standard drinks or equivalent per week     Comment: 2 drink at night    Drug use: No    Sexual activity: Not on file   Other Topics Concern     Service Not Asked    Blood Transfusions Not Asked    Caffeine Concern Yes     Comment: 2 cups daily    Occupational Exposure Not Asked    Hobby Hazards Not Asked    Sleep Concern Not Asked    Stress Concern No    Weight Concern No    Special Diet No    Back Care Not Asked    Exercise Yes     Comment: walking daily     Bike Helmet Not Asked    Seat Belt Yes    Self-Exams Not Asked   Social History Narrative    Not on file     Social Drivers of Health     Food Insecurity: No Food Insecurity (3/3/2025)    NCSS - Food Insecurity     Worried About Running Out of Food in the Last Year: No     Ran Out of Food in the Last Year: No   Transportation Needs: No Transportation Needs (3/3/2025)    NCSS - Transportation     Lack of Transportation: No   Stress: Not on file    Housing Stability: Not At Risk (3/3/2025)    NCSS - Housing/Utilities     Has Housing: Yes     Worried About Losing Housing: No     Unable to Get Utilities: No       Health Maintenance:    Immunization History   Administered Date(s) Administered    Covid-19 Vaccine Moderna 100 mcg/0.5 ml 02/05/2021, 03/05/2021    Covid-19 Vaccine Moderna 50 Mcg/0.25 Ml 12/13/2021    Covid-19 Vaccine Moderna Bivalent 50mcg/0.5mL 12+ years 12/26/2022    FLU VAC High Dose 65 YRS & Older PRSV Free (55322) 10/20/2014, 11/30/2015, 11/01/2016, 10/19/2020, 10/06/2021, 11/01/2022, 09/29/2023    FLUAD High Dose 65 yr and older (39962) 10/05/2017    Fluzone Vaccine Medicare () 10/20/2014, 11/30/2015, 11/01/2016, 11/27/2016, 11/20/2018    High Dose Fluzone Influenza Vaccine, 65yr+ PF 0.5mL (38224) 10/19/2020, 10/06/2021, 11/13/2024    Influenza 11/06/2013, 10/19/2019    Influenza Virus Vaccine, Split 09/13/2012    Moderna Covid-19 Vaccine 50mcg/0.5ml 12yrs+ 01/02/2024, 07/22/2024    Pneumococcal (Prevnar 13) 08/27/2015    Pneumovax 23 12/01/2009    RSV, bivalent, diluent reconstituted PF (Abrysvo) 11/29/2023    TD 10/01/2006    TDAP 01/13/2014    Tb Intradermal Test 12/26/2022, 01/05/2023    Tetanus 01/13/2014    Zoster Vaccine Live (Zostavax) 07/01/2012    Zoster Vaccine Recombinant Adjuvanted (Shingrix) 07/01/2012, 05/19/2023, 02/03/2024       Labs and Tests:  [facility] Lab Encounter on 02/27/2025   Component Date Value Ref Range Status    Glucose 02/27/2025 88  70 - 99 mg/dL Final    Sodium 02/27/2025 140  136 - 145 mmol/L Final    Potassium 02/27/2025 4.4  3.5 - 5.1 mmol/L Final    Chloride 02/27/2025 106  98 - 112 mmol/L Final    CO2 02/27/2025 25.0  21.0 - 32.0 mmol/L Final    Anion Gap 02/27/2025 9  0 - 18 mmol/L Final    BUN 02/27/2025 29 (H)  9 - 23 mg/dL Final    Creatinine 02/27/2025 1.79 (H)  0.70 - 1.30 mg/dL Final    BUN/CREA Ratio 02/27/2025 16.2  10.0 - 20.0 Final    Calcium, Total 02/27/2025 9.0  8.7 - 10.4 mg/dL Final     Calculated Osmolality 02/27/2025 295  275 - 295 mOsm/kg Final    eGFR-Cr 02/27/2025 37 (L)  >=60 mL/min/1.73m2 Final    ALT 02/27/2025 18  10 - 49 U/L Final    AST 02/27/2025 28  <34 U/L Final    Alkaline Phosphatase 02/27/2025 65  45 - 117 U/L Final    Bilirubin, Total 02/27/2025 0.7  0.2 - 1.1 mg/dL Final    Total Protein 02/27/2025 6.5  5.7 - 8.2 g/dL Final    Albumin 02/27/2025 4.1  3.2 - 4.8 g/dL Final    Globulin  02/27/2025 2.4  2.0 - 3.5 g/dL Final    A/G Ratio 02/27/2025 1.7  1.0 - 2.0 Final    Patient Fasting for CMP? 02/27/2025 Yes   Final    WBC 02/27/2025 7.6  4.0 - 11.0 x10(3) uL Final    RBC 02/27/2025 3.07 (L)  3.80 - 5.80 x10(6)uL Final    HGB 02/27/2025 11.0 (L)  13.0 - 17.5 g/dL Final    HCT 02/27/2025 31.8 (L)  39.0 - 53.0 % Final    MCV 02/27/2025 103.6 (H)  80.0 - 100.0 fL Final    MCH 02/27/2025 35.8 (H)  26.0 - 34.0 pg Final    MCHC 02/27/2025 34.6  31.0 - 37.0 g/dL Final    RDW-SD 02/27/2025 54.1 (H)  35.1 - 46.3 fL Final    RDW 02/27/2025 14.5  11.0 - 15.0 % Final    PLT 02/27/2025 114.0 (L)  150.0 - 450.0 10(3)uL Final    Neutrophil Absolute Prelim 02/27/2025 5.93  1.50 - 7.70 x10 (3) uL Final    Neutrophil Absolute 02/27/2025 5.93  1.50 - 7.70 x10(3) uL Final    Lymphocyte Absolute 02/27/2025 0.61 (L)  1.00 - 4.00 x10(3) uL Final    Monocyte Absolute 02/27/2025 0.63  0.10 - 1.00 x10(3) uL Final    Eosinophil Absolute 02/27/2025 0.40  0.00 - 0.70 x10(3) uL Final    Basophil Absolute 02/27/2025 0.03  0.00 - 0.20 x10(3) uL Final    Immature Granulocyte Absolute 02/27/2025 0.03  0.00 - 1.00 x10(3) uL Final    Neutrophil % 02/27/2025 77.7  % Final    Lymphocyte % 02/27/2025 8.0  % Final    Monocyte % 02/27/2025 8.3  % Final    Eosinophil % 02/27/2025 5.2  % Final    Basophil % 02/27/2025 0.4  % Final    Immature Granulocyte % 02/27/2025 0.4  % Final    Sed Rate 02/27/2025 12  0 - 20 mm/Hr Final       Vitals:  Vitals:    04/29/25 1203   BP: 152/82   BP Location: Right arm   Patient Position:  Sitting   Cuff Size: adult   Pulse: 76   Temp: 97.9 °F (36.6 °C)   SpO2: 99%   Weight: 130 lb (59 kg)   Height: 5' 10\" (1.778 m)     Body mass index is 18.65 kg/m².      Assessment & Plan:   1. Cachectic (HCC)    2. Weight loss    3. Frailty syndrome in geriatric patient    4. PMR (polymyalgia rheumatica) (HCC)    5. Chronic kidney disease, unspecified CKD stage    6. Mesenteric panniculitis (HCC)      Cachectic but stable  BMP shows mild stable CKD. ESR normal can stop Prednisone and Methotrexate for PMR-in remission  Cont Boost suppl and daughter will encourage feeding and ambulation with walker  Has Phy therapist grandson for help  Mild anemia may reverse after stopping Methotrexat  On two antidepressants-continue  Explore other causes for wt loss like occult CA  CXR neg 8/2024 less than 1yr ago  Last CT abd 12/2023 can repeat   Offered but refused [facility] Cancer screen for $799 cash  RTC 1mo CPE            Orders Placed This Encounter   Procedures    CBC With Differential With Platelet    Basic Metabolic Panel (8)    Sed Rate, Ralfren (Automated) [E]       Meds This Visit:  Requested Prescriptions      No prescriptions requested or ordered in this encounter       Imaging & Referrals:  CT ABDOMEN+PELVIS(CPT=74176)         [1]   Current Outpatient Medications   Medication Sig Dispense Refill    amLODIPine 2.5 MG Oral Tab Take 1 tablet (2.5 mg total) by mouth daily. 90 tablet 0    methotrexate 2.5 MG Oral Tab Take 2 tablets (5 mg total) by mouth once a week.      predniSONE 2.5 MG Oral Tab Take 1 tablet (2.5 mg total) by mouth daily.      buPROPion  MG Oral Tablet 24 Hr Take 1 tablet (150 mg total) by mouth every morning before breakfast. 90 tablet 3    donepezil 10 MG Oral Tab Take 1 tablet (10 mg total) by mouth every morning. 90 tablet 3    mirtazapine 45 MG Oral Tab Take 1 tablet (45 mg total) by mouth nightly. 90 tablet 3    CARVEDILOL 3.125 MG Oral Tab TAKE 1 TABLET (3.125 MG TOTAL) BY MOUTH 2 (TWO)  TIMES DAILY WITH MEALS. 60 tablet 89    diphenhydrAMINE HCl 2 % External Cream Apply tid prn      Cholecalciferol (VITAMIN D) 50 MCG (2000 UT) Oral Cap Take by mouth.      folic acid 1 MG Oral Tab Take 1 tablet (1 mg total) by mouth daily. 90 tablet 3    aspirin 81 MG Oral Tab EC Take 1 tablet (81 mg total) by mouth daily. 90 tablet 3    atorvastatin 20 MG Oral Tab Take 1 tablet (20 mg total) by mouth nightly. 90 tablet 3    famotidine (PEPCID) 20 MG Oral Tab Take 2 tablets (40 mg total) by mouth 2 (two) times daily. 360 tablet 3    Nutritional Supplements (JUICE PLUS FIBRE OR) Take 1 capsule by mouth 2 (two) times daily. Juice Plus Fiber      Azelastine HCl 0.15 % Nasal Solution 1 spray by Nasal route in the morning and 1 spray before bedtime.     [2]   Allergies  Allergen Reactions    Penicillins UNKNOWN, HIVES and OTHER (SEE COMMENTS)     unknown    Codeine PAIN     abdominal    Lisinopril HIVES    Pcn [Bicillin C-R,] HIVES    Niacin RASH and OTHER (SEE COMMENTS)     flushing    Ultram [Tramadol] NAUSEA ONLY     And constipation    Zinc Acetate NAUSEA ONLY   [3]   Patient Active Problem List  Diagnosis    HTN (hypertension)    PMR (polymyalgia rheumatica) ([facility])    CAD (coronary artery disease)    Aortic stenosis    Psoriasis    Hiatal hernia with gastroesophageal reflux    Vitamin D deficiency    Diverticulosis    Colon polyps    Dyslipidemia    Carotid artery plaque    Osteopenia    S/P AVR    S/P CABG (coronary artery bypass graft)    Proteinuria    Multiple renal cysts    Renal stone    Closed wedge compression fracture of T11 vertebra with routine healing    Compression fracture of T12 vertebra ([facility])    Anemia    Chronic fatigue    Positive TRINY (antinuclear antibody)    Weakness generalized    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[facility])    History of community acquired pneumonia    Elevated lipoprotein A level    Elevated homocysteine    Drug-induced interstitial nephritis    Right sided sciatica     CKD (chronic kidney disease) stage 4, GFR 15-29 ml/min ([facility])    Contrast dye induced nephropathy    Renal artery stenosis    Pittman's esophagus    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HCC)    Sjogren's syndrome with myopathy (HCC)    Clostridium difficile colitis    SHIRA (obstructive sleep apnea)    Intractable low back pain    Lumbosacral radiculopathy at L2    History of back surgery-12/21/22    Weakness of right lower extremity    Diaphragmatic hernia without obstruction or gangrene    History of falling    Long term (current) use of systemic steroids    Personal history of colonic polyps    Personal history of nicotine dependence    Presence of aortocoronary bypass graft    Presence of prosthetic heart valve    Pulmonary fibrosis (HCC)    History of Clostridioides difficile colitis    Closed displaced fracture of right femoral neck ([facility])    History of right hip replacement    Acute posthemorrhagic anemia    Aftercare following right knee joint replacement surgery    Anemia in chronic kidney disease    Athscl heart disease of native coronary artery w/o ang pctrs    Pittman esophagus    Chronic kidney disease, stage 4 (severe) ([facility])    Deficiency of other specified B group vitamins    Hyperlipidemia, unspecified    Hypertensive chronic kidney disease w stg 1-4/unsp chr kdny    Obstructive sleep apnea (adult) (pediatric)    Other specified disorders of bone density and structure, unspecified site    Other spondylosis with radiculopathy, lumbosacral region    Polymyalgia rheumatica (HCC)    Presence of coronary angioplasty implant and graft    Presence of right artificial hip joint    Sjogren syndrome with myopathy ([facility])    Unspecified hearing loss, unspecified ear    Unspecified open wound, right foot, subsequent encounter    Urgency of urination    Vitamin D deficiency, unspecified    Avascular necrosis of femoral head, right ([facility])    Cognitive dysfunction    Osteonecrosis of right hip ([facility])    Pain disorder with  psychological factors    Itching    Dry skin    Mesenteric fibrosis    Iron deficiency anemia secondary to blood loss (chronic)    Syncope and collapse    Light chain (AL) amyloidosis (HCC)   [4]   Past Medical History:   Acute left-sided low back pain without sciatica    JOSE LUIS (acute kidney injury)    from omeprazole-resolved    Anemia    Aortic stenosis    AVR-bio    Pittman esophagus    CAD (coronary artery disease)    stents, bypasses    Calculus of kidney    Carotid artery plaque    ultrasound    Chronic anemia    bone marrow neg 12/2012    Chronic fatigue    CKD (chronic kidney disease) stage 4, GFR 15-29 ml/min (HCC)    Closed wedge compression fracture of T11 vertebra with routine healing    Clostridium difficile colitis    Colon polyps    colonoscopy    Compression fracture of T12 vertebra (HCC)    Contrast dye induced nephropathy    Dehydration    Diverticulosis    Drug-induced interstitial nephritis    Omeprazole    Dyslipidemia    Elevated homocysteine    Elevated lipoprotein A level    Elevated troponin    FUO (fever of unknown origin)    PMR or testicular/prostate infection    Gastritis    Hearing impairment    [place]    Hemorrhoids    Hiatal hernia with gastroesophageal reflux    History of Clostridioides difficile colitis    History of community acquired pneumonia    HTN (hypertension)    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)    Inguinal lymphadenopathy    right-bx    Ischemic colitis (HCC)    resolved    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HCC)    Mesenteric adenitis    bx    Multiple renal cysts    both-not reported on recent CTs    SHIRA (obstructive sleep apnea)    Osteopenia    steroids    PMR (polymyalgia rheumatica) (HCC)    Pneumonia    Positive TRINY (antinuclear antibody)    Precancerous lesion    skin    Proteinuria    mild    Psoriasis    Renal artery stenosis    both    Renal stone    right    Right shoulder pain    injected-Liane    Sjogren's syndrome with myopathy (HCC)    Sleep apnea     untreated    Thrombocytopenia    resolved    Vitamin B12 nutritional deficiency    Vitamin D deficiency    Weight loss   [5]   Past Surgical History:  Procedure Laterality Date    Angioplasty (coronary)  1999    Dr. Maguire    Appendectomy      teenager    Biopsy  02/08/2013    Laparoscopic excision of lymph nodes, biopsies of the colonic and small bowel mesentery.    Bone marrow biopsy and aspiration  01/2021    Hantel-neg    Cabg  10/25/2013    AORTIC VALVE REPLACEMENT; bypass x 2-3    Cardiac catheterization  2008/2013    Cholecystectomy  1984    Colonoscopy  2005    Eliu    Colonoscopy  11/07/2012        Colonoscopy N/A 04/04/2018    Procedure: COLONOSCOPY;  Surgeon: Camacho Mckee MD;  Location:  ENDOSCOPY    Colonoscopy N/A 10/11/2021    1.Esophagitis 2.Colon polyps  3. Diverticulosis 4.Colitis    Cortisone injection  07/06/2015    rt shoulder     Cytology lymph node fna - jar(s): 1  12/07/2012    excisional biopsy rt inguinal lymph node     Egd  10/22/2021    Rafi    Egd  08/10/2019    Hiatal hernia    Hip replacement surgery      Ir kidney biopsy (renal)random  08/11/2020    for drug induced nephritis    Laminotomy, addl lumbar  12/21/2022    Minimally invasive right L1-2 laminotomy, partial medial facetectomy, resection of calcified disc/endplate complex.    Laparoscopic lymph node biop  02/08/2013    exploratory abd lymphadenopathy    Replace aortic valve open  10/25/2013    bio    Sigmoidoscopy,diagnostic  1995    Skin tags  10/1714    plus histofreeze    Total hip arthroplasty Right 10/18/2023    Right total hip arthroplasty    Upper gi endoscopy performed  2005/2007    Eliu/Rafi   [6]   Family History  Problem Relation Age of Onset    Neurological Disorder Father         parkinsons    Cancer Father         lung    Other (Other) Father     Mental Disorder Mother         alzheimers    Other (Other) Mother     Cancer Daughter         hodgkins at age 18    Heart Surgery Brother      Heart Disorder Brother     Other (Other) Brother     Other (Other) Brother     Other (Other) Other         seizures from neurofibromatosis

## 2025-04-30 NOTE — PROGRESS NOTES
Per Dr. Bond:  Jeffy Bond MD  4/29/2025  9:05 PM CDT Back to Top      Mild anemia same, mild CKD same, ESR normal stop prednisone     Jeffy Bond MD  P [facility] Clinical Staff  See previous message to stop Prednisone. Also tell him to stop Methotrexate    Med list updated

## 2025-05-06 ENCOUNTER — TELEPHONE (OUTPATIENT)
Facility: CLINIC | Age: 85
End: 2025-05-06

## 2025-05-06 NOTE — TELEPHONE ENCOUNTER
Patient is scheduled for a one-stop shop on 6/4/25 with Dr. Lam.  Please reschedule to a different day.

## 2025-05-19 RX ORDER — FAMOTIDINE 40 MG/1
40 TABLET, FILM COATED ORAL 2 TIMES DAILY
Qty: 180 TABLET | Refills: 3 | Status: SHIPPED | OUTPATIENT
Start: 2025-05-19

## 2025-05-19 NOTE — TELEPHONE ENCOUNTER
[facility] wall called, if they could change the Famotidine from 20 mg 2 tab bid to Famotidine 40 mg bid it would be more cost effective for the Patient.

## 2025-05-21 ENCOUNTER — PATIENT MESSAGE (OUTPATIENT)
Facility: CLINIC | Age: 85
End: 2025-05-21

## 2025-05-21 RX ORDER — AMLODIPINE BESYLATE 2.5 MG/1
2.5 TABLET ORAL DAILY
Qty: 90 TABLET | Refills: 0 | Status: SHIPPED | OUTPATIENT
Start: 2025-05-21

## 2025-05-21 NOTE — TELEPHONE ENCOUNTER
Medication: amlodipine 2.5 mg 1 tab daily filled     Last time it was filled 4/7/25 90 with 0 refills   Last office visit:  4/29/25  Due back to office:  RTC 1mo CPE   Future office visit:  6/4/25  Labs     Ref Range & Units (hover) 2/27/25 10:28 AM   Glucose 88   Sodium 140   Potassium 4.4   Chloride 106   CO2 25.0   Anion Gap 9   BUN 29 High    Creatinine 1.79 High    BUN/CREA Ratio 16.2   Calcium, Total 9.0   Calculated Osmolality 295   eGFR-Cr 37 Low    ALT 18   AST 28   Alkaline Phosphatase 65

## 2025-06-04 ENCOUNTER — HOSPITAL ENCOUNTER (EMERGENCY)
Facility: HOSPITAL | Age: 85
Discharge: HOME | End: 2025-06-04
Attending: EMERGENCY MEDICINE
Payer: MEDICARE

## 2025-06-04 ENCOUNTER — APPOINTMENT (OUTPATIENT)
Dept: GENERAL RADIOLOGY | Facility: HOSPITAL | Age: 85
End: 2025-06-04
Attending: EMERGENCY MEDICINE
Payer: MEDICARE

## 2025-06-04 ENCOUNTER — TELEPHONE (OUTPATIENT)
Facility: CLINIC | Age: 85
End: 2025-06-04

## 2025-06-04 VITALS
DIASTOLIC BLOOD PRESSURE: 65 MMHG | SYSTOLIC BLOOD PRESSURE: 138 MMHG | HEART RATE: 72 BPM | OXYGEN SATURATION: 100 % | RESPIRATION RATE: 23 BRPM | TEMPERATURE: 98 F

## 2025-06-04 DIAGNOSIS — N17.9 ACUTE RENAL FAILURE SUPERIMPOSED ON CHRONIC KIDNEY DISEASE, UNSPECIFIED ACUTE RENAL FAILURE TYPE, UNSPECIFIED CKD STAGE: ICD-10-CM

## 2025-06-04 DIAGNOSIS — N18.9 ACUTE RENAL FAILURE SUPERIMPOSED ON CHRONIC KIDNEY DISEASE, UNSPECIFIED ACUTE RENAL FAILURE TYPE, UNSPECIFIED CKD STAGE: ICD-10-CM

## 2025-06-04 DIAGNOSIS — R62.7 FTT (FAILURE TO THRIVE) IN ADULT: ICD-10-CM

## 2025-06-04 DIAGNOSIS — E86.0 DEHYDRATION: Primary | ICD-10-CM

## 2025-06-04 DIAGNOSIS — N18.4 CKD (CHRONIC KIDNEY DISEASE) STAGE 4, GFR 15-29 ML/MIN (HCC): Primary | ICD-10-CM

## 2025-06-04 DIAGNOSIS — R53.1 WEAKNESS GENERALIZED: ICD-10-CM

## 2025-06-04 LAB
ALBUMIN SERPL-MCNC: 4 G/DL (ref 3.2–4.8)
ALBUMIN/GLOB SERPL: 1 {RATIO} (ref 1–2)
ALP LIVER SERPL-CCNC: 82 U/L (ref 45–117)
ALT SERPL-CCNC: 39 U/L (ref 10–49)
ANION GAP SERPL CALC-SCNC: 11 MMOL/L (ref 0–18)
AST SERPL-CCNC: 66 U/L (ref ?–34)
ATRIAL RATE: 81 BPM
BASOPHILS # BLD AUTO: 0.09 X10(3) UL (ref 0–0.2)
BASOPHILS NFR BLD AUTO: 0.7 %
BILIRUB SERPL-MCNC: 0.6 MG/DL (ref 0.2–1.1)
BILIRUB UR QL STRIP.AUTO: NEGATIVE
BUN BLD-MCNC: 31 MG/DL (ref 9–23)
CALCIUM BLD-MCNC: 9.6 MG/DL (ref 8.7–10.6)
CHLORIDE SERPL-SCNC: 102 MMOL/L (ref 98–112)
CLARITY UR REFRACT.AUTO: CLEAR
CO2 SERPL-SCNC: 26 MMOL/L (ref 21–32)
COLOR UR AUTO: YELLOW
CREAT BLD-MCNC: 2.55 MG/DL (ref 0.7–1.3)
EGFRCR SERPLBLD CKD-EPI 2021: 24 ML/MIN/1.73M2 (ref 60–?)
EOSINOPHIL # BLD AUTO: 0.82 X10(3) UL (ref 0–0.7)
EOSINOPHIL NFR BLD AUTO: 6.7 %
ERYTHROCYTE [DISTWIDTH] IN BLOOD BY AUTOMATED COUNT: 11.6 %
GLOBULIN PLAS-MCNC: 4.1 G/DL (ref 2–3.5)
GLUCOSE BLD-MCNC: 123 MG/DL (ref 70–99)
GLUCOSE UR STRIP.AUTO-MCNC: NORMAL MG/DL
HCT VFR BLD AUTO: 34.6 % (ref 39–53)
HGB BLD-MCNC: 11.7 G/DL (ref 13–17.5)
HYALINE CASTS #/AREA URNS AUTO: PRESENT /LPF
IMM GRANULOCYTES # BLD AUTO: 0.04 X10(3) UL (ref 0–1)
IMM GRANULOCYTES NFR BLD: 0.3 %
KETONES UR STRIP.AUTO-MCNC: NEGATIVE MG/DL
LEUKOCYTE ESTERASE UR QL STRIP.AUTO: NEGATIVE
LIPASE SERPL-CCNC: 24 U/L (ref 12–53)
LYMPHOCYTES # BLD AUTO: 0.82 X10(3) UL (ref 1–4)
LYMPHOCYTES NFR BLD AUTO: 6.7 %
MCH RBC QN AUTO: 33.2 PG (ref 26–34)
MCHC RBC AUTO-ENTMCNC: 33.8 G/DL (ref 31–37)
MCV RBC AUTO: 98.3 FL (ref 80–100)
MONOCYTES # BLD AUTO: 1.34 X10(3) UL (ref 0.1–1)
MONOCYTES NFR BLD AUTO: 11 %
NEUTROPHILS # BLD AUTO: 9.11 X10 (3) UL (ref 1.5–7.7)
NEUTROPHILS # BLD AUTO: 9.11 X10(3) UL (ref 1.5–7.7)
NEUTROPHILS NFR BLD AUTO: 74.6 %
NITRITE UR QL STRIP.AUTO: NEGATIVE
OSMOLALITY SERPL CALC.SUM OF ELEC: 296 MOSM/KG (ref 275–295)
P AXIS: 113 DEGREES
P-R INTERVAL: 342 MS
PH UR STRIP.AUTO: 6 [PH] (ref 5–8)
PLATELET # BLD AUTO: 236 10(3)UL (ref 150–450)
POTASSIUM SERPL-SCNC: 4.8 MMOL/L (ref 3.5–5.1)
PROT SERPL-MCNC: 8.1 G/DL (ref 5.7–8.2)
PROT UR STRIP.AUTO-MCNC: 30 MG/DL
Q-T INTERVAL: 390 MS
QRS DURATION: 98 MS
QTC CALCULATION (BEZET): 453 MS
R AXIS: -44 DEGREES
RBC # BLD AUTO: 3.52 X10(6)UL (ref 3.8–5.8)
RBC UR QL AUTO: NEGATIVE
SODIUM SERPL-SCNC: 139 MMOL/L (ref 136–145)
SP GR UR STRIP.AUTO: 1.02 (ref 1–1.03)
T AXIS: 14 DEGREES
UROBILINOGEN UR STRIP.AUTO-MCNC: NORMAL MG/DL
VENTRICULAR RATE: 81 BPM
WBC # BLD AUTO: 12.2 X10(3) UL (ref 4–11)

## 2025-06-04 PROCEDURE — 93005 ELECTROCARDIOGRAM TRACING: CPT

## 2025-06-04 PROCEDURE — 93010 ELECTROCARDIOGRAM REPORT: CPT

## 2025-06-04 PROCEDURE — 96361 HYDRATE IV INFUSION ADD-ON: CPT

## 2025-06-04 PROCEDURE — 80053 COMPREHEN METABOLIC PANEL: CPT | Performed by: EMERGENCY MEDICINE

## 2025-06-04 PROCEDURE — 96360 HYDRATION IV INFUSION INIT: CPT

## 2025-06-04 PROCEDURE — 81001 URINALYSIS AUTO W/SCOPE: CPT | Performed by: EMERGENCY MEDICINE

## 2025-06-04 PROCEDURE — 83690 ASSAY OF LIPASE: CPT | Performed by: EMERGENCY MEDICINE

## 2025-06-04 PROCEDURE — 71045 X-RAY EXAM CHEST 1 VIEW: CPT | Performed by: EMERGENCY MEDICINE

## 2025-06-04 PROCEDURE — 99285 EMERGENCY DEPT VISIT HI MDM: CPT

## 2025-06-04 PROCEDURE — 85025 COMPLETE CBC W/AUTO DIFF WBC: CPT | Performed by: EMERGENCY MEDICINE

## 2025-06-04 NOTE — TELEPHONE ENCOUNTER
Patient's son-in-law Dr. Camacho Storey calling (Ok per HIPAA/Phone/Verbal consent)  He states Patient went to the ER today, and he would like to speak with Dr. Bond about this.    Per EMR, Patient is currently in [facility]    Dr. Camacho Storey' call back #:  [phone]

## 2025-06-04 NOTE — ED QUICK NOTES
Patient appears in NAD, denies any complaints at this time, RR even/NL, updated on POC, waiting for urine sample and MD dispo, call light within reach, bed in low and locked position, on continuous cardiac monitoring, wctm closely.

## 2025-06-04 NOTE — ED PROVIDER NOTES
Patient Seen in: [facility] Emergency Department        History  Chief Complaint   Patient presents with    Failure To Thrive     Stated Complaint: fatiued and not eating sent from pmd    Subjective:   HPI            Weakness, poor p.o. intake, increased sleepiness for the last week.  Has been sleeping most of the day.  No fevers, no chills, no cough.  No urinary symptoms.  No abdominal pain.    Daughter at bedside.  States that he noticed a change about a year ago when the patient's wife  but has not had any problems like this before.      Objective:     No pertinent past medical history.            No pertinent past surgical history.              No pertinent social history.                              Physical Exam    ED Triage Vitals [25 1315]   /76   Pulse 85   Resp 16   Temp 97.9 °F (36.6 °C)   Temp src Temporal   SpO2 100 %   O2 Device        Current Vitals:   Vital Signs  BP: 138/65  Pulse: 70  Resp: 21  Temp: 97.9 °F (36.6 °C)  Temp src: Temporal  MAP (mmHg): 86    Oxygen Therapy  SpO2: 100 %            Physical Exam      Physical Exam   Constitutional: Awake, alert, well appearing..  Appears a bit malnourished and clinically dehydrated with moist mucous membranes  Head: Normocephalic and atraumatic.   Eyes: Conjunctivae are normal. Pupils are equal, round, and reactive to light.   Neck: Normal range of motion. No JVD  Cardiovascular: Normal rate, regular rhythm  Pulmonary/Chest: Normal effort.  No accessory muscle use.  No cyanosis.  Abdominal: Soft. Not distended.  Neurological: Pt is alert and oriented to person, place, and time. no cranial nerve deficits. Speech fluent      Belly benign multiple exams    ED Course  Labs Reviewed   COMP METABOLIC PANEL (14) - Abnormal; Notable for the following components:       Result Value    Glucose 123 (*)     BUN 31 (*)     Creatinine 2.55 (*)     Calculated Osmolality 296 (*)     eGFR-Cr 24 (*)     AST 66 (*)     Globulin  4.1 (*)     All  other components within normal limits   CBC WITH DIFFERENTIAL WITH PLATELET - Abnormal; Notable for the following components:    WBC 12.2 (*)     RBC 3.52 (*)     HGB 11.7 (*)     HCT 34.6 (*)     Neutrophil Absolute Prelim 9.11 (*)     Neutrophil Absolute 9.11 (*)     Lymphocyte Absolute 0.82 (*)     Monocyte Absolute 1.34 (*)     Eosinophil Absolute 0.82 (*)     All other components within normal limits   LIPASE - Normal   URINALYSIS, ROUTINE   RAINBOW DRAW LAVENDER   RAINBOW DRAW LIGHT GREEN   RAINBOW DRAW BLUE   RAINBOW DRAW GOLD     EKG    Rate, intervals and axes as noted on EKG Report.  Rate: 81  Rhythm: Sinus Rhythm  Reading: Sinus rhythm, no acute ischemia              Blood reviewed, BUN/creatinine has been rising slowly over the last year but nothing marked.  CBC fine.    XR CHEST AP PORTABLE  (CPT=71045)  Result Date: 6/4/2025  CONCLUSION:  Postsurgical changes median sternotomy.  Normal heart size and pulmonary vascularity.  No focal infiltrate, consolidation, effusion or pneumothorax..   LOCATION:  [id number]      Dictated by (CST): Philomena Rodriguez MD on 6/04/2025 at 2:34 PM     Finalized by (CST): Philomena Rodriguez MD on 6/04/2025 at 2:37 PM           UA pending                  MDM           Differential diagnoses considered: Dehydration, electrolyte disturbance, UTI, pneumonia, depression, JOSE LUIS    -Slight JOSE LUIS but stable vitals.  - Will give a liter of fluid and check for UTI.  If UA suggest urinary tract infection will treat with p.o. antibiotics at home.  Does not seem to have an indication keep him in the hospital.      I visualized the radiology studies, my independent interpretation: No pneumonia noted on chest x-ray      *Comorbidities contributing to the complexity of decision making: n/a  *External charts reviewed:  outpatient blood work ordered by Dr. Bond over the last year reviewed.  Creatinine has been steadily rising from 1.7 about a year ago.  *Additional sources of history:  history  supplemented by family at bedside.    Shared decision making was done by: patient, myself.          Medical Decision Making      Disposition and Plan     Clinical Impression:  1. Dehydration    2. Acute renal failure superimposed on chronic kidney disease, unspecified acute renal failure type, unspecified CKD stage    3. FTT (failure to thrive) in adult         Disposition:  There is no disposition on file for this visit.  There is no disposition time on file for this visit.    Follow-up:  Jeffy Bond MD  [address]  [address] [address]  [phone]    Follow up            Medications Prescribed:  Current Discharge Medication List                Supplementary Documentation:

## 2025-06-05 ENCOUNTER — VIRTUAL PHONE E/M (OUTPATIENT)
Facility: CLINIC | Age: 85
End: 2025-06-05
Payer: MEDICARE

## 2025-06-05 DIAGNOSIS — N18.9 CHRONIC KIDNEY DISEASE, UNSPECIFIED CKD STAGE: Primary | ICD-10-CM

## 2025-06-05 DIAGNOSIS — R53.1 WEAKNESS GENERALIZED: ICD-10-CM

## 2025-06-05 RX ORDER — LEVOCETIRIZINE DIHYDROCHLORIDE 5 MG/1
5 TABLET, FILM COATED ORAL DAILY
COMMUNITY

## 2025-06-05 RX ORDER — BENZOCAINE/MENTHOL 6 MG-10 MG
LOZENGE MUCOUS MEMBRANE
COMMUNITY

## 2025-06-05 NOTE — TELEPHONE ENCOUNTER
Reconciled medlist with Jazzmine on Verbal Release.   She will contact [facility] for HH and keep us posted on requirements/necessary information.  Patient is scheduled for appointment on 6/9/2025 w/Dr Bond

## 2025-06-05 NOTE — PROGRESS NOTES
Virtual Telephone Check-In    Camacho Daly verbally consents to a Virtual/Telephone Check-In visit on 06/05/25.  Patient has been referred to the [facility] website at [email or web address] to review the yearly Consent to Treat document.    Patient understands and accepts financial responsibility for any deductible, co-insurance and/or co-pays associated with this service.    Duration of the service: 30 minutes audio only      Summary of topics discussed:     30min call last night to Camacho, daughter Mark and dunia mccurdy  30 min today call with Camacho and Jazzmine  Decision agreed on to go Medicare Comfort care does not want to give up current [facility] [place](will contact). Agreed on DNR, no re-hospitalization, no 911, stopping non-comfort meds like cardiac drugs. Daughter informed this an end-of-life decision equivalent to hospice, life expectancy is 2-4 weeks depending how much or how little he eats and drinks. Suspect he will succumb to uremia(most likely) or less likely but possible heart attack or stroke. Patient and daughter well-aware of this decision and end-result. See new reconciled med list.         Jeffy Bond MD

## 2025-06-06 NOTE — TELEPHONE ENCOUNTER
S/w Mark on verbal release. She states [facility] will need a referral for [facility]ospice [facility] faxed to [phone]. [facility] Ph. [phone].    Pended for your review, if appropriate I can sign order. Please let me know.

## 2025-06-06 NOTE — TELEPHONE ENCOUNTER
Agustina MOREIRA with [facility] [phone]/ [phone] requesting recent office visit to determine eligibility of hospice care. She is also asking for clarification on diagnosis.     They can complete an in person evaluation for hospice care but may not qualify but they may be able to offer palliative care as a bridge until he is eligible for hospice.    Agustina MOREIRA is happy to discuss/assist determine if any eligible diagnosis.

## 2025-06-09 ENCOUNTER — VIRTUAL PHONE E/M (OUTPATIENT)
Facility: CLINIC | Age: 85
End: 2025-06-09
Payer: MEDICARE

## 2025-06-09 ENCOUNTER — TELEPHONE (OUTPATIENT)
Facility: CLINIC | Age: 85
End: 2025-06-09

## 2025-06-09 DIAGNOSIS — F33.2 SEVERE EPISODE OF RECURRENT MAJOR DEPRESSIVE DISORDER, WITHOUT PSYCHOTIC FEATURES (HCC): ICD-10-CM

## 2025-06-09 DIAGNOSIS — F01.B3 MODERATE VASCULAR DEMENTIA WITH MOOD DISTURBANCE (HCC): ICD-10-CM

## 2025-06-09 DIAGNOSIS — N18.9 CHRONIC KIDNEY DISEASE, UNSPECIFIED CKD STAGE: Primary | ICD-10-CM

## 2025-06-09 DIAGNOSIS — I50.84 END STAGE HEART FAILURE (HCC): ICD-10-CM

## 2025-06-09 PROCEDURE — G2252 BRIEF CHKIN BY MD/QHP, 11-20: HCPCS | Performed by: INTERNAL MEDICINE

## 2025-06-09 RX ORDER — LOPERAMIDE HYDROCHLORIDE 2 MG/1
2 CAPSULE ORAL
COMMUNITY

## 2025-06-09 NOTE — TELEPHONE ENCOUNTER
LM with Marci at [facility] to have Chelsea MOREIRA call back.    Argentina Torrez, KASSANDRA  6/9/25  9:48 AM  [facility] needs history and physical on Camacho      Fax [phone]

## 2025-06-09 NOTE — TELEPHONE ENCOUNTER
S/w Joy RN with [facility] - Fax # [phone]    Patient newly admitted to [facility] on Friday and RN was out yesterday to complete initial intake, he is now going to be on daily visits due to condition declining. Facility is aware this is \"comfort care\" and avoid using hospice term.     They are asking for visit notes from PCP - health and history with any pertinent information regarding condition decline and medication list.   Ok to fax notes from virtual visit on 6/5/25 and med list, any other records?       Patient is on schedule for phone visit today at 2:30p

## 2025-06-10 NOTE — PROGRESS NOTES
Virtual Telephone Check-In    Camacho Daly verbally consents to a Virtual/Telephone Check-In visit on 06/10/25.  Patient has been referred to the [facility] website at [email or web address] to review the yearly Consent to Treat document.    Patient understands and accepts financial responsibility for any deductible, co-insurance and/or co-pays associated with this service.    Duration of the service: 15 minutes audio only      Summary of topics discussed:     Discussed with patient and daughter Mark on speaker phone. Patient declining from heart and kidney failure, worsening vascular dementia and major depression.  Not eating or drinking much, losing wt and strength, has walker. Mark now has POA and Patient is DNR on comfort care regular Medicare-does not want to be in formal hospice program. Meds have been trimmed down to comfort care meds only. This office has been in touch with [facility] to assist. Life expectancy 2-4 week range. I will make home visit 6/17.         Jeffy Bond MD

## 2025-06-18 NOTE — PROGRESS NOTES
Subjective:   Patient ID: Camacho Daly is a 85 year old male.home visit    Hospice declining quickly from heart and kidney failure, only intakes are sips water and 7up, extremely cachectic, off all meds except hospice comfort meds.        History/Other:   Review of Systems   Constitutional:  Positive for fatigue (severe and chronic) and unexpected weight change (stable but skinny).   HENT:  Positive for hearing loss.    Respiratory: Negative.     Cardiovascular:  Negative for chest pain and leg swelling.        CAD, CABG, stents, AVR, Lipids, HTN    Gastrointestinal:  Negative for constipation.        Reflux,not eating, minimal stool   Endocrine: Negative.    Genitourinary:  Positive for difficulty urinating.        CKD   Musculoskeletal:  Positive for arthralgias, back pain and gait problem (walker).        RTHR 10/2023 healing well  PMR on lodose Methotrexate and Prednisone-stable   Skin:         Severe and generalized awzfcqrx-hgpnppofjtj-oz inc Claitin and Prednisone-better  Dry skin-on various topicals   Neurological:  Positive for speech difficulty (labored) and light-headedness. Weakness: generalized.       Imbalance-high risk falls   Hematological:  Bruises/bleeds easily (ASA and Prednisone).   Psychiatric/Behavioral:  Positive for dysphoric mood.         Minimally response but answers are coherent     Current Medications[1]  Allergies:Allergies[2]    Objective:   Physical Exam  Constitutional:       Appearance: He is ill-appearing (dying).      Comments: Looks malnourished   HENT:      Ears:      Comments: Hearing loss  Cardiovascular:      Rate and Rhythm: Normal rate and regular rhythm.      Heart sounds: Murmur (2/6 sys) heard.      Comments: Sternal scar  Pulmonary:      Effort: No respiratory distress.      Breath sounds: Normal breath sounds.   Abdominal:      Tenderness: There is no abdominal tenderness.   Skin:     Findings: Bruising (severe) present. No rash.   Neurological:      Motor: Weakness  (extreme-can barely get out of bed) present.      Gait: Gait abnormal (walker with 1-person assist).   Psychiatric:      Comments: Barely responsive     No chief complaint on file.      Active Problems:  Problem List[3]    Past Medical History:  Past Medical History[4]    Past Surgical History:  Past Surgical History[5]    OB/GYN History:  No LMP for male patient.  G:   P:   A:     Family History:  Family History[6]    Social History:  Social History     Socioeconomic History    Marital status:      Spouse name: Anjelica    Number of children: 3    Years of education: Not on file    Highest education level: Not on file   Occupational History    Occupation:      Employer: [employer]   Tobacco Use    Smoking status: Former     Current packs/day: 0.00     Types: Cigarettes     Quit date: 1971     Years since quittin.8    Smokeless tobacco: Never   Vaping Use    Vaping status: Never Used   Substance and Sexual Activity    Alcohol use: Yes     Alcohol/week: 2.0 standard drinks of alcohol     Types: 2 Standard drinks or equivalent per week     Comment: 2 drink at night    Drug use: No    Sexual activity: Not on file   Other Topics Concern     Service Not Asked    Blood Transfusions Not Asked    Caffeine Concern Yes     Comment: 2 cups daily    Occupational Exposure Not Asked    Hobby Hazards Not Asked    Sleep Concern Not Asked    Stress Concern No    Weight Concern No    Special Diet No    Back Care Not Asked    Exercise Yes     Comment: walking daily     Bike Helmet Not Asked    Seat Belt Yes    Self-Exams Not Asked   Social History Narrative    Not on file     Social Drivers of Health     Food Insecurity: No Food Insecurity (3/3/2025)    NCSS - Food Insecurity     Worried About Running Out of Food in the Last Year: No     Ran Out of Food in the Last Year: No   Transportation Needs: No Transportation Needs (3/3/2025)    NCSS - Transportation     Lack of Transportation:  No   Stress: Not on file   Housing Stability: Not At Risk (3/3/2025)    NCSS - Housing/Utilities     Has Housing: Yes     Worried About Losing Housing: No     Unable to Get Utilities: No       Health Maintenance:  Immunization History   Administered Date(s) Administered    Covid-19 Vaccine Moderna 100 mcg/0.5 ml 02/05/2021, 03/05/2021    Covid-19 Vaccine Moderna 50 Mcg/0.25 Ml 12/13/2021    Covid-19 Vaccine Moderna Bivalent 50mcg/0.5mL 12+ years 12/26/2022    FLU VAC High Dose 65 YRS & Older PRSV Free (12894) 10/20/2014, 11/30/2015, 11/01/2016, 10/19/2020, 10/06/2021, 11/01/2022, 09/29/2023    FLUAD High Dose 65 yr and older (70872) 10/05/2017    Fluzone Vaccine Medicare () 10/20/2014, 11/30/2015, 11/01/2016, 11/27/2016, 11/20/2018    High Dose Fluzone Influenza Vaccine, 65yr+ PF 0.5mL (41398) 10/19/2020, 10/06/2021, 11/13/2024    Influenza 11/06/2013, 10/19/2019    Influenza Virus Vaccine, Split 09/13/2012    Moderna Covid-19 Vaccine 50mcg/0.5ml 12yrs+ 01/02/2024, 07/22/2024    Pneumococcal (Prevnar 13) 08/27/2015    Pneumovax 23 12/01/2009    RSV, bivalent, diluent reconstituted PF (Abrysvo) 11/29/2023    TD 10/01/2006    TDAP 01/13/2014    Tb Intradermal Test 12/26/2022, 01/05/2023    Tetanus 01/13/2014    Zoster Vaccine Live (Zostavax) 07/01/2012    Zoster Vaccine Recombinant Adjuvanted (Shingrix) 07/01/2012, 05/19/2023, 02/03/2024       Labs and Tests:  Admission on 06/04/2025, Discharged on 06/04/2025   Component Date Value Ref Range Status    Hold Lavender 06/04/2025 Auto Resulted   Final    Hold Lt Green 06/04/2025 Auto Resulted   Final    Hold Blue 06/04/2025 Auto Resulted   Final    Hold Gold 06/04/2025 Auto Resulted   Final    Urine Color 06/04/2025 Yellow  Yellow Final    Clarity Urine 06/04/2025 Clear  Clear Final    Spec Gravity 06/04/2025 1.020  1.005 - 1.030 Final    Glucose Urine 06/04/2025 Normal  Normal mg/dL Final    Bilirubin Urine 06/04/2025 Negative  Negative Final    Ketones Urine  06/04/2025 Negative  Negative mg/dL Final    Blood Urine 06/04/2025 Negative  Negative Final    pH Urine 06/04/2025 6.0  5.0 - 8.0 Final    Protein Urine 06/04/2025 30 (A)  Negative mg/dL Final    Urobilinogen Urine 06/04/2025 Normal  Normal mg/dL Final    Nitrite Urine 06/04/2025 Negative  Negative Final    Leukocyte Esterase Urine 06/04/2025 Negative  Negative Final    WBC Urine 06/04/2025 1-5  0 - 5 /HPF Final    RBC Urine 06/04/2025 0-2  0 - 2 /HPF Final    Bacteria Urine 06/04/2025 None Seen  None Seen /HPF Final    Squamous Epi. Cells 06/04/2025 Few (A)  None Seen /HPF Final    Renal Tubular Epithelial Cells 06/04/2025 None Seen  None Seen /HPF Final    Transitional Cells 06/04/2025 None Seen  None Seen /HPF Final    Hyaline Casts 06/04/2025 Present (A)  None Seen /LPF Final    Yeast Urine 06/04/2025 None Seen  None Seen /HPF Final    Ventricular rate 06/04/2025 81  BPM Final    Atrial rate 06/04/2025 81  BPM Final    P-R Interval 06/04/2025 342  ms Final    QRS Duration 06/04/2025 98  ms Final    Q-T Interval 06/04/2025 390  ms Final    QTC Calculation (Bezet) 06/04/2025 453  ms Final    P Axis 06/04/2025 113  degrees Final    R Axis 06/04/2025 -44  degrees Final    T Axis 06/04/2025 14  degrees Final    Glucose 06/04/2025 123 (H)  70 - 99 mg/dL Final    Sodium 06/04/2025 139  136 - 145 mmol/L Final    Potassium 06/04/2025 4.8  3.5 - 5.1 mmol/L Final    Chloride 06/04/2025 102  98 - 112 mmol/L Final    CO2 06/04/2025 26.0  21.0 - 32.0 mmol/L Final    Anion Gap 06/04/2025 11  0 - 18 mmol/L Final    BUN 06/04/2025 31 (H)  9 - 23 mg/dL Final    Creatinine 06/04/2025 2.55 (H)  0.70 - 1.30 mg/dL Final    Calcium, Total 06/04/2025 9.6  8.7 - 10.6 mg/dL Final    Calculated Osmolality 06/04/2025 296 (H)  275 - 295 mOsm/kg Final    eGFR-Cr 06/04/2025 24 (L)  >=60 mL/min/1.73m2 Final    eGFR calculated using the CKD-EPI 2021 calculation    AST 06/04/2025 66 (H)  <34 U/L Final    ALT 06/04/2025 39  10 - 49 U/L Final     Alkaline Phosphatase 06/04/2025 82  45 - 117 U/L Final    Bilirubin, Total 06/04/2025 0.6  0.2 - 1.1 mg/dL Final    Total Protein 06/04/2025 8.1  5.7 - 8.2 g/dL Final    Albumin 06/04/2025 4.0  3.2 - 4.8 g/dL Final    Globulin  06/04/2025 4.1 (H)  2.0 - 3.5 g/dL Final    A/G Ratio 06/04/2025 1.0  1.0 - 2.0 Final    WBC 06/04/2025 12.2 (H)  4.0 - 11.0 x10(3) uL Final    RBC 06/04/2025 3.52 (L)  3.80 - 5.80 x10(6)uL Final    HGB 06/04/2025 11.7 (L)  13.0 - 17.5 g/dL Final    HCT 06/04/2025 34.6 (L)  39.0 - 53.0 % Final    PLT 06/04/2025 236.0  150.0 - 450.0 10(3)uL Final    MCV 06/04/2025 98.3  80.0 - 100.0 fL Final    MCH 06/04/2025 33.2  26.0 - 34.0 pg Final    MCHC 06/04/2025 33.8  31.0 - 37.0 g/dL Final    RDW 06/04/2025 11.6  % Final    Neutrophil Absolute Prelim 06/04/2025 9.11 (H)  1.50 - 7.70 x10 (3) uL Final    Neutrophil Absolute 06/04/2025 9.11 (H)  1.50 - 7.70 x10(3) uL Final    Lymphocyte Absolute 06/04/2025 0.82 (L)  1.00 - 4.00 x10(3) uL Final    Monocyte Absolute 06/04/2025 1.34 (H)  0.10 - 1.00 x10(3) uL Final    Eosinophil Absolute 06/04/2025 0.82 (H)  0.00 - 0.70 x10(3) uL Final    Basophil Absolute 06/04/2025 0.09  0.00 - 0.20 x10(3) uL Final    Immature Granulocyte Absolute 06/04/2025 0.04  0.00 - 1.00 x10(3) uL Final    Neutrophil % 06/04/2025 74.6  % Final    Lymphocyte % 06/04/2025 6.7  % Final    Monocyte % 06/04/2025 11.0  % Final    Eosinophil % 06/04/2025 6.7  % Final    Basophil % 06/04/2025 0.7  % Final    Immature Granulocyte % 06/04/2025 0.3  % Final    Lipase 06/04/2025 24  12 - 53 U/L Final       Vitals:  HR 60 regular, did not take BP  There is no height or weight on file to calculate BMI.        Assessment & Plan:   1. Chronic combined systolic and diastolic heart failure (HCC)    2. CKD (chronic kidney disease) stage 4, GFR 15-29 ml/min (HCC)    3. Cachectic (HCC)      Meds reconciled and discussed with daughter Mark, [facility] present and [facility] RN on  phone  Mark consoled and made aware his death is coming soon-a few days and rest of family should be alerted    Will plan a followup call on 6/20  40 min face to face spent      Meds This Visit:  Requested Prescriptions      No prescriptions requested or ordered in this encounter       Imaging & Referrals:  None         [1]   Current Outpatient Medications   Medication Sig Dispense Refill    loperamide 2 MG Oral Cap Take 1 capsule (2 mg total) by mouth every 4 to 6 hours as needed for Diarrhea. IMODIUM      levocetirizine 5 MG Oral Tab Take 1 tablet (5 mg total) by mouth in the morning.      hydrocortisone 1 % External Cream Apply topically TID prn with Diphendyramine 2% cream      famotidine 40 MG Oral Tab Take 1 tablet (40 mg total) by mouth 2 (two) times daily. 180 tablet 3    buPROPion  MG Oral Tablet 24 Hr Take 1 tablet (150 mg total) by mouth every morning before breakfast. 90 tablet 3    mirtazapine 45 MG Oral Tab Take 1 tablet (45 mg total) by mouth nightly. 90 tablet 3    diphenhydrAMINE HCl 2 % External Cream  Apply topically TID prn with hydrocortisone 1% cream     [2]   Allergies  Allergen Reactions    Penicillins UNKNOWN, HIVES and OTHER (SEE COMMENTS)     unknown    Codeine PAIN     abdominal    Lisinopril HIVES    Pcn [Bicillin C-R,] HIVES    Niacin RASH and OTHER (SEE COMMENTS)     flushing    Ultram [Tramadol] NAUSEA ONLY     And constipation    Zinc Acetate NAUSEA ONLY   [3]   Patient Active Problem List  Diagnosis    HTN (hypertension)    PMR (polymyalgia rheumatica) (HCC)    CAD (coronary artery disease)    Aortic stenosis    Psoriasis    Hiatal hernia with gastroesophageal reflux    Vitamin D deficiency    Diverticulosis    Colon polyps    Dyslipidemia    Carotid artery plaque    Osteopenia    S/P AVR    S/P CABG (coronary artery bypass graft)    Proteinuria    Multiple renal cysts    Renal stone    Closed wedge compression fracture of T11 vertebra with routine healing    Compression  fracture of T12 vertebra ([facility])    Anemia    Chronic fatigue    Positive TRINY (antinuclear antibody)    Weakness generalized    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[facility])    History of community acquired pneumonia    Elevated lipoprotein A level    Elevated homocysteine    Drug-induced interstitial nephritis    Right sided sciatica    CKD (chronic kidney disease) stage 4, GFR 15-29 ml/min ([facility])    Contrast dye induced nephropathy    Renal artery stenosis    Pittman's esophagus    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[facility])    Sjogren's syndrome with myopathy ([facility])    Clostridium difficile colitis    SHIRA (obstructive sleep apnea)    Intractable low back pain    Lumbosacral radiculopathy at L2    History of back surgery-12/21/22    Weakness of right lower extremity    Diaphragmatic hernia without obstruction or gangrene    History of falling    Long term (current) use of systemic steroids    Personal history of colonic polyps    Personal history of nicotine dependence    Presence of aortocoronary bypass graft    Presence of prosthetic heart valve    Pulmonary fibrosis ([facility])    History of Clostridioides difficile colitis    Closed displaced fracture of right femoral neck ([facility])    History of right hip replacement    Acute posthemorrhagic anemia    Aftercare following right knee joint replacement surgery    Anemia in chronic kidney disease    Athscl heart disease of native coronary artery w/o ang pctrs    Pittman esophagus    Chronic kidney disease, stage 4 (severe) ([facility])    Deficiency of other specified B group vitamins    Hyperlipidemia, unspecified    Hypertensive chronic kidney disease w stg 1-4/unsp chr kdny    Obstructive sleep apnea (adult) (pediatric)    Other specified disorders of bone density and structure, unspecified site    Other spondylosis with radiculopathy, lumbosacral region    Polymyalgia rheumatica ([facility])    Presence of coronary angioplasty implant and graft    Presence of right artificial hip joint     Sjogren syndrome with myopathy (HCC)    Unspecified hearing loss, unspecified ear    Unspecified open wound, right foot, subsequent encounter    Urgency of urination    Vitamin D deficiency, unspecified    Avascular necrosis of femoral head, right (HCC)    Cognitive dysfunction    Osteonecrosis of right hip (HCC)    Pain disorder with psychological factors    Itching    Dry skin    Mesenteric fibrosis    Iron deficiency anemia secondary to blood loss (chronic)    Syncope and collapse    Light chain (AL) amyloidosis (HCC)   [4]   Past Medical History:   Acute left-sided low back pain without sciatica    JOSE LUIS (acute kidney injury)    from omeprazole-resolved    Anemia    Aortic stenosis    AVR-bio    Pittman esophagus    CAD (coronary artery disease)    stents, bypasses    Calculus of kidney    Carotid artery plaque    ultrasound    Chronic anemia    bone marrow neg 12/2012    Chronic fatigue    CKD (chronic kidney disease) stage 4, GFR 15-29 ml/min (HCC)    Closed wedge compression fracture of T11 vertebra with routine healing    Clostridium difficile colitis    Colon polyps    colonoscopy    Compression fracture of T12 vertebra (HCC)    Contrast dye induced nephropathy    Dehydration    Diverticulosis    Drug-induced interstitial nephritis    Omeprazole    Dyslipidemia    Elevated homocysteine    Elevated lipoprotein A level    Elevated troponin    FUO (fever of unknown origin)    PMR or testicular/prostate infection    Gastritis    Hearing impairment    [place]    Hemorrhoids    Hiatal hernia with gastroesophageal reflux    History of Clostridioides difficile colitis    History of community acquired pneumonia    HTN (hypertension)    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)    Inguinal lymphadenopathy    right-bx    Ischemic colitis (HCC)    resolved    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HCC)    Mesenteric adenitis    bx    Multiple renal cysts    both-not reported on recent CTs    SHIRA (obstructive sleep apnea)     Osteopenia    steroids    PMR (polymyalgia rheumatica) (HCC)    Pneumonia    Positive TRINY (antinuclear antibody)    Precancerous lesion    skin    Proteinuria    mild    Psoriasis    Renal artery stenosis    both    Renal stone    right    Right shoulder pain    injected-Liane    Sjogren's syndrome with myopathy (HCC)    Sleep apnea    untreated    Thrombocytopenia    resolved    Vitamin B12 nutritional deficiency    Vitamin D deficiency    Weight loss   [5]   Past Surgical History:  Procedure Laterality Date    Angioplasty (coronary)  1999    Dr. Maguire    Appendectomy      teenager    Biopsy  02/08/2013    Laparoscopic excision of lymph nodes, biopsies of the colonic and small bowel mesentery.    Bone marrow biopsy and aspiration  01/2021    Hantel-neg    Cabg  10/25/2013    AORTIC VALVE REPLACEMENT; bypass x 2-3    Cardiac catheterization  2008/2013    Cholecystectomy  1984    Colonoscopy  2005    Eliu    Colonoscopy  11/07/2012        Colonoscopy N/A 04/04/2018    Procedure: COLONOSCOPY;  Surgeon: Camacho Mckee MD;  Location:  ENDOSCOPY    Colonoscopy N/A 10/11/2021    1.Esophagitis 2.Colon polyps  3. Diverticulosis 4.Colitis    Cortisone injection  07/06/2015    rt shoulder     Cytology lymph node fna - jar(s): 1  12/07/2012    excisional biopsy rt inguinal lymph node     Egd  10/22/2021    Rafi    Egd  08/10/2019    Hiatal hernia    Hip replacement surgery      Ir kidney biopsy (renal)random  08/11/2020    for drug induced nephritis    Laminotomy, addl lumbar  12/21/2022    Minimally invasive right L1-2 laminotomy, partial medial facetectomy, resection of calcified disc/endplate complex.    Laparoscopic lymph node biop  02/08/2013    exploratory abd lymphadenopathy    Replace aortic valve open  10/25/2013    bio    Sigmoidoscopy,diagnostic  1995    Skin tags  10/1714    plus histofreeze    Total hip arthroplasty Right 10/18/2023    Right total hip arthroplasty    Upper gi  endoscopy performed  2005/2007    Eliu/Rafi   [6]   Family History  Problem Relation Age of Onset    Neurological Disorder Father         parkinsons    Cancer Father         lung    Other (Other) Father     Mental Disorder Mother         alzheimers    Other (Other) Mother     Cancer Daughter         hodgkins at age 18    Heart Surgery Brother     Heart Disorder Brother     Other (Other) Brother     Other (Other) Brother     Other (Other) Other         seizures from neurofibromatosis

## 2025-07-31 ENCOUNTER — TELEPHONE (OUTPATIENT)
Facility: CLINIC | Age: 85
End: 2025-07-31

## 2025-07-31 ENCOUNTER — MED REC SCAN ONLY (OUTPATIENT)
Facility: CLINIC | Age: 85
End: 2025-07-31

## (undated) DIAGNOSIS — I25.10 CORONARY ARTERY DISEASE INVOLVING NATIVE HEART WITHOUT ANGINA PECTORIS, UNSPECIFIED VESSEL OR LESION TYPE: ICD-10-CM

## (undated) DIAGNOSIS — N18.4 CKD (CHRONIC KIDNEY DISEASE) STAGE 4, GFR 15-29 ML/MIN (HCC): Primary | ICD-10-CM

## (undated) DIAGNOSIS — G47.33 OBSTRUCTIVE SLEEP APNEA SYNDROME: ICD-10-CM

## (undated) DIAGNOSIS — D64.89 ANEMIA DUE TO OTHER CAUSE, NOT CLASSIFIED: ICD-10-CM

## (undated) DEVICE — LAMINECTOMY: Brand: MEDLINE INDUSTRIES, INC.

## (undated) DEVICE — Device: Brand: DUAL NARE NASAL CANNULAE FEMALE LUER CON 7FT O2 TUBE

## (undated) DEVICE — PILLOW FX 56X38X15CM MED HIP

## (undated) DEVICE — SNARE CAPTIFLEX MICRO-OVL OLY

## (undated) DEVICE — GAMMEX® NON-LATEX PI ORTHO SIZE 8.5, STERILE POLYISOPRENE POWDER-FREE SURGICAL GLOVE: Brand: GAMMEX

## (undated) DEVICE — SKIN PREP TRAY 4 COMPARTM TRAY: Brand: MEDLINE INDUSTRIES, INC.

## (undated) DEVICE — KIT CLEAN ENDOKIT 1.1OZ GOWNX2

## (undated) DEVICE — 1200CC GUARDIAN II: Brand: GUARDIAN

## (undated) DEVICE — PEN: MARKING STD PT 100/CS: Brand: MEDICAL ACTION INDUSTRIES

## (undated) DEVICE — FLOSEAL HEMOSTATIC MATRIX, 5ML: Brand: FLOSEAL HEMOSTATIC MATRIX

## (undated) DEVICE — GAMMEX® NON-LATEX PI ORTHO SIZE 6.5, STERILE POLYISOPRENE POWDER-FREE SURGICAL GLOVE: Brand: GAMMEX

## (undated) DEVICE — 3 ML SYRINGE LUER-LOCK TIP: Brand: MONOJECT

## (undated) DEVICE — SPONGE GZ 4XL4IN 100% COT 12 PLY TYP VII WVN

## (undated) DEVICE — SUTURE ETHBND EXCEL SZ 2 L30IN NONABSORBABLE MX69G

## (undated) DEVICE — PILLOW ABD M W15XH6XL22IN LEG RASPBERRY FOAM

## (undated) DEVICE — 2T11 #2 PDO 36 X 36: Brand: 2T11 #2 PDO 36 X 36

## (undated) DEVICE — 3M(TM) TEGADERM(TM) TRANSPARENT FILM DRESSING FRAME STYLE 1628: Brand: 3M™ TEGADERM™

## (undated) DEVICE — DRAPE SRG 70X60IN SPLT U IMPRV

## (undated) DEVICE — PAD N ADH W3XL4IN POLY COT SFT PERF FLM ABSRB

## (undated) DEVICE — NON-ADHERENT DRESSING: Brand: TELFA

## (undated) DEVICE — SUTURE MCRYL SZ 2-0 L36IN ABSRB UD L36MM CT-1

## (undated) DEVICE — BIT DRL L30MM DIA3.2MM DISP FOR G7 2 MOBILITY

## (undated) DEVICE — FRAZIER SUCTION INSTRUMENT 12 FR W/CONTROL VENT & OBTURATOR: Brand: FRAZIER

## (undated) DEVICE — SUT VICRYL 3-0 CP-2 J761D

## (undated) DEVICE — FRAZIER SUCTION INSTRUMENT 8 FR W/CONTROL VENT & OBTURATOR: Brand: FRAZIER

## (undated) DEVICE — DURASEAL® DURAL SEALANT SYSTEM 5ML 5 PACK
Type: IMPLANTABLE DEVICE | Site: BACK
Brand: DURASEAL®

## (undated) DEVICE — 3M™ RED DOT™ MONITORING ELECTRODE WITH FOAM TAPE AND STICKY GEL, 50/BAG, 20/CASE, 72/PLT 2570: Brand: RED DOT™

## (undated) DEVICE — Device: Brand: DEFENDO AIR/WATER/SUCTION AND BIOPSY VALVE

## (undated) DEVICE — 6 ML SYRINGE LUER-LOCK TIP: Brand: MONOJECT

## (undated) DEVICE — SHEET,DRAPE,70X100,STERILE: Brand: MEDLINE

## (undated) DEVICE — KIT PATIENT CARE SPINAL TABLE

## (undated) DEVICE — FILTERLINE NASAL ADULT O2/CO2

## (undated) DEVICE — FORCEP BIOPSY RJ4 LG CAP W/ND

## (undated) DEVICE — ENCORE® LATEX MICRO SIZE 8.5, STERILE LATEX POWDER-FREE SURGICAL GLOVE: Brand: ENCORE

## (undated) DEVICE — Device

## (undated) DEVICE — SUT MONOCRYL 4-0 PS-2 Y496G

## (undated) DEVICE — C-ARMOR C-ARM EQUIPMENT COVERS CLEAR STERILE UNIVERSAL FIT 12 PER CASE: Brand: C-ARMOR

## (undated) DEVICE — ENDOSCOPY PACK - LOWER: Brand: MEDLINE INDUSTRIES, INC.

## (undated) DEVICE — EXTENDED TIP APPLICATOR EXTENDED TIP APPLICATOR, SINGLE USE APPLICATOR, 15CM: Brand: EXTENDED TIP APPLICATOR

## (undated) DEVICE — Device: Brand: JELCO

## (undated) DEVICE — SPNG GZ W4XL4IN COT 12 PLY TYP

## (undated) DEVICE — LINE MNTR ADLT SET O2 INTMD

## (undated) DEVICE — SUT VICRYL 0 UR-6 J603H

## (undated) DEVICE — SOLUTION IRRIG 3000ML 0.9% NACL FLX CONT

## (undated) DEVICE — 20 ML SYRINGE LUER-LOCK TIP: Brand: MONOJECT

## (undated) DEVICE — SOLUTION IRRIG 1000ML 0.9% NACL USP BTL

## (undated) DEVICE — DRAPE SHEET LARGE 76X55

## (undated) DEVICE — CONMED SCOPE SAVER BITE BLOCK, 20X27 MM: Brand: SCOPE SAVER

## (undated) DEVICE — ADH DRMBND PRINEO SKN CLOS TOP

## (undated) DEVICE — REM POLYHESIVE ADULT PATIENT RETURN ELECTRODE: Brand: VALLEYLAB

## (undated) DEVICE — FORCEP RADIAL JAW 4

## (undated) DEVICE — GAMMEX® PI HYBRID SIZE 8.5, STERILE POWDER-FREE SURGICAL GLOVE, POLYISOPRENE AND NEOPRENE BLEND: Brand: GAMMEX

## (undated) DEVICE — E-Z BUTTON SWITCH PENCIL

## (undated) DEVICE — SUT PROLENE 6-0 BV-1 8709H

## (undated) DEVICE — TRAY CATH 16FR F INCLUDE BARDX IC COMPLT CARE

## (undated) DEVICE — CAUTERY TIP TEFLON MEGADYNE

## (undated) DEVICE — 3.0MM PRECISION NEURO (MATCH HEAD)

## (undated) DEVICE — ENDOSCOPY PACK UPPER: Brand: MEDLINE INDUSTRIES, INC.

## (undated) DEVICE — SOLUTION PREP 26ML 0.7% POVACRYLEX 74% ISO

## (undated) DEVICE — Device: Brand: STABLECUT®

## (undated) DEVICE — ENCORE® LATEX MICRO SIZE 6.5, STERILE LATEX POWDER-FREE SURGICAL GLOVE: Brand: ENCORE

## (undated) DEVICE — 3M™ TEGADERM™ TRANSPARENT FILM DRESSING, 1626W, 4 IN X 4-3/4 IN (10 CM X 12 CM), 50 EACH/CARTON, 4 CARTON/CASE: Brand: 3M™ TEGADERM™

## (undated) DEVICE — MEDI-VAC NON-CONDUCTIVE SUCTION TUBING 6MM X 1.8M (6FT.) L: Brand: CARDINAL HEALTH

## (undated) DEVICE — PACK CDS TOTAL HIP

## (undated) DEVICE — DRAPE SHEET LG

## (undated) DEVICE — PACK SURG UNIVERSAL DRAPE

## (undated) DEVICE — HOOD: Brand: FLYTE

## (undated) NOTE — MR AVS SNAPSHOT
[address]  [phone]               Thank you for choosing us for your health care visit with [facility].   We are glad to serve you and happy to provide you with this summary o Apply  topically 2 (two) times daily. prn           CO Q 10 OR   Take 50 mg by mouth.            CRESTOR 20 MG Tabs   Generic drug:  Rosuvastatin Calcium   TAKE ONE TABLET BY MOUTH NIGHTLY           Ibuprofen 200 MG Caps   3 tabs tid pc   What changed:    - SmartestK12 questions? Call [phone] for help. SmartestK12 is NOT to be used for urgent needs. For medical emergencies, dial 911.            Visit [facility] online at  [email or web address]

## (undated) NOTE — LETTER
09/04/18        [facility]      Dear Khurram Glass records indicate that you have outstanding lab work and or testing that was ordered for you and has not yet been completed:          Sed Shellie, Colleen

## (undated) NOTE — LETTER
[address] [address]  [place], [address] [facility]    Consent for Operation  Date: __________________                                Time: _______________    1. I authorize the performance upon Hyacinth Jaimes the following operation:   2.  Leidyoph procedure has been videotaped, the surgeon will obtain the original videotape. The hospital will not be responsible for storage or maintenance of this tape.   7. For the purpose of advancing medical education, I consent to the admittance of observers to the STATEMENTS REQUIRING INSERTION OR COMPLETION WERE FILLED IN.     Signature of Patient:   ___________________________    When the patient is a minor or mentally incompetent to give consent:  Signature of person authorized to consent for patient: ____________ supplements, and pills I can buy without a prescription (including street drugs/illegal medications). Failure to inform my anesthesiologist about these medicines may increase my risk of anesthetic complications. iv.  If I am allergic to anything or have ha Anesthesiologist Signature     Date   Time  I have discussed the procedure and information above with the patient (or patient’s representative) and answered their questions. The patient or their representative has agreed to have anesthesia services.     ___

## (undated) NOTE — LETTER
[address]  Authorization for Surgical Operation and Procedure                                                                                           1. I hereby authorize Omar Polanco MD, my physician and his/her assistants (if applicable), which may include medical students, residents, and/or fellows, to perform the following surgical operation/ procedure and administer such anesthesia as may be determined necessary by my physician: Operation/Procedure name (s) LUMBAR EPIDURAL STEROID INJECTION UNDER FLUOROSCOPIC GUIDANCE on [address]   2. I recognize that during the surgical operation/procedure, unforeseen conditions may necessitate additional or different procedures than those listed above. I, therefore, further authorize and request that the above-named surgeon, assistants, or designees perform such procedures as are, in their judgment, necessary and desirable. 3.   My surgeon/physician has discussed prior to my surgery the potential benefits, risks and side effects of this procedure; the likelihood of achieving goals; and potential problems that might occur during recuperation. They also discussed reasonable alternatives to the procedure, including risks, benefits, and side effects related to the alternatives and risks related to not receiving this procedure. I have had all my questions answered and I acknowledge that no guarantee has been made as to the result that may be obtained. 4.   Should the need arise during my operation/procedure, which includes change of level of care prior to discharge, I also consent to the administration of blood and/or blood products. Further, I understand that despite careful testing and screening of blood or blood products by collecting agencies, I may still be subject to ill effects as a result of receiving a blood transfusion and/or blood products.   The following are some, but not all, of the potential risks that can occur: fever and allergic reactions, hemolytic reactions, transmission of diseases such as Hepatitis, AIDS and Cytomegalovirus (CMV) and fluid overload. In the event that I wish to have an autologous transfusion of my own blood, or a directed donor transfusion, I will discuss this with my physician. Check only if Refusing Blood or Blood Products  I understand refusal of blood or blood products as deemed necessary by my physician may have serious consequences to my condition to include possible death. I hereby assume responsibility for my refusal and release the hospital, its personnel, and my physicians from any responsibility for the consequences of my refusal.    o  Refuse   5. I authorize the use of any specimen, organs, tissues, body parts or foreign objects that may be removed from my body during the operation/procedure for diagnosis, research or teaching purposes and their subsequent disposal by hospital authorities. I also authorize the release of specimen test results and/or written reports to my treating physician on the hospital medical staff or other referring or consulting physicians involved in my care, at the discretion of the Pathologist or my treating physician. 6.   I consent to the photographing or videotaping of the operations or procedures to be performed, including appropriate portions of my body for medical, scientific, or educational purposes, provided my identity is not revealed by the pictures or by descriptive texts accompanying them. If the procedure has been photographed/videotaped, the surgeon will obtain the original picture, image, videotape or CD. The hospital will not be responsible for storage, release or maintenance of the picture, image, tape or CD.    7.   I consent to the presence of a  or observers in the operating room as deemed necessary by my physician or their designees.     8.   I recognize that in the event my procedure results in extended X-Ray/fluoroscopy time, I may develop a skin reaction. 9. If I have a Do Not Attempt Resuscitation (DNAR) order in place, that status will be suspended while in the operating room, procedural suite, and during the recovery period unless otherwise explicitly stated by me (or a person authorized to consent on my behalf). The surgeon or my attending physician will determine when the applicable recovery period ends for purposes of reinstating the DNAR order. 10. Patients having a sterilization procedure: I understand that if the procedure is successful the results will be permanent and it will therefore be impossible for me to inseminate, conceive, or bear children. I also understand that the procedure is intended to result in sterility, although the result has not been guaranteed. 11. I acknowledge that my physician has explained sedation/analgesia administration to me including the risk and benefits I consent to the administration of sedation/analgesia as may be necessary or desirable in the judgment of my physician. I CERTIFY THAT I HAVE READ AND FULLY UNDERSTAND THE ABOVE CONSENT TO OPERATION and/or OTHER PROCEDURE.     _________________________________________ _________________________________     ___________________________________  Signature of Patient     Signature of Responsible Person                   Printed Name of Responsible Person                              _________________________________________ ______________________________        ___________________________________  Signature of Witness         Date  Time         Relationship to Patient    STATEMENT OF PHYSICIAN My signature below affirms that prior to the time of the procedure; I have explained to the patient and/or his/her legal representative, the risks and benefits involved in the proposed treatment and any reasonable alternative to the proposed treatment.  I have also explained the risks and benefits involved in refusal of the proposed treatment and alternatives to the proposed treatment and have answered the patient's questions.  If I have a significant financial interest in a co-management agreement or a significant financial interest in any product or implant, or other significant relationship used in this procedure/surgery, I have disclosed this and had a discussion with my patient.     _______________________________________________________________ _____________________________  Jeraline Lele of Physician)                                                                                         (Date)                                   (Time)  Patient Name: Jolie Courtney    : 1940   Printed: 2022      Medical Record #: [id number]                                              Page 1 of 1

## (undated) NOTE — LETTER
[address]  Authorization for Surgical Operation and Procedure                                                                                           I hereby authorize José Zimmer MD, my physician and his/her assistants (if applicable), which may include medical students, residents, and/or fellows, to perform the following surgical operation/ procedure and administer such anesthesia as may be determined necessary by my physician: Operation/Procedure name (s) Right total hip arthroplasty on [facility]   2. I recognize that during the surgical operation/procedure, unforeseen conditions may necessitate additional or different procedures than those listed above. I, therefore, further authorize and request that the above-named surgeon, assistants, or designees perform such procedures as are, in their judgment, necessary and desirable. 3.   My surgeon/physician has discussed prior to my surgery the potential benefits, risks and side effects of this procedure; the likelihood of achieving goals; and potential problems that might occur during recuperation. They also discussed reasonable alternatives to the procedure, including risks, benefits, and side effects related to the alternatives and risks related to not receiving this procedure. I have had all my questions answered and I acknowledge that no guarantee has been made as to the result that may be obtained. 4.   Should the need arise during my operation/procedure, which includes change of level of care prior to discharge, I also consent to the administration of blood and/or blood products. Further, I understand that despite careful testing and screening of blood or blood products by collecting agencies, I may still be subject to ill effects as a result of receiving a blood transfusion and/or blood products.   The following are some, but not all, of the potential risks that can occur: fever and allergic reactions, hemolytic reactions, transmission of diseases such as Hepatitis, AIDS and Cytomegalovirus (CMV) and fluid overload. In the event that I wish to have an autologous transfusion of my own blood, or a directed donor transfusion, I will discuss this with my physician. Check only if Refusing Blood or Blood Products  I understand refusal of blood or blood products as deemed necessary by my physician may have serious consequences to my condition to include possible death. I hereby assume responsibility for my refusal and release the hospital, its personnel, and my physicians from any responsibility for the consequences of my refusal.    o  Refuse   5. I authorize the use of any specimen, organs, tissues, body parts or foreign objects that may be removed from my body during the operation/procedure for diagnosis, research or teaching purposes and their subsequent disposal by hospital authorities. I also authorize the release of specimen test results and/or written reports to my treating physician on the hospital medical staff or other referring or consulting physicians involved in my care, at the discretion of the Pathologist or my treating physician. 6.   I consent to the photographing or videotaping of the operations or procedures to be performed, including appropriate portions of my body for medical, scientific, or educational purposes, provided my identity is not revealed by the pictures or by descriptive texts accompanying them. If the procedure has been photographed/videotaped, the surgeon will obtain the original picture, image, videotape or CD. The hospital will not be responsible for storage, release or maintenance of the picture, image, tape or CD.    7.   I consent to the presence of a  or observers in the operating room as deemed necessary by my physician or their designees.     8.   I recognize that in the event my procedure results in extended X-Ray/fluoroscopy time, I may develop a skin reaction. 9. If I have a Do Not Attempt Resuscitation (DNAR) order in place, that status will be suspended while in the operating room, procedural suite, and during the recovery period unless otherwise explicitly stated by me (or a person authorized to consent on my behalf). The surgeon or my attending physician will determine when the applicable recovery period ends for purposes of reinstating the DNAR order. 10. Patients having a sterilization procedure: I understand that if the procedure is successful the results will be permanent and it will therefore be impossible for me to inseminate, conceive, or bear children. I also understand that the procedure is intended to result in sterility, although the result has not been guaranteed. 11. I acknowledge that my physician has explained sedation/analgesia administration to me including the risk and benefits I consent to the administration of sedation/analgesia as may be necessary or desirable in the judgment of my physician. I CERTIFY THAT I HAVE READ AND FULLY UNDERSTAND THE ABOVE CONSENT TO OPERATION and/or OTHER PROCEDURE.     _________________________________________ _________________________________     ___________________________________  Signature of Patient     Signature of Responsible Person                   Printed Name of Responsible Person                              _________________________________________ ______________________________        ___________________________________  Signature of Witness         Date  Time         Relationship to Patient    STATEMENT OF PHYSICIAN My signature below affirms that prior to the time of the procedure; I have explained to the patient and/or his/her legal representative, the risks and benefits involved in the proposed treatment and any reasonable alternative to the proposed treatment.  I have also explained the risks and benefits involved in refusal of the proposed treatment and alternatives to the proposed treatment and have answered the patient's questions.  If I have a significant financial interest in a co-management agreement or a significant financial interest in any product or implant, or other significant relationship used in this procedure/surgery, I have disclosed this and had a discussion with my patient.     _______________________________________________________________ _____________________________  Julian Sanchez Physician)                                                                                         (Date)                                   (Time)  Patient Name: Mark Crespo    : 1940   Printed: 10/17/2023      Medical Record #: [id number]                                              Page 1 of 1

## (undated) NOTE — LETTER
04/23/19        Ke Bui  [facility]      Dear Jose Cuevas,    [address] records indicate that you have outstanding lab work and or testing that was ordered for you and has not yet been completed:  Orders Placed This Encounter

## (undated) NOTE — LETTER
Guille Marie [address], [place], [place]  [address]  INFORMED CONSENT FOR TRANSFUSION OF BLOOD OR BLOOD PRODUCTS  My physician has informed me of the nature, purpose, benefits and risks of transfusion for blood and blood components that he/she may deem necessary during my treatment or hospitalization. He/she has also discussed alternatives to receiving blood from the voluntary blood supply with me, such as self-donation (autologous) and directed donation (blood donated by family or friends to be used specifically for me). I further understand that while the [address] [place] [address] will attempt to supply any autologous or directed donor blood prior to transfusion of blood from the routine blood supply, medical circumstances may require that other or additional blood components may be required for my care. In giving consent, I acknowledge that my physician has also informed me that despite careful screening and testing in accordance with national and regional regulations, there is still a small risk of transmission of infectious agents including hepatitis, HIV-1/2, cytomegalovirus and other viruses or diseases as yet unknown for which licensed definitive screening tests do not currently exist. Additionally, my physician has informed me of the potential for transfusion reactions not related to an infectious agent. [  ]  Check here for Recurring Outpatient Transfusion Therapy (valid for 1 year) In addition to the above, my physician has informed me that I shall receive numerous transfusions over a period of time and that these can lead to other increased risks. I hereby authorize the transfusion of blood and/or blood products to me as deemed necessary and ordered by physicians participating in my care.  My physician has given me the opportunity to ask questions and any questions asked have been answered to my satisfaction  __________________________________________ ______________________________________________  (Signature of Patient)                                                            (Responsible party in case of Minor,                                                                                                 Incompetent, or unconscious Patient)  ___________________________________________       ________ ______________________________________  (Relationship to Patient)                                                       (Signature of Witness)  ______________________________________________________________________________________________   (Date)                                                                           (Time)  REFUSAL OF CONSENT FOR BLOOD TRANSFUSIONS   Sign only if Refusing   [  ] I understand refusal of blood or blood products as deemed necessary by my physician may have serious consequences to my condition to include possible death.  I hereby assume responsibility for my refusal and release the hospital, its personnel, and my physicians from any responsibility for the consequences of my refusal.    ________________________________________________________________________________  (Signature of Patient)                                                         (Responsible Party/Relationship to Patient)    ________________________________________________________________________________  (Signature of Witness)                                                       (Date/Time)     Patient Name: Darryl Vivas     : 1940                 Printed: 2022      Medical Record #: [id number]                                 Page 1 of 1

## (undated) NOTE — LETTER
08/03/20      Re:   [facility]  [address]  [place] [address]    Dr. Alexandria River,     As requested, I performed a pre op medical clearance for Minerva Micah 7/31/20 and he is approved to complete renal biopsy.      Thank you,      Dr. Liz Landis

## (undated) NOTE — LETTER
22    Patient: Deidre Topete  : 1940 Visit date: 2022    Dear  Maggie Roman MD    Thank you for referring Deidre Topete to my practice. Please find my assessment and plan below.        Assessment   Ischemic colitis ([facility] [address]  (primary enc masses or hernias. The patient has several abdominal scars from prior surgeries. He has a Kocher and midline incision below the umbilicus  Body mass index is 21.38 kg/m².      I discussed the case with Dr. Jewel Hernandez and the working diagnosis is ischemic coliti

## (undated) NOTE — LETTER
[facility]  Administration of Anesthesia  1. Lazaro Varela, or _________________________________ acting on his behalf, (Patient) (Dependent/Representative) request to receive anesthesia for my pending procedure/operation/treatment.   A bleeding, seizure, cardiac arrest and death. 7. AWARENESS: I understand that it is possible (but unlikely) to have explicit memory of events from the operating room while under general anesthesia.   8. ELECTROCONVULSIVE THERAPY PATIENTS: This consent serve below affirms that prior to the time of the procedure, I have explained to the patient and/or his/her guardian, the risks and benefits of undergoing anesthesia, as well as any reasonable alternatives.     ___________________________________________________

## (undated) NOTE — LETTER
December 10, 2021    Tramaine [id number]  [address] [facility] [address]    Dear Asad Brumfield: It was a pleasure speaking with you over the phone recently.  To follow up, I wanted to send you some contact information to utilize when you hav [address]  (Office [phone]  [email or web address]

## (undated) NOTE — Clinical Note
TCM assessment completed.  The patient is scheduled for a TCM/HFU with you tomorrow 08/08/2024.  Thank you.

## (undated) NOTE — MR AVS SNAPSHOT
[address], [address] [place], [place] [id number] [phone]               Thank you for choosing us for your health care visit with Hoang Causey MD.  We are glad to serve you and happy to provide you with this summa Follow - Up           Medical Issues Discussed Today     Aortic stenosis    CAD (coronary artery disease)    PMR (polymyalgia rheumatica)    S/P AVR    S/P CABG (coronary artery bypass graft)    Family history of pulmonary fibrosis          Instructions a Vitamin D3 5000 units Caps   daily. Vitamin K 100 MCG Tabs   Take 100 mcg by mouth daily.                    MyChart     Visit MyChart  You can access your MyChart to more actively manage your health care and view more details from this visit by

## (undated) NOTE — MR AVS SNAPSHOT
[address]  [phone]               Thank you for choosing us for your health care visit with [facility].   We are glad to serve you and happy to provide you with this summary o TAKE ONE TABLET BY MOUTH ONCE DAILY AT  NIGHTTIME           Ibuprofen 200 MG Caps   3 tabs tid pc   What changed:    - when to take this  - reasons to take this  - additional instructions   Commonly known as:  ADVIL           JUICE PLUS FIBRE OR   Take  by

## (undated) NOTE — LETTER
[address]  Authorization for Surgical Operation and Procedure                                                                                           1. I hereby authorize Isma Kulkarni MD, my physician and his/her assistants (if applicable), which may include medical students, residents, and/or fellows, to perform the following surgical operation/ procedure and administer such anesthesia as may be determined necessary by my physician: Operation/Procedure name (s) Right minimally invasive lumbar one to two laminoforaminotomy and discectomy on Ulsterefrain Kam Addy   2. I recognize that during the surgical operation/procedure, unforeseen conditions may necessitate additional or different procedures than those listed above. I, therefore, further authorize and request that the above-named surgeon, assistants, or designees perform such procedures as are, in their judgment, necessary and desirable. 3.   My surgeon/physician has discussed prior to my surgery the potential benefits, risks and side effects of this procedure; the likelihood of achieving goals; and potential problems that might occur during recuperation. They also discussed reasonable alternatives to the procedure, including risks, benefits, and side effects related to the alternatives and risks related to not receiving this procedure. I have had all my questions answered and I acknowledge that no guarantee has been made as to the result that may be obtained. 4.   Should the need arise during my operation/procedure, which includes change of level of care prior to discharge, I also consent to the administration of blood and/or blood products. Further, I understand that despite careful testing and screening of blood or blood products by collecting agencies, I may still be subject to ill effects as a result of receiving a blood transfusion and/or blood products.   The following are some, but not all, of the potential risks that can occur: fever and allergic reactions, hemolytic reactions, transmission of diseases such as Hepatitis, AIDS and Cytomegalovirus (CMV) and fluid overload. In the event that I wish to have an autologous transfusion of my own blood, or a directed donor transfusion, I will discuss this with my physician. Check only if Refusing Blood or Blood Products  I understand refusal of blood or blood products as deemed necessary by my physician may have serious consequences to my condition to include possible death. I hereby assume responsibility for my refusal and release the hospital, its personnel, and my physicians from any responsibility for the consequences of my refusal.    o  Refuse   5. I authorize the use of any specimen, organs, tissues, body parts or foreign objects that may be removed from my body during the operation/procedure for diagnosis, research or teaching purposes and their subsequent disposal by hospital authorities. I also authorize the release of specimen test results and/or written reports to my treating physician on the hospital medical staff or other referring or consulting physicians involved in my care, at the discretion of the Pathologist or my treating physician. 6.   I consent to the photographing or videotaping of the operations or procedures to be performed, including appropriate portions of my body for medical, scientific, or educational purposes, provided my identity is not revealed by the pictures or by descriptive texts accompanying them. If the procedure has been photographed/videotaped, the surgeon will obtain the original picture, image, videotape or CD. The hospital will not be responsible for storage, release or maintenance of the picture, image, tape or CD.    7.   I consent to the presence of a  or observers in the operating room as deemed necessary by my physician or their designees.     8.   I recognize that in the event my procedure results in extended X-Ray/fluoroscopy time, I may develop a skin reaction. 9. If I have a Do Not Attempt Resuscitation (DNAR) order in place, that status will be suspended while in the operating room, procedural suite, and during the recovery period unless otherwise explicitly stated by me (or a person authorized to consent on my behalf). The surgeon or my attending physician will determine when the applicable recovery period ends for purposes of reinstating the DNAR order. 10. Patients having a sterilization procedure: I understand that if the procedure is successful the results will be permanent and it will therefore be impossible for me to inseminate, conceive, or bear children. I also understand that the procedure is intended to result in sterility, although the result has not been guaranteed. 11. I acknowledge that my physician has explained sedation/analgesia administration to me including the risk and benefits I consent to the administration of sedation/analgesia as may be necessary or desirable in the judgment of my physician. I CERTIFY THAT I HAVE READ AND FULLY UNDERSTAND THE ABOVE CONSENT TO OPERATION and/or OTHER PROCEDURE.     _________________________________________ _________________________________     ___________________________________  Signature of Patient     Signature of Responsible Person                   Printed Name of Responsible Person                              _________________________________________ ______________________________        ___________________________________  Signature of Witness         Date  Time         Relationship to Patient    STATEMENT OF PHYSICIAN My signature below affirms that prior to the time of the procedure; I have explained to the patient and/or his/her legal representative, the risks and benefits involved in the proposed treatment and any reasonable alternative to the proposed treatment.  I have also explained the risks and benefits involved in refusal of the proposed treatment and alternatives to the proposed treatment and have answered the patient's questions.  If I have a significant financial interest in a co-management agreement or a significant financial interest in any product or implant, or other significant relationship used in this procedure/surgery, I have disclosed this and had a discussion with my patient.     _______________________________________________________________ _____________________________  Vivian Sharla of Physician)                                                                                         (Date)                                   (Time)  Patient Name: Mely Macario    : 1940   Printed: 2022      Medical Record #: [id number]                                              Page 1 of 1

## (undated) NOTE — Clinical Note
JOZEF, TCM call made, see notes. ELLI confirmed with patient that he has a HFU on 1/16/2020 with Dr. Ibrahim Monday, ELLI changed visit type to TCM HFU.

## (undated) NOTE — IP AVS SNAPSHOT
[facility]            (For Outpatient Use Only) Initial Admit Date: 2022   Inpt/Obs Admit Date: Inpt: 22 / Obs: N/A   Discharge Date:    Zachary Harmon:  [de-identified]   MRN: [de-identified]   CSN: [id number]   CEID: [id number]        ENCOUNTER  Patient Class: Inpatient Admitting Provider: Kody Vivar MD Unit: [address] [place] [address]/   Hospital Service: Medical Attending Provider: Shane Ralph MD   Bed: 409-A   Visit Type:   Referring Physician: No ref. provider found Billing Flag:    Admit Diagnosis: Intractable back pain [M54.9]      PATIENT  Legal Name:   Sal Jennings   Legal Sex: Male  Gender ID: Male             [address] [place] [address],[facility] Floor Name:    PCP:  Judd Schultz MD Home: [phone]   Address:  [address] [place], [place] [address] : 1940 (82 yrs) Mobile: [phone]         City/State/Zip: [place] [address] [place]  Marital:  Language: Dulce Maria liao: Bin SSN4: [id number] Zoroastrian: [facility] Mormonism - [employer]*     Race: White Ethnicity: Non  Or [address] [place] [address] CONTACT   Name Relationship Legal Guardian? Home Phone Work Phone Mobile Phone   Perfecto Kwon Spouse  Daughter          [phone]     GUARANTOR  Guarantor: Edwin Marshall : 1940 Home Phone: [phone]   Address: [address] [place] [address] [id number]  Sex:  Male Work Phone:    [place]Zip[place] [address] [place]    Rel. to Patient: Self Guarantor ID: [id number]   [address]   Employer:  Status: RETIRED     COVERAGE  PRIMARY INSURANCE   Payor: MEDICARE Plan: MEDICARE PART A&B   Group Number:  Insurance Type: INDEMNITY   Subscriber Name: Moise Roberts : 1940   Subscriber ID: [id number] Pt Rel to Subscriber: Self   SECONDARY INSURANCE   Payor: [place] Plan: [facility]   Group Number: [id number] Insurance Type: [id number] Name: Moise Roberts : 1940   Subscriber ID: [id number] Pt Rel to Subscriber: [address] [place] [address] Payor:  Plan:    Group Number:  Insurance Type:    Subscriber Name:  Subscriber :    Subscriber ID:  Pt Rel to Subscriber:    Hospital Account Financial Class: Medicare    2022

## (undated) NOTE — LETTER
[address] [place] [address]  Authorization for Invasive Procedures  1.  I hereby authorize Dr. Teddy Gomez , my physician and whomever may be designated as the doctor's assistant, to perform the following operation and/or procedure:  C risks that can occur: fever and allergic reactions, hemolytic reactions, transmission of disease such as hepatitis, AIDS, cytomegalovirus (CMV), and flluid overload.  In the event that I wish to have autologous transfusions of my own blood, or a directed do of my physician.      Signature of Patient:  ________________________________________________ Date: _________Time: _________    Responsible person in case of minor or unconscious: _____________________________Relationship: ____________     Witness Signature

## (undated) NOTE — LETTER
02/27/18    Adalberto Morrison. There are some tests that Dr. Cynthia Miller has asked that you complete. I have detailed them below along with the dates he'd like you to complete them.      Please take a look at everything and if you have any questions, feel free t

## (undated) NOTE — LETTER
22    Patient: JoseA Campos  : 1940 Visit date: 2022    Dear  Agustina Morton MD    Thank you for referring Seemabello Andres to my practice. Please find my assessment and plan below.     Assessment   Ischemic colitis ([facility] [address]  (primary encoun not fit the typical clinical picture of ischemic colitis. He has definitely improved at this time.     I discussed with the patient that he may have had a bad food poisoning, or it could be fine vessel disease, however he should continue to monitor his sym